# Patient Record
Sex: FEMALE | Race: WHITE | NOT HISPANIC OR LATINO
[De-identification: names, ages, dates, MRNs, and addresses within clinical notes are randomized per-mention and may not be internally consistent; named-entity substitution may affect disease eponyms.]

---

## 2017-04-19 PROBLEM — Z00.00 ENCOUNTER FOR PREVENTIVE HEALTH EXAMINATION: Status: ACTIVE | Noted: 2017-04-19

## 2017-04-27 ENCOUNTER — RESULT REVIEW (OUTPATIENT)
Age: 77
End: 2017-04-27

## 2017-04-27 ENCOUNTER — OUTPATIENT (OUTPATIENT)
Dept: OUTPATIENT SERVICES | Facility: HOSPITAL | Age: 77
LOS: 1 days | Discharge: HOME | End: 2017-04-27
Payer: MEDICARE

## 2017-04-27 DIAGNOSIS — C82.97 FOLLICULAR LYMPHOMA, UNSPECIFIED, SPLEEN: ICD-10-CM

## 2017-04-28 ENCOUNTER — LABORATORY RESULT (OUTPATIENT)
Age: 77
End: 2017-04-28

## 2017-04-28 ENCOUNTER — OUTPATIENT (OUTPATIENT)
Dept: OUTPATIENT SERVICES | Facility: HOSPITAL | Age: 77
LOS: 1 days | End: 2017-04-28
Payer: MEDICARE

## 2017-04-28 ENCOUNTER — APPOINTMENT (OUTPATIENT)
Dept: HEMATOLOGY ONCOLOGY | Facility: CLINIC | Age: 77
End: 2017-04-28

## 2017-04-28 ENCOUNTER — RESULT REVIEW (OUTPATIENT)
Age: 77
End: 2017-04-28

## 2017-04-28 VITALS
WEIGHT: 113.98 LBS | OXYGEN SATURATION: 98 % | RESPIRATION RATE: 16 BRPM | TEMPERATURE: 98.3 F | DIASTOLIC BLOOD PRESSURE: 96 MMHG | HEART RATE: 95 BPM | HEIGHT: 62.8 IN | SYSTOLIC BLOOD PRESSURE: 149 MMHG | BODY MASS INDEX: 20.2 KG/M2

## 2017-04-28 DIAGNOSIS — C82.97 FOLLICULAR LYMPHOMA, UNSPECIFIED, SPLEEN: ICD-10-CM

## 2017-04-28 LAB
BASOPHILS # BLD AUTO: 0 K/UL — SIGNIFICANT CHANGE UP (ref 0–0.2)
BASOPHILS NFR BLD AUTO: 0.7 % — SIGNIFICANT CHANGE UP (ref 0–2)
EOSINOPHIL # BLD AUTO: 0.2 K/UL — SIGNIFICANT CHANGE UP (ref 0–0.5)
EOSINOPHIL NFR BLD AUTO: 2.8 % — SIGNIFICANT CHANGE UP (ref 0–6)
HCT VFR BLD CALC: 35.1 % — SIGNIFICANT CHANGE UP (ref 34.5–45)
HGB BLD-MCNC: 11.9 G/DL — SIGNIFICANT CHANGE UP (ref 11.5–15.5)
LYMPHOCYTES # BLD AUTO: 1.8 K/UL — SIGNIFICANT CHANGE UP (ref 1–3.3)
LYMPHOCYTES # BLD AUTO: 30.3 % — SIGNIFICANT CHANGE UP (ref 13–44)
MCHC RBC-ENTMCNC: 30.6 PG — SIGNIFICANT CHANGE UP (ref 27–34)
MCHC RBC-ENTMCNC: 33.9 G/DL — SIGNIFICANT CHANGE UP (ref 32–36)
MCV RBC AUTO: 90.3 FL — SIGNIFICANT CHANGE UP (ref 80–100)
MONOCYTES # BLD AUTO: 0.9 K/UL — SIGNIFICANT CHANGE UP (ref 0–0.9)
MONOCYTES NFR BLD AUTO: 15.2 % — HIGH (ref 2–14)
NEUTROPHILS # BLD AUTO: 3 K/UL — SIGNIFICANT CHANGE UP (ref 1.8–7.4)
NEUTROPHILS NFR BLD AUTO: 50.9 % — SIGNIFICANT CHANGE UP (ref 43–77)
PLATELET # BLD AUTO: 208 K/UL — SIGNIFICANT CHANGE UP (ref 150–400)
RBC # BLD: 3.89 M/UL — SIGNIFICANT CHANGE UP (ref 3.8–5.2)
RBC # FLD: 13.1 % — SIGNIFICANT CHANGE UP (ref 10.3–14.5)
WBC # BLD: 5.9 K/UL — SIGNIFICANT CHANGE UP (ref 3.8–10.5)
WBC # FLD AUTO: 5.9 K/UL — SIGNIFICANT CHANGE UP (ref 3.8–10.5)

## 2017-04-28 PROCEDURE — 88280 CHROMOSOME KARYOTYPE STUDY: CPT

## 2017-04-28 PROCEDURE — 88264 CHROMOSOME ANALYSIS 20-25: CPT

## 2017-04-28 PROCEDURE — 85097 BONE MARROW INTERPRETATION: CPT

## 2017-04-28 PROCEDURE — 88313 SPECIAL STAINS GROUP 2: CPT

## 2017-04-28 PROCEDURE — 88189 FLOWCYTOMETRY/READ 16 & >: CPT

## 2017-04-28 PROCEDURE — 88185 FLOWCYTOMETRY/TC ADD-ON: CPT

## 2017-04-28 PROCEDURE — 88313 SPECIAL STAINS GROUP 2: CPT | Mod: 26

## 2017-04-28 PROCEDURE — 88237 TISSUE CULTURE BONE MARROW: CPT

## 2017-04-28 PROCEDURE — 88305 TISSUE EXAM BY PATHOLOGIST: CPT | Mod: 26

## 2017-04-28 PROCEDURE — 88305 TISSUE EXAM BY PATHOLOGIST: CPT

## 2017-04-28 PROCEDURE — 88184 FLOWCYTOMETRY/ TC 1 MARKER: CPT

## 2017-04-29 LAB
ALBUMIN SERPL ELPH-MCNC: 4.1 G/DL
ALP BLD-CCNC: 73 U/L
ALT SERPL-CCNC: 11 U/L
ANION GAP SERPL CALC-SCNC: 14 MMOL/L
AST SERPL-CCNC: 16 U/L
BILIRUB SERPL-MCNC: 1.1 MG/DL
BUN SERPL-MCNC: 10 MG/DL
CALCIUM SERPL-MCNC: 9.4 MG/DL
CHLORIDE SERPL-SCNC: 96 MMOL/L
CO2 SERPL-SCNC: 22 MMOL/L
CREAT SERPL-MCNC: 0.49 MG/DL
GLUCOSE SERPL-MCNC: 90 MG/DL
HCV AB SER QL: NONREACTIVE
HCV S/CO RATIO: 0.11 S/CO
HIV1+2 AB SPEC QL IA.RAPID: NONREACTIVE
LDH SERPL-CCNC: 196 U/L
POTASSIUM SERPL-SCNC: 4.2 MMOL/L
PROT SERPL-MCNC: 6 G/DL
SODIUM SERPL-SCNC: 132 MMOL/L
TSH SERPL-ACNC: 1.95 UIU/ML

## 2017-04-29 RX ORDER — PREDNISONE 20 MG/1
20 TABLET ORAL
Qty: 120 | Refills: 0 | Status: DISCONTINUED | COMMUNITY
Start: 2017-04-04

## 2017-04-29 RX ORDER — DILTIAZEM HYDROCHLORIDE 120 MG/1
120 CAPSULE, EXTENDED RELEASE ORAL
Qty: 90 | Refills: 0 | Status: DISCONTINUED | COMMUNITY
Start: 2017-03-20

## 2017-04-29 RX ORDER — CEFUROXIME AXETIL 500 MG/1
500 TABLET ORAL
Qty: 20 | Refills: 0 | Status: DISCONTINUED | COMMUNITY
Start: 2017-02-23

## 2017-04-29 RX ORDER — PROPRANOLOL HYDROCHLORIDE 10 MG/1
10 TABLET ORAL
Qty: 180 | Refills: 0 | Status: DISCONTINUED | COMMUNITY
Start: 2016-10-07

## 2017-04-29 RX ORDER — FLUTICASONE PROPIONATE 50 UG/1
50 SPRAY, METERED NASAL
Qty: 16 | Refills: 0 | Status: DISCONTINUED | COMMUNITY
Start: 2017-02-23

## 2017-04-29 RX ORDER — LEVOFLOXACIN 500 MG/1
500 TABLET, FILM COATED ORAL
Qty: 10 | Refills: 0 | Status: DISCONTINUED | COMMUNITY
Start: 2017-02-28

## 2017-04-29 RX ORDER — METOPROLOL TARTRATE 25 MG/1
25 TABLET, FILM COATED ORAL
Qty: 30 | Refills: 0 | Status: DISCONTINUED | COMMUNITY
Start: 2017-03-08

## 2017-05-01 ENCOUNTER — RESULT REVIEW (OUTPATIENT)
Age: 77
End: 2017-05-01

## 2017-05-03 LAB
HEMATOPATHOLOGY REPORT: SIGNIFICANT CHANGE UP
TM INTERPRETATION: SIGNIFICANT CHANGE UP

## 2017-05-04 ENCOUNTER — RESULT REVIEW (OUTPATIENT)
Age: 77
End: 2017-05-04

## 2017-05-04 ENCOUNTER — LABORATORY RESULT (OUTPATIENT)
Age: 77
End: 2017-05-04

## 2017-05-04 ENCOUNTER — APPOINTMENT (OUTPATIENT)
Dept: INFUSION THERAPY | Facility: HOSPITAL | Age: 77
End: 2017-05-04

## 2017-05-04 LAB
BASOPHILS # BLD AUTO: 0 K/UL — SIGNIFICANT CHANGE UP (ref 0–0.2)
BASOPHILS NFR BLD AUTO: 0.1 % — SIGNIFICANT CHANGE UP (ref 0–2)
EOSINOPHIL # BLD AUTO: 0.1 K/UL — SIGNIFICANT CHANGE UP (ref 0–0.5)
EOSINOPHIL NFR BLD AUTO: 2.1 % — SIGNIFICANT CHANGE UP (ref 0–6)
HCT VFR BLD CALC: 33.8 % — LOW (ref 34.5–45)
HGB BLD-MCNC: 11.9 G/DL — SIGNIFICANT CHANGE UP (ref 11.5–15.5)
LYMPHOCYTES # BLD AUTO: 1.5 K/UL — SIGNIFICANT CHANGE UP (ref 1–3.3)
LYMPHOCYTES # BLD AUTO: 26.2 % — SIGNIFICANT CHANGE UP (ref 13–44)
MCHC RBC-ENTMCNC: 31.8 PG — SIGNIFICANT CHANGE UP (ref 27–34)
MCHC RBC-ENTMCNC: 35.2 G/DL — SIGNIFICANT CHANGE UP (ref 32–36)
MCV RBC AUTO: 90.3 FL — SIGNIFICANT CHANGE UP (ref 80–100)
MONOCYTES # BLD AUTO: 1 K/UL — HIGH (ref 0–0.9)
MONOCYTES NFR BLD AUTO: 16.6 % — HIGH (ref 2–14)
NEUTROPHILS # BLD AUTO: 3.2 K/UL — SIGNIFICANT CHANGE UP (ref 1.8–7.4)
NEUTROPHILS NFR BLD AUTO: 55.1 % — SIGNIFICANT CHANGE UP (ref 43–77)
PLATELET # BLD AUTO: 238 K/UL — SIGNIFICANT CHANGE UP (ref 150–400)
RBC # BLD: 3.74 M/UL — LOW (ref 3.8–5.2)
RBC # FLD: 13 % — SIGNIFICANT CHANGE UP (ref 10.3–14.5)
WBC # BLD: 5.8 K/UL — SIGNIFICANT CHANGE UP (ref 3.8–10.5)
WBC # FLD AUTO: 5.8 K/UL — SIGNIFICANT CHANGE UP (ref 3.8–10.5)

## 2017-05-05 ENCOUNTER — RESULT REVIEW (OUTPATIENT)
Age: 77
End: 2017-05-05

## 2017-05-05 ENCOUNTER — APPOINTMENT (OUTPATIENT)
Dept: INFUSION THERAPY | Facility: HOSPITAL | Age: 77
End: 2017-05-05

## 2017-05-05 DIAGNOSIS — R11.2 NAUSEA WITH VOMITING, UNSPECIFIED: ICD-10-CM

## 2017-05-05 DIAGNOSIS — Z51.11 ENCOUNTER FOR ANTINEOPLASTIC CHEMOTHERAPY: ICD-10-CM

## 2017-05-05 DIAGNOSIS — E86.0 DEHYDRATION: ICD-10-CM

## 2017-05-05 LAB
BUN SERPL-MCNC: 13 MG/DL — SIGNIFICANT CHANGE UP (ref 7–23)
CA-I BLDA-SCNC: 1.22 MMOL/L — SIGNIFICANT CHANGE UP (ref 1.12–1.3)
CHLORIDE SERPL-SCNC: 92 MMOL/L — LOW (ref 96–108)
CO2 SERPL-SCNC: 28 MMOL/L — SIGNIFICANT CHANGE UP (ref 22–31)
CREAT SERPL-MCNC: 0.6 MG/DL — SIGNIFICANT CHANGE UP (ref 0.5–1.3)
GLUCOSE SERPL-MCNC: 95 MG/DL — SIGNIFICANT CHANGE UP (ref 70–99)
POTASSIUM SERPL-MCNC: 4.6 MMOL/L — SIGNIFICANT CHANGE UP (ref 3.5–5.3)
POTASSIUM SERPL-SCNC: 4.6 MMOL/L — SIGNIFICANT CHANGE UP (ref 3.5–5.3)
SODIUM SERPL-SCNC: 129 MMOL/L — LOW (ref 135–145)

## 2017-05-08 ENCOUNTER — RESULT REVIEW (OUTPATIENT)
Age: 77
End: 2017-05-08

## 2017-05-08 ENCOUNTER — APPOINTMENT (OUTPATIENT)
Dept: HEMATOLOGY ONCOLOGY | Facility: CLINIC | Age: 77
End: 2017-05-08

## 2017-05-08 DIAGNOSIS — Z51.89 ENCOUNTER FOR OTHER SPECIFIED AFTERCARE: ICD-10-CM

## 2017-05-08 DIAGNOSIS — C82.02 FOLLICULAR LYMPHOMA GRADE I, INTRATHORACIC LYMPH NODES: ICD-10-CM

## 2017-05-08 LAB
24R-OH-CALCIDIOL SERPL-MCNC: 20.4 NG/ML — LOW (ref 30–100)
ALBUMIN SERPL ELPH-MCNC: 4.2 G/DL — SIGNIFICANT CHANGE UP (ref 3.3–5)
ALP SERPL-CCNC: 119 U/L — SIGNIFICANT CHANGE UP (ref 40–120)
ALT FLD-CCNC: 14 U/L — SIGNIFICANT CHANGE UP (ref 10–45)
ANION GAP SERPL CALC-SCNC: 19 MMOL/L — HIGH (ref 5–17)
AST SERPL-CCNC: 22 U/L — SIGNIFICANT CHANGE UP (ref 10–40)
BASOPHILS # BLD AUTO: 0 K/UL — SIGNIFICANT CHANGE UP (ref 0–0.2)
BASOPHILS NFR BLD AUTO: 2 % — SIGNIFICANT CHANGE UP (ref 0–2)
BILIRUB SERPL-MCNC: 1.8 MG/DL — HIGH (ref 0.2–1.2)
BUN SERPL-MCNC: 3 MG/DL — LOW (ref 7–23)
BUN SERPL-MCNC: 7 MG/DL — SIGNIFICANT CHANGE UP (ref 7–23)
CA-I BLDA-SCNC: 1.17 MMOL/L — SIGNIFICANT CHANGE UP (ref 1.12–1.3)
CALCIUM SERPL-MCNC: 9.7 MG/DL — SIGNIFICANT CHANGE UP (ref 8.4–10.5)
CHLORIDE SERPL-SCNC: 91 MMOL/L — LOW (ref 96–108)
CHLORIDE SERPL-SCNC: 92 MMOL/L — LOW (ref 96–108)
CO2 SERPL-SCNC: 23 MMOL/L — SIGNIFICANT CHANGE UP (ref 22–31)
CO2 SERPL-SCNC: 26 MMOL/L — SIGNIFICANT CHANGE UP (ref 22–31)
CREAT SERPL-MCNC: 0.5 MG/DL — SIGNIFICANT CHANGE UP (ref 0.5–1.3)
CREAT SERPL-MCNC: 0.52 MG/DL — SIGNIFICANT CHANGE UP (ref 0.5–1.3)
EOSINOPHIL # BLD AUTO: 0.1 K/UL — SIGNIFICANT CHANGE UP (ref 0–0.5)
GLUCOSE SERPL-MCNC: 82 MG/DL — SIGNIFICANT CHANGE UP (ref 70–99)
GLUCOSE SERPL-MCNC: 90 MG/DL — SIGNIFICANT CHANGE UP (ref 70–99)
HCT VFR BLD CALC: 36 % — SIGNIFICANT CHANGE UP (ref 34.5–45)
HGB BLD-MCNC: 12.6 G/DL — SIGNIFICANT CHANGE UP (ref 11.5–15.5)
LDH SERPL L TO P-CCNC: 294 U/L — HIGH (ref 50–242)
LYMPHOCYTES # BLD AUTO: 0.8 K/UL — LOW (ref 1–3.3)
LYMPHOCYTES # BLD AUTO: 2 % — LOW (ref 13–44)
MCHC RBC-ENTMCNC: 31.7 PG — SIGNIFICANT CHANGE UP (ref 27–34)
MCHC RBC-ENTMCNC: 35 G/DL — SIGNIFICANT CHANGE UP (ref 32–36)
MCV RBC AUTO: 90.7 FL — SIGNIFICANT CHANGE UP (ref 80–100)
MONOCYTES # BLD AUTO: 2 K/UL — HIGH (ref 0–0.9)
MONOCYTES NFR BLD AUTO: 8 % — SIGNIFICANT CHANGE UP (ref 2–14)
NEUTROPHILS # BLD AUTO: 47.9 K/UL — HIGH (ref 1.8–7.4)
NEUTROPHILS NFR BLD AUTO: 86 % — HIGH (ref 43–77)
NEUTS BAND # BLD: 2 % — SIGNIFICANT CHANGE UP (ref 0–8)
PLAT MORPH BLD: NORMAL — SIGNIFICANT CHANGE UP
PLATELET # BLD AUTO: 262 K/UL — SIGNIFICANT CHANGE UP (ref 150–400)
POTASSIUM SERPL-MCNC: 3.3 MMOL/L — LOW (ref 3.5–5.3)
POTASSIUM SERPL-MCNC: 3.5 MMOL/L — SIGNIFICANT CHANGE UP (ref 3.5–5.3)
POTASSIUM SERPL-SCNC: 3.3 MMOL/L — LOW (ref 3.5–5.3)
POTASSIUM SERPL-SCNC: 3.5 MMOL/L — SIGNIFICANT CHANGE UP (ref 3.5–5.3)
PROT SERPL-MCNC: 6.2 G/DL — SIGNIFICANT CHANGE UP (ref 6–8.3)
RBC # BLD: 3.98 M/UL — SIGNIFICANT CHANGE UP (ref 3.8–5.2)
RBC # FLD: 13.3 % — SIGNIFICANT CHANGE UP (ref 10.3–14.5)
RBC BLD AUTO: NORMAL — SIGNIFICANT CHANGE UP
SODIUM SERPL-SCNC: 133 MMOL/L — LOW (ref 135–145)
SODIUM SERPL-SCNC: 134 MMOL/L — LOW (ref 135–145)
WBC # BLD: 50.8 K/UL — CRITICAL HIGH (ref 3.8–10.5)
WBC # FLD AUTO: 50.8 K/UL — CRITICAL HIGH (ref 3.8–10.5)

## 2017-05-09 ENCOUNTER — APPOINTMENT (OUTPATIENT)
Dept: HEMATOLOGY ONCOLOGY | Facility: CLINIC | Age: 77
End: 2017-05-09

## 2017-05-11 ENCOUNTER — RESULT REVIEW (OUTPATIENT)
Age: 77
End: 2017-05-11

## 2017-05-11 PROCEDURE — 88321 CONSLTJ&REPRT SLD PREP ELSWR: CPT

## 2017-05-12 LAB — CHROM ANALY OVERALL INTERP SPEC-IMP: SIGNIFICANT CHANGE UP

## 2017-05-15 ENCOUNTER — LABORATORY RESULT (OUTPATIENT)
Age: 77
End: 2017-05-15

## 2017-05-15 ENCOUNTER — RESULT REVIEW (OUTPATIENT)
Age: 77
End: 2017-05-15

## 2017-05-15 ENCOUNTER — APPOINTMENT (OUTPATIENT)
Dept: HEMATOLOGY ONCOLOGY | Facility: CLINIC | Age: 77
End: 2017-05-15

## 2017-05-15 VITALS
BODY MASS INDEX: 21.22 KG/M2 | TEMPERATURE: 100.1 F | HEART RATE: 148 BPM | OXYGEN SATURATION: 97 % | RESPIRATION RATE: 18 BRPM | DIASTOLIC BLOOD PRESSURE: 82 MMHG | WEIGHT: 119.05 LBS | SYSTOLIC BLOOD PRESSURE: 136 MMHG

## 2017-05-15 LAB
BASOPHILS # BLD AUTO: 0 K/UL — SIGNIFICANT CHANGE UP (ref 0–0.2)
BASOPHILS NFR BLD AUTO: 0.2 % — SIGNIFICANT CHANGE UP (ref 0–2)
BUN SERPL-MCNC: 6 MG/DL — LOW (ref 7–23)
CA-I BLDA-SCNC: 1.08 MMOL/L — LOW (ref 1.12–1.3)
CHLORIDE SERPL-SCNC: 91 MMOL/L — LOW (ref 96–108)
CO2 SERPL-SCNC: 25 MMOL/L — SIGNIFICANT CHANGE UP (ref 22–31)
CREAT SERPL-MCNC: 0.5 MG/DL — SIGNIFICANT CHANGE UP (ref 0.5–1.3)
EOSINOPHIL # BLD AUTO: 0.1 K/UL — SIGNIFICANT CHANGE UP (ref 0–0.5)
EOSINOPHIL NFR BLD AUTO: 0.3 % — SIGNIFICANT CHANGE UP (ref 0–6)
GLUCOSE SERPL-MCNC: 102 MG/DL — HIGH (ref 70–99)
HCT VFR BLD CALC: 33.3 % — LOW (ref 34.5–45)
HGB BLD-MCNC: 11.5 G/DL — SIGNIFICANT CHANGE UP (ref 11.5–15.5)
LYMPHOCYTES # BLD AUTO: 1 K/UL — SIGNIFICANT CHANGE UP (ref 1–3.3)
LYMPHOCYTES # BLD AUTO: 4.7 % — LOW (ref 13–44)
MCHC RBC-ENTMCNC: 31.5 PG — SIGNIFICANT CHANGE UP (ref 27–34)
MCHC RBC-ENTMCNC: 34.4 G/DL — SIGNIFICANT CHANGE UP (ref 32–36)
MCV RBC AUTO: 91.4 FL — SIGNIFICANT CHANGE UP (ref 80–100)
MONOCYTES # BLD AUTO: 1.4 K/UL — HIGH (ref 0–0.9)
MONOCYTES NFR BLD AUTO: 6.6 % — SIGNIFICANT CHANGE UP (ref 2–14)
NEUTROPHILS # BLD AUTO: 18.5 K/UL — HIGH (ref 1.8–7.4)
NEUTROPHILS NFR BLD AUTO: 88.2 % — HIGH (ref 43–77)
PLATELET # BLD AUTO: 111 K/UL — LOW (ref 150–400)
POTASSIUM SERPL-MCNC: 3.6 MMOL/L — SIGNIFICANT CHANGE UP (ref 3.5–5.3)
POTASSIUM SERPL-SCNC: 3.6 MMOL/L — SIGNIFICANT CHANGE UP (ref 3.5–5.3)
RBC # BLD: 3.64 M/UL — LOW (ref 3.8–5.2)
RBC # FLD: 14.2 % — SIGNIFICANT CHANGE UP (ref 10.3–14.5)
SODIUM SERPL-SCNC: 130 MMOL/L — LOW (ref 135–145)
SURGICAL PATHOLOGY STUDY: SIGNIFICANT CHANGE UP
WBC # BLD: 21 K/UL — HIGH (ref 3.8–10.5)
WBC # FLD AUTO: 21 K/UL — HIGH (ref 3.8–10.5)

## 2017-05-15 RX ORDER — APIXABAN 5 MG/1
5 TABLET, FILM COATED ORAL
Qty: 60 | Refills: 0 | Status: DISCONTINUED | COMMUNITY
Start: 2017-04-13 | End: 2017-05-15

## 2017-05-18 ENCOUNTER — RESULT REVIEW (OUTPATIENT)
Age: 77
End: 2017-05-18

## 2017-05-18 ENCOUNTER — APPOINTMENT (OUTPATIENT)
Dept: HEMATOLOGY ONCOLOGY | Facility: CLINIC | Age: 77
End: 2017-05-18

## 2017-05-18 LAB
BASOPHILS # BLD AUTO: 0 K/UL — SIGNIFICANT CHANGE UP (ref 0–0.2)
BASOPHILS NFR BLD AUTO: 0.1 % — SIGNIFICANT CHANGE UP (ref 0–2)
EOSINOPHIL # BLD AUTO: 0.2 K/UL — SIGNIFICANT CHANGE UP (ref 0–0.5)
EOSINOPHIL NFR BLD AUTO: 0.9 % — SIGNIFICANT CHANGE UP (ref 0–6)
HCT VFR BLD CALC: 32.8 % — LOW (ref 34.5–45)
HGB BLD-MCNC: 11 G/DL — LOW (ref 11.5–15.5)
LYMPHOCYTES # BLD AUTO: 0.7 K/UL — LOW (ref 1–3.3)
LYMPHOCYTES # BLD AUTO: 3.7 % — LOW (ref 13–44)
MCHC RBC-ENTMCNC: 31 PG — SIGNIFICANT CHANGE UP (ref 27–34)
MCHC RBC-ENTMCNC: 33.6 G/DL — SIGNIFICANT CHANGE UP (ref 32–36)
MCV RBC AUTO: 92.2 FL — SIGNIFICANT CHANGE UP (ref 80–100)
MONOCYTES # BLD AUTO: 0.8 K/UL — SIGNIFICANT CHANGE UP (ref 0–0.9)
MONOCYTES NFR BLD AUTO: 4.4 % — SIGNIFICANT CHANGE UP (ref 2–14)
NEUTROPHILS # BLD AUTO: 16.5 K/UL — HIGH (ref 1.8–7.4)
NEUTROPHILS NFR BLD AUTO: 90.9 % — HIGH (ref 43–77)
PLATELET # BLD AUTO: 166 K/UL — SIGNIFICANT CHANGE UP (ref 150–400)
RBC # BLD: 3.56 M/UL — LOW (ref 3.8–5.2)
RBC # FLD: 14.2 % — SIGNIFICANT CHANGE UP (ref 10.3–14.5)
WBC # BLD: 18.1 K/UL — HIGH (ref 3.8–10.5)
WBC # FLD AUTO: 18.1 K/UL — HIGH (ref 3.8–10.5)

## 2017-05-18 RX ORDER — OMEPRAZOLE 20 MG/1
20 CAPSULE, DELAYED RELEASE ORAL
Qty: 14 | Refills: 0 | Status: DISCONTINUED | COMMUNITY
Start: 2017-04-04 | End: 2017-05-18

## 2017-05-19 LAB
ALBUMIN SERPL ELPH-MCNC: 3.4 G/DL
ALP BLD-CCNC: 85 U/L
ALT SERPL-CCNC: 8 U/L
ANION GAP SERPL CALC-SCNC: 20 MMOL/L
AST SERPL-CCNC: 11 U/L
BACTERIA UR CULT: ABNORMAL
BILIRUB SERPL-MCNC: 1.9 MG/DL
BUN SERPL-MCNC: 22 MG/DL
CALCIUM SERPL-MCNC: 8.7 MG/DL
CHLORIDE SERPL-SCNC: 87 MMOL/L
CO2 SERPL-SCNC: 19 MMOL/L
CREAT SERPL-MCNC: 0.65 MG/DL
GLUCOSE SERPL-MCNC: 117 MG/DL
LDH SERPL-CCNC: 282 U/L
POTASSIUM SERPL-SCNC: 4.2 MMOL/L
PROT SERPL-MCNC: 5.6 G/DL
SODIUM SERPL-SCNC: 126 MMOL/L

## 2017-05-22 ENCOUNTER — LABORATORY RESULT (OUTPATIENT)
Age: 77
End: 2017-05-22

## 2017-05-22 ENCOUNTER — APPOINTMENT (OUTPATIENT)
Dept: HEMATOLOGY ONCOLOGY | Facility: CLINIC | Age: 77
End: 2017-05-22

## 2017-05-22 ENCOUNTER — RESULT REVIEW (OUTPATIENT)
Age: 77
End: 2017-05-22

## 2017-05-22 ENCOUNTER — FORM ENCOUNTER (OUTPATIENT)
Age: 77
End: 2017-05-22

## 2017-05-22 LAB
BASOPHILS # BLD AUTO: 0.1 K/UL — SIGNIFICANT CHANGE UP (ref 0–0.2)
BASOPHILS NFR BLD AUTO: 1.2 % — SIGNIFICANT CHANGE UP (ref 0–2)
BUN SERPL-MCNC: 8 MG/DL — SIGNIFICANT CHANGE UP (ref 7–23)
CA-I BLDA-SCNC: 1.14 MMOL/L — SIGNIFICANT CHANGE UP (ref 1.12–1.3)
CHLORIDE SERPL-SCNC: 92 MMOL/L — LOW (ref 96–108)
CO2 SERPL-SCNC: 25 MMOL/L — SIGNIFICANT CHANGE UP (ref 22–31)
CREAT SERPL-MCNC: 0.3 MG/DL — LOW (ref 0.5–1.3)
EOSINOPHIL # BLD AUTO: 0.1 K/UL — SIGNIFICANT CHANGE UP (ref 0–0.5)
EOSINOPHIL NFR BLD AUTO: 1.7 % — SIGNIFICANT CHANGE UP (ref 0–6)
GLUCOSE SERPL-MCNC: 91 MG/DL — SIGNIFICANT CHANGE UP (ref 70–99)
HCT VFR BLD CALC: 30.2 % — LOW (ref 34.5–45)
HGB BLD-MCNC: 10.3 G/DL — LOW (ref 11.5–15.5)
LYMPHOCYTES # BLD AUTO: 0.4 K/UL — LOW (ref 1–3.3)
LYMPHOCYTES # BLD AUTO: 8 % — LOW (ref 13–44)
MCHC RBC-ENTMCNC: 31.9 PG — SIGNIFICANT CHANGE UP (ref 27–34)
MCHC RBC-ENTMCNC: 34.2 G/DL — SIGNIFICANT CHANGE UP (ref 32–36)
MCV RBC AUTO: 93.3 FL — SIGNIFICANT CHANGE UP (ref 80–100)
MONOCYTES # BLD AUTO: 0.6 K/UL — SIGNIFICANT CHANGE UP (ref 0–0.9)
MONOCYTES NFR BLD AUTO: 12.9 % — SIGNIFICANT CHANGE UP (ref 2–14)
NEUTROPHILS # BLD AUTO: 3.4 K/UL — SIGNIFICANT CHANGE UP (ref 1.8–7.4)
NEUTROPHILS NFR BLD AUTO: 76.1 % — SIGNIFICANT CHANGE UP (ref 43–77)
PLATELET # BLD AUTO: 256 K/UL — SIGNIFICANT CHANGE UP (ref 150–400)
POTASSIUM SERPL-MCNC: 4.4 MMOL/L — SIGNIFICANT CHANGE UP (ref 3.5–5.3)
POTASSIUM SERPL-SCNC: 4.4 MMOL/L — SIGNIFICANT CHANGE UP (ref 3.5–5.3)
RBC # BLD: 3.24 M/UL — LOW (ref 3.8–5.2)
RBC # FLD: 13.8 % — SIGNIFICANT CHANGE UP (ref 10.3–14.5)
SODIUM SERPL-SCNC: 131 MMOL/L — LOW (ref 135–145)
WBC # BLD: 4.5 K/UL — SIGNIFICANT CHANGE UP (ref 3.8–10.5)
WBC # FLD AUTO: 4.5 K/UL — SIGNIFICANT CHANGE UP (ref 3.8–10.5)

## 2017-05-23 ENCOUNTER — APPOINTMENT (OUTPATIENT)
Dept: NUCLEAR MEDICINE | Facility: IMAGING CENTER | Age: 77
End: 2017-05-23

## 2017-05-23 ENCOUNTER — APPOINTMENT (OUTPATIENT)
Dept: NEPHROLOGY | Facility: CLINIC | Age: 77
End: 2017-05-23

## 2017-05-23 ENCOUNTER — OUTPATIENT (OUTPATIENT)
Dept: OUTPATIENT SERVICES | Facility: HOSPITAL | Age: 77
LOS: 1 days | End: 2017-05-23
Payer: MEDICARE

## 2017-05-23 VITALS
BODY MASS INDEX: 21.71 KG/M2 | HEART RATE: 99 BPM | OXYGEN SATURATION: 97 % | DIASTOLIC BLOOD PRESSURE: 97 MMHG | HEIGHT: 62 IN | WEIGHT: 118 LBS | SYSTOLIC BLOOD PRESSURE: 131 MMHG

## 2017-05-23 DIAGNOSIS — C82.02 FOLLICULAR LYMPHOMA GRADE I, INTRATHORACIC LYMPH NODES: ICD-10-CM

## 2017-05-23 DIAGNOSIS — Z87.19 PERSONAL HISTORY OF OTHER DISEASES OF THE DIGESTIVE SYSTEM: ICD-10-CM

## 2017-05-23 PROCEDURE — 78472 GATED HEART PLANAR SINGLE: CPT

## 2017-05-23 PROCEDURE — A9538: CPT

## 2017-05-23 PROCEDURE — A9560: CPT

## 2017-05-23 RX ORDER — ACYCLOVIR 800 MG/1
800 TABLET ORAL
Qty: 14 | Refills: 3 | Status: DISCONTINUED | COMMUNITY
Start: 2017-05-18 | End: 2017-05-23

## 2017-05-24 ENCOUNTER — APPOINTMENT (OUTPATIENT)
Dept: NUCLEAR MEDICINE | Facility: IMAGING CENTER | Age: 77
End: 2017-05-24

## 2017-05-24 LAB
APPEARANCE: ABNORMAL
BACTERIA: NEGATIVE
BILIRUBIN URINE: NEGATIVE
BLOOD URINE: NEGATIVE
COLOR: ABNORMAL
CREAT SPEC-SCNC: 63 MG/DL
GLUCOSE QUALITATIVE U: NORMAL MG/DL
HYALINE CASTS: 0 /LPF
KETONES URINE: NEGATIVE
LEUKOCYTE ESTERASE URINE: ABNORMAL
MICROSCOPIC-UA: NORMAL
NITRITE URINE: NEGATIVE
OSMOLALITY UR: 305 MOS/KG
PH URINE: 7
PROTEIN URINE: NEGATIVE MG/DL
RED BLOOD CELLS URINE: 2 /HPF
SODIUM ?TM SUB UR QN: 43 MMOL/L
SPECIFIC GRAVITY URINE: 1.01
SQUAMOUS EPITHELIAL CELLS: >27 /HPF
UROBILINOGEN URINE: 1 MG/DL
WHITE BLOOD CELLS URINE: 10 /HPF

## 2017-05-30 ENCOUNTER — OUTPATIENT (OUTPATIENT)
Dept: OUTPATIENT SERVICES | Facility: HOSPITAL | Age: 77
LOS: 1 days | Discharge: HOME | End: 2017-05-30

## 2017-05-30 DIAGNOSIS — C82.97 FOLLICULAR LYMPHOMA, UNSPECIFIED, SPLEEN: ICD-10-CM

## 2017-06-01 ENCOUNTER — APPOINTMENT (OUTPATIENT)
Dept: INFUSION THERAPY | Facility: HOSPITAL | Age: 77
End: 2017-06-01

## 2017-06-01 ENCOUNTER — LABORATORY RESULT (OUTPATIENT)
Age: 77
End: 2017-06-01

## 2017-06-01 ENCOUNTER — RESULT REVIEW (OUTPATIENT)
Age: 77
End: 2017-06-01

## 2017-06-01 LAB
BASOPHILS # BLD AUTO: 0 K/UL — SIGNIFICANT CHANGE UP (ref 0–0.2)
BASOPHILS NFR BLD AUTO: 0.2 % — SIGNIFICANT CHANGE UP (ref 0–2)
EOSINOPHIL # BLD AUTO: 0.3 K/UL — SIGNIFICANT CHANGE UP (ref 0–0.5)
EOSINOPHIL NFR BLD AUTO: 2.7 % — SIGNIFICANT CHANGE UP (ref 0–6)
HCT VFR BLD CALC: 32.9 % — LOW (ref 34.5–45)
HGB BLD-MCNC: 11.3 G/DL — LOW (ref 11.5–15.5)
LYMPHOCYTES # BLD AUTO: 0.9 K/UL — LOW (ref 1–3.3)
LYMPHOCYTES # BLD AUTO: 9.2 % — LOW (ref 13–44)
MCHC RBC-ENTMCNC: 31.3 PG — SIGNIFICANT CHANGE UP (ref 27–34)
MCHC RBC-ENTMCNC: 34.2 G/DL — SIGNIFICANT CHANGE UP (ref 32–36)
MCV RBC AUTO: 91.4 FL — SIGNIFICANT CHANGE UP (ref 80–100)
MONOCYTES # BLD AUTO: 0.4 K/UL — SIGNIFICANT CHANGE UP (ref 0–0.9)
MONOCYTES NFR BLD AUTO: 4.3 % — SIGNIFICANT CHANGE UP (ref 2–14)
NEUTROPHILS # BLD AUTO: 8.4 K/UL — HIGH (ref 1.8–7.4)
NEUTROPHILS NFR BLD AUTO: 83.7 % — HIGH (ref 43–77)
PLATELET # BLD AUTO: 199 K/UL — SIGNIFICANT CHANGE UP (ref 150–400)
RBC # BLD: 3.6 M/UL — LOW (ref 3.8–5.2)
RBC # FLD: 15.1 % — HIGH (ref 10.3–14.5)
WBC # BLD: 10.1 K/UL — SIGNIFICANT CHANGE UP (ref 3.8–10.5)
WBC # FLD AUTO: 10.1 K/UL — SIGNIFICANT CHANGE UP (ref 3.8–10.5)

## 2017-06-02 ENCOUNTER — APPOINTMENT (OUTPATIENT)
Dept: INFUSION THERAPY | Facility: HOSPITAL | Age: 77
End: 2017-06-02

## 2017-06-02 DIAGNOSIS — C82.82: ICD-10-CM

## 2017-06-02 DIAGNOSIS — Z51.11 ENCOUNTER FOR ANTINEOPLASTIC CHEMOTHERAPY: ICD-10-CM

## 2017-06-02 DIAGNOSIS — R11.2 NAUSEA WITH VOMITING, UNSPECIFIED: ICD-10-CM

## 2017-06-04 LAB — BACTERIA BLD CULT: NORMAL

## 2017-06-05 DIAGNOSIS — Z51.89 ENCOUNTER FOR OTHER SPECIFIED AFTERCARE: ICD-10-CM

## 2017-06-08 ENCOUNTER — RESULT REVIEW (OUTPATIENT)
Age: 77
End: 2017-06-08

## 2017-06-08 ENCOUNTER — APPOINTMENT (OUTPATIENT)
Dept: HEMATOLOGY ONCOLOGY | Facility: CLINIC | Age: 77
End: 2017-06-08

## 2017-06-08 VITALS
BODY MASS INDEX: 21.37 KG/M2 | HEART RATE: 101 BPM | RESPIRATION RATE: 16 BRPM | DIASTOLIC BLOOD PRESSURE: 84 MMHG | OXYGEN SATURATION: 98 % | TEMPERATURE: 98.6 F | WEIGHT: 116.82 LBS | SYSTOLIC BLOOD PRESSURE: 144 MMHG

## 2017-06-08 LAB
HCT VFR BLD CALC: 30 % — LOW (ref 34.5–45)
HGB BLD-MCNC: 10.4 G/DL — LOW (ref 11.5–15.5)
MCHC RBC-ENTMCNC: 31.3 PG — SIGNIFICANT CHANGE UP (ref 27–34)
MCHC RBC-ENTMCNC: 34.8 G/DL — SIGNIFICANT CHANGE UP (ref 32–36)
MCV RBC AUTO: 90 FL — SIGNIFICANT CHANGE UP (ref 80–100)
PLATELET # BLD AUTO: 79 K/UL — LOW (ref 150–400)
RBC # BLD: 3.34 M/UL — LOW (ref 3.8–5.2)
RBC # FLD: 14.5 % — SIGNIFICANT CHANGE UP (ref 10.3–14.5)
WBC # BLD: 0.5 K/UL — CRITICAL LOW (ref 3.8–10.5)
WBC # FLD AUTO: 0.5 K/UL — CRITICAL LOW (ref 3.8–10.5)

## 2017-06-08 RX ORDER — LEVOFLOXACIN 500 MG/1
500 TABLET, FILM COATED ORAL
Qty: 25 | Refills: 1 | Status: DISCONTINUED | COMMUNITY
Start: 2017-06-01 | End: 2017-06-08

## 2017-06-08 RX ORDER — ALLOPURINOL 300 MG/1
300 TABLET ORAL
Qty: 14 | Refills: 0 | Status: COMPLETED | COMMUNITY
Start: 2017-05-02 | End: 2017-06-08

## 2017-06-10 ENCOUNTER — INPATIENT (INPATIENT)
Facility: HOSPITAL | Age: 77
LOS: 3 days | Discharge: HOME | End: 2017-06-14
Attending: HOSPITALIST | Admitting: HOSPITALIST
Payer: MEDICARE

## 2017-06-10 VITALS
SYSTOLIC BLOOD PRESSURE: 153 MMHG | DIASTOLIC BLOOD PRESSURE: 111 MMHG | TEMPERATURE: 101 F | OXYGEN SATURATION: 99 % | HEART RATE: 140 BPM | RESPIRATION RATE: 16 BRPM

## 2017-06-10 DIAGNOSIS — D70.9 NEUTROPENIA, UNSPECIFIED: ICD-10-CM

## 2017-06-10 LAB
ALBUMIN SERPL ELPH-MCNC: 3.7 G/DL — SIGNIFICANT CHANGE UP (ref 3.3–5)
ALP SERPL-CCNC: 66 U/L — SIGNIFICANT CHANGE UP (ref 40–120)
ALT FLD-CCNC: 11 U/L — SIGNIFICANT CHANGE UP (ref 4–33)
ANISOCYTOSIS BLD QL: SLIGHT — SIGNIFICANT CHANGE UP
APPEARANCE UR: CLEAR — SIGNIFICANT CHANGE UP
AST SERPL-CCNC: 17 U/L — SIGNIFICANT CHANGE UP (ref 4–32)
BACTERIA # UR AUTO: SIGNIFICANT CHANGE UP
BASE EXCESS BLDV CALC-SCNC: 3.2 MMOL/L — SIGNIFICANT CHANGE UP
BASOPHILS # BLD AUTO: 0.01 K/UL — SIGNIFICANT CHANGE UP (ref 0–0.2)
BASOPHILS NFR BLD AUTO: 3.6 % — HIGH (ref 0–2)
BASOPHILS NFR SPEC: 0 % — SIGNIFICANT CHANGE UP (ref 0–2)
BILIRUB SERPL-MCNC: 1.3 MG/DL — HIGH (ref 0.2–1.2)
BILIRUB UR-MCNC: NEGATIVE — SIGNIFICANT CHANGE UP
BLOOD GAS VENOUS - CREATININE: 0.44 MG/DL — LOW (ref 0.5–1.3)
BLOOD UR QL VISUAL: NEGATIVE — SIGNIFICANT CHANGE UP
BUN SERPL-MCNC: 12 MG/DL — SIGNIFICANT CHANGE UP (ref 7–23)
CALCIUM SERPL-MCNC: 8.4 MG/DL — SIGNIFICANT CHANGE UP (ref 8.4–10.5)
CHLORIDE BLDV-SCNC: 93 MMOL/L — LOW (ref 96–108)
CHLORIDE SERPL-SCNC: 93 MMOL/L — LOW (ref 98–107)
CO2 SERPL-SCNC: 24 MMOL/L — SIGNIFICANT CHANGE UP (ref 22–31)
COLOR SPEC: YELLOW — SIGNIFICANT CHANGE UP
CREAT SERPL-MCNC: 0.45 MG/DL — LOW (ref 0.5–1.3)
EOSINOPHIL # BLD AUTO: 0.02 K/UL — SIGNIFICANT CHANGE UP (ref 0–0.5)
EOSINOPHIL NFR BLD AUTO: 7.1 % — HIGH (ref 0–6)
EOSINOPHIL NFR FLD: 12 % — HIGH (ref 0–6)
GAS PNL BLDV: 126 MMOL/L — LOW (ref 136–146)
GLUCOSE BLDV-MCNC: 100 — HIGH (ref 70–99)
GLUCOSE SERPL-MCNC: 107 MG/DL — HIGH (ref 70–99)
GLUCOSE UR-MCNC: NEGATIVE — SIGNIFICANT CHANGE UP
HCO3 BLDV-SCNC: 26 MMOL/L — SIGNIFICANT CHANGE UP (ref 20–27)
HCT VFR BLD CALC: 30 % — LOW (ref 34.5–45)
HCT VFR BLDV CALC: 31.6 % — LOW (ref 34.5–45)
HGB BLD-MCNC: 10 G/DL — LOW (ref 11.5–15.5)
HGB BLDV-MCNC: 10.2 G/DL — LOW (ref 11.5–15.5)
IMM GRANULOCYTES NFR BLD AUTO: 3.6 % — HIGH (ref 0–1.5)
KETONES UR-MCNC: SIGNIFICANT CHANGE UP
LACTATE BLDV-MCNC: 1 MMOL/L — SIGNIFICANT CHANGE UP (ref 0.5–2)
LEUKOCYTE ESTERASE UR-ACNC: NEGATIVE — SIGNIFICANT CHANGE UP
LG PLATELETS BLD QL AUTO: SLIGHT — SIGNIFICANT CHANGE UP
LYMPHOCYTES # BLD AUTO: 0.11 K/UL — LOW (ref 1–3.3)
LYMPHOCYTES # BLD AUTO: 39.3 % — SIGNIFICANT CHANGE UP (ref 13–44)
LYMPHOCYTES NFR SPEC AUTO: 44 % — SIGNIFICANT CHANGE UP (ref 13–44)
MCHC RBC-ENTMCNC: 29.4 PG — SIGNIFICANT CHANGE UP (ref 27–34)
MCHC RBC-ENTMCNC: 33.3 % — SIGNIFICANT CHANGE UP (ref 32–36)
MCV RBC AUTO: 88.2 FL — SIGNIFICANT CHANGE UP (ref 80–100)
MONOCYTES # BLD AUTO: 0.05 K/UL — SIGNIFICANT CHANGE UP (ref 0–0.9)
MONOCYTES NFR BLD AUTO: 17.9 % — HIGH (ref 2–14)
MONOCYTES NFR BLD: 8 % — SIGNIFICANT CHANGE UP (ref 2–9)
MUCOUS THREADS # UR AUTO: SIGNIFICANT CHANGE UP
NEUTROPHIL AB SER-ACNC: 36 % — LOW (ref 43–77)
NEUTROPHILS # BLD AUTO: 0.08 K/UL — LOW (ref 1.8–7.4)
NEUTROPHILS NFR BLD AUTO: 28.5 % — LOW (ref 43–77)
NITRITE UR-MCNC: NEGATIVE — SIGNIFICANT CHANGE UP
NON-SQ EPI CELLS # UR AUTO: <1 — SIGNIFICANT CHANGE UP
PCO2 BLDV: 39 MMHG — LOW (ref 41–51)
PH BLDV: 7.45 PH — HIGH (ref 7.32–7.43)
PH UR: 7.5 — SIGNIFICANT CHANGE UP (ref 4.6–8)
PLATELET # BLD AUTO: 76 K/UL — LOW (ref 150–400)
PLATELET COUNT - ESTIMATE: SIGNIFICANT CHANGE UP
PMV BLD: 11.1 FL — SIGNIFICANT CHANGE UP (ref 7–13)
PO2 BLDV: 25 MMHG — LOW (ref 35–40)
POTASSIUM BLDV-SCNC: 3.7 MMOL/L — SIGNIFICANT CHANGE UP (ref 3.4–4.5)
POTASSIUM SERPL-MCNC: 3.9 MMOL/L — SIGNIFICANT CHANGE UP (ref 3.5–5.3)
POTASSIUM SERPL-SCNC: 3.9 MMOL/L — SIGNIFICANT CHANGE UP (ref 3.5–5.3)
PROT SERPL-MCNC: 6 G/DL — SIGNIFICANT CHANGE UP (ref 6–8.3)
PROT UR-MCNC: 10 — SIGNIFICANT CHANGE UP
RBC # BLD: 3.4 M/UL — LOW (ref 3.8–5.2)
RBC # FLD: 15.5 % — HIGH (ref 10.3–14.5)
RBC CASTS # UR COMP ASSIST: SIGNIFICANT CHANGE UP (ref 0–?)
SAO2 % BLDV: 40.1 % — LOW (ref 60–85)
SODIUM SERPL-SCNC: 129 MMOL/L — LOW (ref 135–145)
SP GR SPEC: 1.01 — SIGNIFICANT CHANGE UP (ref 1–1.03)
SQUAMOUS # UR AUTO: SIGNIFICANT CHANGE UP
UROBILINOGEN FLD QL: NORMAL E.U. — SIGNIFICANT CHANGE UP (ref 0.1–0.2)
WBC # BLD: 0.28 K/UL — CRITICAL LOW (ref 3.8–10.5)
WBC # FLD AUTO: 0.28 K/UL — CRITICAL LOW (ref 3.8–10.5)
WBC UR QL: SIGNIFICANT CHANGE UP (ref 0–?)

## 2017-06-10 PROCEDURE — 71010: CPT | Mod: 26

## 2017-06-10 RX ORDER — SODIUM CHLORIDE 9 MG/ML
2000 INJECTION INTRAMUSCULAR; INTRAVENOUS; SUBCUTANEOUS ONCE
Qty: 0 | Refills: 0 | Status: COMPLETED | OUTPATIENT
Start: 2017-06-10 | End: 2017-06-10

## 2017-06-10 RX ORDER — ACETAMINOPHEN 500 MG
650 TABLET ORAL ONCE
Qty: 0 | Refills: 0 | Status: COMPLETED | OUTPATIENT
Start: 2017-06-10 | End: 2017-06-10

## 2017-06-10 RX ORDER — CEFEPIME 1 G/1
2000 INJECTION, POWDER, FOR SOLUTION INTRAMUSCULAR; INTRAVENOUS ONCE
Qty: 0 | Refills: 0 | Status: COMPLETED | OUTPATIENT
Start: 2017-06-10 | End: 2017-06-10

## 2017-06-10 RX ADMIN — Medication 650 MILLIGRAM(S): at 21:15

## 2017-06-10 RX ADMIN — CEFEPIME 100 MILLIGRAM(S): 1 INJECTION, POWDER, FOR SOLUTION INTRAMUSCULAR; INTRAVENOUS at 21:14

## 2017-06-10 RX ADMIN — SODIUM CHLORIDE 1000 MILLILITER(S): 9 INJECTION INTRAMUSCULAR; INTRAVENOUS; SUBCUTANEOUS at 21:08

## 2017-06-10 NOTE — ED PROVIDER NOTE - DIAGNOSTIC INTERPRETATION
CXR prelim ": small right pleural effusion. left basilar linear opacity likely atelectasis. small right mid lung calcified granuloma. "

## 2017-06-10 NOTE — ED PROVIDER NOTE - OBJECTIVE STATEMENT
77F hx of Afib on Eliquis, large cell lymphoma (last chemo 6/1/17), HTN presents with fever and weakness x 1 day. No chest pain, cough, urinary sx. SOB with exertion but not new. No n/v or diarrhea. No abdominal pain or back pain. Pt is on Levoquin but unsure why.     Oncologist: Dr. Parikh

## 2017-06-10 NOTE — ED PROVIDER NOTE - ATTENDING CONTRIBUTION TO CARE
77F hx of Afib on Eliquis, large cell lymphoma (last chemo 6/1/17), HTN presents with fever and weakness x 1 day. No chest pain, cough, urinary sx. SOB with exertion but not new. No n/v or diarrhea. No abdominal pain or back pain. Pt is on Levoquin but unsure why. On exam: in no distress, + febrile and tachycardic., clear lungs heart sounds normal abdomen soft non tender skin normal neuro non focal no leg swelling. Sepsis labs and cultures sent IV fluid resuscitation and cefepime 2g IV given for neutropenic sepsis, CXR UA cultures sent.

## 2017-06-10 NOTE — ED ADULT TRIAGE NOTE - CHIEF COMPLAINT QUOTE
Pt c/o fever & weakness x 1d sp IV chemo last week (for large cell lymphoma). Denies all other adverse symptoms at this time. States hx of afib.

## 2017-06-10 NOTE — ED ADULT NURSE NOTE - OBJECTIVE STATEMENT
Melania RN:. 78 yo female received in spot 12.  Pt is A&Ox4.  Pt c/o fever & weakness x 1d.  Pt has had 2 rounds of chemo for large cell lymphoma last round on 6/1/17.  Pt recieves chemo through PIV.  Pt has hx of afib.  Pt HR is 160 on CM.  EKG completed.  Pt also states she has she has been having ABD discomfort since 6/9/17.  Pt curently on levaquin but unsure dose or why she is taking it.  Denies any SOB, CP, N/V/D, no recent travel, or sick contacts.  cultures drawn.  pt has b/l PIVs.  Denies all other adverse symptoms at this time. Melania RN:. 78 yo female received in spot 12.  Pt is A&Ox4.  Pt c/o fever & weakness x 1d.  Pt has had 2 rounds of chemo for large cell lymphoma last round on 6/1/17.  Pt recieves chemo through PIV.  Pt has hx of afib.  Pt currently on eliquis for afib.  Pt HR is 140 on CM.  EKG completed.  Pt also states she has she has been having ABD discomfort since 6/9/17.  Pt curently on levaquin but unsure dose or why she is taking it.  Denies any SOB, CP, N/V/D, no recent travel, or sick contacts.  cultures drawn.  pt has b/l PIVs.  Denies all other adverse symptoms at this time.

## 2017-06-11 DIAGNOSIS — A41.9 SEPSIS, UNSPECIFIED ORGANISM: ICD-10-CM

## 2017-06-11 DIAGNOSIS — C85.90 NON-HODGKIN LYMPHOMA, UNSPECIFIED, UNSPECIFIED SITE: ICD-10-CM

## 2017-06-11 DIAGNOSIS — D70.9 NEUTROPENIA, UNSPECIFIED: ICD-10-CM

## 2017-06-11 DIAGNOSIS — E87.1 HYPO-OSMOLALITY AND HYPONATREMIA: ICD-10-CM

## 2017-06-11 DIAGNOSIS — Z90.13 ACQUIRED ABSENCE OF BILATERAL BREASTS AND NIPPLES: Chronic | ICD-10-CM

## 2017-06-11 DIAGNOSIS — I48.2 CHRONIC ATRIAL FIBRILLATION: ICD-10-CM

## 2017-06-11 DIAGNOSIS — I15.9 SECONDARY HYPERTENSION, UNSPECIFIED: ICD-10-CM

## 2017-06-11 DIAGNOSIS — Z29.9 ENCOUNTER FOR PROPHYLACTIC MEASURES, UNSPECIFIED: ICD-10-CM

## 2017-06-11 DIAGNOSIS — Z00.8 ENCOUNTER FOR OTHER GENERAL EXAMINATION: ICD-10-CM

## 2017-06-11 DIAGNOSIS — I48.91 UNSPECIFIED ATRIAL FIBRILLATION: ICD-10-CM

## 2017-06-11 DIAGNOSIS — I10 ESSENTIAL (PRIMARY) HYPERTENSION: ICD-10-CM

## 2017-06-11 LAB
ALBUMIN SERPL ELPH-MCNC: 3 G/DL — LOW (ref 3.3–5)
ALP SERPL-CCNC: 54 U/L — SIGNIFICANT CHANGE UP (ref 40–120)
ALT FLD-CCNC: 11 U/L — SIGNIFICANT CHANGE UP (ref 4–33)
AST SERPL-CCNC: 16 U/L — SIGNIFICANT CHANGE UP (ref 4–32)
BASOPHILS # BLD AUTO: 0.01 K/UL — SIGNIFICANT CHANGE UP (ref 0–0.2)
BASOPHILS NFR BLD AUTO: 4.2 % — HIGH (ref 0–2)
BILIRUB SERPL-MCNC: 1.3 MG/DL — HIGH (ref 0.2–1.2)
BUN SERPL-MCNC: 8 MG/DL — SIGNIFICANT CHANGE UP (ref 7–23)
CALCIUM SERPL-MCNC: 8 MG/DL — LOW (ref 8.4–10.5)
CHLORIDE SERPL-SCNC: 95 MMOL/L — LOW (ref 98–107)
CO2 SERPL-SCNC: 22 MMOL/L — SIGNIFICANT CHANGE UP (ref 22–31)
CREAT SERPL-MCNC: 0.29 MG/DL — LOW (ref 0.5–1.3)
EOSINOPHIL # BLD AUTO: 0.02 K/UL — SIGNIFICANT CHANGE UP (ref 0–0.5)
EOSINOPHIL NFR BLD AUTO: 8.3 % — HIGH (ref 0–6)
GLUCOSE SERPL-MCNC: 98 MG/DL — SIGNIFICANT CHANGE UP (ref 70–99)
HCT VFR BLD CALC: 26.3 % — LOW (ref 34.5–45)
HGB BLD-MCNC: 8.7 G/DL — LOW (ref 11.5–15.5)
IMM GRANULOCYTES NFR BLD AUTO: 4.2 % — HIGH (ref 0–1.5)
LYMPHOCYTES # BLD AUTO: 0.09 K/UL — LOW (ref 1–3.3)
LYMPHOCYTES # BLD AUTO: 37.5 % — SIGNIFICANT CHANGE UP (ref 13–44)
MAGNESIUM SERPL-MCNC: 1.8 MG/DL — SIGNIFICANT CHANGE UP (ref 1.6–2.6)
MANUAL SMEAR VERIFICATION: SIGNIFICANT CHANGE UP
MCHC RBC-ENTMCNC: 29.3 PG — SIGNIFICANT CHANGE UP (ref 27–34)
MCHC RBC-ENTMCNC: 33.1 % — SIGNIFICANT CHANGE UP (ref 32–36)
MCV RBC AUTO: 88.6 FL — SIGNIFICANT CHANGE UP (ref 80–100)
MONOCYTES # BLD AUTO: 0.07 K/UL — SIGNIFICANT CHANGE UP (ref 0–0.9)
MONOCYTES NFR BLD AUTO: 29.2 % — HIGH (ref 2–14)
NEUTROPHILS # BLD AUTO: 0.04 K/UL — LOW (ref 1.8–7.4)
NEUTROPHILS NFR BLD AUTO: 16.6 % — LOW (ref 43–77)
OSMOLALITY SERPL: 257 MOSMO/KG — LOW (ref 275–295)
OSMOLALITY UR: 340 MOSMO/KG — SIGNIFICANT CHANGE UP (ref 50–1200)
PHOSPHATE SERPL-MCNC: 2.2 MG/DL — LOW (ref 2.5–4.5)
PLATELET # BLD AUTO: 59 K/UL — LOW (ref 150–400)
PMV BLD: 11 FL — SIGNIFICANT CHANGE UP (ref 7–13)
POTASSIUM SERPL-MCNC: 3.5 MMOL/L — SIGNIFICANT CHANGE UP (ref 3.5–5.3)
POTASSIUM SERPL-SCNC: 3.5 MMOL/L — SIGNIFICANT CHANGE UP (ref 3.5–5.3)
PROT SERPL-MCNC: 5.2 G/DL — LOW (ref 6–8.3)
RBC # BLD: 2.97 M/UL — LOW (ref 3.8–5.2)
RBC # FLD: 15.7 % — HIGH (ref 10.3–14.5)
SODIUM SERPL-SCNC: 130 MMOL/L — LOW (ref 135–145)
SODIUM UR-SCNC: 126 MEQ/L — SIGNIFICANT CHANGE UP
SPECIMEN SOURCE: SIGNIFICANT CHANGE UP
SPECIMEN SOURCE: SIGNIFICANT CHANGE UP
WBC # BLD: 0.24 K/UL — CRITICAL LOW (ref 3.8–10.5)
WBC # FLD AUTO: 0.24 K/UL — CRITICAL LOW (ref 3.8–10.5)

## 2017-06-11 PROCEDURE — 99223 1ST HOSP IP/OBS HIGH 75: CPT | Mod: AI,GC

## 2017-06-11 PROCEDURE — 99223 1ST HOSP IP/OBS HIGH 75: CPT | Mod: GC

## 2017-06-11 RX ORDER — PANTOPRAZOLE SODIUM 20 MG/1
40 TABLET, DELAYED RELEASE ORAL
Qty: 0 | Refills: 0 | Status: DISCONTINUED | OUTPATIENT
Start: 2017-06-11 | End: 2017-06-14

## 2017-06-11 RX ORDER — CEFEPIME 1 G/1
2000 INJECTION, POWDER, FOR SOLUTION INTRAMUSCULAR; INTRAVENOUS EVERY 8 HOURS
Qty: 0 | Refills: 0 | Status: DISCONTINUED | OUTPATIENT
Start: 2017-06-11 | End: 2017-06-14

## 2017-06-11 RX ORDER — POTASSIUM PHOSPHATE, MONOBASIC POTASSIUM PHOSPHATE, DIBASIC 236; 224 MG/ML; MG/ML
15 INJECTION, SOLUTION INTRAVENOUS ONCE
Qty: 0 | Refills: 0 | Status: COMPLETED | OUTPATIENT
Start: 2017-06-11 | End: 2017-06-11

## 2017-06-11 RX ORDER — APIXABAN 2.5 MG/1
5 TABLET, FILM COATED ORAL
Qty: 0 | Refills: 0 | Status: DISCONTINUED | OUTPATIENT
Start: 2017-06-11 | End: 2017-06-14

## 2017-06-11 RX ORDER — SODIUM CHLORIDE 9 MG/ML
1000 INJECTION INTRAMUSCULAR; INTRAVENOUS; SUBCUTANEOUS
Qty: 0 | Refills: 0 | Status: DISCONTINUED | OUTPATIENT
Start: 2017-06-11 | End: 2017-06-14

## 2017-06-11 RX ORDER — LOSARTAN POTASSIUM 100 MG/1
50 TABLET, FILM COATED ORAL DAILY
Qty: 0 | Refills: 0 | Status: DISCONTINUED | OUTPATIENT
Start: 2017-06-11 | End: 2017-06-14

## 2017-06-11 RX ADMIN — APIXABAN 5 MILLIGRAM(S): 2.5 TABLET, FILM COATED ORAL at 18:29

## 2017-06-11 RX ADMIN — SODIUM CHLORIDE 100 MILLILITER(S): 9 INJECTION INTRAMUSCULAR; INTRAVENOUS; SUBCUTANEOUS at 11:14

## 2017-06-11 RX ADMIN — LOSARTAN POTASSIUM 50 MILLIGRAM(S): 100 TABLET, FILM COATED ORAL at 05:27

## 2017-06-11 RX ADMIN — POTASSIUM PHOSPHATE, MONOBASIC POTASSIUM PHOSPHATE, DIBASIC 62.5 MILLIMOLE(S): 236; 224 INJECTION, SOLUTION INTRAVENOUS at 14:55

## 2017-06-11 RX ADMIN — CEFEPIME 100 MILLIGRAM(S): 1 INJECTION, POWDER, FOR SOLUTION INTRAMUSCULAR; INTRAVENOUS at 14:00

## 2017-06-11 RX ADMIN — CEFEPIME 100 MILLIGRAM(S): 1 INJECTION, POWDER, FOR SOLUTION INTRAMUSCULAR; INTRAVENOUS at 21:55

## 2017-06-11 RX ADMIN — CEFEPIME 100 MILLIGRAM(S): 1 INJECTION, POWDER, FOR SOLUTION INTRAMUSCULAR; INTRAVENOUS at 05:27

## 2017-06-11 RX ADMIN — APIXABAN 5 MILLIGRAM(S): 2.5 TABLET, FILM COATED ORAL at 05:33

## 2017-06-11 NOTE — H&P ADULT - NSHPREVIEWOFSYSTEMS_GEN_ALL_CORE
REVIEW OF SYSTEMS:    CONSTITUTIONAL: +weakness +fever +chills  EYES/ENT: No visual changes;  No vertigo or throat pain   NECK: No pain or stiffness  RESPIRATORY: No cough, wheezing, hemoptysis; No shortness of breath  CARDIOVASCULAR: No chest pain or palpitations  GASTROINTESTINAL: No abdominal or epigastric pain. No nausea, vomiting, or hematemesis; No diarrhea or constipation. No melena or hematochezia.  GENITOURINARY: No dysuria, frequency or hematuria  NEUROLOGICAL: No numbness or weakness  SKIN: No itching, burning, rashes, or lesions   All other review of systems is negative unless indicated above.

## 2017-06-11 NOTE — CONSULT NOTE ADULT - PROBLEM SELECTOR RECOMMENDATION 9
s/p RCHOP 6/1 with neulasta 6/2  - continue to monitor CBC daily  - f/u final cultures  - continue abx per primary team  - check RVP  - supportive transfusions PRN  - will continue to follow s/p RCHOP 6/1 with neulasta 6/2  - continue to monitor CBC daily  - f/u final cultures  - continue abx per primary team  - check RVP  - supportive transfusions PRN  - will continue to follow  - will need to f/u with Dr. Nguyen on discharge

## 2017-06-11 NOTE — H&P ADULT - NSHPPHYSICALEXAM_GEN_ALL_CORE
GENERAL: No acute distress, well-developed  HEAD:  Atraumatic, Normocephalic  ENT: EOMI, PERRLA, conjunctiva and sclera clear, Neck supple, No JVD, moist mucosa, no pharynageal erythema, no tonsillar enlargement or exudate  CHEST/LUNG: Clear to auscultation bilaterally; No wheeze, equal breath sounds bilaterally   HEART: Irregularly irregular; No murmurs, rubs, or gallops  ABDOMEN: Soft, Nontender, Nondistended; Bowel sounds present, no organomegaly  EXTREMITIES:  2+ Peripheral Pulses, No clubbing, cyanosis, or edema  PSYCH: AAOx3  NEUROLOGY: non-focal, cranial nerves intact  SKIN: Normal color, No rashes or lesions

## 2017-06-11 NOTE — H&P ADULT - ASSESSMENT
77F hx of Afib on Eliquis, large cell lymphoma (last chemo 6/1/17 with Rituxan and Bendamustine), HTN presents with fever and weakness x 1 day.

## 2017-06-11 NOTE — H&P ADULT - PMH
Afib    HTN (hypertension) Afib    Ductal carcinoma in situ (DCIS) of left breast    HTN (hypertension)    Lymphoma, unspecified body region, unspecified lymphoma type

## 2017-06-11 NOTE — H&P ADULT - PROBLEM SELECTOR PLAN 1
-Pt tachycardic and febrile, meets sepsis criteria, however no localizing source of infection  -s/p cefepime x1 in the ED, will continue with cefepime 2g q8 for neutropenic fever  -f/u bcx x2, ucx  - will hold off IVF for now given chronic hyponatremia  -ID consult in the AM for further mgmt -Pt tachycardic and febrile, meets sepsis criteria, however no localizing source of infection, may be chemo-related.   -s/p cefepime x1 in the ED, will continue with cefepime 2g q8 for neutropenic fever  -f/u bcx x2, ucx  - will hold off IVF for now given chronic hyponatremia  -ID consult in the AM for further mgmt -Pt tachycardic and febrile, meets sepsis criteria, however no localizing source of infection, may be chemo-related (no skin lesions/wounds, no port/lines)  -s/p cefepime x1 in the ED, will continue with cefepime 2g q8 for neutropenic fever  -f/u bcx x2, ucx  - will hold off IVF for now given chronic hyponatremia  -ID consult in the AM for further mgmt

## 2017-06-11 NOTE — H&P ADULT - PROBLEM SELECTOR PLAN 3
-Atrial fibrillation now with better rate control, keep HR <110 as per RACE trial  -H/o of skin rash with metoprolol in the past, may use cardizem if HR persistently elevated.  -c/w home dose Eliquis BID

## 2017-06-11 NOTE — CONSULT NOTE ADULT - ATTENDING COMMENTS
77 year old woman with diffuse B cell large cell lymphoma s/p first cycle of R-CHOP with neutropenic fever  She received Neulasta and has monocytosis indicative of recovering marrow, so will hold off Neupogen.  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

## 2017-06-11 NOTE — CONSULT NOTE ADULT - SUBJECTIVE AND OBJECTIVE BOX
HPI:  77F hx of Afib on Eliquis, large cell lymphoma (last chemo 6/1/17 with Rituxan and Bendamustine), HTN, DCIS s/p b/l mastectomy 2013 presents with fever and weakness x 1 day. Pt endorses 'just feeling unwell. Pt denies CP, cough, nasal congestion, dysuria, increased urinary frequency or urgency, diarrhea, N/V, abd pain, back pain. Pt does have LAWRENCE but at baseline. Sleeps with one pillow at night, able to perform all ADLs with no difficulties. Pt denies recent travels, sick contact. Per outpt note, pt was started on Levaquin x5 days after each IV chemotherapy, completed last dose on 6/6. Pt also takes prednisone 100mg x5 days with each chemotherapy. Per pt, she was told to come to the ED after her son called her outpatient oncologist. Pt states that she currently feels much better.     In the ED, VS: 101.1 on admission,  -> 103 without meds, /69, RR16, 97%RA  CXR preliminary read showed small right pleural effusion, left basilar linear opacity likely atelectasis, small right mid lung calcified granuloma.  Pt received Cefepime x1, tylenol x1 in the ED. (11 Jun 2017 00:47)      Allergies    metoprolol (Unknown)    Intolerances        MEDICATIONS  (STANDING):  cefepime  IVPB 2000milliGRAM(s) IV Intermittent every 8 hours  apixaban 5milliGRAM(s) Oral two times a day  pantoprazole    Tablet 40milliGRAM(s) Oral before breakfast  losartan 50milliGRAM(s) Oral daily    MEDICATIONS  (PRN):      PAST MEDICAL & SURGICAL HISTORY:  Lymphoma, unspecified body region, unspecified lymphoma type  Ductal carcinoma in situ (DCIS) of left breast  HTN (hypertension)  Afib  H/O bilateral mastectomy  No significant past surgical history      FAMILY HISTORY:  No pertinent family history in first degree relatives      SOCIAL HISTORY: No EtOH, no tobacco    REVIEW OF SYSTEMS:    CONSTITUTIONAL: No weakness, fevers or chills  EYES/ENT: No visual changes;  No vertigo or throat pain   NECK: No pain or stiffness  RESPIRATORY: No cough, wheezing, hemoptysis; No shortness of breath  CARDIOVASCULAR: No chest pain or palpitations  GASTROINTESTINAL: No abdominal or epigastric pain. No nausea, vomiting, or hematemesis; No diarrhea or constipation. No melena or hematochezia.  GENITOURINARY: No dysuria, frequency or hematuria  NEUROLOGICAL: No numbness or weakness  SKIN: No itching, burning, rashes, or lesions   All other review of systems is negative unless indicated above.    Height (cm): 160 (06-11 @ 01:19)  Weight (kg): 54.2 (06-11 @ 01:19)  BMI (kg/m2): 21.2 (06-11 @ 01:19)  BSA (m2): 1.55 (06-11 @ 01:19)    T(F): 99.7, Max: 101.1 (06-10 @ 20:26)  HR: 107  BP: 113/68  RR: 18  SpO2: 98%  Wt(kg): --    GENERAL: NAD, well-developed  HEAD:  Atraumatic, Normocephalic  EYES: EOMI, PERRLA, conjunctiva and sclera clear  NECK: Supple, No JVD  CHEST/LUNG: Clear to auscultation bilaterally; No wheeze  HEART: Regular rate and rhythm; No murmurs, rubs, or gallops  ABDOMEN: Soft, Nontender, Nondistended; Bowel sounds present  EXTREMITIES:  2+ Peripheral Pulses, No clubbing, cyanosis, or edema  NEUROLOGY: non-focal  SKIN: No rashes or lesions                          10.0   0.28  )-----------( 76       ( 10 Ricky 2017 21:09 )             30.0       06-10    129<L>  |  93<L>  |  12  ----------------------------<  107<H>  3.9   |  24  |  0.45<L>    Ca    8.4      10 Ricky 2017 21:09    TPro  6.0  /  Alb  3.7  /  TBili  1.3<H>  /  DBili  x   /  AST  17  /  ALT  11  /  AlkPhos  66  06-10 HPI:  77F hx of Afib on Eliquis, DLBCL s/p RCHOP c1 on 6/1, admitted 6/10 for fevers, generalized weakness. She reports symptoms have been ongoing for last 1-2 days.       Pt denies CP, cough, nasal congestion, dysuria, increased urinary frequency or urgency, diarrhea, N/V, abd pain, back pain.     Per outpt note, pt was started on Levaquin x5 days after each IV chemotherapy, completed last dose on 6/6. Pt also takes prednisone 100mg x5 days with each chemotherapy.      In the ED, VS: 101.1 on admission,  -> 103 without meds, /69, RR16, 97%RA  CXR preliminary read showed small right pleural effusion, left basilar linear opacity likely atelectasis, small right mid lung calcified granuloma.  Pt received Cefepime x1, tylenol x1 in the ED. (11 Jun 2017 00:47)    Currently on cefepime, cultures negative to date    Allergies    metoprolol (Unknown)    Intolerances        MEDICATIONS  (STANDING):  cefepime  IVPB 2000milliGRAM(s) IV Intermittent every 8 hours  apixaban 5milliGRAM(s) Oral two times a day  pantoprazole    Tablet 40milliGRAM(s) Oral before breakfast  losartan 50milliGRAM(s) Oral daily    MEDICATIONS  (PRN):      PAST MEDICAL & SURGICAL HISTORY:  Lymphoma, unspecified body region, unspecified lymphoma type  Ductal carcinoma in situ (DCIS) of left breast  HTN (hypertension)  Afib  H/O bilateral mastectomy  No significant past surgical history      FAMILY HISTORY:  No pertinent family history in first degree relatives      SOCIAL HISTORY: No EtOH, no tobacco    REVIEW OF SYSTEMS:    CONSTITUTIONAL: No weakness, fevers or chills  EYES/ENT: No visual changes;  No vertigo or throat pain   NECK: No pain or stiffness  RESPIRATORY: No cough, wheezing, hemoptysis; No shortness of breath  CARDIOVASCULAR: No chest pain or palpitations  GASTROINTESTINAL: No abdominal or epigastric pain. No nausea, vomiting, or hematemesis; No diarrhea or constipation. No melena or hematochezia.  GENITOURINARY: No dysuria, frequency or hematuria  NEUROLOGICAL: No numbness or weakness  SKIN: No itching, burning, rashes, or lesions   All other review of systems is negative unless indicated above.    Height (cm): 160 (06-11 @ 01:19)  Weight (kg): 54.2 (06-11 @ 01:19)  BMI (kg/m2): 21.2 (06-11 @ 01:19)  BSA (m2): 1.55 (06-11 @ 01:19)    T(F): 99.7, Max: 101.1 (06-10 @ 20:26)  HR: 107  BP: 113/68  RR: 18  SpO2: 98%  Wt(kg): --    GENERAL: NAD, well-developed  HEAD:  Atraumatic, Normocephalic  EYES: EOMI, PERRLA, conjunctiva and sclera clear  NECK: Supple, No JVD  CHEST/LUNG: Clear to auscultation bilaterally; No wheeze  HEART: Regular rate and rhythm; No murmurs, rubs, or gallops  ABDOMEN: Soft, Nontender, Nondistended; Bowel sounds present  EXTREMITIES:  2+ Peripheral Pulses, No clubbing, cyanosis, or edema  NEUROLOGY: non-focal  SKIN: No rashes or lesions                          10.0   0.28  )-----------( 76       ( 10 Ricky 2017 21:09 )             30.0       06-10    129<L>  |  93<L>  |  12  ----------------------------<  107<H>  3.9   |  24  |  0.45<L>    Ca    8.4      10 Ricky 2017 21:09    TPro  6.0  /  Alb  3.7  /  TBili  1.3<H>  /  DBili  x   /  AST  17  /  ALT  11  /  AlkPhos  66  06-10 HPI:  77F hx of Afib on Eliquis, DLBCL s/p RCHOP c1 on 6/1, admitted 6/10 for fevers, generalized weakness. She reports symptoms have been ongoing for last 1-2 days.   Pt denies CP, cough, nasal congestion, dysuria, increased urinary frequency or urgency, diarrhea, N/V, abd pain, back pain.       In the ED, VS: 101.1 on admission,  -> 103 without meds, /69, RR16, 97%RA  CXR preliminary read showed small right pleural effusion, left basilar linear opacity likely atelectasis, small right mid lung calcified granuloma.  Pt received Cefepime x1, tylenol x1 in the ED.   Currently on cefepime, cultures negative to date    Allergies    metoprolol (Unknown)    Intolerances        MEDICATIONS  (STANDING):  cefepime  IVPB 2000milliGRAM(s) IV Intermittent every 8 hours  apixaban 5milliGRAM(s) Oral two times a day  pantoprazole    Tablet 40milliGRAM(s) Oral before breakfast  losartan 50milliGRAM(s) Oral daily    MEDICATIONS  (PRN):      PAST MEDICAL & SURGICAL HISTORY:  Lymphoma, unspecified body region, unspecified lymphoma type  Ductal carcinoma in situ (DCIS) of left breast  HTN (hypertension)  Afib  H/O bilateral mastectomy  No significant past surgical history      FAMILY HISTORY:  No pertinent family history in first degree relatives      SOCIAL HISTORY: No EtOH, no tobacco    REVIEW OF SYSTEMS:    CONSTITUTIONAL: No weakness, fevers or chills  EYES/ENT: No visual changes;  No vertigo or throat pain   NECK: No pain or stiffness  RESPIRATORY: No cough, wheezing, hemoptysis; No shortness of breath  CARDIOVASCULAR: No chest pain or palpitations  GASTROINTESTINAL: No abdominal or epigastric pain. No nausea, vomiting, or hematemesis; No diarrhea or constipation. No melena or hematochezia.  GENITOURINARY: No dysuria, frequency or hematuria  NEUROLOGICAL: No numbness or weakness  SKIN: No itching, burning, rashes, or lesions   All other review of systems is negative unless indicated above.    Height (cm): 160 (06-11 @ 01:19)  Weight (kg): 54.2 (06-11 @ 01:19)  BMI (kg/m2): 21.2 (06-11 @ 01:19)  BSA (m2): 1.55 (06-11 @ 01:19)    T(F): 99.7, Max: 101.1 (06-10 @ 20:26)  HR: 107  BP: 113/68  RR: 18  SpO2: 98%  Wt(kg): --    GENERAL: NAD, well-developed  HEAD:  Atraumatic, Normocephalic  EYES: EOMI, PERRLA, conjunctiva and sclera clear  NECK: Supple, No JVD  CHEST/LUNG: Clear to auscultation bilaterally; No wheeze  HEART: Regular rate and rhythm; No murmurs, rubs, or gallops  ABDOMEN: Soft, Nontender, Nondistended; Bowel sounds present  EXTREMITIES:  2+ Peripheral Pulses, No clubbing, cyanosis, or edema  NEUROLOGY: non-focal  SKIN: No rashes or lesions                          10.0   0.28  )-----------( 76       ( 10 Ricky 2017 21:09 )             30.0       06-10    129<L>  |  93<L>  |  12  ----------------------------<  107<H>  3.9   |  24  |  0.45<L>    Ca    8.4      10 Ricky 2017 21:09    TPro  6.0  /  Alb  3.7  /  TBili  1.3<H>  /  DBili  x   /  AST  17  /  ALT  11  /  AlkPhos  66  06-10 HPI:  77F hx of Afib on Eliquis, DLBCL s/p RCHOP c1 on 6/1, admitted 6/10 for fevers, generalized weakness. She reports symptoms have been ongoing for last 1-2 days. Noted to have a temperature of 101 at home and was instructed to come to ED.   Pt denies CP, cough, nasal congestion, dysuria, increased urinary frequency or urgency, diarrhea, N/V, abd pain, back pain.     In the ED, VS: 101.1 on admission,  -> 103 without meds, /69, RR16, 97%RA  CXR  showed small right pleural effusion, left basilar linear opacity likely atelectasis,    Pt received Cefepime x1, tylenol x1 in the ED.   Currently on cefepime, cultures negative to date    Allergies    metoprolol (Unknown)    Intolerances        MEDICATIONS  (STANDING):  cefepime  IVPB 2000milliGRAM(s) IV Intermittent every 8 hours  apixaban 5milliGRAM(s) Oral two times a day  pantoprazole    Tablet 40milliGRAM(s) Oral before breakfast  losartan 50milliGRAM(s) Oral daily    MEDICATIONS  (PRN):      PAST MEDICAL & SURGICAL HISTORY:  Lymphoma, unspecified body region, unspecified lymphoma type  Ductal carcinoma in situ (DCIS) of left breast  HTN (hypertension)  Afib  H/O bilateral mastectomy  No significant past surgical history      FAMILY HISTORY:  No pertinent family history in first degree relatives      SOCIAL HISTORY: No EtOH, no tobacco    REVIEW OF SYSTEMS:    CONSTITUTIONAL: No weakness, fevers or chills  EYES/ENT: No visual changes;  No vertigo or throat pain   NECK: No pain or stiffness  RESPIRATORY: No cough, wheezing, hemoptysis; No shortness of breath  CARDIOVASCULAR: No chest pain or palpitations  GASTROINTESTINAL: No abdominal or epigastric pain. No nausea, vomiting, or hematemesis; No diarrhea or constipation. No melena or hematochezia.  GENITOURINARY: No dysuria, frequency or hematuria  NEUROLOGICAL: No numbness or weakness  SKIN: No itching, burning, rashes, or lesions   All other review of systems is negative unless indicated above.    Height (cm): 160 (06-11 @ 01:19)  Weight (kg): 54.2 (06-11 @ 01:19)  BMI (kg/m2): 21.2 (06-11 @ 01:19)  BSA (m2): 1.55 (06-11 @ 01:19)    T(F): 99.7, Max: 101.1 (06-10 @ 20:26)  HR: 107  BP: 113/68  RR: 18  SpO2: 98%  Wt(kg): --    GENERAL: NAD, well-developed  HEAD:  Atraumatic, Normocephalic  EYES: EOMI, PERRLA, conjunctiva and sclera clear  NECK: Supple, No JVD  CHEST/LUNG: Clear to auscultation bilaterally; No wheeze  HEART: Regular rate and rhythm; No murmurs, rubs, or gallops  ABDOMEN: Soft, Nontender, Nondistended; Bowel sounds present  EXTREMITIES:  2+ Peripheral Pulses, No clubbing, cyanosis, or edema  NEUROLOGY: non-focal  SKIN: No rashes or lesions                          10.0   0.28  )-----------( 76       ( 10 Ricky 2017 21:09 )             30.0       06-10    129<L>  |  93<L>  |  12  ----------------------------<  107<H>  3.9   |  24  |  0.45<L>    Ca    8.4      10 Ricky 2017 21:09    TPro  6.0  /  Alb  3.7  /  TBili  1.3<H>  /  DBili  x   /  AST  17  /  ALT  11  /  AlkPhos  66  06-10 HPI:  77F hx of Afib on Eliquis, DLBCL s/p RCHOP c1 on 6/1, admitted 6/10 for fevers, generalized weakness. She reports symptoms have been ongoing for last 1-2 days. Noted to have a temperature of 101 at home and was instructed to come to ED.   Pt denies CP, cough, nasal congestion, dysuria, increased urinary frequency or urgency, diarrhea, N/V, abd pain, back pain.     In the ED, VS: 101.1 on admission,  -> 103 without meds, /69, RR16, 97%RA  CXR  showed small right pleural effusion, left basilar linear opacity likely atelectasis,    Pt received Cefepime x1, tylenol x1 in the ED.   Currently on cefepime, cultures negative to date.  Reports she is feeling better today. Denies fevers, chills, s/s infection.     Allergies    metoprolol (Unknown)    Intolerances        MEDICATIONS  (STANDING):  cefepime  IVPB 2000milliGRAM(s) IV Intermittent every 8 hours  apixaban 5milliGRAM(s) Oral two times a day  pantoprazole    Tablet 40milliGRAM(s) Oral before breakfast  losartan 50milliGRAM(s) Oral daily    MEDICATIONS  (PRN):      PAST MEDICAL & SURGICAL HISTORY:  Lymphoma, unspecified body region, unspecified lymphoma type  Ductal carcinoma in situ (DCIS) of left breast  HTN (hypertension)  Afib  H/O bilateral mastectomy  No significant past surgical history      FAMILY HISTORY:  No pertinent family history in first degree relatives      SOCIAL HISTORY: No EtOH, no tobacco    REVIEW OF SYSTEMS:    CONSTITUTIONAL: + weakness, fevers or chills  EYES/ENT: No visual changes;  No vertigo or throat pain   NECK: No pain or stiffness  RESPIRATORY: No cough, wheezing, hemoptysis; No shortness of breath  CARDIOVASCULAR: No chest pain or palpitations  GASTROINTESTINAL: No abdominal or epigastric pain. No nausea, vomiting, or hematemesis; No diarrhea or constipation. No melena or hematochezia.  GENITOURINARY: No dysuria, frequency or hematuria  NEUROLOGICAL: No numbness or weakness  SKIN: No itching, burning, rashes, or lesions   All other review of systems is negative unless indicated above.    Height (cm): 160 (06-11 @ 01:19)  Weight (kg): 54.2 (06-11 @ 01:19)  BMI (kg/m2): 21.2 (06-11 @ 01:19)  BSA (m2): 1.55 (06-11 @ 01:19)    T(F): 99.7, Max: 101.1 (06-10 @ 20:26)  HR: 107  BP: 113/68  RR: 18  SpO2: 98%  Wt(kg): --    GENERAL: NAD, well-developed  HEAD:  Atraumatic, Normocephalic  EYES: EOMI, PERRLA, conjunctiva and sclera clear  NECK: Supple, No JVD  CHEST/LUNG: Clear to auscultation bilaterally; No wheeze  HEART: Regular rate and rhythm; No murmurs, rubs, or gallops  ABDOMEN: Soft, Nontender, Nondistended; Bowel sounds present  EXTREMITIES:  2+ Peripheral Pulses, No clubbing, cyanosis, or edema  NEUROLOGY: non-focal  SKIN: No rashes or lesions                          10.0   0.28  )-----------( 76       ( 10 Ricky 2017 21:09 )             30.0       06-10    129<L>  |  93<L>  |  12  ----------------------------<  107<H>  3.9   |  24  |  0.45<L>    Ca    8.4      10 Ricky 2017 21:09    TPro  6.0  /  Alb  3.7  /  TBili  1.3<H>  /  DBili  x   /  AST  17  /  ALT  11  /  AlkPhos  66  06-10

## 2017-06-11 NOTE — PROGRESS NOTE ADULT - ATTENDING COMMENTS
Pt AAOx3, comfortable in bed  Neutropenic fever, c/w empiric abx, no need  for neupogen per Hem  Monitor counts  Thrombocytopenia + eliquis, trend plt counts. Risk for bleeding is high. Follow up Hem

## 2017-06-11 NOTE — H&P ADULT - HISTORY OF PRESENT ILLNESS
77F hx of Afib on Eliquis, large cell lymphoma (last chemo 6/1/17 with Rituxan and Bendamustine), HTN presents with fever and weakness x 1 day. Pt endorses 'just feeling unwell. Pt denies CP, cough, nasal congestion, dysuria, increased urinary frequency or urgency, diarrhea, N/V, abd pain, back pain. Pt does have LAWRENCE but at baseline. Sleeps with one pillow at night, able to perform all ADLs with no difficulties. Pt denies recent travels, sick contact. Per outpt note, pt was started on Levaquin x5 days after each IV chemotherapy. Per pt, she was told to come to the ED after her son called her outpatient oncologist. Pt states that she currently feels much better.     In the ED, VS: 101.1 on admission,  -> 103 without meds, /69, RR16, 97%RA  CXR preliminary read showed small right pleural effusion, left basilar linear opacity likely atelectasis, small right mid lung calcified granuloma.  Pt received Cefepime x1, tylenol x1 in the ED.       No chest pain, cough, urinary sx. SOB with exertion but not new. No n/v or diarrhea. No abdominal pain or back pain. Pt is on Levoquin but unsure why. 77F hx of Afib on Eliquis, large cell lymphoma (last chemo 6/1/17 with Rituxan and Bendamustine), HTN, DCIS s/p b/l mastectomy 2013 presents with fever and weakness x 1 day. Pt endorses 'just feeling unwell. Pt denies CP, cough, nasal congestion, dysuria, increased urinary frequency or urgency, diarrhea, N/V, abd pain, back pain. Pt does have LAWRENCE but at baseline. Sleeps with one pillow at night, able to perform all ADLs with no difficulties. Pt denies recent travels, sick contact. Per outpt note, pt was started on Levaquin x5 days after each IV chemotherapy. Per pt, she was told to come to the ED after her son called her outpatient oncologist. Pt states that she currently feels much better.     In the ED, VS: 101.1 on admission,  -> 103 without meds, /69, RR16, 97%RA  CXR preliminary read showed small right pleural effusion, left basilar linear opacity likely atelectasis, small right mid lung calcified granuloma.  Pt received Cefepime x1, tylenol x1 in the ED. 77F hx of Afib on Eliquis, large cell lymphoma (last chemo 6/1/17 with Rituxan and Bendamustine), HTN, DCIS s/p b/l mastectomy 2013 presents with fever and weakness x 1 day. Pt endorses 'just feeling unwell. Pt denies CP, cough, nasal congestion, dysuria, increased urinary frequency or urgency, diarrhea, N/V, abd pain, back pain. Pt does have LAWRENCE but at baseline. Sleeps with one pillow at night, able to perform all ADLs with no difficulties. Pt denies recent travels, sick contact. Per outpt note, pt was started on Levaquin x5 days after each IV chemotherapy, completed last dose on 6/6. Pt also takes prednisone 100mg x5 days with each chemotherapy. Per pt, she was told to come to the ED after her son called her outpatient oncologist. Pt states that she currently feels much better.     In the ED, VS: 101.1 on admission,  -> 103 without meds, /69, RR16, 97%RA  CXR preliminary read showed small right pleural effusion, left basilar linear opacity likely atelectasis, small right mid lung calcified granuloma.  Pt received Cefepime x1, tylenol x1 in the ED.

## 2017-06-11 NOTE — PROGRESS NOTE ADULT - SUBJECTIVE AND OBJECTIVE BOX
Medicine Accept Note   Patient is a 77y old  Female who presents with a chief complaint of chills, weakness (2017 00:47)    HPI:  77F hx of Afib on Eliquis, large cell lymphoma (last chemo 17 with Rituxan and Bendamustine), HTN, DCIS s/p b/l mastectomy  presents with fever and weakness x 1 day. Pt endorses 'just feeling unwell. Pt denies CP, cough, nasal congestion, dysuria, increased urinary frequency or urgency, diarrhea, N/V, abd pain, back pain. Pt does have LAWRENCE but at baseline. Sleeps with one pillow at night, able to perform all ADLs with no difficulties. Pt denies recent travels, sick contact. Per outpt note, pt was started on Levaquin x5 days after each IV chemotherapy, completed last dose on . Pt also takes prednisone 100mg x5 days with each chemotherapy. Per pt, she was told to come to the ED after her son called her outpatient oncologist.     In the ED, VS: 101.1 on admission,  -> 103 without meds, /69, RR16, 97%RA  CXR preliminary read showed small right pleural effusion, left basilar linear opacity likely atelectasis, small right mid lung calcified granuloma.  Pt received Cefepime x1, tylenol x1 in the ED.  Labs significant for WBC of 0.28 with ANC of 0.08.     ON  Patient admitted to medicine for further management and evaluation. Was continued on cefepime for mgt of neutropenic fever. Heme onc was consulted.       MEDICATIONS  (STANDING):  cefepime  IVPB 2000milliGRAM(s) IV Intermittent every 8 hours  apixaban 5milliGRAM(s) Oral two times a day  pantoprazole    Tablet 40milliGRAM(s) Oral before breakfast  losartan 50milliGRAM(s) Oral daily    MEDICATIONS  (PRN):    Allergies    metoprolol (Unknown)    Intolerances      REVIEW OF SYSTEMS:    CONSTITUTIONAL: No weakness, fevers or chills  EYES/ENT: No visual changes;  No vertigo or throat pain   NECK: No pain or stiffness  RESPIRATORY: No cough, wheezing, hemoptysis; No shortness of breath  CARDIOVASCULAR: No chest pain or palpitations  GASTROINTESTINAL: No abdominal or epigastric pain. No nausea, vomiting, or hematemesis; No diarrhea or constipation. No melena or hematochezia.  GENITOURINARY: No dysuria, frequency or hematuria  NEUROLOGICAL: No numbness or weakness  SKIN: No itching, burning, rashes, or lesions   All other review of systems is negative unless indicated above.  .  VITAL SIGNS:  T(C): 37.6, Max: 38.4 (06-10 @ 20:26)  T(F): 99.7, Max: 101.1 (06-10 @ 20:26)  HR: 107 (103 - 145)  BP: 113/68 (113/68 - 153/111)  BP(mean): --  RR: 18 (15 - 19)  SpO2: 98% (97% - 100%)  Wt(kg): --    GENERAL: NAD, well-developed  HEAD:  Atraumatic, Normocephalic  EYES: EOMI, PERRLA, conjunctiva and sclera clear  NECK: Supple, No JVD  CHEST/LUNG: Clear to auscultation bilaterally; No wheeze  HEART: Regular rate and rhythm; No murmurs, rubs, or gallops  ABDOMEN: Soft, Nontender, Nondistended; Bowel sounds present  EXTREMITIES:  2+ Peripheral Pulses, No clubbing, cyanosis, or edema  NEUROLOGY: non-focal  SKIN: No rashes or lesions  LABS:                        10.0   0.28  )-----------( 76       ( 10 Ricky 2017 21:09 )             30.0     06-10    129<L>  |  93<L>  |  12  ----------------------------<  107<H>  3.9   |  24  |  0.45<L>    Ca    8.4      10 Ricky 2017 21:09    TPro  6.0  /  Alb  3.7  /  TBili  1.3<H>  /  DBili  x   /  AST  17  /  ALT  11  /  AlkPhos  66  06-10    CAPILLARY BLOOD GLUCOSE            Urinalysis Basic - ( 10 Ricky 2017 23:15 )    Color: YELLOW / Appearance: CLEAR / S.011 / pH: 7.5  Gluc: NEGATIVE / Ketone: TRACE  / Bili: NEGATIVE / Urobili: NORMAL E.U.   Blood: NEGATIVE / Protein: 10 / Nitrite: NEGATIVE   Leuk Esterase: NEGATIVE / RBC: 0-2 / WBC 2-5   Sq Epi: OCC / Non Sq Epi: x / Bacteria: FEW    CXR  INTERPRETATION:  CLINICAL INFORMATION: Fever.    EXAM: Frontal radiograph of the chest from [facility] 10, 2017.    COMPARISON: None.    FINDINGS:  Small right pleural effusion. Linear opacity at the left lung base may   represent atelectasis. The heart is enlarged.     IMPRESSION: Small right pleural effusion. Linear opacity at the left lung   base may represent atelectasis.

## 2017-06-11 NOTE — H&P ADULT - PROBLEM SELECTOR PLAN 2
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

## 2017-06-11 NOTE — PROGRESS NOTE ADULT - PROBLEM SELECTOR PLAN 1
-Pt tachycardic and febrile, meets sepsis criteria  -In regards to source: UA WNL, CXR showed no evidence of pna though pt is neutropenic with ANC of 0.08 in setting of recent chemo  -s/p cefepime x1 in the ED, will continue with cefepime 2g q8 for neutropenic fever  -f/u bcx x2, ucx

## 2017-06-11 NOTE — H&P ADULT - NSHPLABSRESULTS_GEN_ALL_CORE
10.0   0.28  )-----------( 76       ( 10 Ricky 2017 21:09 )             30.0       06-10    129<L>  |  93<L>  |  12  ----------------------------<  107<H>  3.9   |  24  |  0.45<L>    Ca    8.4      10 Ricky 2017 21:09    TPro  6.0  /  Alb  3.7  /  TBili  1.3<H>  /  DBili  x   /  AST  17  /  ALT  11  /  AlkPhos  66  06-10        Urinalysis Basic - ( 10 Ricky 2017 23:15 )    Color: YELLOW / Appearance: CLEAR / S.011 / pH: 7.5  Gluc: NEGATIVE / Ketone: TRACE  / Bili: NEGATIVE / Urobili: NORMAL E.U.   Blood: NEGATIVE / Protein: 10 / Nitrite: NEGATIVE   Leuk Esterase: NEGATIVE / RBC: 0-2 / WBC 2-5   Sq Epi: OCC / Non Sq Epi: x / Bacteria: FEW

## 2017-06-12 ENCOUNTER — APPOINTMENT (OUTPATIENT)
Dept: HEMATOLOGY ONCOLOGY | Facility: CLINIC | Age: 77
End: 2017-06-12

## 2017-06-12 LAB
BACTERIA UR CULT: SIGNIFICANT CHANGE UP
BASOPHILS # BLD AUTO: 0.01 K/UL — SIGNIFICANT CHANGE UP (ref 0–0.2)
BASOPHILS NFR BLD AUTO: 3.8 % — HIGH (ref 0–2)
BUN SERPL-MCNC: 5 MG/DL — LOW (ref 7–23)
CALCIUM SERPL-MCNC: 7.3 MG/DL — LOW (ref 8.4–10.5)
CHLORIDE SERPL-SCNC: 98 MMOL/L — SIGNIFICANT CHANGE UP (ref 98–107)
CO2 SERPL-SCNC: 22 MMOL/L — SIGNIFICANT CHANGE UP (ref 22–31)
CREAT SERPL-MCNC: 0.28 MG/DL — LOW (ref 0.5–1.3)
EOSINOPHIL # BLD AUTO: 0.01 K/UL — SIGNIFICANT CHANGE UP (ref 0–0.5)
EOSINOPHIL NFR BLD AUTO: 3.8 % — SIGNIFICANT CHANGE UP (ref 0–6)
GLUCOSE SERPL-MCNC: 98 MG/DL — SIGNIFICANT CHANGE UP (ref 70–99)
HCT VFR BLD CALC: 23.1 % — LOW (ref 34.5–45)
HGB BLD-MCNC: 8 G/DL — LOW (ref 11.5–15.5)
IMM GRANULOCYTES NFR BLD AUTO: 3.8 % — HIGH (ref 0–1.5)
LYMPHOCYTES # BLD AUTO: 0.07 K/UL — LOW (ref 1–3.3)
LYMPHOCYTES # BLD AUTO: 26.9 % — SIGNIFICANT CHANGE UP (ref 13–44)
MAGNESIUM SERPL-MCNC: 1.6 MG/DL — SIGNIFICANT CHANGE UP (ref 1.6–2.6)
MANUAL SMEAR VERIFICATION: SIGNIFICANT CHANGE UP
MCHC RBC-ENTMCNC: 30.5 PG — SIGNIFICANT CHANGE UP (ref 27–34)
MCHC RBC-ENTMCNC: 34.6 % — SIGNIFICANT CHANGE UP (ref 32–36)
MCV RBC AUTO: 88.2 FL — SIGNIFICANT CHANGE UP (ref 80–100)
MONOCYTES # BLD AUTO: 0.12 K/UL — SIGNIFICANT CHANGE UP (ref 0–0.9)
MONOCYTES NFR BLD AUTO: 46.2 % — HIGH (ref 2–14)
NEUTROPHILS # BLD AUTO: 0.04 K/UL — LOW (ref 1.8–7.4)
NEUTROPHILS NFR BLD AUTO: 15.5 % — LOW (ref 43–77)
PHOSPHATE SERPL-MCNC: 2.2 MG/DL — LOW (ref 2.5–4.5)
PLATELET # BLD AUTO: 58 K/UL — LOW (ref 150–400)
PMV BLD: 11.1 FL — SIGNIFICANT CHANGE UP (ref 7–13)
POTASSIUM SERPL-MCNC: 3 MMOL/L — LOW (ref 3.5–5.3)
POTASSIUM SERPL-SCNC: 3 MMOL/L — LOW (ref 3.5–5.3)
RBC # BLD: 2.62 M/UL — LOW (ref 3.8–5.2)
RBC # FLD: 15.8 % — HIGH (ref 10.3–14.5)
SODIUM SERPL-SCNC: 132 MMOL/L — LOW (ref 135–145)
SPECIMEN SOURCE: SIGNIFICANT CHANGE UP
WBC # BLD: 0.26 K/UL — CRITICAL LOW (ref 3.8–10.5)
WBC # FLD AUTO: 0.26 K/UL — CRITICAL LOW (ref 3.8–10.5)

## 2017-06-12 PROCEDURE — 99233 SBSQ HOSP IP/OBS HIGH 50: CPT | Mod: GC

## 2017-06-12 RX ORDER — POTASSIUM PHOSPHATE, MONOBASIC POTASSIUM PHOSPHATE, DIBASIC 236; 224 MG/ML; MG/ML
15 INJECTION, SOLUTION INTRAVENOUS ONCE
Qty: 0 | Refills: 0 | Status: COMPLETED | OUTPATIENT
Start: 2017-06-12 | End: 2017-06-12

## 2017-06-12 RX ORDER — MAGNESIUM SULFATE 500 MG/ML
2 VIAL (ML) INJECTION ONCE
Qty: 0 | Refills: 0 | Status: COMPLETED | OUTPATIENT
Start: 2017-06-12 | End: 2017-06-12

## 2017-06-12 RX ORDER — POTASSIUM CHLORIDE 20 MEQ
40 PACKET (EA) ORAL EVERY 4 HOURS
Qty: 0 | Refills: 0 | Status: COMPLETED | OUTPATIENT
Start: 2017-06-12 | End: 2017-06-12

## 2017-06-12 RX ORDER — DOCUSATE SODIUM 100 MG
100 CAPSULE ORAL DAILY
Qty: 0 | Refills: 0 | Status: DISCONTINUED | OUTPATIENT
Start: 2017-06-12 | End: 2017-06-14

## 2017-06-12 RX ADMIN — Medication 100 MILLIGRAM(S): at 15:24

## 2017-06-12 RX ADMIN — CEFEPIME 100 MILLIGRAM(S): 1 INJECTION, POWDER, FOR SOLUTION INTRAMUSCULAR; INTRAVENOUS at 15:23

## 2017-06-12 RX ADMIN — APIXABAN 5 MILLIGRAM(S): 2.5 TABLET, FILM COATED ORAL at 06:05

## 2017-06-12 RX ADMIN — LOSARTAN POTASSIUM 50 MILLIGRAM(S): 100 TABLET, FILM COATED ORAL at 06:05

## 2017-06-12 RX ADMIN — Medication 50 GRAM(S): at 10:12

## 2017-06-12 RX ADMIN — CEFEPIME 100 MILLIGRAM(S): 1 INJECTION, POWDER, FOR SOLUTION INTRAMUSCULAR; INTRAVENOUS at 05:38

## 2017-06-12 RX ADMIN — CEFEPIME 100 MILLIGRAM(S): 1 INJECTION, POWDER, FOR SOLUTION INTRAMUSCULAR; INTRAVENOUS at 21:54

## 2017-06-12 RX ADMIN — Medication 40 MILLIEQUIVALENT(S): at 15:24

## 2017-06-12 RX ADMIN — POTASSIUM PHOSPHATE, MONOBASIC POTASSIUM PHOSPHATE, DIBASIC 62.5 MILLIMOLE(S): 236; 224 INJECTION, SOLUTION INTRAVENOUS at 16:22

## 2017-06-12 RX ADMIN — Medication 40 MILLIEQUIVALENT(S): at 10:12

## 2017-06-12 RX ADMIN — APIXABAN 5 MILLIGRAM(S): 2.5 TABLET, FILM COATED ORAL at 18:35

## 2017-06-12 RX ADMIN — PANTOPRAZOLE SODIUM 40 MILLIGRAM(S): 20 TABLET, DELAYED RELEASE ORAL at 06:05

## 2017-06-12 NOTE — PROGRESS NOTE ADULT - ATTENDING COMMENTS
Patient seen and examined.  Case discussed with house staff.  Agree with above as edited.  neutropenic fever - c/w cefepime  d/w Heme.  No plans for neupagen.  Hypokalemia - Replete with KCl and monitor.   Hypophosphatemia - Replete with KPhos and monitor.

## 2017-06-12 NOTE — PROGRESS NOTE ADULT - SUBJECTIVE AND OBJECTIVE BOX
Patient is a 77y old  Female who presents with a chief complaint of chills, weakness (2017 00:47)      SUBJECTIVE / OVERNIGHT EVENTS: No acute events ON. Afebrile in last 24 hrs. No complaints at this time. Denies chills, HA, SOB, chest pain, palpitations, n/v, diarrhea, constipation    MEDICATIONS  (STANDING):  cefepime  IVPB 2000milliGRAM(s) IV Intermittent every 8 hours  apixaban 5milliGRAM(s) Oral two times a day  pantoprazole    Tablet 40milliGRAM(s) Oral before breakfast  losartan 50milliGRAM(s) Oral daily  sodium chloride 0.9%. 1000milliLiter(s) IV Continuous <Continuous>    MEDICATIONS  (PRN):      Vital Signs Last 24 Hrs  T(C): 37.1, Max: 37.4 (-11 @ 21:38)  HR: 92 (83 - 96)  BP: 117/78 (114/77 - 117/78)  RR: 18 (18 - 18)  SpO2: 96% (96% - 99%)  Wt(kg): --  CAPILLARY BLOOD GLUCOSE    I&O's Summary      PHYSICAL EXAM:  GENERAL: NAD, well-developed  HEAD:  Atraumatic, Normocephalic  EYES: EOMI, PERRLA, conjunctiva and sclera clear  NECK: Supple, No JVD  CHEST/LUNG: Clear to auscultation bilaterally; No wheeze  HEART: Regular rate and rhythm; No murmurs, rubs, or gallops  ABDOMEN: Soft, Nontender, Nondistended; Bowel sounds present  EXTREMITIES:  2+ Peripheral Pulses, No clubbing, cyanosis, or edema  PSYCH: AAOx3  NEUROLOGY: non-focal  SKIN: No rashes or lesions    LABS:                        8.7    0.24  )-----------( 59       ( 2017 06:25 )             26.3     06-11    130<L>  |  95<L>  |  8   ----------------------------<  98  3.5   |  22  |  0.29<L>    Ca    8.0<L>      2017 06:25  Phos  2.2     06-11  Mg     1.8     06-11    TPro  5.2<L>  /  Alb  3.0<L>  /  TBili  1.3<H>  /  DBili  x   /  AST  16  /  ALT  11  /  AlkPhos  54  06-11          Urinalysis Basic - ( 10 Ricky 2017 23:15 )    Color: YELLOW / Appearance: CLEAR / S.011 / pH: 7.5  Gluc: NEGATIVE / Ketone: TRACE  / Bili: NEGATIVE / Urobili: NORMAL E.U.   Blood: NEGATIVE / Protein: 10 / Nitrite: NEGATIVE   Leuk Esterase: NEGATIVE / RBC: 0-2 / WBC 2-5   Sq Epi: OCC / Non Sq Epi: x / Bacteria: FEW    Culture - Blood (06.10.17 @ 21:20)    Culture - Blood:   NO ORGANISMS ISOLATED  NO ORGANISMS ISOLATED AT 24 HOURS    Specimen Source: BLOOD VENOUS Patient is a 77y old  Female who presents with a chief complaint of chills, weakness (11 Jun 2017 00:47)      SUBJECTIVE / OVERNIGHT EVENTS: No acute events ON. Afebrile in last 24 hrs. No complaints at this time. Denies chills, HA, SOB, chest pain, palpitations, n/v, diarrhea, constipation    MEDICATIONS  (STANDING):  cefepime  IVPB 2000milliGRAM(s) IV Intermittent every 8 hours  apixaban 5milliGRAM(s) Oral two times a day  pantoprazole    Tablet 40milliGRAM(s) Oral before breakfast  losartan 50milliGRAM(s) Oral daily  sodium chloride 0.9%. 1000milliLiter(s) IV Continuous <Continuous>    MEDICATIONS  (PRN):      Vital Signs Last 24 Hrs  T(C): 37.1, Max: 37.4 (06-11 @ 21:38)  HR: 92 (83 - 96)  BP: 117/78 (114/77 - 117/78)  RR: 18 (18 - 18)  SpO2: 96% (96% - 99%)  Wt(kg): --  CAPILLARY BLOOD GLUCOSE    I&O's Summary      PHYSICAL EXAM:  GENERAL: NAD, well-developed  HEAD:  Atraumatic, Normocephalic  EYES: EOMI, PERRLA, conjunctiva and sclera clear  NECK: Supple, No JVD  CHEST/LUNG: Clear to auscultation bilaterally; No wheeze  HEART: Regular rate and rhythm; No murmurs, rubs, or gallops  ABDOMEN: Soft, Nontender, Nondistended; Bowel sounds present  EXTREMITIES:  2+ Peripheral Pulses, No clubbing, cyanosis, or edema  PSYCH: AAOx3  NEUROLOGY: non-focal  SKIN: No rashes or lesions    LABS:                          8.0    0.26  )-----------( 58       ( 12 Jun 2017 06:05 )             23.1   06-12    132<L>  |  98  |  5<L>  ----------------------------<  98  3.0<L>   |  22  |  0.28<L>    Ca    7.3<L>      12 Jun 2017 06:05  Phos  2.2     06-12  Mg     1.6     06-12    TPro  5.2<L>  /  Alb  3.0<L>  /  TBili  1.3<H>  /  DBili  x   /  AST  16  /  ALT  11  /  AlkPhos  54  06-11

## 2017-06-12 NOTE — PROGRESS NOTE ADULT - PROBLEM SELECTOR PLAN 1
-Pt tachycardic and febrile on admission, neutropenic with ANC of 0.08 in setting of recent chemo  -C/w cefepime   -Per Heme monocytosis indicative of count recovery, hold off neupogen at this time   -NGTD on blood cultures

## 2017-06-13 LAB
BASOPHILS # BLD AUTO: 0.01 K/UL — SIGNIFICANT CHANGE UP (ref 0–0.2)
BASOPHILS NFR BLD AUTO: 2.6 % — HIGH (ref 0–2)
BUN SERPL-MCNC: 6 MG/DL — LOW (ref 7–23)
CALCIUM SERPL-MCNC: 8.1 MG/DL — LOW (ref 8.4–10.5)
CHLORIDE SERPL-SCNC: 101 MMOL/L — SIGNIFICANT CHANGE UP (ref 98–107)
CO2 SERPL-SCNC: 23 MMOL/L — SIGNIFICANT CHANGE UP (ref 22–31)
CREAT SERPL-MCNC: 0.28 MG/DL — LOW (ref 0.5–1.3)
EOSINOPHIL # BLD AUTO: 0.01 K/UL — SIGNIFICANT CHANGE UP (ref 0–0.5)
EOSINOPHIL NFR BLD AUTO: 2.6 % — SIGNIFICANT CHANGE UP (ref 0–6)
GLUCOSE SERPL-MCNC: 94 MG/DL — SIGNIFICANT CHANGE UP (ref 70–99)
HCT VFR BLD CALC: 23.1 % — LOW (ref 34.5–45)
HCT VFR BLD CALC: 23.1 % — LOW (ref 34.5–45)
HGB BLD-MCNC: 7.6 G/DL — LOW (ref 11.5–15.5)
HGB BLD-MCNC: 7.6 G/DL — LOW (ref 11.5–15.5)
IMM GRANULOCYTES NFR BLD AUTO: 5.3 % — HIGH (ref 0–1.5)
LYMPHOCYTES # BLD AUTO: 0.08 K/UL — LOW (ref 1–3.3)
LYMPHOCYTES # BLD AUTO: 21.1 % — SIGNIFICANT CHANGE UP (ref 13–44)
LYMPHOCYTES NFR SPEC AUTO: 24 % — SIGNIFICANT CHANGE UP (ref 13–44)
LYMPHOCYTES NFR SPEC AUTO: 24 % — SIGNIFICANT CHANGE UP (ref 13–44)
MAGNESIUM SERPL-MCNC: 1.8 MG/DL — SIGNIFICANT CHANGE UP (ref 1.6–2.6)
MCHC RBC-ENTMCNC: 29.1 PG — SIGNIFICANT CHANGE UP (ref 27–34)
MCHC RBC-ENTMCNC: 29.1 PG — SIGNIFICANT CHANGE UP (ref 27–34)
MCHC RBC-ENTMCNC: 32.9 % — SIGNIFICANT CHANGE UP (ref 32–36)
MCHC RBC-ENTMCNC: 32.9 % — SIGNIFICANT CHANGE UP (ref 32–36)
MCV RBC AUTO: 88.5 FL — SIGNIFICANT CHANGE UP (ref 80–100)
MCV RBC AUTO: 88.5 FL — SIGNIFICANT CHANGE UP (ref 80–100)
MONOCYTES # BLD AUTO: 0.2 K/UL — SIGNIFICANT CHANGE UP (ref 0–0.9)
MONOCYTES NFR BLD AUTO: 52.6 % — HIGH (ref 2–14)
MONOCYTES NFR BLD: 48 % — HIGH (ref 2–9)
MONOCYTES NFR BLD: 48 % — HIGH (ref 2–9)
MORPHOLOGY BLD-IMP: SIGNIFICANT CHANGE UP
MORPHOLOGY BLD-IMP: SIGNIFICANT CHANGE UP
NEUTROPHIL AB SER-ACNC: 28 % — LOW (ref 43–77)
NEUTROPHIL AB SER-ACNC: 28 % — LOW (ref 43–77)
NEUTROPHILS # BLD AUTO: 0.06 K/UL — LOW (ref 1.8–7.4)
NEUTROPHILS NFR BLD AUTO: 15.8 % — LOW (ref 43–77)
PHOSPHATE SERPL-MCNC: 2.4 MG/DL — LOW (ref 2.5–4.5)
PLATELET # BLD AUTO: 79 K/UL — LOW (ref 150–400)
PLATELET # BLD AUTO: 79 K/UL — LOW (ref 150–400)
PLATELET COUNT - ESTIMATE: SIGNIFICANT CHANGE UP
PLATELET COUNT - ESTIMATE: SIGNIFICANT CHANGE UP
PMV BLD: 11.5 FL — SIGNIFICANT CHANGE UP (ref 7–13)
PMV BLD: 11.5 FL — SIGNIFICANT CHANGE UP (ref 7–13)
POTASSIUM SERPL-MCNC: 4.2 MMOL/L — SIGNIFICANT CHANGE UP (ref 3.5–5.3)
POTASSIUM SERPL-SCNC: 4.2 MMOL/L — SIGNIFICANT CHANGE UP (ref 3.5–5.3)
RBC # BLD: 2.61 M/UL — LOW (ref 3.8–5.2)
RBC # BLD: 2.61 M/UL — LOW (ref 3.8–5.2)
RBC # FLD: 15.9 % — HIGH (ref 10.3–14.5)
RBC # FLD: 15.9 % — HIGH (ref 10.3–14.5)
SODIUM SERPL-SCNC: 133 MMOL/L — LOW (ref 135–145)
WBC # BLD: 0.41 K/UL — CRITICAL LOW (ref 3.8–10.5)
WBC # BLD: 0.41 K/UL — CRITICAL LOW (ref 3.8–10.5)
WBC # FLD AUTO: 0.41 K/UL — CRITICAL LOW (ref 3.8–10.5)
WBC # FLD AUTO: 0.41 K/UL — CRITICAL LOW (ref 3.8–10.5)

## 2017-06-13 PROCEDURE — 93010 ELECTROCARDIOGRAM REPORT: CPT

## 2017-06-13 PROCEDURE — 99233 SBSQ HOSP IP/OBS HIGH 50: CPT | Mod: GC

## 2017-06-13 RX ORDER — FAMOTIDINE 10 MG/ML
20 INJECTION INTRAVENOUS DAILY
Qty: 0 | Refills: 0 | Status: DISCONTINUED | OUTPATIENT
Start: 2017-06-13 | End: 2017-06-14

## 2017-06-13 RX ORDER — ONDANSETRON 8 MG/1
4 TABLET, FILM COATED ORAL EVERY 8 HOURS
Qty: 0 | Refills: 0 | Status: DISCONTINUED | OUTPATIENT
Start: 2017-06-13 | End: 2017-06-14

## 2017-06-13 RX ADMIN — PANTOPRAZOLE SODIUM 40 MILLIGRAM(S): 20 TABLET, DELAYED RELEASE ORAL at 06:41

## 2017-06-13 RX ADMIN — LOSARTAN POTASSIUM 50 MILLIGRAM(S): 100 TABLET, FILM COATED ORAL at 06:41

## 2017-06-13 RX ADMIN — FAMOTIDINE 20 MILLIGRAM(S): 10 INJECTION INTRAVENOUS at 13:10

## 2017-06-13 RX ADMIN — APIXABAN 5 MILLIGRAM(S): 2.5 TABLET, FILM COATED ORAL at 17:04

## 2017-06-13 RX ADMIN — Medication 100 MILLIGRAM(S): at 13:10

## 2017-06-13 RX ADMIN — Medication 63.75 MILLIMOLE(S): at 13:10

## 2017-06-13 RX ADMIN — CEFEPIME 100 MILLIGRAM(S): 1 INJECTION, POWDER, FOR SOLUTION INTRAMUSCULAR; INTRAVENOUS at 13:10

## 2017-06-13 RX ADMIN — CEFEPIME 100 MILLIGRAM(S): 1 INJECTION, POWDER, FOR SOLUTION INTRAMUSCULAR; INTRAVENOUS at 22:02

## 2017-06-13 RX ADMIN — CEFEPIME 100 MILLIGRAM(S): 1 INJECTION, POWDER, FOR SOLUTION INTRAMUSCULAR; INTRAVENOUS at 06:36

## 2017-06-13 RX ADMIN — APIXABAN 5 MILLIGRAM(S): 2.5 TABLET, FILM COATED ORAL at 06:41

## 2017-06-13 RX ADMIN — ONDANSETRON 4 MILLIGRAM(S): 8 TABLET, FILM COATED ORAL at 01:13

## 2017-06-13 NOTE — PROGRESS NOTE ADULT - ATTENDING COMMENTS
Patient seen and examined.  Case discussed with house staff.  Agree with above as edited.  Patient a/w neutropenic fever - c/w cefepime and monitor ANC. Will need abx until ANC recovers and afebrile 48hrs  Per Heme.  No plans for neupagen.

## 2017-06-13 NOTE — PROGRESS NOTE ADULT - SUBJECTIVE AND OBJECTIVE BOX
Patient is a 77y old  Female who presents with a chief complaint of chills, weakness (11 Jun 2017 00:47)      SUBJECTIVE / OVERNIGHT EVENTS: ON patient slightly nauseous. Afebrile. Denies chest pain, SOB, dizziness, constipation or diarrhea.     MEDICATIONS  (STANDING):  cefepime  IVPB 2000milliGRAM(s) IV Intermittent every 8 hours  apixaban 5milliGRAM(s) Oral two times a day  pantoprazole    Tablet 40milliGRAM(s) Oral before breakfast  losartan 50milliGRAM(s) Oral daily  sodium chloride 0.9%. 1000milliLiter(s) IV Continuous <Continuous>  docusate sodium 100milliGRAM(s) Oral daily    MEDICATIONS  (PRN):  ondansetron Injectable 4milliGRAM(s) IV Push every 8 hours PRN Nausea and/or Vomiting      Vital Signs Last 24 Hrs  T(C): 37.2, Max: 37.2 (06-13 @ 05:19)  HR: 94 (94 - 130)  BP: 107/68 (107/68 - 124/78)  RR: 18 (18 - 20)  SpO2: 96% (96% - 99%)  Wt(kg): --  CAPILLARY BLOOD GLUCOSE    I&O's Summary    I & Os for current day (as of 13 Jun 2017 07:10)  =============================================  IN: 50 ml / OUT: 0 ml / NET: 50 ml      PHYSICAL EXAM:  GENERAL: NAD, well-developed  HEAD:  Atraumatic, Normocephalic  EYES: EOMI, PERRLA, conjunctiva and sclera clear  NECK: Supple, No JVD  CHEST/LUNG: Clear to auscultation bilaterally; No wheeze  HEART: Regular rate and rhythm; No murmurs, rubs, or gallops  ABDOMEN: Soft, Nontender, Nondistended; Bowel sounds present  EXTREMITIES:  2+ Peripheral Pulses, No clubbing, cyanosis, or edema  PSYCH: AAOx3  NEUROLOGY: non-focal  SKIN: No rashes or lesions    LABS:                        8.0    0.26  )-----------( 58       ( 12 Jun 2017 06:05 )             23.1     06-12    132<L>  |  98  |  5<L>  ----------------------------<  98  3.0<L>   |  22  |  0.28<L>    Ca    7.3<L>      12 Jun 2017 06:05  Phos  2.2     06-12  Mg     1.6     06-12

## 2017-06-14 VITALS
OXYGEN SATURATION: 96 % | RESPIRATION RATE: 18 BRPM | DIASTOLIC BLOOD PRESSURE: 74 MMHG | SYSTOLIC BLOOD PRESSURE: 111 MMHG | HEART RATE: 90 BPM | TEMPERATURE: 98 F | WEIGHT: 115.96 LBS

## 2017-06-14 LAB
ANISOCYTOSIS BLD QL: SLIGHT — SIGNIFICANT CHANGE UP
BASOPHILS # BLD AUTO: 0.02 K/UL — SIGNIFICANT CHANGE UP (ref 0–0.2)
BASOPHILS NFR BLD AUTO: 3.4 % — HIGH (ref 0–2)
BASOPHILS NFR SPEC: 2.6 % — HIGH (ref 0–2)
BUN SERPL-MCNC: 6 MG/DL — LOW (ref 7–23)
CALCIUM SERPL-MCNC: 8.6 MG/DL — SIGNIFICANT CHANGE UP (ref 8.4–10.5)
CHLORIDE SERPL-SCNC: 98 MMOL/L — SIGNIFICANT CHANGE UP (ref 98–107)
CO2 SERPL-SCNC: 25 MMOL/L — SIGNIFICANT CHANGE UP (ref 22–31)
CREAT SERPL-MCNC: 0.34 MG/DL — LOW (ref 0.5–1.3)
EOSINOPHIL # BLD AUTO: 0.02 K/UL — SIGNIFICANT CHANGE UP (ref 0–0.5)
EOSINOPHIL NFR BLD AUTO: 3.4 % — SIGNIFICANT CHANGE UP (ref 0–6)
EOSINOPHIL NFR FLD: 0.9 % — SIGNIFICANT CHANGE UP (ref 0–6)
GIANT PLATELETS BLD QL SMEAR: PRESENT — SIGNIFICANT CHANGE UP
GLUCOSE SERPL-MCNC: 91 MG/DL — SIGNIFICANT CHANGE UP (ref 70–99)
HCT VFR BLD CALC: 25 % — LOW (ref 34.5–45)
HGB BLD-MCNC: 8.4 G/DL — LOW (ref 11.5–15.5)
IMM GRANULOCYTES NFR BLD AUTO: 0 % — SIGNIFICANT CHANGE UP (ref 0–1.5)
LYMPHOCYTES # BLD AUTO: 0.06 K/UL — LOW (ref 1–3.3)
LYMPHOCYTES # BLD AUTO: 10.2 % — LOW (ref 13–44)
LYMPHOCYTES NFR SPEC AUTO: 13.2 % — SIGNIFICANT CHANGE UP (ref 13–44)
MACROCYTES BLD QL: SLIGHT — SIGNIFICANT CHANGE UP
MAGNESIUM SERPL-MCNC: 1.7 MG/DL — SIGNIFICANT CHANGE UP (ref 1.6–2.6)
MCHC RBC-ENTMCNC: 30.1 PG — SIGNIFICANT CHANGE UP (ref 27–34)
MCHC RBC-ENTMCNC: 33.6 % — SIGNIFICANT CHANGE UP (ref 32–36)
MCV RBC AUTO: 89.6 FL — SIGNIFICANT CHANGE UP (ref 80–100)
MONOCYTES # BLD AUTO: 0.34 K/UL — SIGNIFICANT CHANGE UP (ref 0–0.9)
MONOCYTES NFR BLD AUTO: 57.6 % — HIGH (ref 2–14)
MONOCYTES NFR BLD: 40.2 % — HIGH (ref 2–9)
MYELOCYTES NFR BLD: 0.9 % — HIGH (ref 0–0)
NEUTROPHIL AB SER-ACNC: 35.1 % — LOW (ref 43–77)
NEUTROPHILS # BLD AUTO: 0.15 K/UL — LOW (ref 1.8–7.4)
NEUTROPHILS NFR BLD AUTO: 25.4 % — LOW (ref 43–77)
NEUTS BAND # BLD: 1.8 % — SIGNIFICANT CHANGE UP (ref 0–6)
PHOSPHATE SERPL-MCNC: 3.5 MG/DL — SIGNIFICANT CHANGE UP (ref 2.5–4.5)
PLATELET # BLD AUTO: 120 K/UL — LOW (ref 150–400)
PLATELET COUNT - ESTIMATE: SIGNIFICANT CHANGE UP
PMV BLD: 11.6 FL — SIGNIFICANT CHANGE UP (ref 7–13)
POIKILOCYTOSIS BLD QL AUTO: SLIGHT — SIGNIFICANT CHANGE UP
POTASSIUM SERPL-MCNC: 3.7 MMOL/L — SIGNIFICANT CHANGE UP (ref 3.5–5.3)
POTASSIUM SERPL-SCNC: 3.7 MMOL/L — SIGNIFICANT CHANGE UP (ref 3.5–5.3)
RBC # BLD: 2.79 M/UL — LOW (ref 3.8–5.2)
RBC # FLD: 16 % — HIGH (ref 10.3–14.5)
SODIUM SERPL-SCNC: 134 MMOL/L — LOW (ref 135–145)
VARIANT LYMPHS # BLD: 5.3 % — SIGNIFICANT CHANGE UP
WBC # BLD: 0.59 K/UL — CRITICAL LOW (ref 3.8–10.5)
WBC # FLD AUTO: 0.59 K/UL — CRITICAL LOW (ref 3.8–10.5)

## 2017-06-14 PROCEDURE — 99239 HOSP IP/OBS DSCHRG MGMT >30: CPT

## 2017-06-14 RX ORDER — CEFPODOXIME PROXETIL 100 MG
1 TABLET ORAL
Qty: 10 | Refills: 0 | OUTPATIENT
Start: 2017-06-14 | End: 2017-06-19

## 2017-06-14 RX ADMIN — FAMOTIDINE 20 MILLIGRAM(S): 10 INJECTION INTRAVENOUS at 13:03

## 2017-06-14 RX ADMIN — LOSARTAN POTASSIUM 50 MILLIGRAM(S): 100 TABLET, FILM COATED ORAL at 06:26

## 2017-06-14 RX ADMIN — PANTOPRAZOLE SODIUM 40 MILLIGRAM(S): 20 TABLET, DELAYED RELEASE ORAL at 06:25

## 2017-06-14 RX ADMIN — CEFEPIME 100 MILLIGRAM(S): 1 INJECTION, POWDER, FOR SOLUTION INTRAMUSCULAR; INTRAVENOUS at 13:03

## 2017-06-14 RX ADMIN — APIXABAN 5 MILLIGRAM(S): 2.5 TABLET, FILM COATED ORAL at 06:25

## 2017-06-14 RX ADMIN — CEFEPIME 100 MILLIGRAM(S): 1 INJECTION, POWDER, FOR SOLUTION INTRAMUSCULAR; INTRAVENOUS at 06:25

## 2017-06-14 RX ADMIN — Medication 100 MILLIGRAM(S): at 13:03

## 2017-06-14 NOTE — PROGRESS NOTE ADULT - PROBLEM SELECTOR PLAN 3
-c/w home dose Eliquis BID, Hold for plts <50  -Consider adding cardizem if RVR -c/w home dose Eliquis BID, Hold for plts <50

## 2017-06-14 NOTE — PROVIDER CONTACT NOTE (CRITICAL VALUE NOTIFICATION) - RECOMMENDATIONS
administer antibiotics as ordered
continue to monitor
MD notified for further interventions.
MD notified for further interventions.

## 2017-06-14 NOTE — DISCHARGE NOTE ADULT - PLAN OF CARE
Antibiotics You arrived with fever to 102 likely due to your decreased blood counts. You received antibiotics while in the hospital till your blood counts started increasing and will require more oral antibiotics till your counts fully recover. If you experience a fever while at home please seek emergent medical attention. Please follow up with Dr. Nguyen's office to have blood work to ensure your counts recover. Please follow up with Dr. Nguyen for further chemotherapy plans for your lymphoma Please continue eliquis

## 2017-06-14 NOTE — DISCHARGE NOTE ADULT - CARE PLAN
Principal Discharge DX:	Neutropenic fever  Secondary Diagnosis:	Lymphoma, unspecified body region, unspecified lymphoma type  Secondary Diagnosis:	Atrial fibrillation, unspecified type Principal Discharge DX:	Neutropenic fever  Goal:	Antibiotics  Instructions for follow-up, activity and diet:	You arrived with fever to 102 likely due to your decreased blood counts. You received antibiotics while in the hospital till your blood counts started increasing and will require more oral antibiotics till your counts fully recover. If you experience a fever while at home please seek emergent medical attention. Please follow up with Dr. Nguyen's office to have blood work to ensure your counts recover.  Secondary Diagnosis:	Lymphoma, unspecified body region, unspecified lymphoma type  Instructions for follow-up, activity and diet:	Please follow up with Dr. Nguyen for further chemotherapy plans for your lymphoma  Secondary Diagnosis:	Atrial fibrillation, unspecified type  Instructions for follow-up, activity and diet:	Please continue eliquis

## 2017-06-14 NOTE — DISCHARGE NOTE ADULT - CARE PROVIDER_API CALL
Alvarez Nguyen), Hematology; Internal Medicine; Medical Oncology  [address] [place] [address]  Phone: [phone]  Fax: [phone]

## 2017-06-14 NOTE — PROGRESS NOTE ADULT - PROBLEM SELECTOR PLAN 2
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

## 2017-06-14 NOTE — PROGRESS NOTE ADULT - SUBJECTIVE AND OBJECTIVE BOX
Patient is a 77y old  Female who presents with a chief complaint of chills, weakness (11 Jun 2017 00:47)      SUBJECTIVE / OVERNIGHT EVENTS: No events ON. No fevers. Denies chest pain, SOB, N/V, constipation or diarrhea    MEDICATIONS  (STANDING):  cefepime  IVPB 2000milliGRAM(s) IV Intermittent every 8 hours  apixaban 5milliGRAM(s) Oral two times a day  pantoprazole    Tablet 40milliGRAM(s) Oral before breakfast  losartan 50milliGRAM(s) Oral daily  sodium chloride 0.9%. 1000milliLiter(s) IV Continuous <Continuous>  docusate sodium 100milliGRAM(s) Oral daily  famotidine    Tablet 20milliGRAM(s) Oral daily    MEDICATIONS  (PRN):  ondansetron Injectable 4milliGRAM(s) IV Push every 8 hours PRN Nausea and/or Vomiting      Vital Signs Last 24 Hrs  T(C): 36.9, Max: 37.1 (06-13 @ 21:13)  HR: 90 (90 - 91)  BP: 111/74 (111/74 - 127/79)  RR: 18 (18 - 20)  SpO2: 96% (96% - 100%)  Wt(kg): --  CAPILLARY BLOOD GLUCOSE    I&O's Summary      PHYSICAL EXAM:  GENERAL: NAD, well-developed  HEAD:  Atraumatic, Normocephalic  EYES: EOMI, PERRLA, conjunctiva and sclera clear  NECK: Supple, No JVD  CHEST/LUNG: Clear to auscultation bilaterally; No wheeze  HEART: Regular rate and rhythm; No murmurs, rubs, or gallops  ABDOMEN: Soft, Nontender, Nondistended; Bowel sounds present  EXTREMITIES:  2+ Peripheral Pulses, No clubbing, cyanosis, or edema  PSYCH: AAOx3  NEUROLOGY: non-focal  SKIN: No rashes or lesions    LABS:                        7.6    0.41  )-----------( 79       ( 13 Jun 2017 05:30 )             23.1     06-13    133<L>  |  101  |  6<L>  ----------------------------<  94  4.2   |  23  |  0.28<L>    Ca    8.1<L>      13 Jun 2017 05:30  Phos  2.4     06-13  Mg     1.8     06-13                Care Discussed with Consultants/Other Providers: Heme

## 2017-06-14 NOTE — PROVIDER CONTACT NOTE (CRITICAL VALUE NOTIFICATION) - BACKGROUND
Pt. admitted with neutropenia
Pt. admitted with neutropenia
Patient admitted for neutropenic fever, PMH afib, lymphoma; PSH B/L mastectomy
Patient admitted for neutropenic fever.

## 2017-06-14 NOTE — PROGRESS NOTE ADULT - PROBLEM SELECTOR PROBLEM 3
Chronic atrial fibrillation

## 2017-06-14 NOTE — PROGRESS NOTE ADULT - ATTENDING COMMENTS
Patient seen and examined.  Case discussed with house staff.  Agree with above as edited.   Patient with DLBCL  and Afib a/w neutropenic fever.  Patient still neutropenic but counts improving.  Appreciated heme f/u.  Plan for d/c today. Change Abx to Cefpodoxime to complete 10d course. Patient seen and examined.  Case discussed with house staff.  Agree with above as edited.   Patient with DLBCL  and Afib a/w neutropenic fever.  Patient still neutropenic but counts improving.  Appreciated heme f/u.  Plan for d/c today. Change Abx to Cefpodoxime to complete 10d course.  D/C time: 40 minutes

## 2017-06-14 NOTE — PROVIDER CONTACT NOTE (CRITICAL VALUE NOTIFICATION) - ACTION/TREATMENT ORDERED:
administer antibiotics as ordered
administered antibiotics as ordered
MD aware. No further interventions ordered at this time. Will continue to monitor.
MD aware. Will continue to monitor.

## 2017-06-14 NOTE — DISCHARGE NOTE ADULT - PATIENT PORTAL LINK FT
“You can access the [facility] Patient Portal, offered by [facility], by registering with the following website: [email or web address]”

## 2017-06-14 NOTE — PROGRESS NOTE ADULT - ASSESSMENT
77F hx of Afib on Eliquis, large cell lymphoma (last chemo 6/1/17 with Rituxan and Bendamustine), HTN presents with fever and weakness x 1 day. Found to be neutropenic to 0.08 with recent cycle of chemo (last dose 6/6) so started on management for neutropenic fever

## 2017-06-14 NOTE — DISCHARGE NOTE ADULT - MEDICATION SUMMARY - MEDICATIONS TO TAKE
I will START or STAY ON the medications listed below when I get home from the hospital:    olmesartan 20 mg oral tablet  -- 1 tab(s) by mouth once a day  -- Indication: For HTN (hypertension)    Eliquis 5 mg oral tablet  -- 1 tab(s) by mouth 2 times a day  -- Indication: For Afib    cefpodoxime 200 mg oral tablet  -- 1 tab(s) by mouth 2 times a day  -- Finish all this medication unless otherwise directed by prescriber.  Take with food or milk.    -- Indication: For Neutropenia    omeprazole 20 mg oral delayed release capsule  -- 1 cap(s) by mouth once a day  -- Indication: For GERD

## 2017-06-14 NOTE — PROGRESS NOTE ADULT - PROBLEM SELECTOR PLAN 6
-DASH diet, fluid restrict.

## 2017-06-14 NOTE — PROGRESS NOTE ADULT - PROBLEM SELECTOR PLAN 1
-Pt tachycardic and febrile on admission, neutropenic with ANC of 0.08 in setting of recent chemo  -C/w cefepime- D/C on levaquin to complete empiric 5d course   -Per Heme monocytosis indicative of count recovery, hold off neupogen at this time   -NGTD on blood cultures -Pt tachycardic and febrile on admission, neutropenic with ANC of 150 now in setting of recent chemo  -C/w cefepime- D/C on cefpoxomine to complete empiric 10d course   -Per Heme monocytosis indicative of count recovery, hold off neupogen at this time   -NGTD on blood cultures

## 2017-06-15 ENCOUNTER — RESULT REVIEW (OUTPATIENT)
Age: 77
End: 2017-06-15

## 2017-06-15 ENCOUNTER — APPOINTMENT (OUTPATIENT)
Dept: HEMATOLOGY ONCOLOGY | Facility: CLINIC | Age: 77
End: 2017-06-15

## 2017-06-15 VITALS
TEMPERATURE: 98.2 F | OXYGEN SATURATION: 98 % | DIASTOLIC BLOOD PRESSURE: 82 MMHG | WEIGHT: 112.44 LBS | BODY MASS INDEX: 20.56 KG/M2 | RESPIRATION RATE: 16 BRPM | SYSTOLIC BLOOD PRESSURE: 135 MMHG | HEART RATE: 105 BPM

## 2017-06-15 PROBLEM — I48.91 UNSPECIFIED ATRIAL FIBRILLATION: Chronic | Status: ACTIVE | Noted: 2017-06-10

## 2017-06-15 PROBLEM — I10 ESSENTIAL (PRIMARY) HYPERTENSION: Chronic | Status: ACTIVE | Noted: 2017-06-10

## 2017-06-15 LAB
BACTERIA BLD CULT: SIGNIFICANT CHANGE UP
BACTERIA BLD CULT: SIGNIFICANT CHANGE UP
BASOPHILS # BLD AUTO: 0 K/UL — SIGNIFICANT CHANGE UP (ref 0–0.2)
BASOPHILS NFR BLD AUTO: 1 % — SIGNIFICANT CHANGE UP (ref 0–2)
EOSINOPHIL # BLD AUTO: 0.1 K/UL — SIGNIFICANT CHANGE UP (ref 0–0.5)
EOSINOPHIL NFR BLD AUTO: 2 % — SIGNIFICANT CHANGE UP (ref 0–6)
HCT VFR BLD CALC: 27.1 % — LOW (ref 34.5–45)
HGB BLD-MCNC: 9.4 G/DL — LOW (ref 11.5–15.5)
LYMPHOCYTES # BLD AUTO: 0.2 K/UL — LOW (ref 1–3.3)
LYMPHOCYTES # BLD AUTO: 15 % — SIGNIFICANT CHANGE UP (ref 13–44)
MCHC RBC-ENTMCNC: 31 PG — SIGNIFICANT CHANGE UP (ref 27–34)
MCHC RBC-ENTMCNC: 34.5 G/DL — SIGNIFICANT CHANGE UP (ref 32–36)
MCV RBC AUTO: 89.6 FL — SIGNIFICANT CHANGE UP (ref 80–100)
MONOCYTES # BLD AUTO: 0.7 K/UL — SIGNIFICANT CHANGE UP (ref 0–0.9)
MONOCYTES NFR BLD AUTO: 40 % — HIGH (ref 2–14)
NEUTROPHILS # BLD AUTO: 0.6 K/UL — LOW (ref 1.8–7.4)
NEUTROPHILS NFR BLD AUTO: 37 % — LOW (ref 43–77)
NEUTS BAND # BLD: 5 % — SIGNIFICANT CHANGE UP (ref 0–8)
PLAT MORPH BLD: NORMAL — SIGNIFICANT CHANGE UP
PLATELET # BLD AUTO: 160 K/UL — SIGNIFICANT CHANGE UP (ref 150–400)
RBC # BLD: 3.03 M/UL — LOW (ref 3.8–5.2)
RBC # FLD: 15.6 % — HIGH (ref 10.3–14.5)
RBC BLD AUTO: SIGNIFICANT CHANGE UP
WBC # BLD: 1.6 K/UL — LOW (ref 3.8–10.5)
WBC # FLD AUTO: 1.6 K/UL — LOW (ref 3.8–10.5)

## 2017-06-19 ENCOUNTER — APPOINTMENT (OUTPATIENT)
Dept: CARDIOLOGY | Facility: CLINIC | Age: 77
End: 2017-06-19

## 2017-06-19 VITALS
OXYGEN SATURATION: 98 % | WEIGHT: 112 LBS | DIASTOLIC BLOOD PRESSURE: 99 MMHG | BODY MASS INDEX: 20.61 KG/M2 | HEART RATE: 101 BPM | SYSTOLIC BLOOD PRESSURE: 130 MMHG | HEIGHT: 62 IN

## 2017-06-19 RX ORDER — METOPROLOL SUCCINATE 50 MG/1
50 TABLET, EXTENDED RELEASE ORAL DAILY
Qty: 30 | Refills: 5 | Status: DISCONTINUED | COMMUNITY
Start: 2017-06-19 | End: 2017-06-19

## 2017-06-22 ENCOUNTER — LABORATORY RESULT (OUTPATIENT)
Age: 77
End: 2017-06-22

## 2017-06-22 ENCOUNTER — RESULT REVIEW (OUTPATIENT)
Age: 77
End: 2017-06-22

## 2017-06-22 ENCOUNTER — APPOINTMENT (OUTPATIENT)
Dept: INFUSION THERAPY | Facility: HOSPITAL | Age: 77
End: 2017-06-22

## 2017-06-22 LAB
BASOPHILS # BLD AUTO: 0 K/UL — SIGNIFICANT CHANGE UP (ref 0–0.2)
BASOPHILS NFR BLD AUTO: 0.3 % — SIGNIFICANT CHANGE UP (ref 0–2)
EOSINOPHIL # BLD AUTO: 0.1 K/UL — SIGNIFICANT CHANGE UP (ref 0–0.5)
EOSINOPHIL NFR BLD AUTO: 1.9 % — SIGNIFICANT CHANGE UP (ref 0–6)
HCT VFR BLD CALC: 31.1 % — LOW (ref 34.5–45)
HGB BLD-MCNC: 10.8 G/DL — LOW (ref 11.5–15.5)
LYMPHOCYTES # BLD AUTO: 0.9 K/UL — LOW (ref 1–3.3)
LYMPHOCYTES # BLD AUTO: 12.9 % — LOW (ref 13–44)
MCHC RBC-ENTMCNC: 31.7 PG — SIGNIFICANT CHANGE UP (ref 27–34)
MCHC RBC-ENTMCNC: 34.7 G/DL — SIGNIFICANT CHANGE UP (ref 32–36)
MCV RBC AUTO: 91.4 FL — SIGNIFICANT CHANGE UP (ref 80–100)
MONOCYTES # BLD AUTO: 0.4 K/UL — SIGNIFICANT CHANGE UP (ref 0–0.9)
MONOCYTES NFR BLD AUTO: 5.5 % — SIGNIFICANT CHANGE UP (ref 2–14)
NEUTROPHILS # BLD AUTO: 5.7 K/UL — SIGNIFICANT CHANGE UP (ref 1.8–7.4)
NEUTROPHILS NFR BLD AUTO: 79.4 % — HIGH (ref 43–77)
PLATELET # BLD AUTO: 251 K/UL — SIGNIFICANT CHANGE UP (ref 150–400)
RBC # BLD: 3.4 M/UL — LOW (ref 3.8–5.2)
RBC # FLD: 17.7 % — HIGH (ref 10.3–14.5)
WBC # BLD: 7.1 K/UL — SIGNIFICANT CHANGE UP (ref 3.8–10.5)
WBC # FLD AUTO: 7.1 K/UL — SIGNIFICANT CHANGE UP (ref 3.8–10.5)

## 2017-06-23 ENCOUNTER — APPOINTMENT (OUTPATIENT)
Dept: INFUSION THERAPY | Facility: HOSPITAL | Age: 77
End: 2017-06-23

## 2017-06-23 DIAGNOSIS — C83.30 DIFFUSE LARGE B-CELL LYMPHOMA, UNSPECIFIED SITE: ICD-10-CM

## 2017-06-27 ENCOUNTER — OUTPATIENT (OUTPATIENT)
Dept: OUTPATIENT SERVICES | Facility: HOSPITAL | Age: 77
LOS: 1 days | Discharge: HOME | End: 2017-06-27

## 2017-06-27 DIAGNOSIS — C82.97 FOLLICULAR LYMPHOMA, UNSPECIFIED, SPLEEN: ICD-10-CM

## 2017-06-27 DIAGNOSIS — Z90.13 ACQUIRED ABSENCE OF BILATERAL BREASTS AND NIPPLES: Chronic | ICD-10-CM

## 2017-06-29 ENCOUNTER — APPOINTMENT (OUTPATIENT)
Dept: INFUSION THERAPY | Facility: HOSPITAL | Age: 77
End: 2017-06-29

## 2017-06-30 ENCOUNTER — APPOINTMENT (OUTPATIENT)
Dept: INFUSION THERAPY | Facility: HOSPITAL | Age: 77
End: 2017-06-30

## 2017-07-03 ENCOUNTER — RESULT REVIEW (OUTPATIENT)
Age: 77
End: 2017-07-03

## 2017-07-03 ENCOUNTER — APPOINTMENT (OUTPATIENT)
Dept: HEMATOLOGY ONCOLOGY | Facility: CLINIC | Age: 77
End: 2017-07-03

## 2017-07-03 VITALS
TEMPERATURE: 98.5 F | BODY MASS INDEX: 20.6 KG/M2 | OXYGEN SATURATION: 99 % | RESPIRATION RATE: 16 BRPM | WEIGHT: 112.65 LBS | HEART RATE: 119 BPM | DIASTOLIC BLOOD PRESSURE: 96 MMHG | SYSTOLIC BLOOD PRESSURE: 144 MMHG

## 2017-07-03 LAB
BASOPHILS # BLD AUTO: 0 K/UL — SIGNIFICANT CHANGE UP (ref 0–0.2)
BASOPHILS NFR BLD AUTO: 0.5 % — SIGNIFICANT CHANGE UP (ref 0–2)
EOSINOPHIL # BLD AUTO: 0.1 K/UL — SIGNIFICANT CHANGE UP (ref 0–0.5)
EOSINOPHIL NFR BLD AUTO: 2.1 % — SIGNIFICANT CHANGE UP (ref 0–6)
HCT VFR BLD CALC: 31.5 % — LOW (ref 34.5–45)
HGB BLD-MCNC: 10.8 G/DL — LOW (ref 11.5–15.5)
LYMPHOCYTES # BLD AUTO: 0.5 K/UL — LOW (ref 1–3.3)
LYMPHOCYTES # BLD AUTO: 11.5 % — LOW (ref 13–44)
MCHC RBC-ENTMCNC: 31.9 PG — SIGNIFICANT CHANGE UP (ref 27–34)
MCHC RBC-ENTMCNC: 34.1 G/DL — SIGNIFICANT CHANGE UP (ref 32–36)
MCV RBC AUTO: 93.4 FL — SIGNIFICANT CHANGE UP (ref 80–100)
MONOCYTES # BLD AUTO: 0.6 K/UL — SIGNIFICANT CHANGE UP (ref 0–0.9)
MONOCYTES NFR BLD AUTO: 13.3 % — SIGNIFICANT CHANGE UP (ref 2–14)
NEUTROPHILS # BLD AUTO: 3.4 K/UL — SIGNIFICANT CHANGE UP (ref 1.8–7.4)
NEUTROPHILS NFR BLD AUTO: 72.6 % — SIGNIFICANT CHANGE UP (ref 43–77)
PLATELET # BLD AUTO: 94 K/UL — LOW (ref 150–400)
RBC # BLD: 3.38 M/UL — LOW (ref 3.8–5.2)
RBC # FLD: 17.5 % — HIGH (ref 10.3–14.5)
WBC # BLD: 4.7 K/UL — SIGNIFICANT CHANGE UP (ref 3.8–10.5)
WBC # FLD AUTO: 4.7 K/UL — SIGNIFICANT CHANGE UP (ref 3.8–10.5)

## 2017-07-03 RX ORDER — CEFPODOXIME PROXETIL 200 MG/1
200 TABLET, FILM COATED ORAL
Qty: 10 | Refills: 0 | Status: DISCONTINUED | COMMUNITY
Start: 2017-06-14 | End: 2017-07-03

## 2017-07-09 ENCOUNTER — FORM ENCOUNTER (OUTPATIENT)
Age: 77
End: 2017-07-09

## 2017-07-10 ENCOUNTER — APPOINTMENT (OUTPATIENT)
Dept: NUCLEAR MEDICINE | Facility: IMAGING CENTER | Age: 77
End: 2017-07-10

## 2017-07-10 ENCOUNTER — APPOINTMENT (OUTPATIENT)
Dept: CARDIOLOGY | Facility: CLINIC | Age: 77
End: 2017-07-10

## 2017-07-10 ENCOUNTER — OUTPATIENT (OUTPATIENT)
Dept: OUTPATIENT SERVICES | Facility: HOSPITAL | Age: 77
LOS: 1 days | End: 2017-07-10
Payer: MEDICARE

## 2017-07-10 DIAGNOSIS — C82.02 FOLLICULAR LYMPHOMA GRADE I, INTRATHORACIC LYMPH NODES: ICD-10-CM

## 2017-07-10 DIAGNOSIS — Z90.13 ACQUIRED ABSENCE OF BILATERAL BREASTS AND NIPPLES: Chronic | ICD-10-CM

## 2017-07-10 PROCEDURE — 78815 PET IMAGE W/CT SKULL-THIGH: CPT

## 2017-07-10 PROCEDURE — A9552: CPT

## 2017-07-13 ENCOUNTER — RESULT REVIEW (OUTPATIENT)
Age: 77
End: 2017-07-13

## 2017-07-13 ENCOUNTER — LABORATORY RESULT (OUTPATIENT)
Age: 77
End: 2017-07-13

## 2017-07-13 ENCOUNTER — APPOINTMENT (OUTPATIENT)
Dept: INFUSION THERAPY | Facility: HOSPITAL | Age: 77
End: 2017-07-13

## 2017-07-13 LAB
BASOPHILS # BLD AUTO: 0 K/UL — SIGNIFICANT CHANGE UP (ref 0–0.2)
BASOPHILS NFR BLD AUTO: 0.2 % — SIGNIFICANT CHANGE UP (ref 0–2)
EOSINOPHIL # BLD AUTO: 0.1 K/UL — SIGNIFICANT CHANGE UP (ref 0–0.5)
EOSINOPHIL NFR BLD AUTO: 1.4 % — SIGNIFICANT CHANGE UP (ref 0–6)
HCT VFR BLD CALC: 33.1 % — LOW (ref 34.5–45)
HGB BLD-MCNC: 11.3 G/DL — LOW (ref 11.5–15.5)
LYMPHOCYTES # BLD AUTO: 1.4 K/UL — SIGNIFICANT CHANGE UP (ref 1–3.3)
LYMPHOCYTES # BLD AUTO: 16.3 % — SIGNIFICANT CHANGE UP (ref 13–44)
MCHC RBC-ENTMCNC: 31.9 PG — SIGNIFICANT CHANGE UP (ref 27–34)
MCHC RBC-ENTMCNC: 34.1 G/DL — SIGNIFICANT CHANGE UP (ref 32–36)
MCV RBC AUTO: 93.6 FL — SIGNIFICANT CHANGE UP (ref 80–100)
MONOCYTES # BLD AUTO: 0.8 K/UL — SIGNIFICANT CHANGE UP (ref 0–0.9)
MONOCYTES NFR BLD AUTO: 8.6 % — SIGNIFICANT CHANGE UP (ref 2–14)
NEUTROPHILS # BLD AUTO: 6.5 K/UL — SIGNIFICANT CHANGE UP (ref 1.8–7.4)
NEUTROPHILS NFR BLD AUTO: 73.5 % — SIGNIFICANT CHANGE UP (ref 43–77)
PLATELET # BLD AUTO: 187 K/UL — SIGNIFICANT CHANGE UP (ref 150–400)
RBC # BLD: 3.54 M/UL — LOW (ref 3.8–5.2)
RBC # FLD: 19.2 % — HIGH (ref 10.3–14.5)
WBC # BLD: 8.8 K/UL — SIGNIFICANT CHANGE UP (ref 3.8–10.5)
WBC # FLD AUTO: 8.8 K/UL — SIGNIFICANT CHANGE UP (ref 3.8–10.5)

## 2017-07-14 ENCOUNTER — APPOINTMENT (OUTPATIENT)
Dept: INFUSION THERAPY | Facility: HOSPITAL | Age: 77
End: 2017-07-14

## 2017-07-14 DIAGNOSIS — C83.30 DIFFUSE LARGE B-CELL LYMPHOMA, UNSPECIFIED SITE: ICD-10-CM

## 2017-07-14 DIAGNOSIS — Z51.11 ENCOUNTER FOR ANTINEOPLASTIC CHEMOTHERAPY: ICD-10-CM

## 2017-07-14 DIAGNOSIS — R11.2 NAUSEA WITH VOMITING, UNSPECIFIED: ICD-10-CM

## 2017-07-17 DIAGNOSIS — Z51.89 ENCOUNTER FOR OTHER SPECIFIED AFTERCARE: ICD-10-CM

## 2017-07-25 ENCOUNTER — APPOINTMENT (OUTPATIENT)
Dept: HEMATOLOGY ONCOLOGY | Facility: CLINIC | Age: 77
End: 2017-07-25

## 2017-07-25 ENCOUNTER — RESULT REVIEW (OUTPATIENT)
Age: 77
End: 2017-07-25

## 2017-07-25 VITALS
OXYGEN SATURATION: 97 % | RESPIRATION RATE: 16 BRPM | SYSTOLIC BLOOD PRESSURE: 144 MMHG | WEIGHT: 114.64 LBS | HEART RATE: 104 BPM | DIASTOLIC BLOOD PRESSURE: 89 MMHG | TEMPERATURE: 98 F | BODY MASS INDEX: 20.97 KG/M2

## 2017-07-25 LAB
BASOPHILS # BLD AUTO: 0 K/UL — SIGNIFICANT CHANGE UP (ref 0–0.2)
BASOPHILS NFR BLD AUTO: 0.6 % — SIGNIFICANT CHANGE UP (ref 0–2)
EOSINOPHIL # BLD AUTO: 0.1 K/UL — SIGNIFICANT CHANGE UP (ref 0–0.5)
EOSINOPHIL NFR BLD AUTO: 1.5 % — SIGNIFICANT CHANGE UP (ref 0–6)
HCT VFR BLD CALC: 33.8 % — LOW (ref 34.5–45)
HGB BLD-MCNC: 11.6 G/DL — SIGNIFICANT CHANGE UP (ref 11.5–15.5)
LYMPHOCYTES # BLD AUTO: 0.5 K/UL — LOW (ref 1–3.3)
LYMPHOCYTES # BLD AUTO: 6 % — LOW (ref 13–44)
MCHC RBC-ENTMCNC: 32.9 PG — SIGNIFICANT CHANGE UP (ref 27–34)
MCHC RBC-ENTMCNC: 34.2 G/DL — SIGNIFICANT CHANGE UP (ref 32–36)
MCV RBC AUTO: 96.3 FL — SIGNIFICANT CHANGE UP (ref 80–100)
MONOCYTES # BLD AUTO: 1 K/UL — HIGH (ref 0–0.9)
MONOCYTES NFR BLD AUTO: 12.1 % — SIGNIFICANT CHANGE UP (ref 2–14)
NEUTROPHILS # BLD AUTO: 6.9 K/UL — SIGNIFICANT CHANGE UP (ref 1.8–7.4)
NEUTROPHILS NFR BLD AUTO: 79.9 % — HIGH (ref 43–77)
PLATELET # BLD AUTO: 87 K/UL — LOW (ref 150–400)
RBC # BLD: 3.51 M/UL — LOW (ref 3.8–5.2)
RBC # FLD: 18.7 % — HIGH (ref 10.3–14.5)
WBC # BLD: 8.6 K/UL — SIGNIFICANT CHANGE UP (ref 3.8–10.5)
WBC # FLD AUTO: 8.6 K/UL — SIGNIFICANT CHANGE UP (ref 3.8–10.5)

## 2017-07-27 ENCOUNTER — APPOINTMENT (OUTPATIENT)
Dept: INFUSION THERAPY | Facility: HOSPITAL | Age: 77
End: 2017-07-27

## 2017-07-28 ENCOUNTER — APPOINTMENT (OUTPATIENT)
Dept: INFUSION THERAPY | Facility: HOSPITAL | Age: 77
End: 2017-07-28

## 2017-07-31 ENCOUNTER — APPOINTMENT (OUTPATIENT)
Dept: CARDIOLOGY | Facility: CLINIC | Age: 77
End: 2017-07-31
Payer: MEDICARE

## 2017-07-31 ENCOUNTER — NON-APPOINTMENT (OUTPATIENT)
Age: 77
End: 2017-07-31

## 2017-07-31 ENCOUNTER — OUTPATIENT (OUTPATIENT)
Dept: OUTPATIENT SERVICES | Facility: HOSPITAL | Age: 77
LOS: 1 days | Discharge: HOME | End: 2017-07-31
Payer: MEDICARE

## 2017-07-31 VITALS
HEIGHT: 62 IN | TEMPERATURE: 99.3 F | DIASTOLIC BLOOD PRESSURE: 96 MMHG | BODY MASS INDEX: 20.8 KG/M2 | HEART RATE: 114 BPM | WEIGHT: 113 LBS | OXYGEN SATURATION: 98 % | SYSTOLIC BLOOD PRESSURE: 139 MMHG

## 2017-07-31 VITALS — DIASTOLIC BLOOD PRESSURE: 80 MMHG | SYSTOLIC BLOOD PRESSURE: 130 MMHG

## 2017-07-31 DIAGNOSIS — Z90.13 ACQUIRED ABSENCE OF BILATERAL BREASTS AND NIPPLES: Chronic | ICD-10-CM

## 2017-07-31 DIAGNOSIS — C82.97 FOLLICULAR LYMPHOMA, UNSPECIFIED, SPLEEN: ICD-10-CM

## 2017-07-31 PROCEDURE — 93000 ELECTROCARDIOGRAM COMPLETE: CPT

## 2017-07-31 PROCEDURE — 99214 OFFICE O/P EST MOD 30 MIN: CPT

## 2017-08-01 ENCOUNTER — MEDICATION RENEWAL (OUTPATIENT)
Age: 77
End: 2017-08-01

## 2017-08-03 ENCOUNTER — RESULT REVIEW (OUTPATIENT)
Age: 77
End: 2017-08-03

## 2017-08-03 ENCOUNTER — MEDICATION RENEWAL (OUTPATIENT)
Age: 77
End: 2017-08-03

## 2017-08-03 ENCOUNTER — LABORATORY RESULT (OUTPATIENT)
Age: 77
End: 2017-08-03

## 2017-08-03 ENCOUNTER — APPOINTMENT (OUTPATIENT)
Dept: INFUSION THERAPY | Facility: HOSPITAL | Age: 77
End: 2017-08-03
Payer: MEDICARE

## 2017-08-03 LAB
BASOPHILS # BLD AUTO: 0 K/UL — SIGNIFICANT CHANGE UP (ref 0–0.2)
BASOPHILS NFR BLD AUTO: 0.1 % — SIGNIFICANT CHANGE UP (ref 0–2)
EOSINOPHIL # BLD AUTO: 0.1 K/UL — SIGNIFICANT CHANGE UP (ref 0–0.5)
EOSINOPHIL NFR BLD AUTO: 0.7 % — SIGNIFICANT CHANGE UP (ref 0–6)
HCT VFR BLD CALC: 34.3 % — LOW (ref 34.5–45)
HGB BLD-MCNC: 11.7 G/DL — SIGNIFICANT CHANGE UP (ref 11.5–15.5)
LYMPHOCYTES # BLD AUTO: 1.2 K/UL — SIGNIFICANT CHANGE UP (ref 1–3.3)
LYMPHOCYTES # BLD AUTO: 13.6 % — SIGNIFICANT CHANGE UP (ref 13–44)
MCHC RBC-ENTMCNC: 32.3 PG — SIGNIFICANT CHANGE UP (ref 27–34)
MCHC RBC-ENTMCNC: 34 G/DL — SIGNIFICANT CHANGE UP (ref 32–36)
MCV RBC AUTO: 94.9 FL — SIGNIFICANT CHANGE UP (ref 80–100)
MONOCYTES # BLD AUTO: 0.8 K/UL — SIGNIFICANT CHANGE UP (ref 0–0.9)
MONOCYTES NFR BLD AUTO: 8.5 % — SIGNIFICANT CHANGE UP (ref 2–14)
NEUTROPHILS # BLD AUTO: 7 K/UL — SIGNIFICANT CHANGE UP (ref 1.8–7.4)
NEUTROPHILS NFR BLD AUTO: 77.2 % — HIGH (ref 43–77)
PLATELET # BLD AUTO: 198 K/UL — SIGNIFICANT CHANGE UP (ref 150–400)
RBC # BLD: 3.61 M/UL — LOW (ref 3.8–5.2)
RBC # FLD: 19.5 % — HIGH (ref 10.3–14.5)
WBC # BLD: 9.1 K/UL — SIGNIFICANT CHANGE UP (ref 3.8–10.5)
WBC # FLD AUTO: 9.1 K/UL — SIGNIFICANT CHANGE UP (ref 3.8–10.5)

## 2017-08-03 PROCEDURE — 99213 OFFICE O/P EST LOW 20 MIN: CPT

## 2017-08-03 PROCEDURE — 93010 ELECTROCARDIOGRAM REPORT: CPT

## 2017-08-04 ENCOUNTER — APPOINTMENT (OUTPATIENT)
Dept: INFUSION THERAPY | Facility: HOSPITAL | Age: 77
End: 2017-08-04

## 2017-08-04 DIAGNOSIS — Z51.89 ENCOUNTER FOR OTHER SPECIFIED AFTERCARE: ICD-10-CM

## 2017-08-04 DIAGNOSIS — R11.2 NAUSEA WITH VOMITING, UNSPECIFIED: ICD-10-CM

## 2017-08-04 DIAGNOSIS — Z51.11 ENCOUNTER FOR ANTINEOPLASTIC CHEMOTHERAPY: ICD-10-CM

## 2017-08-04 DIAGNOSIS — C83.30 DIFFUSE LARGE B-CELL LYMPHOMA, UNSPECIFIED SITE: ICD-10-CM

## 2017-08-07 ENCOUNTER — APPOINTMENT (OUTPATIENT)
Dept: HEMATOLOGY ONCOLOGY | Facility: CLINIC | Age: 77
End: 2017-08-07

## 2017-08-14 ENCOUNTER — APPOINTMENT (OUTPATIENT)
Dept: HEMATOLOGY ONCOLOGY | Facility: CLINIC | Age: 77
End: 2017-08-14
Payer: MEDICARE

## 2017-08-14 ENCOUNTER — RESULT REVIEW (OUTPATIENT)
Age: 77
End: 2017-08-14

## 2017-08-14 VITALS
WEIGHT: 112.41 LBS | RESPIRATION RATE: 16 BRPM | DIASTOLIC BLOOD PRESSURE: 80 MMHG | SYSTOLIC BLOOD PRESSURE: 120 MMHG | TEMPERATURE: 98.1 F | BODY MASS INDEX: 20.56 KG/M2 | HEART RATE: 102 BPM | OXYGEN SATURATION: 98 %

## 2017-08-14 LAB
EOSINOPHIL NFR BLD AUTO: 2 % — SIGNIFICANT CHANGE UP (ref 0–6)
HCT VFR BLD CALC: 28.6 % — LOW (ref 34.5–45)
HGB BLD-MCNC: 10.1 G/DL — LOW (ref 11.5–15.5)
LYMPHOCYTES # BLD AUTO: 0.5 K/UL — LOW (ref 1–3.3)
LYMPHOCYTES # BLD AUTO: 7 % — LOW (ref 13–44)
MCHC RBC-ENTMCNC: 33.8 PG — SIGNIFICANT CHANGE UP (ref 27–34)
MCHC RBC-ENTMCNC: 35.5 G/DL — SIGNIFICANT CHANGE UP (ref 32–36)
MCV RBC AUTO: 95.3 FL — SIGNIFICANT CHANGE UP (ref 80–100)
MONOCYTES # BLD AUTO: 0.7 K/UL — SIGNIFICANT CHANGE UP (ref 0–0.9)
MONOCYTES NFR BLD AUTO: 8 % — SIGNIFICANT CHANGE UP (ref 2–14)
NEUTROPHILS # BLD AUTO: 5.1 K/UL — SIGNIFICANT CHANGE UP (ref 1.8–7.4)
NEUTROPHILS NFR BLD AUTO: 77 % — SIGNIFICANT CHANGE UP (ref 43–77)
NEUTS BAND # BLD: 6 % — SIGNIFICANT CHANGE UP (ref 0–8)
PLAT MORPH BLD: NORMAL — SIGNIFICANT CHANGE UP
PLATELET # BLD AUTO: 44 K/UL — LOW (ref 150–400)
RBC # BLD: 3 M/UL — LOW (ref 3.8–5.2)
RBC # FLD: 16.3 % — HIGH (ref 10.3–14.5)
RBC BLD AUTO: SIGNIFICANT CHANGE UP
WBC # BLD: 6.3 K/UL — SIGNIFICANT CHANGE UP (ref 3.8–10.5)
WBC # FLD AUTO: 6.3 K/UL — SIGNIFICANT CHANGE UP (ref 3.8–10.5)

## 2017-08-14 PROCEDURE — 99214 OFFICE O/P EST MOD 30 MIN: CPT

## 2017-08-24 ENCOUNTER — LABORATORY RESULT (OUTPATIENT)
Age: 77
End: 2017-08-24

## 2017-08-24 ENCOUNTER — APPOINTMENT (OUTPATIENT)
Dept: INFUSION THERAPY | Facility: HOSPITAL | Age: 77
End: 2017-08-24

## 2017-08-24 ENCOUNTER — RESULT REVIEW (OUTPATIENT)
Age: 77
End: 2017-08-24

## 2017-08-24 LAB
BASOPHILS # BLD AUTO: 0 K/UL — SIGNIFICANT CHANGE UP (ref 0–0.2)
BASOPHILS NFR BLD AUTO: 0.3 % — SIGNIFICANT CHANGE UP (ref 0–2)
EOSINOPHIL # BLD AUTO: 0.1 K/UL — SIGNIFICANT CHANGE UP (ref 0–0.5)
EOSINOPHIL NFR BLD AUTO: 1.1 % — SIGNIFICANT CHANGE UP (ref 0–6)
HCT VFR BLD CALC: 30.2 % — LOW (ref 34.5–45)
HGB BLD-MCNC: 10.7 G/DL — LOW (ref 11.5–15.5)
LYMPHOCYTES # BLD AUTO: 0.9 K/UL — LOW (ref 1–3.3)
LYMPHOCYTES # BLD AUTO: 9 % — LOW (ref 13–44)
MCHC RBC-ENTMCNC: 34.5 PG — HIGH (ref 27–34)
MCHC RBC-ENTMCNC: 35.5 G/DL — SIGNIFICANT CHANGE UP (ref 32–36)
MCV RBC AUTO: 97.1 FL — SIGNIFICANT CHANGE UP (ref 80–100)
MONOCYTES # BLD AUTO: 1.3 K/UL — HIGH (ref 0–0.9)
MONOCYTES NFR BLD AUTO: 13.5 % — SIGNIFICANT CHANGE UP (ref 2–14)
NEUTROPHILS # BLD AUTO: 7.5 K/UL — HIGH (ref 1.8–7.4)
NEUTROPHILS NFR BLD AUTO: 76.1 % — SIGNIFICANT CHANGE UP (ref 43–77)
PLATELET # BLD AUTO: 226 K/UL — SIGNIFICANT CHANGE UP (ref 150–400)
RBC # BLD: 3.11 M/UL — LOW (ref 3.8–5.2)
RBC # FLD: 17.6 % — HIGH (ref 10.3–14.5)
WBC # BLD: 9.8 K/UL — SIGNIFICANT CHANGE UP (ref 3.8–10.5)
WBC # FLD AUTO: 9.8 K/UL — SIGNIFICANT CHANGE UP (ref 3.8–10.5)

## 2017-08-25 ENCOUNTER — APPOINTMENT (OUTPATIENT)
Dept: INFUSION THERAPY | Facility: HOSPITAL | Age: 77
End: 2017-08-25

## 2017-08-28 ENCOUNTER — OUTPATIENT (OUTPATIENT)
Dept: OUTPATIENT SERVICES | Facility: HOSPITAL | Age: 77
LOS: 1 days | Discharge: HOME | End: 2017-08-28

## 2017-08-28 DIAGNOSIS — Z90.13 ACQUIRED ABSENCE OF BILATERAL BREASTS AND NIPPLES: Chronic | ICD-10-CM

## 2017-08-28 DIAGNOSIS — C82.97 FOLLICULAR LYMPHOMA, UNSPECIFIED, SPLEEN: ICD-10-CM

## 2017-08-31 ENCOUNTER — RESULT REVIEW (OUTPATIENT)
Age: 77
End: 2017-08-31

## 2017-08-31 ENCOUNTER — APPOINTMENT (OUTPATIENT)
Dept: HEMATOLOGY ONCOLOGY | Facility: CLINIC | Age: 77
End: 2017-08-31
Payer: MEDICARE

## 2017-08-31 VITALS
DIASTOLIC BLOOD PRESSURE: 97 MMHG | HEART RATE: 81 BPM | RESPIRATION RATE: 16 BRPM | OXYGEN SATURATION: 95 % | TEMPERATURE: 98.7 F | SYSTOLIC BLOOD PRESSURE: 143 MMHG | WEIGHT: 113.54 LBS | BODY MASS INDEX: 20.77 KG/M2

## 2017-08-31 LAB
EOSINOPHIL NFR BLD AUTO: 4 % — SIGNIFICANT CHANGE UP (ref 0–6)
HCT VFR BLD CALC: 30.1 % — LOW (ref 34.5–45)
HGB BLD-MCNC: 10.8 G/DL — LOW (ref 11.5–15.5)
LYMPHOCYTES # BLD AUTO: 42 % — SIGNIFICANT CHANGE UP (ref 13–44)
LYMPHOCYTES # BLD AUTO: LOW K/UL (ref 1–3.3)
MCHC RBC-ENTMCNC: 35.2 PG — HIGH (ref 27–34)
MCHC RBC-ENTMCNC: 35.9 G/DL — SIGNIFICANT CHANGE UP (ref 32–36)
MCV RBC AUTO: 98 FL — SIGNIFICANT CHANGE UP (ref 80–100)
MONOCYTES NFR BLD AUTO: 11 % — SIGNIFICANT CHANGE UP (ref 2–14)
NEUTROPHILS # BLD AUTO: 0.7 K/UL — LOW (ref 1.8–7.4)
NEUTROPHILS NFR BLD AUTO: 43 % — SIGNIFICANT CHANGE UP (ref 43–77)
PLAT MORPH BLD: NORMAL — SIGNIFICANT CHANGE UP
PLATELET # BLD AUTO: 40 K/UL — LOW (ref 150–400)
RBC # BLD: 3.08 M/UL — LOW (ref 3.8–5.2)
RBC # FLD: 16.3 % — HIGH (ref 10.3–14.5)
RBC BLD AUTO: SIGNIFICANT CHANGE UP
WBC # BLD: 1.3 K/UL — LOW (ref 3.8–10.5)
WBC # FLD AUTO: 1.3 K/UL — LOW (ref 3.8–10.5)

## 2017-08-31 PROCEDURE — 99214 OFFICE O/P EST MOD 30 MIN: CPT

## 2017-08-31 RX ORDER — APREPITANT 80 MG/1
80 CAPSULE ORAL
Qty: 1 | Refills: 4 | Status: DISCONTINUED | COMMUNITY
Start: 2017-05-15 | End: 2017-08-31

## 2017-08-31 RX ORDER — APREPITANT 80 MG/1
80 CAPSULE ORAL
Qty: 1 | Refills: 6 | Status: DISCONTINUED | COMMUNITY
Start: 2017-06-15 | End: 2017-08-31

## 2017-08-31 RX ORDER — ATENOLOL 25 MG/1
25 TABLET ORAL
Qty: 30 | Refills: 9 | Status: DISCONTINUED | COMMUNITY
Start: 2017-06-19 | End: 2017-08-31

## 2017-09-05 ENCOUNTER — APPOINTMENT (OUTPATIENT)
Dept: HEMATOLOGY ONCOLOGY | Facility: CLINIC | Age: 77
End: 2017-09-05

## 2017-09-25 ENCOUNTER — APPOINTMENT (OUTPATIENT)
Dept: HEMATOLOGY ONCOLOGY | Facility: CLINIC | Age: 77
End: 2017-09-25
Payer: MEDICARE

## 2017-09-25 ENCOUNTER — LABORATORY RESULT (OUTPATIENT)
Age: 77
End: 2017-09-25

## 2017-09-25 ENCOUNTER — RESULT REVIEW (OUTPATIENT)
Age: 77
End: 2017-09-25

## 2017-09-25 VITALS
DIASTOLIC BLOOD PRESSURE: 80 MMHG | OXYGEN SATURATION: 100 % | RESPIRATION RATE: 16 BRPM | HEART RATE: 100 BPM | BODY MASS INDEX: 20.36 KG/M2 | WEIGHT: 111.31 LBS | SYSTOLIC BLOOD PRESSURE: 120 MMHG | TEMPERATURE: 97.2 F

## 2017-09-25 LAB
BASOPHILS # BLD AUTO: 0 K/UL — SIGNIFICANT CHANGE UP (ref 0–0.2)
EOSINOPHIL # BLD AUTO: 0.1 K/UL — SIGNIFICANT CHANGE UP (ref 0–0.5)
EOSINOPHIL NFR BLD AUTO: 1 % — SIGNIFICANT CHANGE UP (ref 0–6)
HCT VFR BLD CALC: 32.2 % — LOW (ref 34.5–45)
HGB BLD-MCNC: 11 G/DL — LOW (ref 11.5–15.5)
LYMPHOCYTES # BLD AUTO: 1.5 K/UL — SIGNIFICANT CHANGE UP (ref 1–3.3)
LYMPHOCYTES # BLD AUTO: 34 % — SIGNIFICANT CHANGE UP (ref 13–44)
MCHC RBC-ENTMCNC: 34 PG — SIGNIFICANT CHANGE UP (ref 27–34)
MCHC RBC-ENTMCNC: 34.2 G/DL — SIGNIFICANT CHANGE UP (ref 32–36)
MCV RBC AUTO: 99.4 FL — SIGNIFICANT CHANGE UP (ref 80–100)
MONOCYTES # BLD AUTO: 1 K/UL — HIGH (ref 0–0.9)
MONOCYTES NFR BLD AUTO: 17 % — HIGH (ref 2–14)
NEUTROPHILS # BLD AUTO: 2.6 K/UL — SIGNIFICANT CHANGE UP (ref 1.8–7.4)
NEUTROPHILS NFR BLD AUTO: 48 % — SIGNIFICANT CHANGE UP (ref 43–77)
PLAT MORPH BLD: NORMAL — SIGNIFICANT CHANGE UP
PLATELET # BLD AUTO: 152 K/UL — SIGNIFICANT CHANGE UP (ref 150–400)
RBC # BLD: 3.24 M/UL — LOW (ref 3.8–5.2)
RBC # FLD: 17.6 % — HIGH (ref 10.3–14.5)
RBC BLD AUTO: SIGNIFICANT CHANGE UP
WBC # BLD: 5.2 K/UL — SIGNIFICANT CHANGE UP (ref 3.8–10.5)
WBC # FLD AUTO: 5.2 K/UL — SIGNIFICANT CHANGE UP (ref 3.8–10.5)

## 2017-09-25 PROCEDURE — 99214 OFFICE O/P EST MOD 30 MIN: CPT

## 2017-11-14 ENCOUNTER — FORM ENCOUNTER (OUTPATIENT)
Age: 77
End: 2017-11-14

## 2017-11-14 ENCOUNTER — OUTPATIENT (OUTPATIENT)
Dept: OUTPATIENT SERVICES | Facility: HOSPITAL | Age: 77
LOS: 1 days | Discharge: HOME | End: 2017-11-14

## 2017-11-14 DIAGNOSIS — C82.97 FOLLICULAR LYMPHOMA, UNSPECIFIED, SPLEEN: ICD-10-CM

## 2017-11-14 DIAGNOSIS — Z90.13 ACQUIRED ABSENCE OF BILATERAL BREASTS AND NIPPLES: Chronic | ICD-10-CM

## 2017-11-15 ENCOUNTER — APPOINTMENT (OUTPATIENT)
Dept: NUCLEAR MEDICINE | Facility: IMAGING CENTER | Age: 77
End: 2017-11-15
Payer: MEDICARE

## 2017-11-15 ENCOUNTER — OUTPATIENT (OUTPATIENT)
Dept: OUTPATIENT SERVICES | Facility: HOSPITAL | Age: 77
LOS: 1 days | End: 2017-11-15
Payer: MEDICARE

## 2017-11-15 DIAGNOSIS — Z90.13 ACQUIRED ABSENCE OF BILATERAL BREASTS AND NIPPLES: Chronic | ICD-10-CM

## 2017-11-15 DIAGNOSIS — C83.32 DIFFUSE LARGE B-CELL LYMPHOMA, INTRATHORACIC LYMPH NODES: ICD-10-CM

## 2017-11-15 PROCEDURE — 78815 PET IMAGE W/CT SKULL-THIGH: CPT | Mod: 26,PS

## 2017-11-15 PROCEDURE — A9552: CPT

## 2017-11-15 PROCEDURE — 78815 PET IMAGE W/CT SKULL-THIGH: CPT

## 2017-11-16 ENCOUNTER — APPOINTMENT (OUTPATIENT)
Dept: HEMATOLOGY ONCOLOGY | Facility: CLINIC | Age: 77
End: 2017-11-16
Payer: MEDICARE

## 2017-11-16 ENCOUNTER — RESULT REVIEW (OUTPATIENT)
Age: 77
End: 2017-11-16

## 2017-11-16 VITALS
RESPIRATION RATE: 16 BRPM | TEMPERATURE: 97.8 F | WEIGHT: 115.74 LBS | OXYGEN SATURATION: 98 % | BODY MASS INDEX: 21.17 KG/M2 | HEART RATE: 92 BPM | SYSTOLIC BLOOD PRESSURE: 120 MMHG | DIASTOLIC BLOOD PRESSURE: 80 MMHG

## 2017-11-16 LAB
BASOPHILS # BLD AUTO: 0.1 K/UL — SIGNIFICANT CHANGE UP (ref 0–0.2)
BASOPHILS NFR BLD AUTO: 1.4 % — SIGNIFICANT CHANGE UP (ref 0–2)
EOSINOPHIL # BLD AUTO: 0.1 K/UL — SIGNIFICANT CHANGE UP (ref 0–0.5)
EOSINOPHIL NFR BLD AUTO: 1.6 % — SIGNIFICANT CHANGE UP (ref 0–6)
HCT VFR BLD CALC: 38 % — SIGNIFICANT CHANGE UP (ref 34.5–45)
HGB BLD-MCNC: 13.5 G/DL — SIGNIFICANT CHANGE UP (ref 11.5–15.5)
LYMPHOCYTES # BLD AUTO: 1.4 K/UL — SIGNIFICANT CHANGE UP (ref 1–3.3)
LYMPHOCYTES # BLD AUTO: 29.7 % — SIGNIFICANT CHANGE UP (ref 13–44)
MCHC RBC-ENTMCNC: 33 PG — SIGNIFICANT CHANGE UP (ref 27–34)
MCHC RBC-ENTMCNC: 35.5 G/DL — SIGNIFICANT CHANGE UP (ref 32–36)
MCV RBC AUTO: 93.2 FL — SIGNIFICANT CHANGE UP (ref 80–100)
MONOCYTES # BLD AUTO: 0.7 K/UL — SIGNIFICANT CHANGE UP (ref 0–0.9)
MONOCYTES NFR BLD AUTO: 15.9 % — HIGH (ref 2–14)
NEUTROPHILS # BLD AUTO: 2.4 K/UL — SIGNIFICANT CHANGE UP (ref 1.8–7.4)
NEUTROPHILS NFR BLD AUTO: 51.4 % — SIGNIFICANT CHANGE UP (ref 43–77)
PLATELET # BLD AUTO: 134 K/UL — LOW (ref 150–400)
RBC # BLD: 4.08 M/UL — SIGNIFICANT CHANGE UP (ref 3.8–5.2)
RBC # FLD: 12.9 % — SIGNIFICANT CHANGE UP (ref 10.3–14.5)
WBC # BLD: 4.6 K/UL — SIGNIFICANT CHANGE UP (ref 3.8–10.5)
WBC # FLD AUTO: 4.6 K/UL — SIGNIFICANT CHANGE UP (ref 3.8–10.5)

## 2017-11-16 PROCEDURE — 99215 OFFICE O/P EST HI 40 MIN: CPT

## 2017-11-19 LAB
ALBUMIN MFR SERPL ELPH: 64.8 %
ALBUMIN SERPL ELPH-MCNC: 4.4 G/DL
ALBUMIN SERPL-MCNC: 3.9 G/DL
ALBUMIN/GLOB SERPL: 1.9 RATIO
ALP BLD-CCNC: 94 U/L
ALPHA1 GLOB MFR SERPL ELPH: 5.6 %
ALPHA1 GLOB SERPL ELPH-MCNC: 0.3 G/DL
ALPHA2 GLOB MFR SERPL ELPH: 11.8 %
ALPHA2 GLOB SERPL ELPH-MCNC: 0.7 G/DL
ALT SERPL-CCNC: 20 U/L
ANION GAP SERPL CALC-SCNC: 12 MMOL/L
AST SERPL-CCNC: 34 U/L
B-GLOBULIN MFR SERPL ELPH: 10.3 %
B-GLOBULIN SERPL ELPH-MCNC: 0.6 G/DL
B2 MICROGLOB SERPL-MCNC: 2.1 MG/L
B2 MICROGLOB SERPL-MCNC: 2.3 MG/L
BILIRUB SERPL-MCNC: 0.9 MG/DL
BUN SERPL-MCNC: 11 MG/DL
CALCIUM SERPL-MCNC: 9 MG/DL
CHLORIDE SERPL-SCNC: 95 MMOL/L
CO2 SERPL-SCNC: 26 MMOL/L
CREAT SERPL-MCNC: 0.54 MG/DL
GAMMA GLOB FLD ELPH-MCNC: 0.4 G/DL
GAMMA GLOB MFR SERPL ELPH: 7.5 %
GLUCOSE SERPL-MCNC: 91 MG/DL
IGA SER QL IEP: 83 MG/DL
IGG SER QL IEP: 434 MG/DL
IGM SER QL IEP: 45 MG/DL
INTERPRETATION SERPL IEP-IMP: NORMAL
LDH SERPL-CCNC: 237 U/L
POTASSIUM SERPL-SCNC: 5.2 MMOL/L
PROT SERPL-MCNC: 6 G/DL
SODIUM SERPL-SCNC: 133 MMOL/L

## 2017-11-22 ENCOUNTER — APPOINTMENT (OUTPATIENT)
Dept: THORACIC SURGERY | Facility: CLINIC | Age: 77
End: 2017-11-22
Payer: MEDICARE

## 2017-11-22 VITALS
HEIGHT: 62 IN | WEIGHT: 113 LBS | HEART RATE: 106 BPM | DIASTOLIC BLOOD PRESSURE: 96 MMHG | OXYGEN SATURATION: 98 % | BODY MASS INDEX: 20.8 KG/M2 | TEMPERATURE: 97.9 F | SYSTOLIC BLOOD PRESSURE: 156 MMHG | RESPIRATION RATE: 15 BRPM

## 2017-11-22 DIAGNOSIS — R91.1 SOLITARY PULMONARY NODULE: ICD-10-CM

## 2017-11-22 DIAGNOSIS — R59.1 GENERALIZED ENLARGED LYMPH NODES: ICD-10-CM

## 2017-11-22 PROCEDURE — 99205 OFFICE O/P NEW HI 60 MIN: CPT

## 2017-11-22 RX ORDER — OMEPRAZOLE 20 MG/1
20 CAPSULE, DELAYED RELEASE ORAL
Qty: 90 | Refills: 3 | Status: DISCONTINUED | COMMUNITY
Start: 2017-05-18 | End: 2017-11-22

## 2017-11-22 RX ORDER — ONDANSETRON 8 MG/1
8 TABLET ORAL
Qty: 15 | Refills: 5 | Status: DISCONTINUED | COMMUNITY
Start: 2017-05-15 | End: 2017-11-22

## 2017-11-22 RX ORDER — PREDNISONE 50 MG/1
50 TABLET ORAL
Qty: 60 | Refills: 0 | Status: DISCONTINUED | COMMUNITY
Start: 2017-05-15 | End: 2017-11-22

## 2017-11-22 RX ORDER — CLOTRIMAZOLE 10 MG/1
10 LOZENGE ORAL 3 TIMES DAILY
Qty: 90 | Refills: 0 | Status: DISCONTINUED | COMMUNITY
Start: 2017-05-15 | End: 2017-11-22

## 2017-11-22 RX ORDER — METOCLOPRAMIDE 10 MG/1
10 TABLET ORAL
Qty: 60 | Refills: 6 | Status: DISCONTINUED | COMMUNITY
Start: 2017-05-02 | End: 2017-11-22

## 2017-12-04 ENCOUNTER — NON-APPOINTMENT (OUTPATIENT)
Age: 77
End: 2017-12-04

## 2017-12-04 ENCOUNTER — APPOINTMENT (OUTPATIENT)
Dept: CARDIOLOGY | Facility: CLINIC | Age: 77
End: 2017-12-04
Payer: MEDICARE

## 2017-12-04 VITALS
HEART RATE: 108 BPM | OXYGEN SATURATION: 98 % | DIASTOLIC BLOOD PRESSURE: 109 MMHG | BODY MASS INDEX: 21.77 KG/M2 | SYSTOLIC BLOOD PRESSURE: 171 MMHG | WEIGHT: 119 LBS

## 2017-12-04 VITALS — DIASTOLIC BLOOD PRESSURE: 90 MMHG | SYSTOLIC BLOOD PRESSURE: 140 MMHG

## 2017-12-04 DIAGNOSIS — J98.8 OTHER SPECIFIED RESPIRATORY DISORDERS: ICD-10-CM

## 2017-12-04 PROCEDURE — 99214 OFFICE O/P EST MOD 30 MIN: CPT

## 2017-12-04 PROCEDURE — 93000 ELECTROCARDIOGRAM COMPLETE: CPT

## 2017-12-06 ENCOUNTER — OUTPATIENT (OUTPATIENT)
Dept: OUTPATIENT SERVICES | Facility: HOSPITAL | Age: 77
LOS: 1 days | End: 2017-12-06
Payer: MEDICARE

## 2017-12-06 ENCOUNTER — FORM ENCOUNTER (OUTPATIENT)
Age: 77
End: 2017-12-06

## 2017-12-06 VITALS
HEIGHT: 63 IN | HEART RATE: 88 BPM | TEMPERATURE: 99 F | RESPIRATION RATE: 16 BRPM | WEIGHT: 117.95 LBS | DIASTOLIC BLOOD PRESSURE: 90 MMHG | SYSTOLIC BLOOD PRESSURE: 140 MMHG

## 2017-12-06 DIAGNOSIS — Z90.89 ACQUIRED ABSENCE OF OTHER ORGANS: Chronic | ICD-10-CM

## 2017-12-06 DIAGNOSIS — I48.91 UNSPECIFIED ATRIAL FIBRILLATION: ICD-10-CM

## 2017-12-06 DIAGNOSIS — R91.8 OTHER NONSPECIFIC ABNORMAL FINDING OF LUNG FIELD: ICD-10-CM

## 2017-12-06 DIAGNOSIS — M12.9 ARTHROPATHY, UNSPECIFIED: Chronic | ICD-10-CM

## 2017-12-06 DIAGNOSIS — R59.1 GENERALIZED ENLARGED LYMPH NODES: ICD-10-CM

## 2017-12-06 DIAGNOSIS — H26.9 UNSPECIFIED CATARACT: Chronic | ICD-10-CM

## 2017-12-06 DIAGNOSIS — Z90.13 ACQUIRED ABSENCE OF BILATERAL BREASTS AND NIPPLES: Chronic | ICD-10-CM

## 2017-12-06 LAB
ALBUMIN SERPL ELPH-MCNC: 4.4 G/DL — SIGNIFICANT CHANGE UP (ref 3.3–5)
ALP SERPL-CCNC: 89 U/L — SIGNIFICANT CHANGE UP (ref 40–120)
ALT FLD-CCNC: 23 U/L — SIGNIFICANT CHANGE UP (ref 4–33)
AST SERPL-CCNC: 37 U/L — HIGH (ref 4–32)
BILIRUB SERPL-MCNC: 1.1 MG/DL — SIGNIFICANT CHANGE UP (ref 0.2–1.2)
BUN SERPL-MCNC: 11 MG/DL — SIGNIFICANT CHANGE UP (ref 7–23)
CALCIUM SERPL-MCNC: 9.6 MG/DL — SIGNIFICANT CHANGE UP (ref 8.4–10.5)
CHLORIDE SERPL-SCNC: 98 MMOL/L — SIGNIFICANT CHANGE UP (ref 98–107)
CO2 SERPL-SCNC: 27 MMOL/L — SIGNIFICANT CHANGE UP (ref 22–31)
CREAT SERPL-MCNC: 0.58 MG/DL — SIGNIFICANT CHANGE UP (ref 0.5–1.3)
GLUCOSE SERPL-MCNC: 77 MG/DL — SIGNIFICANT CHANGE UP (ref 70–99)
HBA1C BLD-MCNC: 5.7 % — HIGH (ref 4–5.6)
HCT VFR BLD CALC: 40 % — SIGNIFICANT CHANGE UP (ref 34.5–45)
HGB BLD-MCNC: 13.3 G/DL — SIGNIFICANT CHANGE UP (ref 11.5–15.5)
MCHC RBC-ENTMCNC: 30.7 PG — SIGNIFICANT CHANGE UP (ref 27–34)
MCHC RBC-ENTMCNC: 33.3 % — SIGNIFICANT CHANGE UP (ref 32–36)
MCV RBC AUTO: 92.4 FL — SIGNIFICANT CHANGE UP (ref 80–100)
NRBC # FLD: 0 — SIGNIFICANT CHANGE UP
PLATELET # BLD AUTO: 166 K/UL — SIGNIFICANT CHANGE UP (ref 150–400)
PMV BLD: 10.5 FL — SIGNIFICANT CHANGE UP (ref 7–13)
POTASSIUM SERPL-MCNC: 4.9 MMOL/L — SIGNIFICANT CHANGE UP (ref 3.5–5.3)
POTASSIUM SERPL-SCNC: 4.9 MMOL/L — SIGNIFICANT CHANGE UP (ref 3.5–5.3)
PROT SERPL-MCNC: 6.4 G/DL — SIGNIFICANT CHANGE UP (ref 6–8.3)
RBC # BLD: 4.33 M/UL — SIGNIFICANT CHANGE UP (ref 3.8–5.2)
RBC # FLD: 13.3 % — SIGNIFICANT CHANGE UP (ref 10.3–14.5)
SODIUM SERPL-SCNC: 136 MMOL/L — SIGNIFICANT CHANGE UP (ref 135–145)
WBC # BLD: 7.07 K/UL — SIGNIFICANT CHANGE UP (ref 3.8–10.5)
WBC # FLD AUTO: 7.07 K/UL — SIGNIFICANT CHANGE UP (ref 3.8–10.5)

## 2017-12-06 PROCEDURE — 93010 ELECTROCARDIOGRAM REPORT: CPT

## 2017-12-06 NOTE — H&P PST ADULT - NSANTHOSAYNRD_GEN_A_CORE
No. EDMOND screening performed.  STOP BANG Legend: 0-2 = LOW Risk; 3-4 = INTERMEDIATE Risk; 5-8 = HIGH Risk

## 2017-12-06 NOTE — H&P PST ADULT - ENMT COMMENTS
no oral erythema or lesions since chemotherapy, "nose runs a lot" no oral erythema or lesions; wears wig

## 2017-12-06 NOTE — H&P PST ADULT - PROBLEM SELECTOR PLAN 2
Await cardiac evaluation with cardiologist pre-arranged prior to PAST appointment  * As per patient, cardiologist instructed her that the last dose of Eliquis is am of 12-5-17

## 2017-12-06 NOTE — H&P PST ADULT - SOURCE OF INFORMATION, PROFILE
patient/cell [phone]; home [phone]; authorized , Uvaldo, and son, Uvaldo Logan. to receive info

## 2017-12-06 NOTE — H&P PST ADULT - HISTORY OF PRESENT ILLNESS
This is a 76 y/o female who is a very poor historian. Patient presents with h/o left breast cancer diagnosed in 2012, underwent b/l mastectomy with NO post op chemotherapy. Recently, patient developed "the flu" and was hospitalized at [facility]. "The tests showed I had something in the lung." Referred to surgeon with recommendation for intervention. Treated with chemotherapy prior to surgery which was completed in November 2017. Scheduled for flexible/right bronchoscopy with biopsy, cautery debridement of trachea, possible YAG bettina, possible stent, cryoablation on 12-8-17.

## 2017-12-06 NOTE — H&P PST ADULT - PSH
Arthropathy  2005, partial left knee replacement  H/O bilateral mastectomy Arthropathy  2005, partial left knee replacement  H/O bilateral mastectomy    S/P tonsillectomy Arthropathy  2005, partial left knee replacement  Cataract  left eye lens  H/O bilateral mastectomy    S/P tonsillectomy

## 2017-12-06 NOTE — H&P PST ADULT - LYMPHATIC
anterior cervical R/supraclavicular R/posterior cervical L/anterior cervical L/supraclavicular L/posterior cervical R

## 2017-12-06 NOTE — H&P PST ADULT - FAMILY HISTORY
No pertinent family history in first degree relatives Family history of lymphoma, low grade, as per Dr. Palacios's consult on 12-4-17     Aunt  Still living? No  Family history of ovarian cancer, Age at diagnosis: Age Unknown

## 2017-12-06 NOTE — H&P PST ADULT - PROBLEM SELECTOR PLAN 1
This is a 78 y/o female who is scheduled for flexible/right bronchoscopy with biopsy, cautery debridement of trachea, possible YAG laser, possible stent, cryoablation on 12-8-17  * Given preop famotidine  * Instructed to take normal am dose of metoprolol and olmesartan the am of surgery

## 2017-12-06 NOTE — H&P PST ADULT - PMH
Afib    Ductal carcinoma in situ (DCIS) of left breast  had b/l mastectomy in 2012 with NO post op chemotherapy or RT  HTN (hypertension)    Lung mass  diagnosed in 2017  Lymphoma, unspecified body region, unspecified lymphoma type  HODKIN's LYMPHOMA Afib    Anemia  due to chemotherapy, as per Dr. Mckeon's consult on 12-4-17  Ductal carcinoma in situ (DCIS) of left breast  had b/l mastectomy in 2012 with NO post op chemotherapy or RT  GERD (gastroesophageal reflux disease)    HTN (hypertension)    Hyponatremia  hyposmolality and/or hypoonatremia, as per Dr. Mckeon consult on 12-4-17  Lung mass  diagnosed in 2017--received chemotherapy prior to surgery scheduled to biopsy trachea on 12-8-17  Lymphadenopathy  as per Dr. Mckeon's consult on 12-4-17  Lymphoma, unspecified body region, unspecified lymphoma type  diffuse large B-cell lymphoma of intrathoracic lymph nodes  S/P chemotherapy, time since 4-12 weeks  since chemotherapy, c/o memory loss  Thrombocytopenia    Tracheal compression    Varicose veins Afib    Anemia  due to chemotherapy, as per Dr. Mckeon's consult on 12-4-17  Ductal carcinoma in situ (DCIS) of left breast  had b/l mastectomy in 2012 with NO post op chemotherapy or RT  GERD (gastroesophageal reflux disease)    HTN (hypertension)    Hyponatremia  hyposmolality and/or hyponatremia, as per Dr. Mckeon consult on 12-4-17  Lung mass  diagnosed in 2017--received chemotherapy prior to surgery scheduled to biopsy trachea on 12-8-17  Lymphadenopathy  as per Dr. Mckeon's consult on 12-4-17  Lymphoma, unspecified body region, unspecified lymphoma type  diffuse large B-cell lymphoma of intrathoracic lymph nodes  S/P chemotherapy, time since 4-12 weeks  since chemotherapy, c/o memory loss  Thrombocytopenia    Tracheal compression    Varicose veins Afib    Alopecia  wears a wig -- hair regrowing back  Anemia  due to chemotherapy, as per Dr. Mckeon's consult on 12-4-17  Ductal carcinoma in situ (DCIS) of left breast  had b/l mastectomy in 2012 with NO post op chemotherapy or RT  GERD (gastroesophageal reflux disease)    HTN (hypertension)    Hyponatremia  hyposmolality and/or hyponatremia, as per Dr. Mckeon consult on 12-4-17  Lung mass  diagnosed in 2017--received chemotherapy prior to surgery scheduled to biopsy trachea on 12-8-17  Lymphadenopathy  as per Dr. Mckeon's consult on 12-4-17  Lymphoma, unspecified body region, unspecified lymphoma type  diffuse large B-cell lymphoma of intrathoracic lymph nodes  Murmur, cardiac    S/P chemotherapy, time since 4-12 weeks  since chemotherapy, c/o memory loss  Thrombocytopenia    Tracheal compression    Varicose veins

## 2017-12-07 ENCOUNTER — RESULT REVIEW (OUTPATIENT)
Age: 77
End: 2017-12-07

## 2017-12-07 ENCOUNTER — APPOINTMENT (OUTPATIENT)
Dept: THORACIC SURGERY | Facility: HOSPITAL | Age: 77
End: 2017-12-07
Payer: MEDICARE

## 2017-12-07 ENCOUNTER — OUTPATIENT (OUTPATIENT)
Dept: OUTPATIENT SERVICES | Facility: HOSPITAL | Age: 77
LOS: 1 days | Discharge: HOME | End: 2017-12-07
Payer: MEDICARE

## 2017-12-07 VITALS
TEMPERATURE: 98 F | OXYGEN SATURATION: 98 % | HEART RATE: 92 BPM | DIASTOLIC BLOOD PRESSURE: 92 MMHG | WEIGHT: 117.95 LBS | SYSTOLIC BLOOD PRESSURE: 137 MMHG | HEIGHT: 63 IN | RESPIRATION RATE: 16 BRPM

## 2017-12-07 VITALS
TEMPERATURE: 98 F | OXYGEN SATURATION: 96 % | HEART RATE: 91 BPM | RESPIRATION RATE: 16 BRPM | SYSTOLIC BLOOD PRESSURE: 134 MMHG | DIASTOLIC BLOOD PRESSURE: 89 MMHG

## 2017-12-07 DIAGNOSIS — M12.9 ARTHROPATHY, UNSPECIFIED: Chronic | ICD-10-CM

## 2017-12-07 DIAGNOSIS — Z90.89 ACQUIRED ABSENCE OF OTHER ORGANS: Chronic | ICD-10-CM

## 2017-12-07 DIAGNOSIS — H26.9 UNSPECIFIED CATARACT: Chronic | ICD-10-CM

## 2017-12-07 DIAGNOSIS — Z90.13 ACQUIRED ABSENCE OF BILATERAL BREASTS AND NIPPLES: Chronic | ICD-10-CM

## 2017-12-07 DIAGNOSIS — R59.1 GENERALIZED ENLARGED LYMPH NODES: ICD-10-CM

## 2017-12-07 PROCEDURE — 88189 FLOWCYTOMETRY/READ 16 & >: CPT

## 2017-12-07 PROCEDURE — 88307 TISSUE EXAM BY PATHOLOGIST: CPT | Mod: 26

## 2017-12-07 PROCEDURE — 88365 INSITU HYBRIDIZATION (FISH): CPT | Mod: 26,59

## 2017-12-07 PROCEDURE — 31641 BRONCHOSCOPY TREAT BLOCKAGE: CPT

## 2017-12-07 PROCEDURE — 88341 IMHCHEM/IMCYTCHM EA ADD ANTB: CPT | Mod: 26,59

## 2017-12-07 PROCEDURE — 88367 INSITU HYBRIDIZATION AUTO: CPT | Mod: 26

## 2017-12-07 PROCEDURE — 88342 IMHCHEM/IMCYTCHM 1ST ANTB: CPT | Mod: 26,59

## 2017-12-07 PROCEDURE — 71010: CPT | Mod: 26

## 2017-12-07 PROCEDURE — 31624 DX BRONCHOSCOPE/LAVAGE: CPT

## 2017-12-07 RX ORDER — SODIUM CHLORIDE 9 MG/ML
1000 INJECTION, SOLUTION INTRAVENOUS
Qty: 0 | Refills: 0 | Status: CANCELLED | OUTPATIENT
Start: 2017-12-08 | End: 2017-12-07

## 2017-12-07 RX ORDER — ONDANSETRON 8 MG/1
4 TABLET, FILM COATED ORAL ONCE
Qty: 0 | Refills: 0 | Status: DISCONTINUED | OUTPATIENT
Start: 2017-12-07 | End: 2017-12-08

## 2017-12-07 RX ORDER — METOCLOPRAMIDE HCL 10 MG
10 TABLET ORAL ONCE
Qty: 0 | Refills: 0 | Status: DISCONTINUED | OUTPATIENT
Start: 2017-12-07 | End: 2017-12-08

## 2017-12-07 RX ORDER — FENTANYL CITRATE 50 UG/ML
25 INJECTION INTRAVENOUS
Qty: 0 | Refills: 0 | Status: DISCONTINUED | OUTPATIENT
Start: 2017-12-07 | End: 2017-12-08

## 2017-12-07 RX ORDER — SODIUM CHLORIDE 9 MG/ML
1000 INJECTION, SOLUTION INTRAVENOUS
Qty: 0 | Refills: 0 | Status: DISCONTINUED | OUTPATIENT
Start: 2017-12-07 | End: 2017-12-22

## 2017-12-07 RX ADMIN — FENTANYL CITRATE 25 MICROGRAM(S): 50 INJECTION INTRAVENOUS at 16:22

## 2017-12-07 RX ADMIN — FENTANYL CITRATE 25 MICROGRAM(S): 50 INJECTION INTRAVENOUS at 16:37

## 2017-12-07 NOTE — ASU PATIENT PROFILE, ADULT - VISION (WITH CORRECTIVE LENSES IF THE PATIENT USUALLY WEARS THEM):
wears glasses for reading Partially impaired: cannot see medication labels or newsprint, but can see obstacles in path, and the surrounding layout; can count fingers at arm's length/wears glasses for reading

## 2017-12-07 NOTE — BRIEF OPERATIVE NOTE - PROCEDURE
<<-----Click on this checkbox to enter Procedure Bronchoscopy  12/07/2017  Bronchoscopy, Biopsy of tracheal mass, Laser/electro fulgeration of tracheal mass, Mechanical debridment of tracheal mass, Cryotherapy of tracheal mass.  Active  CSUMMERS

## 2017-12-07 NOTE — ASU DISCHARGE PLAN (ADULT/PEDIATRIC). - MEDICATION SUMMARY - MEDICATIONS TO TAKE
I will START or STAY ON the medications listed below when I get home from the hospital:    olmesartan 20 mg oral tablet  -- 1 tab(s) by mouth once a day  -- Indication: For HTN    Eliquis 5 mg oral tablet  -- 1 tab(s) by mouth 2 times a day  -- Indication: For Anticoagulation. Start on Friday 12/8/17    Metoprolol Succinate ER 50 mg oral tablet, extended release  -- 1 tab(s) by mouth once a day in the morning  -- Indication: For HTN I will START or STAY ON the medications listed below when I get home from the hospital:    olmesartan 20 mg oral tablet  -- 1 tab(s) by mouth once a day  -- Indication: For HTN    Eliquis 5 mg oral tablet  -- 1 tab(s) by mouth 2 times a day  -- Indication: For Anticoagulation. Start on Friday 12/10/17 per Dr Coles    Metoprolol Succinate ER 50 mg oral tablet, extended release  -- 1 tab(s) by mouth once a day in the morning  -- Indication: For HTN

## 2017-12-07 NOTE — ASU DISCHARGE PLAN (ADULT/PEDIATRIC). - INSTRUCTIONS
Call to schedule a bronchoscopy for 4 weeks from now.  Please call at anytime if you have any questions or concerns.

## 2017-12-07 NOTE — ASU DISCHARGE PLAN (ADULT/PEDIATRIC). - NURSING INSTRUCTIONS
For the next 24-48 hours, avoid spicy, greasy, citrus, dairy, carbonated beverages & jagged edged foods like dry crackers and toast. Have fluids at room temperature. Any difficulty breathing or shortness of breath please call 911 immediately.

## 2017-12-07 NOTE — ASU DISCHARGE PLAN (ADULT/PEDIATRIC). - NOTIFY
Swelling that continues/Shortness of breath/Fever greater than 101/Bleeding that does not stop Bleeding that does not stop/Persistent Nausea and Vomiting/Swelling that continues/Shortness of breath/Unable to Urinate/Fever greater than 101

## 2017-12-07 NOTE — ASU PATIENT PROFILE, ADULT - PSH
Arthropathy  2005, partial left knee replacement  Cataract  left eye lens  H/O bilateral mastectomy    S/P tonsillectomy

## 2017-12-07 NOTE — ASU PATIENT PROFILE, ADULT - PMH
Afib    Alopecia  wears a wig -- hair regrowing back  Anemia  due to chemotherapy, as per Dr. Mckeon's consult on 12-4-17  Ductal carcinoma in situ (DCIS) of left breast  had b/l mastectomy in 2012 with NO post op chemotherapy or RT  GERD (gastroesophageal reflux disease)    HTN (hypertension)    Hyponatremia  hyposmolality and/or hyponatremia, as per Dr. Mckeon consult on 12-4-17  Lung mass  diagnosed in 2017--received chemotherapy prior to surgery scheduled to biopsy trachea on 12-8-17  Lymphadenopathy  as per Dr. Mckeon's consult on 12-4-17  Lymphoma, unspecified body region, unspecified lymphoma type  diffuse large B-cell lymphoma of intrathoracic lymph nodes  Murmur, cardiac    S/P chemotherapy, time since 4-12 weeks  since chemotherapy, c/o memory loss  Thrombocytopenia    Tracheal compression    Varicose veins

## 2017-12-08 ENCOUNTER — TRANSCRIPTION ENCOUNTER (OUTPATIENT)
Age: 77
End: 2017-12-08

## 2017-12-08 ENCOUNTER — APPOINTMENT (OUTPATIENT)
Dept: THORACIC SURGERY | Facility: HOSPITAL | Age: 77
End: 2017-12-08

## 2017-12-11 LAB — TM INTERPRETATION: SIGNIFICANT CHANGE UP

## 2017-12-19 ENCOUNTER — RX RENEWAL (OUTPATIENT)
Age: 77
End: 2017-12-19

## 2017-12-19 LAB — HEMATOPATHOLOGY REPORT: SIGNIFICANT CHANGE UP

## 2017-12-20 ENCOUNTER — OUTPATIENT (OUTPATIENT)
Dept: OUTPATIENT SERVICES | Facility: HOSPITAL | Age: 77
LOS: 1 days | Discharge: HOME | End: 2017-12-20

## 2017-12-20 DIAGNOSIS — Z90.89 ACQUIRED ABSENCE OF OTHER ORGANS: Chronic | ICD-10-CM

## 2017-12-20 DIAGNOSIS — Z90.13 ACQUIRED ABSENCE OF BILATERAL BREASTS AND NIPPLES: Chronic | ICD-10-CM

## 2017-12-20 DIAGNOSIS — H26.9 UNSPECIFIED CATARACT: Chronic | ICD-10-CM

## 2017-12-20 DIAGNOSIS — M12.9 ARTHROPATHY, UNSPECIFIED: Chronic | ICD-10-CM

## 2017-12-21 ENCOUNTER — APPOINTMENT (OUTPATIENT)
Dept: RADIATION ONCOLOGY | Facility: CLINIC | Age: 77
End: 2017-12-21
Payer: MEDICARE

## 2017-12-21 ENCOUNTER — RESULT REVIEW (OUTPATIENT)
Age: 77
End: 2017-12-21

## 2017-12-21 VITALS
SYSTOLIC BLOOD PRESSURE: 162 MMHG | TEMPERATURE: 98.1 F | BODY MASS INDEX: 21.5 KG/M2 | WEIGHT: 116.84 LBS | HEART RATE: 110 BPM | OXYGEN SATURATION: 96 % | HEIGHT: 62 IN | DIASTOLIC BLOOD PRESSURE: 104 MMHG | RESPIRATION RATE: 17 BRPM

## 2017-12-21 PROCEDURE — 77262 THER RADIOLOGY TX PLNG INTRM: CPT

## 2017-12-21 PROCEDURE — 99204 OFFICE O/P NEW MOD 45 MIN: CPT | Mod: 25

## 2017-12-26 ENCOUNTER — MEDICATION RENEWAL (OUTPATIENT)
Age: 77
End: 2017-12-26

## 2017-12-26 ENCOUNTER — OTHER (OUTPATIENT)
Age: 77
End: 2017-12-26

## 2017-12-26 ENCOUNTER — OUTPATIENT (OUTPATIENT)
Dept: OUTPATIENT SERVICES | Facility: HOSPITAL | Age: 77
LOS: 1 days | Discharge: HOME | End: 2017-12-26

## 2017-12-26 DIAGNOSIS — H26.9 UNSPECIFIED CATARACT: Chronic | ICD-10-CM

## 2017-12-26 DIAGNOSIS — Z90.89 ACQUIRED ABSENCE OF OTHER ORGANS: Chronic | ICD-10-CM

## 2017-12-26 DIAGNOSIS — M12.9 ARTHROPATHY, UNSPECIFIED: Chronic | ICD-10-CM

## 2017-12-26 DIAGNOSIS — Z90.13 ACQUIRED ABSENCE OF BILATERAL BREASTS AND NIPPLES: Chronic | ICD-10-CM

## 2017-12-26 DIAGNOSIS — C82.97 FOLLICULAR LYMPHOMA, UNSPECIFIED, SPLEEN: ICD-10-CM

## 2017-12-26 PROCEDURE — 77334 RADIATION TREATMENT AID(S): CPT | Mod: 26

## 2017-12-26 PROCEDURE — 77290 THER RAD SIMULAJ FIELD CPLX: CPT | Mod: 26

## 2017-12-29 ENCOUNTER — RESULT REVIEW (OUTPATIENT)
Age: 77
End: 2017-12-29

## 2017-12-29 ENCOUNTER — LABORATORY RESULT (OUTPATIENT)
Age: 77
End: 2017-12-29

## 2017-12-29 ENCOUNTER — APPOINTMENT (OUTPATIENT)
Dept: INFUSION THERAPY | Facility: HOSPITAL | Age: 77
End: 2017-12-29

## 2017-12-29 LAB
BASOPHILS # BLD AUTO: 0.1 K/UL — SIGNIFICANT CHANGE UP (ref 0–0.2)
BASOPHILS NFR BLD AUTO: 1.3 % — SIGNIFICANT CHANGE UP (ref 0–2)
EOSINOPHIL # BLD AUTO: 0 K/UL — SIGNIFICANT CHANGE UP (ref 0–0.5)
EOSINOPHIL NFR BLD AUTO: 0 % — SIGNIFICANT CHANGE UP (ref 0–6)
HCT VFR BLD CALC: 39.4 % — SIGNIFICANT CHANGE UP (ref 34.5–45)
HGB BLD-MCNC: 13.5 G/DL — SIGNIFICANT CHANGE UP (ref 11.5–15.5)
LYMPHOCYTES # BLD AUTO: 2.1 K/UL — SIGNIFICANT CHANGE UP (ref 1–3.3)
LYMPHOCYTES # BLD AUTO: 25.9 % — SIGNIFICANT CHANGE UP (ref 13–44)
MCHC RBC-ENTMCNC: 31.3 PG — SIGNIFICANT CHANGE UP (ref 27–34)
MCHC RBC-ENTMCNC: 34.2 G/DL — SIGNIFICANT CHANGE UP (ref 32–36)
MCV RBC AUTO: 91.4 FL — SIGNIFICANT CHANGE UP (ref 80–100)
MONOCYTES # BLD AUTO: 0.9 K/UL — SIGNIFICANT CHANGE UP (ref 0–0.9)
MONOCYTES NFR BLD AUTO: 11.2 % — SIGNIFICANT CHANGE UP (ref 2–14)
NEUTROPHILS # BLD AUTO: 4.9 K/UL — SIGNIFICANT CHANGE UP (ref 1.8–7.4)
NEUTROPHILS NFR BLD AUTO: 61.7 % — SIGNIFICANT CHANGE UP (ref 43–77)
PLATELET # BLD AUTO: 177 K/UL — SIGNIFICANT CHANGE UP (ref 150–400)
RBC # BLD: 4.31 M/UL — SIGNIFICANT CHANGE UP (ref 3.8–5.2)
RBC # FLD: 13.5 % — SIGNIFICANT CHANGE UP (ref 10.3–14.5)
WBC # BLD: 8 K/UL — SIGNIFICANT CHANGE UP (ref 3.8–10.5)
WBC # FLD AUTO: 8 K/UL — SIGNIFICANT CHANGE UP (ref 3.8–10.5)

## 2018-01-02 DIAGNOSIS — Z51.11 ENCOUNTER FOR ANTINEOPLASTIC CHEMOTHERAPY: ICD-10-CM

## 2018-01-02 DIAGNOSIS — R11.2 NAUSEA WITH VOMITING, UNSPECIFIED: ICD-10-CM

## 2018-01-03 RX ORDER — ALLOPURINOL 300 MG/1
300 TABLET ORAL
Qty: 7 | Refills: 0 | Status: DISCONTINUED | COMMUNITY
Start: 2017-12-22 | End: 2018-01-03

## 2018-01-03 RX ORDER — HYDROCODONE BITARTRATE AND HOMATROPINE METHYLBROMIDE 5; 1.5 MG/5ML; MG/5ML
5-1.5 SYRUP ORAL
Qty: 420 | Refills: 0 | Status: DISCONTINUED | COMMUNITY
Start: 2017-12-19 | End: 2018-01-03

## 2018-01-05 ENCOUNTER — APPOINTMENT (OUTPATIENT)
Dept: INFUSION THERAPY | Facility: HOSPITAL | Age: 78
End: 2018-01-05

## 2018-01-05 ENCOUNTER — LABORATORY RESULT (OUTPATIENT)
Age: 78
End: 2018-01-05

## 2018-01-05 ENCOUNTER — RESULT REVIEW (OUTPATIENT)
Age: 78
End: 2018-01-05

## 2018-01-05 ENCOUNTER — RX RENEWAL (OUTPATIENT)
Age: 78
End: 2018-01-05

## 2018-01-05 LAB
BASOPHILS # BLD AUTO: 0.1 K/UL — SIGNIFICANT CHANGE UP (ref 0–0.2)
BASOPHILS NFR BLD AUTO: 0.8 % — SIGNIFICANT CHANGE UP (ref 0–2)
EOSINOPHIL # BLD AUTO: 0 K/UL — SIGNIFICANT CHANGE UP (ref 0–0.5)
EOSINOPHIL NFR BLD AUTO: 0 % — SIGNIFICANT CHANGE UP (ref 0–6)
HCT VFR BLD CALC: 40 % — SIGNIFICANT CHANGE UP (ref 34.5–45)
HGB BLD-MCNC: 13.9 G/DL — SIGNIFICANT CHANGE UP (ref 11.5–15.5)
LYMPHOCYTES # BLD AUTO: 1.6 K/UL — SIGNIFICANT CHANGE UP (ref 1–3.3)
LYMPHOCYTES # BLD AUTO: 21.2 % — SIGNIFICANT CHANGE UP (ref 13–44)
MCHC RBC-ENTMCNC: 31.2 PG — SIGNIFICANT CHANGE UP (ref 27–34)
MCHC RBC-ENTMCNC: 34.8 G/DL — SIGNIFICANT CHANGE UP (ref 32–36)
MCV RBC AUTO: 89.7 FL — SIGNIFICANT CHANGE UP (ref 80–100)
MONOCYTES # BLD AUTO: 1.1 K/UL — HIGH (ref 0–0.9)
MONOCYTES NFR BLD AUTO: 14.4 % — HIGH (ref 2–14)
NEUTROPHILS # BLD AUTO: 4.8 K/UL — SIGNIFICANT CHANGE UP (ref 1.8–7.4)
NEUTROPHILS NFR BLD AUTO: 63.6 % — SIGNIFICANT CHANGE UP (ref 43–77)
PLATELET # BLD AUTO: 142 K/UL — LOW (ref 150–400)
RBC # BLD: 4.47 M/UL — SIGNIFICANT CHANGE UP (ref 3.8–5.2)
RBC # FLD: 14.1 % — SIGNIFICANT CHANGE UP (ref 10.3–14.5)
WBC # BLD: 7.6 K/UL — SIGNIFICANT CHANGE UP (ref 3.8–10.5)
WBC # FLD AUTO: 7.6 K/UL — SIGNIFICANT CHANGE UP (ref 3.8–10.5)

## 2018-01-12 ENCOUNTER — APPOINTMENT (OUTPATIENT)
Dept: INFUSION THERAPY | Facility: HOSPITAL | Age: 78
End: 2018-01-12

## 2018-01-12 ENCOUNTER — RESULT REVIEW (OUTPATIENT)
Age: 78
End: 2018-01-12

## 2018-01-12 ENCOUNTER — LABORATORY RESULT (OUTPATIENT)
Age: 78
End: 2018-01-12

## 2018-01-12 LAB
BASOPHILS # BLD AUTO: 0 K/UL — SIGNIFICANT CHANGE UP (ref 0–0.2)
BASOPHILS NFR BLD AUTO: 0.5 % — SIGNIFICANT CHANGE UP (ref 0–2)
EOSINOPHIL # BLD AUTO: 0 K/UL — SIGNIFICANT CHANGE UP (ref 0–0.5)
EOSINOPHIL NFR BLD AUTO: 0 % — SIGNIFICANT CHANGE UP (ref 0–6)
HCT VFR BLD CALC: 37.7 % — SIGNIFICANT CHANGE UP (ref 34.5–45)
HGB BLD-MCNC: 13.7 G/DL — SIGNIFICANT CHANGE UP (ref 11.5–15.5)
LYMPHOCYTES # BLD AUTO: 2.3 K/UL — SIGNIFICANT CHANGE UP (ref 1–3.3)
LYMPHOCYTES # BLD AUTO: 28.9 % — SIGNIFICANT CHANGE UP (ref 13–44)
MCHC RBC-ENTMCNC: 32.8 PG — SIGNIFICANT CHANGE UP (ref 27–34)
MCHC RBC-ENTMCNC: 36.2 G/DL — HIGH (ref 32–36)
MCV RBC AUTO: 90.6 FL — SIGNIFICANT CHANGE UP (ref 80–100)
MONOCYTES # BLD AUTO: 1.2 K/UL — HIGH (ref 0–0.9)
MONOCYTES NFR BLD AUTO: 15.6 % — HIGH (ref 2–14)
NEUTROPHILS # BLD AUTO: 4.4 K/UL — SIGNIFICANT CHANGE UP (ref 1.8–7.4)
NEUTROPHILS NFR BLD AUTO: 55 % — SIGNIFICANT CHANGE UP (ref 43–77)
PLATELET # BLD AUTO: 161 K/UL — SIGNIFICANT CHANGE UP (ref 150–400)
RBC # BLD: 4.16 M/UL — SIGNIFICANT CHANGE UP (ref 3.8–5.2)
RBC # FLD: 14.5 % — SIGNIFICANT CHANGE UP (ref 10.3–14.5)
WBC # BLD: 8 K/UL — SIGNIFICANT CHANGE UP (ref 3.8–10.5)
WBC # FLD AUTO: 8 K/UL — SIGNIFICANT CHANGE UP (ref 3.8–10.5)

## 2018-01-12 PROCEDURE — 77295 3-D RADIOTHERAPY PLAN: CPT | Mod: 26

## 2018-01-12 PROCEDURE — 77300 RADIATION THERAPY DOSE PLAN: CPT | Mod: 26

## 2018-01-12 PROCEDURE — 77334 RADIATION TREATMENT AID(S): CPT | Mod: 26

## 2018-01-12 RX ORDER — METOPROLOL SUCCINATE 50 MG/1
50 TABLET, EXTENDED RELEASE ORAL
Qty: 30 | Refills: 3 | Status: DISCONTINUED | COMMUNITY
Start: 2017-08-03 | End: 2018-01-12

## 2018-01-12 RX ORDER — ALLOPURINOL 300 MG/1
300 TABLET ORAL
Qty: 5 | Refills: 0 | Status: COMPLETED | COMMUNITY
Start: 2018-01-03 | End: 2018-01-12

## 2018-01-19 ENCOUNTER — LABORATORY RESULT (OUTPATIENT)
Age: 78
End: 2018-01-19

## 2018-01-19 ENCOUNTER — RESULT REVIEW (OUTPATIENT)
Age: 78
End: 2018-01-19

## 2018-01-19 ENCOUNTER — APPOINTMENT (OUTPATIENT)
Dept: INFUSION THERAPY | Facility: HOSPITAL | Age: 78
End: 2018-01-19

## 2018-01-19 LAB
BASOPHILS # BLD AUTO: 0 K/UL — SIGNIFICANT CHANGE UP (ref 0–0.2)
BASOPHILS NFR BLD AUTO: 0.2 % — SIGNIFICANT CHANGE UP (ref 0–2)
EOSINOPHIL # BLD AUTO: 0 K/UL — SIGNIFICANT CHANGE UP (ref 0–0.5)
EOSINOPHIL NFR BLD AUTO: 0 % — SIGNIFICANT CHANGE UP (ref 0–6)
HCT VFR BLD CALC: 38.2 % — SIGNIFICANT CHANGE UP (ref 34.5–45)
HGB BLD-MCNC: 13.2 G/DL — SIGNIFICANT CHANGE UP (ref 11.5–15.5)
LYMPHOCYTES # BLD AUTO: 1.7 K/UL — SIGNIFICANT CHANGE UP (ref 1–3.3)
LYMPHOCYTES # BLD AUTO: 22.5 % — SIGNIFICANT CHANGE UP (ref 13–44)
MCHC RBC-ENTMCNC: 31.5 PG — SIGNIFICANT CHANGE UP (ref 27–34)
MCHC RBC-ENTMCNC: 34.6 G/DL — SIGNIFICANT CHANGE UP (ref 32–36)
MCV RBC AUTO: 91 FL — SIGNIFICANT CHANGE UP (ref 80–100)
MONOCYTES # BLD AUTO: 1 K/UL — HIGH (ref 0–0.9)
MONOCYTES NFR BLD AUTO: 14 % — SIGNIFICANT CHANGE UP (ref 2–14)
NEUTROPHILS # BLD AUTO: 4.7 K/UL — SIGNIFICANT CHANGE UP (ref 1.8–7.4)
NEUTROPHILS NFR BLD AUTO: 63.3 % — SIGNIFICANT CHANGE UP (ref 43–77)
PLATELET # BLD AUTO: 172 K/UL — SIGNIFICANT CHANGE UP (ref 150–400)
RBC # BLD: 4.2 M/UL — SIGNIFICANT CHANGE UP (ref 3.8–5.2)
RBC # FLD: 14.8 % — HIGH (ref 10.3–14.5)
WBC # BLD: 7.4 K/UL — SIGNIFICANT CHANGE UP (ref 3.8–10.5)
WBC # FLD AUTO: 7.4 K/UL — SIGNIFICANT CHANGE UP (ref 3.8–10.5)

## 2018-01-22 PROCEDURE — 77280 THER RAD SIMULAJ FIELD SMPL: CPT | Mod: 26

## 2018-01-23 PROCEDURE — 77427 RADIATION TX MANAGEMENT X5: CPT

## 2018-01-24 PROCEDURE — 77331 SPECIAL RADIATION DOSIMETRY: CPT | Mod: 26

## 2018-01-26 ENCOUNTER — CHART COPY (OUTPATIENT)
Age: 78
End: 2018-01-26

## 2018-01-26 RX ORDER — HYDROCODONE BITARTRATE AND HOMATROPINE METHYLBROMIDE 5; 1.5 MG/5ML; MG/5ML
5-1.5 SYRUP ORAL
Qty: 240 | Refills: 0 | Status: DISCONTINUED | COMMUNITY
Start: 2018-01-12 | End: 2018-01-26

## 2018-01-26 RX ORDER — MONTELUKAST 10 MG/1
10 TABLET, FILM COATED ORAL
Qty: 2 | Refills: 3 | Status: DISCONTINUED | COMMUNITY
Start: 2017-08-03 | End: 2018-01-26

## 2018-01-26 RX ORDER — MONTELUKAST 10 MG/1
10 TABLET, FILM COATED ORAL
Qty: 4 | Refills: 0 | Status: DISCONTINUED | COMMUNITY
Start: 2018-01-03 | End: 2018-01-26

## 2018-01-26 RX ORDER — HYDROCODONE BITARTRATE AND HOMATROPINE METHYLBROMIDE 5; 1.5 MG/5ML; MG/5ML
5-1.5 SYRUP ORAL
Qty: 240 | Refills: 0 | Status: DISCONTINUED | COMMUNITY
Start: 2017-12-29 | End: 2018-01-26

## 2018-01-26 RX ORDER — PREDNISONE 20 MG/1
20 TABLET ORAL
Qty: 8 | Refills: 0 | Status: DISCONTINUED | COMMUNITY
Start: 2017-12-19 | End: 2018-01-26

## 2018-01-26 RX ORDER — OLMESARTAN MEDOXOMIL 20 MG/1
20 TABLET, FILM COATED ORAL DAILY
Refills: 0 | Status: DISCONTINUED | COMMUNITY
Start: 2017-03-27 | End: 2018-01-26

## 2018-01-28 ENCOUNTER — FORM ENCOUNTER (OUTPATIENT)
Age: 78
End: 2018-01-28

## 2018-01-29 ENCOUNTER — APPOINTMENT (OUTPATIENT)
Dept: CARDIOLOGY | Facility: CLINIC | Age: 78
End: 2018-01-29
Payer: MEDICARE

## 2018-01-29 ENCOUNTER — NON-APPOINTMENT (OUTPATIENT)
Age: 78
End: 2018-01-29

## 2018-01-29 ENCOUNTER — OUTPATIENT (OUTPATIENT)
Dept: OUTPATIENT SERVICES | Facility: HOSPITAL | Age: 78
LOS: 1 days | End: 2018-01-29
Payer: MEDICARE

## 2018-01-29 ENCOUNTER — APPOINTMENT (OUTPATIENT)
Dept: CT IMAGING | Facility: IMAGING CENTER | Age: 78
End: 2018-01-29

## 2018-01-29 VITALS
HEIGHT: 62 IN | TEMPERATURE: 97.8 F | BODY MASS INDEX: 21.56 KG/M2 | OXYGEN SATURATION: 98 % | RESPIRATION RATE: 17 BRPM | HEART RATE: 118 BPM | SYSTOLIC BLOOD PRESSURE: 160 MMHG | WEIGHT: 117.18 LBS | DIASTOLIC BLOOD PRESSURE: 92 MMHG

## 2018-01-29 VITALS — DIASTOLIC BLOOD PRESSURE: 80 MMHG | SYSTOLIC BLOOD PRESSURE: 150 MMHG

## 2018-01-29 VITALS
TEMPERATURE: 98.5 F | SYSTOLIC BLOOD PRESSURE: 167 MMHG | HEART RATE: 102 BPM | DIASTOLIC BLOOD PRESSURE: 115 MMHG | OXYGEN SATURATION: 98 %

## 2018-01-29 DIAGNOSIS — Z90.13 ACQUIRED ABSENCE OF BILATERAL BREASTS AND NIPPLES: Chronic | ICD-10-CM

## 2018-01-29 DIAGNOSIS — Z00.8 ENCOUNTER FOR OTHER GENERAL EXAMINATION: ICD-10-CM

## 2018-01-29 DIAGNOSIS — Z90.89 ACQUIRED ABSENCE OF OTHER ORGANS: Chronic | ICD-10-CM

## 2018-01-29 DIAGNOSIS — M12.9 ARTHROPATHY, UNSPECIFIED: Chronic | ICD-10-CM

## 2018-01-29 DIAGNOSIS — H26.9 UNSPECIFIED CATARACT: Chronic | ICD-10-CM

## 2018-01-29 PROCEDURE — 71250 CT THORAX DX C-: CPT | Mod: 26

## 2018-01-29 PROCEDURE — 99214 OFFICE O/P EST MOD 30 MIN: CPT

## 2018-01-29 PROCEDURE — 71250 CT THORAX DX C-: CPT

## 2018-01-29 PROCEDURE — 93000 ELECTROCARDIOGRAM COMPLETE: CPT

## 2018-01-30 ENCOUNTER — OUTPATIENT (OUTPATIENT)
Dept: OUTPATIENT SERVICES | Facility: HOSPITAL | Age: 78
LOS: 1 days | Discharge: HOME | End: 2018-01-30

## 2018-01-30 VITALS — OXYGEN SATURATION: 98 % | HEART RATE: 110 BPM

## 2018-01-30 DIAGNOSIS — C82.97 FOLLICULAR LYMPHOMA, UNSPECIFIED, SPLEEN: ICD-10-CM

## 2018-01-30 DIAGNOSIS — Z90.89 ACQUIRED ABSENCE OF OTHER ORGANS: Chronic | ICD-10-CM

## 2018-01-30 DIAGNOSIS — H26.9 UNSPECIFIED CATARACT: Chronic | ICD-10-CM

## 2018-01-30 DIAGNOSIS — M12.9 ARTHROPATHY, UNSPECIFIED: Chronic | ICD-10-CM

## 2018-01-30 DIAGNOSIS — Z90.13 ACQUIRED ABSENCE OF BILATERAL BREASTS AND NIPPLES: Chronic | ICD-10-CM

## 2018-02-01 ENCOUNTER — APPOINTMENT (OUTPATIENT)
Dept: THORACIC SURGERY | Facility: CLINIC | Age: 78
End: 2018-02-01

## 2018-02-01 PROCEDURE — 77427 RADIATION TX MANAGEMENT X5: CPT

## 2018-02-02 ENCOUNTER — RESULT REVIEW (OUTPATIENT)
Age: 78
End: 2018-02-02

## 2018-02-02 ENCOUNTER — LABORATORY RESULT (OUTPATIENT)
Age: 78
End: 2018-02-02

## 2018-02-02 ENCOUNTER — APPOINTMENT (OUTPATIENT)
Dept: INFUSION THERAPY | Facility: HOSPITAL | Age: 78
End: 2018-02-02

## 2018-02-02 LAB
BASOPHILS # BLD AUTO: 0 K/UL — SIGNIFICANT CHANGE UP (ref 0–0.2)
BASOPHILS NFR BLD AUTO: 0.1 % — SIGNIFICANT CHANGE UP (ref 0–2)
EOSINOPHIL # BLD AUTO: 0 K/UL — SIGNIFICANT CHANGE UP (ref 0–0.5)
EOSINOPHIL NFR BLD AUTO: 0 % — SIGNIFICANT CHANGE UP (ref 0–6)
HCT VFR BLD CALC: 41.7 % — SIGNIFICANT CHANGE UP (ref 34.5–45)
HGB BLD-MCNC: 14.6 G/DL — SIGNIFICANT CHANGE UP (ref 11.5–15.5)
LYMPHOCYTES # BLD AUTO: 1.3 K/UL — SIGNIFICANT CHANGE UP (ref 1–3.3)
LYMPHOCYTES # BLD AUTO: 14.5 % — SIGNIFICANT CHANGE UP (ref 13–44)
MCHC RBC-ENTMCNC: 32.1 PG — SIGNIFICANT CHANGE UP (ref 27–34)
MCHC RBC-ENTMCNC: 35 G/DL — SIGNIFICANT CHANGE UP (ref 32–36)
MCV RBC AUTO: 91.8 FL — SIGNIFICANT CHANGE UP (ref 80–100)
MONOCYTES # BLD AUTO: 1.5 K/UL — HIGH (ref 0–0.9)
MONOCYTES NFR BLD AUTO: 16.2 % — HIGH (ref 2–14)
NEUTROPHILS # BLD AUTO: 6.2 K/UL — SIGNIFICANT CHANGE UP (ref 1.8–7.4)
NEUTROPHILS NFR BLD AUTO: 69.1 % — SIGNIFICANT CHANGE UP (ref 43–77)
PLATELET # BLD AUTO: 171 K/UL — SIGNIFICANT CHANGE UP (ref 150–400)
RBC # BLD: 4.54 M/UL — SIGNIFICANT CHANGE UP (ref 3.8–5.2)
RBC # FLD: 15.4 % — HIGH (ref 10.3–14.5)
WBC # BLD: 9 K/UL — SIGNIFICANT CHANGE UP (ref 3.8–10.5)
WBC # FLD AUTO: 9 K/UL — SIGNIFICANT CHANGE UP (ref 3.8–10.5)

## 2018-02-05 VITALS
DIASTOLIC BLOOD PRESSURE: 82 MMHG | BODY MASS INDEX: 21.06 KG/M2 | HEART RATE: 97 BPM | HEIGHT: 62 IN | TEMPERATURE: 98.1 F | SYSTOLIC BLOOD PRESSURE: 155 MMHG | RESPIRATION RATE: 16 BRPM | WEIGHT: 114.42 LBS | OXYGEN SATURATION: 99 %

## 2018-02-05 DIAGNOSIS — C82.30 FOLLICULAR LYMPHOMA GRADE IIIA, UNSPECIFIED SITE: ICD-10-CM

## 2018-02-05 DIAGNOSIS — R11.2 NAUSEA WITH VOMITING, UNSPECIFIED: ICD-10-CM

## 2018-02-05 DIAGNOSIS — Z51.11 ENCOUNTER FOR ANTINEOPLASTIC CHEMOTHERAPY: ICD-10-CM

## 2018-02-09 ENCOUNTER — RESULT REVIEW (OUTPATIENT)
Age: 78
End: 2018-02-09

## 2018-02-09 ENCOUNTER — RX RENEWAL (OUTPATIENT)
Age: 78
End: 2018-02-09

## 2018-02-09 ENCOUNTER — APPOINTMENT (OUTPATIENT)
Dept: HEMATOLOGY ONCOLOGY | Facility: CLINIC | Age: 78
End: 2018-02-09

## 2018-02-09 LAB
BASOPHILS # BLD AUTO: 0 K/UL — SIGNIFICANT CHANGE UP (ref 0–0.2)
BASOPHILS NFR BLD AUTO: 0.6 % — SIGNIFICANT CHANGE UP (ref 0–2)
EOSINOPHIL # BLD AUTO: 0 K/UL — SIGNIFICANT CHANGE UP (ref 0–0.5)
EOSINOPHIL NFR BLD AUTO: 0 % — SIGNIFICANT CHANGE UP (ref 0–6)
HCT VFR BLD CALC: 36 % — SIGNIFICANT CHANGE UP (ref 34.5–45)
HGB BLD-MCNC: 12.8 G/DL — SIGNIFICANT CHANGE UP (ref 11.5–15.5)
LYMPHOCYTES # BLD AUTO: 1.2 K/UL — SIGNIFICANT CHANGE UP (ref 1–3.3)
LYMPHOCYTES # BLD AUTO: 17.5 % — SIGNIFICANT CHANGE UP (ref 13–44)
MCHC RBC-ENTMCNC: 32.2 PG — SIGNIFICANT CHANGE UP (ref 27–34)
MCHC RBC-ENTMCNC: 35.5 G/DL — SIGNIFICANT CHANGE UP (ref 32–36)
MCV RBC AUTO: 90.5 FL — SIGNIFICANT CHANGE UP (ref 80–100)
MONOCYTES # BLD AUTO: 0.8 K/UL — SIGNIFICANT CHANGE UP (ref 0–0.9)
MONOCYTES NFR BLD AUTO: 11.7 % — SIGNIFICANT CHANGE UP (ref 2–14)
NEUTROPHILS # BLD AUTO: 4.6 K/UL — SIGNIFICANT CHANGE UP (ref 1.8–7.4)
NEUTROPHILS NFR BLD AUTO: 70.2 % — SIGNIFICANT CHANGE UP (ref 43–77)
PLATELET # BLD AUTO: 103 K/UL — LOW (ref 150–400)
RBC # BLD: 3.98 M/UL — SIGNIFICANT CHANGE UP (ref 3.8–5.2)
RBC # FLD: 15.2 % — HIGH (ref 10.3–14.5)
WBC # BLD: 6.6 K/UL — SIGNIFICANT CHANGE UP (ref 3.8–10.5)
WBC # FLD AUTO: 6.6 K/UL — SIGNIFICANT CHANGE UP (ref 3.8–10.5)

## 2018-02-15 PROCEDURE — 77427 RADIATION TX MANAGEMENT X5: CPT

## 2018-02-16 ENCOUNTER — RESULT REVIEW (OUTPATIENT)
Age: 78
End: 2018-02-16

## 2018-02-16 ENCOUNTER — LABORATORY RESULT (OUTPATIENT)
Age: 78
End: 2018-02-16

## 2018-02-16 ENCOUNTER — APPOINTMENT (OUTPATIENT)
Dept: INFUSION THERAPY | Facility: HOSPITAL | Age: 78
End: 2018-02-16

## 2018-02-16 LAB
BASOPHILS # BLD AUTO: 0 K/UL — SIGNIFICANT CHANGE UP (ref 0–0.2)
BASOPHILS NFR BLD AUTO: 0.4 % — SIGNIFICANT CHANGE UP (ref 0–2)
EOSINOPHIL # BLD AUTO: 0.1 K/UL — SIGNIFICANT CHANGE UP (ref 0–0.5)
EOSINOPHIL NFR BLD AUTO: 1.8 % — SIGNIFICANT CHANGE UP (ref 0–6)
HCT VFR BLD CALC: 34 % — LOW (ref 34.5–45)
HGB BLD-MCNC: 12 G/DL — SIGNIFICANT CHANGE UP (ref 11.5–15.5)
LYMPHOCYTES # BLD AUTO: 1 K/UL — SIGNIFICANT CHANGE UP (ref 1–3.3)
LYMPHOCYTES # BLD AUTO: 15.6 % — SIGNIFICANT CHANGE UP (ref 13–44)
MCHC RBC-ENTMCNC: 32.6 PG — SIGNIFICANT CHANGE UP (ref 27–34)
MCHC RBC-ENTMCNC: 35.4 G/DL — SIGNIFICANT CHANGE UP (ref 32–36)
MCV RBC AUTO: 92 FL — SIGNIFICANT CHANGE UP (ref 80–100)
MONOCYTES # BLD AUTO: 0.6 K/UL — SIGNIFICANT CHANGE UP (ref 0–0.9)
MONOCYTES NFR BLD AUTO: 8.7 % — SIGNIFICANT CHANGE UP (ref 2–14)
NEUTROPHILS # BLD AUTO: 4.9 K/UL — SIGNIFICANT CHANGE UP (ref 1.8–7.4)
NEUTROPHILS NFR BLD AUTO: 73.6 % — SIGNIFICANT CHANGE UP (ref 43–77)
PLATELET # BLD AUTO: 101 K/UL — LOW (ref 150–400)
RBC # BLD: 3.69 M/UL — LOW (ref 3.8–5.2)
RBC # FLD: 15.8 % — HIGH (ref 10.3–14.5)
WBC # BLD: 6.7 K/UL — SIGNIFICANT CHANGE UP (ref 3.8–10.5)
WBC # FLD AUTO: 6.7 K/UL — SIGNIFICANT CHANGE UP (ref 3.8–10.5)

## 2018-02-23 ENCOUNTER — MEDICATION RENEWAL (OUTPATIENT)
Age: 78
End: 2018-02-23

## 2018-02-23 DIAGNOSIS — R07.0 PAIN IN THROAT: ICD-10-CM

## 2018-02-23 PROCEDURE — 77427 RADIATION TX MANAGEMENT X5: CPT

## 2018-02-26 ENCOUNTER — OUTPATIENT (OUTPATIENT)
Dept: OUTPATIENT SERVICES | Facility: HOSPITAL | Age: 78
LOS: 1 days | Discharge: HOME | End: 2018-02-26

## 2018-02-26 DIAGNOSIS — M12.9 ARTHROPATHY, UNSPECIFIED: Chronic | ICD-10-CM

## 2018-02-26 DIAGNOSIS — Z90.13 ACQUIRED ABSENCE OF BILATERAL BREASTS AND NIPPLES: Chronic | ICD-10-CM

## 2018-02-26 DIAGNOSIS — Z90.89 ACQUIRED ABSENCE OF OTHER ORGANS: Chronic | ICD-10-CM

## 2018-02-26 DIAGNOSIS — C82.97 FOLLICULAR LYMPHOMA, UNSPECIFIED, SPLEEN: ICD-10-CM

## 2018-02-26 DIAGNOSIS — H26.9 UNSPECIFIED CATARACT: Chronic | ICD-10-CM

## 2018-02-27 VITALS
WEIGHT: 109.9 LBS | HEART RATE: 96 BPM | TEMPERATURE: 97.8 F | BODY MASS INDEX: 20.22 KG/M2 | SYSTOLIC BLOOD PRESSURE: 124 MMHG | HEIGHT: 62 IN | DIASTOLIC BLOOD PRESSURE: 76 MMHG | RESPIRATION RATE: 16 BRPM | OXYGEN SATURATION: 97 %

## 2018-02-28 ENCOUNTER — OTHER (OUTPATIENT)
Age: 78
End: 2018-02-28

## 2018-03-02 ENCOUNTER — RESULT REVIEW (OUTPATIENT)
Age: 78
End: 2018-03-02

## 2018-03-02 ENCOUNTER — APPOINTMENT (OUTPATIENT)
Dept: INFUSION THERAPY | Facility: HOSPITAL | Age: 78
End: 2018-03-02

## 2018-03-02 ENCOUNTER — LABORATORY RESULT (OUTPATIENT)
Age: 78
End: 2018-03-02

## 2018-03-02 LAB
BASOPHILS # BLD AUTO: 0 K/UL — SIGNIFICANT CHANGE UP (ref 0–0.2)
BASOPHILS NFR BLD AUTO: 1 % — SIGNIFICANT CHANGE UP (ref 0–2)
EOSINOPHIL # BLD AUTO: 0.1 K/UL — SIGNIFICANT CHANGE UP (ref 0–0.5)
HCT VFR BLD CALC: 33.7 % — LOW (ref 34.5–45)
HGB BLD-MCNC: 12.3 G/DL — SIGNIFICANT CHANGE UP (ref 11.5–15.5)
LYMPHOCYTES # BLD AUTO: 0.7 K/UL — LOW (ref 1–3.3)
LYMPHOCYTES # BLD AUTO: 19 % — SIGNIFICANT CHANGE UP (ref 13–44)
MCHC RBC-ENTMCNC: 33.5 PG — SIGNIFICANT CHANGE UP (ref 27–34)
MCHC RBC-ENTMCNC: 36.4 G/DL — HIGH (ref 32–36)
MCV RBC AUTO: 92.1 FL — SIGNIFICANT CHANGE UP (ref 80–100)
METAMYELOCYTES # FLD: 1 % — HIGH (ref 0–0)
MONOCYTES # BLD AUTO: 0.4 K/UL — SIGNIFICANT CHANGE UP (ref 0–0.9)
MONOCYTES NFR BLD AUTO: 15 % — HIGH (ref 2–14)
MYELOCYTES NFR BLD: 1 % — HIGH (ref 0–0)
NEUTROPHILS # BLD AUTO: 1.2 K/UL — LOW (ref 1.8–7.4)
NEUTROPHILS NFR BLD AUTO: 59 % — SIGNIFICANT CHANGE UP (ref 43–77)
PLAT MORPH BLD: NORMAL — SIGNIFICANT CHANGE UP
PLATELET # BLD AUTO: 119 K/UL — LOW (ref 150–400)
RBC # BLD: 3.65 M/UL — LOW (ref 3.8–5.2)
RBC # FLD: 16.5 % — HIGH (ref 10.3–14.5)
RBC BLD AUTO: SIGNIFICANT CHANGE UP
VARIANT LYMPHS # BLD: 4 % — SIGNIFICANT CHANGE UP (ref 0–6)
WBC # BLD: 2.5 K/UL — LOW (ref 3.8–10.5)
WBC # FLD AUTO: 2.5 K/UL — LOW (ref 3.8–10.5)

## 2018-03-05 DIAGNOSIS — R11.2 NAUSEA WITH VOMITING, UNSPECIFIED: ICD-10-CM

## 2018-03-05 DIAGNOSIS — Z51.11 ENCOUNTER FOR ANTINEOPLASTIC CHEMOTHERAPY: ICD-10-CM

## 2018-03-06 ENCOUNTER — OTHER (OUTPATIENT)
Age: 78
End: 2018-03-06

## 2018-03-16 ENCOUNTER — APPOINTMENT (OUTPATIENT)
Dept: INFUSION THERAPY | Facility: HOSPITAL | Age: 78
End: 2018-03-16

## 2018-03-16 ENCOUNTER — RESULT REVIEW (OUTPATIENT)
Age: 78
End: 2018-03-16

## 2018-03-16 ENCOUNTER — LABORATORY RESULT (OUTPATIENT)
Age: 78
End: 2018-03-16

## 2018-03-16 LAB
BASOPHILS # BLD AUTO: 0 K/UL — SIGNIFICANT CHANGE UP (ref 0–0.2)
EOSINOPHIL # BLD AUTO: 0 K/UL — SIGNIFICANT CHANGE UP (ref 0–0.5)
HCT VFR BLD CALC: 33.8 % — LOW (ref 34.5–45)
HGB BLD-MCNC: 12.2 G/DL — SIGNIFICANT CHANGE UP (ref 11.5–15.5)
LYMPHOCYTES # BLD AUTO: 1.2 K/UL — SIGNIFICANT CHANGE UP (ref 1–3.3)
LYMPHOCYTES # BLD AUTO: 47 % — HIGH (ref 13–44)
MCHC RBC-ENTMCNC: 33.1 PG — SIGNIFICANT CHANGE UP (ref 27–34)
MCHC RBC-ENTMCNC: 36.1 G/DL — HIGH (ref 32–36)
MCV RBC AUTO: 91.6 FL — SIGNIFICANT CHANGE UP (ref 80–100)
MONOCYTES # BLD AUTO: 0.5 K/UL — SIGNIFICANT CHANGE UP (ref 0–0.9)
MONOCYTES NFR BLD AUTO: 13 % — SIGNIFICANT CHANGE UP (ref 2–14)
NEUTROPHILS # BLD AUTO: 1.4 K/UL — LOW (ref 1.8–7.4)
NEUTROPHILS NFR BLD AUTO: 40 % — LOW (ref 43–77)
PLAT MORPH BLD: NORMAL — SIGNIFICANT CHANGE UP
PLATELET # BLD AUTO: 137 K/UL — LOW (ref 150–400)
RBC # BLD: 3.69 M/UL — LOW (ref 3.8–5.2)
RBC # FLD: 16.9 % — HIGH (ref 10.3–14.5)
RBC BLD AUTO: SIGNIFICANT CHANGE UP
WBC # BLD: 3.1 K/UL — LOW (ref 3.8–10.5)
WBC # FLD AUTO: 3.1 K/UL — LOW (ref 3.8–10.5)

## 2018-03-27 ENCOUNTER — MEDICATION RENEWAL (OUTPATIENT)
Age: 78
End: 2018-03-27

## 2018-04-10 ENCOUNTER — OUTPATIENT (OUTPATIENT)
Dept: OUTPATIENT SERVICES | Facility: HOSPITAL | Age: 78
LOS: 1 days | Discharge: HOME | End: 2018-04-10

## 2018-04-10 DIAGNOSIS — C82.97 FOLLICULAR LYMPHOMA, UNSPECIFIED, SPLEEN: ICD-10-CM

## 2018-04-10 DIAGNOSIS — M12.9 ARTHROPATHY, UNSPECIFIED: Chronic | ICD-10-CM

## 2018-04-10 DIAGNOSIS — Z90.13 ACQUIRED ABSENCE OF BILATERAL BREASTS AND NIPPLES: Chronic | ICD-10-CM

## 2018-04-10 DIAGNOSIS — H26.9 UNSPECIFIED CATARACT: Chronic | ICD-10-CM

## 2018-04-10 DIAGNOSIS — Z90.89 ACQUIRED ABSENCE OF OTHER ORGANS: Chronic | ICD-10-CM

## 2018-04-11 ENCOUNTER — APPOINTMENT (OUTPATIENT)
Dept: RADIATION ONCOLOGY | Facility: CLINIC | Age: 78
End: 2018-04-11
Payer: MEDICARE

## 2018-04-11 VITALS
WEIGHT: 110.34 LBS | BODY MASS INDEX: 20.31 KG/M2 | SYSTOLIC BLOOD PRESSURE: 91 MMHG | TEMPERATURE: 98.2 F | HEART RATE: 96 BPM | HEIGHT: 62 IN | DIASTOLIC BLOOD PRESSURE: 60 MMHG | OXYGEN SATURATION: 100 % | RESPIRATION RATE: 15 BRPM

## 2018-04-11 PROCEDURE — 99024 POSTOP FOLLOW-UP VISIT: CPT

## 2018-04-13 ENCOUNTER — RESULT REVIEW (OUTPATIENT)
Age: 78
End: 2018-04-13

## 2018-04-13 ENCOUNTER — LABORATORY RESULT (OUTPATIENT)
Age: 78
End: 2018-04-13

## 2018-04-13 ENCOUNTER — APPOINTMENT (OUTPATIENT)
Dept: INFUSION THERAPY | Facility: HOSPITAL | Age: 78
End: 2018-04-13

## 2018-04-13 LAB
BASOPHILS # BLD AUTO: 0.1 K/UL — SIGNIFICANT CHANGE UP (ref 0–0.2)
BASOPHILS NFR BLD AUTO: 2.5 % — HIGH (ref 0–2)
EOSINOPHIL # BLD AUTO: 0.5 K/UL — SIGNIFICANT CHANGE UP (ref 0–0.5)
EOSINOPHIL NFR BLD AUTO: 11.7 % — HIGH (ref 0–6)
HCT VFR BLD CALC: 31.4 % — LOW (ref 34.5–45)
HGB BLD-MCNC: 11.1 G/DL — LOW (ref 11.5–15.5)
LYMPHOCYTES # BLD AUTO: 2 K/UL — SIGNIFICANT CHANGE UP (ref 1–3.3)
LYMPHOCYTES # BLD AUTO: 44.1 % — HIGH (ref 13–44)
MCHC RBC-ENTMCNC: 33.7 PG — SIGNIFICANT CHANGE UP (ref 27–34)
MCHC RBC-ENTMCNC: 35.4 G/DL — SIGNIFICANT CHANGE UP (ref 32–36)
MCV RBC AUTO: 95.2 FL — SIGNIFICANT CHANGE UP (ref 80–100)
MONOCYTES # BLD AUTO: 0.7 K/UL — SIGNIFICANT CHANGE UP (ref 0–0.9)
MONOCYTES NFR BLD AUTO: 16.6 % — HIGH (ref 2–14)
NEUTROPHILS # BLD AUTO: 1.1 K/UL — LOW (ref 1.8–7.4)
NEUTROPHILS NFR BLD AUTO: 25 % — LOW (ref 43–77)
PLATELET # BLD AUTO: 154 K/UL — SIGNIFICANT CHANGE UP (ref 150–400)
RBC # BLD: 3.3 M/UL — LOW (ref 3.8–5.2)
RBC # FLD: 16.4 % — HIGH (ref 10.3–14.5)
WBC # BLD: 4.5 K/UL — SIGNIFICANT CHANGE UP (ref 3.8–10.5)
WBC # FLD AUTO: 4.5 K/UL — SIGNIFICANT CHANGE UP (ref 3.8–10.5)

## 2018-04-16 ENCOUNTER — RESULT REVIEW (OUTPATIENT)
Age: 78
End: 2018-04-16

## 2018-04-16 DIAGNOSIS — Z51.11 ENCOUNTER FOR ANTINEOPLASTIC CHEMOTHERAPY: ICD-10-CM

## 2018-04-16 DIAGNOSIS — R11.2 NAUSEA WITH VOMITING, UNSPECIFIED: ICD-10-CM

## 2018-04-19 ENCOUNTER — FORM ENCOUNTER (OUTPATIENT)
Age: 78
End: 2018-04-19

## 2018-04-19 ENCOUNTER — APPOINTMENT (OUTPATIENT)
Dept: HEMATOLOGY ONCOLOGY | Facility: CLINIC | Age: 78
End: 2018-04-19

## 2018-04-20 ENCOUNTER — APPOINTMENT (OUTPATIENT)
Dept: CT IMAGING | Facility: IMAGING CENTER | Age: 78
End: 2018-04-20
Payer: MEDICARE

## 2018-04-20 ENCOUNTER — OUTPATIENT (OUTPATIENT)
Dept: OUTPATIENT SERVICES | Facility: HOSPITAL | Age: 78
LOS: 1 days | End: 2018-04-20
Payer: MEDICARE

## 2018-04-20 DIAGNOSIS — H26.9 UNSPECIFIED CATARACT: Chronic | ICD-10-CM

## 2018-04-20 DIAGNOSIS — M12.9 ARTHROPATHY, UNSPECIFIED: Chronic | ICD-10-CM

## 2018-04-20 DIAGNOSIS — Z90.13 ACQUIRED ABSENCE OF BILATERAL BREASTS AND NIPPLES: Chronic | ICD-10-CM

## 2018-04-20 DIAGNOSIS — C83.32 DIFFUSE LARGE B-CELL LYMPHOMA, INTRATHORACIC LYMPH NODES: ICD-10-CM

## 2018-04-20 DIAGNOSIS — Z90.89 ACQUIRED ABSENCE OF OTHER ORGANS: Chronic | ICD-10-CM

## 2018-04-20 PROCEDURE — 71260 CT THORAX DX C+: CPT

## 2018-04-20 PROCEDURE — 71260 CT THORAX DX C+: CPT | Mod: 26

## 2018-04-20 RX ORDER — PREDNISONE 20 MG/1
20 TABLET ORAL
Qty: 14 | Refills: 0 | Status: COMPLETED | COMMUNITY
Start: 2018-01-19 | End: 2018-04-20

## 2018-04-20 RX ORDER — OLMESARTAN MEDOXOMIL 20 MG/1
20 TABLET, FILM COATED ORAL DAILY
Qty: 30 | Refills: 2 | Status: DISCONTINUED | COMMUNITY
Start: 2018-01-05 | End: 2018-04-20

## 2018-04-20 RX ORDER — DIPHENHYDRAMINE HYDROCHLORIDE AND LIDOCAINE HYDROCHLORIDE AND ALUMINUM HYDROXIDE AND MAGNESIUM HYDRO
KIT
Qty: 500 | Refills: 2 | Status: DISCONTINUED | COMMUNITY
Start: 2018-03-16 | End: 2018-04-20

## 2018-04-20 RX ORDER — SULFAMETHOXAZOLE AND TRIMETHOPRIM 800; 160 MG/1; MG/1
800-160 TABLET ORAL
Qty: 36 | Refills: 4 | Status: DISCONTINUED | COMMUNITY
Start: 2018-01-26 | End: 2018-04-20

## 2018-04-20 RX ORDER — SUCRALFATE 1 G/10ML
1 SUSPENSION ORAL 3 TIMES DAILY
Qty: 450 | Refills: 1 | Status: DISCONTINUED | COMMUNITY
Start: 2018-02-23 | End: 2018-04-20

## 2018-04-20 RX ORDER — METOPROLOL SUCCINATE 50 MG/1
50 TABLET, EXTENDED RELEASE ORAL
Qty: 7 | Refills: 0 | Status: DISCONTINUED | COMMUNITY
Start: 2018-01-05 | End: 2018-04-20

## 2018-04-20 RX ORDER — LENALIDOMIDE 15 MG/1
15 CAPSULE ORAL
Qty: 21 | Refills: 0 | Status: DISCONTINUED | COMMUNITY
Start: 2018-01-16 | End: 2018-04-20

## 2018-04-20 RX ORDER — LEVOFLOXACIN 500 MG/1
500 TABLET, FILM COATED ORAL
Qty: 7 | Refills: 0 | Status: DISCONTINUED | COMMUNITY
Start: 2018-02-09 | End: 2018-04-20

## 2018-04-20 RX ORDER — HYDROCODONE BITARTRATE AND HOMATROPINE METHYLBROMIDE 5; 1.5 MG/5ML; MG/5ML
5-1.5 SYRUP ORAL
Qty: 420 | Refills: 0 | Status: COMPLETED | COMMUNITY
Start: 2018-01-26 | End: 2018-04-20

## 2018-04-20 RX ORDER — PREDNISONE 20 MG/1
20 TABLET ORAL DAILY
Qty: 28 | Refills: 0 | Status: DISCONTINUED | COMMUNITY
Start: 2018-01-19 | End: 2018-04-20

## 2018-04-24 ENCOUNTER — RX RENEWAL (OUTPATIENT)
Age: 78
End: 2018-04-24

## 2018-04-25 ENCOUNTER — OTHER (OUTPATIENT)
Age: 78
End: 2018-04-25

## 2018-05-08 ENCOUNTER — OUTPATIENT (OUTPATIENT)
Dept: OUTPATIENT SERVICES | Facility: HOSPITAL | Age: 78
LOS: 1 days | Discharge: HOME | End: 2018-05-08

## 2018-05-08 DIAGNOSIS — Z90.13 ACQUIRED ABSENCE OF BILATERAL BREASTS AND NIPPLES: Chronic | ICD-10-CM

## 2018-05-08 DIAGNOSIS — Z90.89 ACQUIRED ABSENCE OF OTHER ORGANS: Chronic | ICD-10-CM

## 2018-05-08 DIAGNOSIS — C82.97 FOLLICULAR LYMPHOMA, UNSPECIFIED, SPLEEN: ICD-10-CM

## 2018-05-08 DIAGNOSIS — M12.9 ARTHROPATHY, UNSPECIFIED: Chronic | ICD-10-CM

## 2018-05-08 DIAGNOSIS — H26.9 UNSPECIFIED CATARACT: Chronic | ICD-10-CM

## 2018-05-09 ENCOUNTER — FORM ENCOUNTER (OUTPATIENT)
Age: 78
End: 2018-05-09

## 2018-05-10 ENCOUNTER — APPOINTMENT (OUTPATIENT)
Dept: HEMATOLOGY ONCOLOGY | Facility: CLINIC | Age: 78
End: 2018-05-10
Payer: MEDICARE

## 2018-05-10 ENCOUNTER — RESULT REVIEW (OUTPATIENT)
Age: 78
End: 2018-05-10

## 2018-05-10 ENCOUNTER — OUTPATIENT (OUTPATIENT)
Dept: OUTPATIENT SERVICES | Facility: HOSPITAL | Age: 78
LOS: 1 days | End: 2018-05-10
Payer: MEDICARE

## 2018-05-10 ENCOUNTER — APPOINTMENT (OUTPATIENT)
Dept: CT IMAGING | Facility: IMAGING CENTER | Age: 78
End: 2018-05-10
Payer: MEDICARE

## 2018-05-10 VITALS
BODY MASS INDEX: 20.84 KG/M2 | HEART RATE: 97 BPM | OXYGEN SATURATION: 97 % | RESPIRATION RATE: 16 BRPM | WEIGHT: 113.96 LBS | SYSTOLIC BLOOD PRESSURE: 149 MMHG | DIASTOLIC BLOOD PRESSURE: 95 MMHG | TEMPERATURE: 97.5 F

## 2018-05-10 DIAGNOSIS — M12.9 ARTHROPATHY, UNSPECIFIED: Chronic | ICD-10-CM

## 2018-05-10 DIAGNOSIS — H26.9 UNSPECIFIED CATARACT: Chronic | ICD-10-CM

## 2018-05-10 DIAGNOSIS — Z90.89 ACQUIRED ABSENCE OF OTHER ORGANS: Chronic | ICD-10-CM

## 2018-05-10 DIAGNOSIS — Z90.13 ACQUIRED ABSENCE OF BILATERAL BREASTS AND NIPPLES: Chronic | ICD-10-CM

## 2018-05-10 DIAGNOSIS — I48.91 UNSPECIFIED ATRIAL FIBRILLATION: ICD-10-CM

## 2018-05-10 LAB
ALBUMIN SERPL ELPH-MCNC: 3.8 G/DL
ALP BLD-CCNC: 88 U/L
ALT SERPL-CCNC: 46 U/L
ANION GAP SERPL CALC-SCNC: 12 MMOL/L
ANISOCYTOSIS BLD QL: SLIGHT — SIGNIFICANT CHANGE UP
AST SERPL-CCNC: 40 U/L
BASOPHILS # BLD AUTO: 0.1 K/UL — SIGNIFICANT CHANGE UP (ref 0–0.2)
BASOPHILS NFR BLD AUTO: 2 % — SIGNIFICANT CHANGE UP (ref 0–2)
BILIRUB SERPL-MCNC: 1.4 MG/DL
BUN SERPL-MCNC: 8 MG/DL
CALCIUM SERPL-MCNC: 8.9 MG/DL
CHLORIDE SERPL-SCNC: 99 MMOL/L
CO2 SERPL-SCNC: 25 MMOL/L
CREAT SERPL-MCNC: 0.54 MG/DL
EOSINOPHIL # BLD AUTO: 0.1 K/UL — SIGNIFICANT CHANGE UP (ref 0–0.5)
EOSINOPHIL NFR BLD AUTO: 3 % — SIGNIFICANT CHANGE UP (ref 0–6)
GLUCOSE SERPL-MCNC: 90 MG/DL
HCT VFR BLD CALC: 33.6 % — LOW (ref 34.5–45)
HGB BLD-MCNC: 11.7 G/DL — SIGNIFICANT CHANGE UP (ref 11.5–15.5)
LDH SERPL-CCNC: 226 U/L
LG PLATELETS BLD QL AUTO: SLIGHT — SIGNIFICANT CHANGE UP
LYMPHOCYTES # BLD AUTO: 1.9 K/UL — SIGNIFICANT CHANGE UP (ref 1–3.3)
LYMPHOCYTES # BLD AUTO: 51 % — HIGH (ref 13–44)
MACROCYTES BLD QL: SLIGHT — SIGNIFICANT CHANGE UP
MCHC RBC-ENTMCNC: 33.4 PG — SIGNIFICANT CHANGE UP (ref 27–34)
MCHC RBC-ENTMCNC: 34.9 G/DL — SIGNIFICANT CHANGE UP (ref 32–36)
MCV RBC AUTO: 95.8 FL — SIGNIFICANT CHANGE UP (ref 80–100)
MICROCYTES BLD QL: SLIGHT — SIGNIFICANT CHANGE UP
MONOCYTES # BLD AUTO: 0.9 K/UL — SIGNIFICANT CHANGE UP (ref 0–0.9)
MONOCYTES NFR BLD AUTO: 17 % — HIGH (ref 2–14)
NEUTROPHILS # BLD AUTO: 1.8 K/UL — SIGNIFICANT CHANGE UP (ref 1.8–7.4)
NEUTROPHILS NFR BLD AUTO: 27 % — LOW (ref 43–77)
PLAT MORPH BLD: NORMAL — SIGNIFICANT CHANGE UP
PLATELET # BLD AUTO: 109 K/UL — LOW (ref 150–400)
POIKILOCYTOSIS BLD QL AUTO: SLIGHT — SIGNIFICANT CHANGE UP
POTASSIUM SERPL-SCNC: 4.1 MMOL/L
PROT SERPL-MCNC: 5.2 G/DL
RBC # BLD: 3.51 M/UL — LOW (ref 3.8–5.2)
RBC # FLD: 15.7 % — HIGH (ref 10.3–14.5)
RBC BLD AUTO: SIGNIFICANT CHANGE UP
SODIUM SERPL-SCNC: 136 MMOL/L
WBC # BLD: 4.7 K/UL — SIGNIFICANT CHANGE UP (ref 3.8–10.5)
WBC # FLD AUTO: 4.7 K/UL — SIGNIFICANT CHANGE UP (ref 3.8–10.5)

## 2018-05-10 PROCEDURE — 99214 OFFICE O/P EST MOD 30 MIN: CPT

## 2018-05-10 PROCEDURE — 71250 CT THORAX DX C-: CPT

## 2018-05-10 PROCEDURE — 71250 CT THORAX DX C-: CPT | Mod: 26

## 2018-05-10 RX ORDER — PREDNISONE 20 MG/1
20 TABLET ORAL
Qty: 14 | Refills: 0 | Status: DISCONTINUED | COMMUNITY
Start: 2018-04-20 | End: 2018-05-10

## 2018-05-14 ENCOUNTER — RESULT REVIEW (OUTPATIENT)
Age: 78
End: 2018-05-14

## 2018-05-14 ENCOUNTER — RX RENEWAL (OUTPATIENT)
Age: 78
End: 2018-05-14

## 2018-05-14 ENCOUNTER — APPOINTMENT (OUTPATIENT)
Dept: INFUSION THERAPY | Facility: HOSPITAL | Age: 78
End: 2018-05-14

## 2018-05-14 ENCOUNTER — LABORATORY RESULT (OUTPATIENT)
Age: 78
End: 2018-05-14

## 2018-05-14 LAB
BASOPHILS # BLD AUTO: 0.1 K/UL — SIGNIFICANT CHANGE UP (ref 0–0.2)
BASOPHILS NFR BLD AUTO: 1.2 % — SIGNIFICANT CHANGE UP (ref 0–2)
EOSINOPHIL # BLD AUTO: 0 K/UL — SIGNIFICANT CHANGE UP (ref 0–0.5)
EOSINOPHIL NFR BLD AUTO: 0 % — SIGNIFICANT CHANGE UP (ref 0–6)
HCT VFR BLD CALC: 34.6 % — SIGNIFICANT CHANGE UP (ref 34.5–45)
HGB BLD-MCNC: 12.2 G/DL — SIGNIFICANT CHANGE UP (ref 11.5–15.5)
LYMPHOCYTES # BLD AUTO: 2.2 K/UL — SIGNIFICANT CHANGE UP (ref 1–3.3)
LYMPHOCYTES # BLD AUTO: 48.2 % — HIGH (ref 13–44)
MCHC RBC-ENTMCNC: 33.5 PG — SIGNIFICANT CHANGE UP (ref 27–34)
MCHC RBC-ENTMCNC: 35.2 G/DL — SIGNIFICANT CHANGE UP (ref 32–36)
MCV RBC AUTO: 95.1 FL — SIGNIFICANT CHANGE UP (ref 80–100)
MONOCYTES # BLD AUTO: 0.6 K/UL — SIGNIFICANT CHANGE UP (ref 0–0.9)
MONOCYTES NFR BLD AUTO: 14.1 % — HIGH (ref 2–14)
NEUTROPHILS # BLD AUTO: 1.7 K/UL — LOW (ref 1.8–7.4)
NEUTROPHILS NFR BLD AUTO: 36.5 % — LOW (ref 43–77)
PLATELET # BLD AUTO: 127 K/UL — LOW (ref 150–400)
RBC # BLD: 3.64 M/UL — LOW (ref 3.8–5.2)
RBC # FLD: 15.3 % — HIGH (ref 10.3–14.5)
WBC # BLD: 4.6 K/UL — SIGNIFICANT CHANGE UP (ref 3.8–10.5)
WBC # FLD AUTO: 4.6 K/UL — SIGNIFICANT CHANGE UP (ref 3.8–10.5)

## 2018-05-14 RX ORDER — APIXABAN 5 MG/1
5 TABLET, FILM COATED ORAL
Qty: 60 | Refills: 5 | Status: ACTIVE | COMMUNITY
Start: 2018-05-14 | End: 1900-01-01

## 2018-05-15 DIAGNOSIS — Z51.11 ENCOUNTER FOR ANTINEOPLASTIC CHEMOTHERAPY: ICD-10-CM

## 2018-05-15 DIAGNOSIS — R11.2 NAUSEA WITH VOMITING, UNSPECIFIED: ICD-10-CM

## 2018-05-29 ENCOUNTER — RX RENEWAL (OUTPATIENT)
Age: 78
End: 2018-05-29

## 2018-06-07 ENCOUNTER — OUTPATIENT (OUTPATIENT)
Dept: OUTPATIENT SERVICES | Facility: HOSPITAL | Age: 78
LOS: 1 days | Discharge: HOME | End: 2018-06-07

## 2018-06-07 DIAGNOSIS — C82.97 FOLLICULAR LYMPHOMA, UNSPECIFIED, SPLEEN: ICD-10-CM

## 2018-06-07 DIAGNOSIS — M12.9 ARTHROPATHY, UNSPECIFIED: Chronic | ICD-10-CM

## 2018-06-07 DIAGNOSIS — H26.9 UNSPECIFIED CATARACT: Chronic | ICD-10-CM

## 2018-06-07 DIAGNOSIS — Z90.13 ACQUIRED ABSENCE OF BILATERAL BREASTS AND NIPPLES: Chronic | ICD-10-CM

## 2018-06-07 DIAGNOSIS — Z90.89 ACQUIRED ABSENCE OF OTHER ORGANS: Chronic | ICD-10-CM

## 2018-06-11 ENCOUNTER — LABORATORY RESULT (OUTPATIENT)
Age: 78
End: 2018-06-11

## 2018-06-11 ENCOUNTER — APPOINTMENT (OUTPATIENT)
Dept: INFUSION THERAPY | Facility: HOSPITAL | Age: 78
End: 2018-06-11

## 2018-06-11 ENCOUNTER — RESULT REVIEW (OUTPATIENT)
Age: 78
End: 2018-06-11

## 2018-06-11 ENCOUNTER — APPOINTMENT (OUTPATIENT)
Dept: HEMATOLOGY ONCOLOGY | Facility: CLINIC | Age: 78
End: 2018-06-11
Payer: MEDICARE

## 2018-06-11 LAB
BASOPHILS # BLD AUTO: 0.1 K/UL — SIGNIFICANT CHANGE UP (ref 0–0.2)
BASOPHILS NFR BLD AUTO: 1.6 % — SIGNIFICANT CHANGE UP (ref 0–2)
EOSINOPHIL # BLD AUTO: 0.2 K/UL — SIGNIFICANT CHANGE UP (ref 0–0.5)
EOSINOPHIL NFR BLD AUTO: 4.2 % — SIGNIFICANT CHANGE UP (ref 0–6)
HCT VFR BLD CALC: 37.4 % — SIGNIFICANT CHANGE UP (ref 34.5–45)
HGB BLD-MCNC: 12.7 G/DL — SIGNIFICANT CHANGE UP (ref 11.5–15.5)
LYMPHOCYTES # BLD AUTO: 1.9 K/UL — SIGNIFICANT CHANGE UP (ref 1–3.3)
LYMPHOCYTES # BLD AUTO: 40.7 % — SIGNIFICANT CHANGE UP (ref 13–44)
MCHC RBC-ENTMCNC: 31.3 PG — SIGNIFICANT CHANGE UP (ref 27–34)
MCHC RBC-ENTMCNC: 33.8 G/DL — SIGNIFICANT CHANGE UP (ref 32–36)
MCV RBC AUTO: 92.6 FL — SIGNIFICANT CHANGE UP (ref 80–100)
MONOCYTES # BLD AUTO: 0.6 K/UL — SIGNIFICANT CHANGE UP (ref 0–0.9)
MONOCYTES NFR BLD AUTO: 13.6 % — SIGNIFICANT CHANGE UP (ref 2–14)
NEUTROPHILS # BLD AUTO: 1.9 K/UL — SIGNIFICANT CHANGE UP (ref 1.8–7.4)
NEUTROPHILS NFR BLD AUTO: 39.9 % — LOW (ref 43–77)
PLATELET # BLD AUTO: 127 K/UL — LOW (ref 150–400)
RBC # BLD: 4.04 M/UL — SIGNIFICANT CHANGE UP (ref 3.8–5.2)
RBC # FLD: 14.9 % — HIGH (ref 10.3–14.5)
WBC # BLD: 4.7 K/UL — SIGNIFICANT CHANGE UP (ref 3.8–10.5)
WBC # FLD AUTO: 4.7 K/UL — SIGNIFICANT CHANGE UP (ref 3.8–10.5)

## 2018-06-11 PROCEDURE — 99214 OFFICE O/P EST MOD 30 MIN: CPT

## 2018-06-12 DIAGNOSIS — C83.30 DIFFUSE LARGE B-CELL LYMPHOMA, UNSPECIFIED SITE: ICD-10-CM

## 2018-06-12 DIAGNOSIS — R11.2 NAUSEA WITH VOMITING, UNSPECIFIED: ICD-10-CM

## 2018-06-12 DIAGNOSIS — Z51.11 ENCOUNTER FOR ANTINEOPLASTIC CHEMOTHERAPY: ICD-10-CM

## 2018-06-15 NOTE — PROVIDER CONTACT NOTE (CRITICAL VALUE NOTIFICATION) - PERSON GIVING RESULT:
Patient presents to clinic today with a chief complaint of painful, inflamed toenails which the patient states hurt inside the shoes.  The patient reports painful nails that they can no longer self debride.  The pain is related as being a pressure type of pain occurring when the tops of the shoes rub on the toenails.  Pain is related as being mild to moderate, and is made worse with dressier shoes.  The nails have not responded to conservative treatment and the patient declines pharmacological or laser treatments. Nurse's notes were reviewed and accepted.    Physical Exam:  Reveals nails that are thickened, lytic, brittle, discolored with subungual debris present.  There is noted pain to palpation of the nails.  The nails are flaky and also exhibit an odor consistent with onychomycosis.  The nails are also dystrophic.  Nails affected are left 1, right 1.  Any remaining nails are observed to be elongated and non-dystrophic. The lateral borders of both hallux nails are also noted to be growing into the nail grooves and causing pain upon palpation.    Neurologic:  Patient is alert and oriented and in appropriate mood.  Proprioception appears intact.    Constitutional:  Patient's general appearance is normal, showing adequate nutrition and grooming.    Musculoskeletal:  No palpable soft tissue masses seen bilateral.  Muscle strength and tone appear within normal limits for patient's age and health status bilateral.    Peripheral Vascular Exam:  Reveals non-palpable dp pulses, non-palpable pt pulses, hair atrophy, thickened nails, dermatological pigmentary discoloration, edema bilateral ankles.    Diagnosis:  Dystrophic nails which are also mycotic with report of pain, generalized atherosclerosis indicated by the presence of peripheral vascular findings, and non-dystrophic nails.     Procedure:  The patient's feet were examined on today's date.  The patient was instructed about good foot health.  All the affected nails 
were debrided utilizing sharp and mechanical debridement, reducing the diseased nail tissue to a level that alleviated the patient's symptoms and is medically safe for the patient once verbal consent was obtained.  The remaining 8 non-dystrophic nails were also trimmed.  The patient was then instructed about his or her conditions and recommendations were made for these conditions.  The recommendations post nail debridement includes OTC topical medication, oral medication such as Lamisil, prescription topical medication, and mycotic nail laser.  The patient declined these.  The patient was instructed to return to clinic as needed.  Today's treatment was confirmed to be medically necessary.          Patient has had a right knee replacement done in September 2016.      Patient is also diabetic.  
Lab - Cora Solares
Hematology - Hammad

## 2018-06-29 ENCOUNTER — OUTPATIENT (OUTPATIENT)
Dept: OUTPATIENT SERVICES | Facility: HOSPITAL | Age: 78
LOS: 1 days | Discharge: HOME | End: 2018-06-29

## 2018-06-29 DIAGNOSIS — C82.97 FOLLICULAR LYMPHOMA, UNSPECIFIED, SPLEEN: ICD-10-CM

## 2018-06-29 DIAGNOSIS — Z90.89 ACQUIRED ABSENCE OF OTHER ORGANS: Chronic | ICD-10-CM

## 2018-06-29 DIAGNOSIS — H26.9 UNSPECIFIED CATARACT: Chronic | ICD-10-CM

## 2018-06-29 DIAGNOSIS — Z90.13 ACQUIRED ABSENCE OF BILATERAL BREASTS AND NIPPLES: Chronic | ICD-10-CM

## 2018-06-29 DIAGNOSIS — M12.9 ARTHROPATHY, UNSPECIFIED: Chronic | ICD-10-CM

## 2018-07-05 ENCOUNTER — MEDICATION RENEWAL (OUTPATIENT)
Age: 78
End: 2018-07-05

## 2018-07-09 ENCOUNTER — LABORATORY RESULT (OUTPATIENT)
Age: 78
End: 2018-07-09

## 2018-07-09 ENCOUNTER — APPOINTMENT (OUTPATIENT)
Dept: INFUSION THERAPY | Facility: HOSPITAL | Age: 78
End: 2018-07-09

## 2018-07-09 ENCOUNTER — RESULT REVIEW (OUTPATIENT)
Age: 78
End: 2018-07-09

## 2018-07-09 LAB
BASOPHILS # BLD AUTO: 0.1 K/UL — SIGNIFICANT CHANGE UP (ref 0–0.2)
BASOPHILS NFR BLD AUTO: 2.2 % — HIGH (ref 0–2)
EOSINOPHIL # BLD AUTO: 0.2 K/UL — SIGNIFICANT CHANGE UP (ref 0–0.5)
EOSINOPHIL NFR BLD AUTO: 6 % — SIGNIFICANT CHANGE UP (ref 0–6)
HCT VFR BLD CALC: 37.5 % — SIGNIFICANT CHANGE UP (ref 34.5–45)
HGB BLD-MCNC: 12.8 G/DL — SIGNIFICANT CHANGE UP (ref 11.5–15.5)
LYMPHOCYTES # BLD AUTO: 1.6 K/UL — SIGNIFICANT CHANGE UP (ref 1–3.3)
LYMPHOCYTES # BLD AUTO: 49 % — HIGH (ref 13–44)
MCHC RBC-ENTMCNC: 31.2 PG — SIGNIFICANT CHANGE UP (ref 27–34)
MCHC RBC-ENTMCNC: 34.1 G/DL — SIGNIFICANT CHANGE UP (ref 32–36)
MCV RBC AUTO: 91.4 FL — SIGNIFICANT CHANGE UP (ref 80–100)
MONOCYTES # BLD AUTO: 0.4 K/UL — SIGNIFICANT CHANGE UP (ref 0–0.9)
MONOCYTES NFR BLD AUTO: 12.7 % — SIGNIFICANT CHANGE UP (ref 2–14)
NEUTROPHILS # BLD AUTO: 1 K/UL — LOW (ref 1.8–7.4)
NEUTROPHILS NFR BLD AUTO: 30.1 % — LOW (ref 43–77)
PLATELET # BLD AUTO: 131 K/UL — LOW (ref 150–400)
RBC # BLD: 4.1 M/UL — SIGNIFICANT CHANGE UP (ref 3.8–5.2)
RBC # FLD: 15.1 % — HIGH (ref 10.3–14.5)
WBC # BLD: 3.2 K/UL — LOW (ref 3.8–10.5)
WBC # FLD AUTO: 3.2 K/UL — LOW (ref 3.8–10.5)

## 2018-07-10 DIAGNOSIS — R11.2 NAUSEA WITH VOMITING, UNSPECIFIED: ICD-10-CM

## 2018-07-10 DIAGNOSIS — Z51.11 ENCOUNTER FOR ANTINEOPLASTIC CHEMOTHERAPY: ICD-10-CM

## 2018-07-16 PROBLEM — R91.8 OTHER NONSPECIFIC ABNORMAL FINDING OF LUNG FIELD: Chronic | Status: ACTIVE | Noted: 2017-12-06

## 2018-07-16 PROBLEM — C85.90 NON-HODGKIN LYMPHOMA, UNSPECIFIED, UNSPECIFIED SITE: Chronic | Status: ACTIVE | Noted: 2017-06-11

## 2018-07-16 PROBLEM — D05.12 INTRADUCTAL CARCINOMA IN SITU OF LEFT BREAST: Chronic | Status: ACTIVE | Noted: 2017-06-11

## 2018-07-18 ENCOUNTER — RX RENEWAL (OUTPATIENT)
Age: 78
End: 2018-07-18

## 2018-07-24 PROBLEM — K21.9 GASTRO-ESOPHAGEAL REFLUX DISEASE WITHOUT ESOPHAGITIS: Chronic | Status: ACTIVE | Noted: 2017-12-06

## 2018-07-24 PROBLEM — D69.6 THROMBOCYTOPENIA, UNSPECIFIED: Chronic | Status: ACTIVE | Noted: 2017-12-06

## 2018-07-24 PROBLEM — J98.8 OTHER SPECIFIED RESPIRATORY DISORDERS: Chronic | Status: ACTIVE | Noted: 2017-12-06

## 2018-07-24 PROBLEM — R01.1 CARDIAC MURMUR, UNSPECIFIED: Chronic | Status: ACTIVE | Noted: 2017-12-06

## 2018-07-24 PROBLEM — L65.9 NONSCARRING HAIR LOSS, UNSPECIFIED: Chronic | Status: ACTIVE | Noted: 2017-12-06

## 2018-07-24 PROBLEM — I83.90 ASYMPTOMATIC VARICOSE VEINS OF UNSPECIFIED LOWER EXTREMITY: Chronic | Status: ACTIVE | Noted: 2017-12-06

## 2018-07-24 PROBLEM — R59.1 GENERALIZED ENLARGED LYMPH NODES: Chronic | Status: ACTIVE | Noted: 2017-12-06

## 2018-07-25 ENCOUNTER — OUTPATIENT (OUTPATIENT)
Dept: OUTPATIENT SERVICES | Facility: HOSPITAL | Age: 78
LOS: 1 days | Discharge: HOME | End: 2018-07-25

## 2018-07-25 DIAGNOSIS — Z90.13 ACQUIRED ABSENCE OF BILATERAL BREASTS AND NIPPLES: Chronic | ICD-10-CM

## 2018-07-25 DIAGNOSIS — Z90.89 ACQUIRED ABSENCE OF OTHER ORGANS: Chronic | ICD-10-CM

## 2018-07-25 DIAGNOSIS — M12.9 ARTHROPATHY, UNSPECIFIED: Chronic | ICD-10-CM

## 2018-07-25 DIAGNOSIS — H26.9 UNSPECIFIED CATARACT: Chronic | ICD-10-CM

## 2018-07-25 DIAGNOSIS — C82.97 FOLLICULAR LYMPHOMA, UNSPECIFIED, SPLEEN: ICD-10-CM

## 2018-07-31 ENCOUNTER — RESULT REVIEW (OUTPATIENT)
Age: 78
End: 2018-07-31

## 2018-07-31 ENCOUNTER — APPOINTMENT (OUTPATIENT)
Dept: HEMATOLOGY ONCOLOGY | Facility: CLINIC | Age: 78
End: 2018-07-31
Payer: MEDICARE

## 2018-07-31 VITALS
WEIGHT: 110.34 LBS | SYSTOLIC BLOOD PRESSURE: 165 MMHG | BODY MASS INDEX: 20.18 KG/M2 | HEART RATE: 87 BPM | DIASTOLIC BLOOD PRESSURE: 84 MMHG | RESPIRATION RATE: 16 BRPM | TEMPERATURE: 97.7 F | OXYGEN SATURATION: 96 %

## 2018-07-31 LAB
BASOPHILS # BLD AUTO: 0 K/UL — SIGNIFICANT CHANGE UP (ref 0–0.2)
BASOPHILS NFR BLD AUTO: 3 % — HIGH (ref 0–2)
EOSINOPHIL # BLD AUTO: 0.1 K/UL — SIGNIFICANT CHANGE UP (ref 0–0.5)
EOSINOPHIL NFR BLD AUTO: 2 % — SIGNIFICANT CHANGE UP (ref 0–6)
HCT VFR BLD CALC: 34.5 % — SIGNIFICANT CHANGE UP (ref 34.5–45)
HGB BLD-MCNC: 11.6 G/DL — SIGNIFICANT CHANGE UP (ref 11.5–15.5)
LYMPHOCYTES # BLD AUTO: 1.4 K/UL — SIGNIFICANT CHANGE UP (ref 1–3.3)
LYMPHOCYTES # BLD AUTO: 47 % — HIGH (ref 13–44)
MCHC RBC-ENTMCNC: 30.5 PG — SIGNIFICANT CHANGE UP (ref 27–34)
MCHC RBC-ENTMCNC: 33.5 G/DL — SIGNIFICANT CHANGE UP (ref 32–36)
MCV RBC AUTO: 90.9 FL — SIGNIFICANT CHANGE UP (ref 80–100)
MONOCYTES # BLD AUTO: 0.4 K/UL — SIGNIFICANT CHANGE UP (ref 0–0.9)
MONOCYTES NFR BLD AUTO: 14 % — SIGNIFICANT CHANGE UP (ref 2–14)
NEUTROPHILS # BLD AUTO: 0.6 K/UL — LOW (ref 1.8–7.4)
NEUTROPHILS NFR BLD AUTO: 34 % — LOW (ref 43–77)
PLAT MORPH BLD: NORMAL — SIGNIFICANT CHANGE UP
PLATELET # BLD AUTO: 98 K/UL — LOW (ref 150–400)
RBC # BLD: 3.79 M/UL — LOW (ref 3.8–5.2)
RBC # FLD: 14.9 % — HIGH (ref 10.3–14.5)
RBC BLD AUTO: SIGNIFICANT CHANGE UP
WBC # BLD: 2.5 K/UL — LOW (ref 3.8–10.5)
WBC # FLD AUTO: 2.5 K/UL — LOW (ref 3.8–10.5)

## 2018-07-31 PROCEDURE — 99214 OFFICE O/P EST MOD 30 MIN: CPT

## 2018-08-06 ENCOUNTER — RESULT REVIEW (OUTPATIENT)
Age: 78
End: 2018-08-06

## 2018-08-06 ENCOUNTER — APPOINTMENT (OUTPATIENT)
Dept: HEMATOLOGY ONCOLOGY | Facility: CLINIC | Age: 78
End: 2018-08-06

## 2018-08-06 ENCOUNTER — APPOINTMENT (OUTPATIENT)
Dept: INFUSION THERAPY | Facility: HOSPITAL | Age: 78
End: 2018-08-06

## 2018-08-06 LAB
ALBUMIN SERPL ELPH-MCNC: 4.1 G/DL
ALP BLD-CCNC: 79 U/L
ALT SERPL-CCNC: 14 U/L
ANION GAP SERPL CALC-SCNC: 12 MMOL/L
AST SERPL-CCNC: 18 U/L
BASOPHILS # BLD AUTO: 0.1 K/UL — SIGNIFICANT CHANGE UP (ref 0–0.2)
BASOPHILS NFR BLD AUTO: 3 % — HIGH (ref 0–2)
BILIRUB SERPL-MCNC: 1.7 MG/DL
BUN SERPL-MCNC: 11 MG/DL
CALCIUM SERPL-MCNC: 9 MG/DL
CHLORIDE SERPL-SCNC: 100 MMOL/L
CO2 SERPL-SCNC: 26 MMOL/L
CREAT SERPL-MCNC: 0.55 MG/DL
DEPRECATED KAPPA LC FREE/LAMBDA SER: 1.24 RATIO
EOSINOPHIL # BLD AUTO: 0 K/UL — SIGNIFICANT CHANGE UP (ref 0–0.5)
EOSINOPHIL NFR BLD AUTO: 3 % — SIGNIFICANT CHANGE UP (ref 0–6)
GLUCOSE SERPL-MCNC: 95 MG/DL
HCT VFR BLD CALC: 35.2 % — SIGNIFICANT CHANGE UP (ref 34.5–45)
HGB BLD-MCNC: 12 G/DL — SIGNIFICANT CHANGE UP (ref 11.5–15.5)
IGA SER QL IEP: 50 MG/DL
IGG SER QL IEP: 377 MG/DL
IGM SER QL IEP: 13 MG/DL
KAPPA LC CSF-MCNC: 1.1 MG/DL
KAPPA LC SERPL-MCNC: 1.36 MG/DL
LDH SERPL-CCNC: 194 U/L
LYMPHOCYTES # BLD AUTO: 1.4 K/UL — SIGNIFICANT CHANGE UP (ref 1–3.3)
LYMPHOCYTES # BLD AUTO: 51 % — HIGH (ref 13–44)
MCHC RBC-ENTMCNC: 30.9 PG — SIGNIFICANT CHANGE UP (ref 27–34)
MCHC RBC-ENTMCNC: 34 G/DL — SIGNIFICANT CHANGE UP (ref 32–36)
MCV RBC AUTO: 90.9 FL — SIGNIFICANT CHANGE UP (ref 80–100)
MONOCYTES # BLD AUTO: 0.6 K/UL — SIGNIFICANT CHANGE UP (ref 0–0.9)
MONOCYTES NFR BLD AUTO: 17 % — HIGH (ref 2–14)
NEUTROPHILS # BLD AUTO: 0.5 K/UL — LOW (ref 1.8–7.4)
NEUTROPHILS NFR BLD AUTO: 26 % — LOW (ref 43–77)
PLAT MORPH BLD: NORMAL — SIGNIFICANT CHANGE UP
PLATELET # BLD AUTO: 151 K/UL — SIGNIFICANT CHANGE UP (ref 150–400)
POTASSIUM SERPL-SCNC: 4.3 MMOL/L
PROT SERPL-MCNC: 5.3 G/DL
RBC # BLD: 3.87 M/UL — SIGNIFICANT CHANGE UP (ref 3.8–5.2)
RBC # FLD: 15.8 % — HIGH (ref 10.3–14.5)
RBC BLD AUTO: SIGNIFICANT CHANGE UP
SODIUM SERPL-SCNC: 138 MMOL/L
WBC # BLD: 2.6 K/UL — LOW (ref 3.8–10.5)
WBC # FLD AUTO: 2.6 K/UL — LOW (ref 3.8–10.5)

## 2018-08-07 DIAGNOSIS — Z51.89 ENCOUNTER FOR OTHER SPECIFIED AFTERCARE: ICD-10-CM

## 2018-08-13 ENCOUNTER — RESULT REVIEW (OUTPATIENT)
Age: 78
End: 2018-08-13

## 2018-08-13 ENCOUNTER — APPOINTMENT (OUTPATIENT)
Dept: HEMATOLOGY ONCOLOGY | Facility: CLINIC | Age: 78
End: 2018-08-13

## 2018-08-13 LAB
BASOPHILS # BLD AUTO: 0.1 K/UL — SIGNIFICANT CHANGE UP (ref 0–0.2)
BASOPHILS NFR BLD AUTO: 2 % — SIGNIFICANT CHANGE UP (ref 0–2)
EOSINOPHIL # BLD AUTO: 0 K/UL — SIGNIFICANT CHANGE UP (ref 0–0.5)
EOSINOPHIL NFR BLD AUTO: 1 % — SIGNIFICANT CHANGE UP (ref 0–6)
HCT VFR BLD CALC: 39 % — SIGNIFICANT CHANGE UP (ref 34.5–45)
HGB BLD-MCNC: 13.1 G/DL — SIGNIFICANT CHANGE UP (ref 11.5–15.5)
LYMPHOCYTES # BLD AUTO: 1.8 K/UL — SIGNIFICANT CHANGE UP (ref 1–3.3)
LYMPHOCYTES # BLD AUTO: 30 % — SIGNIFICANT CHANGE UP (ref 13–44)
MCHC RBC-ENTMCNC: 30.7 PG — SIGNIFICANT CHANGE UP (ref 27–34)
MCHC RBC-ENTMCNC: 33.7 G/DL — SIGNIFICANT CHANGE UP (ref 32–36)
MCV RBC AUTO: 91.2 FL — SIGNIFICANT CHANGE UP (ref 80–100)
MONOCYTES # BLD AUTO: 1 K/UL — HIGH (ref 0–0.9)
MONOCYTES NFR BLD AUTO: 19 % — HIGH (ref 2–14)
NEUTROPHILS # BLD AUTO: 2.7 K/UL — SIGNIFICANT CHANGE UP (ref 1.8–7.4)
NEUTROPHILS NFR BLD AUTO: 43 % — SIGNIFICANT CHANGE UP (ref 43–77)
NEUTS BAND # BLD: 5 % — SIGNIFICANT CHANGE UP (ref 0–8)
PLAT MORPH BLD: NORMAL — SIGNIFICANT CHANGE UP
PLATELET # BLD AUTO: 149 K/UL — LOW (ref 150–400)
RBC # BLD: 4.27 M/UL — SIGNIFICANT CHANGE UP (ref 3.8–5.2)
RBC # FLD: 15.9 % — HIGH (ref 10.3–14.5)
RBC BLD AUTO: NORMAL — SIGNIFICANT CHANGE UP
WBC # BLD: 5.6 K/UL — SIGNIFICANT CHANGE UP (ref 3.8–10.5)
WBC # FLD AUTO: 5.6 K/UL — SIGNIFICANT CHANGE UP (ref 3.8–10.5)

## 2018-08-15 ENCOUNTER — MEDICATION RENEWAL (OUTPATIENT)
Age: 78
End: 2018-08-15

## 2018-08-15 RX ORDER — OLMESARTAN MEDOXOMIL 20 MG/1
20 TABLET, FILM COATED ORAL
Qty: 30 | Refills: 2 | Status: ACTIVE | COMMUNITY
Start: 2018-04-16 | End: 1900-01-01

## 2018-08-30 ENCOUNTER — OUTPATIENT (OUTPATIENT)
Dept: OUTPATIENT SERVICES | Facility: HOSPITAL | Age: 78
LOS: 1 days | Discharge: HOME | End: 2018-08-30

## 2018-08-30 DIAGNOSIS — Z90.89 ACQUIRED ABSENCE OF OTHER ORGANS: Chronic | ICD-10-CM

## 2018-08-30 DIAGNOSIS — C82.97 FOLLICULAR LYMPHOMA, UNSPECIFIED, SPLEEN: ICD-10-CM

## 2018-08-30 DIAGNOSIS — H26.9 UNSPECIFIED CATARACT: Chronic | ICD-10-CM

## 2018-08-30 DIAGNOSIS — M12.9 ARTHROPATHY, UNSPECIFIED: Chronic | ICD-10-CM

## 2018-08-30 DIAGNOSIS — Z90.13 ACQUIRED ABSENCE OF BILATERAL BREASTS AND NIPPLES: Chronic | ICD-10-CM

## 2018-09-07 ENCOUNTER — RX RENEWAL (OUTPATIENT)
Age: 78
End: 2018-09-07

## 2018-09-07 RX ORDER — APIXABAN 5 MG/1
5 TABLET, FILM COATED ORAL
Qty: 60 | Refills: 5 | Status: DISCONTINUED | COMMUNITY
Start: 2017-05-15 | End: 2018-09-07

## 2018-09-07 RX ORDER — MONTELUKAST 10 MG/1
10 TABLET, FILM COATED ORAL
Qty: 16 | Refills: 3 | Status: ACTIVE | COMMUNITY
Start: 2018-09-07 | End: 1900-01-01

## 2018-09-07 RX ORDER — LENALIDOMIDE 15 MG/1
15 CAPSULE ORAL
Qty: 21 | Refills: 0 | Status: COMPLETED | COMMUNITY
Start: 2018-05-29 | End: 2018-09-07

## 2018-09-07 RX ORDER — PREDNISONE 10 MG/1
10 TABLET ORAL DAILY
Qty: 30 | Refills: 1 | Status: COMPLETED | COMMUNITY
Start: 2018-07-31 | End: 2018-09-07

## 2018-09-07 RX ORDER — MONTELUKAST 10 MG/1
10 TABLET, FILM COATED ORAL
Qty: 18 | Refills: 0 | Status: COMPLETED | COMMUNITY
Start: 2018-01-12 | End: 2018-09-07

## 2018-09-07 RX ORDER — LENALIDOMIDE 15 MG/1
15 CAPSULE ORAL
Qty: 21 | Refills: 0 | Status: COMPLETED | COMMUNITY
Start: 2018-04-24 | End: 2018-09-07

## 2018-09-07 RX ORDER — LENALIDOMIDE 15 MG/1
15 CAPSULE ORAL
Qty: 21 | Refills: 0 | Status: COMPLETED | COMMUNITY
Start: 2018-02-27 | End: 2018-09-07

## 2018-09-07 RX ORDER — AMOXICILLIN AND CLAVULANATE POTASSIUM 500; 125 MG/1; MG/1
500-125 TABLET, FILM COATED ORAL
Qty: 14 | Refills: 2 | Status: COMPLETED | COMMUNITY
Start: 2018-08-06 | End: 2018-09-07

## 2018-09-10 ENCOUNTER — RESULT REVIEW (OUTPATIENT)
Age: 78
End: 2018-09-10

## 2018-09-10 ENCOUNTER — APPOINTMENT (OUTPATIENT)
Dept: INFUSION THERAPY | Facility: HOSPITAL | Age: 78
End: 2018-09-10

## 2018-09-10 ENCOUNTER — LABORATORY RESULT (OUTPATIENT)
Age: 78
End: 2018-09-10

## 2018-09-10 LAB
ANISOCYTOSIS BLD QL: SLIGHT — SIGNIFICANT CHANGE UP
BASOPHILS # BLD AUTO: 0 K/UL — SIGNIFICANT CHANGE UP (ref 0–0.2)
BASOPHILS NFR BLD AUTO: 4 % — HIGH (ref 0–2)
EOSINOPHIL # BLD AUTO: 0 K/UL — SIGNIFICANT CHANGE UP (ref 0–0.5)
HCT VFR BLD CALC: 33.9 % — LOW (ref 34.5–45)
HGB BLD-MCNC: 11.7 G/DL — SIGNIFICANT CHANGE UP (ref 11.5–15.5)
LYMPHOCYTES # BLD AUTO: 1.3 K/UL — SIGNIFICANT CHANGE UP (ref 1–3.3)
LYMPHOCYTES # BLD AUTO: 25 % — SIGNIFICANT CHANGE UP (ref 13–44)
MACROCYTES BLD QL: SLIGHT — SIGNIFICANT CHANGE UP
MCHC RBC-ENTMCNC: 30.9 PG — SIGNIFICANT CHANGE UP (ref 27–34)
MCHC RBC-ENTMCNC: 34.4 G/DL — SIGNIFICANT CHANGE UP (ref 32–36)
MCV RBC AUTO: 89.8 FL — SIGNIFICANT CHANGE UP (ref 80–100)
MICROCYTES BLD QL: SLIGHT — SIGNIFICANT CHANGE UP
MONOCYTES # BLD AUTO: 1 K/UL — HIGH (ref 0–0.9)
MONOCYTES NFR BLD AUTO: 22 % — HIGH (ref 2–14)
NEUTROPHILS # BLD AUTO: 2.9 K/UL — SIGNIFICANT CHANGE UP (ref 1.8–7.4)
NEUTROPHILS NFR BLD AUTO: 49 % — SIGNIFICANT CHANGE UP (ref 43–77)
PLAT MORPH BLD: NORMAL — SIGNIFICANT CHANGE UP
PLATELET # BLD AUTO: 145 K/UL — LOW (ref 150–400)
POIKILOCYTOSIS BLD QL AUTO: SLIGHT — SIGNIFICANT CHANGE UP
POLYCHROMASIA BLD QL SMEAR: SLIGHT — SIGNIFICANT CHANGE UP
RBC # BLD: 3.78 M/UL — LOW (ref 3.8–5.2)
RBC # FLD: 15.3 % — HIGH (ref 10.3–14.5)
RBC BLD AUTO: SIGNIFICANT CHANGE UP
WBC # BLD: 5.3 K/UL — SIGNIFICANT CHANGE UP (ref 3.8–10.5)
WBC # FLD AUTO: 5.3 K/UL — SIGNIFICANT CHANGE UP (ref 3.8–10.5)

## 2018-09-11 DIAGNOSIS — Z51.11 ENCOUNTER FOR ANTINEOPLASTIC CHEMOTHERAPY: ICD-10-CM

## 2018-09-11 DIAGNOSIS — R11.2 NAUSEA WITH VOMITING, UNSPECIFIED: ICD-10-CM

## 2018-09-20 ENCOUNTER — RESULT REVIEW (OUTPATIENT)
Age: 78
End: 2018-09-20

## 2018-09-20 ENCOUNTER — APPOINTMENT (OUTPATIENT)
Dept: HEMATOLOGY ONCOLOGY | Facility: CLINIC | Age: 78
End: 2018-09-20
Payer: MEDICARE

## 2018-09-20 VITALS
HEART RATE: 82 BPM | BODY MASS INDEX: 19.48 KG/M2 | DIASTOLIC BLOOD PRESSURE: 107 MMHG | TEMPERATURE: 97.6 F | RESPIRATION RATE: 17 BRPM | WEIGHT: 106.48 LBS | SYSTOLIC BLOOD PRESSURE: 151 MMHG

## 2018-09-20 DIAGNOSIS — R06.02 SHORTNESS OF BREATH: ICD-10-CM

## 2018-09-20 LAB
BASOPHILS # BLD AUTO: 0 K/UL — SIGNIFICANT CHANGE UP (ref 0–0.2)
BASOPHILS NFR BLD AUTO: 0.4 % — SIGNIFICANT CHANGE UP (ref 0–2)
EOSINOPHIL # BLD AUTO: 0.2 K/UL — SIGNIFICANT CHANGE UP (ref 0–0.5)
EOSINOPHIL NFR BLD AUTO: 3.2 % — SIGNIFICANT CHANGE UP (ref 0–6)
HCT VFR BLD CALC: 35.1 % — SIGNIFICANT CHANGE UP (ref 34.5–45)
HGB BLD-MCNC: 11.5 G/DL — SIGNIFICANT CHANGE UP (ref 11.5–15.5)
LYMPHOCYTES # BLD AUTO: 1 K/UL — SIGNIFICANT CHANGE UP (ref 1–3.3)
LYMPHOCYTES # BLD AUTO: 20.1 % — SIGNIFICANT CHANGE UP (ref 13–44)
MCHC RBC-ENTMCNC: 29.9 PG — SIGNIFICANT CHANGE UP (ref 27–34)
MCHC RBC-ENTMCNC: 32.8 G/DL — SIGNIFICANT CHANGE UP (ref 32–36)
MCV RBC AUTO: 91.3 FL — SIGNIFICANT CHANGE UP (ref 80–100)
MONOCYTES # BLD AUTO: 0.8 K/UL — SIGNIFICANT CHANGE UP (ref 0–0.9)
MONOCYTES NFR BLD AUTO: 16.5 % — HIGH (ref 2–14)
NEUTROPHILS # BLD AUTO: 3 K/UL — SIGNIFICANT CHANGE UP (ref 1.8–7.4)
NEUTROPHILS NFR BLD AUTO: 59.9 % — SIGNIFICANT CHANGE UP (ref 43–77)
PLATELET # BLD AUTO: 113 K/UL — LOW (ref 150–400)
RBC # BLD: 3.85 M/UL — SIGNIFICANT CHANGE UP (ref 3.8–5.2)
RBC # FLD: 15.4 % — HIGH (ref 10.3–14.5)
WBC # BLD: 5.1 K/UL — SIGNIFICANT CHANGE UP (ref 3.8–10.5)
WBC # FLD AUTO: 5.1 K/UL — SIGNIFICANT CHANGE UP (ref 3.8–10.5)

## 2018-09-20 PROCEDURE — 99214 OFFICE O/P EST MOD 30 MIN: CPT

## 2018-09-20 RX ORDER — METOPROLOL SUCCINATE 50 MG/1
50 TABLET, EXTENDED RELEASE ORAL DAILY
Qty: 90 | Refills: 2 | Status: COMPLETED | COMMUNITY
Start: 2018-04-16 | End: 2018-09-20

## 2018-09-20 RX ORDER — BACLOFEN 10 MG/1
10 TABLET ORAL
Qty: 7 | Refills: 1 | Status: COMPLETED | COMMUNITY
Start: 2018-05-10 | End: 2018-09-20

## 2018-09-20 RX ORDER — TRIAMCINOLONE ACETONIDE 1 MG/G
0.1 OINTMENT TOPICAL
Qty: 1 | Refills: 1 | Status: ACTIVE | COMMUNITY
Start: 2018-09-20 | End: 1900-01-01

## 2018-09-20 RX ORDER — HYDROCODONE BITARTRATE AND HOMATROPINE METHYLBROMIDE 5; 1.5 MG/5ML; MG/5ML
5-1.5 SYRUP ORAL
Qty: 240 | Refills: 0 | Status: COMPLETED | COMMUNITY
Start: 2018-02-09 | End: 2018-09-20

## 2018-09-20 RX ORDER — FUROSEMIDE 20 MG/1
20 TABLET ORAL
Qty: 3 | Refills: 0 | Status: DISCONTINUED | COMMUNITY
Start: 2018-05-10 | End: 2018-09-20

## 2018-09-24 LAB
ALBUMIN SERPL ELPH-MCNC: 4.1 G/DL
ALP BLD-CCNC: 85 U/L
ALT SERPL-CCNC: 14 U/L
ANION GAP SERPL CALC-SCNC: 12 MMOL/L
AST SERPL-CCNC: 20 U/L
B2 MICROGLOB SERPL-MCNC: 2.4 MG/L
BILIRUB SERPL-MCNC: 1.4 MG/DL
BUN SERPL-MCNC: 7 MG/DL
CALCIUM SERPL-MCNC: 9.2 MG/DL
CHLORIDE SERPL-SCNC: 99 MMOL/L
CO2 SERPL-SCNC: 25 MMOL/L
CREAT SERPL-MCNC: 0.54 MG/DL
DEPRECATED KAPPA LC FREE/LAMBDA SER: 1.41 RATIO
GLUCOSE SERPL-MCNC: 68 MG/DL
IGA SER QL IEP: 55 MG/DL
IGG SER QL IEP: 386 MG/DL
IGM SER QL IEP: <11 MG/DL
KAPPA LC CSF-MCNC: 1 MG/DL
KAPPA LC SERPL-MCNC: 1.41 MG/DL
LDH SERPL-CCNC: 174 U/L
POTASSIUM SERPL-SCNC: 4.2 MMOL/L
PROT SERPL-MCNC: 5.4 G/DL
SODIUM SERPL-SCNC: 136 MMOL/L

## 2018-10-03 ENCOUNTER — MEDICATION RENEWAL (OUTPATIENT)
Age: 78
End: 2018-10-03

## 2018-11-08 ENCOUNTER — OUTPATIENT (OUTPATIENT)
Dept: OUTPATIENT SERVICES | Facility: HOSPITAL | Age: 78
LOS: 1 days | Discharge: HOME | End: 2018-11-08

## 2018-11-08 DIAGNOSIS — Z90.89 ACQUIRED ABSENCE OF OTHER ORGANS: Chronic | ICD-10-CM

## 2018-11-08 DIAGNOSIS — C82.97 FOLLICULAR LYMPHOMA, UNSPECIFIED, SPLEEN: ICD-10-CM

## 2018-11-08 DIAGNOSIS — H26.9 UNSPECIFIED CATARACT: Chronic | ICD-10-CM

## 2018-11-08 DIAGNOSIS — Z90.13 ACQUIRED ABSENCE OF BILATERAL BREASTS AND NIPPLES: Chronic | ICD-10-CM

## 2018-11-08 DIAGNOSIS — M12.9 ARTHROPATHY, UNSPECIFIED: Chronic | ICD-10-CM

## 2018-11-19 ENCOUNTER — RESULT REVIEW (OUTPATIENT)
Age: 78
End: 2018-11-19

## 2018-11-19 ENCOUNTER — LABORATORY RESULT (OUTPATIENT)
Age: 78
End: 2018-11-19

## 2018-11-19 ENCOUNTER — APPOINTMENT (OUTPATIENT)
Dept: HEMATOLOGY ONCOLOGY | Facility: CLINIC | Age: 78
End: 2018-11-19
Payer: MEDICARE

## 2018-11-19 ENCOUNTER — APPOINTMENT (OUTPATIENT)
Dept: INFUSION THERAPY | Facility: HOSPITAL | Age: 78
End: 2018-11-19

## 2018-11-19 VITALS
SYSTOLIC BLOOD PRESSURE: 138 MMHG | RESPIRATION RATE: 16 BRPM | DIASTOLIC BLOOD PRESSURE: 90 MMHG | OXYGEN SATURATION: 98 % | WEIGHT: 110.23 LBS | BODY MASS INDEX: 20.16 KG/M2 | TEMPERATURE: 97.8 F | HEART RATE: 84 BPM

## 2018-11-19 DIAGNOSIS — Z78.9 OTHER SPECIFIED HEALTH STATUS: ICD-10-CM

## 2018-11-19 DIAGNOSIS — Z80.41 FAMILY HISTORY OF MALIGNANT NEOPLASM OF OVARY: ICD-10-CM

## 2018-11-19 DIAGNOSIS — D69.6 THROMBOCYTOPENIA, UNSPECIFIED: ICD-10-CM

## 2018-11-19 LAB
BASOPHILS # BLD AUTO: 0 K/UL — SIGNIFICANT CHANGE UP (ref 0–0.2)
BASOPHILS NFR BLD AUTO: 1 % — SIGNIFICANT CHANGE UP (ref 0–2)
EOSINOPHIL # BLD AUTO: 0.1 K/UL — SIGNIFICANT CHANGE UP (ref 0–0.5)
EOSINOPHIL NFR BLD AUTO: 2.7 % — SIGNIFICANT CHANGE UP (ref 0–6)
HCT VFR BLD CALC: 38.6 % — SIGNIFICANT CHANGE UP (ref 34.5–45)
HGB BLD-MCNC: 12.8 G/DL — SIGNIFICANT CHANGE UP (ref 11.5–15.5)
LYMPHOCYTES # BLD AUTO: 1.5 K/UL — SIGNIFICANT CHANGE UP (ref 1–3.3)
LYMPHOCYTES # BLD AUTO: 32.9 % — SIGNIFICANT CHANGE UP (ref 13–44)
MCHC RBC-ENTMCNC: 30.5 PG — SIGNIFICANT CHANGE UP (ref 27–34)
MCHC RBC-ENTMCNC: 33.2 G/DL — SIGNIFICANT CHANGE UP (ref 32–36)
MCV RBC AUTO: 91.9 FL — SIGNIFICANT CHANGE UP (ref 80–100)
MONOCYTES # BLD AUTO: 0.8 K/UL — SIGNIFICANT CHANGE UP (ref 0–0.9)
MONOCYTES NFR BLD AUTO: 16.9 % — HIGH (ref 2–14)
NEUTROPHILS # BLD AUTO: 2.1 K/UL — SIGNIFICANT CHANGE UP (ref 1.8–7.4)
NEUTROPHILS NFR BLD AUTO: 46.5 % — SIGNIFICANT CHANGE UP (ref 43–77)
PLATELET # BLD AUTO: 100 K/UL — LOW (ref 150–400)
RBC # BLD: 4.2 M/UL — SIGNIFICANT CHANGE UP (ref 3.8–5.2)
RBC # FLD: 16.2 % — HIGH (ref 10.3–14.5)
WBC # BLD: 4.5 K/UL — SIGNIFICANT CHANGE UP (ref 3.8–10.5)
WBC # FLD AUTO: 4.5 K/UL — SIGNIFICANT CHANGE UP (ref 3.8–10.5)

## 2018-11-19 PROCEDURE — 99214 OFFICE O/P EST MOD 30 MIN: CPT

## 2018-11-19 RX ORDER — METOCLOPRAMIDE 10 MG/1
10 TABLET ORAL
Qty: 60 | Refills: 6 | Status: DISCONTINUED | COMMUNITY
Start: 2017-12-22 | End: 2018-11-19

## 2018-11-19 RX ORDER — METOPROLOL SUCCINATE 50 MG/1
50 TABLET, EXTENDED RELEASE ORAL
Refills: 0 | Status: ACTIVE | COMMUNITY
Start: 2018-11-19

## 2018-11-19 RX ORDER — CHOLECALCIFEROL (VITAMIN D3) 50 MCG
50 MCG TABLET ORAL
Refills: 0 | Status: ACTIVE | COMMUNITY
Start: 2018-11-19

## 2018-11-19 RX ORDER — FLUTICASONE PROPIONATE 50 UG/1
50 SPRAY, METERED NASAL TWICE DAILY
Qty: 1 | Refills: 0 | Status: DISCONTINUED | COMMUNITY
Start: 2018-09-20 | End: 2018-11-19

## 2018-11-19 RX ORDER — UBIDECARENONE/VIT E ACET 100MG-5
50 MCG CAPSULE ORAL
Refills: 0 | Status: DISCONTINUED | COMMUNITY
End: 2018-11-19

## 2018-11-21 DIAGNOSIS — R11.2 NAUSEA WITH VOMITING, UNSPECIFIED: ICD-10-CM

## 2018-11-21 DIAGNOSIS — Z51.11 ENCOUNTER FOR ANTINEOPLASTIC CHEMOTHERAPY: ICD-10-CM

## 2018-11-21 PROBLEM — Z78.9 NEVER SMOKED TOBACCO: Status: ACTIVE | Noted: 2017-04-29

## 2018-11-21 PROBLEM — Z78.9 ALCOHOL INGESTION: Status: ACTIVE | Noted: 2017-04-29

## 2018-11-21 PROBLEM — D69.6 THROMBOCYTOPENIA: Status: ACTIVE | Noted: 2017-08-18

## 2018-11-21 PROBLEM — Z80.41 FHX: OVARIAN CANCER: Status: ACTIVE | Noted: 2017-04-29

## 2018-11-21 LAB
ALBUMIN SERPL ELPH-MCNC: 4.4 G/DL
ALP BLD-CCNC: 82 U/L
ALT SERPL-CCNC: 27 U/L
ANION GAP SERPL CALC-SCNC: 12 MMOL/L
AST SERPL-CCNC: 26 U/L
B2 MICROGLOB SERPL-MCNC: 2.7 MG/L
BILIRUB SERPL-MCNC: 1.6 MG/DL
BUN SERPL-MCNC: 10 MG/DL
CALCIUM SERPL-MCNC: 9.5 MG/DL
CHLORIDE SERPL-SCNC: 97 MMOL/L
CO2 SERPL-SCNC: 27 MMOL/L
CREAT SERPL-MCNC: 0.71 MG/DL
GLUCOSE SERPL-MCNC: 86 MG/DL
LDH SERPL-CCNC: 163 U/L
POTASSIUM SERPL-SCNC: 4.7 MMOL/L
PROT SERPL-MCNC: 5.7 G/DL
SODIUM SERPL-SCNC: 136 MMOL/L

## 2018-11-21 NOTE — PHYSICAL EXAM
[Restricted in physically strenuous activity but ambulatory and able to carry out work of a light or sedentary nature] : Status 1- Restricted in physically strenuous activity but ambulatory and able to carry out work of a light or sedentary nature, e.g., light house work, office work [Thin] : thin [Normal] : affect appropriate [de-identified] : no CVAT [de-identified] : minimal papular rash

## 2018-11-21 NOTE — REVIEW OF SYSTEMS
[Fatigue] : fatigue [Cough] : cough [Skin Rash] : skin rash [Negative] : Heme/Lymph [Easy Bleeding] : no tendency for easy bleeding [Easy Bruising] : no tendency for easy bruising [Swollen Glands] : no swollen glands [FreeTextEntry3] : blurry vision-less c/o [FreeTextEntry6] : mild [de-identified] : as above [de-identified] : less tingling

## 2018-11-21 NOTE — HISTORY OF PRESENT ILLNESS
[Disease:__________________________] : Disease: [unfilled] [Treatment Protocol] : Treatment Protocol [Therapy: ___] : Therapy: [unfilled] [Cycle: ___] : Cycle: [unfilled] [de-identified] : Mrs. White is a 77 year old woman with a newly diagnosed lymphoma of FCC origin presenting with cough, dyspnea and bulky paratracheal and hilar disease.  She had had generally good health.  In January, 2017 she developed increased cough, dyspnea and was admitted to [place] when she was found to be in rapid atrial fibrillation.  She also was (+) for influenza A>  She was hospitalized for one week. Right paratracheal and perihilar densities were seen in a CXR on 2/24/17.  CT of chest with contrast on 2/28/2017 showed as compared with prior CT scans of chest in 6/2015 and 8/2016 a nodular soft tissue mass measuring up to 6.4 x 5.3 x 6.4 cm extending from right paratracheal region inferiorly to aortopulmonary and pretracheal regions, encasing RUL pulmonary artery and with mass effect on SVC and mild tracheal compression at the jon.  Stable right thyroid nodules and subcm lung granulomata were unchanged. Flexible bronchoscopy was negative.  Steroids were started on 3/30/17  because of worsening pulmonary status. 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 Pathology was read at [facility]. The lymphoma was (+) for CD10,  CD20, BCL-2 and (-) for CD5 and BCL-6.  Ki-67 was described as "low."  \par She was gven prednisone and  had a marked improvement in her pulmonary status.  She saw Dr. Ashtyn Costello a few days ago and had a PET/CT 4/25/17 which showed generally marked regression in her dominant paratracheal, perihilar disease.  While a non-FDG+  1.5 x 1.1 m  RUL nodule was larger than a ground glass nodule i \par CT  of chest from 3/390/17,  right hilar adenopathy  was 2.9 x 1.8 cm with an SUV of 10.9 compared with 6.1 x 4.7 cm in prior CT; left paratracheal node was 1.5 x 1 cm with SUV 2.9 vs. 2 cm in earlier CT.   An FDG (-) subcarinal node was seen.  She also has a calcified, weakly FDG+ right thyroid nodule. She is ambulatory, without significant cough or LAWRENCE.  In the last week, there has been some recurrence of cough, albeit milder than prior to steroid therapy. She has had no significant constitutional symptoms except for weight loss bordering  on 10% of her baseline weight.  On steroids, appetite, weight loss have stabilized.\par Atrial fibrillation was initially treated with metoprolol, on which she developed a rash.  She is on olmesartan 10 mg/d and tolerating it well.  She is also on eliquis 5 mg po 2x/day.  She does not have cardiac complaints recentlyShe had a left DCIS in 2013 s/p mastectomy and prophylactic roight mastectomy.  She has a h/o HTN and partial left knee replacement. [de-identified] : II vs IV [de-identified] : low Ki-67 [de-identified] : DLBCL- completed Radiation therapy, Rituxan weekly x 4 then every other week x 4 then monthly x 4, now on q8 wk rituxan along with rev 15 mg po 21/28d.  Has had intermittent rasah, pruritusa likely rev related, currently mild and control with TAC.  Before RT, had paratracheal mass causing SOB and dysphagia.\par Significant cough which she had has greatly diminished.\par  \par AFIB- on xarelto, no c/o palpitatioonas, CP\par hypertension-controlled with olmesartan\par thrombocytopenia, miold, likely rev related, 100K topday

## 2018-11-21 NOTE — ASSESSMENT
[Palliative] : Goals of care discussed with patient: Palliative [Palliative Care Plan] : not applicable at this time [FreeTextEntry1] : relapse DLBCL status post radiation therapy to chest  now receiving Rituxan and Revlimid \par Excellent clinical response\par Cont. R2, with ASA held as xarelto should provide adequate coverage re rev's prothrombotic risk.\par plan; follow CMP, L[facility]\par rituxan now q8 wks\par \par RV 4-6 wks

## 2018-11-23 ENCOUNTER — MEDICATION RENEWAL (OUTPATIENT)
Age: 78
End: 2018-11-23

## 2018-12-02 ENCOUNTER — FORM ENCOUNTER (OUTPATIENT)
Age: 78
End: 2018-12-02

## 2018-12-03 ENCOUNTER — APPOINTMENT (OUTPATIENT)
Dept: HEMATOLOGY ONCOLOGY | Facility: CLINIC | Age: 78
End: 2018-12-03
Payer: MEDICARE

## 2018-12-03 ENCOUNTER — OUTPATIENT (OUTPATIENT)
Dept: OUTPATIENT SERVICES | Facility: HOSPITAL | Age: 78
LOS: 1 days | End: 2018-12-03
Payer: MEDICARE

## 2018-12-03 ENCOUNTER — RESULT REVIEW (OUTPATIENT)
Age: 78
End: 2018-12-03

## 2018-12-03 ENCOUNTER — APPOINTMENT (OUTPATIENT)
Dept: INFUSION THERAPY | Facility: HOSPITAL | Age: 78
End: 2018-12-03

## 2018-12-03 ENCOUNTER — APPOINTMENT (OUTPATIENT)
Dept: RADIOLOGY | Facility: IMAGING CENTER | Age: 78
End: 2018-12-03
Payer: MEDICARE

## 2018-12-03 VITALS
DIASTOLIC BLOOD PRESSURE: 93 MMHG | BODY MASS INDEX: 20.67 KG/M2 | HEART RATE: 106 BPM | RESPIRATION RATE: 16 BRPM | SYSTOLIC BLOOD PRESSURE: 164 MMHG | OXYGEN SATURATION: 95 % | WEIGHT: 112.99 LBS | TEMPERATURE: 99.1 F

## 2018-12-03 DIAGNOSIS — Z90.13 ACQUIRED ABSENCE OF BILATERAL BREASTS AND NIPPLES: Chronic | ICD-10-CM

## 2018-12-03 DIAGNOSIS — H26.9 UNSPECIFIED CATARACT: Chronic | ICD-10-CM

## 2018-12-03 DIAGNOSIS — M12.9 ARTHROPATHY, UNSPECIFIED: Chronic | ICD-10-CM

## 2018-12-03 DIAGNOSIS — Z90.89 ACQUIRED ABSENCE OF OTHER ORGANS: Chronic | ICD-10-CM

## 2018-12-03 DIAGNOSIS — C50.912 MALIGNANT NEOPLASM OF UNSPECIFIED SITE OF LEFT FEMALE BREAST: ICD-10-CM

## 2018-12-03 DIAGNOSIS — C83.32 DIFFUSE LARGE B-CELL LYMPHOMA, INTRATHORACIC LYMPH NODES: ICD-10-CM

## 2018-12-03 LAB
BASOPHILS # BLD AUTO: 0 K/UL — SIGNIFICANT CHANGE UP (ref 0–0.2)
BUN SERPL-MCNC: 7 MG/DL — SIGNIFICANT CHANGE UP (ref 7–23)
CA-I BLDA-SCNC: 1.15 MMOL/L — SIGNIFICANT CHANGE UP (ref 1.12–1.3)
CHLORIDE SERPL-SCNC: 99 MMOL/L — SIGNIFICANT CHANGE UP (ref 96–108)
CO2 SERPL-SCNC: 27 MMOL/L — SIGNIFICANT CHANGE UP (ref 22–31)
CREAT SERPL-MCNC: 0.6 MG/DL — SIGNIFICANT CHANGE UP (ref 0.5–1.3)
EOSINOPHIL # BLD AUTO: 0 K/UL — SIGNIFICANT CHANGE UP (ref 0–0.5)
EOSINOPHIL NFR BLD AUTO: 3 % — SIGNIFICANT CHANGE UP (ref 0–6)
GLUCOSE SERPL-MCNC: 96 MG/DL — SIGNIFICANT CHANGE UP (ref 70–99)
HCT VFR BLD CALC: 35 % — SIGNIFICANT CHANGE UP (ref 34.5–45)
HGB BLD-MCNC: 11.9 G/DL — SIGNIFICANT CHANGE UP (ref 11.5–15.5)
LYMPHOCYTES # BLD AUTO: 1.8 K/UL — SIGNIFICANT CHANGE UP (ref 1–3.3)
LYMPHOCYTES # BLD AUTO: 60 % — HIGH (ref 13–44)
MCHC RBC-ENTMCNC: 31.5 PG — SIGNIFICANT CHANGE UP (ref 27–34)
MCHC RBC-ENTMCNC: 34.1 G/DL — SIGNIFICANT CHANGE UP (ref 32–36)
MCV RBC AUTO: 92.4 FL — SIGNIFICANT CHANGE UP (ref 80–100)
MONOCYTES # BLD AUTO: 0.7 K/UL — SIGNIFICANT CHANGE UP (ref 0–0.9)
MONOCYTES NFR BLD AUTO: 19 % — HIGH (ref 2–14)
NEUTROPHILS # BLD AUTO: 0.5 K/UL — LOW (ref 1.8–7.4)
NEUTROPHILS NFR BLD AUTO: 18 % — LOW (ref 43–77)
PLAT MORPH BLD: NORMAL — SIGNIFICANT CHANGE UP
PLATELET # BLD AUTO: 103 K/UL — LOW (ref 150–400)
POTASSIUM SERPL-MCNC: 4 MMOL/L — SIGNIFICANT CHANGE UP (ref 3.5–5.3)
POTASSIUM SERPL-SCNC: 4 MMOL/L — SIGNIFICANT CHANGE UP (ref 3.5–5.3)
RBC # BLD: 3.78 M/UL — LOW (ref 3.8–5.2)
RBC # FLD: 16.8 % — HIGH (ref 10.3–14.5)
RBC BLD AUTO: SIGNIFICANT CHANGE UP
SODIUM SERPL-SCNC: 137 MMOL/L — SIGNIFICANT CHANGE UP (ref 135–145)
WBC # BLD: 3 K/UL — LOW (ref 3.8–10.5)
WBC # FLD AUTO: 3 K/UL — LOW (ref 3.8–10.5)

## 2018-12-03 PROCEDURE — 71046 X-RAY EXAM CHEST 2 VIEWS: CPT | Mod: 26

## 2018-12-03 PROCEDURE — 71046 X-RAY EXAM CHEST 2 VIEWS: CPT

## 2018-12-03 PROCEDURE — 99214 OFFICE O/P EST MOD 30 MIN: CPT

## 2018-12-04 DIAGNOSIS — Z51.89 ENCOUNTER FOR OTHER SPECIFIED AFTERCARE: ICD-10-CM

## 2018-12-10 ENCOUNTER — OUTPATIENT (OUTPATIENT)
Dept: OUTPATIENT SERVICES | Facility: HOSPITAL | Age: 78
LOS: 1 days | Discharge: HOME | End: 2018-12-10

## 2018-12-10 DIAGNOSIS — M12.9 ARTHROPATHY, UNSPECIFIED: Chronic | ICD-10-CM

## 2018-12-10 DIAGNOSIS — Z90.89 ACQUIRED ABSENCE OF OTHER ORGANS: Chronic | ICD-10-CM

## 2018-12-10 DIAGNOSIS — H26.9 UNSPECIFIED CATARACT: Chronic | ICD-10-CM

## 2018-12-10 DIAGNOSIS — C82.97 FOLLICULAR LYMPHOMA, UNSPECIFIED, SPLEEN: ICD-10-CM

## 2018-12-10 DIAGNOSIS — Z90.13 ACQUIRED ABSENCE OF BILATERAL BREASTS AND NIPPLES: Chronic | ICD-10-CM

## 2018-12-11 ENCOUNTER — RESULT REVIEW (OUTPATIENT)
Age: 78
End: 2018-12-11

## 2018-12-11 ENCOUNTER — APPOINTMENT (OUTPATIENT)
Dept: HEMATOLOGY ONCOLOGY | Facility: CLINIC | Age: 78
End: 2018-12-11

## 2018-12-11 LAB
BASOPHILS # BLD AUTO: 0 K/UL — SIGNIFICANT CHANGE UP (ref 0–0.2)
BASOPHILS NFR BLD AUTO: 0.5 % — SIGNIFICANT CHANGE UP (ref 0–2)
EOSINOPHIL # BLD AUTO: 0.1 K/UL — SIGNIFICANT CHANGE UP (ref 0–0.5)
EOSINOPHIL NFR BLD AUTO: 2.1 % — SIGNIFICANT CHANGE UP (ref 0–6)
HCT VFR BLD CALC: 36.7 % — SIGNIFICANT CHANGE UP (ref 34.5–45)
HGB BLD-MCNC: 12.2 G/DL — SIGNIFICANT CHANGE UP (ref 11.5–15.5)
LYMPHOCYTES # BLD AUTO: 1.2 K/UL — SIGNIFICANT CHANGE UP (ref 1–3.3)
LYMPHOCYTES # BLD AUTO: 25.1 % — SIGNIFICANT CHANGE UP (ref 13–44)
MCHC RBC-ENTMCNC: 30.5 PG — SIGNIFICANT CHANGE UP (ref 27–34)
MCHC RBC-ENTMCNC: 33.2 G/DL — SIGNIFICANT CHANGE UP (ref 32–36)
MCV RBC AUTO: 91.8 FL — SIGNIFICANT CHANGE UP (ref 80–100)
MONOCYTES # BLD AUTO: 0.4 K/UL — SIGNIFICANT CHANGE UP (ref 0–0.9)
MONOCYTES NFR BLD AUTO: 8.6 % — SIGNIFICANT CHANGE UP (ref 2–14)
NEUTROPHILS # BLD AUTO: 3 K/UL — SIGNIFICANT CHANGE UP (ref 1.8–7.4)
NEUTROPHILS NFR BLD AUTO: 63.6 % — SIGNIFICANT CHANGE UP (ref 43–77)
PLATELET # BLD AUTO: 144 K/UL — LOW (ref 150–400)
RBC # BLD: 4 M/UL — SIGNIFICANT CHANGE UP (ref 3.8–5.2)
RBC # FLD: 16.6 % — HIGH (ref 10.3–14.5)
WBC # BLD: 4.7 K/UL — SIGNIFICANT CHANGE UP (ref 3.8–10.5)
WBC # FLD AUTO: 4.7 K/UL — SIGNIFICANT CHANGE UP (ref 3.8–10.5)

## 2018-12-19 ENCOUNTER — RESULT REVIEW (OUTPATIENT)
Age: 78
End: 2018-12-19

## 2018-12-19 ENCOUNTER — APPOINTMENT (OUTPATIENT)
Dept: HEMATOLOGY ONCOLOGY | Facility: CLINIC | Age: 78
End: 2018-12-19

## 2018-12-19 LAB
BASOPHILS # BLD AUTO: 0 K/UL — SIGNIFICANT CHANGE UP (ref 0–0.2)
BASOPHILS NFR BLD AUTO: 0.6 % — SIGNIFICANT CHANGE UP (ref 0–2)
BUN SERPL-MCNC: 7 MG/DL — SIGNIFICANT CHANGE UP (ref 7–23)
CA-I BLDA-SCNC: 1.18 MMOL/L — SIGNIFICANT CHANGE UP (ref 1.12–1.3)
CHLORIDE SERPL-SCNC: 98 MMOL/L — SIGNIFICANT CHANGE UP (ref 96–108)
CO2 SERPL-SCNC: 26 MMOL/L — SIGNIFICANT CHANGE UP (ref 22–31)
CREAT SERPL-MCNC: 0.5 MG/DL — SIGNIFICANT CHANGE UP (ref 0.5–1.3)
EOSINOPHIL # BLD AUTO: 0.1 K/UL — SIGNIFICANT CHANGE UP (ref 0–0.5)
EOSINOPHIL NFR BLD AUTO: 3.1 % — SIGNIFICANT CHANGE UP (ref 0–6)
GLUCOSE SERPL-MCNC: 79 MG/DL — SIGNIFICANT CHANGE UP (ref 70–99)
HCT VFR BLD CALC: 36.1 % — SIGNIFICANT CHANGE UP (ref 34.5–45)
HGB BLD-MCNC: 12.6 G/DL — SIGNIFICANT CHANGE UP (ref 11.5–15.5)
LYMPHOCYTES # BLD AUTO: 1.2 K/UL — SIGNIFICANT CHANGE UP (ref 1–3.3)
LYMPHOCYTES # BLD AUTO: 28.5 % — SIGNIFICANT CHANGE UP (ref 13–44)
MCHC RBC-ENTMCNC: 32.4 PG — SIGNIFICANT CHANGE UP (ref 27–34)
MCHC RBC-ENTMCNC: 34.9 G/DL — SIGNIFICANT CHANGE UP (ref 32–36)
MCV RBC AUTO: 92.8 FL — SIGNIFICANT CHANGE UP (ref 80–100)
MONOCYTES # BLD AUTO: 0.4 K/UL — SIGNIFICANT CHANGE UP (ref 0–0.9)
MONOCYTES NFR BLD AUTO: 10.1 % — SIGNIFICANT CHANGE UP (ref 2–14)
NEUTROPHILS # BLD AUTO: 2.3 K/UL — SIGNIFICANT CHANGE UP (ref 1.8–7.4)
NEUTROPHILS NFR BLD AUTO: 57.7 % — SIGNIFICANT CHANGE UP (ref 43–77)
PLATELET # BLD AUTO: 98 K/UL — LOW (ref 150–400)
POTASSIUM SERPL-MCNC: 4.1 MMOL/L — SIGNIFICANT CHANGE UP (ref 3.5–5.3)
POTASSIUM SERPL-SCNC: 4.1 MMOL/L — SIGNIFICANT CHANGE UP (ref 3.5–5.3)
RBC # BLD: 3.89 M/UL — SIGNIFICANT CHANGE UP (ref 3.8–5.2)
RBC # FLD: 16.2 % — HIGH (ref 10.3–14.5)
SODIUM SERPL-SCNC: 136 MMOL/L — SIGNIFICANT CHANGE UP (ref 135–145)
WBC # BLD: 4 K/UL — SIGNIFICANT CHANGE UP (ref 3.8–10.5)
WBC # FLD AUTO: 4 K/UL — SIGNIFICANT CHANGE UP (ref 3.8–10.5)

## 2018-12-28 RX ORDER — LENALIDOMIDE 15 MG/1
15 CAPSULE ORAL
Qty: 21 | Refills: 0 | Status: COMPLETED | COMMUNITY
Start: 2018-10-30 | End: 2018-12-28

## 2018-12-28 RX ORDER — TRIAMCINOLONE ACETONIDE 1 MG/G
0.1 OINTMENT TOPICAL
Qty: 1 | Refills: 2 | Status: COMPLETED | COMMUNITY
Start: 2018-04-20 | End: 2018-12-28

## 2019-01-01 NOTE — ASSESSMENT
[Palliative] : Goals of care discussed with patient: Palliative [Palliative Care Plan] : not applicable at this time [FreeTextEntry1] : relapse DLBCL status post radiation therapy to chest  now receiving Rituxan and Revlimid , seen today for increase cough congestion\par Excellent clinical response\par Cont. R2, with ASA held as xarelto should provide adequate coverage re rev's prothrombotic risk.\par plan; follow CMP, [facility]DH obtain chest xray\par Rituxan now q8 wks\par to start Levaquin 500 mg daily\par Tessalon Perles for cough\par \par RV 4-6 wks

## 2019-01-01 NOTE — PHYSICAL EXAM
[Restricted in physically strenuous activity but ambulatory and able to carry out work of a light or sedentary nature] : Status 1- Restricted in physically strenuous activity but ambulatory and able to carry out work of a light or sedentary nature, e.g., light house work, office work [Thin] : thin [Normal] : affect appropriate [de-identified] : diminshed at bases ?rales scattered

## 2019-01-01 NOTE — REVIEW OF SYSTEMS
[Fatigue] : fatigue [Cough] : cough [Negative] : Allergic/Immunologic [FreeTextEntry6] : crackles at bases [de-identified] : no rash at present

## 2019-01-01 NOTE — HISTORY OF PRESENT ILLNESS
[Disease:__________________________] : Disease: [unfilled] [Treatment Protocol] : Treatment Protocol [Therapy: ___] : Therapy: [unfilled] [Cycle: ___] : Cycle: [unfilled] [de-identified] : Mrs. White is a 77 year old woman with a newly diagnosed lymphoma of FCC origin presenting with cough, dyspnea and bulky paratracheal and hilar disease.  She had had generally good health.  In January, 2017 she developed increased cough, dyspnea and was admitted to [place] when she was found to be in rapid atrial fibrillation.  She also was (+) for influenza A>  She was hospitalized for one week. Right paratracheal and perihilar densities were seen in a CXR on 2/24/17.  CT of chest with contrast on 2/28/2017 showed as compared with prior CT scans of chest in 6/2015 and 8/2016 a nodular soft tissue mass measuring up to 6.4 x 5.3 x 6.4 cm extending from right paratracheal region inferiorly to aortopulmonary and pretracheal regions, encasing RUL pulmonary artery and with mass effect on SVC and mild tracheal compression at the jon.  Stable right thyroid nodules and subcm lung granulomata were unchanged. Flexible bronchoscopy was negative.  Steroids were started on 3/30/17  because of worsening pulmonary status. 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 Pathology was read at [facility]. The lymphoma was (+) for CD10,  CD20, BCL-2 and (-) for CD5 and BCL-6.  Ki-67 was described as "low."  \par She was gven prednisone and  had a marked improvement in her pulmonary status.  She saw Dr. Ashtyn Costello a few days ago and had a PET/CT 4/25/17 which showed generally marked regression in her dominant paratracheal, perihilar disease.  While a non-FDG+  1.5 x 1.1 m  RUL nodule was larger than a ground glass nodule i \par CT  of chest from 3/390/17,  right hilar adenopathy  was 2.9 x 1.8 cm with an SUV of 10.9 compared with 6.1 x 4.7 cm in prior CT; left paratracheal node was 1.5 x 1 cm with SUV 2.9 vs. 2 cm in earlier CT.   An FDG (-) subcarinal node was seen.  She also has a calcified, weakly FDG+ right thyroid nodule. She is ambulatory, without significant cough or LAWRENCE.  In the last week, there has been some recurrence of cough, albeit milder than prior to steroid therapy. She has had no significant constitutional symptoms except for weight loss bordering  on 10% of her baseline weight.  On steroids, appetite, weight loss have stabilized.\par Atrial fibrillation was initially treated with metoprolol, on which she developed a rash.  She is on olmesartan 10 mg/d and tolerating it well.  She is also on eliquis 5 mg po 2x/day.  She does not have cardiac complaints recentlyShe had a left DCIS in 2013 s/p mastectomy and prophylactic roight mastectomy.  She has a h/o HTN and partial left knee replacement. [de-identified] : II vs IV [de-identified] : low Ki-67 [de-identified] : DLBCL- completed Radiation therapy, Rituxan weekly x 4 then every other week x 4 then monthly x 4, now on q8 wk rituxan along with rev 15 mg po 21/28d.  Has had intermittent rasah, pruritusa likely rev related, currently mild and control with TAC.  Before RT, had paratracheal mass causing SOB and dysphagia.\par Significant cough which she had has greatly diminished.\par  \par AFIB- on xarelto, no c/o palpitatioonas, CP\par hypertension-controlled with olmesartan\par thrombocytopenia, miold, likely rev related,

## 2019-01-07 ENCOUNTER — RESULT REVIEW (OUTPATIENT)
Age: 79
End: 2019-01-07

## 2019-01-07 ENCOUNTER — LABORATORY RESULT (OUTPATIENT)
Age: 79
End: 2019-01-07

## 2019-01-07 ENCOUNTER — APPOINTMENT (OUTPATIENT)
Dept: INFUSION THERAPY | Facility: HOSPITAL | Age: 79
End: 2019-01-07

## 2019-01-07 LAB
ANISOCYTOSIS BLD QL: SLIGHT — SIGNIFICANT CHANGE UP
BASOPHILS # BLD AUTO: 0.1 K/UL — SIGNIFICANT CHANGE UP (ref 0–0.2)
ELLIPTOCYTES BLD QL SMEAR: SLIGHT — SIGNIFICANT CHANGE UP
EOSINOPHIL # BLD AUTO: 0.1 K/UL — SIGNIFICANT CHANGE UP (ref 0–0.5)
HCT VFR BLD CALC: 35.9 % — SIGNIFICANT CHANGE UP (ref 34.5–45)
HGB BLD-MCNC: 12.7 G/DL — SIGNIFICANT CHANGE UP (ref 11.5–15.5)
LG PLATELETS BLD QL AUTO: SLIGHT — SIGNIFICANT CHANGE UP
LYMPHOCYTES # BLD AUTO: 1.6 K/UL — SIGNIFICANT CHANGE UP (ref 1–3.3)
LYMPHOCYTES # BLD AUTO: 62 % — HIGH (ref 13–44)
MCHC RBC-ENTMCNC: 32.3 PG — SIGNIFICANT CHANGE UP (ref 27–34)
MCHC RBC-ENTMCNC: 35.4 G/DL — SIGNIFICANT CHANGE UP (ref 32–36)
MCV RBC AUTO: 91.2 FL — SIGNIFICANT CHANGE UP (ref 80–100)
MONOCYTES # BLD AUTO: 0.7 K/UL — SIGNIFICANT CHANGE UP (ref 0–0.9)
MONOCYTES NFR BLD AUTO: 22 % — HIGH (ref 2–14)
NEUTROPHILS # BLD AUTO: 0.4 K/UL — LOW (ref 1.8–7.4)
NEUTROPHILS NFR BLD AUTO: 10 % — LOW (ref 43–77)
NEUTS BAND # BLD: 2 % — SIGNIFICANT CHANGE UP (ref 0–8)
PLAT MORPH BLD: NORMAL — SIGNIFICANT CHANGE UP
PLATELET # BLD AUTO: 147 K/UL — LOW (ref 150–400)
POIKILOCYTOSIS BLD QL AUTO: SLIGHT — SIGNIFICANT CHANGE UP
RBC # BLD: 3.93 M/UL — SIGNIFICANT CHANGE UP (ref 3.8–5.2)
RBC # FLD: 15.8 % — HIGH (ref 10.3–14.5)
RBC BLD AUTO: ABNORMAL
SCHISTOCYTES BLD QL AUTO: SLIGHT — SIGNIFICANT CHANGE UP
VARIANT LYMPHS # BLD: 4 % — SIGNIFICANT CHANGE UP (ref 0–6)
WBC # BLD: 2.9 K/UL — LOW (ref 3.8–10.5)
WBC # FLD AUTO: 2.9 K/UL — LOW (ref 3.8–10.5)

## 2019-01-08 ENCOUNTER — APPOINTMENT (OUTPATIENT)
Dept: INFUSION THERAPY | Facility: HOSPITAL | Age: 79
End: 2019-01-08

## 2019-01-08 DIAGNOSIS — Z51.89 ENCOUNTER FOR OTHER SPECIFIED AFTERCARE: ICD-10-CM

## 2019-01-09 ENCOUNTER — OUTPATIENT (OUTPATIENT)
Dept: OUTPATIENT SERVICES | Facility: HOSPITAL | Age: 79
LOS: 1 days | Discharge: HOME | End: 2019-01-09

## 2019-01-09 DIAGNOSIS — H26.9 UNSPECIFIED CATARACT: Chronic | ICD-10-CM

## 2019-01-09 DIAGNOSIS — Z90.13 ACQUIRED ABSENCE OF BILATERAL BREASTS AND NIPPLES: Chronic | ICD-10-CM

## 2019-01-09 DIAGNOSIS — C82.97 FOLLICULAR LYMPHOMA, UNSPECIFIED, SPLEEN: ICD-10-CM

## 2019-01-09 DIAGNOSIS — Z90.89 ACQUIRED ABSENCE OF OTHER ORGANS: Chronic | ICD-10-CM

## 2019-01-09 DIAGNOSIS — M12.9 ARTHROPATHY, UNSPECIFIED: Chronic | ICD-10-CM

## 2019-01-14 ENCOUNTER — APPOINTMENT (OUTPATIENT)
Dept: INFUSION THERAPY | Facility: HOSPITAL | Age: 79
End: 2019-01-14

## 2019-01-14 ENCOUNTER — RESULT REVIEW (OUTPATIENT)
Age: 79
End: 2019-01-14

## 2019-01-14 ENCOUNTER — LABORATORY RESULT (OUTPATIENT)
Age: 79
End: 2019-01-14

## 2019-01-14 LAB
BASOPHILS # BLD AUTO: 0 K/UL — SIGNIFICANT CHANGE UP (ref 0–0.2)
BASOPHILS NFR BLD AUTO: 0.7 % — SIGNIFICANT CHANGE UP (ref 0–2)
EOSINOPHIL # BLD AUTO: 0 K/UL — SIGNIFICANT CHANGE UP (ref 0–0.5)
EOSINOPHIL NFR BLD AUTO: 0 % — SIGNIFICANT CHANGE UP (ref 0–6)
HCT VFR BLD CALC: 38.1 % — SIGNIFICANT CHANGE UP (ref 34.5–45)
HGB BLD-MCNC: 13.1 G/DL — SIGNIFICANT CHANGE UP (ref 11.5–15.5)
LYMPHOCYTES # BLD AUTO: 1.8 K/UL — SIGNIFICANT CHANGE UP (ref 1–3.3)
LYMPHOCYTES # BLD AUTO: 30.7 % — SIGNIFICANT CHANGE UP (ref 13–44)
MCHC RBC-ENTMCNC: 31.8 PG — SIGNIFICANT CHANGE UP (ref 27–34)
MCHC RBC-ENTMCNC: 34.4 G/DL — SIGNIFICANT CHANGE UP (ref 32–36)
MCV RBC AUTO: 92.3 FL — SIGNIFICANT CHANGE UP (ref 80–100)
MONOCYTES # BLD AUTO: 0.9 K/UL — SIGNIFICANT CHANGE UP (ref 0–0.9)
MONOCYTES NFR BLD AUTO: 14.5 % — HIGH (ref 2–14)
NEUTROPHILS # BLD AUTO: 3.2 K/UL — SIGNIFICANT CHANGE UP (ref 1.8–7.4)
NEUTROPHILS NFR BLD AUTO: 54.1 % — SIGNIFICANT CHANGE UP (ref 43–77)
PLATELET # BLD AUTO: 141 K/UL — LOW (ref 150–400)
RBC # BLD: 4.13 M/UL — SIGNIFICANT CHANGE UP (ref 3.8–5.2)
RBC # FLD: 15.6 % — HIGH (ref 10.3–14.5)
WBC # BLD: 6 K/UL — SIGNIFICANT CHANGE UP (ref 3.8–10.5)
WBC # FLD AUTO: 6 K/UL — SIGNIFICANT CHANGE UP (ref 3.8–10.5)

## 2019-01-15 DIAGNOSIS — R11.2 NAUSEA WITH VOMITING, UNSPECIFIED: ICD-10-CM

## 2019-01-15 DIAGNOSIS — Z51.11 ENCOUNTER FOR ANTINEOPLASTIC CHEMOTHERAPY: ICD-10-CM

## 2019-01-17 ENCOUNTER — RESULT REVIEW (OUTPATIENT)
Age: 79
End: 2019-01-17

## 2019-01-17 ENCOUNTER — APPOINTMENT (OUTPATIENT)
Dept: HEMATOLOGY ONCOLOGY | Facility: CLINIC | Age: 79
End: 2019-01-17
Payer: MEDICARE

## 2019-01-17 VITALS
BODY MASS INDEX: 20.64 KG/M2 | WEIGHT: 112.87 LBS | RESPIRATION RATE: 16 BRPM | OXYGEN SATURATION: 98 % | HEART RATE: 92 BPM | TEMPERATURE: 98.4 F | DIASTOLIC BLOOD PRESSURE: 101 MMHG | SYSTOLIC BLOOD PRESSURE: 135 MMHG

## 2019-01-17 LAB
BASOPHILS # BLD AUTO: 0 K/UL — SIGNIFICANT CHANGE UP (ref 0–0.2)
BASOPHILS NFR BLD AUTO: 0.6 % — SIGNIFICANT CHANGE UP (ref 0–2)
EOSINOPHIL # BLD AUTO: 0.1 K/UL — SIGNIFICANT CHANGE UP (ref 0–0.5)
EOSINOPHIL NFR BLD AUTO: 1.6 % — SIGNIFICANT CHANGE UP (ref 0–6)
HCT VFR BLD CALC: 38.4 % — SIGNIFICANT CHANGE UP (ref 34.5–45)
HGB BLD-MCNC: 13.3 G/DL — SIGNIFICANT CHANGE UP (ref 11.5–15.5)
LYMPHOCYTES # BLD AUTO: 1.3 K/UL — SIGNIFICANT CHANGE UP (ref 1–3.3)
LYMPHOCYTES # BLD AUTO: 16.6 % — SIGNIFICANT CHANGE UP (ref 13–44)
MCHC RBC-ENTMCNC: 32.1 PG — SIGNIFICANT CHANGE UP (ref 27–34)
MCHC RBC-ENTMCNC: 34.5 G/DL — SIGNIFICANT CHANGE UP (ref 32–36)
MCV RBC AUTO: 92.9 FL — SIGNIFICANT CHANGE UP (ref 80–100)
MONOCYTES # BLD AUTO: 0.4 K/UL — SIGNIFICANT CHANGE UP (ref 0–0.9)
MONOCYTES NFR BLD AUTO: 4.5 % — SIGNIFICANT CHANGE UP (ref 2–14)
NEUTROPHILS # BLD AUTO: 6.1 K/UL — SIGNIFICANT CHANGE UP (ref 1.8–7.4)
NEUTROPHILS NFR BLD AUTO: 76.7 % — SIGNIFICANT CHANGE UP (ref 43–77)
PLATELET # BLD AUTO: 136 K/UL — LOW (ref 150–400)
RBC # BLD: 4.13 M/UL — SIGNIFICANT CHANGE UP (ref 3.8–5.2)
RBC # FLD: 15.6 % — HIGH (ref 10.3–14.5)
WBC # BLD: 8 K/UL — SIGNIFICANT CHANGE UP (ref 3.8–10.5)
WBC # FLD AUTO: 8 K/UL — SIGNIFICANT CHANGE UP (ref 3.8–10.5)

## 2019-01-17 PROCEDURE — 99214 OFFICE O/P EST MOD 30 MIN: CPT

## 2019-01-17 RX ORDER — LEVOFLOXACIN 500 MG/1
500 TABLET, FILM COATED ORAL
Qty: 7 | Refills: 1 | Status: COMPLETED | COMMUNITY
Start: 2018-12-03 | End: 2019-01-17

## 2019-01-17 RX ORDER — BENZONATATE 100 MG/1
100 CAPSULE ORAL 3 TIMES DAILY
Qty: 21 | Refills: 1 | Status: DISCONTINUED | COMMUNITY
Start: 2018-12-03 | End: 2019-01-17

## 2019-01-28 RX ORDER — HYDROCODONE BITARTRATE AND HOMATROPINE METHYLBROMIDE 5; 1.5 MG/5ML; MG/5ML
5-1.5 SYRUP ORAL
Qty: 240 | Refills: 0 | Status: ACTIVE | COMMUNITY
Start: 2019-01-28 | End: 1900-01-01

## 2019-02-11 ENCOUNTER — OTHER (OUTPATIENT)
Age: 79
End: 2019-02-11

## 2019-02-18 LAB
ALBUMIN SERPL ELPH-MCNC: 3.8 G/DL
ALP BLD-CCNC: 86 U/L
ALT SERPL-CCNC: 31 U/L
ANION GAP SERPL CALC-SCNC: 9 MMOL/L
AST SERPL-CCNC: 32 U/L
BILIRUB SERPL-MCNC: 1.2 MG/DL
BUN SERPL-MCNC: 8 MG/DL
CALCIUM SERPL-MCNC: 8.9 MG/DL
CHLORIDE SERPL-SCNC: 103 MMOL/L
CO2 SERPL-SCNC: 25 MMOL/L
CREAT SERPL-MCNC: 0.59 MG/DL
GLUCOSE SERPL-MCNC: 101 MG/DL
LDH SERPL-CCNC: 187 U/L
PHOSPHATE SERPL-MCNC: 3.6 MG/DL
POTASSIUM SERPL-SCNC: 4.4 MMOL/L
PROT SERPL-MCNC: 5.4 G/DL
SODIUM SERPL-SCNC: 137 MMOL/L
URATE SERPL-MCNC: 3.2 MG/DL

## 2019-02-18 NOTE — HISTORY OF PRESENT ILLNESS
[Disease:__________________________] : Disease: [unfilled] [Treatment Protocol] : Treatment Protocol [Therapy: ___] : Therapy: [unfilled] [Cycle: ___] : Cycle: [unfilled] [de-identified] : Mrs. White is a 77 year old woman with a newly diagnosed lymphoma of FCC origin presenting with cough, dyspnea and bulky paratracheal and hilar disease.  She had had generally good health.  In January, 2017 she developed increased cough, dyspnea and was admitted to [place] when she was found to be in rapid atrial fibrillation.  She also was (+) for influenza A>  She was hospitalized for one week. Right paratracheal and perihilar densities were seen in a CXR on 2/24/17.  CT of chest with contrast on 2/28/2017 showed as compared with prior CT scans of chest in 6/2015 and 8/2016 a nodular soft tissue mass measuring up to 6.4 x 5.3 x 6.4 cm extending from right paratracheal region inferiorly to aortopulmonary and pretracheal regions, encasing RUL pulmonary artery and with mass effect on SVC and mild tracheal compression at the jon.  Stable right thyroid nodules and subcm lung granulomata were unchanged. Flexible bronchoscopy was negative.  Steroids were started on 3/30/17  because of worsening pulmonary status. 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 Pathology was read at [facility]. The lymphoma was (+) for CD10,  CD20, BCL-2 and (-) for CD5 and BCL-6.  Ki-67 was described as "low."  \par She was gven prednisone and  had a marked improvement in her pulmonary status.  She saw Dr. Ashtyn Costello a few days ago and had a PET/CT 4/25/17 which showed generally marked regression in her dominant paratracheal, perihilar disease.  While a non-FDG+  1.5 x 1.1 m  RUL nodule was larger than a ground glass nodule i \par CT  of chest from 3/390/17,  right hilar adenopathy  was 2.9 x 1.8 cm with an SUV of 10.9 compared with 6.1 x 4.7 cm in prior CT; left paratracheal node was 1.5 x 1 cm with SUV 2.9 vs. 2 cm in earlier CT.   An FDG (-) subcarinal node was seen.  She also has a calcified, weakly FDG+ right thyroid nodule. She is ambulatory, without significant cough or LAWRENCE.  In the last week, there has been some recurrence of cough, albeit milder than prior to steroid therapy. She has had no significant constitutional symptoms except for weight loss bordering  on 10% of her baseline weight.  On steroids, appetite, weight loss have stabilized.\par Atrial fibrillation was initially treated with metoprolol, on which she developed a rash.  She is on olmesartan 10 mg/d and tolerating it well.  She is also on eliquis 5 mg po 2x/day.  She does not have cardiac complaints recentlyShe had a left DCIS in 2013 s/p mastectomy and prophylactic roight mastectomy.  She has a h/o HTN and partial left knee replacement. [de-identified] : II vs IV [de-identified] : low Ki-67 [de-identified] : DLBCL- completed Radiation therapy, Rituxan weekly x 4 then every other week x 4 then monthly x 4, now on q8 wk rituxan along with rev 15 mg po 21/28d.  Has had intermittent rash, pruritus likely rev related, currently mild and control with TAC.  Before RT, had paratracheal mass causing SOB and dysphagia.\par Significant cough which she had has greatly diminished.\par  \par AFIB- on Xarelto, no c/o palpitation or , CP\par hypertension- controlled with olmesartan\par thrombocytopenia, mild  likely rev related, \par wants to go to [place] for a month

## 2019-02-18 NOTE — PHYSICAL EXAM
[Restricted in physically strenuous activity but ambulatory and able to carry out work of a light or sedentary nature] : Status 1- Restricted in physically strenuous activity but ambulatory and able to carry out work of a light or sedentary nature, e.g., light house work, office work [Normal] : affect appropriate [Thin] : thin

## 2019-02-18 NOTE — ASSESSMENT
[FreeTextEntry1] : relapse DLBCL status post radiation therapy to chest  now receiving Rituxan and Revlimid.\par Excellent clinical response\par Cont. R2, with ASA held as Xarelto should provide adequate coverage re rev's prothrombotic risk.\par plan; follow CMP, LDH \par Rituxan now q8 wks\par cleared for trip to [place]\par \par RV 4-6 wks [Palliative] : Goals of care discussed with patient: Palliative

## 2019-03-07 ENCOUNTER — RX RENEWAL (OUTPATIENT)
Age: 79
End: 2019-03-07

## 2019-03-07 RX ORDER — LENALIDOMIDE 15 MG/1
15 CAPSULE ORAL
Qty: 21 | Refills: 0 | Status: DISCONTINUED | COMMUNITY
Start: 2018-12-28 | End: 2019-03-07

## 2019-03-07 RX ORDER — LENALIDOMIDE 15 MG/1
15 CAPSULE ORAL
Qty: 21 | Refills: 0 | Status: DISCONTINUED | COMMUNITY
Start: 2019-02-11 | End: 2019-03-07

## 2019-03-07 RX ORDER — LENALIDOMIDE 15 MG/1
15 CAPSULE ORAL
Qty: 21 | Refills: 0 | Status: DISCONTINUED | COMMUNITY
Start: 2018-07-31 | End: 2019-03-07

## 2019-03-11 ENCOUNTER — APPOINTMENT (OUTPATIENT)
Dept: INFUSION THERAPY | Facility: HOSPITAL | Age: 79
End: 2019-03-11

## 2019-03-15 ENCOUNTER — OUTPATIENT (OUTPATIENT)
Dept: OUTPATIENT SERVICES | Facility: HOSPITAL | Age: 79
LOS: 1 days | Discharge: HOME | End: 2019-03-15

## 2019-03-15 DIAGNOSIS — C82.97 FOLLICULAR LYMPHOMA, UNSPECIFIED, SPLEEN: ICD-10-CM

## 2019-03-15 DIAGNOSIS — M12.9 ARTHROPATHY, UNSPECIFIED: Chronic | ICD-10-CM

## 2019-03-15 DIAGNOSIS — Z90.89 ACQUIRED ABSENCE OF OTHER ORGANS: Chronic | ICD-10-CM

## 2019-03-15 DIAGNOSIS — Z90.13 ACQUIRED ABSENCE OF BILATERAL BREASTS AND NIPPLES: Chronic | ICD-10-CM

## 2019-03-15 DIAGNOSIS — H26.9 UNSPECIFIED CATARACT: Chronic | ICD-10-CM

## 2019-03-18 ENCOUNTER — RX RENEWAL (OUTPATIENT)
Age: 79
End: 2019-03-18

## 2019-03-21 ENCOUNTER — APPOINTMENT (OUTPATIENT)
Dept: HEMATOLOGY ONCOLOGY | Facility: CLINIC | Age: 79
End: 2019-03-21
Payer: MEDICARE

## 2019-03-21 ENCOUNTER — RESULT REVIEW (OUTPATIENT)
Age: 79
End: 2019-03-21

## 2019-03-21 VITALS
RESPIRATION RATE: 16 BRPM | DIASTOLIC BLOOD PRESSURE: 95 MMHG | BODY MASS INDEX: 20.77 KG/M2 | OXYGEN SATURATION: 97 % | HEART RATE: 85 BPM | SYSTOLIC BLOOD PRESSURE: 151 MMHG | TEMPERATURE: 98.3 F | WEIGHT: 113.54 LBS

## 2019-03-21 DIAGNOSIS — E87.1 HYPO-OSMOLALITY AND HYPONATREMIA: ICD-10-CM

## 2019-03-21 DIAGNOSIS — Z87.898 PERSONAL HISTORY OF OTHER SPECIFIED CONDITIONS: ICD-10-CM

## 2019-03-21 DIAGNOSIS — I10 ESSENTIAL (PRIMARY) HYPERTENSION: ICD-10-CM

## 2019-03-21 DIAGNOSIS — Z86.79 PERSONAL HISTORY OF OTHER DISEASES OF THE CIRCULATORY SYSTEM: ICD-10-CM

## 2019-03-21 DIAGNOSIS — C83.32: ICD-10-CM

## 2019-03-21 DIAGNOSIS — Z51.11 ENCOUNTER FOR ANTINEOPLASTIC CHEMOTHERAPY: ICD-10-CM

## 2019-03-21 DIAGNOSIS — Z87.2 PERSONAL HISTORY OF DISEASES OF THE SKIN AND SUBCUTANEOUS TISSUE: ICD-10-CM

## 2019-03-21 DIAGNOSIS — R05 COUGH: ICD-10-CM

## 2019-03-21 DIAGNOSIS — Z01.810 ENCOUNTER FOR PREPROCEDURAL CARDIOVASCULAR EXAMINATION: ICD-10-CM

## 2019-03-21 DIAGNOSIS — I48.91 UNSPECIFIED ATRIAL FIBRILLATION: ICD-10-CM

## 2019-03-21 DIAGNOSIS — D64.81 ANEMIA DUE TO ANTINEOPLASTIC CHEMOTHERAPY: ICD-10-CM

## 2019-03-21 DIAGNOSIS — T45.1X5A ANEMIA DUE TO ANTINEOPLASTIC CHEMOTHERAPY: ICD-10-CM

## 2019-03-21 LAB
BASOPHILS NFR BLD AUTO: 2 % — SIGNIFICANT CHANGE UP (ref 0–2)
EOSINOPHIL # BLD AUTO: 0.1 K/UL — SIGNIFICANT CHANGE UP (ref 0–0.5)
HCT VFR BLD CALC: 33.6 % — LOW (ref 34.5–45)
HGB BLD-MCNC: 11.8 G/DL — SIGNIFICANT CHANGE UP (ref 11.5–15.5)
LYMPHOCYTES # BLD AUTO: 0.9 K/UL — LOW (ref 1–3.3)
LYMPHOCYTES # BLD AUTO: 32 % — SIGNIFICANT CHANGE UP (ref 13–44)
MCHC RBC-ENTMCNC: 32 PG — SIGNIFICANT CHANGE UP (ref 27–34)
MCHC RBC-ENTMCNC: 35 G/DL — SIGNIFICANT CHANGE UP (ref 32–36)
MCV RBC AUTO: 91.2 FL — SIGNIFICANT CHANGE UP (ref 80–100)
MONOCYTES # BLD AUTO: 0.4 K/UL — SIGNIFICANT CHANGE UP (ref 0–0.9)
MONOCYTES NFR BLD AUTO: 18 % — HIGH (ref 2–14)
NEUTROPHILS # BLD AUTO: 1.4 K/UL — LOW (ref 1.8–7.4)
NEUTROPHILS NFR BLD AUTO: 48 % — SIGNIFICANT CHANGE UP (ref 43–77)
PLAT MORPH BLD: NORMAL — SIGNIFICANT CHANGE UP
PLATELET # BLD AUTO: 129 K/UL — LOW (ref 150–400)
RBC # BLD: 3.69 M/UL — LOW (ref 3.8–5.2)
RBC # FLD: 15.2 % — HIGH (ref 10.3–14.5)
RBC BLD AUTO: SIGNIFICANT CHANGE UP
WBC # BLD: 2.8 K/UL — LOW (ref 3.8–10.5)
WBC # FLD AUTO: 2.8 K/UL — LOW (ref 3.8–10.5)

## 2019-03-21 PROCEDURE — 99214 OFFICE O/P EST MOD 30 MIN: CPT

## 2019-03-21 RX ORDER — LENALIDOMIDE 15 MG/1
15 CAPSULE ORAL
Qty: 21 | Refills: 0 | Status: COMPLETED | COMMUNITY
Start: 2019-03-07 | End: 2019-03-21

## 2019-03-21 RX ORDER — IPRATROPIUM BROMIDE AND ALBUTEROL 20; 100 UG/1; UG/1
20-100 SPRAY, METERED RESPIRATORY (INHALATION) 4 TIMES DAILY
Qty: 1 | Refills: 0 | Status: DISCONTINUED | COMMUNITY
Start: 2018-09-20 | End: 2019-03-21

## 2019-03-21 RX ORDER — OMEPRAZOLE 20 MG
20 TABLET, DELAYED RELEASE (ENTERIC COATED) ORAL
Qty: 30 | Refills: 0 | Status: COMPLETED | COMMUNITY
Start: 2018-09-20 | End: 2019-03-21

## 2019-03-21 RX ORDER — HYDROCODONE BITARTRATE AND HOMATROPINE METHYLBROMIDE 5; 1.5 MG/5ML; MG/5ML
5-1.5 SYRUP ORAL
Qty: 240 | Refills: 0 | Status: ACTIVE | COMMUNITY
Start: 2019-03-21 | End: 1900-01-01

## 2019-03-21 RX ORDER — HYDROCODONE BITARTRATE AND HOMATROPINE METHYLBROMIDE 5; 1.5 MG/5ML; MG/5ML
5-1.5 SYRUP ORAL
Qty: 240 | Refills: 0 | Status: COMPLETED | COMMUNITY
Start: 2018-12-03 | End: 2019-03-21

## 2019-03-25 ENCOUNTER — RESULT REVIEW (OUTPATIENT)
Age: 79
End: 2019-03-25

## 2019-03-25 ENCOUNTER — LABORATORY RESULT (OUTPATIENT)
Age: 79
End: 2019-03-25

## 2019-03-25 ENCOUNTER — APPOINTMENT (OUTPATIENT)
Dept: INFUSION THERAPY | Facility: HOSPITAL | Age: 79
End: 2019-03-25

## 2019-03-25 LAB
ALBUMIN SERPL ELPH-MCNC: 4.1 G/DL
ALP BLD-CCNC: 82 U/L
ALT SERPL-CCNC: 13 U/L
ANION GAP SERPL CALC-SCNC: 13 MMOL/L
AST SERPL-CCNC: 17 U/L
B2 MICROGLOB SERPL-MCNC: 2.2 MG/L
BASOPHILS # BLD AUTO: 0 K/UL — SIGNIFICANT CHANGE UP (ref 0–0.2)
BASOPHILS NFR BLD AUTO: 0.4 % — SIGNIFICANT CHANGE UP (ref 0–2)
BILIRUB SERPL-MCNC: 1.7 MG/DL
BUN SERPL-MCNC: 10 MG/DL
CALCIUM SERPL-MCNC: 9.2 MG/DL
CHLORIDE SERPL-SCNC: 101 MMOL/L
CO2 SERPL-SCNC: 26 MMOL/L
CREAT SERPL-MCNC: 0.61 MG/DL
EOSINOPHIL # BLD AUTO: 0.3 K/UL — SIGNIFICANT CHANGE UP (ref 0–0.5)
EOSINOPHIL NFR BLD AUTO: 8.6 % — HIGH (ref 0–6)
GLUCOSE SERPL-MCNC: 116 MG/DL
HCT VFR BLD CALC: 37.1 % — SIGNIFICANT CHANGE UP (ref 34.5–45)
HGB BLD-MCNC: 12.9 G/DL — SIGNIFICANT CHANGE UP (ref 11.5–15.5)
LDH SERPL-CCNC: 214 U/L
LYMPHOCYTES # BLD AUTO: 1.2 K/UL — SIGNIFICANT CHANGE UP (ref 1–3.3)
LYMPHOCYTES # BLD AUTO: 40 % — SIGNIFICANT CHANGE UP (ref 13–44)
MCHC RBC-ENTMCNC: 31.4 PG — SIGNIFICANT CHANGE UP (ref 27–34)
MCHC RBC-ENTMCNC: 34.7 G/DL — SIGNIFICANT CHANGE UP (ref 32–36)
MCV RBC AUTO: 90.5 FL — SIGNIFICANT CHANGE UP (ref 80–100)
MONOCYTES # BLD AUTO: 0.5 K/UL — SIGNIFICANT CHANGE UP (ref 0–0.9)
MONOCYTES NFR BLD AUTO: 15.4 % — HIGH (ref 2–14)
NEUTROPHILS # BLD AUTO: 1.1 K/UL — LOW (ref 1.8–7.4)
NEUTROPHILS NFR BLD AUTO: 35.6 % — LOW (ref 43–77)
PLATELET # BLD AUTO: 144 K/UL — LOW (ref 150–400)
POTASSIUM SERPL-SCNC: 3.7 MMOL/L
PROT SERPL-MCNC: 5.5 G/DL
RBC # BLD: 4.1 M/UL — SIGNIFICANT CHANGE UP (ref 3.8–5.2)
RBC # FLD: 15.3 % — HIGH (ref 10.3–14.5)
SODIUM SERPL-SCNC: 140 MMOL/L
WBC # BLD: 3 K/UL — LOW (ref 3.8–10.5)
WBC # FLD AUTO: 3 K/UL — LOW (ref 3.8–10.5)

## 2019-03-26 DIAGNOSIS — Z51.11 ENCOUNTER FOR ANTINEOPLASTIC CHEMOTHERAPY: ICD-10-CM

## 2019-03-26 DIAGNOSIS — R11.2 NAUSEA WITH VOMITING, UNSPECIFIED: ICD-10-CM

## 2019-03-31 PROBLEM — E87.1 HYPOSMOLALITY AND/OR HYPONATREMIA: Status: RESOLVED | Noted: 2017-05-23 | Resolved: 2019-03-31

## 2019-03-31 PROBLEM — Z87.898 HISTORY OF FEVER: Status: RESOLVED | Noted: 2018-02-09 | Resolved: 2019-03-31

## 2019-03-31 PROBLEM — Z87.2 HISTORY OF DRUG RASH: Status: RESOLVED | Noted: 2018-04-20 | Resolved: 2019-03-31

## 2019-03-31 PROBLEM — Z86.79 HISTORY OF ATRIAL FIBRILLATION: Status: RESOLVED | Noted: 2017-08-18 | Resolved: 2019-03-31

## 2019-03-31 PROBLEM — R05 COUGH: Status: ACTIVE | Noted: 2017-04-29

## 2019-03-31 PROBLEM — Z01.810 PREOP CARDIOVASCULAR EXAM: Status: RESOLVED | Noted: 2017-12-04 | Resolved: 2019-03-31

## 2019-03-31 PROBLEM — Z51.11 ENCOUNTER FOR CHEMOTHERAPY MANAGEMENT: Status: ACTIVE | Noted: 2017-08-18

## 2019-03-31 NOTE — REVIEW OF SYSTEMS
[Fatigue] : fatigue [Cough] : cough [Negative] : Heme/Lymph [FreeTextEntry3] : blurry vision [FreeTextEntry9] : mikayla kendrick lower legs , pain behind knee

## 2019-03-31 NOTE — HISTORY OF PRESENT ILLNESS
[Disease:__________________________] : Disease: [unfilled] [de-identified] : Mrs. White is a 77 year old woman with a newly diagnosed lymphoma of FCC origin presenting with cough, dyspnea and bulky paratracheal and hilar disease.  She had had generally good health.  In January, 2017 she developed increased cough, dyspnea and was admitted to [facility] when she was found to be in rapid atrial fibrillation.  She also was (+) for influenza A>  She was hospitalized for one week. Right paratracheal and perihilar densities were seen in a CXR on 2/24/17.  CT of chest with contrast on 2/28/2017 showed as compared with prior CT scans of chest in 6/2015 and 8/2016 a nodular soft tissue mass measuring up to 6.4 x 5.3 x 6.4 cm extending from right paratracheal region inferiorly to aortopulmonary and pretracheal regions, encasing RUL pulmonary artery and with mass effect on SVC and mild tracheal compression at the jon.  Stable right thyroid nodules and subcm lung granulomata were unchanged. Flexible bronchoscopy was negative.  Steroids were started on 3/30/17  because of worsening pulmonary status. 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 Pathology was read at [facility]. The lymphoma was (+) for CD10,  CD20, BCL-2 and (-) for CD5 and BCL-6.  Ki-67 was described as "low."  \par She was gven prednisone and  had a marked improvement in her pulmonary status.  She saw Dr. Ashtyn Costello a few days ago and had a PET/CT 4/25/17 which showed generally marked regression in her dominant paratracheal, perihilar disease.  While a non-FDG+  1.5 x 1.1 m  RUL nodule was larger than a ground glass nodule i \par CT  of chest from 3/390/17,  right hilar adenopathy  was 2.9 x 1.8 cm with an SUV of 10.9 compared with 6.1 x 4.7 cm in prior CT; left paratracheal node was 1.5 x 1 cm with SUV 2.9 vs. 2 cm in earlier CT.   An FDG (-) subcarinal node was seen.  She also has a calcified, weakly FDG+ right thyroid nodule. She is ambulatory, without significant cough or LAWRENCE.  In the last week, there has been some recurrence of cough, albeit milder than prior to steroid therapy. She has had no significant constitutional symptoms except for weight loss bordering  on 10% of her baseline weight.  On steroids, appetite, weight loss have stabilized.\par Atrial fibrillation was initially treated with metoprolol, on which she developed a rash.  She is on olmesartan 10 mg/d and tolerating it well.  She is also on eliquis 5 mg po 2x/day.  She does not have cardiac complaints recentlyShe had a left DCIS in 2013 s/p mastectomy and prophylactic roight mastectomy.  She has a h/o HTN and partial left knee replacement. [de-identified] : II vs IV [de-identified] : low Ki-67 [de-identified] : fatigue , fall asleep during day \par memory loss hard remember things\par cough with occasional yellow mucus with SOB\par blurry vision\par swelling ankle and pain behind knee right \par patient was down in [place] for past 2 months delayed restarting Revlimid secondary to side effects as above but has now decided to take medication and is planning to continue Rituxan seen with Dr Nguyen

## 2019-03-31 NOTE — ASSESSMENT
[FreeTextEntry1] : relapse DLBCL status post radiation therapy to chest  now receiving Rituxan and Revlimid.\par Excellent clinical response\par Cont. R2, with ASA held as Xarelto should provide adequate coverage re rev's prothrombotic risk.\par plan; follow CMP, LDH \par Rituxan now q8 wks\par \par RV 4-6 wks [Palliative] : Goals of care discussed with patient: Palliative

## 2019-04-03 ENCOUNTER — APPOINTMENT (OUTPATIENT)
Dept: ULTRASOUND IMAGING | Facility: IMAGING CENTER | Age: 79
End: 2019-04-03
Payer: MEDICARE

## 2019-04-03 ENCOUNTER — MEDICATION RENEWAL (OUTPATIENT)
Age: 79
End: 2019-04-03

## 2019-04-03 ENCOUNTER — RESULT REVIEW (OUTPATIENT)
Age: 79
End: 2019-04-03

## 2019-04-03 ENCOUNTER — APPOINTMENT (OUTPATIENT)
Dept: HEMATOLOGY ONCOLOGY | Facility: CLINIC | Age: 79
End: 2019-04-03

## 2019-04-03 ENCOUNTER — APPOINTMENT (OUTPATIENT)
Dept: INFUSION THERAPY | Facility: HOSPITAL | Age: 79
End: 2019-04-03

## 2019-04-03 ENCOUNTER — RX CHANGE (OUTPATIENT)
Age: 79
End: 2019-04-03

## 2019-04-03 LAB
BASOPHILS # BLD AUTO: 0 K/UL — SIGNIFICANT CHANGE UP (ref 0–0.2)
BASOPHILS NFR BLD AUTO: 1.6 % — SIGNIFICANT CHANGE UP (ref 0–2)
EOSINOPHIL # BLD AUTO: 0 K/UL — SIGNIFICANT CHANGE UP (ref 0–0.5)
EOSINOPHIL NFR BLD AUTO: 2.9 % — SIGNIFICANT CHANGE UP (ref 0–6)
HCT VFR BLD CALC: 34 % — LOW (ref 34.5–45)
HGB BLD-MCNC: 11.9 G/DL — SIGNIFICANT CHANGE UP (ref 11.5–15.5)
LYMPHOCYTES # BLD AUTO: 0.8 K/UL — LOW (ref 1–3.3)
LYMPHOCYTES # BLD AUTO: 63 % — HIGH (ref 13–44)
MCHC RBC-ENTMCNC: 31.8 PG — SIGNIFICANT CHANGE UP (ref 27–34)
MCHC RBC-ENTMCNC: 35 G/DL — SIGNIFICANT CHANGE UP (ref 32–36)
MCV RBC AUTO: 90.9 FL — SIGNIFICANT CHANGE UP (ref 80–100)
MONOCYTES # BLD AUTO: 0.2 K/UL — SIGNIFICANT CHANGE UP (ref 0–0.9)
MONOCYTES NFR BLD AUTO: 18.1 % — HIGH (ref 2–14)
NEUTROPHILS # BLD AUTO: 0.2 K/UL — LOW (ref 1.8–7.4)
NEUTROPHILS NFR BLD AUTO: 14.4 % — LOW (ref 43–77)
PLATELET # BLD AUTO: 104 K/UL — LOW (ref 150–400)
RBC # BLD: 3.74 M/UL — LOW (ref 3.8–5.2)
RBC # FLD: 15.3 % — HIGH (ref 10.3–14.5)
WBC # BLD: 1.3 K/UL — LOW (ref 3.8–10.5)
WBC # FLD AUTO: 1.3 K/UL — LOW (ref 3.8–10.5)

## 2019-04-03 RX ORDER — LEVOFLOXACIN 500 MG/1
500 TABLET, FILM COATED ORAL DAILY
Qty: 14 | Refills: 0 | Status: ACTIVE | COMMUNITY
Start: 2019-04-03 | End: 1900-01-01

## 2019-04-03 RX ORDER — PREDNISONE 20 MG/1
20 TABLET ORAL
Qty: 60 | Refills: 0 | Status: ACTIVE | COMMUNITY
Start: 2019-04-03 | End: 1900-01-01

## 2019-04-04 DIAGNOSIS — C83.30 DIFFUSE LARGE B-CELL LYMPHOMA, UNSPECIFIED SITE: ICD-10-CM

## 2019-04-04 DIAGNOSIS — R79.1 ABNORMAL COAGULATION PROFILE: ICD-10-CM

## 2019-04-04 DIAGNOSIS — Z51.89 ENCOUNTER FOR OTHER SPECIFIED AFTERCARE: ICD-10-CM

## 2019-04-04 RX ORDER — PHYTONADIONE 5 MG/1
5 TABLET ORAL
Qty: 6 | Refills: 1 | Status: ACTIVE | COMMUNITY
Start: 2019-04-04 | End: 1900-01-01

## 2019-04-05 ENCOUNTER — RESULT REVIEW (OUTPATIENT)
Age: 79
End: 2019-04-05

## 2019-04-05 ENCOUNTER — APPOINTMENT (OUTPATIENT)
Dept: INFUSION THERAPY | Facility: HOSPITAL | Age: 79
End: 2019-04-05

## 2019-04-05 ENCOUNTER — RX RENEWAL (OUTPATIENT)
Age: 79
End: 2019-04-05

## 2019-04-05 ENCOUNTER — APPOINTMENT (OUTPATIENT)
Dept: HEMATOLOGY ONCOLOGY | Facility: CLINIC | Age: 79
End: 2019-04-05

## 2019-04-05 LAB
BASOPHILS # BLD AUTO: 0.1 K/UL — SIGNIFICANT CHANGE UP (ref 0–0.2)
BASOPHILS NFR BLD AUTO: 2 % — SIGNIFICANT CHANGE UP (ref 0–2)
EOSINOPHIL # BLD AUTO: 0 K/UL — SIGNIFICANT CHANGE UP (ref 0–0.5)
EOSINOPHIL NFR BLD AUTO: 0.1 % — SIGNIFICANT CHANGE UP (ref 0–6)
HCT VFR BLD CALC: 32.9 % — LOW (ref 34.5–45)
HGB BLD-MCNC: 11.2 G/DL — LOW (ref 11.5–15.5)
LYMPHOCYTES # BLD AUTO: 0.8 K/UL — LOW (ref 1–3.3)
LYMPHOCYTES # BLD AUTO: 30.8 % — SIGNIFICANT CHANGE UP (ref 13–44)
MCHC RBC-ENTMCNC: 31.4 PG — SIGNIFICANT CHANGE UP (ref 27–34)
MCHC RBC-ENTMCNC: 34.1 G/DL — SIGNIFICANT CHANGE UP (ref 32–36)
MCV RBC AUTO: 91.9 FL — SIGNIFICANT CHANGE UP (ref 80–100)
MONOCYTES # BLD AUTO: 0.6 K/UL — SIGNIFICANT CHANGE UP (ref 0–0.9)
MONOCYTES NFR BLD AUTO: 23.3 % — HIGH (ref 2–14)
NEUTROPHILS # BLD AUTO: 1.2 K/UL — LOW (ref 1.8–7.4)
NEUTROPHILS NFR BLD AUTO: 43.9 % — SIGNIFICANT CHANGE UP (ref 43–77)
PLATELET # BLD AUTO: 124 K/UL — SIGNIFICANT CHANGE UP (ref 150–400)
RBC # BLD: 3.58 M/UL — LOW (ref 3.8–5.2)
RBC # FLD: 15.3 % — HIGH (ref 10.3–14.5)
WBC # BLD: 2.7 K/UL — LOW (ref 3.8–10.5)
WBC # FLD AUTO: 2.7 K/UL — LOW (ref 3.8–10.5)

## 2019-04-05 RX ORDER — HYDROCODONE BITARTRATE AND HOMATROPINE METHYLBROMIDE 5; 1.5 MG/5ML; MG/5ML
5-1.5 SYRUP ORAL
Qty: 240 | Refills: 0 | Status: ACTIVE | COMMUNITY
Start: 2019-04-05 | End: 1900-01-01

## 2019-04-06 ENCOUNTER — APPOINTMENT (OUTPATIENT)
Dept: INFUSION THERAPY | Facility: HOSPITAL | Age: 79
End: 2019-04-06

## 2019-04-08 ENCOUNTER — FORM ENCOUNTER (OUTPATIENT)
Age: 79
End: 2019-04-08

## 2019-04-08 ENCOUNTER — RESULT REVIEW (OUTPATIENT)
Age: 79
End: 2019-04-08

## 2019-04-08 ENCOUNTER — APPOINTMENT (OUTPATIENT)
Dept: INFUSION THERAPY | Facility: HOSPITAL | Age: 79
End: 2019-04-08

## 2019-04-08 ENCOUNTER — APPOINTMENT (OUTPATIENT)
Dept: HEMATOLOGY ONCOLOGY | Facility: CLINIC | Age: 79
End: 2019-04-08

## 2019-04-08 DIAGNOSIS — C82.32: ICD-10-CM

## 2019-04-08 DIAGNOSIS — D64.81 ANEMIA DUE TO ANTINEOPLASTIC CHEMOTHERAPY: ICD-10-CM

## 2019-04-08 LAB
APTT BLD: 31.7 SEC
BASOPHILS # BLD AUTO: 0 K/UL — SIGNIFICANT CHANGE UP (ref 0–0.2)
DOHLE BOD BLD QL SMEAR: PRESENT — SIGNIFICANT CHANGE UP
EOSINOPHIL # BLD AUTO: 0 K/UL — SIGNIFICANT CHANGE UP (ref 0–0.5)
EOSINOPHIL NFR BLD AUTO: 1 % — SIGNIFICANT CHANGE UP (ref 0–6)
HCT VFR BLD CALC: 37.8 % — SIGNIFICANT CHANGE UP (ref 34.5–45)
HGB BLD-MCNC: 12.7 G/DL — SIGNIFICANT CHANGE UP (ref 11.5–15.5)
INR PPP: 1.93 RATIO
LYMPHOCYTES # BLD AUTO: 1.7 K/UL — SIGNIFICANT CHANGE UP (ref 1–3.3)
LYMPHOCYTES # BLD AUTO: 9 % — LOW (ref 13–44)
MCHC RBC-ENTMCNC: 30.8 PG — SIGNIFICANT CHANGE UP (ref 27–34)
MCHC RBC-ENTMCNC: 33.5 G/DL — SIGNIFICANT CHANGE UP (ref 32–36)
MCV RBC AUTO: 92 FL — SIGNIFICANT CHANGE UP (ref 80–100)
MONOCYTES # BLD AUTO: 2.4 K/UL — HIGH (ref 0–0.9)
MONOCYTES NFR BLD AUTO: 12 % — SIGNIFICANT CHANGE UP (ref 2–14)
NEUTROPHILS # BLD AUTO: 32.6 K/UL — HIGH (ref 1.8–7.4)
NEUTROPHILS NFR BLD AUTO: 64 % — SIGNIFICANT CHANGE UP (ref 43–77)
NEUTS BAND # BLD: 14 % — HIGH (ref 0–8)
NPP-PT: 11.6 SEC
PLAT MORPH BLD: NORMAL — SIGNIFICANT CHANGE UP
PLATELET # BLD AUTO: 113 K/UL — LOW (ref 150–400)
PROTHROMBIN TIME/NOMAL: 15.4 SEC
PT BLD: 22.7 SEC
PT BLD: 22.7 SEC
RBC # BLD: 4.11 M/UL — SIGNIFICANT CHANGE UP (ref 3.8–5.2)
RBC # FLD: 15.9 % — HIGH (ref 10.3–14.5)
RBC BLD AUTO: SIGNIFICANT CHANGE UP
WBC # BLD: 36.6 K/UL — HIGH (ref 3.8–10.5)
WBC # FLD AUTO: 36.6 K/UL — HIGH (ref 3.8–10.5)

## 2019-04-09 ENCOUNTER — RESULT REVIEW (OUTPATIENT)
Age: 79
End: 2019-04-09

## 2019-04-09 ENCOUNTER — OUTPATIENT (OUTPATIENT)
Dept: OUTPATIENT SERVICES | Facility: HOSPITAL | Age: 79
LOS: 1 days | End: 2019-04-09
Payer: MEDICARE

## 2019-04-09 ENCOUNTER — APPOINTMENT (OUTPATIENT)
Dept: ULTRASOUND IMAGING | Facility: IMAGING CENTER | Age: 79
End: 2019-04-09
Payer: MEDICARE

## 2019-04-09 DIAGNOSIS — R05 COUGH: ICD-10-CM

## 2019-04-09 DIAGNOSIS — T45.1X5A ADVERSE EFFECT OF ANTINEOPLASTIC AND IMMUNOSUPPRESSIVE DRUGS, INITIAL ENCOUNTER: ICD-10-CM

## 2019-04-09 DIAGNOSIS — R06.02 SHORTNESS OF BREATH: ICD-10-CM

## 2019-04-09 DIAGNOSIS — C83.32 DIFFUSE LARGE B-CELL LYMPHOMA, INTRATHORACIC LYMPH NODES: ICD-10-CM

## 2019-04-09 DIAGNOSIS — Z90.89 ACQUIRED ABSENCE OF OTHER ORGANS: Chronic | ICD-10-CM

## 2019-04-09 DIAGNOSIS — M12.9 ARTHROPATHY, UNSPECIFIED: Chronic | ICD-10-CM

## 2019-04-09 DIAGNOSIS — H26.9 UNSPECIFIED CATARACT: Chronic | ICD-10-CM

## 2019-04-09 DIAGNOSIS — Z90.13 ACQUIRED ABSENCE OF BILATERAL BREASTS AND NIPPLES: Chronic | ICD-10-CM

## 2019-04-09 LAB
AMYLASE FLD-CCNC: 13 U/L — SIGNIFICANT CHANGE UP
APTT BLD: 28.1 SEC
B PERT IGG+IGM PNL SER: CLEAR — SIGNIFICANT CHANGE UP
COLOR FLD: YELLOW — SIGNIFICANT CHANGE UP
FIBRINOGEN PPP COAG.DERIVED-MCNC: 513 MG/DL
FLUID INTAKE SUBSTANCE CLASS: SIGNIFICANT CHANGE UP
FLUID SEGMENTED GRANULOCYTES: 23 % — SIGNIFICANT CHANGE UP
GLUCOSE FLD-MCNC: 106 MG/DL — SIGNIFICANT CHANGE UP
INR PPP: 1.2 RATIO
LYMPHOCYTES # FLD: 39 % — SIGNIFICANT CHANGE UP
MESOTHL CELL # FLD: 9 % — SIGNIFICANT CHANGE UP
MONOS+MACROS # FLD: 23 % — SIGNIFICANT CHANGE UP
OTHER CELLS FLD MANUAL: 6 % — SIGNIFICANT CHANGE UP
PROT FLD-MCNC: 2 G/DL — SIGNIFICANT CHANGE UP
PT BLD: 13.8 SEC
RCV VOL RI: 112 /UL — HIGH (ref 0–5)
TOTAL NUCLEATED CELL COUNT, BODY FLUID: 163 /UL — HIGH (ref 0–5)
TUBE TYPE: SIGNIFICANT CHANGE UP

## 2019-04-09 PROCEDURE — 88112 CYTOPATH CELL ENHANCE TECH: CPT | Mod: 26

## 2019-04-09 PROCEDURE — 88342 IMHCHEM/IMCYTCHM 1ST ANTB: CPT

## 2019-04-09 PROCEDURE — 71046 X-RAY EXAM CHEST 2 VIEWS: CPT | Mod: 26

## 2019-04-09 PROCEDURE — 71046 X-RAY EXAM CHEST 2 VIEWS: CPT

## 2019-04-09 PROCEDURE — 82150 ASSAY OF AMYLASE: CPT

## 2019-04-09 PROCEDURE — 87075 CULTR BACTERIA EXCEPT BLOOD: CPT

## 2019-04-09 PROCEDURE — 82945 GLUCOSE OTHER FLUID: CPT

## 2019-04-09 PROCEDURE — 88185 FLOWCYTOMETRY/TC ADD-ON: CPT

## 2019-04-09 PROCEDURE — 88108 CYTOPATH CONCENTRATE TECH: CPT | Mod: 26,59

## 2019-04-09 PROCEDURE — 88341 IMHCHEM/IMCYTCHM EA ADD ANTB: CPT | Mod: 26

## 2019-04-09 PROCEDURE — 88342 IMHCHEM/IMCYTCHM 1ST ANTB: CPT | Mod: 26,59

## 2019-04-09 PROCEDURE — 88189 FLOWCYTOMETRY/READ 16 & >: CPT

## 2019-04-09 PROCEDURE — 87206 SMEAR FLUORESCENT/ACID STAI: CPT

## 2019-04-09 PROCEDURE — 88305 TISSUE EXAM BY PATHOLOGIST: CPT | Mod: 26

## 2019-04-09 PROCEDURE — 89051 BODY FLUID CELL COUNT: CPT

## 2019-04-09 PROCEDURE — 87116 MYCOBACTERIA CULTURE: CPT

## 2019-04-09 PROCEDURE — 88112 CYTOPATH CELL ENHANCE TECH: CPT

## 2019-04-09 PROCEDURE — 88341 IMHCHEM/IMCYTCHM EA ADD ANTB: CPT

## 2019-04-09 PROCEDURE — 32555 ASPIRATE PLEURA W/ IMAGING: CPT

## 2019-04-09 PROCEDURE — 84157 ASSAY OF PROTEIN OTHER: CPT

## 2019-04-09 PROCEDURE — 87205 SMEAR GRAM STAIN: CPT

## 2019-04-09 PROCEDURE — 32555 ASPIRATE PLEURA W/ IMAGING: CPT | Mod: RT

## 2019-04-09 PROCEDURE — 87015 SPECIMEN INFECT AGNT CONCNTJ: CPT

## 2019-04-09 PROCEDURE — 88184 FLOWCYTOMETRY/ TC 1 MARKER: CPT

## 2019-04-09 PROCEDURE — 88305 TISSUE EXAM BY PATHOLOGIST: CPT

## 2019-04-09 PROCEDURE — 87070 CULTURE OTHR SPECIMN AEROBIC: CPT

## 2019-04-09 PROCEDURE — 83690 ASSAY OF LIPASE: CPT

## 2019-04-10 LAB
GRAM STN FLD: SIGNIFICANT CHANGE UP
NIGHT BLUE STAIN TISS: SIGNIFICANT CHANGE UP
SPECIMEN SOURCE: SIGNIFICANT CHANGE UP
SPECIMEN SOURCE: SIGNIFICANT CHANGE UP

## 2019-04-11 LAB — TM INTERPRETATION: SIGNIFICANT CHANGE UP

## 2019-04-12 LAB — LIPASE FLD-CCNC: SIGNIFICANT CHANGE UP

## 2019-04-14 LAB
CULTURE RESULTS: SIGNIFICANT CHANGE UP
SPECIMEN SOURCE: SIGNIFICANT CHANGE UP

## 2019-04-15 ENCOUNTER — OTHER (OUTPATIENT)
Age: 79
End: 2019-04-15

## 2019-04-17 ENCOUNTER — FORM ENCOUNTER (OUTPATIENT)
Age: 79
End: 2019-04-17

## 2019-04-17 ENCOUNTER — MEDICATION RENEWAL (OUTPATIENT)
Age: 79
End: 2019-04-17

## 2019-04-17 LAB
COMMENT - FLUIDS: SIGNIFICANT CHANGE UP
NON-GYNECOLOGICAL CYTOLOGY STUDY: SIGNIFICANT CHANGE UP

## 2019-04-17 RX ORDER — LENALIDOMIDE 15 MG/1
15 CAPSULE ORAL
Qty: 21 | Refills: 0 | Status: ACTIVE | COMMUNITY
Start: 2019-03-18 | End: 1900-01-01

## 2019-04-18 ENCOUNTER — APPOINTMENT (OUTPATIENT)
Dept: NUCLEAR MEDICINE | Facility: IMAGING CENTER | Age: 79
End: 2019-04-18
Payer: MEDICARE

## 2019-04-18 ENCOUNTER — OUTPATIENT (OUTPATIENT)
Dept: OUTPATIENT SERVICES | Facility: HOSPITAL | Age: 79
LOS: 1 days | End: 2019-04-18
Payer: MEDICARE

## 2019-04-18 DIAGNOSIS — H26.9 UNSPECIFIED CATARACT: Chronic | ICD-10-CM

## 2019-04-18 DIAGNOSIS — Z90.89 ACQUIRED ABSENCE OF OTHER ORGANS: Chronic | ICD-10-CM

## 2019-04-18 DIAGNOSIS — M12.9 ARTHROPATHY, UNSPECIFIED: Chronic | ICD-10-CM

## 2019-04-18 DIAGNOSIS — Z90.13 ACQUIRED ABSENCE OF BILATERAL BREASTS AND NIPPLES: Chronic | ICD-10-CM

## 2019-04-18 DIAGNOSIS — C83.32 DIFFUSE LARGE B-CELL LYMPHOMA, INTRATHORACIC LYMPH NODES: ICD-10-CM

## 2019-04-18 PROCEDURE — 78815 PET IMAGE W/CT SKULL-THIGH: CPT | Mod: 26,PS

## 2019-04-18 PROCEDURE — 78815 PET IMAGE W/CT SKULL-THIGH: CPT

## 2019-04-18 PROCEDURE — A9552: CPT

## 2019-05-01 ENCOUNTER — OUTPATIENT (OUTPATIENT)
Dept: OUTPATIENT SERVICES | Facility: HOSPITAL | Age: 79
LOS: 1 days | Discharge: HOME | End: 2019-05-01

## 2019-05-01 ENCOUNTER — OTHER (OUTPATIENT)
Age: 79
End: 2019-05-01

## 2019-05-01 ENCOUNTER — RESULT REVIEW (OUTPATIENT)
Age: 79
End: 2019-05-01

## 2019-05-01 DIAGNOSIS — H26.9 UNSPECIFIED CATARACT: Chronic | ICD-10-CM

## 2019-05-01 DIAGNOSIS — C82.97 FOLLICULAR LYMPHOMA, UNSPECIFIED, SPLEEN: ICD-10-CM

## 2019-05-01 DIAGNOSIS — Z90.89 ACQUIRED ABSENCE OF OTHER ORGANS: Chronic | ICD-10-CM

## 2019-05-01 DIAGNOSIS — M12.9 ARTHROPATHY, UNSPECIFIED: Chronic | ICD-10-CM

## 2019-05-01 DIAGNOSIS — Z90.13 ACQUIRED ABSENCE OF BILATERAL BREASTS AND NIPPLES: Chronic | ICD-10-CM

## 2019-05-02 ENCOUNTER — OTHER (OUTPATIENT)
Age: 79
End: 2019-05-02

## 2019-05-03 ENCOUNTER — OTHER (OUTPATIENT)
Age: 79
End: 2019-05-03

## 2019-05-05 LAB
APTT BLD: 35.2 SEC
INR PPP: 2.23 RATIO
PT BLD: 26.1 SEC

## 2019-05-06 ENCOUNTER — APPOINTMENT (OUTPATIENT)
Dept: INFUSION THERAPY | Facility: HOSPITAL | Age: 79
End: 2019-05-06

## 2019-05-06 ENCOUNTER — FORM ENCOUNTER (OUTPATIENT)
Age: 79
End: 2019-05-06

## 2019-05-07 ENCOUNTER — OUTPATIENT (OUTPATIENT)
Dept: OUTPATIENT SERVICES | Facility: HOSPITAL | Age: 79
LOS: 1 days | End: 2019-05-07
Payer: MEDICARE

## 2019-05-07 ENCOUNTER — RESULT REVIEW (OUTPATIENT)
Age: 79
End: 2019-05-07

## 2019-05-07 ENCOUNTER — APPOINTMENT (OUTPATIENT)
Dept: CT IMAGING | Facility: HOSPITAL | Age: 79
End: 2019-05-07

## 2019-05-07 ENCOUNTER — APPOINTMENT (OUTPATIENT)
Dept: INFUSION THERAPY | Facility: HOSPITAL | Age: 79
End: 2019-05-07

## 2019-05-07 DIAGNOSIS — H26.9 UNSPECIFIED CATARACT: Chronic | ICD-10-CM

## 2019-05-07 DIAGNOSIS — Z90.89 ACQUIRED ABSENCE OF OTHER ORGANS: Chronic | ICD-10-CM

## 2019-05-07 DIAGNOSIS — M12.9 ARTHROPATHY, UNSPECIFIED: Chronic | ICD-10-CM

## 2019-05-07 DIAGNOSIS — Z00.00 ENCOUNTER FOR GENERAL ADULT MEDICAL EXAMINATION WITHOUT ABNORMAL FINDINGS: ICD-10-CM

## 2019-05-07 DIAGNOSIS — Z90.13 ACQUIRED ABSENCE OF BILATERAL BREASTS AND NIPPLES: Chronic | ICD-10-CM

## 2019-05-07 PROCEDURE — 32405: CPT

## 2019-05-07 PROCEDURE — 77012 CT SCAN FOR NEEDLE BIOPSY: CPT

## 2019-05-07 PROCEDURE — 71045 X-RAY EXAM CHEST 1 VIEW: CPT | Mod: 26,76

## 2019-05-07 PROCEDURE — 88172 CYTP DX EVAL FNA 1ST EA SITE: CPT

## 2019-05-07 PROCEDURE — 88173 CYTOPATH EVAL FNA REPORT: CPT

## 2019-05-07 PROCEDURE — 71045 X-RAY EXAM CHEST 1 VIEW: CPT

## 2019-05-07 PROCEDURE — 88173 CYTOPATH EVAL FNA REPORT: CPT | Mod: 26

## 2019-05-07 PROCEDURE — 10009 FNA BX W/CT GDN 1ST LES: CPT

## 2019-05-07 PROCEDURE — 88305 TISSUE EXAM BY PATHOLOGIST: CPT | Mod: 26

## 2019-05-07 PROCEDURE — 88305 TISSUE EXAM BY PATHOLOGIST: CPT

## 2019-05-07 PROCEDURE — 77012 CT SCAN FOR NEEDLE BIOPSY: CPT | Mod: 26

## 2019-05-08 DIAGNOSIS — C83.32 DIFFUSE LARGE B-CELL LYMPHOMA, INTRATHORACIC LYMPH NODES: ICD-10-CM

## 2019-05-08 LAB — NON-GYNECOLOGICAL CYTOLOGY STUDY: SIGNIFICANT CHANGE UP

## 2019-05-12 ENCOUNTER — FORM ENCOUNTER (OUTPATIENT)
Age: 79
End: 2019-05-12

## 2019-05-13 ENCOUNTER — RESULT REVIEW (OUTPATIENT)
Age: 79
End: 2019-05-13

## 2019-05-13 ENCOUNTER — OUTPATIENT (OUTPATIENT)
Dept: OUTPATIENT SERVICES | Facility: HOSPITAL | Age: 79
LOS: 1 days | End: 2019-05-13
Payer: MEDICARE

## 2019-05-13 ENCOUNTER — APPOINTMENT (OUTPATIENT)
Dept: CT IMAGING | Facility: HOSPITAL | Age: 79
End: 2019-05-13
Payer: MEDICARE

## 2019-05-13 ENCOUNTER — APPOINTMENT (OUTPATIENT)
Dept: INFUSION THERAPY | Facility: HOSPITAL | Age: 79
End: 2019-05-13

## 2019-05-13 DIAGNOSIS — Z00.8 ENCOUNTER FOR OTHER GENERAL EXAMINATION: ICD-10-CM

## 2019-05-13 DIAGNOSIS — Z90.13 ACQUIRED ABSENCE OF BILATERAL BREASTS AND NIPPLES: Chronic | ICD-10-CM

## 2019-05-13 DIAGNOSIS — H26.9 UNSPECIFIED CATARACT: Chronic | ICD-10-CM

## 2019-05-13 DIAGNOSIS — M12.9 ARTHROPATHY, UNSPECIFIED: Chronic | ICD-10-CM

## 2019-05-13 DIAGNOSIS — R91.1 SOLITARY PULMONARY NODULE: ICD-10-CM

## 2019-05-13 DIAGNOSIS — Z90.89 ACQUIRED ABSENCE OF OTHER ORGANS: Chronic | ICD-10-CM

## 2019-05-13 PROCEDURE — 88341 IMHCHEM/IMCYTCHM EA ADD ANTB: CPT

## 2019-05-13 PROCEDURE — 88360 TUMOR IMMUNOHISTOCHEM/MANUAL: CPT

## 2019-05-13 PROCEDURE — 10009 FNA BX W/CT GDN 1ST LES: CPT

## 2019-05-13 PROCEDURE — 88307 TISSUE EXAM BY PATHOLOGIST: CPT

## 2019-05-13 PROCEDURE — 87205 SMEAR GRAM STAIN: CPT

## 2019-05-13 PROCEDURE — 88307 TISSUE EXAM BY PATHOLOGIST: CPT | Mod: 26

## 2019-05-13 PROCEDURE — 88312 SPECIAL STAINS GROUP 1: CPT | Mod: 26

## 2019-05-13 PROCEDURE — 88342 IMHCHEM/IMCYTCHM 1ST ANTB: CPT | Mod: 26,59

## 2019-05-13 PROCEDURE — 88184 FLOWCYTOMETRY/ TC 1 MARKER: CPT

## 2019-05-13 PROCEDURE — 88341 IMHCHEM/IMCYTCHM EA ADD ANTB: CPT | Mod: 26,59

## 2019-05-13 PROCEDURE — 77012 CT SCAN FOR NEEDLE BIOPSY: CPT

## 2019-05-13 PROCEDURE — 88312 SPECIAL STAINS GROUP 1: CPT

## 2019-05-13 PROCEDURE — 32405: CPT

## 2019-05-13 PROCEDURE — 88185 FLOWCYTOMETRY/TC ADD-ON: CPT

## 2019-05-13 PROCEDURE — 88173 CYTOPATH EVAL FNA REPORT: CPT

## 2019-05-13 PROCEDURE — 88360 TUMOR IMMUNOHISTOCHEM/MANUAL: CPT | Mod: 26

## 2019-05-13 PROCEDURE — 88173 CYTOPATH EVAL FNA REPORT: CPT | Mod: 26

## 2019-05-13 PROCEDURE — 88172 CYTP DX EVAL FNA 1ST EA SITE: CPT

## 2019-05-13 PROCEDURE — 88342 IMHCHEM/IMCYTCHM 1ST ANTB: CPT

## 2019-05-13 PROCEDURE — 77012 CT SCAN FOR NEEDLE BIOPSY: CPT | Mod: 26

## 2019-05-13 RX ORDER — BENZONATATE 100 MG/1
100 CAPSULE ORAL 3 TIMES DAILY
Qty: 30 | Refills: 0 | Status: ACTIVE | COMMUNITY
Start: 2019-05-13 | End: 1900-01-01

## 2019-05-13 RX ORDER — PREDNISONE 20 MG/1
20 TABLET ORAL
Qty: 30 | Refills: 0 | Status: ACTIVE | COMMUNITY
Start: 2019-05-13 | End: 1900-01-01

## 2019-05-14 ENCOUNTER — FORM ENCOUNTER (OUTPATIENT)
Age: 79
End: 2019-05-14

## 2019-05-15 ENCOUNTER — APPOINTMENT (OUTPATIENT)
Dept: ULTRASOUND IMAGING | Facility: IMAGING CENTER | Age: 79
End: 2019-05-15
Payer: MEDICARE

## 2019-05-15 ENCOUNTER — OUTPATIENT (OUTPATIENT)
Dept: OUTPATIENT SERVICES | Facility: HOSPITAL | Age: 79
LOS: 1 days | End: 2019-05-15
Payer: MEDICARE

## 2019-05-15 DIAGNOSIS — Z90.89 ACQUIRED ABSENCE OF OTHER ORGANS: Chronic | ICD-10-CM

## 2019-05-15 DIAGNOSIS — M12.9 ARTHROPATHY, UNSPECIFIED: Chronic | ICD-10-CM

## 2019-05-15 DIAGNOSIS — Z90.13 ACQUIRED ABSENCE OF BILATERAL BREASTS AND NIPPLES: Chronic | ICD-10-CM

## 2019-05-15 DIAGNOSIS — H26.9 UNSPECIFIED CATARACT: Chronic | ICD-10-CM

## 2019-05-15 DIAGNOSIS — C83.32 DIFFUSE LARGE B-CELL LYMPHOMA, INTRATHORACIC LYMPH NODES: ICD-10-CM

## 2019-05-15 LAB
NON-GYNECOLOGICAL CYTOLOGY STUDY: SIGNIFICANT CHANGE UP
TM INTERPRETATION: SIGNIFICANT CHANGE UP

## 2019-05-15 PROCEDURE — 76604 US EXAM CHEST: CPT | Mod: 26

## 2019-05-15 PROCEDURE — 76604 US EXAM CHEST: CPT

## 2019-05-20 ENCOUNTER — APPOINTMENT (OUTPATIENT)
Dept: INFUSION THERAPY | Facility: HOSPITAL | Age: 79
End: 2019-05-20

## 2019-05-28 ENCOUNTER — APPOINTMENT (OUTPATIENT)
Dept: INFUSION THERAPY | Facility: HOSPITAL | Age: 79
End: 2019-05-28

## 2019-05-29 LAB
CULTURE RESULTS: SIGNIFICANT CHANGE UP
SPECIMEN SOURCE: SIGNIFICANT CHANGE UP

## 2019-06-03 ENCOUNTER — APPOINTMENT (OUTPATIENT)
Dept: INFUSION THERAPY | Facility: HOSPITAL | Age: 79
End: 2019-06-03

## 2019-06-04 ENCOUNTER — APPOINTMENT (OUTPATIENT)
Dept: INFUSION THERAPY | Facility: HOSPITAL | Age: 79
End: 2019-06-04

## 2019-06-25 ENCOUNTER — OTHER (OUTPATIENT)
Age: 79
End: 2019-06-25

## 2019-06-26 ENCOUNTER — INPATIENT (INPATIENT)
Facility: HOSPITAL | Age: 79
LOS: 40 days | Discharge: HOME HEALTH | End: 2019-08-06
Attending: INTERNAL MEDICINE | Admitting: INTERNAL MEDICINE
Payer: MEDICARE

## 2019-06-26 VITALS
TEMPERATURE: 99 F | HEART RATE: 84 BPM | OXYGEN SATURATION: 94 % | DIASTOLIC BLOOD PRESSURE: 81 MMHG | RESPIRATION RATE: 24 BRPM | SYSTOLIC BLOOD PRESSURE: 126 MMHG

## 2019-06-26 DIAGNOSIS — Z29.9 ENCOUNTER FOR PROPHYLACTIC MEASURES, UNSPECIFIED: ICD-10-CM

## 2019-06-26 DIAGNOSIS — H26.9 UNSPECIFIED CATARACT: Chronic | ICD-10-CM

## 2019-06-26 DIAGNOSIS — C83.30 DIFFUSE LARGE B-CELL LYMPHOMA, UNSPECIFIED SITE: ICD-10-CM

## 2019-06-26 DIAGNOSIS — Z90.89 ACQUIRED ABSENCE OF OTHER ORGANS: Chronic | ICD-10-CM

## 2019-06-26 DIAGNOSIS — M12.9 ARTHROPATHY, UNSPECIFIED: Chronic | ICD-10-CM

## 2019-06-26 DIAGNOSIS — I48.91 UNSPECIFIED ATRIAL FIBRILLATION: ICD-10-CM

## 2019-06-26 DIAGNOSIS — R06.02 SHORTNESS OF BREATH: ICD-10-CM

## 2019-06-26 DIAGNOSIS — E87.1 HYPO-OSMOLALITY AND HYPONATREMIA: ICD-10-CM

## 2019-06-26 DIAGNOSIS — I10 ESSENTIAL (PRIMARY) HYPERTENSION: ICD-10-CM

## 2019-06-26 DIAGNOSIS — Z90.13 ACQUIRED ABSENCE OF BILATERAL BREASTS AND NIPPLES: Chronic | ICD-10-CM

## 2019-06-26 DIAGNOSIS — J18.9 PNEUMONIA, UNSPECIFIED ORGANISM: ICD-10-CM

## 2019-06-26 LAB
ALBUMIN SERPL ELPH-MCNC: 3.7 G/DL — SIGNIFICANT CHANGE UP (ref 3.3–5)
ALP SERPL-CCNC: 67 U/L — SIGNIFICANT CHANGE UP (ref 40–120)
ALT FLD-CCNC: 17 U/L — SIGNIFICANT CHANGE UP (ref 4–33)
ANION GAP SERPL CALC-SCNC: 11 MMO/L — SIGNIFICANT CHANGE UP (ref 7–14)
ANISOCYTOSIS BLD QL: SLIGHT — SIGNIFICANT CHANGE UP
APPEARANCE UR: CLEAR — SIGNIFICANT CHANGE UP
APTT BLD: 25.9 SEC — LOW (ref 27.5–36.3)
AST SERPL-CCNC: 21 U/L — SIGNIFICANT CHANGE UP (ref 4–32)
B PERT DNA SPEC QL NAA+PROBE: NOT DETECTED — SIGNIFICANT CHANGE UP
BACTERIA # UR AUTO: NEGATIVE — SIGNIFICANT CHANGE UP
BASE EXCESS BLDV CALC-SCNC: 4.3 MMOL/L — SIGNIFICANT CHANGE UP
BASOPHILS # BLD AUTO: 0.05 K/UL — SIGNIFICANT CHANGE UP (ref 0–0.2)
BASOPHILS NFR BLD AUTO: 1.5 % — SIGNIFICANT CHANGE UP (ref 0–2)
BASOPHILS NFR SPEC: 0 % — SIGNIFICANT CHANGE UP (ref 0–2)
BILIRUB SERPL-MCNC: 1.7 MG/DL — HIGH (ref 0.2–1.2)
BILIRUB UR-MCNC: NEGATIVE — SIGNIFICANT CHANGE UP
BLASTS # FLD: 0 % — SIGNIFICANT CHANGE UP (ref 0–0)
BLD GP AB SCN SERPL QL: NEGATIVE — SIGNIFICANT CHANGE UP
BLOOD GAS VENOUS - CREATININE: 0.5 MG/DL — SIGNIFICANT CHANGE UP (ref 0.5–1.3)
BLOOD GAS VENOUS - FIO2: 21 — SIGNIFICANT CHANGE UP
BLOOD UR QL VISUAL: NEGATIVE — SIGNIFICANT CHANGE UP
BUN SERPL-MCNC: 10 MG/DL — SIGNIFICANT CHANGE UP (ref 7–23)
C PNEUM DNA SPEC QL NAA+PROBE: NOT DETECTED — SIGNIFICANT CHANGE UP
CALCIUM SERPL-MCNC: 9.3 MG/DL — SIGNIFICANT CHANGE UP (ref 8.4–10.5)
CHLORIDE BLDV-SCNC: 96 MMOL/L — SIGNIFICANT CHANGE UP (ref 96–108)
CHLORIDE SERPL-SCNC: 89 MMOL/L — LOW (ref 98–107)
CO2 SERPL-SCNC: 28 MMOL/L — SIGNIFICANT CHANGE UP (ref 22–31)
COLOR SPEC: YELLOW — SIGNIFICANT CHANGE UP
CREAT SERPL-MCNC: 0.46 MG/DL — LOW (ref 0.5–1.3)
EOSINOPHIL # BLD AUTO: 0.01 K/UL — SIGNIFICANT CHANGE UP (ref 0–0.5)
EOSINOPHIL NFR BLD AUTO: 0.3 % — SIGNIFICANT CHANGE UP (ref 0–6)
EOSINOPHIL NFR FLD: 0 % — SIGNIFICANT CHANGE UP (ref 0–6)
FLUAV H1 2009 PAND RNA SPEC QL NAA+PROBE: NOT DETECTED — SIGNIFICANT CHANGE UP
FLUAV H1 RNA SPEC QL NAA+PROBE: NOT DETECTED — SIGNIFICANT CHANGE UP
FLUAV H3 RNA SPEC QL NAA+PROBE: NOT DETECTED — SIGNIFICANT CHANGE UP
FLUAV SUBTYP SPEC NAA+PROBE: NOT DETECTED — SIGNIFICANT CHANGE UP
FLUBV RNA SPEC QL NAA+PROBE: NOT DETECTED — SIGNIFICANT CHANGE UP
GAS PNL BLDV: 123 MMOL/L — LOW (ref 136–146)
GIANT PLATELETS BLD QL SMEAR: PRESENT — SIGNIFICANT CHANGE UP
GLUCOSE BLDV-MCNC: 87 MG/DL — SIGNIFICANT CHANGE UP (ref 70–99)
GLUCOSE SERPL-MCNC: 91 MG/DL — SIGNIFICANT CHANGE UP (ref 70–99)
GLUCOSE UR-MCNC: NEGATIVE — SIGNIFICANT CHANGE UP
HADV DNA SPEC QL NAA+PROBE: NOT DETECTED — SIGNIFICANT CHANGE UP
HCO3 BLDV-SCNC: 28 MMOL/L — HIGH (ref 20–27)
HCOV PNL SPEC NAA+PROBE: SIGNIFICANT CHANGE UP
HCT VFR BLD CALC: 33.6 % — LOW (ref 34.5–45)
HCT VFR BLDV CALC: 35.4 % — SIGNIFICANT CHANGE UP (ref 34.5–45)
HGB BLD-MCNC: 11.4 G/DL — LOW (ref 11.5–15.5)
HGB BLDV-MCNC: 11.5 G/DL — SIGNIFICANT CHANGE UP (ref 11.5–15.5)
HMPV RNA SPEC QL NAA+PROBE: NOT DETECTED — SIGNIFICANT CHANGE UP
HPIV1 RNA SPEC QL NAA+PROBE: NOT DETECTED — SIGNIFICANT CHANGE UP
HPIV2 RNA SPEC QL NAA+PROBE: NOT DETECTED — SIGNIFICANT CHANGE UP
HPIV3 RNA SPEC QL NAA+PROBE: NOT DETECTED — SIGNIFICANT CHANGE UP
HPIV4 RNA SPEC QL NAA+PROBE: NOT DETECTED — SIGNIFICANT CHANGE UP
HYALINE CASTS # UR AUTO: NEGATIVE — SIGNIFICANT CHANGE UP
IMM GRANULOCYTES NFR BLD AUTO: 9.4 % — HIGH (ref 0–1.5)
INR BLD: 1.25 — HIGH (ref 0.88–1.17)
KETONES UR-MCNC: NEGATIVE — SIGNIFICANT CHANGE UP
LACTATE BLDV-MCNC: 1.8 MMOL/L — SIGNIFICANT CHANGE UP (ref 0.5–2)
LEUKOCYTE ESTERASE UR-ACNC: NEGATIVE — SIGNIFICANT CHANGE UP
LYMPHOCYTES # BLD AUTO: 0.57 K/UL — LOW (ref 1–3.3)
LYMPHOCYTES # BLD AUTO: 17.3 % — SIGNIFICANT CHANGE UP (ref 13–44)
LYMPHOCYTES NFR SPEC AUTO: 14.8 % — SIGNIFICANT CHANGE UP (ref 13–44)
MACROCYTES BLD QL: SLIGHT — SIGNIFICANT CHANGE UP
MCHC RBC-ENTMCNC: 32 PG — SIGNIFICANT CHANGE UP (ref 27–34)
MCHC RBC-ENTMCNC: 33.9 % — SIGNIFICANT CHANGE UP (ref 32–36)
MCV RBC AUTO: 94.4 FL — SIGNIFICANT CHANGE UP (ref 80–100)
METAMYELOCYTES # FLD: 9.6 % — HIGH (ref 0–1)
MONOCYTES # BLD AUTO: 0.27 K/UL — SIGNIFICANT CHANGE UP (ref 0–0.9)
MONOCYTES NFR BLD AUTO: 8.2 % — SIGNIFICANT CHANGE UP (ref 2–14)
MONOCYTES NFR BLD: 6.1 % — SIGNIFICANT CHANGE UP (ref 2–9)
MYELOCYTES NFR BLD: 6.9 % — HIGH (ref 0–0)
NEUTROPHIL AB SER-ACNC: 48.7 % — SIGNIFICANT CHANGE UP (ref 43–77)
NEUTROPHILS # BLD AUTO: 2.09 K/UL — SIGNIFICANT CHANGE UP (ref 1.8–7.4)
NEUTROPHILS NFR BLD AUTO: 63.3 % — SIGNIFICANT CHANGE UP (ref 43–77)
NEUTS BAND # BLD: 9.6 % — HIGH (ref 0–6)
NITRITE UR-MCNC: NEGATIVE — SIGNIFICANT CHANGE UP
NRBC # FLD: 0 K/UL — SIGNIFICANT CHANGE UP (ref 0–0)
NT-PROBNP SERPL-SCNC: 3794 PG/ML — SIGNIFICANT CHANGE UP
OTHER - HEMATOLOGY %: 0 — SIGNIFICANT CHANGE UP
PCO2 BLDV: 40 MMHG — LOW (ref 41–51)
PH BLDV: 7.46 PH — HIGH (ref 7.32–7.43)
PH UR: 7 — SIGNIFICANT CHANGE UP (ref 5–8)
PLATELET # BLD AUTO: 160 K/UL — SIGNIFICANT CHANGE UP (ref 150–400)
PLATELET COUNT - ESTIMATE: NORMAL — SIGNIFICANT CHANGE UP
PMV BLD: 10.1 FL — SIGNIFICANT CHANGE UP (ref 7–13)
PO2 BLDV: 32 MMHG — LOW (ref 35–40)
POIKILOCYTOSIS BLD QL AUTO: SLIGHT — SIGNIFICANT CHANGE UP
POLYCHROMASIA BLD QL SMEAR: SLIGHT — SIGNIFICANT CHANGE UP
POTASSIUM BLDV-SCNC: 3.3 MMOL/L — LOW (ref 3.4–4.5)
POTASSIUM SERPL-MCNC: 3.6 MMOL/L — SIGNIFICANT CHANGE UP (ref 3.5–5.3)
POTASSIUM SERPL-SCNC: 3.6 MMOL/L — SIGNIFICANT CHANGE UP (ref 3.5–5.3)
PROMYELOCYTES # FLD: 0 % — SIGNIFICANT CHANGE UP (ref 0–0)
PROT SERPL-MCNC: 5.6 G/DL — LOW (ref 6–8.3)
PROT UR-MCNC: NEGATIVE — SIGNIFICANT CHANGE UP
PROTHROM AB SERPL-ACNC: 14.3 SEC — HIGH (ref 9.8–13.1)
RBC # BLD: 3.56 M/UL — LOW (ref 3.8–5.2)
RBC # FLD: 18.6 % — HIGH (ref 10.3–14.5)
RBC CASTS # UR COMP ASSIST: SIGNIFICANT CHANGE UP (ref 0–?)
REVIEW TO FOLLOW: YES — SIGNIFICANT CHANGE UP
RH IG SCN BLD-IMP: POSITIVE — SIGNIFICANT CHANGE UP
RSV RNA SPEC QL NAA+PROBE: NOT DETECTED — SIGNIFICANT CHANGE UP
RV+EV RNA SPEC QL NAA+PROBE: DETECTED — HIGH
SAO2 % BLDV: 55.4 % — LOW (ref 60–85)
SODIUM SERPL-SCNC: 128 MMOL/L — LOW (ref 135–145)
SP GR SPEC: 1.01 — SIGNIFICANT CHANGE UP (ref 1–1.04)
SQUAMOUS # UR AUTO: SIGNIFICANT CHANGE UP
TROPONIN T, HIGH SENSITIVITY: 16 NG/L — SIGNIFICANT CHANGE UP (ref ?–14)
TROPONIN T, HIGH SENSITIVITY: 18 NG/L — SIGNIFICANT CHANGE UP (ref ?–14)
UROBILINOGEN FLD QL: NORMAL — SIGNIFICANT CHANGE UP
VARIANT LYMPHS # BLD: 4.3 % — SIGNIFICANT CHANGE UP
WBC # BLD: 3.3 K/UL — LOW (ref 3.8–10.5)
WBC # FLD AUTO: 3.3 K/UL — LOW (ref 3.8–10.5)
WBC UR QL: SIGNIFICANT CHANGE UP (ref 0–?)

## 2019-06-26 PROCEDURE — 71045 X-RAY EXAM CHEST 1 VIEW: CPT | Mod: 26

## 2019-06-26 PROCEDURE — 99223 1ST HOSP IP/OBS HIGH 75: CPT

## 2019-06-26 RX ORDER — IPRATROPIUM/ALBUTEROL SULFATE 18-103MCG
3 AEROSOL WITH ADAPTER (GRAM) INHALATION ONCE
Refills: 0 | Status: COMPLETED | OUTPATIENT
Start: 2019-06-26 | End: 2019-06-26

## 2019-06-26 RX ORDER — VALACYCLOVIR 500 MG/1
500 TABLET, FILM COATED ORAL
Refills: 0 | Status: DISCONTINUED | OUTPATIENT
Start: 2019-06-26 | End: 2019-07-17

## 2019-06-26 RX ORDER — IPRATROPIUM/ALBUTEROL SULFATE 18-103MCG
3 AEROSOL WITH ADAPTER (GRAM) INHALATION EVERY 6 HOURS
Refills: 0 | Status: DISCONTINUED | OUTPATIENT
Start: 2019-06-26 | End: 2019-06-29

## 2019-06-26 RX ORDER — PANTOPRAZOLE SODIUM 20 MG/1
40 TABLET, DELAYED RELEASE ORAL
Refills: 0 | Status: DISCONTINUED | OUTPATIENT
Start: 2019-06-26 | End: 2019-08-06

## 2019-06-26 RX ORDER — ALLOPURINOL 300 MG
300 TABLET ORAL DAILY
Refills: 0 | Status: DISCONTINUED | OUTPATIENT
Start: 2019-06-26 | End: 2019-08-06

## 2019-06-26 RX ORDER — VANCOMYCIN HCL 1 G
1000 VIAL (EA) INTRAVENOUS ONCE
Refills: 0 | Status: COMPLETED | OUTPATIENT
Start: 2019-06-26 | End: 2019-06-26

## 2019-06-26 RX ORDER — APIXABAN 2.5 MG/1
2.5 TABLET, FILM COATED ORAL
Refills: 0 | Status: DISCONTINUED | OUTPATIENT
Start: 2019-06-26 | End: 2019-06-28

## 2019-06-26 RX ORDER — DILTIAZEM HCL 120 MG
10 CAPSULE, EXT RELEASE 24 HR ORAL ONCE
Refills: 0 | Status: COMPLETED | OUTPATIENT
Start: 2019-06-26 | End: 2019-06-26

## 2019-06-26 RX ORDER — SODIUM CHLORIDE 9 MG/ML
1000 INJECTION INTRAMUSCULAR; INTRAVENOUS; SUBCUTANEOUS ONCE
Refills: 0 | Status: COMPLETED | OUTPATIENT
Start: 2019-06-26 | End: 2019-06-26

## 2019-06-26 RX ORDER — METOPROLOL TARTRATE 50 MG
1 TABLET ORAL
Qty: 0 | Refills: 0 | DISCHARGE

## 2019-06-26 RX ORDER — METOPROLOL TARTRATE 50 MG
50 TABLET ORAL DAILY
Refills: 0 | Status: DISCONTINUED | OUTPATIENT
Start: 2019-06-26 | End: 2019-07-13

## 2019-06-26 RX ORDER — VANCOMYCIN HCL 1 G
750 VIAL (EA) INTRAVENOUS EVERY 12 HOURS
Refills: 0 | Status: DISCONTINUED | OUTPATIENT
Start: 2019-06-26 | End: 2019-07-02

## 2019-06-26 RX ORDER — PIPERACILLIN AND TAZOBACTAM 4; .5 G/20ML; G/20ML
3.38 INJECTION, POWDER, LYOPHILIZED, FOR SOLUTION INTRAVENOUS EVERY 8 HOURS
Refills: 0 | Status: COMPLETED | OUTPATIENT
Start: 2019-06-26 | End: 2019-07-03

## 2019-06-26 RX ORDER — PIPERACILLIN AND TAZOBACTAM 4; .5 G/20ML; G/20ML
3.38 INJECTION, POWDER, LYOPHILIZED, FOR SOLUTION INTRAVENOUS ONCE
Refills: 0 | Status: COMPLETED | OUTPATIENT
Start: 2019-06-26 | End: 2019-06-26

## 2019-06-26 RX ADMIN — Medication 250 MILLIGRAM(S): at 11:00

## 2019-06-26 RX ADMIN — Medication 3 MILLILITER(S): at 09:30

## 2019-06-26 RX ADMIN — Medication 100 MILLIGRAM(S): at 18:20

## 2019-06-26 RX ADMIN — Medication 10 MILLIGRAM(S): at 20:15

## 2019-06-26 RX ADMIN — Medication 3 MILLILITER(S): at 10:54

## 2019-06-26 RX ADMIN — Medication 3 MILLILITER(S): at 17:50

## 2019-06-26 RX ADMIN — Medication 10 MILLIGRAM(S): at 10:16

## 2019-06-26 RX ADMIN — Medication 50 MILLIGRAM(S): at 21:25

## 2019-06-26 RX ADMIN — Medication 1 TABLET(S): at 18:20

## 2019-06-26 RX ADMIN — PIPERACILLIN AND TAZOBACTAM 25 GRAM(S): 4; .5 INJECTION, POWDER, LYOPHILIZED, FOR SOLUTION INTRAVENOUS at 21:37

## 2019-06-26 RX ADMIN — PIPERACILLIN AND TAZOBACTAM 25 GRAM(S): 4; .5 INJECTION, POWDER, LYOPHILIZED, FOR SOLUTION INTRAVENOUS at 10:27

## 2019-06-26 RX ADMIN — APIXABAN 2.5 MILLIGRAM(S): 2.5 TABLET, FILM COATED ORAL at 18:20

## 2019-06-26 RX ADMIN — Medication 3 MILLILITER(S): at 10:30

## 2019-06-26 RX ADMIN — SODIUM CHLORIDE 1000 MILLILITER(S): 9 INJECTION INTRAMUSCULAR; INTRAVENOUS; SUBCUTANEOUS at 09:50

## 2019-06-26 RX ADMIN — VALACYCLOVIR 500 MILLIGRAM(S): 500 TABLET, FILM COATED ORAL at 21:05

## 2019-06-26 RX ADMIN — Medication 20 MILLIGRAM(S): at 18:20

## 2019-06-26 NOTE — H&P ADULT - PROBLEM SELECTOR PLAN 2
- Patient with ?superimposed PNA on CXR vs. metastatic disease  - Will get CT chest  - Will c/w broad spectrum abx at this time as patient is immunocompromised

## 2019-06-26 NOTE — CHART NOTE - NSCHARTNOTEFT_GEN_A_CORE
Patient noted to be rapid AFib to highs 160s, has no complaints of chest pain or shortness of breath at this time.  Will give a dose of 10mg IV Cardizem to aide in rate control.  Patient states that she did not take her Toprol XL 50mg before presenting to the hospital today.  Will give this evening.  Continue to monitor.    T(C): 37.3 (06-26-19 @ 18:49), Max: 37.3 (06-26-19 @ 18:49)  HR: 140 (06-26-19 @ 19:29) (84 - 140)  BP: 133/86 (06-26-19 @ 19:29) (109/67 - 158/79)  RR: 18 (06-26-19 @ 18:49) (17 - 26)  SpO2: 98% (06-26-19 @ 18:49) (94% - 100%) Patient noted to be heart rate sustaining 140s-150s on telemetry, has no complaints of chest pain or shortness of breath at this time.  Will give a dose of 10mg IV Cardizem to aid in rate control.  Patient states that she did not take her Toprol XL 50mg before presenting to the hospital today.  Will give this evening.  Continue to monitor.    T(C): 37.3 (06-26-19 @ 18:49), Max: 37.3 (06-26-19 @ 18:49)  HR: 140 (06-26-19 @ 19:29) (84 - 140)  BP: 133/86 (06-26-19 @ 19:29) (109/67 - 158/79)  RR: 18 (06-26-19 @ 18:49) (17 - 26)  SpO2: 98% (06-26-19 @ 18:49) (94% - 100%)

## 2019-06-26 NOTE — H&P ADULT - PROBLEM SELECTOR PLAN 3
- Afib w/ RVR in ED requiring IV cardizem in ED  - Monitor on tele   - C/w home metoprolol 50mg ER   - Likely RVR in setting of underlying infection

## 2019-06-26 NOTE — H&P ADULT - NSHPPHYSICALEXAM_GEN_ALL_CORE
Vital Signs Last 24 Hrs  T(C): 36.9 (26 Jun 2019 15:33), Max: 37.2 (26 Jun 2019 08:47)  T(F): 98.4 (26 Jun 2019 15:33), Max: 99 (26 Jun 2019 08:47)  HR: 100 (26 Jun 2019 15:33) (84 - 122)  BP: 109/67 (26 Jun 2019 15:33) (109/67 - 158/79)  BP(mean): --  RR: 17 (26 Jun 2019 15:33) (17 - 26)  SpO2: 99% (26 Jun 2019 15:33) (94% - 100%)    Constitutional: no acute distress   Eyes: EOMI  ENMT: mucus membranes moist  Neck: supple   Back: normal shape, ROM intact   Respiratory: CTAB  Cardiovascular: RRR, S1/S2  Gastrointestinal: soft, non-tender, normal bowel sounds   Extremities: no LE edema/cynaosis/clubbing   Vascular: radial pulses intact   Neurological: A&Ox3  Skin: warm and dry   Lymph Nodes: normal supraclavicular LN   Musculoskeletal: ROM intact  Psychiatric: appropriate affect Vital Signs Last 24 Hrs  T(C): 36.9 (26 Jun 2019 15:33), Max: 37.2 (26 Jun 2019 08:47)  T(F): 98.4 (26 Jun 2019 15:33), Max: 99 (26 Jun 2019 08:47)  HR: 100 (26 Jun 2019 15:33) (84 - 122)  BP: 109/67 (26 Jun 2019 15:33) (109/67 - 158/79)  BP(mean): --  RR: 17 (26 Jun 2019 15:33) (17 - 26)  SpO2: 99% (26 Jun 2019 15:33) (94% - 100%)    Constitutional: no acute distress   Eyes: EOMI  ENMT: mucus membranes dry  Neck: supple   Back: normal shape, ROM intact   Respiratory: scattered coarse breath sounds, no wheeze/rales/rhonchi  Cardiovascular: tachycardic, irregularly irregular   Gastrointestinal: soft, non-tender, normal bowel sounds   Extremities: no LE edema/cynaosis/clubbing   Vascular: radial pulses intact   Neurological: A&Ox3  Skin: warm and dry   Musculoskeletal: ROM intact  Psychiatric: appropriate affect

## 2019-06-26 NOTE — H&P ADULT - PROBLEM SELECTOR PLAN 5
- Follows with Dr. Nguyen at [facility]   - Heme/onc consult in AM  - C/w ppx Bactrim, valacyclovir and allopurinol

## 2019-06-26 NOTE — H&P ADULT - PROBLEM SELECTOR PLAN 4
- Likely in setting of decreased PO intake, but can also be due to SIADH in setting of infection/lung pathology   - S/p IVF in ED   - Monitor BMP  - UA unremarkable, will get urine lytes

## 2019-06-26 NOTE — ED ADULT NURSE NOTE - INTERVENTIONS DEFINITIONS
[place] to call system/Non-slip footwear when patient is off stretcher/Physically safe environment: no spills, clutter or unnecessary equipment/Call bell, personal items and telephone within reach/Instruct patient to call for assistance/Stretcher in lowest position, wheels locked, appropriate side rails in place

## 2019-06-26 NOTE — H&P ADULT - PROBLEM SELECTOR PLAN 1
- Found to be RVP + for entero/rhinovirus with ?superimposed PNA vs. worsening lung disease 2/2 lymphoma   - O2 as needed   - Will get CT chest to eval PNA vs. metastatic dz  - Symptomatic support for viral illness

## 2019-06-26 NOTE — ED ADULT NURSE REASSESSMENT NOTE - NS ED NURSE REASSESS COMMENT FT1
Pt with episode of non productive cough, lungs with exp wheezing, pt medicated as per order with Duoneb, with good effect.  Pt to floor accompanied by transport

## 2019-06-26 NOTE — H&P ADULT - HISTORY OF PRESENT ILLNESS
79F hx of DLBC lymphoma, Afib on eliquis presenting with three days of worsening SOB and productive cough. Patient states she was having worsening phlegm and felt like should "couldn't breath" this AM, so she went to ED. Has had chronic cough and per son was started on prednisone 40mg x2 days, now on prednisone 20mg by Dr. Nguyen for worsening cough. She denies sick contacts, fevers, chills, LE edema. She denies chest pain or palpitations. Endorsing decreased PO intake. Denies dysuria, n/v/d.

## 2019-06-26 NOTE — ED PROVIDER NOTE - ATTENDING CONTRIBUTION TO CARE
DANITZA KWOK  ATTENDING, MD: Reviewed and agree with the HPI as documented below:  80 yo F, nonsmoker with PMH of large cell lymphoma s/p chemotherapy 2017, left breast cancer 2012 w/ b/l mastectomy, A-fib on Eliquis, HTN, chronic cough, recurrent lung cancer s/p CT guided biopsy with documented small pneumothorax 5/9/19, BIBEMS to ER c/o productive cough x3 days w/ sob, hypoxic, Sat 84% on RA. Pt states she has chronic cough for 2 years, worsening with productive cough for the past few days. Reports sob w/ the sputum, better after clearing up the phlegm. As per son, patient has recent recurrent lung cancer, has biopsy last month, has tried chemo drug last month x1 but making pt confused which stopped tx. Denies any fever, chills, n/v/d, chest pain, back pain, abdominal pain, dysuria, recent travel, recent hospitalization, sick contacts or any other complaints.    PHYSICAL EXAM:  Vital signs reviewed.  GENERAL: Patient is awake and alert and in no acute distress.  Non-toxic appearing.  A+Ox4  HEAD:  Airway patent.  No oropharyngeal edema.  No stridor.  Auricles are normal.    EYES: EOM grossly intact, conjunctiva non-injected and sclera clear  NECK: Supple, No vertebral point tenderness to palpation.  CHEST/LUNG: Lungs auscultation bilaterally diffuse wheezes.    HEART: Irregularly tachycardia.  ABDOMEN: Soft, non-tender to palpation.  No rebound/no guarding.  Bowel sounds present x 4.   MSK/EXTREMITIES: No clubbing or cyanosis. Back is nontender, with no vertebral point tenderness to palpation, no CVAT.  Moving all 4 extremities.     NEURO: Neurologically grossly intact.   No obvious deficits.   PSYCH: Psychiatrically normal mood and affect.  No apparent risk to self or others.       DR. KWOK, ATTENDING MD:    I performed a face to face bedside interview with patient regarding history of present illness, review of symptoms and past medical history. I completed an independent physical exam.  I have discussed patient's plan of care with the team of health care providers.   I agree with note as stated above, having amended the EMR as needed to reflect my findings. I have discussed the assessment and plan of care.  This includes during the time I functioned as the attending physician for this patient. DANITZA HADDAD MD: Reviewed and agree with the HPI as documented below:  80 yo F, nonsmoker with PMH of large cell lymphoma s/p chemotherapy 2017, left breast cancer 2012 w/ b/l mastectomy, A-fib on Eliquis, HTN, chronic cough, recurrent lung cancer s/p CT guided biopsy with documented small pneumothorax 5/9/19, BIBEMS to ER c/o productive cough x3 days w/ sob, hypoxic, Sat 84% on RA. Pt states she has chronic cough for 2 years, worsening with productive cough for the past few days. Reports sob w/ the sputum, better after clearing up the phlegm. As per son, patient has recent recurrent lung cancer, has biopsy last month, has tried chemo drug last month x1 but making pt confused which stopped tx. Denies any fever, chills, n/v/d, chest pain, back pain, abdominal pain, dysuria, recent travel, recent hospitalization, sick contacts or any other complaints.    PHYSICAL EXAM:  Vital signs reviewed.  GENERAL: Patient is awake and alert and in no acute distress.  Non-toxic appearing.  A+Ox4  HEAD:  Airway patent.  No oropharyngeal edema.  No stridor.  Auricles are normal.    EYES: EOM grossly intact, conjunctiva non-injected and sclera clear  NECK: Supple, No vertebral point tenderness to palpation.  CHEST/LUNG: Lungs auscultation bilaterally diffuse wheezes.    HEART: Irregularly tachycardia. +murmur  ABDOMEN: Soft, non-tender to palpation.  No rebound/no guarding.  Bowel sounds present x 4.   MSK/EXTREMITIES: No clubbing or cyanosis. Back is nontender, with no vertebral point tenderness to palpation, no CVAT.  Moving all 4 extremities.     NEURO: Neurologically grossly intact.   No obvious deficits.   PSYCH: Psychiatrically normal mood and affect.  No apparent risk to self or others.       DR. KWOK, ATTENDING MD:    I performed a face to face bedside interview with patient regarding history of present illness, review of symptoms and past medical history. I completed an independent physical exam.  I have discussed patient's plan of care with the team of health care providers.   I agree with note as stated above, having amended the EMR as needed to reflect my findings. I have discussed the assessment and plan of care.  This includes during the time I functioned as the attending physician for this patient.

## 2019-06-26 NOTE — H&P ADULT - NSICDXFAMILYHX_GEN_ALL_CORE_FT
FAMILY HISTORY:  Family history of lymphoma, low grade, as per Dr. Palacios's consult on 12-4-17    Aunt  Still living? No  Family history of ovarian cancer, Age at diagnosis: Age Unknown

## 2019-06-26 NOTE — H&P ADULT - ASSESSMENT
79F hx of DLBC lymphoma, Afib on eliquis presenting with three days of worsening SOB and productive cough x3 days found to have +entero/rhinovirus with possible superimposed PNA.

## 2019-06-26 NOTE — ED PROVIDER NOTE - OBJECTIVE STATEMENT
80 yo F, nonsmoker with PMH of large cell lymphoma s/p chemotherapy 2017, left breast cancer 2012 w/ b/l mastectomy, recurrent lung cancer, A-fib on Eliquis, HTN, chronic cough, BIBEMS to ER c/o productive cough x3 days w/ sob, hypoxic, Sat 84% on RA. Pt states she has chronic cough for 2 years, worsening with productive cough for the past few days. Reports sob w/ the sputum, better after clearing up the phlegm. As per son, patient has recent recurrent lung cancer, has biopsy last month, has tried chemo drug last month x1 but making pt confused which stopped tx. Denies any fever, chills, n/v/d, chest pain, back pain, abdominal pain, dysuria, recent travel, recent hospitalization, sick contacts or any other complaints. 80 yo F, nonsmoker with PMH of large cell lymphoma s/p chemotherapy 2017, left breast cancer 2012 w/ b/l mastectomy, A-fib on Eliquis, HTN, chronic cough, recurrent lung cancer s/p CT guided biopsy with documented small pneumothorax 5/9/19, BIBEMS to ER c/o productive cough x3 days w/ sob, hypoxic, Sat 84% on RA. Pt states she has chronic cough for 2 years, worsening with productive cough for the past few days. Reports sob w/ the sputum, better after clearing up the phlegm. As per son, patient has recent recurrent lung cancer, has biopsy last month, has tried chemo drug last month x1 but making pt confused which stopped tx. Denies any fever, chills, n/v/d, chest pain, back pain, abdominal pain, dysuria, recent travel, recent hospitalization, sick contacts or any other complaints.

## 2019-06-26 NOTE — H&P ADULT - NSHPREVIEWOFSYSTEMS_GEN_ALL_CORE
General: denies fevers, chills  Skin/Breast: denies rash   Ophthalmologic: denies blurry vision  ENMT: denies throat pain  Respiratory and Thorax: + productive cough, + shortness of breath  Cardiovascular: denies chest pain, palpitations. Denies LE swelling   Gastrointestinal: denies abdominal pain/ nausea/ vomiting/ diarrhea. Denies BRBPR/ melena   Genitourinary: Denies dysuria  Musculoskeletal: Denies mylagias   Neurological: Denies syncope  Psychiatric: Denies mood disturbance   Hematology/Lymphatics: denies bleeding/bruising. Denies skin lumps

## 2019-06-26 NOTE — H&P ADULT - NSHPSOCIALHISTORY_GEN_ALL_CORE
Occupation: retired    Lives with: son and    Substance Use: denies   Tobacco Usage:  denies   Alcohol Usage: denies

## 2019-06-26 NOTE — H&P ADULT - NSICDXPASTSURGICALHX_GEN_ALL_CORE_FT
PAST SURGICAL HISTORY:  Arthropathy 2005, partial left knee replacement    Cataract left eye lens    H/O bilateral mastectomy     S/P tonsillectomy

## 2019-06-26 NOTE — H&P ADULT - NSICDXPASTMEDICALHX_GEN_ALL_CORE_FT
PAST MEDICAL HISTORY:  Afib     Alopecia wears a wig -- hair regrowing back    Anemia     Ductal carcinoma in situ (DCIS) of left breast had b/l mastectomy in 2012 with NO post op chemotherapy or RT    GERD (gastroesophageal reflux disease)     HTN (hypertension)     Hyponatremia     Lung mass diagnosed in 2017--received chemotherapy prior to surgery scheduled to biopsy trachea on 12-8-17    Lymphadenopathy as per Dr. Mckeon's consult on 12-4-17    Lymphoma, unspecified body region, unspecified lymphoma type diffuse large B-cell lymphoma of intrathoracic lymph nodes    Murmur, cardiac     S/P chemotherapy, time since 4-12 weeks     Thrombocytopenia     Tracheal compression     Varicose veins

## 2019-06-26 NOTE — ED PROVIDER NOTE - PROGRESS NOTE DETAILS
PA LINDA: Patient reassessed, sitting comfortably in chair in NAD, denies any complaints. States feeling better, symptoms improved. Exam: wheezing improved b/l. Cardizem given, HR improved, 98 BPM. portable CXR ordered due to FARAZ. Will continue to monitor and reassess. PA LINDA: Patient reassessed, sitting comfortably in chair in NAD, denies any complaints. States feeling better, symptoms improved. CXR shows increased opacities consistent w/ worsening metastatic disease. Called and spoke with Dr. Schafer, not affiliated with [facility]. Discussed with attending, will admit MENG MCKEON: spoke hospitalist, Dr. Mckeon, accepted patient. Pt admitted for worsening sob w/ recurrent lung cancer. MAR text paged.

## 2019-06-26 NOTE — ED PROVIDER NOTE - CLINICAL SUMMARY MEDICAL DECISION MAKING FREE TEXT BOX
80 yo F, nonsmoker with PMH of large cell lymphoma s/p chemotherapy 2017, left breast cancer 2012 w/ b/l mastectomy, recurrent lung cancer, A-fib on Eliquis, HTN, chronic cough, BIBEMS to ER c/o productive cough x3 days w/ sob, hypoxic, Sat 84% on RA. Well appearing female with lymphoma, recurrent lung cancer, worsening cough, productive w/ yellow/white sputum, intermittent sob with sputum, hypoxic on RA, tachycardiac HR of 120, wheezing, afebrile in ED, rectal 98.8, concern for pneumonia, URI. EKG A-fib, no chest pain. Plan: check labs, blood cx, VBG, trop, ProBNP, CXR, give IVF, IV abx, and reassess, likely admit.

## 2019-06-26 NOTE — ED ADULT NURSE NOTE - OBJECTIVE STATEMENT
Pt received to room 22 AAO x 3 c/o SOB and productive cough, worse than usual since this AM. Pt denies chest pain or palpitations, PMH left side lung CA- not presently on chemo or radiation. Pt is tachypneic with audible wheezing and O2 sat 100% on 3L nasal cannula, Afib noted on cardiac monitor, no lower extremity  edema noted. Labs sent and 18G placed to the right AC, prehospital 20G noted to the left AC. Report given to primary RN and margo marmolejo.

## 2019-06-26 NOTE — ED ADULT TRIAGE NOTE - CHIEF COMPLAINT QUOTE
Lung CA pt, states no longer on chemo but recently finished radiation. pt reports SOB this AM with wet productive cough. LS rhonchi on L side. as per EMS pt was hypoxic on room air 84%. 94% on NC.

## 2019-06-27 DIAGNOSIS — J96.01 ACUTE RESPIRATORY FAILURE WITH HYPOXIA: ICD-10-CM

## 2019-06-27 LAB
ALBUMIN SERPL ELPH-MCNC: 3.1 G/DL — LOW (ref 3.3–5)
ALP SERPL-CCNC: 53 U/L — SIGNIFICANT CHANGE UP (ref 40–120)
ALT FLD-CCNC: 12 U/L — SIGNIFICANT CHANGE UP (ref 4–33)
ANION GAP SERPL CALC-SCNC: 11 MMO/L — SIGNIFICANT CHANGE UP (ref 7–14)
AST SERPL-CCNC: 17 U/L — SIGNIFICANT CHANGE UP (ref 4–32)
BACTERIA UR CULT: SIGNIFICANT CHANGE UP
BASOPHILS # BLD AUTO: 0.03 K/UL — SIGNIFICANT CHANGE UP (ref 0–0.2)
BASOPHILS NFR BLD AUTO: 1.1 % — SIGNIFICANT CHANGE UP (ref 0–2)
BILIRUB SERPL-MCNC: 1.3 MG/DL — HIGH (ref 0.2–1.2)
BUN SERPL-MCNC: 10 MG/DL — SIGNIFICANT CHANGE UP (ref 7–23)
CALCIUM SERPL-MCNC: 8.8 MG/DL — SIGNIFICANT CHANGE UP (ref 8.4–10.5)
CHLORIDE SERPL-SCNC: 94 MMOL/L — LOW (ref 98–107)
CO2 SERPL-SCNC: 23 MMOL/L — SIGNIFICANT CHANGE UP (ref 22–31)
CREAT SERPL-MCNC: 0.42 MG/DL — LOW (ref 0.5–1.3)
EOSINOPHIL # BLD AUTO: 0 K/UL — SIGNIFICANT CHANGE UP (ref 0–0.5)
EOSINOPHIL NFR BLD AUTO: 0 % — SIGNIFICANT CHANGE UP (ref 0–6)
GLUCOSE SERPL-MCNC: 165 MG/DL — HIGH (ref 70–99)
HCT VFR BLD CALC: 30 % — LOW (ref 34.5–45)
HGB BLD-MCNC: 10.1 G/DL — LOW (ref 11.5–15.5)
IMM GRANULOCYTES NFR BLD AUTO: 11.9 % — HIGH (ref 0–1.5)
LYMPHOCYTES # BLD AUTO: 0.54 K/UL — LOW (ref 1–3.3)
LYMPHOCYTES # BLD AUTO: 19.5 % — SIGNIFICANT CHANGE UP (ref 13–44)
MAGNESIUM SERPL-MCNC: 1.5 MG/DL — LOW (ref 1.6–2.6)
MANUAL SMEAR VERIFICATION: SIGNIFICANT CHANGE UP
MCHC RBC-ENTMCNC: 32.1 PG — SIGNIFICANT CHANGE UP (ref 27–34)
MCHC RBC-ENTMCNC: 33.7 % — SIGNIFICANT CHANGE UP (ref 32–36)
MCV RBC AUTO: 95.2 FL — SIGNIFICANT CHANGE UP (ref 80–100)
MONOCYTES # BLD AUTO: 0.23 K/UL — SIGNIFICANT CHANGE UP (ref 0–0.9)
MONOCYTES NFR BLD AUTO: 8.3 % — SIGNIFICANT CHANGE UP (ref 2–14)
NEUTROPHILS # BLD AUTO: 1.64 K/UL — LOW (ref 1.8–7.4)
NEUTROPHILS NFR BLD AUTO: 59.2 % — SIGNIFICANT CHANGE UP (ref 43–77)
NRBC # FLD: 0 K/UL — SIGNIFICANT CHANGE UP (ref 0–0)
OSMOLALITY SERPL: 265 MOSMO/KG — LOW (ref 275–295)
PHOSPHATE SERPL-MCNC: 3.2 MG/DL — SIGNIFICANT CHANGE UP (ref 2.5–4.5)
PLATELET # BLD AUTO: 138 K/UL — LOW (ref 150–400)
PMV BLD: 10.5 FL — SIGNIFICANT CHANGE UP (ref 7–13)
POTASSIUM SERPL-MCNC: 3.4 MMOL/L — LOW (ref 3.5–5.3)
POTASSIUM SERPL-SCNC: 3.4 MMOL/L — LOW (ref 3.5–5.3)
PROT SERPL-MCNC: 4.9 G/DL — LOW (ref 6–8.3)
RBC # BLD: 3.15 M/UL — LOW (ref 3.8–5.2)
RBC # FLD: 19.1 % — HIGH (ref 10.3–14.5)
SODIUM SERPL-SCNC: 128 MMOL/L — LOW (ref 135–145)
SPECIMEN SOURCE: SIGNIFICANT CHANGE UP
WBC # BLD: 2.77 K/UL — LOW (ref 3.8–10.5)
WBC # FLD AUTO: 2.77 K/UL — LOW (ref 3.8–10.5)

## 2019-06-27 PROCEDURE — 99233 SBSQ HOSP IP/OBS HIGH 50: CPT

## 2019-06-27 PROCEDURE — 71250 CT THORAX DX C-: CPT | Mod: 26

## 2019-06-27 RX ORDER — SODIUM CHLORIDE 9 MG/ML
1 INJECTION INTRAMUSCULAR; INTRAVENOUS; SUBCUTANEOUS
Refills: 0 | Status: DISCONTINUED | OUTPATIENT
Start: 2019-06-27 | End: 2019-06-28

## 2019-06-27 RX ORDER — POTASSIUM CHLORIDE 20 MEQ
40 PACKET (EA) ORAL ONCE
Refills: 0 | Status: COMPLETED | OUTPATIENT
Start: 2019-06-27 | End: 2019-06-27

## 2019-06-27 RX ADMIN — PANTOPRAZOLE SODIUM 40 MILLIGRAM(S): 20 TABLET, DELAYED RELEASE ORAL at 05:29

## 2019-06-27 RX ADMIN — PIPERACILLIN AND TAZOBACTAM 25 GRAM(S): 4; .5 INJECTION, POWDER, LYOPHILIZED, FOR SOLUTION INTRAVENOUS at 21:58

## 2019-06-27 RX ADMIN — PIPERACILLIN AND TAZOBACTAM 25 GRAM(S): 4; .5 INJECTION, POWDER, LYOPHILIZED, FOR SOLUTION INTRAVENOUS at 06:34

## 2019-06-27 RX ADMIN — Medication 20 MILLIGRAM(S): at 05:29

## 2019-06-27 RX ADMIN — Medication 100 MILLIGRAM(S): at 09:38

## 2019-06-27 RX ADMIN — Medication 300 MILLIGRAM(S): at 11:24

## 2019-06-27 RX ADMIN — SODIUM CHLORIDE 1 GRAM(S): 9 INJECTION INTRAMUSCULAR; INTRAVENOUS; SUBCUTANEOUS at 18:53

## 2019-06-27 RX ADMIN — Medication 3 MILLILITER(S): at 15:08

## 2019-06-27 RX ADMIN — Medication 100 MILLIGRAM(S): at 02:45

## 2019-06-27 RX ADMIN — Medication 250 MILLIGRAM(S): at 18:45

## 2019-06-27 RX ADMIN — Medication 40 MILLIEQUIVALENT(S): at 18:49

## 2019-06-27 RX ADMIN — Medication 250 MILLIGRAM(S): at 05:23

## 2019-06-27 RX ADMIN — APIXABAN 2.5 MILLIGRAM(S): 2.5 TABLET, FILM COATED ORAL at 18:46

## 2019-06-27 RX ADMIN — Medication 100 MILLIGRAM(S): at 18:46

## 2019-06-27 RX ADMIN — Medication 50 MILLIGRAM(S): at 11:24

## 2019-06-27 RX ADMIN — PIPERACILLIN AND TAZOBACTAM 25 GRAM(S): 4; .5 INJECTION, POWDER, LYOPHILIZED, FOR SOLUTION INTRAVENOUS at 13:15

## 2019-06-27 RX ADMIN — APIXABAN 2.5 MILLIGRAM(S): 2.5 TABLET, FILM COATED ORAL at 05:29

## 2019-06-27 NOTE — PROGRESS NOTE ADULT - PROBLEM SELECTOR PLAN 1
ddx inc. PNA vs. mucus plugging vs. progressive metastatic disease vs. reactive airway disease  -remains hypoxic this am, SpO2 85% RA, 94% 3L. will cont O2 to maintain SpO2>90, wean as tolerated   -f/u CT chest r/o PNA  -cont broad spectrum abx for possible bacterial PNA- need to coverage MRSA and gram neg organism given immunocompromised state/recent viral infection   -cont prednisone and duonebs for reactive airway disease  -supportive care for rhino virus- hycodan prn for cough

## 2019-06-27 NOTE — PROGRESS NOTE ADULT - PROBLEM SELECTOR PLAN 3
Likely in setting of decreased PO intake, but can also be due to SIADH in setting of infection/lung pathology   - start NaCl 2g  - f/u urine lytes

## 2019-06-27 NOTE — PROGRESS NOTE ADULT - SUBJECTIVE AND OBJECTIVE BOX
Patient is a 79y old  Female who presents with a chief complaint of SOB (2019 17:29)      SUBJECTIVE / OVERNIGHT EVENTS:    no acute event o/n. report breathing has improved. still have a productive cough    Review of Systems:    RESPIRATORY: No cough, wheezing, chills or hemoptysis; No shortness of breath  CARDIOVASCULAR: No chest pain, palpitations, dizziness, or leg swelling  GASTROINTESTINAL: No abdominal or epigastric pain. No nausea, vomiting, or hematemesis; No diarrhea or constipation. No melena or hematochezia.      MEDICATIONS  (STANDING):  allopurinol 300 milliGRAM(s) Oral daily  apixaban 2.5 milliGRAM(s) Oral two times a day  metoprolol succinate ER 50 milliGRAM(s) Oral daily  pantoprazole    Tablet 40 milliGRAM(s) Oral before breakfast  piperacillin/tazobactam IVPB.. 3.375 Gram(s) IV Intermittent every 8 hours  potassium chloride    Tablet ER 40 milliEquivalent(s) Oral once  predniSONE   Tablet 20 milliGRAM(s) Oral daily  trimethoprim  160 mG/sulfamethoxazole 800 mG 1 Tablet(s) Oral every 48 hours  valACYclovir 500 milliGRAM(s) Oral <User Schedule>  vancomycin  IVPB 750 milliGRAM(s) IV Intermittent every 12 hours    MEDICATIONS  (PRN):  ALBUTerol/ipratropium for Nebulization 3 milliLiter(s) Nebulizer every 6 hours PRN Shortness of Breath and/or Wheezing  benzonatate 100 milliGRAM(s) Oral three times a day PRN Cough  HYDROcodone/homatropine Syrup 5 milliLiter(s) Oral every 6 hours PRN Cough      PHYSICAL EXAM:  T(C): 36.9 (19 @ 09:09), Max: 37.3 (19 @ 18:49)  HR: 102 (19 @ 09:09) (71 - 140)  BP: 104/72 (19 @ 09:09) (100/66 - 133/86)  RR: 17 (19 @ 09:09) (16 - 18)  SpO2: 97% (19 @ 09:09) (93% - 99%)  I&O's Summary    GENERAL: elderly female lying in bed in NAD   MENTAL STATUS/PSYCH:  AAO x3   HEAD:  Atraumatic, Normocephalic  EYES: EOMI, PERRLA, conjunctiva and sclera clear  NECK: Supple, No elevated JVD  CHEST/LUNG:  +rhonchi bilaterally  HEART: Regular rate and rhythm; No murmurs, rubs, or gallops  ABDOMEN: Soft, Nontender, Nondistended; Bowel sounds present  EXTREMITIES:  2+ Peripheral Pulses, No clubbing, cyanosis, or edema  NEUROLOGY: CN II-XII grossly intact, moving all extremities  SKIN: No rashes or lesions    LABS:  CAPILLARY BLOOD GLUCOSE                              10.1   2.77  )-----------( 138      ( 2019 06:16 )             30.0     06-27    128<L>  |  94<L>  |  10  ----------------------------<  165<H>  3.4<L>   |  23  |  0.42<L>    Ca    8.8      2019 06:16  Phos  3.2     -  Mg     1.5     -    TPro  4.9<L>  /  Alb  3.1<L>  /  TBili  1.3<H>  /  DBili  x   /  AST  17  /  ALT  12  /  AlkPhos  53  06-27    PT/INR - ( 2019 09:25 )   PT: 14.3 SEC;   INR: 1.25          PTT - ( 2019 09:25 )  PTT:25.9 SEC      Urinalysis Basic - ( 2019 11:35 )    Color: YELLOW / Appearance: CLEAR / S.008 / pH: 7.0  Gluc: NEGATIVE / Ketone: NEGATIVE  / Bili: NEGATIVE / Urobili: NORMAL   Blood: NEGATIVE / Protein: NEGATIVE / Nitrite: NEGATIVE   Leuk Esterase: NEGATIVE / RBC: 0-2 / WBC 0-2   Sq Epi: OCC / Non Sq Epi: x / Bacteria: NEGATIVE

## 2019-06-27 NOTE — PROGRESS NOTE ADULT - PROBLEM SELECTOR PLAN 2
Afib w/ RVR likely d/t infection and hypoxia, now better rate controlled   - Monitor on tele   - C/w home metoprolol 50mg ER  - cont apixaban for AC

## 2019-06-27 NOTE — PROGRESS NOTE ADULT - PROBLEM SELECTOR PLAN 4
- [place] [facility] [place] per family, [facility] is not offering any disease modifying treatment. Pt is looking into enrollment in a clinical trial   - C/w ppx Bactrim, valacyclovir and allopurinol  - GOC discussed with pt and daughter, want full code for now

## 2019-06-28 LAB
ANION GAP SERPL CALC-SCNC: 14 MMO/L — SIGNIFICANT CHANGE UP (ref 7–14)
BUN SERPL-MCNC: 9 MG/DL — SIGNIFICANT CHANGE UP (ref 7–23)
CALCIUM SERPL-MCNC: 9.1 MG/DL — SIGNIFICANT CHANGE UP (ref 8.4–10.5)
CHLORIDE SERPL-SCNC: 87 MMOL/L — LOW (ref 98–107)
CO2 SERPL-SCNC: 25 MMOL/L — SIGNIFICANT CHANGE UP (ref 22–31)
CREAT SERPL-MCNC: 0.45 MG/DL — LOW (ref 0.5–1.3)
GLUCOSE SERPL-MCNC: 102 MG/DL — HIGH (ref 70–99)
HCT VFR BLD CALC: 32 % — LOW (ref 34.5–45)
HGB BLD-MCNC: 10.9 G/DL — LOW (ref 11.5–15.5)
MAGNESIUM SERPL-MCNC: 1.5 MG/DL — LOW (ref 1.6–2.6)
MCHC RBC-ENTMCNC: 32.1 PG — SIGNIFICANT CHANGE UP (ref 27–34)
MCHC RBC-ENTMCNC: 34.1 % — SIGNIFICANT CHANGE UP (ref 32–36)
MCV RBC AUTO: 94.1 FL — SIGNIFICANT CHANGE UP (ref 80–100)
NRBC # FLD: 0 K/UL — SIGNIFICANT CHANGE UP (ref 0–0)
PHOSPHATE SERPL-MCNC: 2.2 MG/DL — LOW (ref 2.5–4.5)
PLATELET # BLD AUTO: 143 K/UL — LOW (ref 150–400)
PMV BLD: 9.8 FL — SIGNIFICANT CHANGE UP (ref 7–13)
POTASSIUM SERPL-MCNC: 3.8 MMOL/L — SIGNIFICANT CHANGE UP (ref 3.5–5.3)
POTASSIUM SERPL-SCNC: 3.8 MMOL/L — SIGNIFICANT CHANGE UP (ref 3.5–5.3)
RBC # BLD: 3.4 M/UL — LOW (ref 3.8–5.2)
RBC # FLD: 18.8 % — HIGH (ref 10.3–14.5)
SODIUM SERPL-SCNC: 126 MMOL/L — LOW (ref 135–145)
VANCOMYCIN TROUGH SERPL-MCNC: 6.4 UG/ML — LOW (ref 10–20)
WBC # BLD: 2.54 K/UL — LOW (ref 3.8–10.5)
WBC # FLD AUTO: 2.54 K/UL — LOW (ref 3.8–10.5)

## 2019-06-28 PROCEDURE — 99223 1ST HOSP IP/OBS HIGH 75: CPT | Mod: 57

## 2019-06-28 PROCEDURE — 99233 SBSQ HOSP IP/OBS HIGH 50: CPT

## 2019-06-28 RX ORDER — SODIUM CHLORIDE 9 MG/ML
2 INJECTION INTRAMUSCULAR; INTRAVENOUS; SUBCUTANEOUS
Refills: 0 | Status: DISCONTINUED | OUTPATIENT
Start: 2019-06-28 | End: 2019-07-20

## 2019-06-28 RX ADMIN — Medication 100 MILLIGRAM(S): at 03:10

## 2019-06-28 RX ADMIN — PIPERACILLIN AND TAZOBACTAM 25 GRAM(S): 4; .5 INJECTION, POWDER, LYOPHILIZED, FOR SOLUTION INTRAVENOUS at 06:08

## 2019-06-28 RX ADMIN — Medication 3 MILLILITER(S): at 00:31

## 2019-06-28 RX ADMIN — Medication 50 MILLIGRAM(S): at 05:25

## 2019-06-28 RX ADMIN — SODIUM CHLORIDE 1 GRAM(S): 9 INJECTION INTRAMUSCULAR; INTRAVENOUS; SUBCUTANEOUS at 06:09

## 2019-06-28 RX ADMIN — APIXABAN 2.5 MILLIGRAM(S): 2.5 TABLET, FILM COATED ORAL at 17:54

## 2019-06-28 RX ADMIN — Medication 20 MILLIGRAM(S): at 05:25

## 2019-06-28 RX ADMIN — PANTOPRAZOLE SODIUM 40 MILLIGRAM(S): 20 TABLET, DELAYED RELEASE ORAL at 06:09

## 2019-06-28 RX ADMIN — Medication 100 MILLIGRAM(S): at 08:32

## 2019-06-28 RX ADMIN — Medication 100 MILLIGRAM(S): at 13:26

## 2019-06-28 RX ADMIN — Medication 3 MILLILITER(S): at 19:33

## 2019-06-28 RX ADMIN — Medication 250 MILLIGRAM(S): at 18:34

## 2019-06-28 RX ADMIN — APIXABAN 2.5 MILLIGRAM(S): 2.5 TABLET, FILM COATED ORAL at 05:25

## 2019-06-28 RX ADMIN — Medication 300 MILLIGRAM(S): at 12:01

## 2019-06-28 RX ADMIN — Medication 1 TABLET(S): at 17:55

## 2019-06-28 RX ADMIN — PIPERACILLIN AND TAZOBACTAM 25 GRAM(S): 4; .5 INJECTION, POWDER, LYOPHILIZED, FOR SOLUTION INTRAVENOUS at 13:25

## 2019-06-28 RX ADMIN — SODIUM CHLORIDE 2 GRAM(S): 9 INJECTION INTRAMUSCULAR; INTRAVENOUS; SUBCUTANEOUS at 17:54

## 2019-06-28 RX ADMIN — PIPERACILLIN AND TAZOBACTAM 25 GRAM(S): 4; .5 INJECTION, POWDER, LYOPHILIZED, FOR SOLUTION INTRAVENOUS at 22:22

## 2019-06-28 RX ADMIN — Medication 250 MILLIGRAM(S): at 06:09

## 2019-06-28 RX ADMIN — VALACYCLOVIR 500 MILLIGRAM(S): 500 TABLET, FILM COATED ORAL at 17:54

## 2019-06-28 NOTE — CONSULT NOTE ADULT - ASSESSMENT
79yF complex medical history including DBCL now p/w productive cough and shortness of breath. Patient was found to have +enterorhinovirus and radiographic evidence concerning for right lower lobe pneumonia and endobronchial obstruction.  Thoracic surgery consulted for possible flexible bronchoscopy.     - patient seen and examined at bedside, no acute distress without hypoxemia on nasal cannula supplemental oxygen  - to be discussed with Dr. RENITA Pena, plan to follow.     Patient seen and examined with YASEMIN FAN.     HARPREET Park MD, PGY-I  Thoracic Surgery  [facility]  Pager: [phone] 79yF complex medical history including DBCL now p/w productive cough and shortness of breath. Patient was found to have +enterorhinovirus and radiographic evidence concerning for right lower lobe pneumonia and endobronchial obstruction.  Thoracic surgery consulted for possible flexible bronchoscopy.     - patient seen and examined at bedside, no acute distress without hypoxemia on nasal cannula supplemental oxygen  - will plan for Flexible bronchoscopy with possible laser fulguration on Mond  - will make patient npo tonight for possible urgent flexible bronchoscopy tomorrow    Plan discussed with HARPREET Keith MD  Patient seen and examined with YASEMIN FAN.     HARPREET Park MD, PGY-I  Thoracic Surgery  [facility]  Pager: [phone] 79yF complex medical history including DBCL now p/w productive cough and shortness of breath. Patient was found to have +enterorhinovirus and radiographic evidence concerning for right lower lobe pneumonia and endobronchial obstruction.  Thoracic surgery consulted for possible flexible bronchoscopy.     - patient seen and examined at bedside, no acute distress without hypoxemia on nasal cannula supplemental oxygen  - will plan for Flexible bronchoscopy with possible laser fulguration on Mond  - will make patient npo tonight for possible urgent flexible bronchoscopy tomorrow  - Please hold Eliquis   Plan discussed with HARPREET Keith MD  Patient seen and examined with YASEMIN FAN.     HARPREET Park MD, PGY-I  Thoracic Surgery  [facility]  Pager: [phone]

## 2019-06-28 NOTE — PROGRESS NOTE ADULT - SUBJECTIVE AND OBJECTIVE BOX
Patient is a 79y old  Female who presents with a chief complaint of SOB (27 Jun 2019 11:45)      SUBJECTIVE / OVERNIGHT EVENTS:    Pt reports having a rough night last night, coughed up a lot more thick sputum. this am feels a little better. denies chest pain     Review of Systems:    RESPIRATORY: + cough, No wheezing, chills or hemoptysis; No shortness of breath  CARDIOVASCULAR: No chest pain, palpitations, dizziness, or leg swelling  GASTROINTESTINAL: No abdominal or epigastric pain. No nausea, vomiting, or hematemesis; No diarrhea or constipation. No melena or hematochezia.      MEDICATIONS  (STANDING):  allopurinol 300 milliGRAM(s) Oral daily  apixaban 2.5 milliGRAM(s) Oral two times a day  metoprolol succinate ER 50 milliGRAM(s) Oral daily  pantoprazole    Tablet 40 milliGRAM(s) Oral before breakfast  piperacillin/tazobactam IVPB.. 3.375 Gram(s) IV Intermittent every 8 hours  predniSONE   Tablet 20 milliGRAM(s) Oral daily  sodium chloride 1 Gram(s) Oral two times a day  trimethoprim  160 mG/sulfamethoxazole 800 mG 1 Tablet(s) Oral every 48 hours  valACYclovir 500 milliGRAM(s) Oral <User Schedule>  vancomycin  IVPB 750 milliGRAM(s) IV Intermittent every 12 hours    MEDICATIONS  (PRN):  ALBUTerol/ipratropium for Nebulization 3 milliLiter(s) Nebulizer every 6 hours PRN Shortness of Breath and/or Wheezing  benzonatate 100 milliGRAM(s) Oral three times a day PRN Cough  HYDROcodone/homatropine Syrup 5 milliLiter(s) Oral every 6 hours PRN Cough      PHYSICAL EXAM:  T(C): 36.7 (06-28-19 @ 09:21), Max: 36.9 (06-27-19 @ 17:09)  HR: 87 (06-28-19 @ 09:21) (87 - 98)  BP: 117/66 (06-28-19 @ 09:21) (98/69 - 125/86)  RR: 18 (06-28-19 @ 09:21) (18 - 19)  SpO2: 98% (06-28-19 @ 09:21) (96% - 98%)  I&O's Summary    GENERAL: elderly female lying in bed in NAD   MENTAL STATUS/PSYCH:  AAO x3   HEAD:  Atraumatic, Normocephalic  EYES: EOMI, PERRLA, conjunctiva and sclera clear  NECK: Supple, No elevated JVD  CHEST/LUNG:  +rhonchi bilaterally  HEART: Regular rate and rhythm; No murmurs, rubs, or gallops  ABDOMEN: Soft, Nontender, Nondistended; Bowel sounds present  EXTREMITIES:  2+ Peripheral Pulses, No clubbing, cyanosis, or edema  NEUROLOGY: CN II-XII grossly intact, moving all extremities  SKIN: No rashes or lesions    LABS:  CAPILLARY BLOOD GLUCOSE                              10.9   2.54  )-----------( 143      ( 28 Jun 2019 04:30 )             32.0     06-28    126<L>  |  87<L>  |  9   ----------------------------<  102<H>  3.8   |  25  |  0.45<L>    Ca    9.1      28 Jun 2019 04:30  Phos  2.2     06-28  Mg     1.5     06-28    TPro  4.9<L>  /  Alb  3.1<L>  /  TBili  1.3<H>  /  DBili  x   /  AST  17  /  ALT  12  /  AlkPhos  53  06-27

## 2019-06-28 NOTE — PROGRESS NOTE ADULT - PROBLEM SELECTOR PLAN 4
- [place] [facility] [place], [facility] is not offering any disease modifying treatment. Pt is looking into enrollment in a clinical trial   - C/w ppx Bactrim, valacyclovir and allopurinol  - GOC discussed with pt and daughter, want full code for now

## 2019-06-28 NOTE — PROGRESS NOTE ADULT - PROBLEM SELECTOR PLAN 3
Likely in setting of decreased PO intake, but can also be due to SIADH in setting of infection/lung pathology   - increase NaCl to 2g bid

## 2019-06-28 NOTE — CHART NOTE - NSCHARTNOTEFT_GEN_A_CORE
Thoracic surgery    Will make NPO after midnight tonight for potential bronchoscopy on Saturday.  Thoracic surgery [phone]

## 2019-06-28 NOTE — PROGRESS NOTE ADULT - PROBLEM SELECTOR PLAN 1
ddx inc. PNA vs. mucus plugging vs. progressive metastatic disease vs. reactive airway disease  -remains hypoxic this am, SpO2 85% RA, 94% 3L. will cont O2 to maintain SpO2>90, wean as tolerated   -CT chest c/w mucus plugging, cannot r/o PNA  -CT surgery consulted for possible bronchoscopy. will f/u recs   -cont broad spectrum abx for possible bacterial PNA- need to coverage MRSA and gram neg organism given immunocompromised state/recent viral infection   -cont prednisone and duonebs for reactive airway disease  -supportive care for rhino virus- hycodan prn for cough

## 2019-06-28 NOTE — CONSULT NOTE ADULT - SUBJECTIVE AND OBJECTIVE BOX
THORACIC SURGERY CONSULT NOTE    CC:   Patient is a 79y old  Female who presents with a chief complaint of SOB (28 Jun 2019 12:52)    HPI:  79F hx of DLBC lymphoma, Afib on eliquis presenting with three days of worsening SOB and productive cough. Patient states she was having worsening phlegm and felt like should "couldn't breath" this AM, so she went to ED. Has had chronic cough and per son was started on prednisone 40mg x2 days, now on prednisone 20mg by Dr. Nguyen for worsening cough. She denies sick contacts, fevers, chills, LE edema. She denies chest pain or palpitations. Endorsing decreased PO intake. Denies dysuria, n/v/d. (26 Jun 2019 17:29)    INTERVAL EVENTS:  Patient seen and examined at bedside. Resting comfortably in bed w/o shortness of breath at rest on 2L NC. Patient has had minimal ambulation in past 24h and has significant dyspnea on exertion. No nausea / vomiting. Tolerating PO diet. No HA. No CP. No hemoptysis +productive cough with thick sputum. +flatus. -BM.     PAST MEDICAL & SURGICAL HISTORY:  Alopecia: wears a wig -- hair regrowing back  Murmur, cardiac  Varicose veins  GERD (gastroesophageal reflux disease)  Tracheal compression  Thrombocytopenia  Lymphadenopathy: as per Dr. 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 3.375 Gram(s) IV Intermittent every 8 hours  predniSONE   Tablet 20 milliGRAM(s) Oral daily  sodium chloride 1 Gram(s) Oral two times a day  trimethoprim  160 mG/sulfamethoxazole 800 mG 1 Tablet(s) Oral every 48 hours  valACYclovir 500 milliGRAM(s) Oral <User Schedule>  vancomycin  IVPB 750 milliGRAM(s) IV Intermittent every 12 hours    MEDICATIONS  (PRN):  ALBUTerol/ipratropium for Nebulization 3 milliLiter(s) Nebulizer every 6 hours PRN Shortness of Breath and/or Wheezing  benzonatate 100 milliGRAM(s) Oral three times a day PRN Cough  HYDROcodone/homatropine Syrup 5 milliLiter(s) Oral every 6 hours PRN Cough    ALLERGIES:  No Known Allergies or Intolerances    SOCIAL HISTORY:  Occupation: retired; lives with son and   Smoking Hx: denies  Etoh Hx: denies  IVDA Hx: denies    FAMILY HISTORY:  Family history of lymphoma: low grade, as per Dr. Palacios&#x27;s consult on 12-4-17  Family history of ovarian cancer (Aunt)    Objective:   Vital Signs Last 24 Hrs  T(C): 36.7 (28 Jun 2019 09:21), Max: 36.9 (27 Jun 2019 17:09)  T(F): 98.1 (28 Jun 2019 09:21), Max: 98.4 (27 Jun 2019 17:09)  HR: 87 (28 Jun 2019 09:21) (87 - 98)  BP: 117/66 (28 Jun 2019 09:21) (101/72 - 125/86)  RR: 18 (28 Jun 2019 09:21) (18 - 19)  SpO2: 98% (28 Jun 2019 09:21) (96% - 98%)      Physical Exam:  General: Well developed, well nourished, alert and cooperative, and appears to be in no acute distress on NC supplemental oxygen.   HEENT: normocephalic, vision is grossly intact  Neck: Neck supple, non-tender   Chest: Reduced lung sounds in the right lower lung field posteriorly. Bilateral rhonci, crackles and expiratory wheezes bilaterally.   Cardiac: Atrial fibrillation on telemetry. S1, S2.   Abdomen: soft, non-distended, non-tender, no guarding or rebound  Skin: Skin normal color, texture and turgor with no lesions or eruptions.    LABS:                        10.9   2.54  )-----------( 143      ( 28 Jun 2019 04:30 )             32.0     06-28    126<L>  |  87<L>  |  9   ----------------------------<  102<H>  3.8   |  25  |  0.45<L>    Ca    9.1      28 Jun 2019 04:30  Phos  2.2     06-28  Mg     1.5     06-28    TPro  4.9<L>  /  Alb  3.1<L>  /  TBili  1.3<H>  /  DBili  x   /  AST  17  /  ALT  12  /  AlkPhos  53  06-27    LIVER FUNCTIONS - ( 27 Jun 2019 06:16 )  Alb: 3.1 g/dL / Pro: 4.9 g/dL / ALK PHOS: 53 u/L / ALT: 12 u/L / AST: 17 u/L / GGT: x             RADIOLOGY & ADDITIONAL STUDIES:    < from: CT Chest No Cont (06.27.19 @ 18:06) >  ******PRELIMINARY REPORT******            INTERPRETATION:  Progression of metastastic disease.    New right lower lobe consolidation may represent pneumonia and/or atelectasis from endobronchial obstruction by debris/mucus.    Small right pleural effusion.    < end of copied text >

## 2019-06-29 LAB
ANION GAP SERPL CALC-SCNC: 16 MMO/L — HIGH (ref 7–14)
BUN SERPL-MCNC: 8 MG/DL — SIGNIFICANT CHANGE UP (ref 7–23)
CALCIUM SERPL-MCNC: 8.5 MG/DL — SIGNIFICANT CHANGE UP (ref 8.4–10.5)
CHLORIDE SERPL-SCNC: 90 MMOL/L — LOW (ref 98–107)
CO2 SERPL-SCNC: 21 MMOL/L — LOW (ref 22–31)
CREAT SERPL-MCNC: 0.42 MG/DL — LOW (ref 0.5–1.3)
GLUCOSE SERPL-MCNC: 176 MG/DL — HIGH (ref 70–99)
HCT VFR BLD CALC: 30.6 % — LOW (ref 34.5–45)
HGB BLD-MCNC: 10.2 G/DL — LOW (ref 11.5–15.5)
MAGNESIUM SERPL-MCNC: 1.5 MG/DL — LOW (ref 1.6–2.6)
MCHC RBC-ENTMCNC: 31.9 PG — SIGNIFICANT CHANGE UP (ref 27–34)
MCHC RBC-ENTMCNC: 33.3 % — SIGNIFICANT CHANGE UP (ref 32–36)
MCV RBC AUTO: 95.6 FL — SIGNIFICANT CHANGE UP (ref 80–100)
NRBC # FLD: 0 K/UL — SIGNIFICANT CHANGE UP (ref 0–0)
PHOSPHATE SERPL-MCNC: 2.7 MG/DL — SIGNIFICANT CHANGE UP (ref 2.5–4.5)
PLATELET # BLD AUTO: 143 K/UL — LOW (ref 150–400)
PMV BLD: 10.8 FL — SIGNIFICANT CHANGE UP (ref 7–13)
POTASSIUM SERPL-MCNC: 3.3 MMOL/L — LOW (ref 3.5–5.3)
POTASSIUM SERPL-SCNC: 3.3 MMOL/L — LOW (ref 3.5–5.3)
RBC # BLD: 3.2 M/UL — LOW (ref 3.8–5.2)
RBC # FLD: 18.7 % — HIGH (ref 10.3–14.5)
SODIUM SERPL-SCNC: 127 MMOL/L — LOW (ref 135–145)
VANCOMYCIN TROUGH SERPL-MCNC: 7.5 UG/ML — LOW (ref 10–20)
WBC # BLD: 1.75 K/UL — LOW (ref 3.8–10.5)
WBC # FLD AUTO: 1.75 K/UL — LOW (ref 3.8–10.5)

## 2019-06-29 PROCEDURE — 99233 SBSQ HOSP IP/OBS HIGH 50: CPT

## 2019-06-29 RX ORDER — DOCUSATE SODIUM 100 MG
100 CAPSULE ORAL THREE TIMES A DAY
Refills: 0 | Status: DISCONTINUED | OUTPATIENT
Start: 2019-06-29 | End: 2019-08-06

## 2019-06-29 RX ORDER — SENNA PLUS 8.6 MG/1
2 TABLET ORAL AT BEDTIME
Refills: 0 | Status: DISCONTINUED | OUTPATIENT
Start: 2019-06-29 | End: 2019-07-03

## 2019-06-29 RX ORDER — MAGNESIUM SULFATE 500 MG/ML
1 VIAL (ML) INJECTION ONCE
Refills: 0 | Status: COMPLETED | OUTPATIENT
Start: 2019-06-29 | End: 2019-06-29

## 2019-06-29 RX ORDER — HEPARIN SODIUM 5000 [USP'U]/ML
5000 INJECTION INTRAVENOUS; SUBCUTANEOUS EVERY 12 HOURS
Refills: 0 | Status: DISCONTINUED | OUTPATIENT
Start: 2019-06-29 | End: 2019-07-02

## 2019-06-29 RX ORDER — BENZOCAINE AND MENTHOL 5; 1 G/100ML; G/100ML
1 LIQUID ORAL EVERY 4 HOURS
Refills: 0 | Status: DISCONTINUED | OUTPATIENT
Start: 2019-06-29 | End: 2019-08-06

## 2019-06-29 RX ORDER — BENZOCAINE AND MENTHOL 5; 1 G/100ML; G/100ML
1 LIQUID ORAL EVERY 4 HOURS
Refills: 0 | Status: DISCONTINUED | OUTPATIENT
Start: 2019-06-29 | End: 2019-06-29

## 2019-06-29 RX ORDER — POTASSIUM CHLORIDE 20 MEQ
10 PACKET (EA) ORAL
Refills: 0 | Status: COMPLETED | OUTPATIENT
Start: 2019-06-29 | End: 2019-06-29

## 2019-06-29 RX ORDER — IPRATROPIUM/ALBUTEROL SULFATE 18-103MCG
3 AEROSOL WITH ADAPTER (GRAM) INHALATION EVERY 6 HOURS
Refills: 0 | Status: DISCONTINUED | OUTPATIENT
Start: 2019-06-29 | End: 2019-07-01

## 2019-06-29 RX ADMIN — Medication 100 MILLIGRAM(S): at 01:25

## 2019-06-29 RX ADMIN — Medication 100 MILLIEQUIVALENT(S): at 10:35

## 2019-06-29 RX ADMIN — Medication 100 MILLIGRAM(S): at 05:11

## 2019-06-29 RX ADMIN — Medication 250 MILLIGRAM(S): at 05:11

## 2019-06-29 RX ADMIN — Medication 100 MILLIGRAM(S): at 10:34

## 2019-06-29 RX ADMIN — HEPARIN SODIUM 5000 UNIT(S): 5000 INJECTION INTRAVENOUS; SUBCUTANEOUS at 15:55

## 2019-06-29 RX ADMIN — PIPERACILLIN AND TAZOBACTAM 25 GRAM(S): 4; .5 INJECTION, POWDER, LYOPHILIZED, FOR SOLUTION INTRAVENOUS at 15:55

## 2019-06-29 RX ADMIN — Medication 100 MILLIEQUIVALENT(S): at 12:28

## 2019-06-29 RX ADMIN — SODIUM CHLORIDE 2 GRAM(S): 9 INJECTION INTRAMUSCULAR; INTRAVENOUS; SUBCUTANEOUS at 05:13

## 2019-06-29 RX ADMIN — Medication 3 MILLILITER(S): at 10:51

## 2019-06-29 RX ADMIN — Medication 300 MILLIGRAM(S): at 12:27

## 2019-06-29 RX ADMIN — PIPERACILLIN AND TAZOBACTAM 25 GRAM(S): 4; .5 INJECTION, POWDER, LYOPHILIZED, FOR SOLUTION INTRAVENOUS at 21:39

## 2019-06-29 RX ADMIN — SENNA PLUS 2 TABLET(S): 8.6 TABLET ORAL at 21:39

## 2019-06-29 RX ADMIN — Medication 250 MILLIGRAM(S): at 19:07

## 2019-06-29 RX ADMIN — Medication 3 MILLILITER(S): at 16:00

## 2019-06-29 RX ADMIN — Medication 100 GRAM(S): at 10:35

## 2019-06-29 RX ADMIN — PANTOPRAZOLE SODIUM 40 MILLIGRAM(S): 20 TABLET, DELAYED RELEASE ORAL at 06:18

## 2019-06-29 RX ADMIN — Medication 100 MILLIEQUIVALENT(S): at 14:16

## 2019-06-29 RX ADMIN — PIPERACILLIN AND TAZOBACTAM 25 GRAM(S): 4; .5 INJECTION, POWDER, LYOPHILIZED, FOR SOLUTION INTRAVENOUS at 05:11

## 2019-06-29 RX ADMIN — Medication 3 MILLILITER(S): at 23:05

## 2019-06-29 RX ADMIN — BENZOCAINE AND MENTHOL 1 LOZENGE: 5; 1 LIQUID ORAL at 21:41

## 2019-06-29 RX ADMIN — Medication 50 MILLIGRAM(S): at 05:13

## 2019-06-29 RX ADMIN — Medication 3 MILLILITER(S): at 04:22

## 2019-06-29 RX ADMIN — Medication 100 MILLIGRAM(S): at 15:55

## 2019-06-29 RX ADMIN — SODIUM CHLORIDE 2 GRAM(S): 9 INJECTION INTRAMUSCULAR; INTRAVENOUS; SUBCUTANEOUS at 19:08

## 2019-06-29 RX ADMIN — Medication 20 MILLIGRAM(S): at 05:13

## 2019-06-29 NOTE — PROGRESS NOTE ADULT - PROBLEM SELECTOR PLAN 2
Afib w/ RVR likely d/t infection and hypoxia, now better rate controlled   - Monitor on tele   - C/w home metoprolol 50mg ER  - cont apixaban on hold pending bronch

## 2019-06-29 NOTE — PROGRESS NOTE ADULT - PROBLEM SELECTOR PLAN 3
Likely in setting of decreased PO intake, but can also be due to SIADH in setting of infection/lung pathology   - cont NaCl to 2g bid

## 2019-06-29 NOTE — PROGRESS NOTE ADULT - PROBLEM SELECTOR PLAN 4
- [place] [facility] [place] family, [facility] is not offering any disease modifying treatment. Pt is looking into enrollment in a clinical trial   - C/w ppx Bactrim, valacyclovir and allopurinol  - GOC discussed with pt and daughter, want full code for now

## 2019-06-29 NOTE — PROGRESS NOTE ADULT - SUBJECTIVE AND OBJECTIVE BOX
S: Last night with some delirium.  This AM feels ok, does report a cough and some wheezing. Otherwise no complaints.    O: ICU Vital Signs Last 24 Hrs  T(C): 36.6 (29 Jun 2019 04:43), Max: 36.8 (28 Jun 2019 15:45)  T(F): 97.9 (29 Jun 2019 04:43), Max: 98.2 (28 Jun 2019 15:45)  HR: 109 (29 Jun 2019 04:43) (90 - 109)  BP: 127/73 (29 Jun 2019 04:43) (105/71 - 131/85)  BP(mean): --  ABP: --  ABP(mean): --  RR: 18 (29 Jun 2019 04:43) (17 - 20)  SpO2: 97% (29 Jun 2019 04:43) (97% - 99%) 2L NC    NAD  Grossly AAOx3  Some wheezing and coughing, otherwise breathing comfortably  Extremities warm, well perfused

## 2019-06-29 NOTE — PROGRESS NOTE ADULT - PROBLEM SELECTOR PLAN 1
ddx inc. PNA vs. mucus plugging vs. progressive metastatic disease vs. reactive airway disease  -remains hypoxic this am, SpO2 85% RA, 94% 3L. will cont O2 to maintain SpO2>90, wean as tolerated   -CT chest Complete occlusion of the right lower lobe bronchus and the segmental   bronchi of the right lower lobe with a central opacity, cannot r/o PNA  -CT surgery consulted, plan for bronchoscopy 7/1  -cont broad spectrum abx for possible bacterial PNA- need to coverage MRSA and gram neg organism given immunocompromised state/recent viral infection   -cont prednisone and duonebs for reactive airway disease  -supportive care for rhino virus- hycodan prn for cough

## 2019-06-29 NOTE — PROGRESS NOTE ADULT - SUBJECTIVE AND OBJECTIVE BOX
Patient is a 79y old  Female who presents with a chief complaint of SOB (29 Jun 2019 10:25)      SUBJECTIVE / OVERNIGHT EVENTS:    Pt reports persistent cough. Denies sob    Review of Systems:    RESPIRATORY: No cough, wheezing, chills or hemoptysis; No shortness of breath  CARDIOVASCULAR: No chest pain, palpitations, dizziness, or leg swelling  GASTROINTESTINAL: No abdominal or epigastric pain. No nausea, vomiting, or hematemesis; No diarrhea or constipation. No melena or hematochezia.    MEDICATIONS  (STANDING):  ALBUTerol/ipratropium for Nebulization 3 milliLiter(s) Nebulizer every 6 hours  allopurinol 300 milliGRAM(s) Oral daily  metoprolol succinate ER 50 milliGRAM(s) Oral daily  pantoprazole    Tablet 40 milliGRAM(s) Oral before breakfast  piperacillin/tazobactam IVPB.. 3.375 Gram(s) IV Intermittent every 8 hours  potassium chloride  10 mEq/100 mL IVPB 10 milliEquivalent(s) IV Intermittent every 1 hour  predniSONE   Tablet 20 milliGRAM(s) Oral daily  sodium chloride 2 Gram(s) Oral two times a day  trimethoprim  160 mG/sulfamethoxazole 800 mG 1 Tablet(s) Oral every 48 hours  valACYclovir 500 milliGRAM(s) Oral <User Schedule>  vancomycin  IVPB 750 milliGRAM(s) IV Intermittent every 12 hours    MEDICATIONS  (PRN):  benzonatate 100 milliGRAM(s) Oral three times a day PRN Cough  HYDROcodone/homatropine Syrup 5 milliLiter(s) Oral every 6 hours PRN Cough      PHYSICAL EXAM:  T(C): 36.7 (06-29-19 @ 11:16), Max: 36.8 (06-28-19 @ 15:45)  HR: 90 (06-29-19 @ 11:16) (90 - 109)  BP: 117/70 (06-29-19 @ 11:16) (105/71 - 131/85)  RR: 32 (06-29-19 @ 11:16) (17 - 32)  SpO2: 98% (06-29-19 @ 11:16) (95% - 99%)  I&O's Summary    GENERAL: elderly female lying in bed in NAD   MENTAL STATUS/PSYCH:  AAO x3   HEAD:  Atraumatic, Normocephalic  EYES: EOMI, PERRLA, conjunctiva and sclera clear  NECK: Supple, No elevated JVD  CHEST/LUNG:  +rhonchi bilaterally  HEART: Regular rate and rhythm; No murmurs, rubs, or gallops  ABDOMEN: Soft, Nontender, Nondistended; Bowel sounds present  EXTREMITIES:  2+ Peripheral Pulses, No clubbing, cyanosis, or edema  NEUROLOGY: CN II-XII grossly intact, moving all extremities  SKIN: No rashes or lesions      LABS:  CAPILLARY BLOOD GLUCOSE                              10.2   1.75  )-----------( 143      ( 29 Jun 2019 06:10 )             30.6     06-29    127<L>  |  90<L>  |  8   ----------------------------<  176<H>  3.3<L>   |  21<L>  |  0.42<L>    Ca    8.5      29 Jun 2019 06:10  Phos  2.7     06-29  Mg     1.5     06-29

## 2019-06-29 NOTE — PROGRESS NOTE ADULT - ASSESSMENT
A/P: 79yF complex medical history including DBCL now p/w productive cough and shortness of breath.   Patient was found to have +enterorhinovirus and radiographic evidence concerning for right lower lobe pneumonia and endobronchial obstruction.  Thoracic surgery consulted for possible flexible bronchoscopy.   Some wheezing when coughing, otherwise in no acute distress on 2LNC    - Recommend scheduled nebulizer treatments given wheezing  - Plan for Flexible bronchoscopy with possible laser fulguration on Monday  - Ok to give food today. Please make NPO after midnight on Sunday evening  - Continue to hold eliquis   - Pt seen and assessed w/ Dr. Pena who agrees w/ plan  - Thoracic surgery to follow, please call [phone] w/ any questions

## 2019-06-30 ENCOUNTER — TRANSCRIPTION ENCOUNTER (OUTPATIENT)
Age: 79
End: 2019-06-30

## 2019-06-30 LAB
ANION GAP SERPL CALC-SCNC: 11 MMO/L — SIGNIFICANT CHANGE UP (ref 7–14)
BUN SERPL-MCNC: 7 MG/DL — SIGNIFICANT CHANGE UP (ref 7–23)
CALCIUM SERPL-MCNC: 8.8 MG/DL — SIGNIFICANT CHANGE UP (ref 8.4–10.5)
CHLORIDE SERPL-SCNC: 95 MMOL/L — LOW (ref 98–107)
CO2 SERPL-SCNC: 26 MMOL/L — SIGNIFICANT CHANGE UP (ref 22–31)
CREAT SERPL-MCNC: 0.43 MG/DL — LOW (ref 0.5–1.3)
GLUCOSE SERPL-MCNC: 94 MG/DL — SIGNIFICANT CHANGE UP (ref 70–99)
HCT VFR BLD CALC: 29.1 % — LOW (ref 34.5–45)
HGB BLD-MCNC: 9.5 G/DL — LOW (ref 11.5–15.5)
MAGNESIUM SERPL-MCNC: 1.7 MG/DL — SIGNIFICANT CHANGE UP (ref 1.6–2.6)
MCHC RBC-ENTMCNC: 31 PG — SIGNIFICANT CHANGE UP (ref 27–34)
MCHC RBC-ENTMCNC: 32.6 % — SIGNIFICANT CHANGE UP (ref 32–36)
MCV RBC AUTO: 95.1 FL — SIGNIFICANT CHANGE UP (ref 80–100)
NRBC # FLD: 0 K/UL — SIGNIFICANT CHANGE UP (ref 0–0)
PHOSPHATE SERPL-MCNC: 2.2 MG/DL — LOW (ref 2.5–4.5)
PLATELET # BLD AUTO: 132 K/UL — LOW (ref 150–400)
PMV BLD: 10.5 FL — SIGNIFICANT CHANGE UP (ref 7–13)
POTASSIUM SERPL-MCNC: 3.9 MMOL/L — SIGNIFICANT CHANGE UP (ref 3.5–5.3)
POTASSIUM SERPL-SCNC: 3.9 MMOL/L — SIGNIFICANT CHANGE UP (ref 3.5–5.3)
RBC # BLD: 3.06 M/UL — LOW (ref 3.8–5.2)
RBC # FLD: 18.8 % — HIGH (ref 10.3–14.5)
SODIUM SERPL-SCNC: 132 MMOL/L — LOW (ref 135–145)
WBC # BLD: 1.75 K/UL — LOW (ref 3.8–10.5)
WBC # FLD AUTO: 1.75 K/UL — LOW (ref 3.8–10.5)

## 2019-06-30 PROCEDURE — 99233 SBSQ HOSP IP/OBS HIGH 50: CPT

## 2019-06-30 RX ORDER — ACETAMINOPHEN 500 MG
650 TABLET ORAL ONCE
Refills: 0 | Status: COMPLETED | OUTPATIENT
Start: 2019-06-30 | End: 2019-06-30

## 2019-06-30 RX ADMIN — Medication 250 MILLIGRAM(S): at 18:48

## 2019-06-30 RX ADMIN — SODIUM CHLORIDE 2 GRAM(S): 9 INJECTION INTRAMUSCULAR; INTRAVENOUS; SUBCUTANEOUS at 05:27

## 2019-06-30 RX ADMIN — Medication 1 TABLET(S): at 17:22

## 2019-06-30 RX ADMIN — Medication 50 MILLIGRAM(S): at 05:27

## 2019-06-30 RX ADMIN — Medication 3 MILLILITER(S): at 10:20

## 2019-06-30 RX ADMIN — Medication 300 MILLIGRAM(S): at 11:52

## 2019-06-30 RX ADMIN — SODIUM CHLORIDE 2 GRAM(S): 9 INJECTION INTRAMUSCULAR; INTRAVENOUS; SUBCUTANEOUS at 18:49

## 2019-06-30 RX ADMIN — Medication 650 MILLIGRAM(S): at 22:05

## 2019-06-30 RX ADMIN — HEPARIN SODIUM 5000 UNIT(S): 5000 INJECTION INTRAVENOUS; SUBCUTANEOUS at 17:22

## 2019-06-30 RX ADMIN — Medication 20 MILLIGRAM(S): at 05:27

## 2019-06-30 RX ADMIN — VALACYCLOVIR 500 MILLIGRAM(S): 500 TABLET, FILM COATED ORAL at 17:22

## 2019-06-30 RX ADMIN — BENZOCAINE AND MENTHOL 1 LOZENGE: 5; 1 LIQUID ORAL at 17:22

## 2019-06-30 RX ADMIN — PIPERACILLIN AND TAZOBACTAM 25 GRAM(S): 4; .5 INJECTION, POWDER, LYOPHILIZED, FOR SOLUTION INTRAVENOUS at 21:08

## 2019-06-30 RX ADMIN — Medication 3 MILLILITER(S): at 21:20

## 2019-06-30 RX ADMIN — PIPERACILLIN AND TAZOBACTAM 25 GRAM(S): 4; .5 INJECTION, POWDER, LYOPHILIZED, FOR SOLUTION INTRAVENOUS at 13:43

## 2019-06-30 RX ADMIN — BENZOCAINE AND MENTHOL 1 LOZENGE: 5; 1 LIQUID ORAL at 21:08

## 2019-06-30 RX ADMIN — Medication 250 MILLIGRAM(S): at 06:22

## 2019-06-30 RX ADMIN — BENZOCAINE AND MENTHOL 1 LOZENGE: 5; 1 LIQUID ORAL at 11:52

## 2019-06-30 RX ADMIN — PANTOPRAZOLE SODIUM 40 MILLIGRAM(S): 20 TABLET, DELAYED RELEASE ORAL at 05:28

## 2019-06-30 RX ADMIN — BENZOCAINE AND MENTHOL 1 LOZENGE: 5; 1 LIQUID ORAL at 13:43

## 2019-06-30 RX ADMIN — Medication 3 MILLILITER(S): at 16:28

## 2019-06-30 RX ADMIN — Medication 100 MILLIGRAM(S): at 21:08

## 2019-06-30 RX ADMIN — Medication 100 MILLIGRAM(S): at 05:27

## 2019-06-30 RX ADMIN — Medication 650 MILLIGRAM(S): at 22:59

## 2019-06-30 RX ADMIN — BENZOCAINE AND MENTHOL 1 LOZENGE: 5; 1 LIQUID ORAL at 05:26

## 2019-06-30 RX ADMIN — BENZOCAINE AND MENTHOL 1 LOZENGE: 5; 1 LIQUID ORAL at 01:47

## 2019-06-30 RX ADMIN — Medication 100 MILLIGRAM(S): at 13:43

## 2019-06-30 RX ADMIN — PIPERACILLIN AND TAZOBACTAM 25 GRAM(S): 4; .5 INJECTION, POWDER, LYOPHILIZED, FOR SOLUTION INTRAVENOUS at 05:27

## 2019-06-30 RX ADMIN — HEPARIN SODIUM 5000 UNIT(S): 5000 INJECTION INTRAVENOUS; SUBCUTANEOUS at 05:26

## 2019-06-30 RX ADMIN — SENNA PLUS 2 TABLET(S): 8.6 TABLET ORAL at 21:08

## 2019-06-30 NOTE — PROGRESS NOTE ADULT - PROBLEM SELECTOR PLAN 4
- [place] [facility] [place] family, [facility] is not offering any disease modifying treatment. Pt is looking into enrollment in a clinical trial   - C/w ppx Bactrim, valacyclovir and allopurinol  - GOC discussed with pt and daughter, want full code for now

## 2019-06-30 NOTE — PROGRESS NOTE ADULT - SUBJECTIVE AND OBJECTIVE BOX
S:  Pt reports still requiring the nebulizer treatments, breathing improved with them.  No other complaints.    O: Vital Signs Last 24 Hrs  T(C): 36.7 (30 Jun 2019 05:24), Max: 36.7 (29 Jun 2019 11:16)  T(F): 98.1 (30 Jun 2019 05:24), Max: 98.1 (29 Jun 2019 11:16)  HR: 74 (30 Jun 2019 10:22) (61 - 134)  BP: 124/83 (30 Jun 2019 05:24) (110/70 - 150/73)  BP(mean): --  RR: 20 (30 Jun 2019 05:24) (20 - 32)  SpO2: 97% (30 Jun 2019 10:22) (95% - 99%)    NAD  Grossly AAOx3  Breathing comfortably with nebulizer treatment  Extremities warm, well perfused

## 2019-06-30 NOTE — PROGRESS NOTE ADULT - SUBJECTIVE AND OBJECTIVE BOX
Patient is a 79y old  Female who presents with a chief complaint of SOB (30 Jun 2019 10:44)      SUBJECTIVE / OVERNIGHT EVENTS:    No acute event. Cough slightly better than yesterday. No new complaint     Review of Systems:    RESPIRATORY: + cough, wheezing, chills or hemoptysis; No shortness of breath  CARDIOVASCULAR: No chest pain, palpitations, dizziness, or leg swelling  GASTROINTESTINAL: No abdominal or epigastric pain. No nausea, vomiting, or hematemesis; No diarrhea or constipation. No melena or hematochezia.    MEDICATIONS  (STANDING):  ALBUTerol/ipratropium for Nebulization 3 milliLiter(s) Nebulizer every 6 hours  allopurinol 300 milliGRAM(s) Oral daily  benzocaine 15 mG/menthol 3.6 mG (Sugar-Free) Lozenge 1 Lozenge Oral every 4 hours  docusate sodium 100 milliGRAM(s) Oral three times a day  heparin  Injectable 5000 Unit(s) SubCutaneous every 12 hours  metoprolol succinate ER 50 milliGRAM(s) Oral daily  pantoprazole    Tablet 40 milliGRAM(s) Oral before breakfast  piperacillin/tazobactam IVPB.. 3.375 Gram(s) IV Intermittent every 8 hours  predniSONE   Tablet 20 milliGRAM(s) Oral daily  senna 2 Tablet(s) Oral at bedtime  sodium chloride 2 Gram(s) Oral two times a day  trimethoprim  160 mG/sulfamethoxazole 800 mG 1 Tablet(s) Oral every 48 hours  valACYclovir 500 milliGRAM(s) Oral <User Schedule>  vancomycin  IVPB 750 milliGRAM(s) IV Intermittent every 12 hours    MEDICATIONS  (PRN):  benzonatate 100 milliGRAM(s) Oral three times a day PRN Cough  HYDROcodone/homatropine Syrup 5 milliLiter(s) Oral every 6 hours PRN Cough      PHYSICAL EXAM:  T(C): 36.7 (06-30-19 @ 05:24), Max: 36.7 (06-29-19 @ 21:33)  HR: 74 (06-30-19 @ 10:22) (61 - 99)  BP: 124/83 (06-30-19 @ 05:24) (110/70 - 150/73)  RR: 20 (06-30-19 @ 05:24) (20 - 27)  SpO2: 97% (06-30-19 @ 10:22) (95% - 99%)  I&O's Summary    GENERAL: elderly female lying in bed in NAD   MENTAL STATUS/PSYCH:  AAO x3   HEAD:  Atraumatic, Normocephalic  EYES: EOMI, PERRLA, conjunctiva and sclera clear  NECK: Supple, No elevated JVD  CHEST/LUNG:  +rhonchi bilaterally  HEART: Regular rate and rhythm; No murmurs, rubs, or gallops  ABDOMEN: Soft, Nontender, Nondistended; Bowel sounds present  EXTREMITIES:  2+ Peripheral Pulses, No clubbing, cyanosis, or edema  NEUROLOGY: CN II-XII grossly intact, moving all extremities  SKIN: No rashes or lesions    LABS:  CAPILLARY BLOOD GLUCOSE                              9.5    1.75  )-----------( 132      ( 30 Jun 2019 04:34 )             29.1     06-30    132<L>  |  95<L>  |  7   ----------------------------<  94  3.9   |  26  |  0.43<L>    Ca    8.8      30 Jun 2019 04:34  Phos  2.2     06-30  Mg     1.7     06-30

## 2019-06-30 NOTE — CHART NOTE - NSCHARTNOTEFT_GEN_A_CORE
Patient with recurrent fever on antibiotics - Vanco, Zosyn & Bactrim for pneumonia   Oral temp was 100.2 - rectal was 101.3 - repeat Blood cultures x 2 sets were sent, Tylenol was given.  Patient awaiting Flexible bronchoscopy for tomorrow - NPO ordered    Vital Signs Last 24 Hrs  T(C): 37.4 (30 Jun 2019 22:59), Max: 38.5 (30 Jun 2019 21:43)  T(F): 99.4 (30 Jun 2019 22:59), Max: 101.3 (30 Jun 2019 21:43)  HR: 111 (30 Jun 2019 21:22) (74 - 111)  BP: 121/73 (30 Jun 2019 21:02) (110/70 - 124/86)  BP(mean): 84 (30 Jun 2019 17:13) (84 - 84)  RR: 16 (30 Jun 2019 21:02) (16 - 20)  SpO2: 97% (30 Jun 2019 21:22) (97% - 99%)      MEDICATIONS  (STANDING):  ALBUTerol/ipratropium for Nebulization 3 milliLiter(s) Nebulizer every 6 hours  allopurinol 300 milliGRAM(s) Oral daily  benzocaine 15 mG/menthol 3.6 mG (Sugar-Free) Lozenge 1 Lozenge Oral every 4 hours  docusate sodium 100 milliGRAM(s) Oral three times a day  heparin  Injectable 5000 Unit(s) SubCutaneous every 12 hours  metoprolol succinate ER 50 milliGRAM(s) Oral daily  pantoprazole    Tablet 40 milliGRAM(s) Oral before breakfast  piperacillin/tazobactam IVPB.. 3.375 Gram(s) IV Intermittent every 8 hours  predniSONE   Tablet 20 milliGRAM(s) Oral daily  senna 2 Tablet(s) Oral at bedtime  sodium chloride 2 Gram(s) Oral two times a day  trimethoprim  160 mG/sulfamethoxazole 800 mG 1 Tablet(s) Oral every 48 hours  valACYclovir 500 milliGRAM(s) Oral <User Schedule>  vancomycin  IVPB 750 milliGRAM(s) IV Intermittent every 12 hours    MEDICATIONS  (PRN):  benzonatate 100 milliGRAM(s) Oral three times a day PRN Cough  HYDROcodone/homatropine Syrup 5 milliLiter(s) Oral every 6 hours PRN Cough Patient with recurrent fever on antibiotics - Vanco, Zosyn & Bactrim for pneumonia.  Previous blood cultures x 2 sets - neg @ 96 hrs, Urine culture neg  Oral temp was 100.2 - rectal was 101.3 - repeat Blood cultures x 2 sets were sent, Tylenol was given.  Patient awaiting Flexible bronchoscopy for tomorrow - NPO ordered    Vital Signs Last 24 Hrs  T(C): 37.4 (30 Jun 2019 22:59), Max: 38.5 (30 Jun 2019 21:43)  T(F): 99.4 (30 Jun 2019 22:59), Max: 101.3 (30 Jun 2019 21:43)  HR: 111 (30 Jun 2019 21:22) (74 - 111)  BP: 121/73 (30 Jun 2019 21:02) (110/70 - 124/86)  BP(mean): 84 (30 Jun 2019 17:13) (84 - 84)  RR: 16 (30 Jun 2019 21:02) (16 - 20)  SpO2: 97% (30 Jun 2019 21:22) (97% - 99%)      MEDICATIONS  (STANDING):  ALBUTerol/ipratropium for Nebulization 3 milliLiter(s) Nebulizer every 6 hours  allopurinol 300 milliGRAM(s) Oral daily  benzocaine 15 mG/menthol 3.6 mG (Sugar-Free) Lozenge 1 Lozenge Oral every 4 hours  docusate sodium 100 milliGRAM(s) Oral three times a day  heparin  Injectable 5000 Unit(s) SubCutaneous every 12 hours  metoprolol succinate ER 50 milliGRAM(s) Oral daily  pantoprazole    Tablet 40 milliGRAM(s) Oral before breakfast  piperacillin/tazobactam IVPB.. 3.375 Gram(s) IV Intermittent every 8 hours  predniSONE   Tablet 20 milliGRAM(s) Oral daily  senna 2 Tablet(s) Oral at bedtime  sodium chloride 2 Gram(s) Oral two times a day  trimethoprim  160 mG/sulfamethoxazole 800 mG 1 Tablet(s) Oral every 48 hours  valACYclovir 500 milliGRAM(s) Oral <User Schedule>  vancomycin  IVPB 750 milliGRAM(s) IV Intermittent every 12 hours    MEDICATIONS  (PRN):  benzonatate 100 milliGRAM(s) Oral three times a day PRN Cough  HYDROcodone/homatropine Syrup 5 milliLiter(s) Oral every 6 hours PRN Cough

## 2019-06-30 NOTE — PROGRESS NOTE ADULT - ASSESSMENT
A/P: 79yF complex medical history including DBCL now p/w productive cough and shortness of breath.   Patient was found to have +enterorhinovirus and radiographic evidence concerning for right lower lobe pneumonia and endobronchial obstruction.  Thoracic surgery consulted for possible flexible bronchoscopy.   Some wheezing when coughing, otherwise in no acute distress on 2LNC    - Recommend scheduled nebulizer treatments given wheezing (consider xopenex given tachycardia)  - Plan for Flexible bronchoscopy with possible laser fulguration on Monday  - Please make NPO after midnight on Sunday evening  - Type and screen  - Continue to hold eliquis   - Pt seen and assessed w/ Dr. Pena who agrees w/ plan  - Thoracic surgery to follow, please call [employer] [phone] w/ any questions A/P: 79yF complex medical history including DBCL now p/w productive cough and shortness of breath.   Patient was found to have +enterorhinovirus and radiographic evidence concerning for right lower lobe pneumonia and endobronchial obstruction.  Thoracic surgery consulted for possible flexible bronchoscopy.   Some wheezing when coughing, otherwise in no acute distress on 2LNC    - Recommend scheduled nebulizer treatments given wheezing (consider xopenex given tachycardia)  - Plan for Flexible bronchoscopy with possible laser fulguration on Monday  - Please make NPO after midnight on Sunday evening  - Type and screen (I ordered for AM)  - Continue to hold eliquis   - Pt seen and assessed w/ Dr. Pena who agrees w/ plan  - Thoracic surgery to follow, please call [employer] [phone] w/ any questions

## 2019-07-01 ENCOUNTER — RESULT REVIEW (OUTPATIENT)
Age: 79
End: 2019-07-01

## 2019-07-01 DIAGNOSIS — D61.818 OTHER PANCYTOPENIA: ICD-10-CM

## 2019-07-01 LAB
ANION GAP SERPL CALC-SCNC: 10 MMO/L — SIGNIFICANT CHANGE UP (ref 7–14)
ANISOCYTOSIS BLD QL: SIGNIFICANT CHANGE UP
ANISOCYTOSIS BLD QL: SIGNIFICANT CHANGE UP
BACTERIA BLD CULT: SIGNIFICANT CHANGE UP
BACTERIA BLD CULT: SIGNIFICANT CHANGE UP
BASOPHILS # BLD AUTO: 0.03 K/UL — SIGNIFICANT CHANGE UP (ref 0–0.2)
BASOPHILS NFR BLD AUTO: 2 % — SIGNIFICANT CHANGE UP (ref 0–2)
BASOPHILS NFR SPEC: 0.9 % — SIGNIFICANT CHANGE UP (ref 0–2)
BASOPHILS NFR SPEC: 0.9 % — SIGNIFICANT CHANGE UP (ref 0–2)
BLASTS # FLD: 0 % — SIGNIFICANT CHANGE UP (ref 0–0)
BLASTS # FLD: 0 % — SIGNIFICANT CHANGE UP (ref 0–0)
BLD GP AB SCN SERPL QL: NEGATIVE — SIGNIFICANT CHANGE UP
BUN SERPL-MCNC: 9 MG/DL — SIGNIFICANT CHANGE UP (ref 7–23)
CALCIUM SERPL-MCNC: 8.7 MG/DL — SIGNIFICANT CHANGE UP (ref 8.4–10.5)
CHLORIDE SERPL-SCNC: 96 MMOL/L — LOW (ref 98–107)
CO2 SERPL-SCNC: 26 MMOL/L — SIGNIFICANT CHANGE UP (ref 22–31)
CREAT SERPL-MCNC: 0.46 MG/DL — LOW (ref 0.5–1.3)
EOSINOPHIL # BLD AUTO: 0.01 K/UL — SIGNIFICANT CHANGE UP (ref 0–0.5)
EOSINOPHIL NFR BLD AUTO: 0.7 % — SIGNIFICANT CHANGE UP (ref 0–6)
EOSINOPHIL NFR FLD: 1.8 % — SIGNIFICANT CHANGE UP (ref 0–6)
EOSINOPHIL NFR FLD: 1.8 % — SIGNIFICANT CHANGE UP (ref 0–6)
GLUCOSE SERPL-MCNC: 91 MG/DL — SIGNIFICANT CHANGE UP (ref 70–99)
GRAM STN SPT: SIGNIFICANT CHANGE UP
GRAM STN SPT: SIGNIFICANT CHANGE UP
HCT VFR BLD CALC: 30 % — LOW (ref 34.5–45)
HCT VFR BLD CALC: 30 % — LOW (ref 34.5–45)
HGB BLD-MCNC: 9.7 G/DL — LOW (ref 11.5–15.5)
HGB BLD-MCNC: 9.7 G/DL — LOW (ref 11.5–15.5)
IMM GRANULOCYTES NFR BLD AUTO: 10.8 % — HIGH (ref 0–1.5)
LYMPHOCYTES # BLD AUTO: 0.48 K/UL — LOW (ref 1–3.3)
LYMPHOCYTES # BLD AUTO: 32.4 % — SIGNIFICANT CHANGE UP (ref 13–44)
LYMPHOCYTES NFR SPEC AUTO: 13.4 % — SIGNIFICANT CHANGE UP (ref 13–44)
LYMPHOCYTES NFR SPEC AUTO: 13.4 % — SIGNIFICANT CHANGE UP (ref 13–44)
MACROCYTES BLD QL: SLIGHT — SIGNIFICANT CHANGE UP
MACROCYTES BLD QL: SLIGHT — SIGNIFICANT CHANGE UP
MAGNESIUM SERPL-MCNC: 1.5 MG/DL — LOW (ref 1.6–2.6)
MCHC RBC-ENTMCNC: 31.4 PG — SIGNIFICANT CHANGE UP (ref 27–34)
MCHC RBC-ENTMCNC: 31.4 PG — SIGNIFICANT CHANGE UP (ref 27–34)
MCHC RBC-ENTMCNC: 32.3 % — SIGNIFICANT CHANGE UP (ref 32–36)
MCHC RBC-ENTMCNC: 32.3 % — SIGNIFICANT CHANGE UP (ref 32–36)
MCV RBC AUTO: 97.1 FL — SIGNIFICANT CHANGE UP (ref 80–100)
MCV RBC AUTO: 97.1 FL — SIGNIFICANT CHANGE UP (ref 80–100)
METAMYELOCYTES # FLD: 20.5 % — HIGH (ref 0–1)
METAMYELOCYTES # FLD: 20.5 % — HIGH (ref 0–1)
MICROCYTES BLD QL: SLIGHT — SIGNIFICANT CHANGE UP
MICROCYTES BLD QL: SLIGHT — SIGNIFICANT CHANGE UP
MONOCYTES # BLD AUTO: 0.14 K/UL — SIGNIFICANT CHANGE UP (ref 0–0.9)
MONOCYTES NFR BLD AUTO: 9.5 % — SIGNIFICANT CHANGE UP (ref 2–14)
MONOCYTES NFR BLD: 8.9 % — SIGNIFICANT CHANGE UP (ref 2–9)
MONOCYTES NFR BLD: 8.9 % — SIGNIFICANT CHANGE UP (ref 2–9)
MYELOCYTES NFR BLD: 8.9 % — HIGH (ref 0–0)
MYELOCYTES NFR BLD: 8.9 % — HIGH (ref 0–0)
NEUTROPHIL AB SER-ACNC: 19.7 % — LOW (ref 43–77)
NEUTROPHIL AB SER-ACNC: 19.7 % — LOW (ref 43–77)
NEUTROPHILS # BLD AUTO: 0.66 K/UL — LOW (ref 1.8–7.4)
NEUTROPHILS NFR BLD AUTO: 44.6 % — SIGNIFICANT CHANGE UP (ref 43–77)
NEUTS BAND # BLD: 3.6 % — SIGNIFICANT CHANGE UP (ref 0–6)
NEUTS BAND # BLD: 3.6 % — SIGNIFICANT CHANGE UP (ref 0–6)
NRBC # FLD: 0 K/UL — SIGNIFICANT CHANGE UP (ref 0–0)
NRBC # FLD: 0 K/UL — SIGNIFICANT CHANGE UP (ref 0–0)
OTHER - HEMATOLOGY %: 0 — SIGNIFICANT CHANGE UP
OTHER - HEMATOLOGY %: 0 — SIGNIFICANT CHANGE UP
OVALOCYTES BLD QL SMEAR: SLIGHT — SIGNIFICANT CHANGE UP
OVALOCYTES BLD QL SMEAR: SLIGHT — SIGNIFICANT CHANGE UP
PHOSPHATE SERPL-MCNC: 2.7 MG/DL — SIGNIFICANT CHANGE UP (ref 2.5–4.5)
PLATELET # BLD AUTO: 124 K/UL — LOW (ref 150–400)
PLATELET # BLD AUTO: 124 K/UL — LOW (ref 150–400)
PLATELET COUNT - ESTIMATE: SIGNIFICANT CHANGE UP
PLATELET COUNT - ESTIMATE: SIGNIFICANT CHANGE UP
PMV BLD: 9.9 FL — SIGNIFICANT CHANGE UP (ref 7–13)
PMV BLD: 9.9 FL — SIGNIFICANT CHANGE UP (ref 7–13)
POLYCHROMASIA BLD QL SMEAR: SLIGHT — SIGNIFICANT CHANGE UP
POLYCHROMASIA BLD QL SMEAR: SLIGHT — SIGNIFICANT CHANGE UP
POTASSIUM SERPL-MCNC: 3.6 MMOL/L — SIGNIFICANT CHANGE UP (ref 3.5–5.3)
POTASSIUM SERPL-SCNC: 3.6 MMOL/L — SIGNIFICANT CHANGE UP (ref 3.5–5.3)
PROMYELOCYTES # FLD: 0 % — SIGNIFICANT CHANGE UP (ref 0–0)
PROMYELOCYTES # FLD: 0 % — SIGNIFICANT CHANGE UP (ref 0–0)
RBC # BLD: 3.09 M/UL — LOW (ref 3.8–5.2)
RBC # BLD: 3.09 M/UL — LOW (ref 3.8–5.2)
RBC # FLD: 18.9 % — HIGH (ref 10.3–14.5)
RBC # FLD: 18.9 % — HIGH (ref 10.3–14.5)
RH IG SCN BLD-IMP: POSITIVE — SIGNIFICANT CHANGE UP
SMUDGE CELLS # BLD: PRESENT — SIGNIFICANT CHANGE UP
SMUDGE CELLS # BLD: PRESENT — SIGNIFICANT CHANGE UP
SODIUM SERPL-SCNC: 132 MMOL/L — LOW (ref 135–145)
SPECIMEN SOURCE: SIGNIFICANT CHANGE UP
VANCOMYCIN TROUGH SERPL-MCNC: 8.1 UG/ML — LOW (ref 10–20)
VARIANT LYMPHS # BLD: 22.3 % — SIGNIFICANT CHANGE UP
VARIANT LYMPHS # BLD: 22.3 % — SIGNIFICANT CHANGE UP
WBC # BLD: 1.45 K/UL — LOW (ref 3.8–10.5)
WBC # BLD: 1.45 K/UL — LOW (ref 3.8–10.5)
WBC # FLD AUTO: 1.45 K/UL — LOW (ref 3.8–10.5)
WBC # FLD AUTO: 1.45 K/UL — LOW (ref 3.8–10.5)

## 2019-07-01 PROCEDURE — 31645 BRNCHSC W/THER ASPIR 1ST: CPT

## 2019-07-01 PROCEDURE — 88305 TISSUE EXAM BY PATHOLOGIST: CPT | Mod: 26

## 2019-07-01 PROCEDURE — 99233 SBSQ HOSP IP/OBS HIGH 50: CPT

## 2019-07-01 PROCEDURE — 71045 X-RAY EXAM CHEST 1 VIEW: CPT | Mod: 26

## 2019-07-01 PROCEDURE — 31624 DX BRONCHOSCOPE/LAVAGE: CPT

## 2019-07-01 PROCEDURE — 31625 BRONCHOSCOPY W/BIOPSY(S): CPT

## 2019-07-01 RX ORDER — ONDANSETRON 8 MG/1
4 TABLET, FILM COATED ORAL ONCE
Refills: 0 | Status: DISCONTINUED | OUTPATIENT
Start: 2019-07-01 | End: 2019-07-01

## 2019-07-01 RX ORDER — IPRATROPIUM/ALBUTEROL SULFATE 18-103MCG
3 AEROSOL WITH ADAPTER (GRAM) INHALATION ONCE
Refills: 0 | Status: COMPLETED | OUTPATIENT
Start: 2019-07-01 | End: 2019-07-01

## 2019-07-01 RX ORDER — FENTANYL CITRATE 50 UG/ML
25 INJECTION INTRAVENOUS
Refills: 0 | Status: DISCONTINUED | OUTPATIENT
Start: 2019-07-01 | End: 2019-07-01

## 2019-07-01 RX ORDER — FENTANYL CITRATE 50 UG/ML
50 INJECTION INTRAVENOUS
Refills: 0 | Status: DISCONTINUED | OUTPATIENT
Start: 2019-07-01 | End: 2019-07-01

## 2019-07-01 RX ORDER — LEVALBUTEROL 1.25 MG/.5ML
0.63 SOLUTION, CONCENTRATE RESPIRATORY (INHALATION) EVERY 4 HOURS
Refills: 0 | Status: DISCONTINUED | OUTPATIENT
Start: 2019-07-01 | End: 2019-07-08

## 2019-07-01 RX ORDER — METOPROLOL TARTRATE 50 MG
25 TABLET ORAL ONCE
Refills: 0 | Status: COMPLETED | OUTPATIENT
Start: 2019-07-01 | End: 2019-07-01

## 2019-07-01 RX ORDER — SODIUM CHLORIDE 9 MG/ML
1000 INJECTION, SOLUTION INTRAVENOUS
Refills: 0 | Status: DISCONTINUED | OUTPATIENT
Start: 2019-07-01 | End: 2019-07-18

## 2019-07-01 RX ADMIN — HEPARIN SODIUM 5000 UNIT(S): 5000 INJECTION INTRAVENOUS; SUBCUTANEOUS at 05:36

## 2019-07-01 RX ADMIN — FENTANYL CITRATE 50 MICROGRAM(S): 50 INJECTION INTRAVENOUS at 11:30

## 2019-07-01 RX ADMIN — Medication 100 MILLIGRAM(S): at 05:36

## 2019-07-01 RX ADMIN — SENNA PLUS 2 TABLET(S): 8.6 TABLET ORAL at 21:19

## 2019-07-01 RX ADMIN — Medication 100 MILLIGRAM(S): at 21:19

## 2019-07-01 RX ADMIN — Medication 300 MILLIGRAM(S): at 13:27

## 2019-07-01 RX ADMIN — SODIUM CHLORIDE 2 GRAM(S): 9 INJECTION INTRAMUSCULAR; INTRAVENOUS; SUBCUTANEOUS at 18:05

## 2019-07-01 RX ADMIN — Medication 250 MILLIGRAM(S): at 18:05

## 2019-07-01 RX ADMIN — FENTANYL CITRATE 50 MICROGRAM(S): 50 INJECTION INTRAVENOUS at 11:20

## 2019-07-01 RX ADMIN — Medication 100 MILLIGRAM(S): at 20:11

## 2019-07-01 RX ADMIN — Medication 3 MILLILITER(S): at 16:42

## 2019-07-01 RX ADMIN — Medication 250 MILLIGRAM(S): at 06:28

## 2019-07-01 RX ADMIN — HEPARIN SODIUM 5000 UNIT(S): 5000 INJECTION INTRAVENOUS; SUBCUTANEOUS at 18:05

## 2019-07-01 RX ADMIN — Medication 20 MILLIGRAM(S): at 05:36

## 2019-07-01 RX ADMIN — SODIUM CHLORIDE 30 MILLILITER(S): 9 INJECTION, SOLUTION INTRAVENOUS at 13:27

## 2019-07-01 RX ADMIN — Medication 25 MILLIGRAM(S): at 20:32

## 2019-07-01 RX ADMIN — Medication 3 MILLILITER(S): at 03:39

## 2019-07-01 RX ADMIN — PIPERACILLIN AND TAZOBACTAM 25 GRAM(S): 4; .5 INJECTION, POWDER, LYOPHILIZED, FOR SOLUTION INTRAVENOUS at 21:19

## 2019-07-01 RX ADMIN — PANTOPRAZOLE SODIUM 40 MILLIGRAM(S): 20 TABLET, DELAYED RELEASE ORAL at 05:36

## 2019-07-01 RX ADMIN — LEVALBUTEROL 0.63 MILLIGRAM(S): 1.25 SOLUTION, CONCENTRATE RESPIRATORY (INHALATION) at 21:59

## 2019-07-01 RX ADMIN — Medication 100 MILLIGRAM(S): at 18:05

## 2019-07-01 RX ADMIN — BENZOCAINE AND MENTHOL 1 LOZENGE: 5; 1 LIQUID ORAL at 05:36

## 2019-07-01 RX ADMIN — PIPERACILLIN AND TAZOBACTAM 25 GRAM(S): 4; .5 INJECTION, POWDER, LYOPHILIZED, FOR SOLUTION INTRAVENOUS at 13:27

## 2019-07-01 RX ADMIN — SODIUM CHLORIDE 2 GRAM(S): 9 INJECTION INTRAMUSCULAR; INTRAVENOUS; SUBCUTANEOUS at 05:36

## 2019-07-01 RX ADMIN — Medication 3 MILLILITER(S): at 18:27

## 2019-07-01 RX ADMIN — Medication 3 MILLILITER(S): at 11:42

## 2019-07-01 RX ADMIN — Medication 50 MILLIGRAM(S): at 05:36

## 2019-07-01 RX ADMIN — PIPERACILLIN AND TAZOBACTAM 25 GRAM(S): 4; .5 INJECTION, POWDER, LYOPHILIZED, FOR SOLUTION INTRAVENOUS at 05:36

## 2019-07-01 RX ADMIN — BENZOCAINE AND MENTHOL 1 LOZENGE: 5; 1 LIQUID ORAL at 18:07

## 2019-07-01 RX ADMIN — Medication 100 MILLIGRAM(S): at 13:27

## 2019-07-01 NOTE — PROGRESS NOTE ADULT - SUBJECTIVE AND OBJECTIVE BOX
Patient is a 79y old  Female who presents with a chief complaint of SOB (01 Jul 2019 14:56)    SUBJECTIVE / OVERNIGHT EVENTS:  Patient seen with her  at bedside s/p bronch denying any complaints.     MEDICATIONS  (STANDING):  ALBUTerol/ipratropium for Nebulization 3 milliLiter(s) Nebulizer every 6 hours  allopurinol 300 milliGRAM(s) Oral daily  benzocaine 15 mG/menthol 3.6 mG (Sugar-Free) Lozenge 1 Lozenge Oral every 4 hours  docusate sodium 100 milliGRAM(s) Oral three times a day  heparin  Injectable 5000 Unit(s) SubCutaneous every 12 hours  lactated ringers. 1000 milliLiter(s) (30 mL/Hr) IV Continuous <Continuous>  metoprolol succinate ER 50 milliGRAM(s) Oral daily  pantoprazole    Tablet 40 milliGRAM(s) Oral before breakfast  piperacillin/tazobactam IVPB.. 3.375 Gram(s) IV Intermittent every 8 hours  predniSONE   Tablet 20 milliGRAM(s) Oral daily  senna 2 Tablet(s) Oral at bedtime  sodium chloride 2 Gram(s) Oral two times a day  trimethoprim  160 mG/sulfamethoxazole 800 mG 1 Tablet(s) Oral every 48 hours  valACYclovir 500 milliGRAM(s) Oral <User Schedule>  vancomycin  IVPB 750 milliGRAM(s) IV Intermittent every 12 hours    MEDICATIONS  (PRN):  benzonatate 100 milliGRAM(s) Oral three times a day PRN Cough  fentaNYL    Injectable 25 MICROGram(s) IV Push every 5 minutes PRN Moderate Pain (4 - 6)  fentaNYL    Injectable 50 MICROGram(s) IV Push every 5 minutes PRN Severe Pain (7 - 10)  HYDROcodone/homatropine Syrup 5 milliLiter(s) Oral every 6 hours PRN Cough  ondansetron Injectable 4 milliGRAM(s) IV Push once PRN Nausea and/or Vomiting      Vital Signs Last 24 Hrs  T(C): 36.6 (01 Jul 2019 12:00), Max: 38.5 (30 Jun 2019 21:43)  T(F): 97.9 (01 Jul 2019 12:00), Max: 101.3 (30 Jun 2019 21:43)  HR: 118 (01 Jul 2019 12:30) (80 - 143)  BP: 100/74 (01 Jul 2019 12:30) (93/64 - 140/79)  BP(mean): 79 (01 Jul 2019 12:30) (74 - 84)  RR: 19 (01 Jul 2019 12:30) (16 - 24)  SpO2: 95% (01 Jul 2019 12:30) (95% - 99%)  CAPILLARY BLOOD GLUCOSE        I&O's Summary      GENERAL: elderly female lying in bed in NAD   MENTAL STATUS/PSYCH:  AAO x3   HEAD:  Atraumatic, Normocephalic  EYES: EOMI, PERRLA, conjunctiva and sclera clear  NECK: Supple, No elevated JVD  CHEST/LUNG:  course breath sound throughout with rhonchi on Rt side   HEART: Regular rate and rhythm; No murmurs, rubs, or gallops  ABDOMEN: Soft, Nontender, Nondistended; Bowel sounds present  EXTREMITIES:  2+ Peripheral Pulses, No clubbing, cyanosis, or edema  NEUROLOGY: CN II-XII grossly intact, moving all extremities  SKIN: No rashes or lesions      LABS:                        9.7    1.45  )-----------( 124      ( 01 Jul 2019 05:28 )             30.0     07-01    132<L>  |  96<L>  |  9   ----------------------------<  91  3.6   |  26  |  0.46<L>    Ca    8.7      01 Jul 2019 05:28  Phos  2.7     07-01  Mg     1.5     07-01                  RADIOLOGY & ADDITIONAL TESTS:    Imaging Personally Reviewed:  Consultant(s) Notes Reviewed:    Care Discussed with Consultants/Other Providers:

## 2019-07-01 NOTE — SWALLOW BEDSIDE ASSESSMENT ADULT - COMMENTS
79F hx of DLBC lymphoma, Afib on eliquis presenting with three days of worsening SOB and productive cough x3 days found to have +entero/rhinovirus with possible superimposed PNA.     Patient seen at bedside, alert and oriented state. Patient is able to follow commands and express needs. Patient had Bronchoscopy procedure this morning. Patient offers no c/o discomfort/pain when swallowing. Patient is noted with baseline cough prior to PO trials.

## 2019-07-01 NOTE — PROGRESS NOTE ADULT - PROBLEM SELECTOR PLAN 5
- Follows with Dr. Nguyen at [facility] [place]. as per family, [facility] is not offering any disease modifying treatment. Pt is looking into enrollment in a clinical trial   - C/w ppx Bactrim, valacyclovir and allopurinol  - GOC discussed with pt and daughter, want full code for now

## 2019-07-01 NOTE — PROGRESS NOTE ADULT - PROBLEM SELECTOR PLAN 1
Likely 2/2 postobstructive viral/bacterial PNA s/p bronch today with thoracic sx  -remains hypoxic, cont O2 to maintain SpO2>90, wean as tolerated   -f/u cultures from bronch, cytology  -cont broad spectrum abx with IV vanco, zosyn  -cont prednisone and duonebs for reactive airway disease  -supportive care for rhino virus- hycodan prn for cough  - Pulmonary consulted, will see patient tomorrow

## 2019-07-01 NOTE — PROGRESS NOTE ADULT - SUBJECTIVE AND OBJECTIVE BOX
Patient is a 79y old  Female who presents with a chief complaint of SOB (30 Jun 2019 12:10)        SUBJECTIVE / OVERNIGHT EVENTS:      MEDICATIONS  (STANDING):  ALBUTerol/ipratropium for Nebulization 3 milliLiter(s) Nebulizer every 6 hours  allopurinol 300 milliGRAM(s) Oral daily  benzocaine 15 mG/menthol 3.6 mG (Sugar-Free) Lozenge 1 Lozenge Oral every 4 hours  docusate sodium 100 milliGRAM(s) Oral three times a day  heparin  Injectable 5000 Unit(s) SubCutaneous every 12 hours  lactated ringers. 1000 milliLiter(s) (30 mL/Hr) IV Continuous <Continuous>  metoprolol succinate ER 50 milliGRAM(s) Oral daily  pantoprazole    Tablet 40 milliGRAM(s) Oral before breakfast  piperacillin/tazobactam IVPB.. 3.375 Gram(s) IV Intermittent every 8 hours  predniSONE   Tablet 20 milliGRAM(s) Oral daily  senna 2 Tablet(s) Oral at bedtime  sodium chloride 2 Gram(s) Oral two times a day  trimethoprim  160 mG/sulfamethoxazole 800 mG 1 Tablet(s) Oral every 48 hours  valACYclovir 500 milliGRAM(s) Oral <User Schedule>  vancomycin  IVPB 750 milliGRAM(s) IV Intermittent every 12 hours    MEDICATIONS  (PRN):  benzonatate 100 milliGRAM(s) Oral three times a day PRN Cough  fentaNYL    Injectable 25 MICROGram(s) IV Push every 5 minutes PRN Moderate Pain (4 - 6)  fentaNYL    Injectable 50 MICROGram(s) IV Push every 5 minutes PRN Severe Pain (7 - 10)  HYDROcodone/homatropine Syrup 5 milliLiter(s) Oral every 6 hours PRN Cough  ondansetron Injectable 4 milliGRAM(s) IV Push once PRN Nausea and/or Vomiting      Vital Signs Last 24 Hrs  T(C): 36.6 (01 Jul 2019 12:00), Max: 38.5 (30 Jun 2019 21:43)  T(F): 97.9 (01 Jul 2019 12:00), Max: 101.3 (30 Jun 2019 21:43)  HR: 118 (01 Jul 2019 12:30) (80 - 143)  BP: 100/74 (01 Jul 2019 12:30) (93/64 - 140/79)  BP(mean): 79 (01 Jul 2019 12:30) (74 - 84)  RR: 19 (01 Jul 2019 12:30) (16 - 24)  SpO2: 95% (01 Jul 2019 12:30) (95% - 99%)  CAPILLARY BLOOD GLUCOSE        I&O's Summary        PHYSICAL EXAM  GENERAL: NAD, well-developed  HEAD:  Atraumatic, Normocephalic  EYES: EOMI, PERRLA, conjunctiva and sclera clear  NECK: Supple, No JVD  CHEST/LUNG: Clear to auscultation bilaterally; No wheeze  HEART: Regular rate and rhythm; No murmurs, rubs, or gallops  ABDOMEN: Soft, Nontender, Nondistended; Bowel sounds present  EXTREMITIES:  2+ Peripheral Pulses, No clubbing, cyanosis, or edema  PSYCH: AAOx3  SKIN: No rashes or lesions    LABS:                        9.7    1.45  )-----------( 124      ( 01 Jul 2019 05:28 )             30.0     07-01    132<L>  |  96<L>  |  9   ----------------------------<  91  3.6   |  26  |  0.46<L>    Ca    8.7      01 Jul 2019 05:28  Phos  2.7     07-01  Mg     1.5     07-01                  RADIOLOGY & ADDITIONAL TESTS:    Imaging Personally Reviewed:  Consultant(s) Notes Reviewed:    Care Discussed with Consultants/Other Providers:

## 2019-07-01 NOTE — PROGRESS NOTE ADULT - PROBLEM SELECTOR PLAN 4
- Follows with Dr. Nguyen at [facility] [place]. as per family, [facility] is not offering any disease modifying treatment. Pt is looking into enrollment in a clinical trial   - C/w ppx Bactrim, valacyclovir and allopurinol  - GOC discussed with pt and daughter, want full code for now

## 2019-07-01 NOTE — BRIEF OPERATIVE NOTE - NSICDXBRIEFPROCEDURE_GEN_ALL_CORE_FT
PROCEDURES:  Bronchoscopy, with BAL 01-Jul-2019 10:54:37 Biopsy Right Mainstem Endobronchial mass Sparkle Machuca

## 2019-07-01 NOTE — PROGRESS NOTE ADULT - PROBLEM SELECTOR PLAN 1
ddx inc. PNA vs. mucus plugging vs. progressive metastatic disease vs. reactive airway disease  -remains hypoxic, cont O2 to maintain SpO2>90, wean as tolerated   -CT chest Complete occlusion of the right lower lobe bronchus and the segmental   bronchi of the right lower lobe with a central opacity, cannot r/o PNA  -CT surgery consulted, s/p bronchoscopy 7/1  -cont broad spectrum abx for possible bacterial PNA- need to coverage MRSA and gram neg organism given immunocompromised state/recent viral infection   -cont prednisone and duonebs for reactive airway disease  -supportive care for rhino virus- hycodan prn for cough

## 2019-07-01 NOTE — SWALLOW BEDSIDE ASSESSMENT ADULT - SWALLOW EVAL: DIAGNOSIS
Of Note: Patient is with baseline cough prior to PO trials.  Patient presents with OroPharyngeal Stage Dysphagia characterized by adequate oral containment, slow chewing for solid with ability to break down solid, adequate bolus manipulation and transfer with adequate oral clearance. There is laryngeal elevation upon palpation, suspect delayed initiation of the pharyngeal swallow.  There were overt signs of impaired airway protection for Thin Liquids evident by immediate coughing response post swallow for Thin Liquids.  Patient tolerated puree/solids/nectar thick liquids without immediate overt signs of impaired airway protection.

## 2019-07-02 DIAGNOSIS — K59.01 SLOW TRANSIT CONSTIPATION: ICD-10-CM

## 2019-07-02 LAB
ANION GAP SERPL CALC-SCNC: 13 MMO/L — SIGNIFICANT CHANGE UP (ref 7–14)
BASOPHILS # BLD AUTO: 0.03 K/UL — SIGNIFICANT CHANGE UP (ref 0–0.2)
BASOPHILS NFR BLD AUTO: 1.7 % — SIGNIFICANT CHANGE UP (ref 0–2)
BUN SERPL-MCNC: 9 MG/DL — SIGNIFICANT CHANGE UP (ref 7–23)
CALCIUM SERPL-MCNC: 9 MG/DL — SIGNIFICANT CHANGE UP (ref 8.4–10.5)
CHLORIDE SERPL-SCNC: 94 MMOL/L — LOW (ref 98–107)
CO2 SERPL-SCNC: 25 MMOL/L — SIGNIFICANT CHANGE UP (ref 22–31)
CREAT SERPL-MCNC: 0.39 MG/DL — LOW (ref 0.5–1.3)
EOSINOPHIL # BLD AUTO: 0.01 K/UL — SIGNIFICANT CHANGE UP (ref 0–0.5)
EOSINOPHIL NFR BLD AUTO: 0.6 % — SIGNIFICANT CHANGE UP (ref 0–6)
GLUCOSE SERPL-MCNC: 101 MG/DL — HIGH (ref 70–99)
HCT VFR BLD CALC: 30.2 % — LOW (ref 34.5–45)
HGB BLD-MCNC: 10 G/DL — LOW (ref 11.5–15.5)
IMM GRANULOCYTES NFR BLD AUTO: 11 % — HIGH (ref 0–1.5)
LYMPHOCYTES # BLD AUTO: 0.64 K/UL — LOW (ref 1–3.3)
LYMPHOCYTES # BLD AUTO: 35.4 % — SIGNIFICANT CHANGE UP (ref 13–44)
MAGNESIUM SERPL-MCNC: 1.5 MG/DL — LOW (ref 1.6–2.6)
MCHC RBC-ENTMCNC: 31.6 PG — SIGNIFICANT CHANGE UP (ref 27–34)
MCHC RBC-ENTMCNC: 33.1 % — SIGNIFICANT CHANGE UP (ref 32–36)
MCV RBC AUTO: 95.6 FL — SIGNIFICANT CHANGE UP (ref 80–100)
MONOCYTES # BLD AUTO: 0.15 K/UL — SIGNIFICANT CHANGE UP (ref 0–0.9)
MONOCYTES NFR BLD AUTO: 8.3 % — SIGNIFICANT CHANGE UP (ref 2–14)
NEUTROPHILS # BLD AUTO: 0.78 K/UL — LOW (ref 1.8–7.4)
NEUTROPHILS NFR BLD AUTO: 43 % — SIGNIFICANT CHANGE UP (ref 43–77)
NRBC # FLD: 0 K/UL — SIGNIFICANT CHANGE UP (ref 0–0)
PHOSPHATE SERPL-MCNC: 2.2 MG/DL — LOW (ref 2.5–4.5)
PLATELET # BLD AUTO: 132 K/UL — LOW (ref 150–400)
PMV BLD: 10.5 FL — SIGNIFICANT CHANGE UP (ref 7–13)
POTASSIUM SERPL-MCNC: 3.8 MMOL/L — SIGNIFICANT CHANGE UP (ref 3.5–5.3)
POTASSIUM SERPL-SCNC: 3.8 MMOL/L — SIGNIFICANT CHANGE UP (ref 3.5–5.3)
RBC # BLD: 3.16 M/UL — LOW (ref 3.8–5.2)
RBC # FLD: 18.8 % — HIGH (ref 10.3–14.5)
SODIUM SERPL-SCNC: 132 MMOL/L — LOW (ref 135–145)
VANCOMYCIN FLD-MCNC: 7.6 UG/ML — SIGNIFICANT CHANGE UP
WBC # BLD: 1.81 K/UL — LOW (ref 3.8–10.5)
WBC # FLD AUTO: 1.81 K/UL — LOW (ref 3.8–10.5)

## 2019-07-02 PROCEDURE — 99222 1ST HOSP IP/OBS MODERATE 55: CPT | Mod: GC

## 2019-07-02 PROCEDURE — 99233 SBSQ HOSP IP/OBS HIGH 50: CPT

## 2019-07-02 RX ORDER — VANCOMYCIN HCL 1 G
1000 VIAL (EA) INTRAVENOUS EVERY 12 HOURS
Refills: 0 | Status: DISCONTINUED | OUTPATIENT
Start: 2019-07-02 | End: 2019-07-03

## 2019-07-02 RX ORDER — APIXABAN 2.5 MG/1
2.5 TABLET, FILM COATED ORAL EVERY 12 HOURS
Refills: 0 | Status: DISCONTINUED | OUTPATIENT
Start: 2019-07-02 | End: 2019-07-17

## 2019-07-02 RX ORDER — POLYETHYLENE GLYCOL 3350 17 G/17G
17 POWDER, FOR SOLUTION ORAL DAILY
Refills: 0 | Status: DISCONTINUED | OUTPATIENT
Start: 2019-07-02 | End: 2019-08-06

## 2019-07-02 RX ORDER — MAGNESIUM SULFATE 500 MG/ML
2 VIAL (ML) INJECTION ONCE
Refills: 0 | Status: COMPLETED | OUTPATIENT
Start: 2019-07-02 | End: 2019-07-02

## 2019-07-02 RX ADMIN — VALACYCLOVIR 500 MILLIGRAM(S): 500 TABLET, FILM COATED ORAL at 17:42

## 2019-07-02 RX ADMIN — Medication 5 MILLIGRAM(S): at 13:11

## 2019-07-02 RX ADMIN — HEPARIN SODIUM 5000 UNIT(S): 5000 INJECTION INTRAVENOUS; SUBCUTANEOUS at 05:02

## 2019-07-02 RX ADMIN — SODIUM CHLORIDE 30 MILLILITER(S): 9 INJECTION, SOLUTION INTRAVENOUS at 21:33

## 2019-07-02 RX ADMIN — PIPERACILLIN AND TAZOBACTAM 25 GRAM(S): 4; .5 INJECTION, POWDER, LYOPHILIZED, FOR SOLUTION INTRAVENOUS at 13:12

## 2019-07-02 RX ADMIN — Medication 20 MILLIGRAM(S): at 05:02

## 2019-07-02 RX ADMIN — POLYETHYLENE GLYCOL 3350 17 GRAM(S): 17 POWDER, FOR SOLUTION ORAL at 13:12

## 2019-07-02 RX ADMIN — LEVALBUTEROL 0.63 MILLIGRAM(S): 1.25 SOLUTION, CONCENTRATE RESPIRATORY (INHALATION) at 15:22

## 2019-07-02 RX ADMIN — Medication 100 MILLIGRAM(S): at 13:11

## 2019-07-02 RX ADMIN — Medication 100 MILLIGRAM(S): at 05:03

## 2019-07-02 RX ADMIN — Medication 100 MILLIGRAM(S): at 14:59

## 2019-07-02 RX ADMIN — SODIUM CHLORIDE 30 MILLILITER(S): 9 INJECTION, SOLUTION INTRAVENOUS at 17:41

## 2019-07-02 RX ADMIN — PIPERACILLIN AND TAZOBACTAM 25 GRAM(S): 4; .5 INJECTION, POWDER, LYOPHILIZED, FOR SOLUTION INTRAVENOUS at 07:23

## 2019-07-02 RX ADMIN — LEVALBUTEROL 0.63 MILLIGRAM(S): 1.25 SOLUTION, CONCENTRATE RESPIRATORY (INHALATION) at 20:14

## 2019-07-02 RX ADMIN — LEVALBUTEROL 0.63 MILLIGRAM(S): 1.25 SOLUTION, CONCENTRATE RESPIRATORY (INHALATION) at 01:56

## 2019-07-02 RX ADMIN — LEVALBUTEROL 0.63 MILLIGRAM(S): 1.25 SOLUTION, CONCENTRATE RESPIRATORY (INHALATION) at 23:27

## 2019-07-02 RX ADMIN — PANTOPRAZOLE SODIUM 40 MILLIGRAM(S): 20 TABLET, DELAYED RELEASE ORAL at 05:03

## 2019-07-02 RX ADMIN — APIXABAN 2.5 MILLIGRAM(S): 2.5 TABLET, FILM COATED ORAL at 17:42

## 2019-07-02 RX ADMIN — Medication 1 TABLET(S): at 17:42

## 2019-07-02 RX ADMIN — PIPERACILLIN AND TAZOBACTAM 25 GRAM(S): 4; .5 INJECTION, POWDER, LYOPHILIZED, FOR SOLUTION INTRAVENOUS at 21:32

## 2019-07-02 RX ADMIN — SODIUM CHLORIDE 2 GRAM(S): 9 INJECTION INTRAMUSCULAR; INTRAVENOUS; SUBCUTANEOUS at 05:02

## 2019-07-02 RX ADMIN — Medication 300 MILLIGRAM(S): at 13:11

## 2019-07-02 RX ADMIN — Medication 50 MILLIGRAM(S): at 05:02

## 2019-07-02 RX ADMIN — BENZOCAINE AND MENTHOL 1 LOZENGE: 5; 1 LIQUID ORAL at 13:11

## 2019-07-02 RX ADMIN — SODIUM CHLORIDE 2 GRAM(S): 9 INJECTION INTRAMUSCULAR; INTRAVENOUS; SUBCUTANEOUS at 17:42

## 2019-07-02 RX ADMIN — Medication 100 MILLIGRAM(S): at 21:40

## 2019-07-02 RX ADMIN — Medication 50 GRAM(S): at 09:28

## 2019-07-02 RX ADMIN — BENZOCAINE AND MENTHOL 1 LOZENGE: 5; 1 LIQUID ORAL at 21:32

## 2019-07-02 RX ADMIN — Medication 250 MILLIGRAM(S): at 05:02

## 2019-07-02 RX ADMIN — Medication 100 MILLIGRAM(S): at 17:42

## 2019-07-02 RX ADMIN — BENZOCAINE AND MENTHOL 1 LOZENGE: 5; 1 LIQUID ORAL at 05:03

## 2019-07-02 RX ADMIN — LEVALBUTEROL 0.63 MILLIGRAM(S): 1.25 SOLUTION, CONCENTRATE RESPIRATORY (INHALATION) at 09:30

## 2019-07-02 RX ADMIN — LEVALBUTEROL 0.63 MILLIGRAM(S): 1.25 SOLUTION, CONCENTRATE RESPIRATORY (INHALATION) at 05:10

## 2019-07-02 RX ADMIN — BENZOCAINE AND MENTHOL 1 LOZENGE: 5; 1 LIQUID ORAL at 17:42

## 2019-07-02 RX ADMIN — BENZOCAINE AND MENTHOL 1 LOZENGE: 5; 1 LIQUID ORAL at 09:29

## 2019-07-02 RX ADMIN — Medication 250 MILLIGRAM(S): at 17:41

## 2019-07-02 NOTE — CHART NOTE - NSCHARTNOTEFT_GEN_A_CORE
Pt s/p Bronch w BAL and Biopsy yesterday.   No events overnight.   Post Bronch CXR stable.  No further Thoracic Surgical intervention or involvement indicated.   Recommend follow up Pathology and Culture from Bronch  Care per Medicine/Oncology.  Will sign off at this time.   D/w Tele PA. Recall with any questions or concerns.

## 2019-07-02 NOTE — CONSULT NOTE ADULT - SUBJECTIVE AND OBJECTIVE BOX
HPI: 79F PMHx DLBC lymphoma s/p multiple rounds chemo, radiation, Afib on eliquis, presenting w/ 1 month hx productive cough, 5 day hx SOB associated with cough. Patient initially diagnosed w DLBCL in 2017, found to have mediastinal mass, s/p multiple rounds chemo [place] [facility] [place] ([place]. Nguyen) (last chemo ~1.5 months ago), radiation (2017/2018), now found to have bilateral nodules, multiple bilateral masses on CT (6/27/19). Patient states that she had productive cough with yellow phlegm for longer than one month, but that the cough became worse approx. 5 days ago and she had trouble breathing, prompting her to come to the ED. Patient denies fevers, chills, nausea, vomiting, hemoptysis. Patient found to be rhinovirus/enterovirus+ on RVP, also found to have obstruction of RLL bronchus, s/p BAL w/ bx 7/1 by CT surgery.       PAST MEDICAL & SURGICAL HISTORY:  Alopecia: wears a wig -- hair regrowing back  Murmur, cardiac  Varicose veins  GERD (gastroesophageal reflux disease)  Tracheal compression  Thrombocytopenia  Lymphadenopathy: as per Dr. 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 Palacios&#x27;s consult on 12-4-17  Family history of ovarian cancer (Aunt)      SOCIAL HISTORY:  Smoking: limited 2nd hand exposure.   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   Exposures: none  Recent Travel: spent winter in [place], returned in march.     Allergies    No Known Allergies    Intolerances    HOME MEDICATIONS:    REVIEW OF SYSTEMS:  Constitutional: No fevers or chills. No weight loss. +fatigue, no SOB at rest.   Eyes: No itching or discharge from the eyes  ENT: No ear pain. No ear discharge. No nasal congestion. No post nasal drip. No epistaxis. No throat pain. No sore throat. No difficulty swallowing.   CV: No chest pain. No palpitations. No lightheadedness or dizziness.   Resp: No dyspnea at rest. +yellow sputum production, +dyspnea on exertion. Patient denies chest pain with respiration. GI: No nausea. No vomiting. No diarrhea.  MSK: No joint pain or pain in any extremities  Integumentary: KASEY ecchymoses UE. No pedal edema.  Neurological: No gross motor weakness. No sensory changes.    [X ] All other systems negative    OBJECTIVE:  ICU Vital Signs Last 24 Hrs  T(C): 36.6 (02 Jul 2019 12:49), Max: 37.4 (01 Jul 2019 19:30)  T(F): 97.9 (02 Jul 2019 12:49), Max: 99.3 (01 Jul 2019 19:30)  HR: 107 (02 Jul 2019 12:49) (93 - 130)  BP: 96/65 (02 Jul 2019 12:49) (96/65 - 127/76)  BP(mean): --  ABP: --  ABP(mean): --  RR: 18 (02 Jul 2019 12:49) (18 - 24)  SpO2: 96% (02 Jul 2019 12:49) (94% - 98%)        07-01 @ 07:01  -  07-02 @ 07:00  --------------------------------------------------------  IN: 750 mL / OUT: 0 mL / NET: 750 mL      CAPILLARY BLOOD GLUCOSE      PHYSICAL EXAM:  General: Awake, alert, oriented X 3, resting comfortably in bed.   HEENT: Atraumatic, normocephalic.   Lymph Nodes: No palpable lymphadenopathy  Neck: No JVD. No carotid bruit.   Respiratory: Normal chest expansion                         Normal and equal air entry                         +diffuse rhonchi KASEY  Cardiovascular: S1 S2 normal. No murmurs, rubs or gallops.   Abdomen: Soft, non-tender, non-distended. No organomegaly.  Extremities: Warm to touch. Peripheral pulse palpable. No pedal edema.   Skin: +KASEY ecchymoses UE, patient states from IV access attempts, improving  Neurological: Motor and sensory examination equal and normal in all four extremities.  Psychiatry: Appropriate mood and affect.    HOSPITAL MEDICATIONS:  MEDICATIONS  (STANDING):  allopurinol 300 milliGRAM(s) Oral daily  apixaban 2.5 milliGRAM(s) Oral every 12 hours  benzocaine 15 mG/menthol 3.6 mG (Sugar-Free) Lozenge 1 Lozenge Oral every 4 hours  docusate sodium 100 milliGRAM(s) Oral three times a day  lactated ringers. 1000 milliLiter(s) (30 mL/Hr) IV Continuous <Continuous>  levalbuterol Inhalation 0.63 milliGRAM(s) Inhalation every 4 hours  metoprolol succinate ER 50 milliGRAM(s) Oral daily  pantoprazole    Tablet 40 milliGRAM(s) Oral before breakfast  piperacillin/tazobactam IVPB.. 3.375 Gram(s) IV Intermittent every 8 hours  predniSONE   Tablet 20 milliGRAM(s) Oral daily  senna 2 Tablet(s) Oral at bedtime  sodium chloride 2 Gram(s) Oral two times a day  trimethoprim  160 mG/sulfamethoxazole 800 mG 1 Tablet(s) Oral every 48 hours  valACYclovir 500 milliGRAM(s) Oral <User Schedule>  vancomycin  IVPB 750 milliGRAM(s) IV Intermittent every 12 hours    MEDICATIONS  (PRN):  benzonatate 100 milliGRAM(s) Oral three times a day PRN Cough  bisacodyl 5 milliGRAM(s) Oral every 12 hours PRN Constipation  polyethylene glycol 3350 17 Gram(s) Oral daily PRN Constipation      LABS:                        10.0   1.81  )-----------( 132      ( 02 Jul 2019 04:20 )             30.2     07-02    132<L>  |  94<L>  |  9   ----------------------------<  101<H>  3.8   |  25  |  0.39<L>    Ca    9.0      02 Jul 2019 04:20  Phos  2.2     07-02  Mg     1.5     07-02      MICROBIOLOGY:     RADIOLOGY:  [X] Reviewed and interpreted by me    INTERPRETATION:  CT CHEST WITHOUT CONTRAST    INDICATION: Shortness of breath, lymphoma. Cough.    TECHNIQUE: Unenhanced helical images were obtained of the chest. Coronal   and sagittal images were reconstructed. Maximum intensity projection   images were generated.     COMPARISON: Chest CT 5/13/2019, CT chest 5/7/2019, PET/CT 4/18/2019, CT   chest 4/20/2019, 4/2/2019, 11/15/2017.    FINDINGS:     Lungs And Airways:  There are numerous bilateral, panlobar nodules, many   of which are either increased in size, or are new. There are multiple   bilateral masses, largest in the lingula (2, 75) measuring 5.2 x 3.5 cm.   The largest mass in the right upper lobe measures 2.4 x 3.9 cm (series 2   image 43)    There is complete occlusion of the right lower lobe bronchus, increased   since 4/18/2019. Adjacent to the right lower lobe bronchus, is a   confluent opacity (2, 58), measuring 2.6 x 4.1 cm.    Secretions in the right middle lobe bronchus and the medial segmental and   lateral segmental bronchi of the right middle lobe. Subsegmental   atelectasis of the right middle lobe and right lower lobe. The calcified   granuloma in right upper lobe and right lower lobe unchanged. The apical   segment right upper lobe scarring is unchanged. Atelectasis.     Pleura:No pneumothorax.  Small right pleural effusion, decreased since   5/30/2019 but unchanged from 4/18/2019.    Mediastinum: There are multiple enlarged prevascular, bilateral   perihilar, AP window and peribronchial lymph nodes, most of which have   increased in size, largest in the prevascular space measuring 1.8 x 3.0   cm (2, 47). Calcified right thyroid 0.7 cm hypodensity, which corresponds   to the FDG avid hypodensity on PET/CT 4/18/2019, but unchanged since   2017. Unchanged thickening of the esophagus.    Heart and Vasculature: The heart is normal in size.  There is no   pericardial effusion. Thickened mitral valve leaflets with mitral annular   calcification. Coronary artery disease with calcified plaque involving   the left circumflex artery.      The main pulmonary artery is enlarged, which can these seen in the   setting of pulmonary arterial hypertension.  Left-sided aortic arch and   left-sided descending thoracic aorta. The ascending aorta and descending   thoracic aorta are normal in course and caliber. Atheromatous disease of   the aorta.    Upper Abdomen: Right hepatic lobe cyst.    Bones And Soft Tissues: Unchanged superior endplate deformity of T5.  The   soft tissues are unremarkable.      IMPRESSION:     1.  The bilateral nodules have increased in size in number. In   particular, there are multiple masses in the bilateral lobes, with   extension into the right lower lobe bronchus.  2.  Complete occlusion of the right lower lobe bronchus and the segmental   bronchi of the right lower lobe with a central opacity. It is uncertain   if this represents a combination of atelectasis and malignancy or   alternatively atelectasis and pneumonia given the reported history.  3.  The chest lymph nodes are malignant. The prevascular lymph node, in   particular, has increased in size.      LAUREN BAINS M.D., RADIOLOGY RESIDENT  This document has been electronically signed.  MELODY VALLADARES M.D., ATTENDING RADIOLOGIST  This document has been electronically signed. Jun 28 2019  2:38PM HPI: 79F PMHx DLBC lymphoma s/p multiple rounds chemo, radiation, Afib on eliquis, presenting w/ 1 month hx productive cough, 5 day hx SOB associated with cough. Patient initially diagnosed w DLBCL in 2017, found to have mediastinal mass, [place] [facility] [place] (last chemo ~1.5 months ago), radiation (2017/2018), now found to have bilateral nodules, multiple bilateral masses on CT (6/27/19). Patient states that she had productive cough with yellow phlegm for longer than one month, but that the cough became worse approx. 5 days ago and she had trouble breathing, prompting her to come to the ED. Patient denies fevers, chills, nausea, vomiting, hemoptysis. Patient found to be rhinovirus/enterovirus+ on RVP, also found to have obstruction of RLL bronchus, s/p BAL w/ bx 7/1 by CT surgery.       PAST MEDICAL & SURGICAL HISTORY:  Alopecia: wears a wig -- hair regrowing back  Murmur, cardiac  Varicose veins  GERD (gastroesophageal reflux disease)  Tracheal compression  Thrombocytopenia  Lymphadenopathy: as per Dr. 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 Palacios&#x27;s consult on 12-4-17  Family history of ovarian cancer (Aunt)      SOCIAL HISTORY:  Smoking: limited 2nd hand exposure.   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   Exposures: none  Recent Travel: spent winter in [place], returned in march.     Allergies    No Known Allergies    Intolerances    HOME MEDICATIONS:    REVIEW OF SYSTEMS:  Constitutional: No fevers or chills. No weight loss. +fatigue, no SOB at rest.   Eyes: No itching or discharge from the eyes  ENT: No ear pain. No ear discharge. No nasal congestion. No post nasal drip. No epistaxis. No throat pain. No sore throat. No difficulty swallowing.   CV: No chest pain. No palpitations. No lightheadedness or dizziness.   Resp: No dyspnea at rest. +yellow sputum production, +dyspnea on exertion. Patient denies chest pain with respiration. GI: No nausea. No vomiting. No diarrhea.  MSK: No joint pain or pain in any extremities  Integumentary: No pedal edema.  Neurological: No gross motor weakness. No sensory changes.    [X ] All other systems negative    OBJECTIVE:  ICU Vital Signs Last 24 Hrs  T(C): 36.6 (02 Jul 2019 12:49), Max: 37.4 (01 Jul 2019 19:30)  T(F): 97.9 (02 Jul 2019 12:49), Max: 99.3 (01 Jul 2019 19:30)  HR: 107 (02 Jul 2019 12:49) (93 - 130)  BP: 96/65 (02 Jul 2019 12:49) (96/65 - 127/76)  BP(mean): --  ABP: --  ABP(mean): --  RR: 18 (02 Jul 2019 12:49) (18 - 24)  SpO2: 96% (02 Jul 2019 12:49) (94% - 98%)        07-01 @ 07:01  -  07-02 @ 07:00  --------------------------------------------------------  IN: 750 mL / OUT: 0 mL / NET: 750 mL      CAPILLARY BLOOD GLUCOSE      PHYSICAL EXAM:  General: Awake, alert, oriented X 3, resting comfortably in bed.   HEENT: Atraumatic, normocephalic.   Lymph Nodes: No palpable lymphadenopathy  Neck: No JVD. No carotid bruit.   Respiratory: Normal chest expansion                         +diffuse rhonchi KASEY  Cardiovascular: S1 S2 normal. No murmurs, rubs or gallops.   Abdomen: Soft, non-tender, non-distended. No organomegaly.  Extremities: Warm to touch. Peripheral pulse palpable. No pedal edema.   Skin: +KASEY ecchymoses UE, patient states from IV access attempts, improving  Neurological: Motor and sensory examination equal and normal in all four extremities.  Psychiatry: Appropriate mood and affect.    HOSPITAL MEDICATIONS:  MEDICATIONS  (STANDING):  allopurinol 300 milliGRAM(s) Oral daily  apixaban 2.5 milliGRAM(s) Oral every 12 hours  benzocaine 15 mG/menthol 3.6 mG (Sugar-Free) Lozenge 1 Lozenge Oral every 4 hours  docusate sodium 100 milliGRAM(s) Oral three times a day  lactated ringers. 1000 milliLiter(s) (30 mL/Hr) IV Continuous <Continuous>  levalbuterol Inhalation 0.63 milliGRAM(s) Inhalation every 4 hours  metoprolol succinate ER 50 milliGRAM(s) Oral daily  pantoprazole    Tablet 40 milliGRAM(s) Oral before breakfast  piperacillin/tazobactam IVPB.. 3.375 Gram(s) IV Intermittent every 8 hours  predniSONE   Tablet 20 milliGRAM(s) Oral daily  senna 2 Tablet(s) Oral at bedtime  sodium chloride 2 Gram(s) Oral two times a day  trimethoprim  160 mG/sulfamethoxazole 800 mG 1 Tablet(s) Oral every 48 hours  valACYclovir 500 milliGRAM(s) Oral <User Schedule>  vancomycin  IVPB 750 milliGRAM(s) IV Intermittent every 12 hours    MEDICATIONS  (PRN):  benzonatate 100 milliGRAM(s) Oral three times a day PRN Cough  bisacodyl 5 milliGRAM(s) Oral every 12 hours PRN Constipation  polyethylene glycol 3350 17 Gram(s) Oral daily PRN Constipation      LABS:                        10.0   1.81  )-----------( 132      ( 02 Jul 2019 04:20 )             30.2     07-02    132<L>  |  94<L>  |  9   ----------------------------<  101<H>  3.8   |  25  |  0.39<L>    Ca    9.0      02 Jul 2019 04:20  Phos  2.2     07-02  Mg     1.5     07-02      MICROBIOLOGY:     RADIOLOGY:  [X] Reviewed and interpreted by me    INTERPRETATION:  CT CHEST WITHOUT CONTRAST    INDICATION: Shortness of breath, lymphoma. Cough.    TECHNIQUE: Unenhanced helical images were obtained of the chest. Coronal   and sagittal images were reconstructed. Maximum intensity projection   images were generated.     COMPARISON: Chest CT 5/13/2019, CT chest 5/7/2019, PET/CT 4/18/2019, CT   chest 4/20/2019, 4/2/2019, 11/15/2017.    FINDINGS:     Lungs And Airways:  There are numerous bilateral, panlobar nodules, many   of which are either increased in size, or are new. There are multiple   bilateral masses, largest in the lingula (2, 75) measuring 5.2 x 3.5 cm.   The largest mass in the right upper lobe measures 2.4 x 3.9 cm (series 2   image 43)    There is complete occlusion of the right lower lobe bronchus, increased   since 4/18/2019. Adjacent to the right lower lobe bronchus, is a   confluent opacity (2, 58), measuring 2.6 x 4.1 cm.    Secretions in the right middle lobe bronchus and the medial segmental and   lateral segmental bronchi of the right middle lobe. Subsegmental   atelectasis of the right middle lobe and right lower lobe. The calcified   granuloma in right upper lobe and right lower lobe unchanged. The apical   segment right upper lobe scarring is unchanged. Atelectasis.     Pleura:No pneumothorax.  Small right pleural effusion, decreased since   5/30/2019 but unchanged from 4/18/2019.    Mediastinum: There are multiple enlarged prevascular, bilateral   perihilar, AP window and peribronchial lymph nodes, most of which have   increased in size, largest in the prevascular space measuring 1.8 x 3.0   cm (2, 47). Calcified right thyroid 0.7 cm hypodensity, which corresponds   to the FDG avid hypodensity on PET/CT 4/18/2019, but unchanged since   2017. Unchanged thickening of the esophagus.    Heart and Vasculature: The heart is normal in size.  There is no   pericardial effusion. Thickened mitral valve leaflets with mitral annular   calcification. Coronary artery disease with calcified plaque involving   the left circumflex artery.      The main pulmonary artery is enlarged, which can these seen in the   setting of pulmonary arterial hypertension.  Left-sided aortic arch and   left-sided descending thoracic aorta. The ascending aorta and descending   thoracic aorta are normal in course and caliber. Atheromatous disease of   the aorta.    Upper Abdomen: Right hepatic lobe cyst.    Bones And Soft Tissues: Unchanged superior endplate deformity of T5.  The   soft tissues are unremarkable.      IMPRESSION:     1.  The bilateral nodules have increased in size in number. In   particular, there are multiple masses in the bilateral lobes, with   extension into the right lower lobe bronchus.  2.  Complete occlusion of the right lower lobe bronchus and the segmental   bronchi of the right lower lobe with a central opacity. It is uncertain   if this represents a combination of atelectasis and malignancy or   alternatively atelectasis and pneumonia given the reported history.  3.  The chest lymph nodes are malignant. The prevascular lymph node, in   particular, has increased in size.      LAUREN BAINS M.D., RADIOLOGY RESIDENT  This document has been electronically signed.  MELODY VALLADARES M.D., ATTENDING RADIOLOGIST  This document has been electronically signed. Jun 28 2019  2:38PM

## 2019-07-02 NOTE — PROGRESS NOTE ADULT - SUBJECTIVE AND OBJECTIVE BOX
Patient is a 79y old  Female who presents with a chief complaint of SOB (02 Jul 2019 01:49)    SUBJECTIVE / OVERNIGHT EVENTS:  Patient seen with daughter at bedside reporting SOB, improvement with nebs, also complaining of cough with thick yellow copious secretions. She is also constipated, no BM since hospitalization.    MEDICATIONS  (STANDING):  allopurinol 300 milliGRAM(s) Oral daily  benzocaine 15 mG/menthol 3.6 mG (Sugar-Free) Lozenge 1 Lozenge Oral every 4 hours  docusate sodium 100 milliGRAM(s) Oral three times a day  heparin  Injectable 5000 Unit(s) SubCutaneous every 12 hours  lactated ringers. 1000 milliLiter(s) (30 mL/Hr) IV Continuous <Continuous>  levalbuterol Inhalation 0.63 milliGRAM(s) Inhalation every 4 hours  metoprolol succinate ER 50 milliGRAM(s) Oral daily  pantoprazole    Tablet 40 milliGRAM(s) Oral before breakfast  piperacillin/tazobactam IVPB.. 3.375 Gram(s) IV Intermittent every 8 hours  predniSONE   Tablet 20 milliGRAM(s) Oral daily  senna 2 Tablet(s) Oral at bedtime  sodium chloride 2 Gram(s) Oral two times a day  trimethoprim  160 mG/sulfamethoxazole 800 mG 1 Tablet(s) Oral every 48 hours  valACYclovir 500 milliGRAM(s) Oral <User Schedule>  vancomycin  IVPB 750 milliGRAM(s) IV Intermittent every 12 hours    MEDICATIONS  (PRN):  benzonatate 100 milliGRAM(s) Oral three times a day PRN Cough  HYDROcodone/homatropine Syrup 5 milliLiter(s) Oral every 6 hours PRN Cough      Vital Signs Last 24 Hrs  T(C): 37.4 (02 Jul 2019 04:59), Max: 37.4 (01 Jul 2019 19:30)  T(F): 99.3 (02 Jul 2019 04:59), Max: 99.3 (01 Jul 2019 19:30)  HR: 100 (02 Jul 2019 09:35) (93 - 143)  BP: 109/74 (02 Jul 2019 04:59) (93/64 - 140/79)  BP(mean): 79 (01 Jul 2019 12:30) (74 - 79)  RR: 18 (02 Jul 2019 04:59) (16 - 24)  SpO2: 98% (02 Jul 2019 05:11) (94% - 99%)  CAPILLARY BLOOD GLUCOSE        I&O's Summary    01 Jul 2019 07:01  -  02 Jul 2019 07:00  --------------------------------------------------------  IN: 750 mL / OUT: 0 mL / NET: 750 mL          PHYSICAL EXAM  GENERAL: elderly female lying in bed in NAD   MENTAL STATUS/PSYCH:  AAO x3   HEAD:  Atraumatic, Normocephalic  EYES: EOMI, PERRLA, conjunctiva and sclera clear  NECK: Supple, No elevated JVD  CHEST/LUNG:  course breath sound throughout with significant rhonchi on Rt side   HEART: Regular rate and rhythm; No murmurs, rubs, or gallops  ABDOMEN: Soft, Nontender, Nondistended; Bowel sounds present  EXTREMITIES:  2+ Peripheral Pulses, No clubbing, cyanosis, or edema  NEUROLOGY: CN II-XII grossly intact, moving all extremities  SKIN: No rashes or lesions      LABS:                        10.0   1.81  )-----------( 132      ( 02 Jul 2019 04:20 )             30.2     07-02    132<L>  |  94<L>  |  9   ----------------------------<  101<H>  3.8   |  25  |  0.39<L>    Ca    9.0      02 Jul 2019 04:20  Phos  2.2     07-02  Mg     1.5     07-02                Culture - Respiratory with Gram Stain (collected 01 Jul 2019 14:45)  Source: BRONCHIAL LAVAGE  Preliminary Report (02 Jul 2019 09:51):    NRF^Normal Respiratory Le    QUANTITY OF GROWTH: RARE    Culture - Respiratory with Gram Stain (collected 01 Jul 2019 14:29)  Source: BRONCHIAL LAVAGE  Preliminary Report (02 Jul 2019 10:00):    NO GROWTH - PRELIMINARY RESULTS    Culture - Blood (collected 30 Jun 2019 22:54)  Source: BLOOD VENOUS  Preliminary Report (01 Jul 2019 22:55):    NO ORGANISMS ISOLATED    NO ORGANISMS ISOLATED AT 24 HOURS    Culture - Blood (collected 30 Jun 2019 22:54)  Source: BLOOD PERIPHERAL  Preliminary Report (01 Jul 2019 22:55):    NO ORGANISMS ISOLATED    NO ORGANISMS ISOLATED AT 24 HOURS        RADIOLOGY & ADDITIONAL TESTS:    Imaging Personally Reviewed:  Consultant(s) Notes Reviewed:    Care Discussed with Consultants/Other Providers:

## 2019-07-02 NOTE — PROGRESS NOTE ADULT - PROBLEM SELECTOR PLAN 4
Likely 2/2 poor po intake  - continue standing bowel regimen, will add laxatives today, enema if no BM

## 2019-07-02 NOTE — PROGRESS NOTE ADULT - PROBLEM SELECTOR PLAN 1
Likely 2/2 postobstructive viral/bacterial PNA s/p bronch 7/1 with thoracic sx  -remains hypoxic, cont O2 to maintain SpO2>90, wean as tolerated   -f/u cultures from bronch, cytology  -cont broad spectrum abx with IV vanco, zosyn  -cont prednisone and duonebs for reactive airway disease  -supportive care for rhino virus- hycodan prn for cough  - Pulmonary consulted, recommendations appreciated

## 2019-07-02 NOTE — PROGRESS NOTE ADULT - SUBJECTIVE AND OBJECTIVE BOX
Pt seen s/p FB, BAL, endobronchial bx.  She tolerated the procedure and has not c/o hemoptysis.  She has had her usual SOB and has had various treatments while still c/o SOB. Plan as per primary team.

## 2019-07-02 NOTE — PROGRESS NOTE ADULT - PROBLEM SELECTOR PLAN 6
- [place] [facility] [place] per family, [facility] is not offering any disease modifying treatment. Pt is looking into enrollment in a clinical trial   - C/w ppx Bactrim, valacyclovir and allopurinol  - GOC discussed with pt and daughter, want full code for now

## 2019-07-02 NOTE — CONSULT NOTE ADULT - ASSESSMENT
79F PMHx DLBC lymphoma, Afib on eliquus, p/w hx productive cough x1 month, SOB x3 days, found to have RLL bronchus obstruction, +enterorhinovirus on RVP, with possible post obstructive pneumonia.   -patient reports breathing improved s/p BAL w/bx (7/1), patient satting high 90s on 2L NC.   -c/w O2 as needed.   -f/u cultures/cytology from [facility]  -c/w broad spectrum abx vanc/zosyn pending culture results.   -c/w prednisone and duonebs   -Airway clearance therapy w chest PT and bronchodilators.

## 2019-07-03 LAB
ANION GAP SERPL CALC-SCNC: 13 MMO/L — SIGNIFICANT CHANGE UP (ref 7–14)
BUN SERPL-MCNC: 7 MG/DL — SIGNIFICANT CHANGE UP (ref 7–23)
CALCIUM SERPL-MCNC: 9.1 MG/DL — SIGNIFICANT CHANGE UP (ref 8.4–10.5)
CHLORIDE SERPL-SCNC: 93 MMOL/L — LOW (ref 98–107)
CO2 SERPL-SCNC: 27 MMOL/L — SIGNIFICANT CHANGE UP (ref 22–31)
CREAT SERPL-MCNC: 0.42 MG/DL — LOW (ref 0.5–1.3)
GLUCOSE SERPL-MCNC: 109 MG/DL — HIGH (ref 70–99)
HCT VFR BLD CALC: 32.2 % — LOW (ref 34.5–45)
HGB BLD-MCNC: 10.3 G/DL — LOW (ref 11.5–15.5)
MCHC RBC-ENTMCNC: 31.1 PG — SIGNIFICANT CHANGE UP (ref 27–34)
MCHC RBC-ENTMCNC: 32 % — SIGNIFICANT CHANGE UP (ref 32–36)
MCV RBC AUTO: 97.3 FL — SIGNIFICANT CHANGE UP (ref 80–100)
NRBC # FLD: 0 K/UL — SIGNIFICANT CHANGE UP (ref 0–0)
PLATELET # BLD AUTO: 139 K/UL — LOW (ref 150–400)
PMV BLD: 10.3 FL — SIGNIFICANT CHANGE UP (ref 7–13)
POTASSIUM SERPL-MCNC: 3.8 MMOL/L — SIGNIFICANT CHANGE UP (ref 3.5–5.3)
POTASSIUM SERPL-SCNC: 3.8 MMOL/L — SIGNIFICANT CHANGE UP (ref 3.5–5.3)
RBC # BLD: 3.31 M/UL — LOW (ref 3.8–5.2)
RBC # FLD: 18.6 % — HIGH (ref 10.3–14.5)
SODIUM SERPL-SCNC: 133 MMOL/L — LOW (ref 135–145)
WBC # BLD: 1.8 K/UL — LOW (ref 3.8–10.5)
WBC # FLD AUTO: 1.8 K/UL — LOW (ref 3.8–10.5)

## 2019-07-03 PROCEDURE — 99222 1ST HOSP IP/OBS MODERATE 55: CPT | Mod: GC

## 2019-07-03 PROCEDURE — 99232 SBSQ HOSP IP/OBS MODERATE 35: CPT | Mod: GC

## 2019-07-03 PROCEDURE — 99223 1ST HOSP IP/OBS HIGH 75: CPT | Mod: GC

## 2019-07-03 PROCEDURE — 99233 SBSQ HOSP IP/OBS HIGH 50: CPT

## 2019-07-03 RX ORDER — VORICONAZOLE 10 MG/ML
300 INJECTION, POWDER, LYOPHILIZED, FOR SOLUTION INTRAVENOUS EVERY 12 HOURS
Refills: 0 | Status: COMPLETED | OUTPATIENT
Start: 2019-07-03 | End: 2019-07-04

## 2019-07-03 RX ORDER — SODIUM CHLORIDE 9 MG/ML
4 INJECTION INTRAMUSCULAR; INTRAVENOUS; SUBCUTANEOUS EVERY 4 HOURS
Refills: 0 | Status: DISCONTINUED | OUTPATIENT
Start: 2019-07-03 | End: 2019-07-09

## 2019-07-03 RX ORDER — ATOVAQUONE 750 MG/5ML
1500 SUSPENSION ORAL DAILY
Refills: 0 | Status: DISCONTINUED | OUTPATIENT
Start: 2019-07-03 | End: 2019-07-17

## 2019-07-03 RX ORDER — VORICONAZOLE 10 MG/ML
200 INJECTION, POWDER, LYOPHILIZED, FOR SOLUTION INTRAVENOUS EVERY 12 HOURS
Refills: 0 | Status: DISCONTINUED | OUTPATIENT
Start: 2019-07-04 | End: 2019-07-17

## 2019-07-03 RX ORDER — PIPERACILLIN AND TAZOBACTAM 4; .5 G/20ML; G/20ML
3.38 INJECTION, POWDER, LYOPHILIZED, FOR SOLUTION INTRAVENOUS EVERY 8 HOURS
Refills: 0 | Status: DISCONTINUED | OUTPATIENT
Start: 2019-07-03 | End: 2019-07-05

## 2019-07-03 RX ORDER — SODIUM CHLORIDE 0.65 %
1 AEROSOL, SPRAY (ML) NASAL DAILY
Refills: 0 | Status: DISCONTINUED | OUTPATIENT
Start: 2019-07-03 | End: 2019-08-06

## 2019-07-03 RX ADMIN — BENZOCAINE AND MENTHOL 1 LOZENGE: 5; 1 LIQUID ORAL at 05:53

## 2019-07-03 RX ADMIN — Medication 100 MILLIGRAM(S): at 21:06

## 2019-07-03 RX ADMIN — BENZOCAINE AND MENTHOL 1 LOZENGE: 5; 1 LIQUID ORAL at 18:39

## 2019-07-03 RX ADMIN — PIPERACILLIN AND TAZOBACTAM 25 GRAM(S): 4; .5 INJECTION, POWDER, LYOPHILIZED, FOR SOLUTION INTRAVENOUS at 13:23

## 2019-07-03 RX ADMIN — PIPERACILLIN AND TAZOBACTAM 25 GRAM(S): 4; .5 INJECTION, POWDER, LYOPHILIZED, FOR SOLUTION INTRAVENOUS at 05:51

## 2019-07-03 RX ADMIN — Medication 250 MILLIGRAM(S): at 05:50

## 2019-07-03 RX ADMIN — APIXABAN 2.5 MILLIGRAM(S): 2.5 TABLET, FILM COATED ORAL at 18:38

## 2019-07-03 RX ADMIN — SODIUM CHLORIDE 2 GRAM(S): 9 INJECTION INTRAMUSCULAR; INTRAVENOUS; SUBCUTANEOUS at 18:39

## 2019-07-03 RX ADMIN — LEVALBUTEROL 0.63 MILLIGRAM(S): 1.25 SOLUTION, CONCENTRATE RESPIRATORY (INHALATION) at 20:10

## 2019-07-03 RX ADMIN — BENZOCAINE AND MENTHOL 1 LOZENGE: 5; 1 LIQUID ORAL at 01:48

## 2019-07-03 RX ADMIN — BENZOCAINE AND MENTHOL 1 LOZENGE: 5; 1 LIQUID ORAL at 21:06

## 2019-07-03 RX ADMIN — LEVALBUTEROL 0.63 MILLIGRAM(S): 1.25 SOLUTION, CONCENTRATE RESPIRATORY (INHALATION) at 07:01

## 2019-07-03 RX ADMIN — Medication 50 MILLIGRAM(S): at 05:49

## 2019-07-03 RX ADMIN — Medication 100 MILLIGRAM(S): at 01:47

## 2019-07-03 RX ADMIN — VORICONAZOLE 300 MILLIGRAM(S): 10 INJECTION, POWDER, LYOPHILIZED, FOR SOLUTION INTRAVENOUS at 18:39

## 2019-07-03 RX ADMIN — Medication 100 MILLIGRAM(S): at 22:53

## 2019-07-03 RX ADMIN — BENZOCAINE AND MENTHOL 1 LOZENGE: 5; 1 LIQUID ORAL at 10:08

## 2019-07-03 RX ADMIN — Medication 300 MILLIGRAM(S): at 13:15

## 2019-07-03 RX ADMIN — LEVALBUTEROL 0.63 MILLIGRAM(S): 1.25 SOLUTION, CONCENTRATE RESPIRATORY (INHALATION) at 12:02

## 2019-07-03 RX ADMIN — LEVALBUTEROL 0.63 MILLIGRAM(S): 1.25 SOLUTION, CONCENTRATE RESPIRATORY (INHALATION) at 03:47

## 2019-07-03 RX ADMIN — SODIUM CHLORIDE 4 MILLILITER(S): 9 INJECTION INTRAMUSCULAR; INTRAVENOUS; SUBCUTANEOUS at 20:16

## 2019-07-03 RX ADMIN — APIXABAN 2.5 MILLIGRAM(S): 2.5 TABLET, FILM COATED ORAL at 05:49

## 2019-07-03 RX ADMIN — BENZOCAINE AND MENTHOL 1 LOZENGE: 5; 1 LIQUID ORAL at 13:23

## 2019-07-03 RX ADMIN — Medication 20 MILLIGRAM(S): at 05:49

## 2019-07-03 RX ADMIN — SODIUM CHLORIDE 30 MILLILITER(S): 9 INJECTION, SOLUTION INTRAVENOUS at 05:53

## 2019-07-03 RX ADMIN — LEVALBUTEROL 0.63 MILLIGRAM(S): 1.25 SOLUTION, CONCENTRATE RESPIRATORY (INHALATION) at 16:26

## 2019-07-03 RX ADMIN — PIPERACILLIN AND TAZOBACTAM 25 GRAM(S): 4; .5 INJECTION, POWDER, LYOPHILIZED, FOR SOLUTION INTRAVENOUS at 18:39

## 2019-07-03 RX ADMIN — Medication 200 MILLIGRAM(S): at 22:50

## 2019-07-03 NOTE — PROGRESS NOTE ADULT - PROBLEM SELECTOR PLAN 3
Likely in setting of decreased PO intake, but can also be due to SIADH in setting of infection/lung pathology   - cont NaCl to 2g bid  - monitor BMP

## 2019-07-03 NOTE — CONSULT NOTE ADULT - SUBJECTIVE AND OBJECTIVE BOX
HPI:  79F hx of DLBC lymphoma, Afib on eliquis presenting with three days of worsening SOB and productive cough. Patient states she was having worsening phlegm and felt like should "couldn't breath" this AM, so she went to ED. Has had chronic cough and per son was started on prednisone 40mg x2 days, now on prednisone 20mg by Dr. Nguyen for worsening cough. She denies sick contacts, fevers, chills, LE edema. She denies chest pain or palpitations. Endorsing decreased PO intake. Denies dysuria, n/v/d. (26 Jun 2019 17:29)      PAST MEDICAL & SURGICAL HISTORY:  Alopecia: wears a wig -- hair regrowing back  Murmur, cardiac  Varicose veins  GERD (gastroesophageal reflux disease)  Tracheal compression  Thrombocytopenia  Lymphadenopathy: as per Dr. Mckeon&#x27;s consult on 12-4-17  Hyponatremia  Anemia  S/P chemotherapy, time since 4-12 weeks  Lung mass: diagnosed in 2017--received chemotherapy prior to surgery scheduled to biopsy trachea on 12-8-17  Lymphoma, unspecified body region, unspecified lymphoma type: diffuse large B-cell lymphoma of intrathoracic lymph nodes  Ductal carcinoma in situ (DCIS) of left breast: had b/l mastectomy in 2012 with NO post op chemotherapy or RT  HTN (hypertension)  Afib  Cataract: left eye lens  S/P tonsillectomy  Arthropathy: 2005, partial left knee replacement  H/O bilateral mastectomy      Allergies    No Known Allergies    Intolerances        ANTIMICROBIALS:  piperacillin/tazobactam IVPB.. 3.375 every 8 hours  trimethoprim  160 mG/sulfamethoxazole 800 mG 1 every 48 hours  valACYclovir 500 <User Schedule>  vancomycin  IVPB 1000 every 12 hours      OTHER MEDS:  allopurinol 300 milliGRAM(s) Oral daily  apixaban 2.5 milliGRAM(s) Oral every 12 hours  benzocaine 15 mG/menthol 3.6 mG (Sugar-Free) Lozenge 1 Lozenge Oral every 4 hours  benzonatate 100 milliGRAM(s) Oral three times a day PRN  bisacodyl 5 milliGRAM(s) Oral every 12 hours PRN  docusate sodium 100 milliGRAM(s) Oral three times a day  guaiFENesin    Syrup 200 milliGRAM(s) Oral every 6 hours PRN  lactated ringers. 1000 milliLiter(s) IV Continuous <Continuous>  levalbuterol Inhalation 0.63 milliGRAM(s) Inhalation every 4 hours  metoprolol succinate ER 50 milliGRAM(s) Oral daily  pantoprazole    Tablet 40 milliGRAM(s) Oral before breakfast  polyethylene glycol 3350 17 Gram(s) Oral daily PRN  predniSONE   Tablet 20 milliGRAM(s) Oral daily  senna 2 Tablet(s) Oral at bedtime  sodium chloride 2 Gram(s) Oral two times a day  sodium chloride 0.65% Nasal 1 Spray(s) Both Nostrils daily PRN      SOCIAL HISTORY:    Marital Status:    Occupation:   Lives with:     Substance Use (street drugs):   Tobacco Usage:    Alcohol Usage: Social EtOH    FAMILY HISTORY:  Family history of lymphoma: low grade, as per Dr. Palacios&#x27;s consult on 12-4-17  Family history of ovarian cancer (Aunt)      ROS:  Unobtainable because:   All other systems negative     Constitutional: no fever, no chills, no weight loss, no night sweats  Eye: no eye pain, no redness, no vision changes  ENT:  no sore throat, no rhinorrhea  Cardiovascular:  no chest pain, no palpitation  Respiratory:  no SOB, no cough  GI:  no abd pain, no vomiting, no diarrhea  urinary: no dysuria, no hematuria, no flank pain  : no  discharge or bleeding  musculoskeletal:  no joint pain, no joint swelling  skin:  no rash  neurology:  no headache, no seizure, no change in mental status  psych: no anxiety, no depression     Physical Exam:    General:    NAD, non toxic  Head: atraumatic, normocephalic  Eyes: normal sclera and conjunctiva  ENT:   no oropharyngeal lesions, no LAD, neck supple  Cardio:    regular S1,S2, no murmur  Respiratory:   clear b/l, no wheezing  abd:   soft, BS +, not tender, no hepatosplenomegaly  :     no CVAT, no suprapubic tenderness, no bledsoe  Musculoskeletal : no joint swelling, no edema  Skin:    no rash  vascular: no lines, normal pulses  Neurologic:     no focal deficits  psych: normal affect, no suicidal ideation      Drug Dosing Weight  Height (cm): 160.02 (01 Jul 2019 09:06)  Weight (kg): 47.6 (01 Jul 2019 09:06)  BMI (kg/m2): 18.6 (01 Jul 2019 09:06)  BSA (m2): 1.47 (01 Jul 2019 09:06)    Vital Signs Last 24 Hrs  T(F): 97.9 (07-03-19 @ 04:51), Max: 101.3 (06-30-19 @ 21:43)    Vital Signs Last 24 Hrs  HR: 84 (07-03-19 @ 07:01) (82 - 910)  BP: 156/98 (07-03-19 @ 04:51) (96/65 - 156/98)  RR: 22 (07-03-19 @ 04:51)  SpO2: 98% (07-03-19 @ 07:01) (96% - 99%)  Wt(kg): --                          10.3   1.80  )-----------( 139      ( 03 Jul 2019 04:37 )             32.2       07-03    133<L>  |  93<L>  |  7   ----------------------------<  109<H>  3.8   |  27  |  0.42<L>    Ca    9.1      03 Jul 2019 04:37  Phos  2.2     07-02  Mg     1.5     07-02            MICROBIOLOGY:  Vancomycin Level, Random: 7.6 ug/mL (07-02-19 @ 15:18)  v  BRONCHIAL LAVAGE  07-01-19 --  --  --      BRONCHIAL LAVAGE  07-01-19 --  --  --      BLOOD PERIPHERAL  06-30-19 --  --  --      URINE MIDSTREAM  06-26-19 --  --  --      BLOOD PERIPHERAL  06-26-19 --  --  --                  RADIOLOGY: HPI: 79F hx of DLBC lymphoma, Afib on eliquis presenting with three days of worsening SOB and productive cough. Pt is a very poor historian so info from chart. Had relapse of DLBCL. On 3/25/19 given rituxan and revlimid. On 4/29/19 received obintuzumab and bendamustine. Came in for SOB. Has been having cough. Per chart pt w chronic cough that acutely worsened, started on prednisone a couple days PTA.    Denies fevers, cough, cp, abd pain, n/v, diarrhea, dysuria.     Had bronch/BAL performed 7/1 w path now showing fungal hyphae. Per bronch op report- lots of secretions seen. Pt doesn't know when she last traveled- guessed years ago.       PAST MEDICAL & SURGICAL HISTORY:  Alopecia: wears a wig -- hair regrowing back  Murmur, cardiac  Varicose veins  GERD (gastroesophageal reflux disease)  Tracheal compression  Thrombocytopenia  Lymphadenopathy: as per Dr. Mckeon&#x27;s consult on 12-4-17  Hyponatremia  Anemia  S/P chemotherapy, time since 4-12 weeks  Lung mass: diagnosed in 2017--received chemotherapy prior to surgery scheduled to biopsy trachea on 12-8-17  Lymphoma, unspecified body region, unspecified lymphoma type: diffuse large B-cell lymphoma of intrathoracic lymph nodes  Ductal carcinoma in situ (DCIS) of left breast: had b/l mastectomy in 2012 with NO post op chemotherapy or RT  HTN (hypertension)  Afib  Cataract: left eye lens  S/P tonsillectomy  Arthropathy: 2005, partial left knee replacement  H/O bilateral mastectomy      Allergies    No Known Allergies    Intolerances        ANTIMICROBIALS:  piperacillin/tazobactam IVPB.. 3.375 every 8 hours  trimethoprim  160 mG/sulfamethoxazole 800 mG 1 every 48 hours  valACYclovir 500 <User Schedule>  vancomycin  IVPB 1000 every 12 hours      OTHER MEDS:  allopurinol 300 milliGRAM(s) Oral daily  apixaban 2.5 milliGRAM(s) Oral every 12 hours  benzocaine 15 mG/menthol 3.6 mG (Sugar-Free) Lozenge 1 Lozenge Oral every 4 hours  benzonatate 100 milliGRAM(s) Oral three times a day PRN  bisacodyl 5 milliGRAM(s) Oral every 12 hours PRN  docusate sodium 100 milliGRAM(s) Oral three times a day  guaiFENesin    Syrup 200 milliGRAM(s) Oral every 6 hours PRN  lactated ringers. 1000 milliLiter(s) IV Continuous <Continuous>  levalbuterol Inhalation 0.63 milliGRAM(s) Inhalation every 4 hours  metoprolol succinate ER 50 milliGRAM(s) Oral daily  pantoprazole    Tablet 40 milliGRAM(s) Oral before breakfast  polyethylene glycol 3350 17 Gram(s) Oral daily PRN  predniSONE   Tablet 20 milliGRAM(s) Oral daily  senna 2 Tablet(s) Oral at bedtime  sodium chloride 2 Gram(s) Oral two times a day  sodium chloride 0.65% Nasal 1 Spray(s) Both Nostrils daily PRN      SOCIAL HISTORY:    Marital Status:    Occupation:   Lives with:     Substance Use (street drugs):   Tobacco Usage:    Alcohol Usage: Social EtOH    FAMILY HISTORY:  Family history of lymphoma: low grade, as per Dr. Palacios&#x27;s consult on 12-4-17  Family history of ovarian cancer (Aunt)      ROS:  Unobtainable because:   All other systems negative     Constitutional: no fever, no chills, no weight loss, no night sweats  Eye: no eye pain, no redness, no vision changes  ENT:  no sore throat, no rhinorrhea  Cardiovascular:  no chest pain, no palpitation  Respiratory:  no SOB, no cough  GI:  no abd pain, no vomiting, no diarrhea  urinary: no dysuria, no hematuria, no flank pain  : no  discharge or bleeding  musculoskeletal:  no joint pain, no joint swelling  skin:  no rash  neurology:  no headache, no seizure, no change in mental status  psych: no anxiety, no depression     Physical Exam:    General:    NAD, non toxic  Head: atraumatic, normocephalic  Eyes: normal sclera and conjunctiva  ENT:   no oropharyngeal lesions, no LAD, neck supple  Cardio:    regular S1,S2, no murmur  Respiratory:   clear b/l, no wheezing  abd:   soft, BS +, not tender, no hepatosplenomegaly  :     no CVAT, no suprapubic tenderness, no bledsoe  Musculoskeletal : no joint swelling, no edema  Skin:    no rash  vascular: no lines, normal pulses  Neurologic:     no focal deficits  psych: normal affect, no suicidal ideation      Drug Dosing Weight  Height (cm): 160.02 (01 Jul 2019 09:06)  Weight (kg): 47.6 (01 Jul 2019 09:06)  BMI (kg/m2): 18.6 (01 Jul 2019 09:06)  BSA (m2): 1.47 (01 Jul 2019 09:06)    Vital Signs Last 24 Hrs  T(F): 97.9 (07-03-19 @ 04:51), Max: 101.3 (06-30-19 @ 21:43)    Vital Signs Last 24 Hrs  HR: 84 (07-03-19 @ 07:01) (82 - 910)  BP: 156/98 (07-03-19 @ 04:51) (96/65 - 156/98)  RR: 22 (07-03-19 @ 04:51)  SpO2: 98% (07-03-19 @ 07:01) (96% - 99%)  Wt(kg): --                          10.3   1.80  )-----------( 139      ( 03 Jul 2019 04:37 )             32.2       07-03    133<L>  |  93<L>  |  7   ----------------------------<  109<H>  3.8   |  27  |  0.42<L>    Ca    9.1      03 Jul 2019 04:37  Phos  2.2     07-02  Mg     1.5     07-02            MICROBIOLOGY:  Vancomycin Level, Random: 7.6 ug/mL (07-02-19 @ 15:18)  v  BRONCHIAL LAVAGE  07-01-19 --  --  --      BRONCHIAL LAVAGE  07-01-19 --  --  --      BLOOD PERIPHERAL  06-30-19 --  --  --      URINE MIDSTREAM  06-26-19 --  --  --      BLOOD PERIPHERAL  06-26-19 --  --  --                  RADIOLOGY: HPI: 79F hx of DLBC lymphoma, Afib on eliquis presenting with three days of worsening SOB and productive cough. Pt is a very poor historian so info from chart. First had DLBCL dx at [facility] 2017. Had relapse of DLBCL 3/2019. On 3/25/19 given rituxan and revlimid. On 4/29/19 received obintuzumab and bendamustine. Came in for SOB. Has been having cough. Per chart pt w chronic cough that acutely worsened, started on prednisone a couple days PTA.    Denies fevers, cough, cp, abd pain, n/v, diarrhea, dysuria.     Had bronch/BAL performed 7/1 w path now showing fungal hyphae and BAL cx w mold. Per bronch op report- lots of secretions seen. Pt went to [place] a few months ago.       PAST MEDICAL & SURGICAL HISTORY:  Alopecia: wears a wig -- hair regrowing back  Murmur, cardiac  Varicose veins  GERD (gastroesophageal reflux disease)  Tracheal compression  Thrombocytopenia  Lymphadenopathy: as per Dr. Mckeon&#x27;s consult on 12-4-17  Hyponatremia  Anemia  S/P chemotherapy, time since 4-12 weeks  Lung mass: diagnosed in 2017--received chemotherapy prior to surgery scheduled to biopsy trachea on 12-8-17  Lymphoma, unspecified body region, unspecified lymphoma type: diffuse large B-cell lymphoma of intrathoracic lymph nodes  Ductal carcinoma in situ (DCIS) of left breast: had b/l mastectomy in 2012 with NO post op chemotherapy or RT  HTN (hypertension)  Afib  Cataract: left eye lens  S/P tonsillectomy  Arthropathy: 2005, partial left knee replacement  H/O bilateral mastectomy      Allergies    No Known Allergies    Intolerances        ANTIMICROBIALS:  piperacillin/tazobactam IVPB.. 3.375 every 8 hours  trimethoprim  160 mG/sulfamethoxazole 800 mG 1 every 48 hours  valACYclovir 500 <User Schedule>  vancomycin  IVPB 1000 every 12 hours      OTHER MEDS:  allopurinol 300 milliGRAM(s) Oral daily  apixaban 2.5 milliGRAM(s) Oral every 12 hours  benzocaine 15 mG/menthol 3.6 mG (Sugar-Free) Lozenge 1 Lozenge Oral every 4 hours  benzonatate 100 milliGRAM(s) Oral three times a day PRN  bisacodyl 5 milliGRAM(s) Oral every 12 hours PRN  docusate sodium 100 milliGRAM(s) Oral three times a day  guaiFENesin    Syrup 200 milliGRAM(s) Oral every 6 hours PRN  lactated ringers. 1000 milliLiter(s) IV Continuous <Continuous>  levalbuterol Inhalation 0.63 milliGRAM(s) Inhalation every 4 hours  metoprolol succinate ER 50 milliGRAM(s) Oral daily  pantoprazole    Tablet 40 milliGRAM(s) Oral before breakfast  polyethylene glycol 3350 17 Gram(s) Oral daily PRN  predniSONE   Tablet 20 milliGRAM(s) Oral daily  senna 2 Tablet(s) Oral at bedtime  sodium chloride 2 Gram(s) Oral two times a day  sodium chloride 0.65% Nasal 1 Spray(s) Both Nostrils daily PRN      SOCIAL HISTORY:  Substance Use (street drugs): denies  Tobacco Usage:  denies  Alcohol Usage: denies    FAMILY HISTORY:  Family history of lymphoma: low grade, as per Dr. Palacios&#x27;s consult on 12-4-17  Family history of ovarian cancer (Aunt)      ROS:  All other systems negative     Constitutional: no fever, no chills  Eye: no eye pain, no redness, no vision changes  ENT:  no sore throat, no rhinorrhea  Cardiovascular:  no chest pain, no palpitation  Respiratory:  SOB, cough  GI:  no abd pain, no vomiting, no diarrhea  urinary: no dysuria  musculoskeletal:  no joint pain, no joint swelling  skin:  no rash  neurology:  no headache    Physical Exam:    General:  NAD, non toxic  HEENT: atraumatic, normocephalic, normal sclera and conjunctiva  Cardio:    regular S1,S2  Respiratory:   decreased bs RLL  abd:   soft, BS +, not tender, no hepatosplenomegaly  :      no bledsoe  Musculoskeletal : no joint swelling, no edema  Skin:    no rash  Neurologic:     no focal deficits  psych: normal affect      Drug Dosing Weight  Height (cm): 160.02 (01 Jul 2019 09:06)  Weight (kg): 47.6 (01 Jul 2019 09:06)  BMI (kg/m2): 18.6 (01 Jul 2019 09:06)  BSA (m2): 1.47 (01 Jul 2019 09:06)    Vital Signs Last 24 Hrs  T(F): 97.9 (07-03-19 @ 04:51), Max: 101.3 (06-30-19 @ 21:43)    Vital Signs Last 24 Hrs  HR: 84 (07-03-19 @ 07:01) (82 - 910)  BP: 156/98 (07-03-19 @ 04:51) (96/65 - 156/98)  RR: 22 (07-03-19 @ 04:51)  SpO2: 98% (07-03-19 @ 07:01) (96% - 99%)  Wt(kg): --                          10.3   1.80  )-----------( 139      ( 03 Jul 2019 04:37 )             32.2       07-03    133<L>  |  93<L>  |  7   ----------------------------<  109<H>  3.8   |  27  |  0.42<L>    Ca    9.1      03 Jul 2019 04:37  Phos  2.2     07-02  Mg     1.5     07-02            MICROBIOLOGY:  Vancomycin Level, Random: 7.6 ug/mL (07-02-19 @ 15:18)    Culture - Respiratory with Gram Stain (collected 01 Jul 2019 14:45)  Source: BRONCHIAL LAVAGE  Preliminary Report (03 Jul 2019 11:32):    Normal Respiratory Le Also Present    ***** CRITICAL RESULT *****    PERSON CALLED / READ-BACK: SOMMER HAGAN R.N./MICHELLE    DATE / TIME CALLED: 07/03/19 1128    CALLED BY: MARILUZ FERRELL    MOLD^MOLD - Further ID to Follow  Preliminary Report (02 Jul 2019 09:51):    NRF^Normal Respiratory Le    QUANTITY OF GROWTH: RARE    Culture - Respiratory with Gram Stain (collected 01 Jul 2019 14:29)  Source: BRONCHIAL LAVAGE  Preliminary Report (03 Jul 2019 11:52):    NO GROWTH - PRELIMINARY RESULTS    NRF^Normal Respiratory Le    QUANTITY OF GROWTH: RARE    Culture - Blood (collected 30 Jun 2019 22:54)  Source: BLOOD VENOUS  Preliminary Report (02 Jul 2019 22:55):    NO ORGANISMS ISOLATED    NO ORGANISMS ISOLATED AT 48 HRS.    Culture - Blood (collected 30 Jun 2019 22:54)  Source: BLOOD PERIPHERAL  Preliminary Report (02 Jul 2019 22:55):    NO ORGANISMS ISOLATED    NO ORGANISMS ISOLATED AT 48 HRS.    Culture - Urine (collected 26 Jun 2019 13:04)  Source: URINE MIDSTREAM  Final Report (27 Jun 2019 09:03):    NO GROWTH AT 24 HOURS    Culture - Blood (collected 26 Jun 2019 10:29)  Source: BLOOD VENOUS  Final Report (01 Jul 2019 10:31):    NO ORGANISMS ISOLATED    Culture - Blood (collected 26 Jun 2019 10:29)  Source: BLOOD PERIPHERAL  Final Report (01 Jul 2019 10:31):    NO ORGANISMS ISOLATED      RADIOLOGY:    ct chest- 1.  The bilateral nodules have increased in size in number. In   particular, there are multiple masses in the bilateral lobes, with   extension into the right lower lobe bronchus.  2.  Complete occlusion of the right lower lobe bronchus and the segmental   bronchi of the right lower lobe with a central opacity. It is uncertain   if this represents a combination of atelectasis and malignancy or   alternatively atelectasis and pneumonia given the reported history.  3.  The chest lymph nodes are malignant. The prevascular lymph node, in   particular, has increased in size.

## 2019-07-03 NOTE — PROGRESS NOTE ADULT - SUBJECTIVE AND OBJECTIVE BOX
CHIEF COMPLAINT: Dyspnea    Interval Events: Pt seen and examined at bedside. This morning, pt had episode of coughing with wheezing not responsive to Xopenex.     REVIEW OF SYSTEMS:  Constitutional: No fevers or chills. No weight loss. +fatigue, no SOB at rest.   Eyes: No itching or discharge from the eyes  ENT: No ear pain. No ear discharge. No nasal congestion. No post nasal drip. No epistaxis. No throat pain. No sore throat. No difficulty swallowing.   CV: No chest pain. No palpitations. No lightheadedness or dizziness.   Resp: No dyspnea at rest. +yellow sputum production, +dyspnea on exertion. Patient denies chest pain with respiration. GI: No nausea. No vomiting. No diarrhea.  MSK: No joint pain or pain in any extremities  Integumentary: No pedal edema.  Neurological: No gross motor weakness. No sensory changes.  [X ] All other systems negative    OBJECTIVE:  ICU Vital Signs Last 24 Hrs  T(C): 36.6 (03 Jul 2019 04:51), Max: 37.2 (02 Jul 2019 23:03)  T(F): 97.9 (03 Jul 2019 04:51), Max: 98.9 (02 Jul 2019 23:03)  HR: 84 (03 Jul 2019 07:01) (82 - 910)  BP: 156/98 (03 Jul 2019 04:51) (96/65 - 156/98)  BP(mean): --  ABP: --  ABP(mean): --  RR: 22 (03 Jul 2019 04:51) (18 - 22)  SpO2: 98% (03 Jul 2019 07:01) (96% - 99%)        CAPILLARY BLOOD GLUCOSE      PHYSICAL EXAM:  General: Awake, alert, oriented X 3, resting comfortably in bed.   HEENT: Atraumatic, normocephalic.   Lymph Nodes: No palpable lymphadenopathy  Neck: No JVD. No carotid bruit.   Respiratory: Normal chest expansion                         +diffuse rhonchi KASEY. (+) wheezing diffusely.   Cardiovascular: S1 S2 normal. No murmurs, rubs or gallops.   Abdomen: Soft, non-tender, non-distended. No organomegaly.  Extremities: Warm to touch. Peripheral pulse palpable. No pedal edema.   Skin: +KASEY ecchymoses UE, patient states from IV access attempts, improving  Neurological: Motor and sensory examination equal and normal in all four extremities.  Psychiatry: Appropriate mood and affect.    HOSPITAL MEDICATIONS:  MEDICATIONS  (STANDING):  allopurinol 300 milliGRAM(s) Oral daily  apixaban 2.5 milliGRAM(s) Oral every 12 hours  benzocaine 15 mG/menthol 3.6 mG (Sugar-Free) Lozenge 1 Lozenge Oral every 4 hours  docusate sodium 100 milliGRAM(s) Oral three times a day  lactated ringers. 1000 milliLiter(s) (30 mL/Hr) IV Continuous <Continuous>  levalbuterol Inhalation 0.63 milliGRAM(s) Inhalation every 4 hours  metoprolol succinate ER 50 milliGRAM(s) Oral daily  pantoprazole    Tablet 40 milliGRAM(s) Oral before breakfast  piperacillin/tazobactam IVPB.. 3.375 Gram(s) IV Intermittent every 8 hours  predniSONE   Tablet 20 milliGRAM(s) Oral daily  senna 2 Tablet(s) Oral at bedtime  sodium chloride 2 Gram(s) Oral two times a day  trimethoprim  160 mG/sulfamethoxazole 800 mG 1 Tablet(s) Oral every 48 hours  valACYclovir 500 milliGRAM(s) Oral <User Schedule>  vancomycin  IVPB 1000 milliGRAM(s) IV Intermittent every 12 hours    MEDICATIONS  (PRN):  benzonatate 100 milliGRAM(s) Oral three times a day PRN Cough  bisacodyl 5 milliGRAM(s) Oral every 12 hours PRN Constipation  guaiFENesin    Syrup 200 milliGRAM(s) Oral every 6 hours PRN Cough  polyethylene glycol 3350 17 Gram(s) Oral daily PRN Constipation  sodium chloride 0.65% Nasal 1 Spray(s) Both Nostrils daily PRN Nasal Congestion      LABS:                        10.3   1.80  )-----------( 139      ( 03 Jul 2019 04:37 )             32.2     Hgb Trend: 10.3<--, 10.0<--, 9.7<--, 9.5<--, 10.2<--  07-03    133<L>  |  93<L>  |  7   ----------------------------<  109<H>  3.8   |  27  |  0.42<L>    Ca    9.1      03 Jul 2019 04:37  Phos  2.2     07-02  Mg     1.5     07-02      Creatinine Trend: 0.42<--, 0.39<--, 0.46<--, 0.43<--, 0.42<--, 0.45<--            MICROBIOLOGY:     Culture - Respiratory with Gram Stain (collected 01 Jul 2019 14:45)  Source: BRONCHIAL LAVAGE  Preliminary Report (02 Jul 2019 09:51):    NRF^Normal Respiratory Le    QUANTITY OF GROWTH: RARE    Culture - Respiratory with Gram Stain (collected 01 Jul 2019 14:29)  Source: BRONCHIAL LAVAGE  Preliminary Report (02 Jul 2019 10:00):    NO GROWTH - PRELIMINARY RESULTS    Culture - Blood (collected 30 Jun 2019 22:54)  Source: BLOOD VENOUS  Preliminary Report (02 Jul 2019 22:55):    NO ORGANISMS ISOLATED    NO ORGANISMS ISOLATED AT 48 HRS.    Culture - Blood (collected 30 Jun 2019 22:54)  Source: BLOOD PERIPHERAL  Preliminary Report (02 Jul 2019 22:55):    NO ORGANISMS ISOLATED    NO ORGANISMS ISOLATED AT 48 HRS.        RADIOLOGY:  [ x] Reviewed and interpreted by me

## 2019-07-03 NOTE — CONSULT NOTE ADULT - SUBJECTIVE AND OBJECTIVE BOX
HPI:  79F with DLBC lymphoma, Afib on eliquis presenting with three days of worsening SOB and productive cough. Per documentation, symptoms started the morning prior to admission. Associated symptoms are increased phlegm and difficulty breathing. Patient was found to have entero/rhinovirus with possible superimposed PNA for which she currently is being empirically treated for.     Cancer Hx:  Patient had generally good health until January 2017 when she developed increased cough, dyspnea and was admitted to [place]. Right paratracheal and perihilar densities were seen in a CXR on 2/24/17. CT of chest with contrast on 2/28/2017 showed as compared with prior CT scans of chest in 6/2015 and 8/2016 a nodular soft tissue mass measuring up to 6.4 x 5.3 x 6.4 cm extending from right paratracheal region inferiorly to aortopulmonary and pretracheal regions, encasing RUL pulmonary artery and with mass effect on SVC and mild tracheal compression at the jon. Stable right thyroid nodules and subcm lung granulomata were unchanged. Flexible bronchoscopy was negative. Steroids were started on 3/30/17 because of worsening pulmonary status. 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 Pathology was read at [facility]. The lymphoma was (+) for CD10, CD20, BCL-2 and (-) for CD5 and BCL-6. Ki-67 was described as "low." PET/CT 4/25/17 showed generally marked regression in her dominant paratracheal, perihilar disease. While a non-FDG+ 1.5 x 1.1 m RUL nodule was larger than a ground glass nodule. CT of chest from 3/390/17, right hilar adenopathy was 2.9 x 1.8 cm with an SUV of 10.9 compared with 6.1 x 4.7 cm in prior CT; left paratracheal node was 1.5 x 1 cm with SUV 2.9 vs. 2 cm in earlier CT. An FDG (-) subcarinal node was seen. She also has a calcified, weakly FDG+ right thyroid nodule. Patient underwent biopsy of the esophageal lesion in 12/2017 that was consistent with high grade B cell lymphoma. Patient was started on Rituximab and revlimid. She was then noted to have pleural effusion and underwent thoracentesis which cytology was positive for large b cell lymphoma.         Allergies    No Known Allergies    Intolerances        MEDICATIONS  (STANDING):  allopurinol 300 milliGRAM(s) Oral daily  apixaban 2.5 milliGRAM(s) Oral every 12 hours  benzocaine 15 mG/menthol 3.6 mG (Sugar-Free) Lozenge 1 Lozenge Oral every 4 hours  docusate sodium 100 milliGRAM(s) Oral three times a day  lactated ringers. 1000 milliLiter(s) (30 mL/Hr) IV Continuous <Continuous>  levalbuterol Inhalation 0.63 milliGRAM(s) Inhalation every 4 hours  metoprolol succinate ER 50 milliGRAM(s) Oral daily  pantoprazole    Tablet 40 milliGRAM(s) Oral before breakfast  piperacillin/tazobactam IVPB.. 3.375 Gram(s) IV Intermittent every 8 hours  predniSONE   Tablet 20 milliGRAM(s) Oral daily  senna 2 Tablet(s) Oral at bedtime  sodium chloride 2 Gram(s) Oral two times a day  sodium chloride 3%  Inhalation 4 milliLiter(s) Inhalation every 4 hours  trimethoprim  160 mG/sulfamethoxazole 800 mG 1 Tablet(s) Oral every 48 hours  valACYclovir 500 milliGRAM(s) Oral <User Schedule>  vancomycin  IVPB 1000 milliGRAM(s) IV Intermittent every 12 hours  voriconazole 300 milliGRAM(s) Oral every 12 hours    MEDICATIONS  (PRN):  benzonatate 100 milliGRAM(s) Oral three times a day PRN Cough  bisacodyl 5 milliGRAM(s) Oral every 12 hours PRN Constipation  guaiFENesin    Syrup 200 milliGRAM(s) Oral every 6 hours PRN Cough  polyethylene glycol 3350 17 Gram(s) Oral daily PRN Constipation  sodium chloride 0.65% Nasal 1 Spray(s) Both Nostrils daily PRN Nasal Congestion      PAST MEDICAL & SURGICAL HISTORY:  Alopecia: wears a wig -- hair regrowing back  Murmur, cardiac  Varicose veins  GERD (gastroesophageal reflux disease)  Tracheal compression  Thrombocytopenia  Lymphadenopathy: as per Dr. 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 Palacios&#x27;s consult on 12-4-17  Family history of ovarian cancer (Aunt)      SOCIAL HISTORY: No EtOH, no tobacco    REVIEW OF SYSTEMS: per HPI    T(F): 97.9 (07-03-19 @ 04:51), Max: 98.9 (07-02-19 @ 23:03)  HR: 82 (07-03-19 @ 12:00)  BP: 156/98 (07-03-19 @ 04:51)  RR: 22 (07-03-19 @ 04:51)  SpO2: 98% (07-03-19 @ 07:01)  Wt(kg): --    GENERAL:   HEAD:  Atraumatic, Normocephalic  EYES: EOMI, conjunctiva and sclera clear  NECK: Supple  CHEST/LUNG: Clear to auscultation bilaterally; No wheeze  HEART: Regular rate and rhythm; No murmurs, rubs, or gallops  ABDOMEN: Soft, Nontender, Nondistended; Bowel sounds present  EXTREMITIES:  No clubbing, cyanosis, or edema  NEUROLOGY: non-focal  SKIN: No rashes or lesions                          10.3   1.80  )-----------( 139      ( 03 Jul 2019 04:37 )             32.2       07-03    133<L>  |  93<L>  |  7   ----------------------------<  109<H>  3.8   |  27  |  0.42<L>    Ca    9.1      03 Jul 2019 04:37  Phos  2.2     07-02  Mg     1.5     07-02 HPI:  79F with DLBC lymphoma, Afib on eliquis presenting with three days of worsening SOB and productive cough. Per documentation, symptoms started the morning prior to admission. Associated symptoms are increased phlegm and difficulty breathing. Patient was found to have entero/rhinovirus with possible superimposed PNA for which she currently is being empirically treated for.     Cancer Hx:  Patient had generally good health until January 2017 when she developed increased cough, dyspnea and was admitted to [place]. Right paratracheal and perihilar densities were seen in a CXR on 2/24/17. CT of chest with contrast on 2/28/2017 showed as compared with prior CT scans of chest in 6/2015 and 8/2016 a nodular soft tissue mass measuring up to 6.4 x 5.3 x 6.4 cm extending from right paratracheal region inferiorly to aortopulmonary and pretracheal regions, encasing RUL pulmonary artery and with mass effect on SVC and mild tracheal compression at the jon. Stable right thyroid nodules and subcm lung granulomata were unchanged. Flexible bronchoscopy was negative. Steroids were started on 3/30/17 because of worsening pulmonary status. 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 Pathology was read at [facility]. The lymphoma was (+) for CD10, CD20, BCL-2 and (-) for CD5 and BCL-6. Ki-67 was described as "low." PET/CT 4/25/17 showed generally marked regression in her dominant paratracheal, perihilar disease. While a non-FDG+ 1.5 x 1.1 m RUL nodule was larger than a ground glass nodule. CT of chest from 3/390/17, right hilar adenopathy was 2.9 x 1.8 cm with an SUV of 10.9 compared with 6.1 x 4.7 cm in prior CT; left paratracheal node was 1.5 x 1 cm with SUV 2.9 vs. 2 cm in earlier CT. An FDG (-) subcarinal node was seen. She also has a calcified, weakly FDG+ right thyroid nodule. Patient underwent biopsy of the esophageal lesion in 12/2017 that was consistent with high grade B cell lymphoma. Patient was started on Rituximab and revlimid. She was then noted to have pleural effusion and underwent thoracentesis which cytology was positive for large b cell lymphoma.     Patient today reports shortness of breath that has improved since admission. Denies fever, chills.     Allergies    No Known Allergies    Intolerances    MEDICATIONS  (STANDING):  allopurinol 300 milliGRAM(s) Oral daily  apixaban 2.5 milliGRAM(s) Oral every 12 hours  benzocaine 15 mG/menthol 3.6 mG (Sugar-Free) Lozenge 1 Lozenge Oral every 4 hours  docusate sodium 100 milliGRAM(s) Oral three times a day  lactated ringers. 1000 milliLiter(s) (30 mL/Hr) IV Continuous <Continuous>  levalbuterol Inhalation 0.63 milliGRAM(s) Inhalation every 4 hours  metoprolol succinate ER 50 milliGRAM(s) Oral daily  pantoprazole    Tablet 40 milliGRAM(s) Oral before breakfast  piperacillin/tazobactam IVPB.. 3.375 Gram(s) IV Intermittent every 8 hours  predniSONE   Tablet 20 milliGRAM(s) Oral daily  senna 2 Tablet(s) Oral at bedtime  sodium chloride 2 Gram(s) Oral two times a day  sodium chloride 3%  Inhalation 4 milliLiter(s) Inhalation every 4 hours  trimethoprim  160 mG/sulfamethoxazole 800 mG 1 Tablet(s) Oral every 48 hours  valACYclovir 500 milliGRAM(s) Oral <User Schedule>  vancomycin  IVPB 1000 milliGRAM(s) IV Intermittent every 12 hours  voriconazole 300 milliGRAM(s) Oral every 12 hours    MEDICATIONS  (PRN):  benzonatate 100 milliGRAM(s) Oral three times a day PRN Cough  bisacodyl 5 milliGRAM(s) Oral every 12 hours PRN Constipation  guaiFENesin    Syrup 200 milliGRAM(s) Oral every 6 hours PRN Cough  polyethylene glycol 3350 17 Gram(s) Oral daily PRN Constipation  sodium chloride 0.65% Nasal 1 Spray(s) Both Nostrils daily PRN Nasal Congestion      PAST MEDICAL & SURGICAL HISTORY:  Alopecia: wears a wig -- hair regrowing back  Murmur, cardiac  Varicose veins  GERD (gastroesophageal reflux disease)  Tracheal compression  Thrombocytopenia  Lymphadenopathy: as per Dr. 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 Palacios&#x27;s consult on 12-4-17  Family history of ovarian cancer (Aunt)      SOCIAL HISTORY: No EtOH, no tobacco    REVIEW OF SYSTEMS: per HPI    T(F): 97.9 (07-03-19 @ 04:51), Max: 98.9 (07-02-19 @ 23:03)  HR: 82 (07-03-19 @ 12:00)  BP: 156/98 (07-03-19 @ 04:51)  RR: 22 (07-03-19 @ 04:51)  SpO2: 98% (07-03-19 @ 07:01)  Wt(kg): --    GENERAL: mild respiratory distress   HEAD:  Atraumatic, Normocephalic  EYES: EOMI, conjunctiva and sclera clear  NECK: Supple  CHEST/LUNG: course breath sounds   HEART: Regular rate and rhythm  ABDOMEN: Soft, Nontender, Nondistended; Bowel sounds present  EXTREMITIES:  No clubbing, cyanosis, or edema  NEUROLOGY: non-focal  SKIN: No rashes or lesions                          10.3   1.80  )-----------( 139      ( 03 Jul 2019 04:37 )             32.2       07-03    133<L>  |  93<L>  |  7   ----------------------------<  109<H>  3.8   |  27  |  0.42<L>    Ca    9.1      03 Jul 2019 04:37  Phos  2.2     07-02  Mg     1.5     07-02

## 2019-07-03 NOTE — PROGRESS NOTE ADULT - ASSESSMENT
79F PMHx DLBC lymphoma and AFib (on Eliquis) presenting with productive cough x1 month, as well as SOB x3 days now s/p bronchoscopy for concer of RLL bronchus obstruction s/p BAL with bx 7/1 by CTSx found to have mucous impaction. Initial cx (+) fungal hyphae.    INCOMPLETE 79F PMHx DLBC lymphoma and AFib (on Eliquis) presenting with productive cough x1 month, as well as SOB x3 days now s/p bronchoscopy for concer of RLL bronchus obstruction s/p BAL with bx 7/1 by CTSx found to have mucous impaction. Initial cx (+) fungal hyphae in necrotic tissue on biopsy.   -Would treat with antifungal agent given immunocompromised state with (+) tissue biopsy. I spoke to microbiology lab, fungal culture added to BAL respiratory sample as well   -Please start pt on standing airway clearance therapy. Pt should use Xopenex q4 hours, following by nebulized 3% hypertonic saline, and then Aerobika device.   -Continue with broad-spectrum Abx  -Would consult ID for input   -Pulmonary will continue to follow

## 2019-07-03 NOTE — PROGRESS NOTE ADULT - PROBLEM SELECTOR PLAN 2
Afib w/ RVR likely d/t infection and hypoxia, now better rate controlled   - Monitor on tele   - C/w home metoprolol 50mg ER  - cont apixaban

## 2019-07-03 NOTE — CONSULT NOTE ADULT - ASSESSMENT
79F hx of DLBC lymphoma, Afib on eliquis presenting with three days of worsening SOB and productive cough, s/p bronch w path and BAL cx concerning for fungal PNA.    - start voriconazole (order placed)  - check fungitell, galactomannan  - d/c vanco  - c/w zosyn  - could cytopenias be 2/2 bactrim? --> d/c bactrim and start Mepron 1500 mg po daily for pjp ppx  - monitor cbc w diff daily  - monitor all cultures      d/w Dr. Noel 79F hx of DLBC lymphoma, Afib on eliquis presenting with three days of worsening SOB and productive cough, s/p bronch w path and BAL cx concerning for fungal PNA.    - start voriconazole (order placed)  - check fungitell, galactomannan  - d/c vanco  - c/w zosyn  - could cytopenias be 2/2 bactrim? --> d/c bactrim and start Mepron 1500 mg po daily for pjp ppx  - monitor cbc w diff daily  - monitor all cultures  - supportive care for entero/rhinovirus      d/w Dr. Noel

## 2019-07-03 NOTE — PROGRESS NOTE ADULT - PROBLEM SELECTOR PLAN 6
Follows with Dr. Nguyen at [facility]. as per family, [facility] is not offering any disease modifying treatment. Pt is looking into enrollment in a clinical trial   - C/w ppx Bactrim, valacyclovir and allopurinol  - GOC discussed with pt and daughter, want full code for now

## 2019-07-03 NOTE — PROGRESS NOTE ADULT - PROBLEM SELECTOR PLAN 1
RLL bronchus obstruction with concern for postobstructive PNA s/p BAL 7/1 with thoracic sx  -remains hypoxic, cont O2 to maintain SpO2>90, wean as tolerated   -BAL cx NTD, path with Fungal hyphae in a background of necrotic tissue  - F/U fungal cx that was added on   -cont broad spectrum abx with IV vanco, zosyn, will consider adding IV voriconazole, awaiting ID's final recommendations  - standing airway clearance therapy, Xopenex q4 hours, following by nebulized 3% hypertonic saline, and then Aerobika device as per Pulm recommendations  - supportive care for rhino virus- hycodan prn for cough  - Pulmonary and ID consulted, recommendations appreciated

## 2019-07-03 NOTE — CONSULT NOTE ADULT - ASSESSMENT
79F with DLBC lymphoma, Afib on eliquis presenting with three days of worsening SOB and productive cough, found to have entero/rhinovirus and fungal infection.    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 Then esophageal biopsy in 12/2017 consistent with high grade lymphoma  - Patient was started on revlimid and rituximab  - Most recently underwent thoracentesis which was positive for lymphoma  - CT chest from 6/27: The bilateral nodules have increased in size in number. In particular, there are multiple masses in the bilateral lobes, with extension into the right lower lobe bronchus. The chest lymph nodes are malignant. The prevascular lymph node, in particular, has increased in size.  - Palliative care consult     # Pancytopenia  - Concerning for bone marrow involvement given worsening disease noted on imaging  - Continue to check CBC diff daily  - Will review smear     # SOB  - CT chest: complete occlusion of the right lower lobe bronchus and the segmental bronchi of the right lower lobe with a central opacity  - Bronch with biopsy done, showed fungal infection  - Appreciate ID recs    Coral Olazagasti  Hematology Fellow  [phone] 79F with DLBC lymphoma, Afib on eliquis presenting with three days of worsening SOB and productive cough, found to have entero/rhinovirus and fungal infection.    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 Then esophageal biopsy in 12/2017 consistent with high grade lymphoma  - Patient was started on revlimid and rituximab  - Most recently underwent thoracentesis which was positive for lymphoma  - CT chest from 6/27: The bilateral nodules have increased in size in number. In particular, there are multiple masses in the bilateral lobes, with extension into the right lower lobe bronchus. The chest lymph nodes are malignant. The prevascular lymph node, in particular, has increased in size.  - Palliative care consult   - Outpatient follow up with Dr. Nguyen who will determine if a patient is a candidate for further treatment     # Pancytopenia  - Concerning for bone marrow involvement given worsening disease noted on imaging  - Continue to check CBC diff daily  - goal hgb > 7 and plt >10k if afebrile, >15k if febrile     # SOB  - CT chest: complete occlusion of the right lower lobe bronchus and the segmental bronchi of the right lower lobe with a central opacity  - Bronch with biopsy done, showed fungal infection  - Appreciate ID recs    Coral Olazagasti  Hematology Fellow  [phone]

## 2019-07-03 NOTE — PROGRESS NOTE ADULT - SUBJECTIVE AND OBJECTIVE BOX
Patient is a 79y old  Female who presents with a chief complaint of SOB (03 Jul 2019 08:27)    SUBJECTIVE / OVERNIGHT EVENTS:  Patient seen with her daughter at bedside complaining of SOB, no CP. Was coughing overnight, given Xopenex treatment with out improvement.    MEDICATIONS  (STANDING):  allopurinol 300 milliGRAM(s) Oral daily  apixaban 2.5 milliGRAM(s) Oral every 12 hours  benzocaine 15 mG/menthol 3.6 mG (Sugar-Free) Lozenge 1 Lozenge Oral every 4 hours  docusate sodium 100 milliGRAM(s) Oral three times a day  lactated ringers. 1000 milliLiter(s) (30 mL/Hr) IV Continuous <Continuous>  levalbuterol Inhalation 0.63 milliGRAM(s) Inhalation every 4 hours  metoprolol succinate ER 50 milliGRAM(s) Oral daily  pantoprazole    Tablet 40 milliGRAM(s) Oral before breakfast  piperacillin/tazobactam IVPB.. 3.375 Gram(s) IV Intermittent every 8 hours  predniSONE   Tablet 20 milliGRAM(s) Oral daily  senna 2 Tablet(s) Oral at bedtime  sodium chloride 2 Gram(s) Oral two times a day  trimethoprim  160 mG/sulfamethoxazole 800 mG 1 Tablet(s) Oral every 48 hours  valACYclovir 500 milliGRAM(s) Oral <User Schedule>  vancomycin  IVPB 1000 milliGRAM(s) IV Intermittent every 12 hours    MEDICATIONS  (PRN):  benzonatate 100 milliGRAM(s) Oral three times a day PRN Cough  bisacodyl 5 milliGRAM(s) Oral every 12 hours PRN Constipation  guaiFENesin    Syrup 200 milliGRAM(s) Oral every 6 hours PRN Cough  polyethylene glycol 3350 17 Gram(s) Oral daily PRN Constipation  sodium chloride 0.65% Nasal 1 Spray(s) Both Nostrils daily PRN Nasal Congestion      Vital Signs Last 24 Hrs  T(C): 36.6 (03 Jul 2019 04:51), Max: 37.2 (02 Jul 2019 23:03)  T(F): 97.9 (03 Jul 2019 04:51), Max: 98.9 (02 Jul 2019 23:03)  HR: 84 (03 Jul 2019 07:01) (82 - 910)  BP: 156/98 (03 Jul 2019 04:51) (96/65 - 156/98)  BP(mean): --  RR: 22 (03 Jul 2019 04:51) (18 - 22)  SpO2: 98% (03 Jul 2019 07:01) (96% - 99%)  CAPILLARY BLOOD GLUCOSE        I&O's Summary    PHYSICAL EXAM  GENERAL: elderly female lying in bed in NAD   MENTAL STATUS/PSYCH:  AAO x3   HEAD:  Atraumatic, Normocephalic  EYES: EOMI, PERRLA, conjunctiva and sclera clear  NECK: Supple, No elevated JVD  CHEST/LUNG:  course breath sound throughout with significant rhonchi on Rt side   HEART: Regular rate and rhythm; No murmurs, rubs, or gallops  ABDOMEN: Soft, Nontender, Nondistended; Bowel sounds present  EXTREMITIES:  2+ Peripheral Pulses, No clubbing, cyanosis, or edema  NEUROLOGY: CN II-XII grossly intact, moving all extremities  SKIN: No rashes or lesions      LABS:                        10.3   1.80  )-----------( 139      ( 03 Jul 2019 04:37 )             32.2     07-03    133<L>  |  93<L>  |  7   ----------------------------<  109<H>  3.8   |  27  |  0.42<L>    Ca    9.1      03 Jul 2019 04:37  Phos  2.2     07-02  Mg     1.5     07-02                Culture - Respiratory with Gram Stain (collected 01 Jul 2019 14:45)  Source: BRONCHIAL L[facility]AGE  Preliminary Report (02 Jul 2019 09:51):    NRF^Normal Respiratory Le    QUANTITY OF GROWTH: RARE    Culture - Respiratory with Gram Stain (collected 01 Jul 2019 14:29)  Source: BRONCHIAL LAVAGE  Preliminary Report (02 Jul 2019 10:00):    NO GROWTH - PRELIMINARY RESULTS    Culture - Blood (collected 30 Jun 2019 22:54)  Source: BLOOD VENOUS  Preliminary Report (02 Jul 2019 22:55):    NO ORGANISMS ISOLATED    NO ORGANISMS ISOLATED AT 48 HRS.    Culture - Blood (collected 30 Jun 2019 22:54)  Source: BLOOD PERIPHERAL  Preliminary Report (02 Jul 2019 22:55):    NO ORGANISMS ISOLATED    NO ORGANISMS ISOLATED AT 48 HRS.        RADIOLOGY & ADDITIONAL TESTS:    Imaging Personally Reviewed:  Consultant(s) Notes Reviewed:    Care Discussed with Consultants/Other Providers:

## 2019-07-03 NOTE — CHART NOTE - NSCHARTNOTEFT_GEN_A_CORE
Informed by nurse that patient was coughing a lot with wheezing auscultation. Patient examined at bedside. Lungs: diffuse wheezing throughout bilateral lung fields. Cardiovascular: Tachycardic, S1,S2 heard. No murmurs, rubs are gallops.  Patient received Xopenex treatment with out improvement, Hydrocodone/homatropine syrup ordered. Pro BNP ordered. Continuous pulse ox ordered. Will continue to monitor.

## 2019-07-04 LAB
ANION GAP SERPL CALC-SCNC: 15 MMO/L — HIGH (ref 7–14)
BACTERIA SPT RESP CULT: SIGNIFICANT CHANGE UP
BASOPHILS # BLD AUTO: 0.02 K/UL — SIGNIFICANT CHANGE UP (ref 0–0.2)
BASOPHILS NFR BLD AUTO: 1.4 % — SIGNIFICANT CHANGE UP (ref 0–2)
BUN SERPL-MCNC: 7 MG/DL — SIGNIFICANT CHANGE UP (ref 7–23)
CALCIUM SERPL-MCNC: 9.2 MG/DL — SIGNIFICANT CHANGE UP (ref 8.4–10.5)
CHLORIDE SERPL-SCNC: 95 MMOL/L — LOW (ref 98–107)
CO2 SERPL-SCNC: 27 MMOL/L — SIGNIFICANT CHANGE UP (ref 22–31)
CREAT SERPL-MCNC: 0.39 MG/DL — LOW (ref 0.5–1.3)
EOSINOPHIL # BLD AUTO: 0.02 K/UL — SIGNIFICANT CHANGE UP (ref 0–0.5)
EOSINOPHIL NFR BLD AUTO: 1.4 % — SIGNIFICANT CHANGE UP (ref 0–6)
GLUCOSE SERPL-MCNC: 123 MG/DL — HIGH (ref 70–99)
HCT VFR BLD CALC: 30.3 % — LOW (ref 34.5–45)
HGB BLD-MCNC: 9.9 G/DL — LOW (ref 11.5–15.5)
IMM GRANULOCYTES NFR BLD AUTO: 9.5 % — HIGH (ref 0–1.5)
LYMPHOCYTES # BLD AUTO: 0.52 K/UL — LOW (ref 1–3.3)
LYMPHOCYTES # BLD AUTO: 35.1 % — SIGNIFICANT CHANGE UP (ref 13–44)
MAGNESIUM SERPL-MCNC: 1.6 MG/DL — SIGNIFICANT CHANGE UP (ref 1.6–2.6)
MANUAL SMEAR VERIFICATION: SIGNIFICANT CHANGE UP
MCHC RBC-ENTMCNC: 31.5 PG — SIGNIFICANT CHANGE UP (ref 27–34)
MCHC RBC-ENTMCNC: 32.7 % — SIGNIFICANT CHANGE UP (ref 32–36)
MCV RBC AUTO: 96.5 FL — SIGNIFICANT CHANGE UP (ref 80–100)
MONOCYTES # BLD AUTO: 0.2 K/UL — SIGNIFICANT CHANGE UP (ref 0–0.9)
MONOCYTES NFR BLD AUTO: 13.5 % — SIGNIFICANT CHANGE UP (ref 2–14)
NEUTROPHILS # BLD AUTO: 0.58 K/UL — LOW (ref 1.8–7.4)
NEUTROPHILS NFR BLD AUTO: 39.1 % — LOW (ref 43–77)
NRBC # FLD: 0.03 K/UL — SIGNIFICANT CHANGE UP (ref 0–0)
NRBC FLD-RTO: 2 — SIGNIFICANT CHANGE UP
PLATELET # BLD AUTO: 123 K/UL — LOW (ref 150–400)
PMV BLD: 10.1 FL — SIGNIFICANT CHANGE UP (ref 7–13)
POTASSIUM SERPL-MCNC: 3.2 MMOL/L — LOW (ref 3.5–5.3)
POTASSIUM SERPL-SCNC: 3.2 MMOL/L — LOW (ref 3.5–5.3)
RBC # BLD: 3.14 M/UL — LOW (ref 3.8–5.2)
RBC # FLD: 18.8 % — HIGH (ref 10.3–14.5)
SODIUM SERPL-SCNC: 137 MMOL/L — SIGNIFICANT CHANGE UP (ref 135–145)
WBC # BLD: 1.48 K/UL — LOW (ref 3.8–10.5)
WBC # FLD AUTO: 1.48 K/UL — LOW (ref 3.8–10.5)

## 2019-07-04 PROCEDURE — 99232 SBSQ HOSP IP/OBS MODERATE 35: CPT | Mod: GC

## 2019-07-04 PROCEDURE — 99233 SBSQ HOSP IP/OBS HIGH 50: CPT

## 2019-07-04 RX ORDER — POTASSIUM CHLORIDE 20 MEQ
10 PACKET (EA) ORAL
Refills: 0 | Status: COMPLETED | OUTPATIENT
Start: 2019-07-04 | End: 2019-07-04

## 2019-07-04 RX ADMIN — SODIUM CHLORIDE 4 MILLILITER(S): 9 INJECTION INTRAMUSCULAR; INTRAVENOUS; SUBCUTANEOUS at 00:03

## 2019-07-04 RX ADMIN — SODIUM CHLORIDE 4 MILLILITER(S): 9 INJECTION INTRAMUSCULAR; INTRAVENOUS; SUBCUTANEOUS at 23:33

## 2019-07-04 RX ADMIN — Medication 200 MILLIGRAM(S): at 05:02

## 2019-07-04 RX ADMIN — Medication 100 MILLIEQUIVALENT(S): at 16:42

## 2019-07-04 RX ADMIN — BENZOCAINE AND MENTHOL 1 LOZENGE: 5; 1 LIQUID ORAL at 14:17

## 2019-07-04 RX ADMIN — ATOVAQUONE 1500 MILLIGRAM(S): 750 SUSPENSION ORAL at 12:40

## 2019-07-04 RX ADMIN — LEVALBUTEROL 0.63 MILLIGRAM(S): 1.25 SOLUTION, CONCENTRATE RESPIRATORY (INHALATION) at 23:27

## 2019-07-04 RX ADMIN — LEVALBUTEROL 0.63 MILLIGRAM(S): 1.25 SOLUTION, CONCENTRATE RESPIRATORY (INHALATION) at 07:36

## 2019-07-04 RX ADMIN — SODIUM CHLORIDE 4 MILLILITER(S): 9 INJECTION INTRAMUSCULAR; INTRAVENOUS; SUBCUTANEOUS at 04:41

## 2019-07-04 RX ADMIN — VORICONAZOLE 200 MILLIGRAM(S): 10 INJECTION, POWDER, LYOPHILIZED, FOR SOLUTION INTRAVENOUS at 17:52

## 2019-07-04 RX ADMIN — Medication 100 MILLIGRAM(S): at 05:02

## 2019-07-04 RX ADMIN — LEVALBUTEROL 0.63 MILLIGRAM(S): 1.25 SOLUTION, CONCENTRATE RESPIRATORY (INHALATION) at 11:10

## 2019-07-04 RX ADMIN — BENZOCAINE AND MENTHOL 1 LOZENGE: 5; 1 LIQUID ORAL at 21:52

## 2019-07-04 RX ADMIN — SODIUM CHLORIDE 2 GRAM(S): 9 INJECTION INTRAMUSCULAR; INTRAVENOUS; SUBCUTANEOUS at 17:53

## 2019-07-04 RX ADMIN — PANTOPRAZOLE SODIUM 40 MILLIGRAM(S): 20 TABLET, DELAYED RELEASE ORAL at 05:02

## 2019-07-04 RX ADMIN — APIXABAN 2.5 MILLIGRAM(S): 2.5 TABLET, FILM COATED ORAL at 05:02

## 2019-07-04 RX ADMIN — Medication 300 MILLIGRAM(S): at 12:40

## 2019-07-04 RX ADMIN — PIPERACILLIN AND TAZOBACTAM 25 GRAM(S): 4; .5 INJECTION, POWDER, LYOPHILIZED, FOR SOLUTION INTRAVENOUS at 01:35

## 2019-07-04 RX ADMIN — SODIUM CHLORIDE 4 MILLILITER(S): 9 INJECTION INTRAMUSCULAR; INTRAVENOUS; SUBCUTANEOUS at 19:28

## 2019-07-04 RX ADMIN — APIXABAN 2.5 MILLIGRAM(S): 2.5 TABLET, FILM COATED ORAL at 17:53

## 2019-07-04 RX ADMIN — SODIUM CHLORIDE 4 MILLILITER(S): 9 INJECTION INTRAMUSCULAR; INTRAVENOUS; SUBCUTANEOUS at 11:13

## 2019-07-04 RX ADMIN — LEVALBUTEROL 0.63 MILLIGRAM(S): 1.25 SOLUTION, CONCENTRATE RESPIRATORY (INHALATION) at 00:00

## 2019-07-04 RX ADMIN — BENZOCAINE AND MENTHOL 1 LOZENGE: 5; 1 LIQUID ORAL at 17:52

## 2019-07-04 RX ADMIN — PIPERACILLIN AND TAZOBACTAM 25 GRAM(S): 4; .5 INJECTION, POWDER, LYOPHILIZED, FOR SOLUTION INTRAVENOUS at 10:24

## 2019-07-04 RX ADMIN — BENZOCAINE AND MENTHOL 1 LOZENGE: 5; 1 LIQUID ORAL at 10:24

## 2019-07-04 RX ADMIN — LEVALBUTEROL 0.63 MILLIGRAM(S): 1.25 SOLUTION, CONCENTRATE RESPIRATORY (INHALATION) at 04:38

## 2019-07-04 RX ADMIN — LEVALBUTEROL 0.63 MILLIGRAM(S): 1.25 SOLUTION, CONCENTRATE RESPIRATORY (INHALATION) at 15:45

## 2019-07-04 RX ADMIN — Medication 200 MILLIGRAM(S): at 21:52

## 2019-07-04 RX ADMIN — VORICONAZOLE 300 MILLIGRAM(S): 10 INJECTION, POWDER, LYOPHILIZED, FOR SOLUTION INTRAVENOUS at 05:02

## 2019-07-04 RX ADMIN — Medication 20 MILLIGRAM(S): at 05:02

## 2019-07-04 RX ADMIN — LEVALBUTEROL 0.63 MILLIGRAM(S): 1.25 SOLUTION, CONCENTRATE RESPIRATORY (INHALATION) at 19:20

## 2019-07-04 RX ADMIN — PIPERACILLIN AND TAZOBACTAM 25 GRAM(S): 4; .5 INJECTION, POWDER, LYOPHILIZED, FOR SOLUTION INTRAVENOUS at 17:53

## 2019-07-04 RX ADMIN — SODIUM CHLORIDE 4 MILLILITER(S): 9 INJECTION INTRAMUSCULAR; INTRAVENOUS; SUBCUTANEOUS at 07:39

## 2019-07-04 RX ADMIN — SODIUM CHLORIDE 2 GRAM(S): 9 INJECTION INTRAMUSCULAR; INTRAVENOUS; SUBCUTANEOUS at 05:02

## 2019-07-04 RX ADMIN — SODIUM CHLORIDE 4 MILLILITER(S): 9 INJECTION INTRAMUSCULAR; INTRAVENOUS; SUBCUTANEOUS at 15:48

## 2019-07-04 RX ADMIN — Medication 100 MILLIEQUIVALENT(S): at 14:18

## 2019-07-04 RX ADMIN — Medication 50 MILLIGRAM(S): at 05:02

## 2019-07-04 RX ADMIN — SODIUM CHLORIDE 30 MILLILITER(S): 9 INJECTION, SOLUTION INTRAVENOUS at 16:43

## 2019-07-04 RX ADMIN — VALACYCLOVIR 500 MILLIGRAM(S): 500 TABLET, FILM COATED ORAL at 17:53

## 2019-07-04 NOTE — PHYSICAL THERAPY INITIAL EVALUATION ADULT - ADDITIONAL COMMENTS
Patient lives with her  and son in a private house, 4 steps to enter. Patient was previously independent in all ADLs and did not use an assistive device for ambulation.     Patient was left sitting in chair as found, all lines/tubes intact and call godinez within reach, MADELINE patricio

## 2019-07-04 NOTE — PROGRESS NOTE ADULT - PROBLEM SELECTOR PLAN 6
Follows with Dr. Nguyen at [facility]. as per family, [facility] is not offering any disease modifying treatment. Pt is looking into enrollment in a clinical trial   - C/w ppx mepron, valacyclovir and allopurinol  - GOC discussed with pt and daughter, want full code for now

## 2019-07-04 NOTE — PHYSICAL THERAPY INITIAL EVALUATION ADULT - PATIENT PROFILE REVIEW, REHAB EVAL
PT orders received: ambulate as tolerated. Consult with RN Sheridan SHELLEY, pt may participate in PT evaluation./yes

## 2019-07-04 NOTE — PROGRESS NOTE ADULT - SUBJECTIVE AND OBJECTIVE BOX
Interval History: No issues overnight. Still coughing and SOB but somewhat improved from yday.    ROS:    All other systems negative    Constitutional: no fever, no chills  Head: no trauma  Eyes: no vision changes, no eye pain  ENT:  no sore throat, no rhinorrhea  Cardiovascular:  no chest pain, no palpitation  Respiratory:  SOB, cough  GI:  no abd pain, no vomiting, no diarrhea  urinary: no dysuria,  musculoskeletal:  no joint pain, no joint swelling  skin:  no rash  neurology:  no headache         Allergies  No Known Allergies        ANTIMICROBIALS:  atovaquone Suspension 1500 daily  piperacillin/tazobactam IVPB.. 3.375 every 8 hours  valACYclovir 500 <User Schedule>  voriconazole 200 every 12 hours      OTHER MEDS:  allopurinol 300 milliGRAM(s) Oral daily  apixaban 2.5 milliGRAM(s) Oral every 12 hours  benzocaine 15 mG/menthol 3.6 mG (Sugar-Free) Lozenge 1 Lozenge Oral every 4 hours  benzonatate 100 milliGRAM(s) Oral three times a day PRN  bisacodyl 5 milliGRAM(s) Oral every 12 hours PRN  docusate sodium 100 milliGRAM(s) Oral three times a day  guaiFENesin    Syrup 200 milliGRAM(s) Oral every 6 hours PRN  lactated ringers. 1000 milliLiter(s) IV Continuous <Continuous>  levalbuterol Inhalation 0.63 milliGRAM(s) Inhalation every 4 hours  metoprolol succinate ER 50 milliGRAM(s) Oral daily  pantoprazole    Tablet 40 milliGRAM(s) Oral before breakfast  polyethylene glycol 3350 17 Gram(s) Oral daily PRN  predniSONE   Tablet 20 milliGRAM(s) Oral daily  sodium chloride 2 Gram(s) Oral two times a day  sodium chloride 0.65% Nasal 1 Spray(s) Both Nostrils daily PRN  sodium chloride 3%  Inhalation 4 milliLiter(s) Inhalation every 4 hours      Vital Signs Last 24 Hrs  T(C): 36.6 (04 Jul 2019 05:00), Max: 36.8 (03 Jul 2019 21:05)  T(F): 97.9 (04 Jul 2019 05:00), Max: 98.2 (03 Jul 2019 21:05)  HR: 101 (04 Jul 2019 07:39) (81 - 112)  BP: 145/89 (04 Jul 2019 05:00) (117/80 - 145/89)  BP(mean): --  RR: 18 (04 Jul 2019 05:00) (18 - 20)  SpO2: 98% (04 Jul 2019 05:00) (98% - 99%)    Physical Exam:  General:  NAD,  non toxic, A&O x 3  HEENT: atraumatic, normocephalic  Cardio:     regular S1, S2  Respiratory:   some rhonchi throughout, on nasal cannula  abd:     soft,   BS +,   no tenderness,    no organomegaly  :   no  bledsoe  Musculoskeletal:   no joint swelling,   no edema  Skin:    no rash  Neurologic:     no focal deficit  psych: normal affect                          9.9    1.48  )-----------( 123      ( 04 Jul 2019 06:45 )             30.3       07-04    137  |  95<L>  |  7   ----------------------------<  123<H>  3.2<L>   |  27  |  0.39<L>    Ca    9.2      04 Jul 2019 06:45  Mg     1.6     07-04            MICROBIOLOGY:    Culture - Respiratory with Gram Stain (collected 01 Jul 2019 14:45)  Source: BRONCHIAL LAVAGE  Preliminary Report (03 Jul 2019 11:32):    Normal Respiratory Le Also Present    ***** CRITICAL RESULT *****    PERSON CALLED / READ-BACK: SOMMER HAGAN R.N./MICHELLE    DATE / TIME CALLED: 07/03/19 1128    CALLED BY: MARILUZ FERRELL    MOLD^MOLD - Further ID to Follow  Preliminary Report (02 Jul 2019 09:51):    NRF^Normal Respiratory Le    QUANTITY OF GROWTH: RARE    Culture - Respiratory with Gram Stain (collected 01 Jul 2019 14:29)  Source: BRONCHIAL LAVAGE  Preliminary Report (03 Jul 2019 11:52):    NO GROWTH - PRELIMINARY RESULTS    NRF^Normal Respiratory Le    QUANTITY OF GROWTH: RARE    Culture - Blood (collected 30 Jun 2019 22:54)  Source: BLOOD VENOUS  Preliminary Report (03 Jul 2019 22:55):    NO ORGANISMS ISOLATED    NO ORGANISMS ISOLATED AT 72 HRS.    Culture - Blood (collected 30 Jun 2019 22:54)  Source: BLOOD PERIPHERAL  Preliminary Report (03 Jul 2019 22:55):    NO ORGANISMS ISOLATED    NO ORGANISMS ISOLATED AT 72 HRS.    Culture - Urine (collected 26 Jun 2019 13:04)  Source: URINE MIDSTREAM  Final Report (27 Jun 2019 09:03):    NO GROWTH AT 24 HOURS    Culture - Blood (collected 26 Jun 2019 10:29)  Source: BLOOD VENOUS  Final Report (01 Jul 2019 10:31):    NO ORGANISMS ISOLATED    Culture - Blood (collected 26 Jun 2019 10:29)  Source: BLOOD PERIPHERAL  Final Report (01 Jul 2019 10:31):    NO ORGANISMS ISOLATED      Bronch path- fungal hyphae in a background of necrotic tissue    RADIOLOGY:    ct chest- 1.  The bilateral nodules have increased in size in number. In   particular, there are multiple masses in the bilateral lobes, with   extension into the right lower lobe bronchus.  2.  Complete occlusion of the right lower lobe bronchus and the segmental   bronchi of the right lower lobe with a central opacity. It is uncertain   if this represents a combination of atelectasis and malignancy or   alternatively atelectasis and pneumonia given the reported history.  3.  The chest lymph nodes are malignant. The prevascular lymph node, in   particular, has increased in size. f/u fungal pneumonia    Interval History: No issues overnight. Still coughing and SOB but somewhat improved from yesterday.  Denies n/v/d.  No visual hallucinations.  rest of ROS:  All other systems negative    Allergies  No Known Allergies    ANTIMICROBIALS:    atovaquone Suspension 1500 daily  piperacillin/tazobactam IVPB.. 3.375 every 8 hours  valACYclovir 500 <User Schedule>  voriconazole 200 every 12 hours    MEDICATIONS  (STANDING):  allopurinol 300 daily  apixaban 2.5 every 12 hours  docusate sodium 100 three times a day  levalbuterol Inhalation 0.63 every 4 hours  metoprolol succinate ER 50 daily  pantoprazole    Tablet 40 before breakfast  predniSONE   Tablet 20 daily  sodium chloride 3%  Inhalation 4 every 4 hours    Vital Signs Last 24 Hrs  T(C): 36.6 (04 Jul 2019 05:00), Max: 36.8 (03 Jul 2019 21:05)  T(F): 97.9 (04 Jul 2019 05:00), Max: 98.2 (03 Jul 2019 21:05)  HR: 101 (04 Jul 2019 07:39) (81 - 112)  BP: 145/89 (04 Jul 2019 05:00) (117/80 - 145/89)  BP(mean): --  RR: 18 (04 Jul 2019 05:00) (18 - 20)  SpO2: 98% (04 Jul 2019 05:00) (98% - 99%)    Physical Exam: daughter at bedside  General:  NAD,  non toxic, A&O x 3  HEENT: atraumatic, normocephalic  Cardio:     regular S1, S2  Respiratory:   some rhonchi throughout, on nasal cannula  abd:     soft,   BS +,   no tenderness,    no organomegaly  :   no  bledsoe  Musculoskeletal:   no joint swelling,   no edema  Skin:    no rash  Neurologic:     no focal deficit  psych: normal affect  Vascular: no phlebitis                        9.9    1.48  )-----------( 123      ( 04 Jul 2019 06:45 ) -              30.3     137  |  95<L>  |  7   ----------------------------<  123<H>  3.2<L>   |  27  |  0.39<L>    Ca    9.2      04 Jul 2019 06:45  Mg     1.6     07-04    MICROBIOLOGY:  Culture - Respiratory with Gram Stain (collected 01 Jul 2019 14:45)  Source: BRONCHIAL LAVAGE  Preliminary Report (03 Jul 2019 11:32):    Normal Respiratory Le Also Present    ***** CRITICAL RESULT *****    PERSON CALLED / READ-BACK: SOMMER HAGAN R.N./MICHELLE    DATE / TIME CALLED: 07/03/19 1128    CALLED BY: MARILUZ FERRELL    MOLD^MOLD - Further ID to Follow  Preliminary Report (02 Jul 2019 09:51):    NRF^Normal Respiratory Le    QUANTITY OF GROWTH: RARE    Culture - Respiratory with Gram Stain (collected 01 Jul 2019 14:29)  Source: BRONCHIAL LAVAGE  Preliminary Report (03 Jul 2019 11:52):    NO GROWTH - PRELIMINARY RESULTS    NRF^Normal Respiratory Le    QUANTITY OF GROWTH: RARE    Culture - Blood (collected 30 Jun 2019 22:54)  Source: BLOOD VENOUS  Preliminary Report (03 Jul 2019 22:55):    NO ORGANISMS ISOLATED    NO ORGANISMS ISOLATED AT 72 HRS.    Culture - Blood (collected 30 Jun 2019 22:54)  Source: BLOOD PERIPHERAL  Preliminary Report (03 Jul 2019 22:55):    NO ORGANISMS ISOLATED    NO ORGANISMS ISOLATED AT 72 HRS.    Culture - Urine (collected 26 Jun 2019 13:04)  Source: URINE MIDSTREAM  Final Report (27 Jun 2019 09:03):    NO GROWTH AT 24 HOURS    Culture - Blood (collected 26 Jun 2019 10:29)  Source: BLOOD VENOUS  Final Report (01 Jul 2019 10:31):    NO ORGANISMS ISOLATED    Culture - Blood (collected 26 Jun 2019 10:29)  Source: BLOOD PERIPHERAL  Final Report (01 Jul 2019 10:31):    NO ORGANISMS ISOLATED    Bronch path- fungal hyphae in a background of necrotic tissue    RADIOLOGY:  ct chest- 1.  The bilateral nodules have increased in size in number. In particular, there are multiple masses in the bilateral lobes, with extension into the right lower lobe bronchus.  2.  Complete occlusion of the right lower lobe bronchus and the segmental bronchi of the right lower lobe with a central opacity. It is uncertain if this represents a combination of atelectasis and malignancy or alternatively atelectasis and pneumonia given the reported history.  3.  The chest lymph nodes are malignant. The prevascular lymph node, in particular, has increased in size.

## 2019-07-04 NOTE — PHYSICAL THERAPY INITIAL EVALUATION ADULT - PERTINENT HX OF CURRENT PROBLEM, REHAB EVAL
Patient is an 79 year old female admitted to [facility] on 6/26 with worsening SOB and productive cough. PMH: DLBC lymphoma, Afib on eliquis.

## 2019-07-04 NOTE — PHYSICAL THERAPY INITIAL EVALUATION ADULT - ASSISTIVE DEVICE FOR TRANSFER: GAIT, REHAB EVAL
rolling walker utilized for first 25 feet, then patient deferring. Ambulated remainder of evaluation without assistive device

## 2019-07-04 NOTE — PROGRESS NOTE ADULT - ASSESSMENT
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 related to disease vs Bactrim  Changed Bactrim to Mepron 1500 mg po daily  monitor cbc w diff    3) Continue zosyn while WBC low and until bronch cx final.    4) Entero/rhinovirus  - supportive care 79F with relapsed B cell lymphoma (dx 2017, now relaplse).  Treated with rituximab and revlimid in 3/2019.  Treated with Obintuzmab and Bendamustine iin 4/2019.  Presents with shortness of breath.  She had abnormal imaging with lung nodules.  On 7/1- she had a bronchoscopy - path and culture suggestive of fungal process.  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  Neutropenic    Suggest:  - c/w voriconazole pending ID  - f/u Galactomannen  - check beta-d-glucan  - can continue zosyn for now  - continue mepron 1500 mg po daily  - trend cbc w diff

## 2019-07-04 NOTE — PROGRESS NOTE ADULT - SUBJECTIVE AND OBJECTIVE BOX
Patient is a 79y old  Female who presents with a chief complaint of SOB (04 Jul 2019 08:56)      SUBJECTIVE / OVERNIGHT EVENTS:  Patient seen with daughter at bedside stating her dyspnea is slightly improved. No other complaints.     MEDICATIONS  (STANDING):  allopurinol 300 milliGRAM(s) Oral daily  apixaban 2.5 milliGRAM(s) Oral every 12 hours  atovaquone Suspension 1500 milliGRAM(s) Oral daily  benzocaine 15 mG/menthol 3.6 mG (Sugar-Free) Lozenge 1 Lozenge Oral every 4 hours  docusate sodium 100 milliGRAM(s) Oral three times a day  lactated ringers. 1000 milliLiter(s) (30 mL/Hr) IV Continuous <Continuous>  levalbuterol Inhalation 0.63 milliGRAM(s) Inhalation every 4 hours  metoprolol succinate ER 50 milliGRAM(s) Oral daily  pantoprazole    Tablet 40 milliGRAM(s) Oral before breakfast  piperacillin/tazobactam IVPB.. 3.375 Gram(s) IV Intermittent every 8 hours  potassium chloride  10 mEq/100 mL IVPB 10 milliEquivalent(s) IV Intermittent every 1 hour  predniSONE   Tablet 20 milliGRAM(s) Oral daily  sodium chloride 2 Gram(s) Oral two times a day  sodium chloride 3%  Inhalation 4 milliLiter(s) Inhalation every 4 hours  valACYclovir 500 milliGRAM(s) Oral <User Schedule>  voriconazole 200 milliGRAM(s) Oral every 12 hours    MEDICATIONS  (PRN):  benzonatate 100 milliGRAM(s) Oral three times a day PRN Cough  bisacodyl 5 milliGRAM(s) Oral every 12 hours PRN Constipation  guaiFENesin    Syrup 200 milliGRAM(s) Oral every 6 hours PRN Cough  polyethylene glycol 3350 17 Gram(s) Oral daily PRN Constipation  sodium chloride 0.65% Nasal 1 Spray(s) Both Nostrils daily PRN Nasal Congestion      Vital Signs Last 24 Hrs  T(C): 36.8 (04 Jul 2019 12:44), Max: 36.8 (03 Jul 2019 21:05)  T(F): 98.2 (04 Jul 2019 12:44), Max: 98.2 (03 Jul 2019 21:05)  HR: 92 (04 Jul 2019 12:44) (71 - 112)  BP: 112/77 (04 Jul 2019 12:44) (112/77 - 145/89)  BP(mean): --  RR: 18 (04 Jul 2019 12:44) (18 - 20)  SpO2: 92% (04 Jul 2019 12:44) (92% - 99%)  CAPILLARY BLOOD GLUCOSE        I&O's Summary    PHYSICAL EXAM  GENERAL: elderly female lying in bed in NAD   MENTAL STATUS/PSYCH:  AAO x3   HEAD:  Atraumatic, Normocephalic  EYES: EOMI, PERRLA, conjunctiva and sclera clear  NECK: Supple, No elevated JVD  CHEST/LUNG:  course breath sound throughout with significant rhonchi on Rt side   HEART: Regular rate and rhythm; No murmurs, rubs, or gallops  ABDOMEN: Soft, Nontender, Nondistended; Bowel sounds present  EXTREMITIES:  2+ Peripheral Pulses, No clubbing, cyanosis, or edema  NEUROLOGY: CN II-XII grossly intact, moving all extremities  SKIN: No rashes or lesions    LABS:                        9.9    1.48  )-----------( 123      ( 04 Jul 2019 06:45 )             30.3     07-04    137  |  95<L>  |  7   ----------------------------<  123<H>  3.2<L>   |  27  |  0.39<L>    Ca    9.2      04 Jul 2019 06:45  Mg     1.6     07-04                Culture - Respiratory with Gram Stain (collected 01 Jul 2019 14:45)  Source: BRONCHIAL LAVAGE  Preliminary Report (03 Jul 2019 11:32):    Normal Respiratory Le Also Present    ***** CRITICAL RESULT *****    PERSON CALLED / READ-BACK: SOMMER HAGAN R.N./MICHELLE    DATE / TIME CALLED: 07/03/19 1128    CALLED BY: MARILUZ FERRELL    MOLD^MOLD - Further ID to Follow  Preliminary Report (02 Jul 2019 09:51):    NRF^Normal Respiratory Le    QUANTITY OF GROWTH: RARE    Culture - Respiratory with Gram Stain (collected 01 Jul 2019 14:29)  Source: BRONCHIAL LAVAGE  Final Report (04 Jul 2019 11:54):    NO GROWTH - PRELIMINARY RESULTS    NRF^Normal Respiratory Le    QUANTITY OF GROWTH: RARE        RADIOLOGY & ADDITIONAL TESTS:    Imaging Personally Reviewed:  Consultant(s) Notes Reviewed:    Care Discussed with Consultants/Other Providers:

## 2019-07-04 NOTE — PHYSICAL THERAPY INITIAL EVALUATION ADULT - ASR EQUIP NEEDS DISCH PT EVAL
pt deferring rolling walker/cane at this time. Demonstrated safe ambulation without assistive device

## 2019-07-05 LAB
ANION GAP SERPL CALC-SCNC: 14 MMO/L — SIGNIFICANT CHANGE UP (ref 7–14)
ANISOCYTOSIS BLD QL: SLIGHT — SIGNIFICANT CHANGE UP
BACTERIA BLD CULT: SIGNIFICANT CHANGE UP
BACTERIA BLD CULT: SIGNIFICANT CHANGE UP
BASOPHILS # BLD AUTO: 0.03 K/UL — SIGNIFICANT CHANGE UP (ref 0–0.2)
BASOPHILS NFR BLD AUTO: 2 % — SIGNIFICANT CHANGE UP (ref 0–2)
BASOPHILS NFR SPEC: 0 % — SIGNIFICANT CHANGE UP (ref 0–2)
BUN SERPL-MCNC: 8 MG/DL — SIGNIFICANT CHANGE UP (ref 7–23)
CALCIUM SERPL-MCNC: 8.5 MG/DL — SIGNIFICANT CHANGE UP (ref 8.4–10.5)
CHLORIDE SERPL-SCNC: 99 MMOL/L — SIGNIFICANT CHANGE UP (ref 98–107)
CO2 SERPL-SCNC: 24 MMOL/L — SIGNIFICANT CHANGE UP (ref 22–31)
CREAT SERPL-MCNC: 0.35 MG/DL — LOW (ref 0.5–1.3)
EOSINOPHIL # BLD AUTO: 0.02 K/UL — SIGNIFICANT CHANGE UP (ref 0–0.5)
EOSINOPHIL NFR BLD AUTO: 1.3 % — SIGNIFICANT CHANGE UP (ref 0–6)
EOSINOPHIL NFR FLD: 0 % — SIGNIFICANT CHANGE UP (ref 0–6)
GLUCOSE SERPL-MCNC: 84 MG/DL — SIGNIFICANT CHANGE UP (ref 70–99)
HCT VFR BLD CALC: 29.7 % — LOW (ref 34.5–45)
HGB BLD-MCNC: 9.4 G/DL — LOW (ref 11.5–15.5)
IMM GRANULOCYTES NFR BLD AUTO: 15.8 % — HIGH (ref 0–1.5)
LYMPHOCYTES # BLD AUTO: 0.45 K/UL — LOW (ref 1–3.3)
LYMPHOCYTES # BLD AUTO: 29.6 % — SIGNIFICANT CHANGE UP (ref 13–44)
LYMPHOCYTES NFR SPEC AUTO: 28 % — SIGNIFICANT CHANGE UP (ref 13–44)
MAGNESIUM SERPL-MCNC: 1.2 MG/DL — LOW (ref 1.6–2.6)
MANUAL SMEAR VERIFICATION: SIGNIFICANT CHANGE UP
MCHC RBC-ENTMCNC: 30.9 PG — SIGNIFICANT CHANGE UP (ref 27–34)
MCHC RBC-ENTMCNC: 31.6 % — LOW (ref 32–36)
MCV RBC AUTO: 97.7 FL — SIGNIFICANT CHANGE UP (ref 80–100)
MONOCYTES # BLD AUTO: 0.22 K/UL — SIGNIFICANT CHANGE UP (ref 0–0.9)
MONOCYTES NFR BLD AUTO: 14.5 % — HIGH (ref 2–14)
MONOCYTES NFR BLD: 12 % — HIGH (ref 2–9)
NEUTROPHIL AB SER-ACNC: 58 % — SIGNIFICANT CHANGE UP (ref 43–77)
NEUTROPHILS # BLD AUTO: 0.56 K/UL — LOW (ref 1.8–7.4)
NEUTROPHILS NFR BLD AUTO: 36.8 % — LOW (ref 43–77)
NEUTS BAND # BLD: 2 % — SIGNIFICANT CHANGE UP (ref 0–6)
NRBC # BLD: 0 /100WBC — SIGNIFICANT CHANGE UP
NRBC # FLD: 0 K/UL — SIGNIFICANT CHANGE UP (ref 0–0)
PHOSPHATE SERPL-MCNC: 2.2 MG/DL — LOW (ref 2.5–4.5)
PLATELET # BLD AUTO: 126 K/UL — LOW (ref 150–400)
PLATELET COUNT - ESTIMATE: SIGNIFICANT CHANGE UP
PMV BLD: 10.8 FL — SIGNIFICANT CHANGE UP (ref 7–13)
POTASSIUM SERPL-MCNC: 3.5 MMOL/L — SIGNIFICANT CHANGE UP (ref 3.5–5.3)
POTASSIUM SERPL-SCNC: 3.5 MMOL/L — SIGNIFICANT CHANGE UP (ref 3.5–5.3)
RBC # BLD: 3.04 M/UL — LOW (ref 3.8–5.2)
RBC # FLD: 18.6 % — HIGH (ref 10.3–14.5)
SODIUM SERPL-SCNC: 137 MMOL/L — SIGNIFICANT CHANGE UP (ref 135–145)
WBC # BLD: 1.52 K/UL — LOW (ref 3.8–10.5)
WBC # FLD AUTO: 1.52 K/UL — LOW (ref 3.8–10.5)

## 2019-07-05 PROCEDURE — 99232 SBSQ HOSP IP/OBS MODERATE 35: CPT

## 2019-07-05 PROCEDURE — 99233 SBSQ HOSP IP/OBS HIGH 50: CPT

## 2019-07-05 RX ORDER — MAGNESIUM SULFATE 500 MG/ML
2 VIAL (ML) INJECTION ONCE
Refills: 0 | Status: COMPLETED | OUTPATIENT
Start: 2019-07-05 | End: 2019-07-05

## 2019-07-05 RX ADMIN — PIPERACILLIN AND TAZOBACTAM 25 GRAM(S): 4; .5 INJECTION, POWDER, LYOPHILIZED, FOR SOLUTION INTRAVENOUS at 01:00

## 2019-07-05 RX ADMIN — Medication 100 MILLIGRAM(S): at 23:25

## 2019-07-05 RX ADMIN — SODIUM CHLORIDE 4 MILLILITER(S): 9 INJECTION INTRAMUSCULAR; INTRAVENOUS; SUBCUTANEOUS at 12:14

## 2019-07-05 RX ADMIN — BENZOCAINE AND MENTHOL 1 LOZENGE: 5; 1 LIQUID ORAL at 01:00

## 2019-07-05 RX ADMIN — SODIUM CHLORIDE 2 GRAM(S): 9 INJECTION INTRAMUSCULAR; INTRAVENOUS; SUBCUTANEOUS at 05:01

## 2019-07-05 RX ADMIN — LEVALBUTEROL 0.63 MILLIGRAM(S): 1.25 SOLUTION, CONCENTRATE RESPIRATORY (INHALATION) at 15:38

## 2019-07-05 RX ADMIN — SODIUM CHLORIDE 4 MILLILITER(S): 9 INJECTION INTRAMUSCULAR; INTRAVENOUS; SUBCUTANEOUS at 23:37

## 2019-07-05 RX ADMIN — BENZOCAINE AND MENTHOL 1 LOZENGE: 5; 1 LIQUID ORAL at 05:01

## 2019-07-05 RX ADMIN — SODIUM CHLORIDE 4 MILLILITER(S): 9 INJECTION INTRAMUSCULAR; INTRAVENOUS; SUBCUTANEOUS at 15:38

## 2019-07-05 RX ADMIN — VORICONAZOLE 200 MILLIGRAM(S): 10 INJECTION, POWDER, LYOPHILIZED, FOR SOLUTION INTRAVENOUS at 17:11

## 2019-07-05 RX ADMIN — BENZOCAINE AND MENTHOL 1 LOZENGE: 5; 1 LIQUID ORAL at 13:03

## 2019-07-05 RX ADMIN — Medication 20 MILLIGRAM(S): at 05:01

## 2019-07-05 RX ADMIN — LEVALBUTEROL 0.63 MILLIGRAM(S): 1.25 SOLUTION, CONCENTRATE RESPIRATORY (INHALATION) at 12:13

## 2019-07-05 RX ADMIN — BENZOCAINE AND MENTHOL 1 LOZENGE: 5; 1 LIQUID ORAL at 17:12

## 2019-07-05 RX ADMIN — SODIUM CHLORIDE 2 GRAM(S): 9 INJECTION INTRAMUSCULAR; INTRAVENOUS; SUBCUTANEOUS at 17:11

## 2019-07-05 RX ADMIN — Medication 200 MILLIGRAM(S): at 05:01

## 2019-07-05 RX ADMIN — Medication 300 MILLIGRAM(S): at 13:03

## 2019-07-05 RX ADMIN — Medication 100 MILLIGRAM(S): at 23:27

## 2019-07-05 RX ADMIN — BENZOCAINE AND MENTHOL 1 LOZENGE: 5; 1 LIQUID ORAL at 23:26

## 2019-07-05 RX ADMIN — APIXABAN 2.5 MILLIGRAM(S): 2.5 TABLET, FILM COATED ORAL at 05:01

## 2019-07-05 RX ADMIN — PANTOPRAZOLE SODIUM 40 MILLIGRAM(S): 20 TABLET, DELAYED RELEASE ORAL at 05:01

## 2019-07-05 RX ADMIN — Medication 100 MILLIGRAM(S): at 00:59

## 2019-07-05 RX ADMIN — SODIUM CHLORIDE 4 MILLILITER(S): 9 INJECTION INTRAMUSCULAR; INTRAVENOUS; SUBCUTANEOUS at 19:32

## 2019-07-05 RX ADMIN — Medication 200 MILLIGRAM(S): at 13:10

## 2019-07-05 RX ADMIN — APIXABAN 2.5 MILLIGRAM(S): 2.5 TABLET, FILM COATED ORAL at 17:11

## 2019-07-05 RX ADMIN — SODIUM CHLORIDE 4 MILLILITER(S): 9 INJECTION INTRAMUSCULAR; INTRAVENOUS; SUBCUTANEOUS at 08:56

## 2019-07-05 RX ADMIN — VORICONAZOLE 200 MILLIGRAM(S): 10 INJECTION, POWDER, LYOPHILIZED, FOR SOLUTION INTRAVENOUS at 05:01

## 2019-07-05 RX ADMIN — PIPERACILLIN AND TAZOBACTAM 25 GRAM(S): 4; .5 INJECTION, POWDER, LYOPHILIZED, FOR SOLUTION INTRAVENOUS at 09:53

## 2019-07-05 RX ADMIN — ATOVAQUONE 1500 MILLIGRAM(S): 750 SUSPENSION ORAL at 13:03

## 2019-07-05 RX ADMIN — BENZOCAINE AND MENTHOL 1 LOZENGE: 5; 1 LIQUID ORAL at 09:53

## 2019-07-05 RX ADMIN — Medication 50 MILLIGRAM(S): at 05:01

## 2019-07-05 RX ADMIN — Medication 100 MILLIGRAM(S): at 13:03

## 2019-07-05 RX ADMIN — Medication 50 GRAM(S): at 09:53

## 2019-07-05 RX ADMIN — LEVALBUTEROL 0.63 MILLIGRAM(S): 1.25 SOLUTION, CONCENTRATE RESPIRATORY (INHALATION) at 19:27

## 2019-07-05 RX ADMIN — LEVALBUTEROL 0.63 MILLIGRAM(S): 1.25 SOLUTION, CONCENTRATE RESPIRATORY (INHALATION) at 23:31

## 2019-07-05 RX ADMIN — LEVALBUTEROL 0.63 MILLIGRAM(S): 1.25 SOLUTION, CONCENTRATE RESPIRATORY (INHALATION) at 08:48

## 2019-07-05 RX ADMIN — Medication 100 MILLIGRAM(S): at 12:57

## 2019-07-05 NOTE — PROGRESS NOTE ADULT - PROBLEM SELECTOR PLAN 1
RLL bronchus obstruction with concern for postobstructive PNA s/p BAL 7/1 with thoracic sx  -remains hypoxic, cont O2 to maintain SpO2>90, wean as tolerated   -BAL cx NTD, path with Fungal hyphae in a background of necrotic tissue  - F/U fungal cx that was added on   - c/w voriconazole pending ID to cover for invasive aspergilloses  - f/u Galactomannen, beta-d-glucan  - can continue zosyn for now, vanco DC'd  - continue mepron 1500 mg po daily  - trend cbc w diff  - standing airway clearance therapy, Xopenex q4 hours, following by nebulized 3% hypertonic saline, and then Aerobika device as per Pulm recommendations  - supportive care for rhino virus- hycodan prn for cough  - Pulmonary and ID consulted, recommendations appreciated

## 2019-07-05 NOTE — DIETITIAN INITIAL EVALUATION ADULT. - PROBLEM SELECTOR PLAN 5
- Follows with Dr. Nguyen at [facility] [place]   - Heme/onc consult in AM  - C/w ppx Bactrim, valacyclovir and allopurinol

## 2019-07-05 NOTE — PROGRESS NOTE ADULT - SUBJECTIVE AND OBJECTIVE BOX
CHIEF COMPLAINT: SOB    Interval Events: Patient c/o continued cough with sensation that mucus is stuck in her throat/upper chest. Pt states that she was feeling/breathing well most of the day yesterday, but overnight she had a lot of coughing. Daughter worried that pt more confused than baseline; patient currently AAOx3. Patient satting high 90's on 2L NC.     REVIEW OF SYSTEMS:  Constitutional: No fevers or chills. No weight loss. No fatigue or generalised malaise.  Eyes: No itching or discharge from the eyes  ENT: No ear pain. No ear discharge. No nasal congestion. No post nasal drip. No epistaxis. No throat pain. No sore throat. No difficulty swallowing.   CV: No chest pain. No palpitations. No lightheadedness or dizziness.   Resp: No dyspnea at rest. No chest pain with respiration. +cough w white/yellow phlegm. +feeling of upper chest congestion.   GI: No nausea. No vomiting. No diarrhea.  MSK: No joint pain or pain in any extremities  Integumentary: No skin lesions. No pedal edema.  Neurological: No gross motor weakness. No sensory changes.  [X ] All other systems negative      OBJECTIVE:  ICU Vital Signs Last 24 Hrs  T(C): 36.4 (05 Jul 2019 04:59), Max: 36.8 (04 Jul 2019 12:44)  T(F): 97.6 (05 Jul 2019 04:59), Max: 98.3 (04 Jul 2019 17:57)  HR: 111 (05 Jul 2019 04:59) (70 - 111)  BP: 127/88 (05 Jul 2019 04:59) (112/77 - 128/89)  BP(mean): --  ABP: --  ABP(mean): --  RR: 18 (05 Jul 2019 04:59) (18 - 20)  SpO2: 100% (05 Jul 2019 04:59) (92% - 100%)          PHYSICAL EXAM:  General: Awake, alert, oriented X 3.   HEENT: Atraumatic, normocephalic.   Lymph Nodes: No palpable lymphadenopathy  Neck: No JVD. No carotid bruit.   Respiratory: Normal chest expansion  + KASEY diffuse rhonchi  + diffuse wheezing                    Cardiovascular: S1 S2 normal. No murmurs, rubs or gallops.   Abdomen: Soft, non-tender, non-distended. No organomegaly.  Extremities: Warm to touch. Peripheral pulse palpable. No pedal edema.   Skin: KASEY UE ecchymoses @ wrists, improved  Neurological: Motor and sensory examination equal and normal in all four extremities.  Psychiatry: Appropriate mood and affect.    HOSPITAL MEDICATIONS:  MEDICATIONS  (STANDING):  allopurinol 300 milliGRAM(s) Oral daily  apixaban 2.5 milliGRAM(s) Oral every 12 hours  atovaquone Suspension 1500 milliGRAM(s) Oral daily  benzocaine 15 mG/menthol 3.6 mG (Sugar-Free) Lozenge 1 Lozenge Oral every 4 hours  docusate sodium 100 milliGRAM(s) Oral three times a day  lactated ringers. 1000 milliLiter(s) (30 mL/Hr) IV Continuous <Continuous>  levalbuterol Inhalation 0.63 milliGRAM(s) Inhalation every 4 hours  metoprolol succinate ER 50 milliGRAM(s) Oral daily  pantoprazole    Tablet 40 milliGRAM(s) Oral before breakfast  piperacillin/tazobactam IVPB.. 3.375 Gram(s) IV Intermittent every 8 hours  predniSONE   Tablet 20 milliGRAM(s) Oral daily  sodium chloride 2 Gram(s) Oral two times a day  sodium chloride 3%  Inhalation 4 milliLiter(s) Inhalation every 4 hours  valACYclovir 500 milliGRAM(s) Oral <User Schedule>  voriconazole 200 milliGRAM(s) Oral every 12 hours    MEDICATIONS  (PRN):  benzonatate 100 milliGRAM(s) Oral three times a day PRN Cough  bisacodyl 5 milliGRAM(s) Oral every 12 hours PRN Constipation  guaiFENesin    Syrup 200 milliGRAM(s) Oral every 6 hours PRN Cough  polyethylene glycol 3350 17 Gram(s) Oral daily PRN Constipation  sodium chloride 0.65% Nasal 1 Spray(s) Both Nostrils daily PRN Nasal Congestion      LABS:                        9.4    1.52  )-----------( 126      ( 05 Jul 2019 07:01 )             29.7     07-05    137  |  99  |  8   ----------------------------<  84  3.5   |  24  |  0.35<L>    Ca    8.5      05 Jul 2019 07:01  Phos  2.2     07-05  Mg     1.2     07-05      MICROBIOLOGY:   Culture - Respiratory with Gram Stain (07.01.19 @ 14:45)    Culture - Respiratory:   NRF^Normal Respiratory Le  QUANTITY OF GROWTH: RARE    Culture - Respiratory:   Normal Respiratory Le Also Present  ***** CRITICAL RESULT *****  PERSON CALLED / READ-BACK: SOMMER HAGAN R.N./MICHELLE  DATE / TIME CALLED: 07/03/19 1128  CALLED BY: MARILUZ FERRELL  MOLD^MOLD - Further ID to Follow    Gram Stain Sputum:   NOS^No Organisms Seen  WBC^White Blood Cells  QNTY CELLS IN GRAM STAIN: RARE (1+)    Specimen Source: BRONCHIAL LAVAGE CHIEF COMPLAINT: SOB    Interval Events: Patient c/o continued cough with sensation that mucus is stuck in her throat/upper chest. Pt states that she was feeling/breathing well most of the day yesterday, but overnight she had a lot of coughing. Daughter worried that pt more confused than baseline; patient currently AAOx3. Patient satting high 90's on 2L NC.     REVIEW OF SYSTEMS:  Constitutional: No fevers or chills. No weight loss. No fatigue or generalised malaise.  Eyes: No itching or discharge from the eyes  ENT: No ear pain. No ear discharge. No nasal congestion. No post nasal drip. No epistaxis. No throat pain. No sore throat. No difficulty swallowing.   CV: No chest pain. No palpitations. No lightheadedness or dizziness.   Resp: No dyspnea at rest. No chest pain with respiration. +cough w white/yellow phlegm. +feeling of upper chest congestion.   GI: No nausea. No vomiting. No diarrhea.  MSK: No joint pain or pain in any extremities  Integumentary: No skin lesions. No pedal edema.  Neurological: No gross motor weakness. No sensory changes.  [X ] All other systems negative      OBJECTIVE:  ICU Vital Signs Last 24 Hrs  T(C): 36.4 (05 Jul 2019 04:59), Max: 36.8 (04 Jul 2019 12:44)  T(F): 97.6 (05 Jul 2019 04:59), Max: 98.3 (04 Jul 2019 17:57)  HR: 111 (05 Jul 2019 04:59) (70 - 111)  BP: 127/88 (05 Jul 2019 04:59) (112/77 - 128/89)  BP(mean): --  ABP: --  ABP(mean): --  RR: 18 (05 Jul 2019 04:59) (18 - 20)  SpO2: 100% (05 Jul 2019 04:59) (92% - 100%)          PHYSICAL EXAM:  General: Awake, alert, oriented X 3.   HEENT: Atraumatic, normocephalic.   Lymph Nodes: No palpable lymphadenopathy  Neck: No JVD. No carotid bruit.   Respiratory: Normal chest expansion  + KASEY diffuse rhonchi  + wheezing                    Cardiovascular: S1 S2 normal. No murmurs, rubs or gallops.   Abdomen: Soft, non-tender, non-distended. No organomegaly.  Extremities: Warm to touch. Peripheral pulse palpable. No pedal edema.   Skin: KASEY UE ecchymoses @ wrists, improved  Neurological: Motor and sensory examination equal and normal in all four extremities.  Psychiatry: Appropriate mood and affect.    HOSPITAL MEDICATIONS:  MEDICATIONS  (STANDING):  allopurinol 300 milliGRAM(s) Oral daily  apixaban 2.5 milliGRAM(s) Oral every 12 hours  atovaquone Suspension 1500 milliGRAM(s) Oral daily  benzocaine 15 mG/menthol 3.6 mG (Sugar-Free) Lozenge 1 Lozenge Oral every 4 hours  docusate sodium 100 milliGRAM(s) Oral three times a day  lactated ringers. 1000 milliLiter(s) (30 mL/Hr) IV Continuous <Continuous>  levalbuterol Inhalation 0.63 milliGRAM(s) Inhalation every 4 hours  metoprolol succinate ER 50 milliGRAM(s) Oral daily  pantoprazole    Tablet 40 milliGRAM(s) Oral before breakfast  piperacillin/tazobactam IVPB.. 3.375 Gram(s) IV Intermittent every 8 hours  predniSONE   Tablet 20 milliGRAM(s) Oral daily  sodium chloride 2 Gram(s) Oral two times a day  sodium chloride 3%  Inhalation 4 milliLiter(s) Inhalation every 4 hours  valACYclovir 500 milliGRAM(s) Oral <User Schedule>  voriconazole 200 milliGRAM(s) Oral every 12 hours    MEDICATIONS  (PRN):  benzonatate 100 milliGRAM(s) Oral three times a day PRN Cough  bisacodyl 5 milliGRAM(s) Oral every 12 hours PRN Constipation  guaiFENesin    Syrup 200 milliGRAM(s) Oral every 6 hours PRN Cough  polyethylene glycol 3350 17 Gram(s) Oral daily PRN Constipation  sodium chloride 0.65% Nasal 1 Spray(s) Both Nostrils daily PRN Nasal Congestion      LABS:                        9.4    1.52  )-----------( 126      ( 05 Jul 2019 07:01 )             29.7     07-05    137  |  99  |  8   ----------------------------<  84  3.5   |  24  |  0.35<L>    Ca    8.5      05 Jul 2019 07:01  Phos  2.2     07-05  Mg     1.2     07-05      MICROBIOLOGY:   Culture - Respiratory with Gram Stain (07.01.19 @ 14:45)    Culture - Respiratory:   NRF^Normal Respiratory Le  QUANTITY OF GROWTH: RARE    Culture - Respiratory:   Normal Respiratory Le Also Present  ***** CRITICAL RESULT *****  PERSON CALLED / READ-BACK: SOMMER HAGAN R.N./MICHELLE  DATE / TIME CALLED: 07/03/19 1128  CALLED BY: MARILUZ FERRELL  MOLD^MOLD - Further ID to Follow    Gram Stain Sputum:   NOS^No Organisms Seen  WBC^White Blood Cells  QNTY CELLS IN GRAM STAIN: RARE (1+)    Specimen Source: BRONCHIAL LAVAGE

## 2019-07-05 NOTE — PROGRESS NOTE ADULT - ASSESSMENT
79F PMHx DLBC lymphoma, Afib (on Eliquis), p/w productive cough x1 month, SOB x3 days, s/p bronchoscopy & BAL 7/1 for concern of RLL bronchus obstruction found to have mucus impaction. Initial cx demonstrates fungal hyphae in necrotic tissue on bx.  -c/w levalbuterol/HS 3%/aerobika device BID  -levalbuterol q6hr  -antifungal and abx per ID 79F PMHx DLBC lymphoma, Afib (on Eliquis), p/w productive cough x1 month, SOB x3 days, s/p bronchoscopy & BAL 7/1 for concern of RLL bronchus obstruction found to have mucus impaction. Initial cx demonstrates fungal hyphae in necrotic tissue on bx.  -can increase HS 3% to HS 7% c/w levalbuterol/HS 3%/aerobika device BID  -levalbuterol q6hr standing  -antifungal and abx per ID

## 2019-07-05 NOTE — DIETITIAN INITIAL EVALUATION ADULT. - ADD RECOMMEND
1. Liberalize DASH restriction. 2. Add Ensure Enlive 2x daily (700 des and 40 gm protein) to help patient better meet protein/energy needs. 3. Encouraged protein intake at meals/snacks and encouraged continuing Ensure on d/c.

## 2019-07-05 NOTE — DIETITIAN INITIAL EVALUATION ADULT. - DIET TYPE
dysphagia 2, mechanical soft, nectar consistency fld/Ensure Enlive 2x daily (700 des and 40 gm protein)

## 2019-07-05 NOTE — PROGRESS NOTE ADULT - ASSESSMENT
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 related to disease vs Bactrim  Change Bactrim to Mepron 1500 mg po daily    3) Immunosuppressed secondary to cancer tx    4) Delirium: Daughter reports a history of delirium while hospitalized.  Occured prior to voriconazole.    Not clear this is related to voriconazole.    If it worsens, can change voriconazole to posaconazole.

## 2019-07-05 NOTE — DIETITIAN INITIAL EVALUATION ADULT. - PHYSICAL APPEARANCE
underweight/other (specify) Nutrition focused physical exam conducted - found signs of malnutrition [ ]absent [x ]present   Subcutaneous fat loss:  [ moderate]Buccal fat region, [ severe]Triceps region,  Muscle wasting: [severe ]Temples region, [severe ]Clavicle region, [severe ]Shoulder region

## 2019-07-05 NOTE — DIETITIAN INITIAL EVALUATION ADULT. - OTHER INFO
78 y/o F with hx of lymphoma presented with SOB and found to have RVP+ with possible PNA. Pt with poor PO intake during admission (9 days) with PO intake <50% meals. Swallow assessment 7/1 recommending mechanical soft Nectar Consistency fluids. No GI distress (nausea/vomiting/diarrhea/constipation.) Of note, pt was c/o constipation earlier but last BM 7/4. Pt was eating 3 meals/day at home but very small portions as appetite decreased with lymphoma. March 2019 wt was 113.5 pounds and current weight 106 pounds. Pt states before cancer dx she weighed 120 pounds (>2 years ago). Pt is willing to try Ensure Enlive and amenable to diet to being liberalized.

## 2019-07-05 NOTE — PROGRESS NOTE ADULT - SUBJECTIVE AND OBJECTIVE BOX
Patient is a 79y old  Female who presents with a chief complaint of SOB (05 Jul 2019 08:50)        SUBJECTIVE / OVERNIGHT EVENTS:      MEDICATIONS  (STANDING):  allopurinol 300 milliGRAM(s) Oral daily  apixaban 2.5 milliGRAM(s) Oral every 12 hours  atovaquone Suspension 1500 milliGRAM(s) Oral daily  benzocaine 15 mG/menthol 3.6 mG (Sugar-Free) Lozenge 1 Lozenge Oral every 4 hours  docusate sodium 100 milliGRAM(s) Oral three times a day  lactated ringers. 1000 milliLiter(s) (30 mL/Hr) IV Continuous <Continuous>  levalbuterol Inhalation 0.63 milliGRAM(s) Inhalation every 4 hours  magnesium sulfate  IVPB 2 Gram(s) IV Intermittent once  metoprolol succinate ER 50 milliGRAM(s) Oral daily  pantoprazole    Tablet 40 milliGRAM(s) Oral before breakfast  piperacillin/tazobactam IVPB.. 3.375 Gram(s) IV Intermittent every 8 hours  predniSONE   Tablet 20 milliGRAM(s) Oral daily  sodium chloride 2 Gram(s) Oral two times a day  sodium chloride 3%  Inhalation 4 milliLiter(s) Inhalation every 4 hours  valACYclovir 500 milliGRAM(s) Oral <User Schedule>  voriconazole 200 milliGRAM(s) Oral every 12 hours    MEDICATIONS  (PRN):  benzonatate 100 milliGRAM(s) Oral three times a day PRN Cough  bisacodyl 5 milliGRAM(s) Oral every 12 hours PRN Constipation  guaiFENesin    Syrup 200 milliGRAM(s) Oral every 6 hours PRN Cough  polyethylene glycol 3350 17 Gram(s) Oral daily PRN Constipation  sodium chloride 0.65% Nasal 1 Spray(s) Both Nostrils daily PRN Nasal Congestion      Vital Signs Last 24 Hrs  T(C): 36.4 (05 Jul 2019 04:59), Max: 36.8 (04 Jul 2019 12:44)  T(F): 97.6 (05 Jul 2019 04:59), Max: 98.3 (04 Jul 2019 17:57)  HR: 80 (05 Jul 2019 08:50) (70 - 111)  BP: 127/88 (05 Jul 2019 04:59) (112/77 - 128/89)  BP(mean): --  RR: 18 (05 Jul 2019 04:59) (18 - 20)  SpO2: 97% (05 Jul 2019 08:50) (92% - 100%)  CAPILLARY BLOOD GLUCOSE        I&O's Summary        PHYSICAL EXAM  GENERAL: NAD, well-developed  HEAD:  Atraumatic, Normocephalic  EYES: EOMI, PERRLA, conjunctiva and sclera clear  NECK: Supple, No JVD  CHEST/LUNG: Clear to auscultation bilaterally; No wheeze  HEART: Regular rate and rhythm; No murmurs, rubs, or gallops  ABDOMEN: Soft, Nontender, Nondistended; Bowel sounds present  EXTREMITIES:  2+ Peripheral Pulses, No clubbing, cyanosis, or edema  PSYCH: AAOx3  SKIN: No rashes or lesions    LABS:                        9.4    1.52  )-----------( 126      ( 05 Jul 2019 07:01 )             29.7     07-05    137  |  99  |  8   ----------------------------<  84  3.5   |  24  |  0.35<L>    Ca    8.5      05 Jul 2019 07:01  Phos  2.2     07-05  Mg     1.2     07-05                  RADIOLOGY & ADDITIONAL TESTS:    Imaging Personally Reviewed:  Consultant(s) Notes Reviewed:    Care Discussed with Consultants/Other Providers: Patient is a 79y old  Female who presents with a chief complaint of SOB (05 Jul 2019 08:50)    SUBJECTIVE / OVERNIGHT EVENTS:  Patient seen complaining of dyspnea still, coughing overnight, concern of confusion last night as well.     MEDICATIONS  (STANDING):  allopurinol 300 milliGRAM(s) Oral daily  apixaban 2.5 milliGRAM(s) Oral every 12 hours  atovaquone Suspension 1500 milliGRAM(s) Oral daily  benzocaine 15 mG/menthol 3.6 mG (Sugar-Free) Lozenge 1 Lozenge Oral every 4 hours  docusate sodium 100 milliGRAM(s) Oral three times a day  lactated ringers. 1000 milliLiter(s) (30 mL/Hr) IV Continuous <Continuous>  levalbuterol Inhalation 0.63 milliGRAM(s) Inhalation every 4 hours  magnesium sulfate  IVPB 2 Gram(s) IV Intermittent once  metoprolol succinate ER 50 milliGRAM(s) Oral daily  pantoprazole    Tablet 40 milliGRAM(s) Oral before breakfast  piperacillin/tazobactam IVPB.. 3.375 Gram(s) IV Intermittent every 8 hours  predniSONE   Tablet 20 milliGRAM(s) Oral daily  sodium chloride 2 Gram(s) Oral two times a day  sodium chloride 3%  Inhalation 4 milliLiter(s) Inhalation every 4 hours  valACYclovir 500 milliGRAM(s) Oral <User Schedule>  voriconazole 200 milliGRAM(s) Oral every 12 hours    MEDICATIONS  (PRN):  benzonatate 100 milliGRAM(s) Oral three times a day PRN Cough  bisacodyl 5 milliGRAM(s) Oral every 12 hours PRN Constipation  guaiFENesin    Syrup 200 milliGRAM(s) Oral every 6 hours PRN Cough  polyethylene glycol 3350 17 Gram(s) Oral daily PRN Constipation  sodium chloride 0.65% Nasal 1 Spray(s) Both Nostrils daily PRN Nasal Congestion      Vital Signs Last 24 Hrs  T(C): 36.4 (05 Jul 2019 04:59), Max: 36.8 (04 Jul 2019 12:44)  T(F): 97.6 (05 Jul 2019 04:59), Max: 98.3 (04 Jul 2019 17:57)  HR: 80 (05 Jul 2019 08:50) (70 - 111)  BP: 127/88 (05 Jul 2019 04:59) (112/77 - 128/89)  BP(mean): --  RR: 18 (05 Jul 2019 04:59) (18 - 20)  SpO2: 97% (05 Jul 2019 08:50) (92% - 100%)  CAPILLARY BLOOD GLUCOSE        I&O's Summary      PHYSICAL EXAM  GENERAL: elderly female lying in bed in NAD   MENTAL STATUS/PSYCH:  AAO x3   HEAD:  Atraumatic, Normocephalic  EYES: EOMI, PERRLA, conjunctiva and sclera clear  NECK: Supple, No elevated JVD  CHEST/LUNG:  course breath sound throughout with significant rhonchi on Rt side   HEART: Regular rate and rhythm; No murmurs, rubs, or gallops  ABDOMEN: Soft, Nontender, Nondistended; Bowel sounds present  EXTREMITIES:  2+ Peripheral Pulses, No clubbing, cyanosis, or edema  NEUROLOGY: CN II-XII grossly intact, moving all extremities  SKIN: No rashes or lesions    LABS:                        9.4    1.52  )-----------( 126      ( 05 Jul 2019 07:01 )             29.7     07-05    137  |  99  |  8   ----------------------------<  84  3.5   |  24  |  0.35<L>    Ca    8.5      05 Jul 2019 07:01  Phos  2.2     07-05  Mg     1.2     07-05                  RADIOLOGY & ADDITIONAL TESTS:    Imaging Personally Reviewed:  Consultant(s) Notes Reviewed:    Care Discussed with Consultants/Other Providers:

## 2019-07-05 NOTE — PROGRESS NOTE ADULT - SUBJECTIVE AND OBJECTIVE BOX
Follow Up:      Inverval History/ROS:Patient is a 79y old  Female who presents with a chief complaint of SOB (05 Jul 2019 09:16)  no fever  Feels okay      Allergies    No Known Allergies    Intolerances        ANTIMICROBIALS:  atovaquone Suspension 1500 daily  piperacillin/tazobactam IVPB.. 3.375 every 8 hours  valACYclovir 500 <User Schedule>  voriconazole 200 every 12 hours      OTHER MEDS:  allopurinol 300 milliGRAM(s) Oral daily  apixaban 2.5 milliGRAM(s) Oral every 12 hours  benzocaine 15 mG/menthol 3.6 mG (Sugar-Free) Lozenge 1 Lozenge Oral every 4 hours  benzonatate 100 milliGRAM(s) Oral three times a day PRN  bisacodyl 5 milliGRAM(s) Oral every 12 hours PRN  docusate sodium 100 milliGRAM(s) Oral three times a day  guaiFENesin    Syrup 200 milliGRAM(s) Oral every 6 hours PRN  lactated ringers. 1000 milliLiter(s) IV Continuous <Continuous>  levalbuterol Inhalation 0.63 milliGRAM(s) Inhalation every 4 hours  metoprolol succinate ER 50 milliGRAM(s) Oral daily  pantoprazole    Tablet 40 milliGRAM(s) Oral before breakfast  polyethylene glycol 3350 17 Gram(s) Oral daily PRN  predniSONE   Tablet 20 milliGRAM(s) Oral daily  sodium chloride 2 Gram(s) Oral two times a day  sodium chloride 0.65% Nasal 1 Spray(s) Both Nostrils daily PRN  sodium chloride 3%  Inhalation 4 milliLiter(s) Inhalation every 4 hours      Vital Signs Last 24 Hrs  T(C): 36.3 (05 Jul 2019 14:58), Max: 36.8 (04 Jul 2019 17:57)  T(F): 97.3 (05 Jul 2019 14:58), Max: 98.3 (04 Jul 2019 17:57)  HR: 100 (05 Jul 2019 15:38) (65 - 111)  BP: 140/108 (05 Jul 2019 14:58) (113/72 - 140/108)  BP(mean): --  RR: 18 (05 Jul 2019 14:58) (18 - 20)  SpO2: 96% (05 Jul 2019 15:38) (94% - 100%)    PHYSICAL EXAM:  General: [x ] non-toxic  HEAD/EYES: [ ] PERRL [ x] white sclera [ ] icterus  ENT:  [ ] normal [x ] supple [ ] thrush [ ] pharyngeal exudate  Cardiovascular:   [ ] murmur [x] normal [ ] PPM/AICD  Respiratory:  [x ] clear to ausculation bilaterally  GI:  [ x] soft, non-tender, normal bowel sounds  :  [ ] bledsoe [x ] no CVA tenderness   Musculoskeletal:  [ x] no synovitis  Neurologic:  [ ] non-focal exam   Skin:  [x ] no rash  Lymph: [ ] no lymphadenopathy  Psychiatric:  [ ] appropriate affect [x ] alert & oriented  Lines:  [ ] no phlebitis [ ] central line                                9.4    1.52  )-----------( 126      ( 05 Jul 2019 07:01 )             29.7       07-05    137  |  99  |  8   ----------------------------<  84  3.5   |  24  |  0.35<L>    Ca    8.5      05 Jul 2019 07:01  Phos  2.2     07-05  Mg     1.2     07-05            MICROBIOLOGY:Culture - Respiratory:   NRF^Normal Respiratory Le  QUANTITY OF GROWTH: RARE (07-01-19 @ 14:45)  Culture - Respiratory:   Normal Respiratory Le Also Present  ***** CRITICAL RESULT *****  PERSON CALLED / READ-BACK: SOMMER HAGAN R.N./MICHELLE  DATE / TIME CALLED: 07/03/19 1128  CALLED BY: MARILUZ FERRELL  MOLD^MOLD - Further ID to Follow (07-01-19 @ 14:45)  Culture - Respiratory:   NO GROWTH - PRELIMINARY RESULTS  NRF^Normal Respiratory Le  QUANTITY OF GROWTH: RARE (07-01-19 @ 14:29)      RADIOLOGY: Follow Up:      Inverval History/ROS:Patient is a 79y old  Female who presents with a chief complaint of SOB (05 Jul 2019 09:16)  no fever  Feels okay    Episode of delirium this am per daughter.      Allergies    No Known Allergies    Intolerances        ANTIMICROBIALS:  atovaquone Suspension 1500 daily  piperacillin/tazobactam IVPB.. 3.375 every 8 hours  valACYclovir 500 <User Schedule>  voriconazole 200 every 12 hours      OTHER MEDS:  allopurinol 300 milliGRAM(s) Oral daily  apixaban 2.5 milliGRAM(s) Oral every 12 hours  benzocaine 15 mG/menthol 3.6 mG (Sugar-Free) Lozenge 1 Lozenge Oral every 4 hours  benzonatate 100 milliGRAM(s) Oral three times a day PRN  bisacodyl 5 milliGRAM(s) Oral every 12 hours PRN  docusate sodium 100 milliGRAM(s) Oral three times a day  guaiFENesin    Syrup 200 milliGRAM(s) Oral every 6 hours PRN  lactated ringers. 1000 milliLiter(s) IV Continuous <Continuous>  levalbuterol Inhalation 0.63 milliGRAM(s) Inhalation every 4 hours  metoprolol succinate ER 50 milliGRAM(s) Oral daily  pantoprazole    Tablet 40 milliGRAM(s) Oral before breakfast  polyethylene glycol 3350 17 Gram(s) Oral daily PRN  predniSONE   Tablet 20 milliGRAM(s) Oral daily  sodium chloride 2 Gram(s) Oral two times a day  sodium chloride 0.65% Nasal 1 Spray(s) Both Nostrils daily PRN  sodium chloride 3%  Inhalation 4 milliLiter(s) Inhalation every 4 hours      Vital Signs Last 24 Hrs  T(C): 36.3 (05 Jul 2019 14:58), Max: 36.8 (04 Jul 2019 17:57)  T(F): 97.3 (05 Jul 2019 14:58), Max: 98.3 (04 Jul 2019 17:57)  HR: 100 (05 Jul 2019 15:38) (65 - 111)  BP: 140/108 (05 Jul 2019 14:58) (113/72 - 140/108)  BP(mean): --  RR: 18 (05 Jul 2019 14:58) (18 - 20)  SpO2: 96% (05 Jul 2019 15:38) (94% - 100%)    PHYSICAL EXAM:  General: [x ] non-toxic  HEAD/EYES: [ ] PERRL [ x] white sclera [ ] icterus  ENT:  [ ] normal [x ] supple [ ] thrush [ ] pharyngeal exudate  Cardiovascular:   [ ] murmur [x] normal [ ] PPM/AICD  Respiratory:  [x ] clear to ausculation bilaterally  GI:  [ x] soft, non-tender, normal bowel sounds  :  [ ] bledsoe [x ] no CVA tenderness   Musculoskeletal:  [ x] no synovitis  Neurologic:  [ ] non-focal exam   Skin:  [x ] no rash  Lymph: [ ] no lymphadenopathy  Psychiatric:  [ ] appropriate affect [x ] alert & oriented  Lines:  [ ] no phlebitis [ ] central line                                9.4    1.52  )-----------( 126      ( 05 Jul 2019 07:01 )             29.7       07-05    137  |  99  |  8   ----------------------------<  84  3.5   |  24  |  0.35<L>    Ca    8.5      05 Jul 2019 07:01  Phos  2.2     07-05  Mg     1.2     07-05            MICROBIOLOGY:Culture - Respiratory:   NRF^Normal Respiratory Le  QUANTITY OF GROWTH: RARE (07-01-19 @ 14:45)  Culture - Respiratory:   Normal Respiratory Le Also Present  ***** CRITICAL RESULT *****  PERSON CALLED / READ-BACK: SOMMER HAGAN R.N./MICHELLE  DATE / TIME CALLED: 07/03/19 1128  CALLED BY: MARILUZ FERRELL  MOLD^MOLD - Further ID to Follow (07-01-19 @ 14:45)  Culture - Respiratory:   NO GROWTH - PRELIMINARY RESULTS  NRF^Normal Respiratory Le  QUANTITY OF GROWTH: RARE (07-01-19 @ 14:29)      RADIOLOGY:

## 2019-07-05 NOTE — PROGRESS NOTE ADULT - PROBLEM SELECTOR PLAN 6
[place] [facility] [place], [facility] is not offering any disease modifying treatment. Pt is looking into enrollment in a clinical trial   - C/w ppx mepron, valacyclovir and allopurinol  - GOC discussed with pt and daughter, want full code for now

## 2019-07-05 NOTE — DIETITIAN INITIAL EVALUATION ADULT. - PERTINENT MEDS FT
MEDICATIONS  (STANDING):  allopurinol 300 milliGRAM(s) Oral daily  apixaban 2.5 milliGRAM(s) Oral every 12 hours  atovaquone Suspension 1500 milliGRAM(s) Oral daily  benzocaine 15 mG/menthol 3.6 mG (Sugar-Free) Lozenge 1 Lozenge Oral every 4 hours  docusate sodium 100 milliGRAM(s) Oral three times a day  lactated ringers. 1000 milliLiter(s) (30 mL/Hr) IV Continuous <Continuous>  levalbuterol Inhalation 0.63 milliGRAM(s) Inhalation every 4 hours  metoprolol succinate ER 50 milliGRAM(s) Oral daily  pantoprazole    Tablet 40 milliGRAM(s) Oral before breakfast  predniSONE   Tablet 20 milliGRAM(s) Oral daily  sodium chloride 2 Gram(s) Oral two times a day  sodium chloride 3%  Inhalation 4 milliLiter(s) Inhalation every 4 hours  valACYclovir 500 milliGRAM(s) Oral <User Schedule>  voriconazole 200 milliGRAM(s) Oral every 12 hours

## 2019-07-06 LAB
ANION GAP SERPL CALC-SCNC: 13 MMO/L — SIGNIFICANT CHANGE UP (ref 7–14)
BASOPHILS # BLD AUTO: 0.03 K/UL — SIGNIFICANT CHANGE UP (ref 0–0.2)
BASOPHILS NFR BLD AUTO: 1.6 % — SIGNIFICANT CHANGE UP (ref 0–2)
BUN SERPL-MCNC: 9 MG/DL — SIGNIFICANT CHANGE UP (ref 7–23)
CALCIUM SERPL-MCNC: 9.1 MG/DL — SIGNIFICANT CHANGE UP (ref 8.4–10.5)
CHLORIDE SERPL-SCNC: 97 MMOL/L — LOW (ref 98–107)
CO2 SERPL-SCNC: 27 MMOL/L — SIGNIFICANT CHANGE UP (ref 22–31)
CREAT SERPL-MCNC: 0.41 MG/DL — LOW (ref 0.5–1.3)
EOSINOPHIL # BLD AUTO: 0.07 K/UL — SIGNIFICANT CHANGE UP (ref 0–0.5)
EOSINOPHIL NFR BLD AUTO: 3.7 % — SIGNIFICANT CHANGE UP (ref 0–6)
GLUCOSE SERPL-MCNC: 104 MG/DL — HIGH (ref 70–99)
HCT VFR BLD CALC: 30.8 % — LOW (ref 34.5–45)
HGB BLD-MCNC: 9.8 G/DL — LOW (ref 11.5–15.5)
IMM GRANULOCYTES NFR BLD AUTO: 11.1 % — HIGH (ref 0–1.5)
LYMPHOCYTES # BLD AUTO: 0.57 K/UL — LOW (ref 1–3.3)
LYMPHOCYTES # BLD AUTO: 30 % — SIGNIFICANT CHANGE UP (ref 13–44)
MAGNESIUM SERPL-MCNC: 1.7 MG/DL — SIGNIFICANT CHANGE UP (ref 1.6–2.6)
MCHC RBC-ENTMCNC: 31 PG — SIGNIFICANT CHANGE UP (ref 27–34)
MCHC RBC-ENTMCNC: 31.8 % — LOW (ref 32–36)
MCV RBC AUTO: 97.5 FL — SIGNIFICANT CHANGE UP (ref 80–100)
MONOCYTES # BLD AUTO: 0.3 K/UL — SIGNIFICANT CHANGE UP (ref 0–0.9)
MONOCYTES NFR BLD AUTO: 15.8 % — HIGH (ref 2–14)
NEUTROPHILS # BLD AUTO: 0.72 K/UL — LOW (ref 1.8–7.4)
NEUTROPHILS NFR BLD AUTO: 37.8 % — LOW (ref 43–77)
NRBC # FLD: 0.04 K/UL — SIGNIFICANT CHANGE UP (ref 0–0)
NRBC FLD-RTO: 2.1 — SIGNIFICANT CHANGE UP
PHOSPHATE SERPL-MCNC: 2.8 MG/DL — SIGNIFICANT CHANGE UP (ref 2.5–4.5)
PLATELET # BLD AUTO: 128 K/UL — LOW (ref 150–400)
PMV BLD: 10.7 FL — SIGNIFICANT CHANGE UP (ref 7–13)
POTASSIUM SERPL-MCNC: 3.5 MMOL/L — SIGNIFICANT CHANGE UP (ref 3.5–5.3)
POTASSIUM SERPL-SCNC: 3.5 MMOL/L — SIGNIFICANT CHANGE UP (ref 3.5–5.3)
RBC # BLD: 3.16 M/UL — LOW (ref 3.8–5.2)
RBC # FLD: 18.6 % — HIGH (ref 10.3–14.5)
SODIUM SERPL-SCNC: 137 MMOL/L — SIGNIFICANT CHANGE UP (ref 135–145)
WBC # BLD: 1.9 K/UL — LOW (ref 3.8–10.5)
WBC # FLD AUTO: 1.9 K/UL — LOW (ref 3.8–10.5)

## 2019-07-06 PROCEDURE — 99233 SBSQ HOSP IP/OBS HIGH 50: CPT

## 2019-07-06 PROCEDURE — 99232 SBSQ HOSP IP/OBS MODERATE 35: CPT

## 2019-07-06 RX ORDER — LEVALBUTEROL 1.25 MG/.5ML
0.63 SOLUTION, CONCENTRATE RESPIRATORY (INHALATION) ONCE
Refills: 0 | Status: COMPLETED | OUTPATIENT
Start: 2019-07-06 | End: 2019-07-06

## 2019-07-06 RX ADMIN — Medication 100 MILLIGRAM(S): at 11:39

## 2019-07-06 RX ADMIN — Medication 100 MILLIGRAM(S): at 05:31

## 2019-07-06 RX ADMIN — SODIUM CHLORIDE 4 MILLILITER(S): 9 INJECTION INTRAMUSCULAR; INTRAVENOUS; SUBCUTANEOUS at 16:13

## 2019-07-06 RX ADMIN — ATOVAQUONE 1500 MILLIGRAM(S): 750 SUSPENSION ORAL at 11:39

## 2019-07-06 RX ADMIN — VORICONAZOLE 200 MILLIGRAM(S): 10 INJECTION, POWDER, LYOPHILIZED, FOR SOLUTION INTRAVENOUS at 05:31

## 2019-07-06 RX ADMIN — BENZOCAINE AND MENTHOL 1 LOZENGE: 5; 1 LIQUID ORAL at 10:10

## 2019-07-06 RX ADMIN — APIXABAN 2.5 MILLIGRAM(S): 2.5 TABLET, FILM COATED ORAL at 05:31

## 2019-07-06 RX ADMIN — APIXABAN 2.5 MILLIGRAM(S): 2.5 TABLET, FILM COATED ORAL at 17:57

## 2019-07-06 RX ADMIN — SODIUM CHLORIDE 4 MILLILITER(S): 9 INJECTION INTRAMUSCULAR; INTRAVENOUS; SUBCUTANEOUS at 20:10

## 2019-07-06 RX ADMIN — LEVALBUTEROL 0.63 MILLIGRAM(S): 1.25 SOLUTION, CONCENTRATE RESPIRATORY (INHALATION) at 20:01

## 2019-07-06 RX ADMIN — BENZOCAINE AND MENTHOL 1 LOZENGE: 5; 1 LIQUID ORAL at 05:31

## 2019-07-06 RX ADMIN — LEVALBUTEROL 0.63 MILLIGRAM(S): 1.25 SOLUTION, CONCENTRATE RESPIRATORY (INHALATION) at 13:39

## 2019-07-06 RX ADMIN — SODIUM CHLORIDE 4 MILLILITER(S): 9 INJECTION INTRAMUSCULAR; INTRAVENOUS; SUBCUTANEOUS at 09:07

## 2019-07-06 RX ADMIN — VORICONAZOLE 200 MILLIGRAM(S): 10 INJECTION, POWDER, LYOPHILIZED, FOR SOLUTION INTRAVENOUS at 17:57

## 2019-07-06 RX ADMIN — LEVALBUTEROL 0.63 MILLIGRAM(S): 1.25 SOLUTION, CONCENTRATE RESPIRATORY (INHALATION) at 09:05

## 2019-07-06 RX ADMIN — BENZOCAINE AND MENTHOL 1 LOZENGE: 5; 1 LIQUID ORAL at 21:36

## 2019-07-06 RX ADMIN — SODIUM CHLORIDE 4 MILLILITER(S): 9 INJECTION INTRAMUSCULAR; INTRAVENOUS; SUBCUTANEOUS at 13:40

## 2019-07-06 RX ADMIN — Medication 20 MILLIGRAM(S): at 05:31

## 2019-07-06 RX ADMIN — PANTOPRAZOLE SODIUM 40 MILLIGRAM(S): 20 TABLET, DELAYED RELEASE ORAL at 05:31

## 2019-07-06 RX ADMIN — LEVALBUTEROL 0.63 MILLIGRAM(S): 1.25 SOLUTION, CONCENTRATE RESPIRATORY (INHALATION) at 16:13

## 2019-07-06 RX ADMIN — Medication 100 MILLIGRAM(S): at 13:04

## 2019-07-06 RX ADMIN — Medication 200 MILLIGRAM(S): at 21:45

## 2019-07-06 RX ADMIN — BENZOCAINE AND MENTHOL 1 LOZENGE: 5; 1 LIQUID ORAL at 13:04

## 2019-07-06 RX ADMIN — Medication 300 MILLIGRAM(S): at 11:39

## 2019-07-06 RX ADMIN — SODIUM CHLORIDE 2 GRAM(S): 9 INJECTION INTRAMUSCULAR; INTRAVENOUS; SUBCUTANEOUS at 17:57

## 2019-07-06 RX ADMIN — SODIUM CHLORIDE 2 GRAM(S): 9 INJECTION INTRAMUSCULAR; INTRAVENOUS; SUBCUTANEOUS at 05:31

## 2019-07-06 RX ADMIN — SODIUM CHLORIDE 30 MILLILITER(S): 9 INJECTION, SOLUTION INTRAVENOUS at 10:10

## 2019-07-06 RX ADMIN — BENZOCAINE AND MENTHOL 1 LOZENGE: 5; 1 LIQUID ORAL at 17:57

## 2019-07-06 RX ADMIN — LEVALBUTEROL 0.63 MILLIGRAM(S): 1.25 SOLUTION, CONCENTRATE RESPIRATORY (INHALATION) at 06:51

## 2019-07-06 RX ADMIN — Medication 200 MILLIGRAM(S): at 13:04

## 2019-07-06 RX ADMIN — Medication 100 MILLIGRAM(S): at 21:36

## 2019-07-06 RX ADMIN — VALACYCLOVIR 500 MILLIGRAM(S): 500 TABLET, FILM COATED ORAL at 17:57

## 2019-07-06 RX ADMIN — Medication 50 MILLIGRAM(S): at 05:31

## 2019-07-06 RX ADMIN — SODIUM CHLORIDE 30 MILLILITER(S): 9 INJECTION, SOLUTION INTRAVENOUS at 05:33

## 2019-07-06 NOTE — PROGRESS NOTE ADULT - ASSESSMENT
79F with relapsed B cell lymphoma (dx 2017, now relaplse).  Treated with rituximab and revlimid in 3/2019.  Treated with Obintuzmab and Bendamustine iin 4/2019.  Presents with shortness of breath.  She had abnormal imaging with lung nodules.  On 7/1- she had a bronchoscopy - path and culture suggestive of fungal process.  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  Neutropenic    Suggest:  - c/w voriconazole - seems to be tolerating  - f/u Galactomannen  - check beta-d-glucan  - continue mepron 1500 mg po daily  - trend cbc w diff    Please call the ID service [phone] with questions or concerns over the weekend

## 2019-07-06 NOTE — PROGRESS NOTE ADULT - PROBLEM SELECTOR PLAN 1
RLL bronchus obstruction with concern for postobstructive PNA s/p BAL 7/1 with thoracic sx  -remains hypoxic, cont O2 to maintain SpO2>90, wean as tolerated   -BAL cx NTD, path with Fungal hyphae in a background of necrotic tissue  - F/U fungal cx that was added on   - c/w voriconazole pending ID to cover for invasive aspergilloses  - f/u Galactomannen, beta-d-glucan  - can continue zosyn for now, vanco DC'd  - continue mepron 1500 mg po daily  - trend cbc w diff  - standing airway clearance therapy, Xopenex q4 hours, following by nebulized 3% hypertonic saline, and then Aerobika device as per Pulm recommendations  - supportive care for rhino virus- hycodan prn for cough  - Pulmonary and ID consulted, recommendations appreciated  - Nurse instructed to give cough meds

## 2019-07-06 NOTE — PROGRESS NOTE ADULT - SUBJECTIVE AND OBJECTIVE BOX
Patient is a 79y old  Female who presents with a chief complaint of SOB (2019 10:34)      SUBJECTIVE / OVERNIGHT EVENTS: Pt endorses cough. Improving SOB    MEDICATIONS  (STANDING):  allopurinol 300 milliGRAM(s) Oral daily  apixaban 2.5 milliGRAM(s) Oral every 12 hours  atovaquone Suspension 1500 milliGRAM(s) Oral daily  benzocaine 15 mG/menthol 3.6 mG (Sugar-Free) Lozenge 1 Lozenge Oral every 4 hours  docusate sodium 100 milliGRAM(s) Oral three times a day  lactated ringers. 1000 milliLiter(s) (30 mL/Hr) IV Continuous <Continuous>  levalbuterol Inhalation 0.63 milliGRAM(s) Inhalation every 4 hours  metoprolol succinate ER 50 milliGRAM(s) Oral daily  pantoprazole    Tablet 40 milliGRAM(s) Oral before breakfast  predniSONE   Tablet 20 milliGRAM(s) Oral daily  sodium chloride 2 Gram(s) Oral two times a day  sodium chloride 3%  Inhalation 4 milliLiter(s) Inhalation every 4 hours  valACYclovir 500 milliGRAM(s) Oral <User Schedule>  voriconazole 200 milliGRAM(s) Oral every 12 hours    MEDICATIONS  (PRN):  benzonatate 100 milliGRAM(s) Oral three times a day PRN Cough  bisacodyl 5 milliGRAM(s) Oral every 12 hours PRN Constipation  guaiFENesin    Syrup 200 milliGRAM(s) Oral every 6 hours PRN Cough  polyethylene glycol 3350 17 Gram(s) Oral daily PRN Constipation  sodium chloride 0.65% Nasal 1 Spray(s) Both Nostrils daily PRN Nasal Congestion      T(C): 36.6 (19 @ 05:27), Max: 36.6 (19 @ 23:24)  HR: 76 (19 @ 09:06) (65 - 104)  BP: 135/86 (19 @ 05:27) (108/75 - 140/108)  RR: 18 (19 @ 05:27) (18 - 18)  SpO2: 98% (19 @ 09:06) (94% - 100%)  CAPILLARY BLOOD GLUCOSE        I&O's Summary      PHYSICAL EXAM:  GENERAL: NAD,   HEAD:  Normocephalic  EYES: EOMI,  conjunctiva and sclera clear  NECK: Supple,   CHEST/LUNG: diffuse rhonchi; No wheeze  HEART:  s1 s2 + Regular rate and rhythm;   ABDOMEN: Soft, Nontender, Nondistended; Bowel sounds present  EXTREMITIES:   No clubbing, cyanosis  NEURO: AAOx3      LABS:                        9.8    1.90  )-----------( 128      ( 2019 05:16 )             30.8     07-06    137  |  97<L>  |  9   ----------------------------<  104<H>  3.5   |  27  |  0.41<L>    Ca    9.1      2019 05:16  Phos  2.8     07-  Mg     1.7     07-                  Consultant(s) Notes Reviewed:  RASHEEDA Grey MD  Hospitalist  P/ [phone]

## 2019-07-06 NOTE — PROGRESS NOTE ADULT - SUBJECTIVE AND OBJECTIVE BOX
f/u fungal pneumonia    Interval History:  sleeping deeply.  daughter is at bedside and reports there were no further episodes of confusion.  no hallucinations.   no n/v/d.  some cough.  All other systems negative    Allergies  No Known Allergies    ANTIMICROBIALS:    atovaquone Suspension 1500 daily  valACYclovir 500 <User Schedule>  voriconazole 200 every 12 hours    MEDICATIONS  (STANDING):  allopurinol 300 daily  apixaban 2.5 every 12 hours  docusate sodium 100 three times a day  levalbuterol Inhalation 0.63 every 4 hours  metoprolol succinate ER 50 daily  pantoprazole    Tablet 40 before breakfast  predniSONE   Tablet 20 daily  sodium chloride 3%  Inhalation 4 every 4 hours    Vital Signs Last 24 Hrs  T(F): 97.9 (07-06-19 @ 05:27), Max: 97.9 (07-05-19 @ 23:24)  HR: 76 (07-06-19 @ 09:06)  BP: 135/86 (07-06-19 @ 05:27)  RR: 18 (07-06-19 @ 05:27)  SpO2: 98% (07-06-19 @ 09:06) (94% - 100%)    Physical Exam: daughter at bedside  General:  non-toxic, sleeping  Head/EYES: eyes closed  ENT: no thrush  Cardio:  S1, S2  Respiratory:  grossly clear anteriorly  abd:     soft,   BS +  :   no  bledsoe  Musculoskeletal:   no joint swelling  Skin:    no rash  Neurologic:   did not assess  psych:  did not assess  Vascular: no phlebitis                                 9.8    1.90  )-----------( 128      ( 06 Jul 2019 05:16 )             30.8 07-06    137  |  97  |  9   ----------------------------<  104  3.5   |  27  |  0.41  Ca    9.1      06 Jul 2019 05:16Phos  2.8     07-06Mg     1.7     07-06    MICROBIOLOGY:  Culture - Respiratory with Gram Stain (collected 01 Jul 2019 14:45)  Source: BRONCHIAL LAVAGE  Preliminary Report (03 Jul 2019 11:32):    Normal Respiratory Le Also Present    ***** CRITICAL RESULT *****    PERSON CALLED / READ-BACK: SOMMER HAGAN R.N./MICHELLE    DATE / TIME CALLED: 07/03/19 1128    CALLED BY: MARILUZ FERRELL    MOLD^MOLD - Further ID to Follow  Preliminary Report (02 Jul 2019 09:51):    NRF^Normal Respiratory Le    QUANTITY OF GROWTH: RARE    Culture - Respiratory with Gram Stain (collected 01 Jul 2019 14:29)  Source: BRONCHIAL LAVAGE  Preliminary Report (03 Jul 2019 11:52):    NO GROWTH - PRELIMINARY RESULTS    NRF^Normal Respiratory Le    QUANTITY OF GROWTH: RARE    Culture - Blood (collected 30 Jun 2019 22:54)  Source: BLOOD VENOUS  Preliminary Report (03 Jul 2019 22:55):    NO ORGANISMS ISOLATED    NO ORGANISMS ISOLATED AT 72 HRS.    Culture - Blood (collected 30 Jun 2019 22:54)  Source: BLOOD PERIPHERAL  Preliminary Report (03 Jul 2019 22:55):    NO ORGANISMS ISOLATED    NO ORGANISMS ISOLATED AT 72 HRS.    Culture - Urine (collected 26 Jun 2019 13:04)  Source: URINE MIDSTREAM  Final Report (27 Jun 2019 09:03):    NO GROWTH AT 24 HOURS    Culture - Blood (collected 26 Jun 2019 10:29)  Source: BLOOD VENOUS  Final Report (01 Jul 2019 10:31):    NO ORGANISMS ISOLATED    Culture - Blood (collected 26 Jun 2019 10:29)  Source: BLOOD PERIPHERAL  Final Report (01 Jul 2019 10:31):    NO ORGANISMS ISOLATED    Bronch path- fungal hyphae in a background of necrotic tissue    RADIOLOGY:  ct chest- 1.  The bilateral nodules have increased in size in number. In particular, there are multiple masses in the bilateral lobes, with extension into the right lower lobe bronchus.  2.  Complete occlusion of the right lower lobe bronchus and the segmental bronchi of the right lower lobe with a central opacity. It is uncertain if this represents a combination of atelectasis and malignancy or alternatively atelectasis and pneumonia given the reported history.  3.  The chest lymph nodes are malignant. The prevascular lymph node, in particular, has increased in size.

## 2019-07-06 NOTE — PROGRESS NOTE ADULT - PROBLEM SELECTOR PLAN 6
[place]. [place] [facility] [place] as per family, [facility] is not offering any disease modifying treatment. Pt is looking into enrollment in a clinical trial   - C/w ppx mepron, valacyclovir and allopurinol  - GOC discussed with pt and daughter, want full code for now

## 2019-07-07 LAB
ANION GAP SERPL CALC-SCNC: 12 MMO/L — SIGNIFICANT CHANGE UP (ref 7–14)
ANISOCYTOSIS BLD QL: SIGNIFICANT CHANGE UP
BASE EXCESS BLDA CALC-SCNC: 4.8 MMOL/L — SIGNIFICANT CHANGE UP
BASOPHILS # BLD AUTO: 0.02 K/UL — SIGNIFICANT CHANGE UP (ref 0–0.2)
BASOPHILS NFR BLD AUTO: 0.9 % — SIGNIFICANT CHANGE UP (ref 0–2)
BASOPHILS NFR SPEC: 1 % — SIGNIFICANT CHANGE UP (ref 0–2)
BLOOD GAS ARTERIAL - FIO2: 50 — SIGNIFICANT CHANGE UP
BUN SERPL-MCNC: 8 MG/DL — SIGNIFICANT CHANGE UP (ref 7–23)
CALCIUM SERPL-MCNC: 9.6 MG/DL — SIGNIFICANT CHANGE UP (ref 8.4–10.5)
CHLORIDE SERPL-SCNC: 98 MMOL/L — SIGNIFICANT CHANGE UP (ref 98–107)
CO2 SERPL-SCNC: 31 MMOL/L — SIGNIFICANT CHANGE UP (ref 22–31)
CREAT SERPL-MCNC: 0.42 MG/DL — LOW (ref 0.5–1.3)
EOSINOPHIL # BLD AUTO: 0.03 K/UL — SIGNIFICANT CHANGE UP (ref 0–0.5)
EOSINOPHIL NFR BLD AUTO: 1.3 % — SIGNIFICANT CHANGE UP (ref 0–6)
EOSINOPHIL NFR FLD: 1 % — SIGNIFICANT CHANGE UP (ref 0–6)
GLUCOSE BLDA-MCNC: 165 MG/DL — HIGH (ref 70–99)
GLUCOSE SERPL-MCNC: 107 MG/DL — HIGH (ref 70–99)
HCO3 BLDA-SCNC: 29 MMOL/L — HIGH (ref 22–26)
HCT VFR BLD CALC: 33.3 % — LOW (ref 34.5–45)
HCT VFR BLDA CALC: 33.6 % — LOW (ref 34.5–46.5)
HGB BLD-MCNC: 10.4 G/DL — LOW (ref 11.5–15.5)
HGB BLDA-MCNC: 10.9 G/DL — LOW (ref 11.5–15.5)
IMM GRANULOCYTES NFR BLD AUTO: 12.6 % — HIGH (ref 0–1.5)
LYMPHOCYTES # BLD AUTO: 0.72 K/UL — LOW (ref 1–3.3)
LYMPHOCYTES # BLD AUTO: 32.3 % — SIGNIFICANT CHANGE UP (ref 13–44)
LYMPHOCYTES NFR SPEC AUTO: 27 % — SIGNIFICANT CHANGE UP (ref 13–44)
MACROCYTES BLD QL: SLIGHT — SIGNIFICANT CHANGE UP
MAGNESIUM SERPL-MCNC: 1.5 MG/DL — LOW (ref 1.6–2.6)
MANUAL SMEAR VERIFICATION: SIGNIFICANT CHANGE UP
MCHC RBC-ENTMCNC: 31.2 % — LOW (ref 32–36)
MCHC RBC-ENTMCNC: 31.3 PG — SIGNIFICANT CHANGE UP (ref 27–34)
MCV RBC AUTO: 100.3 FL — HIGH (ref 80–100)
METAMYELOCYTES # FLD: 1 % — SIGNIFICANT CHANGE UP (ref 0–1)
MONOCYTES # BLD AUTO: 0.26 K/UL — SIGNIFICANT CHANGE UP (ref 0–0.9)
MONOCYTES NFR BLD AUTO: 11.7 % — SIGNIFICANT CHANGE UP (ref 2–14)
MONOCYTES NFR BLD: 12 % — HIGH (ref 2–9)
MYELOCYTES NFR BLD: 6 % — HIGH (ref 0–0)
NEUTROPHIL AB SER-ACNC: 38 % — LOW (ref 43–77)
NEUTROPHILS # BLD AUTO: 0.92 K/UL — LOW (ref 1.8–7.4)
NEUTROPHILS NFR BLD AUTO: 41.2 % — LOW (ref 43–77)
NEUTS BAND # BLD: 5 % — SIGNIFICANT CHANGE UP (ref 0–6)
NRBC # BLD: 0 /100WBC — SIGNIFICANT CHANGE UP
NRBC # FLD: 0 K/UL — SIGNIFICANT CHANGE UP (ref 0–0)
PCO2 BLDA: 45 MMHG — SIGNIFICANT CHANGE UP (ref 32–48)
PH BLDA: 7.43 PH — SIGNIFICANT CHANGE UP (ref 7.35–7.45)
PHOSPHATE SERPL-MCNC: 2.6 MG/DL — SIGNIFICANT CHANGE UP (ref 2.5–4.5)
PLATELET # BLD AUTO: 121 K/UL — LOW (ref 150–400)
PLATELET COUNT - ESTIMATE: SIGNIFICANT CHANGE UP
PMV BLD: 10.2 FL — SIGNIFICANT CHANGE UP (ref 7–13)
PO2 BLDA: 107 MMHG — SIGNIFICANT CHANGE UP (ref 83–108)
POIKILOCYTOSIS BLD QL AUTO: SLIGHT — SIGNIFICANT CHANGE UP
POTASSIUM BLDA-SCNC: 3.1 MMOL/L — LOW (ref 3.4–4.5)
POTASSIUM SERPL-MCNC: 4.3 MMOL/L — SIGNIFICANT CHANGE UP (ref 3.5–5.3)
POTASSIUM SERPL-SCNC: 4.3 MMOL/L — SIGNIFICANT CHANGE UP (ref 3.5–5.3)
RBC # BLD: 3.32 M/UL — LOW (ref 3.8–5.2)
RBC # FLD: 18.9 % — HIGH (ref 10.3–14.5)
SAO2 % BLDA: 98.2 % — SIGNIFICANT CHANGE UP (ref 95–99)
SODIUM BLDA-SCNC: 131 MMOL/L — LOW (ref 136–146)
SODIUM SERPL-SCNC: 141 MMOL/L — SIGNIFICANT CHANGE UP (ref 135–145)
VARIANT LYMPHS # BLD: 9 % — SIGNIFICANT CHANGE UP
WBC # BLD: 2.23 K/UL — LOW (ref 3.8–10.5)
WBC # FLD AUTO: 2.23 K/UL — LOW (ref 3.8–10.5)

## 2019-07-07 PROCEDURE — 99233 SBSQ HOSP IP/OBS HIGH 50: CPT

## 2019-07-07 PROCEDURE — 71045 X-RAY EXAM CHEST 1 VIEW: CPT | Mod: 26

## 2019-07-07 PROCEDURE — 99233 SBSQ HOSP IP/OBS HIGH 50: CPT | Mod: GC

## 2019-07-07 RX ORDER — MAGNESIUM SULFATE 500 MG/ML
1 VIAL (ML) INJECTION ONCE
Refills: 0 | Status: COMPLETED | OUTPATIENT
Start: 2019-07-07 | End: 2019-07-07

## 2019-07-07 RX ORDER — PIPERACILLIN AND TAZOBACTAM 4; .5 G/20ML; G/20ML
3.38 INJECTION, POWDER, LYOPHILIZED, FOR SOLUTION INTRAVENOUS EVERY 8 HOURS
Refills: 0 | Status: DISCONTINUED | OUTPATIENT
Start: 2019-07-07 | End: 2019-07-08

## 2019-07-07 RX ORDER — PIPERACILLIN AND TAZOBACTAM 4; .5 G/20ML; G/20ML
3.38 INJECTION, POWDER, LYOPHILIZED, FOR SOLUTION INTRAVENOUS ONCE
Refills: 0 | Status: COMPLETED | OUTPATIENT
Start: 2019-07-07 | End: 2019-07-07

## 2019-07-07 RX ADMIN — Medication 20 MILLIGRAM(S): at 05:08

## 2019-07-07 RX ADMIN — ATOVAQUONE 1500 MILLIGRAM(S): 750 SUSPENSION ORAL at 11:09

## 2019-07-07 RX ADMIN — Medication 200 MILLIGRAM(S): at 22:48

## 2019-07-07 RX ADMIN — VORICONAZOLE 200 MILLIGRAM(S): 10 INJECTION, POWDER, LYOPHILIZED, FOR SOLUTION INTRAVENOUS at 05:09

## 2019-07-07 RX ADMIN — Medication 100 MILLIGRAM(S): at 05:10

## 2019-07-07 RX ADMIN — SODIUM CHLORIDE 4 MILLILITER(S): 9 INJECTION INTRAMUSCULAR; INTRAVENOUS; SUBCUTANEOUS at 07:06

## 2019-07-07 RX ADMIN — LEVALBUTEROL 0.63 MILLIGRAM(S): 1.25 SOLUTION, CONCENTRATE RESPIRATORY (INHALATION) at 00:01

## 2019-07-07 RX ADMIN — LEVALBUTEROL 0.63 MILLIGRAM(S): 1.25 SOLUTION, CONCENTRATE RESPIRATORY (INHALATION) at 04:01

## 2019-07-07 RX ADMIN — PIPERACILLIN AND TAZOBACTAM 200 GRAM(S): 4; .5 INJECTION, POWDER, LYOPHILIZED, FOR SOLUTION INTRAVENOUS at 17:36

## 2019-07-07 RX ADMIN — APIXABAN 2.5 MILLIGRAM(S): 2.5 TABLET, FILM COATED ORAL at 17:41

## 2019-07-07 RX ADMIN — BENZOCAINE AND MENTHOL 1 LOZENGE: 5; 1 LIQUID ORAL at 05:10

## 2019-07-07 RX ADMIN — LEVALBUTEROL 0.63 MILLIGRAM(S): 1.25 SOLUTION, CONCENTRATE RESPIRATORY (INHALATION) at 07:05

## 2019-07-07 RX ADMIN — SODIUM CHLORIDE 4 MILLILITER(S): 9 INJECTION INTRAMUSCULAR; INTRAVENOUS; SUBCUTANEOUS at 00:15

## 2019-07-07 RX ADMIN — LEVALBUTEROL 0.63 MILLIGRAM(S): 1.25 SOLUTION, CONCENTRATE RESPIRATORY (INHALATION) at 11:45

## 2019-07-07 RX ADMIN — SODIUM CHLORIDE 2 GRAM(S): 9 INJECTION INTRAMUSCULAR; INTRAVENOUS; SUBCUTANEOUS at 17:38

## 2019-07-07 RX ADMIN — Medication 100 MILLIGRAM(S): at 22:42

## 2019-07-07 RX ADMIN — BENZOCAINE AND MENTHOL 1 LOZENGE: 5; 1 LIQUID ORAL at 22:42

## 2019-07-07 RX ADMIN — SODIUM CHLORIDE 2 GRAM(S): 9 INJECTION INTRAMUSCULAR; INTRAVENOUS; SUBCUTANEOUS at 05:09

## 2019-07-07 RX ADMIN — BENZOCAINE AND MENTHOL 1 LOZENGE: 5; 1 LIQUID ORAL at 17:36

## 2019-07-07 RX ADMIN — Medication 100 GRAM(S): at 09:03

## 2019-07-07 RX ADMIN — Medication 50 MILLIGRAM(S): at 05:08

## 2019-07-07 RX ADMIN — Medication 300 MILLIGRAM(S): at 11:09

## 2019-07-07 RX ADMIN — BENZOCAINE AND MENTHOL 1 LOZENGE: 5; 1 LIQUID ORAL at 09:03

## 2019-07-07 RX ADMIN — SODIUM CHLORIDE 4 MILLILITER(S): 9 INJECTION INTRAMUSCULAR; INTRAVENOUS; SUBCUTANEOUS at 04:10

## 2019-07-07 RX ADMIN — APIXABAN 2.5 MILLIGRAM(S): 2.5 TABLET, FILM COATED ORAL at 05:08

## 2019-07-07 RX ADMIN — Medication 200 MILLIGRAM(S): at 04:40

## 2019-07-07 RX ADMIN — VORICONAZOLE 200 MILLIGRAM(S): 10 INJECTION, POWDER, LYOPHILIZED, FOR SOLUTION INTRAVENOUS at 17:38

## 2019-07-07 RX ADMIN — SODIUM CHLORIDE 4 MILLILITER(S): 9 INJECTION INTRAMUSCULAR; INTRAVENOUS; SUBCUTANEOUS at 11:47

## 2019-07-07 RX ADMIN — Medication 100 MILLIGRAM(S): at 17:37

## 2019-07-07 RX ADMIN — LEVALBUTEROL 0.63 MILLIGRAM(S): 1.25 SOLUTION, CONCENTRATE RESPIRATORY (INHALATION) at 20:01

## 2019-07-07 RX ADMIN — Medication 100 MILLIGRAM(S): at 05:08

## 2019-07-07 RX ADMIN — PANTOPRAZOLE SODIUM 40 MILLIGRAM(S): 20 TABLET, DELAYED RELEASE ORAL at 05:09

## 2019-07-07 RX ADMIN — SODIUM CHLORIDE 4 MILLILITER(S): 9 INJECTION INTRAMUSCULAR; INTRAVENOUS; SUBCUTANEOUS at 20:14

## 2019-07-07 NOTE — PROGRESS NOTE ADULT - SUBJECTIVE AND OBJECTIVE BOX
f/u fungal pneumonia    Interval History:  events noted this morning s/p RRT for increased work of breathing.  secretions noted.  CXR repeat looks slightly worse on the R side.  No fever.  Better now.  s/p MICU consult.  son is at bedside.  No confusion. Cough but is not productive. no CP.  All other systems negative    Allergies  No Known Allergies    ANTIMICROBIALS:    atovaquone Suspension 1500 daily  valACYclovir 500 <User Schedule>  voriconazole 200 every 12 hours    MEDICATIONS  (STANDING):  allopurinol 300 daily  apixaban 2.5 every 12 hours  docusate sodium 100 three times a day  levalbuterol Inhalation 0.63 every 4 hours  metoprolol succinate ER 50 daily  pantoprazole    Tablet 40 before breakfast  predniSONE   Tablet 20 daily  sodium chloride 3%  Inhalation 4 every 4 hours  PRN  benzonatate 100 milliGRAM(s) Oral three times a day PRN  bisacodyl 5 milliGRAM(s) Oral every 12 hours PRN  guaiFENesin    Syrup 200 milliGRAM(s) Oral every 6 hours PRN  polyethylene glycol 3350 17 Gram(s) Oral daily PRN    Vital Signs Last 24 Hrs  T(F): 97.7 (07-07-19 @ 06:49), Max: 98.3 (07-06-19 @ 15:07)  HR: 114 (07-07-19 @ 11:48)  BP: 154/109 (07-07-19 @ 06:49)  RR: 30 (07-07-19 @ 06:49)  SpO2: 94% (07-07-19 @ 06:49) (94% - 100%)    Physical Exam: son at bedside  General:  has on a face mask.  Head/EYES:  no icterus  ENT: supple  Cardio:  S1, S2  Respiratory:  diffusely rhoncherous; wheezing  abd:     soft,   BS +  :   no  bledsoe  Musculoskeletal:   no joint swelling  Skin:    no rash  Neurologic:   grossly non-focal  psych:  normal affect  Vascular: no phlebitis                             10.4   2.23  )-----------( 121      ( 07 Jul 2019 06:08 )             33.3 07-07    141  |  98  |  8   ----------------------------<  107  4.3   |  31  |  0.42  Ca    9.6      07 Jul 2019 06:08Phos  2.6     07-07Mg     1.5     07-07    MICROBIOLOGY:  Culture - Respiratory with Gram Stain (collected 01 Jul 2019 14:45)  Source: BRONCHIAL LAVAGE  Preliminary Report (03 Jul 2019 11:32):    Normal Respiratory Le Also Present    ***** CRITICAL RESULT *****    PERSON CALLED / READ-BACK: SOMMER HAGAN R.N./MICHELLE    DATE / TIME CALLED: 07/03/19 1128    CALLED BY: MARILUZ FERRELL    MOLD^MOLD - Further ID to Follow  Preliminary Report (02 Jul 2019 09:51):    NRF^Normal Respiratory Le    QUANTITY OF GROWTH: RARE    Culture - Respiratory with Gram Stain (collected 01 Jul 2019 14:29)  Source: BRONCHIAL LAVAGE  Preliminary Report (03 Jul 2019 11:52):    NO GROWTH - PRELIMINARY RESULTS    NRF^Normal Respiratory Le    QUANTITY OF GROWTH: RARE    Culture - Blood (collected 30 Jun 2019 22:54)  Source: BLOOD VENOUS  Preliminary Report (03 Jul 2019 22:55):    NO ORGANISMS ISOLATED    NO ORGANISMS ISOLATED AT 72 HRS.    Culture - Blood (collected 30 Jun 2019 22:54)  Source: BLOOD PERIPHERAL  Preliminary Report (03 Jul 2019 22:55):    NO ORGANISMS ISOLATED    NO ORGANISMS ISOLATED AT 72 HRS.    Culture - Urine (collected 26 Jun 2019 13:04)  Source: URINE MIDSTREAM  Final Report (27 Jun 2019 09:03):    NO GROWTH AT 24 HOURS    Culture - Blood (collected 26 Jun 2019 10:29)  Source: BLOOD VENOUS  Final Report (01 Jul 2019 10:31):    NO ORGANISMS ISOLATED    Culture - Blood (collected 26 Jun 2019 10:29)  Source: BLOOD PERIPHERAL  Final Report (01 Jul 2019 10:31):    NO ORGANISMS ISOLATED    Bronch path- fungal hyphae in a background of necrotic tissue    RADIOLOGY:  repeat CXR 7/7 - increased opacity of the R lung    ct chest- 1.  The bilateral nodules have increased in size in number. In particular, there are multiple masses in the bilateral lobes, with extension into the right lower lobe bronchus.  2.  Complete occlusion of the right lower lobe bronchus and the segmental bronchi of the right lower lobe with a central opacity. It is uncertain if this represents a combination of atelectasis and malignancy or alternatively atelectasis and pneumonia given the reported history.  3.  The chest lymph nodes are malignant. The prevascular lymph node, in particular, has increased in size.

## 2019-07-07 NOTE — PROGRESS NOTE ADULT - PROBLEM SELECTOR PLAN 1
RLL bronchus obstruction with concern for postobstructive PNA s/p BAL 7/1 with thoracic sx  -had RRT this morning for increased work of breathing, improved after nebs/solumedrol/lasix, repeat CXR 7/7 right lung infiltrate/atelectasis to my view, f/u official read  -d/w ID fellow, need follow up  -evaluated by MICU, no need for admission at this time, ordered acapella device   -remains hypoxic, cont O2 to maintain SpO2>90, wean as tolerated   -BAL cx NTD, path with Fungal hyphae in a background of necrotic tissue  - F/U fungal cx that was added on   - c/w voriconazole pending ID to cover for possible invasive aspergilloses  - f/u Galactomannen, beta-d-glucan  -off zosyn (completed 10 days), consider restarting zosyn, defer to ID  - continue mepron 1500 mg po daily  - trend cbc w diff  - standing airway clearance therapy, Xopenex q4 hours, following by nebulized 3% hypertonic saline, and then Aerobika device as per Pulm recommendations  - supportive care for rhino virus- hycodan prn for cough  - Pulmonary and ID consulted, recommendations appreciated  - Nurse instructed to give cough meds

## 2019-07-07 NOTE — CONSULT NOTE ADULT - SUBJECTIVE AND OBJECTIVE BOX
CHIEF COMPLAINT: SOB, increased WOB    HPI:  79F hx of DLBC lymphoma, Afib on eliquis presenting with three days of worsening SOB and productive cough. Patient states she was having worsening phlegm and felt like should "couldn't breath" this AM, so she went to ED. Has had chronic cough and per son was started on prednisone 40mg x2 days, now on prednisone 20mg by Dr. Nguyen for worsening cough. She denies sick contacts, fevers, chills, LE edema. She denies chest pain or palpitations. Endorsing decreased PO intake. Denies dysuria, n/v/d.       Pt admitted for pna superimposed on entero/rhinovirus.     RRT:  RRT called for tachypnea. Upon arrival, patient breathing at rate of 40, saturating 100% on nasal cannula, in afib with RVR in 130s-140s, and Bps 160s/70s. Patient given xopinex. Physical exam revealed course breath sounds and rales at bases, as well as upper airway secretions. Patient deep suctioned with minimal mucous retreived. She was given chest PT with some mucous expectorated. Patient placed on venti-mask. BiPAP not used given patient with mucous plugging and marked secretions. Patient then given 50 mg of IV solu-medrol, and 40mg of IV lasix for airway inflammation and diuresis respectively. Patient with improvement in respiration. ABG done and chest xray to be followed up by primary team. MICU consulted for respiratory distress.    PAST MEDICAL & SURGICAL HISTORY:  Alopecia: wears a wig -- hair regrowing back  Murmur, cardiac  Varicose veins  GERD (gastroesophageal reflux disease)  Tracheal compression  Thrombocytopenia  Lymphadenopathy: as per Dr. 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 Palacios&#x27;s consult on 12-4-17  Family history of ovarian cancer (Aunt)      SOCIAL HISTORY:  Occupation: retired    	Lives with: son and    	Substance Use: denies   	Tobacco Usage:  denies   Alcohol Usage: denies      Allergies  No Known Allergies    HOME MEDICATIONS:  Home Medications:  allopurinol 300 mg oral tablet: 1 tab(s) orally once a day (26 Jun 2019 17:27)  Bactrim  mg-160 mg oral tablet: 1 tab(s) orally every other day  (26 Jun 2019 17:27)  benzonatate 100 mg oral capsule: 1 cap(s) orally 3 times a day, As Needed (26 Jun 2019 17:27)  Eliquis 2.5 mg oral tablet: 1 tab(s) orally 2 times a day  (26 Jun 2019 17:27)  Metoprolol Succinate ER 50 mg oral tablet, extended release: 1 tab(s) orally once a day in the morning (26 Jun 2019 17:27)  olmesartan 20 mg oral tablet: 1 tab(s) orally once a day (26 Jun 2019 17:27)  pantoprazole 40 mg oral delayed release tablet: 1 tab(s) orally once a day (26 Jun 2019 17:27)  predniSONE 20 mg oral tablet: 1 tab(s) orally once a day (26 Jun 2019 17:27)  valACYclovir 500 mg oral tablet: 1 tab(s) orally every other day  (26 Jun 2019 17:27)      REVIEW OF SYSTEMS:  CONSTITUTIONAL: No weakness, fevers or chills  EYES/ENT: No visual changes;  No vertigo or throat pain   NECK: No pain or stiffness  RESPIRATORY: pt SOB, but reports breathing improved after RRT  CARDIOVASCULAR: No chest pain or palpitations  GASTROINTESTINAL: No abdominal or epigastric pain. No n/v/c/d, melena, or hematochezia.  GENITOURINARY: No dysuria, frequency or hematuria  NEUROLOGICAL:  No headache, dizziness, syncope.  MUSCULOSKELETAL:  No muscle, back pain, joint pain or stiffness.  HEMATOLOGIC:  No anemia, bleeding or bruising.    OBJECTIVE:  T(C): 36.5 (07-07-19 @ 06:49), Max: 36.8 (07-06-19 @ 15:07)  HR: 121 (07-07-19 @ 07:06) (77 - 127)  BP: 154/109 (07-07-19 @ 06:49) (124/83 - 154/109)  RR: 30 (07-07-19 @ 06:49) (17 - 30)  SpO2: 94% (07-07-19 @ 06:49) (94% - 100%)    GENERAL: mild resp distress, but reports breathingbetter  HEAD:  Atraumatic, Normocephalic  EYES: EOMI, conjunctiva and sclera clear  NECK: Supple, No JVD  CHEST/LUNG: diffuse rhonchi and wheezing bilaterally  HEART: tachycardic, irregular; No murmurs, rubs, or gallops  ABDOMEN: Soft, Nontender, Nondistended; Bowel sounds present  EXTREMITIES:  2+ Peripheral Pulses, No clubbing, cyanosis, or edema  PSYCH: AAOx3  NEUROLOGY: non-focal  SKIN: No rashes or lesions      HOSPITAL MEDICATIONS:  MEDICATIONS  (STANDING):  allopurinol 300 milliGRAM(s) Oral daily  apixaban 2.5 milliGRAM(s) Oral every 12 hours  atovaquone Suspension 1500 milliGRAM(s) Oral daily  benzocaine 15 mG/menthol 3.6 mG (Sugar-Free) Lozenge 1 Lozenge Oral every 4 hours  docusate sodium 100 milliGRAM(s) Oral three times a day  lactated ringers. 1000 milliLiter(s) (30 mL/Hr) IV Continuous <Continuous>  levalbuterol Inhalation 0.63 milliGRAM(s) Inhalation every 4 hours  metoprolol succinate ER 50 milliGRAM(s) Oral daily  pantoprazole    Tablet 40 milliGRAM(s) Oral before breakfast  predniSONE   Tablet 20 milliGRAM(s) Oral daily  sodium chloride 2 Gram(s) Oral two times a day  sodium chloride 3%  Inhalation 4 milliLiter(s) Inhalation every 4 hours  valACYclovir 500 milliGRAM(s) Oral <User Schedule>  voriconazole 200 milliGRAM(s) Oral every 12 hours    MEDICATIONS  (PRN):  benzonatate 100 milliGRAM(s) Oral three times a day PRN Cough  bisacodyl 5 milliGRAM(s) Oral every 12 hours PRN Constipation  guaiFENesin    Syrup 200 milliGRAM(s) Oral every 6 hours PRN Cough  polyethylene glycol 3350 17 Gram(s) Oral daily PRN Constipation  sodium chloride 0.65% Nasal 1 Spray(s) Both Nostrils daily PRN Nasal Congestion      LABS:                        10.4   2.23  )-----------( 121      ( 07 Jul 2019 06:08 )             33.3     07-07    141  |  98  |  8   ----------------------------<  107<H>  4.3   |  31  |  0.42<L>    Ca    9.6      07 Jul 2019 06:08  Phos  2.6     07-07  Mg     1.5     07-07    Arterial Blood Gas:  07-07 @ 07:19  7.43/45/107/29/98.2/4.8  ABG lactate: --    MICROBIOLOGY:   Culture - Respiratory with Gram Stain (07.01.19 @ 14:45)    Culture - Respiratory:   NRF^Normal Respiratory Le  QUANTITY OF GROWTH: RARE    Culture - Respiratory:   Normal Respiratory Le Also Present  ***** CRITICAL RESULT *****  PERSON CALLED / READ-BACK: SOMMER HAGAN R.N./MICHELLE  DATE / TIME CALLED: 07/03/19 1128  CALLED BY: MARILUZ FERRELL  ---------------PRELIMINARY REPORT---------------------  MOLD LIKE FUNGUS ISOLATED  REFERRED TO [facility]  [address] [place] [address]  Phone: [phone]  Fax:   [phone]  :           BETHANY GREGORY MD  MOLD^MOLD - Further ID to Follow    Gram Stain Sputum:   NOS^No Organisms Seen  WBC^White Blood Cells  QNTY CELLS IN GRAM STAIN: RARE (1+)    Specimen Source: BRONCHIAL LAVAGE    RADIOLOGY:  < from: Xray Chest 1 View AP/PA (07.01.19 @ 11:28) >  INTERPRETATION:     No complications such as pneumomediastinum or pneumothorax post   bronchoscopy and biopsy. Lungs are unchanged. Bilateral airspace   opacities. No effusions.      COMPARISON:  June 26, 2019      IMPRESSION:  Status post bronchoscopy and biopsy without complications   from this procedure.    < end of copied text >

## 2019-07-07 NOTE — PROGRESS NOTE ADULT - SUBJECTIVE AND OBJECTIVE BOX
Priti Novoa MD  Pager [phone]    CHIEF COMPLAINT: Patient is a 79y old  female who presents with a chief complaint of SOB (07 Jul 2019 09:11)      SUBJECTIVE / OVERNIGHT EVENTS:  pt had RRT early this morning for tachypnea and increased work of breathing, given xopenex, deep suctioned, chest PT, given solumedrol and iv lasix with improvement, denies chest pain/fever; evaluated by MICU, recommended acapella and no ICU admission at this time    MEDICATIONS  (STANDING):  allopurinol 300 milliGRAM(s) Oral daily  apixaban 2.5 milliGRAM(s) Oral every 12 hours  atovaquone Suspension 1500 milliGRAM(s) Oral daily  benzocaine 15 mG/menthol 3.6 mG (Sugar-Free) Lozenge 1 Lozenge Oral every 4 hours  docusate sodium 100 milliGRAM(s) Oral three times a day  lactated ringers. 1000 milliLiter(s) (30 mL/Hr) IV Continuous <Continuous>  levalbuterol Inhalation 0.63 milliGRAM(s) Inhalation every 4 hours  metoprolol succinate ER 50 milliGRAM(s) Oral daily  pantoprazole    Tablet 40 milliGRAM(s) Oral before breakfast  predniSONE   Tablet 20 milliGRAM(s) Oral daily  sodium chloride 2 Gram(s) Oral two times a day  sodium chloride 3%  Inhalation 4 milliLiter(s) Inhalation every 4 hours  valACYclovir 500 milliGRAM(s) Oral <User Schedule>  voriconazole 200 milliGRAM(s) Oral every 12 hours    MEDICATIONS  (PRN):  benzonatate 100 milliGRAM(s) Oral three times a day PRN Cough  bisacodyl 5 milliGRAM(s) Oral every 12 hours PRN Constipation  guaiFENesin    Syrup 200 milliGRAM(s) Oral every 6 hours PRN Cough  polyethylene glycol 3350 17 Gram(s) Oral daily PRN Constipation  sodium chloride 0.65% Nasal 1 Spray(s) Both Nostrils daily PRN Nasal Congestion      VITALS:  T(F): 97.7 (07-07-19 @ 06:49), Max: 98.3 (07-06-19 @ 15:07)  HR: 114 (07-07-19 @ 11:48) (77 - 127)  BP: 154/109 (07-07-19 @ 06:49) (124/83 - 154/109)  RR: 30 (07-07-19 @ 06:49) (17 - 30)  SpO2: 94% (07-07-19 @ 06:49)      CAPILLARY BLOOD GLUCOSE    Output     I&O's Summary  T(F): 97.7 (07-07-19 @ 06:49), Max: 98.3 (07-06-19 @ 15:07)  HR: 114 (07-07-19 @ 11:48) (77 - 127)  BP: 154/109 (07-07-19 @ 06:49) (124/83 - 154/109)  RR: 30 (07-07-19 @ 06:49) (17 - 30)  SpO2: 94% (07-07-19 @ 06:49)    PHYSICAL EXAM:  GENERAL: NAD, well-developed  HEAD:  Atraumatic, Normocephalic  EYES: EOMI,  conjunctiva and sclera clear  NECK: Supple, No JVD  CHEST/LUNG: diffuse rhonchi/coarse breath sounds  HEART: Regular rate and rhythm; No murmurs, rubs, or gallops  ABDOMEN: Soft, Nontender, Nondistended; Bowel sounds present  EXTREMITIES:  2+ Peripheral Pulses, No clubbing, cyanosis, or edema  PSYCH: AAOx3  NEUROLOGY: non-focal  SKIN: No rashes or lesions    LABS:              10.4                 141  | 31   | 8            2.23  >-----------< 121     ------------------------< 107                   33.3                 4.3  | 98   | 0.42                                         Ca 9.6   Mg 1.5   Ph 2.6        ABG - ( 07 Jul 2019 07:19 )  pH, Arterial: 7.43  pH, Blood: x     /  pCO2: 45    /  pO2: 107   / HCO3: 29    / Base Excess: 4.8   /  SaO2: 98.2          MICROBIOLOGY:    RADIOLOGY & ADDITIONAL TESTS:    Imaging Personally Reviewed:      [ ] Consultant(s) Notes Reviewed:  [ ] Care Discussed with Consultants/Other Providers:

## 2019-07-07 NOTE — PROGRESS NOTE ADULT - ASSESSMENT
79F with relapsed B cell lymphoma (dx 2017, now relaplse).  Treated with rituximab and revlimid in 3/2019.  Treated with Obintuzmab and Bendamustine iin 4/2019.  Presents with shortness of breath.  She had abnormal imaging with lung nodules.  On 7/1- she had a bronchoscopy - path and culture suggestive of fungal process.  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  Neutropenic.  Acute respiratory     Suggest:  - c/w voriconazole - seems to be tolerating  - f/u Galactomannen  - check beta-d-glucan  - continue mepron 1500 mg po daily  - trend cbc w diff 79F with relapsed B cell lymphoma (dx 2017, now relaplse).  Treated with rituximab and revlimid in 3/2019.  Treated with Obintuzmab and Bendamustine iin 4/2019.  Presents with shortness of breath.  She had abnormal imaging with lung nodules.  On 7/1- she had a bronchoscopy - path and culture suggestive of fungal process.  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  Neutropenic.  Acute respiratory distress this morning with increased secretions and a changing CXR.  Agree with restarting antibiotics    Suggest:  - c/w voriconazole  - restart zosyn  - f/u Galactomannen  - check beta-d-glucan  - continue mepron 1500 mg po daily  - trend cbc w diff  - acapella    d/w housestaff

## 2019-07-07 NOTE — PROGRESS NOTE ADULT - PROBLEM SELECTOR PLAN 6
Follows with Dr. Nguyen at [facility] [place]. as per family, [facility] is not offering any disease modifying treatment. Pt is looking into enrollment in a clinical trial   - C/w ppx mepron, valacyclovir and allopurinol  - GOC discussed with pt and daughter, want full code for now

## 2019-07-07 NOTE — CONSULT NOTE ADULT - ASSESSMENT
79F hx of DLBC lymphoma, Afib on eliquis presenting with three days of worsening SOB and productive cough x3 days found to have +entero/rhinovirus with possible superimposed fungal PNA confirmed on bronchoscopy. 79F hx of DLBC lymphoma, Afib on eliquis presenting with three days of worsening SOB and productive cough x3 days found to have +entero/rhinovirus with possible superimposed fungal PNA confirmed on bronchoscopy.    #Neuro  -no acute issues, AAox3    #Resp  Tachypnea, increased WOB  -likely in the setting of poor secretion mobilization  recommend Acapella for airways mobilization, aggressive pulm toilet, frequent Ipratropium nebulization, 3% NaCl Neb for mucus mobilization    PNA  -previously on Zosyn, now only on antifungals  -would consider bacterial coverage for now in setting of decompensation, consider ID c/s for further manamgnet    #CV  Afib, RVR  -likely rapid afib in setting of increased WOB and PNA  -c/w home rate control and AC    #GI  -no acute issues    #ID  PNA  -fungal, enterorhino, ?bacterial  -c/w fungal coverage  -would give bacterial coverage in setting of worsening breathing status    #Renal  -no acute issues    #Heme  DLBCL c/b pancytopenia  -c/w management as per primary team    #Endo  -no acute issues    Pt is not currently candidate for MICU, please reconsult as necessary.

## 2019-07-08 DIAGNOSIS — B49 UNSPECIFIED MYCOSIS: ICD-10-CM

## 2019-07-08 LAB
ANION GAP SERPL CALC-SCNC: 14 MMO/L — SIGNIFICANT CHANGE UP (ref 7–14)
BASOPHILS # BLD AUTO: 0.04 K/UL — SIGNIFICANT CHANGE UP (ref 0–0.2)
BASOPHILS NFR BLD AUTO: 1.4 % — SIGNIFICANT CHANGE UP (ref 0–2)
BUN SERPL-MCNC: 10 MG/DL — SIGNIFICANT CHANGE UP (ref 7–23)
CALCIUM SERPL-MCNC: 9.5 MG/DL — SIGNIFICANT CHANGE UP (ref 8.4–10.5)
CHLORIDE SERPL-SCNC: 93 MMOL/L — LOW (ref 98–107)
CO2 SERPL-SCNC: 31 MMOL/L — SIGNIFICANT CHANGE UP (ref 22–31)
CREAT SERPL-MCNC: 0.42 MG/DL — LOW (ref 0.5–1.3)
EOSINOPHIL # BLD AUTO: 0.01 K/UL — SIGNIFICANT CHANGE UP (ref 0–0.5)
EOSINOPHIL NFR BLD AUTO: 0.4 % — SIGNIFICANT CHANGE UP (ref 0–6)
GLUCOSE SERPL-MCNC: 127 MG/DL — HIGH (ref 70–99)
GRAM STN SPT: SIGNIFICANT CHANGE UP
HCT VFR BLD CALC: 33.8 % — LOW (ref 34.5–45)
HGB BLD-MCNC: 10.7 G/DL — LOW (ref 11.5–15.5)
IMM GRANULOCYTES NFR BLD AUTO: 11.2 % — HIGH (ref 0–1.5)
LYMPHOCYTES # BLD AUTO: 0.83 K/UL — LOW (ref 1–3.3)
LYMPHOCYTES # BLD AUTO: 30 % — SIGNIFICANT CHANGE UP (ref 13–44)
MAGNESIUM SERPL-MCNC: 1.7 MG/DL — SIGNIFICANT CHANGE UP (ref 1.6–2.6)
MANUAL SMEAR VERIFICATION: SIGNIFICANT CHANGE UP
MCHC RBC-ENTMCNC: 31.3 PG — SIGNIFICANT CHANGE UP (ref 27–34)
MCHC RBC-ENTMCNC: 31.7 % — LOW (ref 32–36)
MCV RBC AUTO: 98.8 FL — SIGNIFICANT CHANGE UP (ref 80–100)
MISCELLANEOUS - CHEM: SIGNIFICANT CHANGE UP
MONOCYTES # BLD AUTO: 0.31 K/UL — SIGNIFICANT CHANGE UP (ref 0–0.9)
MONOCYTES NFR BLD AUTO: 11.2 % — SIGNIFICANT CHANGE UP (ref 2–14)
NEUTROPHILS # BLD AUTO: 1.27 K/UL — LOW (ref 1.8–7.4)
NEUTROPHILS NFR BLD AUTO: 45.8 % — SIGNIFICANT CHANGE UP (ref 43–77)
NRBC # FLD: 0.02 K/UL — SIGNIFICANT CHANGE UP (ref 0–0)
PHOSPHATE SERPL-MCNC: 2.7 MG/DL — SIGNIFICANT CHANGE UP (ref 2.5–4.5)
PLATELET # BLD AUTO: 136 K/UL — LOW (ref 150–400)
PMV BLD: 11.6 FL — SIGNIFICANT CHANGE UP (ref 7–13)
POTASSIUM SERPL-MCNC: 4.3 MMOL/L — SIGNIFICANT CHANGE UP (ref 3.5–5.3)
POTASSIUM SERPL-SCNC: 4.3 MMOL/L — SIGNIFICANT CHANGE UP (ref 3.5–5.3)
RBC # BLD: 3.42 M/UL — LOW (ref 3.8–5.2)
RBC # FLD: 18.9 % — HIGH (ref 10.3–14.5)
SODIUM SERPL-SCNC: 138 MMOL/L — SIGNIFICANT CHANGE UP (ref 135–145)
SPECIMEN SOURCE: SIGNIFICANT CHANGE UP
WBC # BLD: 2.77 K/UL — LOW (ref 3.8–10.5)
WBC # FLD AUTO: 2.77 K/UL — LOW (ref 3.8–10.5)

## 2019-07-08 PROCEDURE — 99233 SBSQ HOSP IP/OBS HIGH 50: CPT

## 2019-07-08 PROCEDURE — 99232 SBSQ HOSP IP/OBS MODERATE 35: CPT

## 2019-07-08 RX ORDER — LEVALBUTEROL 1.25 MG/.5ML
0.63 SOLUTION, CONCENTRATE RESPIRATORY (INHALATION) EVERY 6 HOURS
Refills: 0 | Status: DISCONTINUED | OUTPATIENT
Start: 2019-07-08 | End: 2019-07-09

## 2019-07-08 RX ADMIN — LEVALBUTEROL 0.63 MILLIGRAM(S): 1.25 SOLUTION, CONCENTRATE RESPIRATORY (INHALATION) at 12:55

## 2019-07-08 RX ADMIN — LEVALBUTEROL 0.63 MILLIGRAM(S): 1.25 SOLUTION, CONCENTRATE RESPIRATORY (INHALATION) at 09:26

## 2019-07-08 RX ADMIN — SODIUM CHLORIDE 4 MILLILITER(S): 9 INJECTION INTRAMUSCULAR; INTRAVENOUS; SUBCUTANEOUS at 20:43

## 2019-07-08 RX ADMIN — Medication 100 MILLIGRAM(S): at 06:00

## 2019-07-08 RX ADMIN — VALACYCLOVIR 500 MILLIGRAM(S): 500 TABLET, FILM COATED ORAL at 17:15

## 2019-07-08 RX ADMIN — Medication 300 MILLIGRAM(S): at 15:10

## 2019-07-08 RX ADMIN — Medication 20 MILLIGRAM(S): at 06:01

## 2019-07-08 RX ADMIN — PIPERACILLIN AND TAZOBACTAM 25 GRAM(S): 4; .5 INJECTION, POWDER, LYOPHILIZED, FOR SOLUTION INTRAVENOUS at 08:50

## 2019-07-08 RX ADMIN — PIPERACILLIN AND TAZOBACTAM 25 GRAM(S): 4; .5 INJECTION, POWDER, LYOPHILIZED, FOR SOLUTION INTRAVENOUS at 15:09

## 2019-07-08 RX ADMIN — Medication 100 MILLIGRAM(S): at 21:15

## 2019-07-08 RX ADMIN — Medication 100 MILLIGRAM(S): at 15:09

## 2019-07-08 RX ADMIN — PIPERACILLIN AND TAZOBACTAM 25 GRAM(S): 4; .5 INJECTION, POWDER, LYOPHILIZED, FOR SOLUTION INTRAVENOUS at 00:01

## 2019-07-08 RX ADMIN — APIXABAN 2.5 MILLIGRAM(S): 2.5 TABLET, FILM COATED ORAL at 05:59

## 2019-07-08 RX ADMIN — PANTOPRAZOLE SODIUM 40 MILLIGRAM(S): 20 TABLET, DELAYED RELEASE ORAL at 06:00

## 2019-07-08 RX ADMIN — LEVALBUTEROL 0.63 MILLIGRAM(S): 1.25 SOLUTION, CONCENTRATE RESPIRATORY (INHALATION) at 16:24

## 2019-07-08 RX ADMIN — VORICONAZOLE 200 MILLIGRAM(S): 10 INJECTION, POWDER, LYOPHILIZED, FOR SOLUTION INTRAVENOUS at 07:35

## 2019-07-08 RX ADMIN — BENZOCAINE AND MENTHOL 1 LOZENGE: 5; 1 LIQUID ORAL at 15:09

## 2019-07-08 RX ADMIN — SODIUM CHLORIDE 4 MILLILITER(S): 9 INJECTION INTRAMUSCULAR; INTRAVENOUS; SUBCUTANEOUS at 09:27

## 2019-07-08 RX ADMIN — VORICONAZOLE 200 MILLIGRAM(S): 10 INJECTION, POWDER, LYOPHILIZED, FOR SOLUTION INTRAVENOUS at 17:15

## 2019-07-08 RX ADMIN — ATOVAQUONE 1500 MILLIGRAM(S): 750 SUSPENSION ORAL at 15:12

## 2019-07-08 RX ADMIN — APIXABAN 2.5 MILLIGRAM(S): 2.5 TABLET, FILM COATED ORAL at 17:15

## 2019-07-08 RX ADMIN — SODIUM CHLORIDE 4 MILLILITER(S): 9 INJECTION INTRAMUSCULAR; INTRAVENOUS; SUBCUTANEOUS at 04:15

## 2019-07-08 RX ADMIN — LEVALBUTEROL 0.63 MILLIGRAM(S): 1.25 SOLUTION, CONCENTRATE RESPIRATORY (INHALATION) at 00:01

## 2019-07-08 RX ADMIN — LEVALBUTEROL 0.63 MILLIGRAM(S): 1.25 SOLUTION, CONCENTRATE RESPIRATORY (INHALATION) at 04:00

## 2019-07-08 RX ADMIN — LEVALBUTEROL 0.63 MILLIGRAM(S): 1.25 SOLUTION, CONCENTRATE RESPIRATORY (INHALATION) at 20:45

## 2019-07-08 RX ADMIN — Medication 50 MILLIGRAM(S): at 06:01

## 2019-07-08 RX ADMIN — SODIUM CHLORIDE 4 MILLILITER(S): 9 INJECTION INTRAMUSCULAR; INTRAVENOUS; SUBCUTANEOUS at 00:15

## 2019-07-08 RX ADMIN — SODIUM CHLORIDE 4 MILLILITER(S): 9 INJECTION INTRAMUSCULAR; INTRAVENOUS; SUBCUTANEOUS at 16:24

## 2019-07-08 RX ADMIN — SODIUM CHLORIDE 2 GRAM(S): 9 INJECTION INTRAMUSCULAR; INTRAVENOUS; SUBCUTANEOUS at 06:01

## 2019-07-08 RX ADMIN — SODIUM CHLORIDE 2 GRAM(S): 9 INJECTION INTRAMUSCULAR; INTRAVENOUS; SUBCUTANEOUS at 17:15

## 2019-07-08 RX ADMIN — BENZOCAINE AND MENTHOL 1 LOZENGE: 5; 1 LIQUID ORAL at 21:15

## 2019-07-08 NOTE — PROGRESS NOTE ADULT - SUBJECTIVE AND OBJECTIVE BOX
CC: F/U for fungal PNA    SUBJECTIVE / OVERNIGHT EVENTS:  Patient states that her breathing is better compared to yesterday but still with significant LAWRENCE and cough.  No F/C, N/V, CP, lightheadedness, dizziness, abdominal pain, diarrhea, dysuria.      MEDICATIONS  (STANDING):  allopurinol 300 milliGRAM(s) Oral daily  apixaban 2.5 milliGRAM(s) Oral every 12 hours  atovaquone Suspension 1500 milliGRAM(s) Oral daily  benzocaine 15 mG/menthol 3.6 mG (Sugar-Free) Lozenge 1 Lozenge Oral every 4 hours  docusate sodium 100 milliGRAM(s) Oral three times a day  lactated ringers. 1000 milliLiter(s) (30 mL/Hr) IV Continuous <Continuous>  levalbuterol Inhalation 0.63 milliGRAM(s) Inhalation every 4 hours  metoprolol succinate ER 50 milliGRAM(s) Oral daily  pantoprazole    Tablet 40 milliGRAM(s) Oral before breakfast  piperacillin/tazobactam IVPB.. 3.375 Gram(s) IV Intermittent every 8 hours  predniSONE   Tablet 20 milliGRAM(s) Oral daily  sodium chloride 2 Gram(s) Oral two times a day  sodium chloride 3%  Inhalation 4 milliLiter(s) Inhalation every 4 hours  valACYclovir 500 milliGRAM(s) Oral <User Schedule>  voriconazole 200 milliGRAM(s) Oral every 12 hours    MEDICATIONS  (PRN):  benzonatate 100 milliGRAM(s) Oral three times a day PRN Cough  bisacodyl 5 milliGRAM(s) Oral every 12 hours PRN Constipation  guaiFENesin    Syrup 200 milliGRAM(s) Oral every 6 hours PRN Cough  polyethylene glycol 3350 17 Gram(s) Oral daily PRN Constipation  sodium chloride 0.65% Nasal 1 Spray(s) Both Nostrils daily PRN Nasal Congestion      Vital Signs Last 24 Hrs  T(C): 36.5 (08 Jul 2019 15:07), Max: 36.6 (07 Jul 2019 18:27)  T(F): 97.7 (08 Jul 2019 15:07), Max: 97.8 (07 Jul 2019 18:27)  HR: 112 (08 Jul 2019 15:07) (80 - 125)  BP: 132/95 (08 Jul 2019 15:07) (125/84 - 152/97)  BP(mean): --  RR: 18 (08 Jul 2019 15:07) (18 - 20)  SpO2: 97% (08 Jul 2019 15:07) (95% - 100%)  CAPILLARY BLOOD GLUCOSE        I&O's Summary      PHYSICAL EXAM:  GENERAL: NAD, well-developed  HEAD:  Atraumatic, Normocephalic  EYES: EOMI, PERRLA, conjunctiva and sclera clear  NECK: Supple, No JVD  CHEST/LUNG: Diffuse rhonchi and crackles.  HEART: Regular rate and rhythm; No murmurs, rubs, or gallops  ABDOMEN: Soft, Nontender, Nondistended; Bowel sounds present  EXTREMITIES:  2+ Peripheral Pulses, No clubbing, cyanosis, or edema  PSYCH: Calm  NEUROLOGY: A/Ox3, non-focal  SKIN: No rashes or lesions    LABS:                        10.7   2.77  )-----------( 136      ( 08 Jul 2019 06:00 )             33.8     07-08    138  |  93<L>  |  10  ----------------------------<  127<H>  4.3   |  31  |  0.42<L>    Ca    9.5      08 Jul 2019 06:00  Phos  2.7     07-08  Mg     1.7     07-08                RADIOLOGY & ADDITIONAL TESTS:    Imaging Personally Reviewed:    Care Discussed with Consultants/Other Providers:

## 2019-07-08 NOTE — PROGRESS NOTE ADULT - PROBLEM SELECTOR PLAN 1
RLL bronchus obstruction with concern for postobstructive fungal PNA  - appreciate MICU eval  - cont O2 to maintain SpO2>90, wean as tolerated   - trend cbc w diff  - standing airway clearance therapy, Xopenex q4 hours, following by nebulized 3% hypertonic saline, and then Aerobika device as per Pulm recommendations  - supportive care for rhino virus- hycodan prn for cough

## 2019-07-08 NOTE — PROGRESS NOTE ADULT - PROBLEM SELECTOR PLAN 2
- c/w voriconazole pending ID to cover for possible invasive aspergilloses  - f/u Galactomannen, beta-d-glucan  - continue mepron 1500 mg po daily  - Pulmonary and ID consulted, recommendations appreciated

## 2019-07-08 NOTE — PROGRESS NOTE ADULT - SUBJECTIVE AND OBJECTIVE BOX
Follow Up:      Inverval History/ROS:Patient is a 79y old  Female who presents with a chief complaint of SOB (08 Jul 2019 15:38)  Still SOB  Less cough  No halluciantions      Allergies    No Known Allergies    Intolerances        ANTIMICROBIALS:  atovaquone Suspension 1500 daily  valACYclovir 500 <User Schedule>  voriconazole 200 every 12 hours      OTHER MEDS:  allopurinol 300 milliGRAM(s) Oral daily  apixaban 2.5 milliGRAM(s) Oral every 12 hours  benzocaine 15 mG/menthol 3.6 mG (Sugar-Free) Lozenge 1 Lozenge Oral every 4 hours  benzonatate 100 milliGRAM(s) Oral three times a day PRN  bisacodyl 5 milliGRAM(s) Oral every 12 hours PRN  docusate sodium 100 milliGRAM(s) Oral three times a day  guaiFENesin    Syrup 200 milliGRAM(s) Oral every 6 hours PRN  lactated ringers. 1000 milliLiter(s) IV Continuous <Continuous>  levalbuterol Inhalation 0.63 milliGRAM(s) Inhalation every 6 hours  metoprolol succinate ER 50 milliGRAM(s) Oral daily  pantoprazole    Tablet 40 milliGRAM(s) Oral before breakfast  polyethylene glycol 3350 17 Gram(s) Oral daily PRN  predniSONE   Tablet 20 milliGRAM(s) Oral daily  sodium chloride 2 Gram(s) Oral two times a day  sodium chloride 0.65% Nasal 1 Spray(s) Both Nostrils daily PRN  sodium chloride 3%  Inhalation 4 milliLiter(s) Inhalation every 4 hours      Vital Signs Last 24 Hrs  T(C): 36.5 (08 Jul 2019 15:07), Max: 36.6 (07 Jul 2019 18:27)  T(F): 97.7 (08 Jul 2019 15:07), Max: 97.8 (07 Jul 2019 18:27)  HR: 106 (08 Jul 2019 16:25) (80 - 125)  BP: 132/95 (08 Jul 2019 15:07) (125/84 - 152/97)  BP(mean): --  RR: 18 (08 Jul 2019 15:07) (18 - 20)  SpO2: 97% (08 Jul 2019 15:07) (95% - 100%)    PHYSICAL EXAM:  General: [x ] non-toxic  HEAD/EYES: [ ] PERRL [x ] white sclera [ ] icterus  ENT:  [ ] normal [x ] supple [ ] thrush [ ] pharyngeal exudate  Cardiovascular:   [ ] murmur [x ] normal [ ] PPM/AICD  Respiratory:  [ x] clear to ausculation bilaterally  GI:  [ x] soft, non-tender, normal bowel sounds  :  [ ] bledsoe [x ] no CVA tenderness   Musculoskeletal:  [x ] no synovitis  Neurologic:  [ x] non-focal exam   Skin:  [x ] no rash  Lymph: [x ] no lymphadenopathy  Psychiatric:  [ ] appropriate affect [x ] alert & oriented  Lines:  [x ] no phlebitis [ ] central line                                10.7   2.77  )-----------( 136      ( 08 Jul 2019 06:00 )             33.8       07-08    138  |  93<L>  |  10  ----------------------------<  127<H>  4.3   |  31  |  0.42<L>    Ca    9.5      08 Jul 2019 06:00  Phos  2.7     07-08  Mg     1.7     07-08            MICROBIOLOGY:    RADIOLOGY:

## 2019-07-08 NOTE — PROGRESS NOTE ADULT - ASSESSMENT
80F B-cell lymphoma s/p chemo, Breast CA s/p mastectomy, Lung CA, Afib on Eliquis, admitted for acute hypoxic respiratory failure from Viral URI with obstructive PNA likely due to cancer, fungal PNA due to immunosuppression from neutropenia.

## 2019-07-08 NOTE — PROGRESS NOTE ADULT - ASSESSMENT
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 related to disease vs Bactrim  Change Bactrim to Mepron 1500 mg po daily    3) Immunosuppressed secondary to cancer tx  PCP prophylaxis  Continue mepron  At some point, reasonable to re-challenge with Bactrim with close monitoring of WBC    4) Delirium: improved with sleep

## 2019-07-08 NOTE — PROGRESS NOTE ADULT - SUBJECTIVE AND OBJECTIVE BOX
CHIEF COMPLAINT: SOB    Interval Events: Patient states that she is feeling better than yesterday, but notes that she still has     REVIEW OF SYSTEMS:  Constitutional: No fevers or chills. No weight loss. No fatigue or generalised malaise.  Eyes: No itching or discharge from the eyes  ENT: No ear pain. No ear discharge. No nasal congestion. No post nasal drip. No epistaxis. No throat pain. No sore throat. No difficulty swallowing.   CV: No chest pain. No palpitations. No lightheadedness or dizziness.   Resp: No dyspnea at rest. No dyspnea on exertion. No orthopnea. No wheezing. No cough. No stridor. No sputum production. No chest pain with respiration.  GI: No nausea. No vomiting. No diarrhea.  MSK: No joint pain or pain in any extremities  Integumentary: No skin lesions. No pedal edema.  Neurological: No gross motor weakness. No sensory changes.  [ ] All other systems negative  [ ] Unable to assess ROS because ________    OBJECTIVE:  ICU Vital Signs Last 24 Hrs  T(C): 36.3 (08 Jul 2019 05:55), Max: 36.6 (07 Jul 2019 18:27)  T(F): 97.4 (08 Jul 2019 05:55), Max: 97.8 (07 Jul 2019 18:27)  HR: 102 (08 Jul 2019 09:27) (80 - 125)  BP: 152/97 (08 Jul 2019 05:55) (125/84 - 152/97)  BP(mean): --  ABP: --  ABP(mean): --  RR: 18 (08 Jul 2019 10:45) (18 - 20)  SpO2: 96% (08 Jul 2019 10:45) (95% - 100%)        CAPILLARY BLOOD GLUCOSE      POCT Blood Glucose.: 161 mg/dL (07 Jul 2019 07:23)      PHYSICAL EXAM:  General: Awake, alert, oriented X 3.   HEENT: Atraumatic, normocephalic.                 Mallampatti Grade                 No nasal congestion.                No tonsillar or pharyngeal exudates.  Lymph Nodes: No palpable lymphadenopathy  Neck: No JVD. No carotid bruit.   Respiratory: Normal chest expansion                         Normal percussion                         Normal and equal air entry                         No wheeze, rhonchi or rales.  Cardiovascular: S1 S2 normal. No murmurs, rubs or gallops.   Abdomen: Soft, non-tender, non-distended. No organomegaly.  Extremities: Warm to touch. Peripheral pulse palpable. No pedal edema.   Skin: No rashes or skin lesions  Neurological: Motor and sensory examination equal and normal in all four extremities.  Psychiatry: Appropriate mood and affect.    HOSPITAL MEDICATIONS:  MEDICATIONS  (STANDING):  allopurinol 300 milliGRAM(s) Oral daily  apixaban 2.5 milliGRAM(s) Oral every 12 hours  atovaquone Suspension 1500 milliGRAM(s) Oral daily  benzocaine 15 mG/menthol 3.6 mG (Sugar-Free) Lozenge 1 Lozenge Oral every 4 hours  docusate sodium 100 milliGRAM(s) Oral three times a day  lactated ringers. 1000 milliLiter(s) (30 mL/Hr) IV Continuous <Continuous>  levalbuterol Inhalation 0.63 milliGRAM(s) Inhalation every 4 hours  metoprolol succinate ER 50 milliGRAM(s) Oral daily  pantoprazole    Tablet 40 milliGRAM(s) Oral before breakfast  piperacillin/tazobactam IVPB.. 3.375 Gram(s) IV Intermittent every 8 hours  predniSONE   Tablet 20 milliGRAM(s) Oral daily  sodium chloride 2 Gram(s) Oral two times a day  sodium chloride 3%  Inhalation 4 milliLiter(s) Inhalation every 4 hours  valACYclovir 500 milliGRAM(s) Oral <User Schedule>  voriconazole 200 milliGRAM(s) Oral every 12 hours    MEDICATIONS  (PRN):  benzonatate 100 milliGRAM(s) Oral three times a day PRN Cough  bisacodyl 5 milliGRAM(s) Oral every 12 hours PRN Constipation  guaiFENesin    Syrup 200 milliGRAM(s) Oral every 6 hours PRN Cough  polyethylene glycol 3350 17 Gram(s) Oral daily PRN Constipation  sodium chloride 0.65% Nasal 1 Spray(s) Both Nostrils daily PRN Nasal Congestion      LABS:                        10.7   2.77  )-----------( 136      ( 08 Jul 2019 06:00 )             33.8     07-08    138  |  93<L>  |  10  ----------------------------<  127<H>  4.3   |  31  |  0.42<L>    Ca    9.5      08 Jul 2019 06:00  Phos  2.7     07-08  Mg     1.7     07-08          Arterial Blood Gas:  07-07 @ 07:19  7.43/45/107/29/98.2/4.8  ABG lactate: --        MICROBIOLOGY:     RADIOLOGY:  [ ] Reviewed and interpreted by me    Point of Care Ultrasound Findings:    PFT:    EKG: CHIEF COMPLAINT: SOB    Interval Events: Patient states that she is feeling better than yesterday, but notes that she still has sensation of SOB and feels like phlegm is caught in her upper chest.       REVIEW OF SYSTEMS:  Constitutional: No fevers or chills. No weight loss. No fatigue or generalised malaise. +SOB, +congestion  Eyes: No itching or discharge from the eyes  ENT: No ear pain. No ear discharge. No nasal congestion. No post nasal drip. No epistaxis. No throat pain. No sore throat. No difficulty swallowing.   CV: No chest pain. No palpitations. No lightheadedness or dizziness.   Resp: No dyspnea at rest. No dyspnea on exertion. +cough. +sputum production. No chest pain with respiration.  GI: No nausea. No vomiting. No diarrhea.  MSK: No joint pain or pain in any extremities  Integumentary: No skin lesions. No pedal edema.  Neurological: No gross motor weakness. No sensory changes.  [X ] All other systems negative      OBJECTIVE:  ICU Vital Signs Last 24 Hrs  T(C): 36.3 (08 Jul 2019 05:55), Max: 36.6 (07 Jul 2019 18:27)  T(F): 97.4 (08 Jul 2019 05:55), Max: 97.8 (07 Jul 2019 18:27)  HR: 102 (08 Jul 2019 09:27) (80 - 125)  BP: 152/97 (08 Jul 2019 05:55) (125/84 - 152/97)  BP(mean): --  ABP: --  ABP(mean): --  RR: 18 (08 Jul 2019 10:45) (18 - 20)  SpO2: 96% (08 Jul 2019 10:45) (95% - 100%)        CAPILLARY BLOOD GLUCOSE      POCT Blood Glucose.: 161 mg/dL (07 Jul 2019 07:23)      PHYSICAL EXAM:  General: Awake, alert, oriented X 3.   HEENT: Atraumatic, normocephalic.   Lymph Nodes: No palpable lymphadenopathy  Neck: No JVD. No carotid bruit.   Respiratory: Normal chest expansion, +Rhonchi KASEY, +mild wheezes KASEY                     Normal and equal air entry  Cardiovascular: S1 S2 normal. No murmurs, rubs or gallops.   Abdomen: Soft, non-tender, non-distended. No organomegaly.  Extremities: Warm to touch. Peripheral pulse palpable. No pedal edema.   Skin: No rashes or skin lesions  Neurological: Motor and sensory examination equal and normal in all four extremities.  Psychiatry: Appropriate mood and affect.    HOSPITAL MEDICATIONS:  MEDICATIONS  (STANDING):  allopurinol 300 milliGRAM(s) Oral daily  apixaban 2.5 milliGRAM(s) Oral every 12 hours  atovaquone Suspension 1500 milliGRAM(s) Oral daily  benzocaine 15 mG/menthol 3.6 mG (Sugar-Free) Lozenge 1 Lozenge Oral every 4 hours  docusate sodium 100 milliGRAM(s) Oral three times a day  lactated ringers. 1000 milliLiter(s) (30 mL/Hr) IV Continuous <Continuous>  levalbuterol Inhalation 0.63 milliGRAM(s) Inhalation every 4 hours  metoprolol succinate ER 50 milliGRAM(s) Oral daily  pantoprazole    Tablet 40 milliGRAM(s) Oral before breakfast  piperacillin/tazobactam IVPB.. 3.375 Gram(s) IV Intermittent every 8 hours  predniSONE   Tablet 20 milliGRAM(s) Oral daily  sodium chloride 2 Gram(s) Oral two times a day  sodium chloride 3%  Inhalation 4 milliLiter(s) Inhalation every 4 hours  valACYclovir 500 milliGRAM(s) Oral <User Schedule>  voriconazole 200 milliGRAM(s) Oral every 12 hours    MEDICATIONS  (PRN):  benzonatate 100 milliGRAM(s) Oral three times a day PRN Cough  bisacodyl 5 milliGRAM(s) Oral every 12 hours PRN Constipation  guaiFENesin    Syrup 200 milliGRAM(s) Oral every 6 hours PRN Cough  polyethylene glycol 3350 17 Gram(s) Oral daily PRN Constipation  sodium chloride 0.65% Nasal 1 Spray(s) Both Nostrils daily PRN Nasal Congestion      LABS:                        10.7   2.77  )-----------( 136      ( 08 Jul 2019 06:00 )             33.8     07-08    138  |  93<L>  |  10  ----------------------------<  127<H>  4.3   |  31  |  0.42<L>    Ca    9.5      08 Jul 2019 06:00  Phos  2.7     07-08  Mg     1.7     07-08          Arterial Blood Gas:  07-07 @ 07:19  7.43/45/107/29/98.2/4.8  ABG lactate: --        MICROBIOLOGY:     RADIOLOGY:   [X] Reviewed and interpreted by me    EXAM:  XR CHEST PORTABLE URGENT 1V      PROCEDURE DATE:  Jul 7 2019     INTERPRETATION:  Clinical indications: Respiratory distress.    Frontal view of the chest is obtained.    Comparison is made with July 1, 2019.    IMPRESSION: There is asmall right pleural effusion may have increased in   size since the prior study with bilateral nodular and patchy opacities,   right greater than left predominantly unchanged. There is no pneumothorax.

## 2019-07-08 NOTE — PROGRESS NOTE ADULT - PROBLEM SELECTOR PLAN 5
Follows with Dr. Nguyen at [facility] [place]. as per family, [facility] is not offering any disease modifying treatment. Pt is looking into enrollment in a clinical trial   - C/w ppx mepron, valacyclovir and allopurinol  - GOC discussed with pt and daughter for poor prognosis - They wish to be full code for now

## 2019-07-08 NOTE — PROGRESS NOTE ADULT - ASSESSMENT
79F PMHx DLBC lymphoma, Afib (on Eliquis), p/w productive cough x1 month, SOB x3 days, s/p bronchoscopy & BAL 7/1 for concern of RLL bronchus obstruction found to have mucus impaction, with recent acute respiratory distress (7/7), now improved. Initial cx demonstrates fungal hyphae in necrotic tissue on bx. Patient restarted on Zosyn after RRT/MICU consult.   -c/w levalbuterol/HS 3%/aerobika device BID  -initiate chest PT with chest vest following pulm toilet treatment as above.   -levalbuterol q6hr standing  -antifungal and abx per ID  -repeat sputum culture    -consider palliative consult for pain control/comfort    Attending recommendations to follow.

## 2019-07-09 LAB
ANION GAP SERPL CALC-SCNC: 14 MMO/L — SIGNIFICANT CHANGE UP (ref 7–14)
BUN SERPL-MCNC: 14 MG/DL — SIGNIFICANT CHANGE UP (ref 7–23)
CALCIUM SERPL-MCNC: 9.2 MG/DL — SIGNIFICANT CHANGE UP (ref 8.4–10.5)
CHLORIDE SERPL-SCNC: 93 MMOL/L — LOW (ref 98–107)
CO2 SERPL-SCNC: 30 MMOL/L — SIGNIFICANT CHANGE UP (ref 22–31)
CREAT SERPL-MCNC: 0.45 MG/DL — LOW (ref 0.5–1.3)
GLUCOSE SERPL-MCNC: 125 MG/DL — HIGH (ref 70–99)
HCT VFR BLD CALC: 31.4 % — LOW (ref 34.5–45)
HGB BLD-MCNC: 10 G/DL — LOW (ref 11.5–15.5)
MAGNESIUM SERPL-MCNC: 2 MG/DL — SIGNIFICANT CHANGE UP (ref 1.6–2.6)
MCHC RBC-ENTMCNC: 31.5 PG — SIGNIFICANT CHANGE UP (ref 27–34)
MCHC RBC-ENTMCNC: 31.8 % — LOW (ref 32–36)
MCV RBC AUTO: 99.1 FL — SIGNIFICANT CHANGE UP (ref 80–100)
NRBC # FLD: 0.05 K/UL — SIGNIFICANT CHANGE UP (ref 0–0)
NRBC FLD-RTO: 1.7 — SIGNIFICANT CHANGE UP
PLATELET # BLD AUTO: 131 K/UL — LOW (ref 150–400)
PMV BLD: 11.6 FL — SIGNIFICANT CHANGE UP (ref 7–13)
POTASSIUM SERPL-MCNC: 3.1 MMOL/L — LOW (ref 3.5–5.3)
POTASSIUM SERPL-SCNC: 3.1 MMOL/L — LOW (ref 3.5–5.3)
RBC # BLD: 3.17 M/UL — LOW (ref 3.8–5.2)
RBC # FLD: 19 % — HIGH (ref 10.3–14.5)
SODIUM SERPL-SCNC: 137 MMOL/L — SIGNIFICANT CHANGE UP (ref 135–145)
SPECIMEN SOURCE: SIGNIFICANT CHANGE UP
SPECIMEN SOURCE: SIGNIFICANT CHANGE UP
WBC # BLD: 2.93 K/UL — LOW (ref 3.8–10.5)
WBC # FLD AUTO: 2.93 K/UL — LOW (ref 3.8–10.5)

## 2019-07-09 PROCEDURE — 99233 SBSQ HOSP IP/OBS HIGH 50: CPT

## 2019-07-09 PROCEDURE — 99233 SBSQ HOSP IP/OBS HIGH 50: CPT | Mod: GC

## 2019-07-09 PROCEDURE — 99232 SBSQ HOSP IP/OBS MODERATE 35: CPT

## 2019-07-09 RX ORDER — MEROPENEM 1 G/30ML
1000 INJECTION INTRAVENOUS EVERY 8 HOURS
Refills: 0 | Status: DISCONTINUED | OUTPATIENT
Start: 2019-07-09 | End: 2019-07-09

## 2019-07-09 RX ORDER — MEROPENEM 1 G/30ML
1000 INJECTION INTRAVENOUS EVERY 12 HOURS
Refills: 0 | Status: DISCONTINUED | OUTPATIENT
Start: 2019-07-09 | End: 2019-07-13

## 2019-07-09 RX ORDER — MAGNESIUM SULFATE 500 MG/ML
2 VIAL (ML) INJECTION ONCE
Refills: 0 | Status: COMPLETED | OUTPATIENT
Start: 2019-07-09 | End: 2019-07-09

## 2019-07-09 RX ORDER — POTASSIUM CHLORIDE 20 MEQ
10 PACKET (EA) ORAL
Refills: 0 | Status: COMPLETED | OUTPATIENT
Start: 2019-07-09 | End: 2019-07-09

## 2019-07-09 RX ORDER — VANCOMYCIN HCL 1 G
750 VIAL (EA) INTRAVENOUS EVERY 24 HOURS
Refills: 0 | Status: DISCONTINUED | OUTPATIENT
Start: 2019-07-09 | End: 2019-07-10

## 2019-07-09 RX ORDER — POTASSIUM CHLORIDE 20 MEQ
40 PACKET (EA) ORAL ONCE
Refills: 0 | Status: COMPLETED | OUTPATIENT
Start: 2019-07-09 | End: 2019-07-09

## 2019-07-09 RX ORDER — VANCOMYCIN HCL 1 G
1000 VIAL (EA) INTRAVENOUS EVERY 12 HOURS
Refills: 0 | Status: DISCONTINUED | OUTPATIENT
Start: 2019-07-09 | End: 2019-07-09

## 2019-07-09 RX ORDER — LEVALBUTEROL 1.25 MG/.5ML
0.63 SOLUTION, CONCENTRATE RESPIRATORY (INHALATION) EVERY 4 HOURS
Refills: 0 | Status: DISCONTINUED | OUTPATIENT
Start: 2019-07-09 | End: 2019-08-06

## 2019-07-09 RX ORDER — SODIUM CHLORIDE 9 MG/ML
4 INJECTION INTRAMUSCULAR; INTRAVENOUS; SUBCUTANEOUS EVERY 6 HOURS
Refills: 0 | Status: DISCONTINUED | OUTPATIENT
Start: 2019-07-09 | End: 2019-07-15

## 2019-07-09 RX ADMIN — Medication 200 MILLIGRAM(S): at 01:57

## 2019-07-09 RX ADMIN — LEVALBUTEROL 0.63 MILLIGRAM(S): 1.25 SOLUTION, CONCENTRATE RESPIRATORY (INHALATION) at 21:20

## 2019-07-09 RX ADMIN — SODIUM CHLORIDE 30 MILLILITER(S): 9 INJECTION, SOLUTION INTRAVENOUS at 23:20

## 2019-07-09 RX ADMIN — SODIUM CHLORIDE 4 MILLILITER(S): 9 INJECTION INTRAMUSCULAR; INTRAVENOUS; SUBCUTANEOUS at 09:09

## 2019-07-09 RX ADMIN — PANTOPRAZOLE SODIUM 40 MILLIGRAM(S): 20 TABLET, DELAYED RELEASE ORAL at 05:24

## 2019-07-09 RX ADMIN — Medication 300 MILLIGRAM(S): at 11:50

## 2019-07-09 RX ADMIN — Medication 100 MILLIGRAM(S): at 23:15

## 2019-07-09 RX ADMIN — VORICONAZOLE 200 MILLIGRAM(S): 10 INJECTION, POWDER, LYOPHILIZED, FOR SOLUTION INTRAVENOUS at 17:29

## 2019-07-09 RX ADMIN — ATOVAQUONE 1500 MILLIGRAM(S): 750 SUSPENSION ORAL at 11:50

## 2019-07-09 RX ADMIN — Medication 50 GRAM(S): at 02:17

## 2019-07-09 RX ADMIN — MEROPENEM 100 MILLIGRAM(S): 1 INJECTION INTRAVENOUS at 17:57

## 2019-07-09 RX ADMIN — Medication 100 MILLIEQUIVALENT(S): at 12:42

## 2019-07-09 RX ADMIN — LEVALBUTEROL 0.63 MILLIGRAM(S): 1.25 SOLUTION, CONCENTRATE RESPIRATORY (INHALATION) at 12:15

## 2019-07-09 RX ADMIN — APIXABAN 2.5 MILLIGRAM(S): 2.5 TABLET, FILM COATED ORAL at 05:23

## 2019-07-09 RX ADMIN — Medication 1 SPRAY(S): at 02:22

## 2019-07-09 RX ADMIN — BENZOCAINE AND MENTHOL 1 LOZENGE: 5; 1 LIQUID ORAL at 14:13

## 2019-07-09 RX ADMIN — Medication 200 MILLIGRAM(S): at 09:59

## 2019-07-09 RX ADMIN — Medication 100 MILLIGRAM(S): at 02:21

## 2019-07-09 RX ADMIN — SODIUM CHLORIDE 4 MILLILITER(S): 9 INJECTION INTRAMUSCULAR; INTRAVENOUS; SUBCUTANEOUS at 04:23

## 2019-07-09 RX ADMIN — LEVALBUTEROL 0.63 MILLIGRAM(S): 1.25 SOLUTION, CONCENTRATE RESPIRATORY (INHALATION) at 04:45

## 2019-07-09 RX ADMIN — SODIUM CHLORIDE 4 MILLILITER(S): 9 INJECTION INTRAMUSCULAR; INTRAVENOUS; SUBCUTANEOUS at 16:35

## 2019-07-09 RX ADMIN — Medication 100 MILLIGRAM(S): at 05:24

## 2019-07-09 RX ADMIN — Medication 100 MILLIEQUIVALENT(S): at 11:01

## 2019-07-09 RX ADMIN — SODIUM CHLORIDE 4 MILLILITER(S): 9 INJECTION INTRAMUSCULAR; INTRAVENOUS; SUBCUTANEOUS at 21:23

## 2019-07-09 RX ADMIN — Medication 20 MILLIGRAM(S): at 17:29

## 2019-07-09 RX ADMIN — LEVALBUTEROL 0.63 MILLIGRAM(S): 1.25 SOLUTION, CONCENTRATE RESPIRATORY (INHALATION) at 01:30

## 2019-07-09 RX ADMIN — BENZOCAINE AND MENTHOL 1 LOZENGE: 5; 1 LIQUID ORAL at 23:15

## 2019-07-09 RX ADMIN — Medication 100 MILLIEQUIVALENT(S): at 09:59

## 2019-07-09 RX ADMIN — LEVALBUTEROL 0.63 MILLIGRAM(S): 1.25 SOLUTION, CONCENTRATE RESPIRATORY (INHALATION) at 09:02

## 2019-07-09 RX ADMIN — SODIUM CHLORIDE 2 GRAM(S): 9 INJECTION INTRAMUSCULAR; INTRAVENOUS; SUBCUTANEOUS at 17:29

## 2019-07-09 RX ADMIN — SODIUM CHLORIDE 4 MILLILITER(S): 9 INJECTION INTRAMUSCULAR; INTRAVENOUS; SUBCUTANEOUS at 00:06

## 2019-07-09 RX ADMIN — LEVALBUTEROL 0.63 MILLIGRAM(S): 1.25 SOLUTION, CONCENTRATE RESPIRATORY (INHALATION) at 16:29

## 2019-07-09 RX ADMIN — SODIUM CHLORIDE 2 GRAM(S): 9 INJECTION INTRAMUSCULAR; INTRAVENOUS; SUBCUTANEOUS at 05:23

## 2019-07-09 RX ADMIN — VORICONAZOLE 200 MILLIGRAM(S): 10 INJECTION, POWDER, LYOPHILIZED, FOR SOLUTION INTRAVENOUS at 05:24

## 2019-07-09 RX ADMIN — Medication 100 MILLIGRAM(S): at 14:13

## 2019-07-09 RX ADMIN — Medication 200 MILLIGRAM(S): at 17:58

## 2019-07-09 RX ADMIN — Medication 50 MILLIGRAM(S): at 05:24

## 2019-07-09 RX ADMIN — SODIUM CHLORIDE 30 MILLILITER(S): 9 INJECTION, SOLUTION INTRAVENOUS at 12:43

## 2019-07-09 RX ADMIN — Medication 20 MILLIGRAM(S): at 05:23

## 2019-07-09 RX ADMIN — Medication 40 MILLIEQUIVALENT(S): at 10:00

## 2019-07-09 RX ADMIN — Medication 250 MILLIGRAM(S): at 18:32

## 2019-07-09 RX ADMIN — APIXABAN 2.5 MILLIGRAM(S): 2.5 TABLET, FILM COATED ORAL at 17:29

## 2019-07-09 NOTE — PROGRESS NOTE ADULT - SUBJECTIVE AND OBJECTIVE BOX
CHIEF COMPLAINT: SOB    Interval Events: Patient states that she is feeling better with less congestion than yesterday. Patient denies fevers/chills/nausea/vomitting/diarrhea, still w productive cough w scant sputum.     REVIEW OF SYSTEMS:  Constitutional: No fevers or chills. No weight loss. No fatigue or generalised malaise.  Eyes: No itching or discharge from the eyes  ENT: No ear pain. No ear discharge. No nasal congestion. No post nasal drip. No epistaxis. No throat pain. No sore throat. No difficulty swallowing.   CV: No chest pain. No palpitations. No lightheadedness or dizziness.   Resp: No dyspnea at rest. No dyspnea on exertion. No orthopnea.  +cough. No stridor. + scant sputum production. No chest pain with respiration.  GI: No nausea. No vomiting. No diarrhea.  MSK: No joint pain or pain in any extremities  Integumentary: No skin lesions. No pedal edema.  Neurological: No gross motor weakness. No sensory changes.  [X ] All other systems negative      OBJECTIVE:  ICU Vital Signs Last 24 Hrs  T(C): 36.6 (09 Jul 2019 05:10), Max: 36.6 (08 Jul 2019 21:18)  T(F): 97.8 (09 Jul 2019 05:10), Max: 97.8 (08 Jul 2019 21:18)  HR: 109 (09 Jul 2019 05:10) (90 - 112)  BP: 144/96 (09 Jul 2019 05:10) (132/95 - 150/98)  BP(mean): --  ABP: --  ABP(mean): --  RR: 18 (09 Jul 2019 05:10) (16 - 18)  SpO2: 99% (09 Jul 2019 05:10) (95% - 99%)        CAPILLARY BLOOD GLUCOSE    PHYSICAL EXAM:  General: Awake, alert, oriented X 3, sitting up comfortably in bed.   HEENT: Atraumatic, normocephalic.   Lymph Nodes: No palpable lymphadenopathy  Neck: No JVD. No carotid bruit.   Respiratory: Normal chest expansion, normal and equal air entry, mild rhonchi KASEY.   Cardiovascular: S1 S2 normal. No murmurs, rubs or gallops.   Abdomen: Soft, non-tender, non-distended. No organomegaly.  Extremities: Warm to touch. Peripheral pulse palpable. No pedal edema.   Skin: No rashes or skin lesions  Neurological: Motor and sensory examination equal and normal in all four extremities.  Psychiatry: Appropriate mood and affect.    HOSPITAL MEDICATIONS:  MEDICATIONS  (STANDING):  allopurinol 300 milliGRAM(s) Oral daily  apixaban 2.5 milliGRAM(s) Oral every 12 hours  atovaquone Suspension 1500 milliGRAM(s) Oral daily  benzocaine 15 mG/menthol 3.6 mG (Sugar-Free) Lozenge 1 Lozenge Oral every 4 hours  docusate sodium 100 milliGRAM(s) Oral three times a day  lactated ringers. 1000 milliLiter(s) (30 mL/Hr) IV Continuous <Continuous>  levalbuterol Inhalation 0.63 milliGRAM(s) Inhalation every 4 hours  metoprolol succinate ER 50 milliGRAM(s) Oral daily  pantoprazole    Tablet 40 milliGRAM(s) Oral before breakfast  potassium chloride    Tablet ER 40 milliEquivalent(s) Oral once  potassium chloride  10 mEq/100 mL IVPB 10 milliEquivalent(s) IV Intermittent every 1 hour  predniSONE   Tablet 20 milliGRAM(s) Oral daily  sodium chloride 2 Gram(s) Oral two times a day  sodium chloride 3%  Inhalation 4 milliLiter(s) Inhalation every 4 hours  valACYclovir 500 milliGRAM(s) Oral <User Schedule>  voriconazole 200 milliGRAM(s) Oral every 12 hours    MEDICATIONS  (PRN):  benzonatate 100 milliGRAM(s) Oral three times a day PRN Cough  bisacodyl 5 milliGRAM(s) Oral every 12 hours PRN Constipation  guaiFENesin    Syrup 200 milliGRAM(s) Oral every 6 hours PRN Cough  polyethylene glycol 3350 17 Gram(s) Oral daily PRN Constipation  sodium chloride 0.65% Nasal 1 Spray(s) Both Nostrils daily PRN Nasal Congestion      LABS:                        10.0   2.93  )-----------( 131      ( 09 Jul 2019 06:20 )             31.4     07-09    137  |  93<L>  |  14  ----------------------------<  125<H>  3.1<L>   |  30  |  0.45<L>    Ca    9.2      09 Jul 2019 06:20  Phos  2.7     07-08  Mg     2.0     07-09      MICROBIOLOGY:     Culture - Respiratory with Gram Stain (collected 08 Jul 2019 18:59)  Source: SPUTUM  gram + cocci in pairs, CHIEF COMPLAINT: SOB    Interval Events: Patient states that she is feeling better with less congestion than yesterday. Patient denies fevers/chills/nausea/vomitting/diarrhea, still w productive cough w scant sputum. Later on during rounds, patient found to be in acute respiratory distress and started on bipap.     REVIEW OF SYSTEMS:  Constitutional: No fevers or chills. No weight loss. No fatigue or generalised malaise.  Eyes: No itching or discharge from the eyes  ENT: No ear pain. No ear discharge. No nasal congestion. No post nasal drip. No epistaxis. No throat pain. No sore throat. No difficulty swallowing.   CV: No chest pain. No palpitations. No lightheadedness or dizziness.   Resp: No dyspnea at rest. No dyspnea on exertion. No orthopnea.  +cough. No stridor. + scant sputum production. No chest pain with respiration.  GI: No nausea. No vomiting. No diarrhea.  MSK: No joint pain or pain in any extremities  Integumentary: No skin lesions. No pedal edema.  Neurological: No gross motor weakness. No sensory changes.  [X ] All other systems negative      OBJECTIVE:  ICU Vital Signs Last 24 Hrs  T(C): 36.6 (09 Jul 2019 05:10), Max: 36.6 (08 Jul 2019 21:18)  T(F): 97.8 (09 Jul 2019 05:10), Max: 97.8 (08 Jul 2019 21:18)  HR: 109 (09 Jul 2019 05:10) (90 - 112)  BP: 144/96 (09 Jul 2019 05:10) (132/95 - 150/98)  BP(mean): --  ABP: --  ABP(mean): --  RR: 18 (09 Jul 2019 05:10) (16 - 18)  SpO2: 99% (09 Jul 2019 05:10) (95% - 99%)        CAPILLARY BLOOD GLUCOSE    PHYSICAL EXAM:  General: Awake, alert, oriented X 3, sitting up comfortably in bed.   HEENT: Atraumatic, normocephalic.   Lymph Nodes: No palpable lymphadenopathy  Neck: No JVD. No carotid bruit.   Respiratory: Normal chest expansion, normal and equal air entry, mild rhonchi KASEY.   Cardiovascular: S1 S2 normal. No murmurs, rubs or gallops.   Abdomen: Soft, non-tender, non-distended. No organomegaly.  Extremities: Warm to touch. Peripheral pulse palpable. No pedal edema.   Skin: No rashes or skin lesions  Neurological: Motor and sensory examination equal and normal in all four extremities.  Psychiatry: Appropriate mood and affect.    HOSPITAL MEDICATIONS:  MEDICATIONS  (STANDING):  allopurinol 300 milliGRAM(s) Oral daily  apixaban 2.5 milliGRAM(s) Oral every 12 hours  atovaquone Suspension 1500 milliGRAM(s) Oral daily  benzocaine 15 mG/menthol 3.6 mG (Sugar-Free) Lozenge 1 Lozenge Oral every 4 hours  docusate sodium 100 milliGRAM(s) Oral three times a day  lactated ringers. 1000 milliLiter(s) (30 mL/Hr) IV Continuous <Continuous>  levalbuterol Inhalation 0.63 milliGRAM(s) Inhalation every 4 hours  metoprolol succinate ER 50 milliGRAM(s) Oral daily  pantoprazole    Tablet 40 milliGRAM(s) Oral before breakfast  potassium chloride    Tablet ER 40 milliEquivalent(s) Oral once  potassium chloride  10 mEq/100 mL IVPB 10 milliEquivalent(s) IV Intermittent every 1 hour  predniSONE   Tablet 20 milliGRAM(s) Oral daily  sodium chloride 2 Gram(s) Oral two times a day  sodium chloride 3%  Inhalation 4 milliLiter(s) Inhalation every 4 hours  valACYclovir 500 milliGRAM(s) Oral <User Schedule>  voriconazole 200 milliGRAM(s) Oral every 12 hours    MEDICATIONS  (PRN):  benzonatate 100 milliGRAM(s) Oral three times a day PRN Cough  bisacodyl 5 milliGRAM(s) Oral every 12 hours PRN Constipation  guaiFENesin    Syrup 200 milliGRAM(s) Oral every 6 hours PRN Cough  polyethylene glycol 3350 17 Gram(s) Oral daily PRN Constipation  sodium chloride 0.65% Nasal 1 Spray(s) Both Nostrils daily PRN Nasal Congestion      LABS:                        10.0   2.93  )-----------( 131      ( 09 Jul 2019 06:20 )             31.4     07-09    137  |  93<L>  |  14  ----------------------------<  125<H>  3.1<L>   |  30  |  0.45<L>    Ca    9.2      09 Jul 2019 06:20  Phos  2.7     07-08  Mg     2.0     07-09      MICROBIOLOGY:     Culture - Respiratory with Gram Stain (collected 08 Jul 2019 18:59)  Source: SPUTUM  gram + cocci in pairs,

## 2019-07-09 NOTE — PROGRESS NOTE ADULT - ASSESSMENT
79F with DLBC lymphoma, Afib on eliquis presenting with three days of worsening SOB and productive cough, found to have entero/rhinovirus and fungal infection.    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 Then esophageal biopsy in 12/2017 consistent with high grade lymphoma  - Patient was started on revlimid and rituximab followed by obi and bendamustine  - Most recently underwent thoracentesis which was positive for lymphoma  - CT chest from 6/27: The bilateral nodules have increased in size in number. In particular, there are multiple masses in the bilateral lobes, with extension into the right lower lobe bronchus. The chest lymph nodes are malignant. The prevascular lymph node, in particular, has increased in size.  - Palliative care consult   - Outpatient follow up with Dr. Nguyen who will determine if a patient is a candidate for further treatment     # Pancytopenia  - Now improving from admission   - Continue to check CBC diff daily  - goal hgb > 7 and plt >10k if afebrile, >15k if febrile     # Hypoxic respiratory failure 2/2 fungal PNA  - CT chest: complete occlusion of the right lower lobe bronchus and the segmental bronchi of the right lower lobe with a central opacity  - Bronch with biopsy done, showed fungal infection  - Appreciate ID recs: Continue voriconazole pending ID of mold. Plan 6 weeks of antifungal coverage  - Pulmonary following   - CXR 7/7: There is a small right pleural effusion may have increased in size since the prior study with bilateral nodular and patchy opacities, right greater than left predominantly unchanged    Coral Olazagasti  Hematology Fellow  [phone] 79F with DLBC lymphoma, Afib on eliquis presenting with three days of worsening SOB and productive cough, found to have entero/rhinovirus and fungal infection.    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 Then esophageal biopsy in 12/2017 consistent with high grade lymphoma  - Patient was started on revlimid and rituximab followed by obi and bendamustine  - Most recently underwent thoracentesis which was positive for lymphoma  - CT chest from 6/27: The bilateral nodules have increased in size in number. In particular, there are multiple masses in the bilateral lobes, with extension into the right lower lobe bronchus. The chest lymph nodes are malignant. The prevascular lymph node, in particular, has increased in size.  - Palliative care consult   - Outpatient follow up with Dr. Nguyen who will determine if a patient is a candidate for further treatment     # Pancytopenia  - Now improving from admission   - Continue to check CBC diff daily  - goal hgb > 7 and plt >10k if afebrile, >15k if febrile     # Hypoxic respiratory failure   - CT chest: complete occlusion of the right lower lobe bronchus and the segmental bronchi of the right lower lobe with a central opacity  - Bronch with biopsy done, showed fungal infection  - Appreciate ID recs: Continue voriconazole pending ID of mold. Plan 6 weeks of antifungal coverage  - Pulmonary following; recommend Bipap, vest PT and broadened abx coverage   - CXR 7/7: There is a small right pleural effusion may have increased in size since the prior study with bilateral nodular and patchy opacities, right greater than left predominantly unchanged    Coral Olazagasti  Hematology Fellow  [phone]

## 2019-07-09 NOTE — PROGRESS NOTE ADULT - SUBJECTIVE AND OBJECTIVE BOX
CC: F/U for lung CA and fungal PNA    SUBJECTIVE / OVERNIGHT EVENTS:  Patient with significant LAWRENCE with minimal exertion.   No F/C, N/V, CP, lightheadedness, dizziness, abdominal pain, diarrhea, dysuria.    MEDICATIONS  (STANDING):  allopurinol 300 milliGRAM(s) Oral daily  apixaban 2.5 milliGRAM(s) Oral every 12 hours  atovaquone Suspension 1500 milliGRAM(s) Oral daily  benzocaine 15 mG/menthol 3.6 mG (Sugar-Free) Lozenge 1 Lozenge Oral every 4 hours  docusate sodium 100 milliGRAM(s) Oral three times a day  lactated ringers. 1000 milliLiter(s) (30 mL/Hr) IV Continuous <Continuous>  levalbuterol Inhalation 0.63 milliGRAM(s) Inhalation every 4 hours  meropenem  IVPB 1000 milliGRAM(s) IV Intermittent every 8 hours  metoprolol succinate ER 50 milliGRAM(s) Oral daily  pantoprazole    Tablet 40 milliGRAM(s) Oral before breakfast  predniSONE   Tablet 20 milliGRAM(s) Oral daily  predniSONE   Tablet 20 milliGRAM(s) Oral once  sodium chloride 2 Gram(s) Oral two times a day  sodium chloride 3%  Inhalation 4 milliLiter(s) Inhalation every 6 hours  valACYclovir 500 milliGRAM(s) Oral <User Schedule>  vancomycin  IVPB 1000 milliGRAM(s) IV Intermittent every 12 hours  voriconazole 200 milliGRAM(s) Oral every 12 hours    MEDICATIONS  (PRN):  benzonatate 100 milliGRAM(s) Oral three times a day PRN Cough  bisacodyl 5 milliGRAM(s) Oral every 12 hours PRN Constipation  guaiFENesin    Syrup 200 milliGRAM(s) Oral every 6 hours PRN Cough  polyethylene glycol 3350 17 Gram(s) Oral daily PRN Constipation  sodium chloride 0.65% Nasal 1 Spray(s) Both Nostrils daily PRN Nasal Congestion      Vital Signs Last 24 Hrs  T(C): 36.2 (09 Jul 2019 13:23), Max: 36.6 (08 Jul 2019 21:18)  T(F): 97.2 (09 Jul 2019 13:23), Max: 97.8 (08 Jul 2019 21:18)  HR: 112 (09 Jul 2019 13:23) (90 - 128)  BP: 110/88 (09 Jul 2019 13:23) (110/88 - 150/98)  BP(mean): --  RR: 16 (09 Jul 2019 13:23) (16 - 18)  SpO2: 98% (09 Jul 2019 13:23) (95% - 100%)  CAPILLARY BLOOD GLUCOSE        I&O's Summary      PHYSICAL EXAM:  GENERAL: NAD  HEAD:  Atraumatic, Normocephalic  EYES: EOMI, PERRLA, conjunctiva and sclera clear  NECK: Supple, No JVD  CHEST/LUNG: Diffuse rhonchi and crackles.  HEART: Regular rate and rhythm; No murmurs, rubs, or gallops  ABDOMEN: Soft, Nontender, Nondistended; Bowel sounds present  EXTREMITIES:  2+ Peripheral Pulses, No clubbing, cyanosis, or edema  PSYCH: Calm  NEUROLOGY: A/Ox3, non-focal  SKIN: No rashes or lesions    LABS:                        10.0   2.93  )-----------( 131      ( 09 Jul 2019 06:20 )             31.4     07-09    137  |  93<L>  |  14  ----------------------------<  125<H>  3.1<L>   |  30  |  0.45<L>    Ca    9.2      09 Jul 2019 06:20  Phos  2.7     07-08  Mg     2.0     07-09                RADIOLOGY & ADDITIONAL TESTS:    Imaging Personally Reviewed:    Care Discussed with Consultants/Other Providers:

## 2019-07-09 NOTE — CHART NOTE - NSCHARTNOTEFT_GEN_A_CORE
Called by Bre CORREA pt was SOB requesting respiratory treatment.    pt a/w SOB, cough, Entero/ rhinovirus, PNA/ worsening lung DM,CHOL,HTN 2/2 lymphoma on Abx, Prednisone, cough regimen, Xopenex nebulizer, seen and evaluated by Pulmonary team.    VITAL SIGNS:  Vital Signs Last 24 Hrs  T(C): 36.6 (09 Jul 2019 05:10), Max: 36.6 (08 Jul 2019 21:18)  T(F): 97.8 (09 Jul 2019 05:10), Max: 97.8 (08 Jul 2019 21:18)  HR: 109 (09 Jul 2019 05:10) (85 - 125)  BP: 144/96 (09 Jul 2019 05:10) (132/95 - 152/97)  BP(mean): --  RR: 18 (09 Jul 2019 05:10) (16 - 18)  SpO2: 99% (09 Jul 2019 05:10) (95% - 99%)    O:  Well developed, well nourished elderly white Female found laying in bed w HOB @ around 30 deg elevation,  alert and oriented x 3 appeared in no acute distress.      HEENT:  No JVD     Chest / Lungs: ++ bilateral scattered rhonchi, mild wheezing auscultated, rhonchi partially cleared by coughing   Poor respiratory effort,  no accessory muscles of respiration were being used.     Heart: S1, S2, regular rate and rhythm,     No Known Allergies      MEDICATIONS  (STANDING):  allopurinol 300 milliGRAM(s) Oral daily  apixaban 2.5 milliGRAM(s) Oral every 12 hours  atovaquone Suspension 1500 milliGRAM(s) Oral daily  benzocaine 15 mG/menthol 3.6 mG (Sugar-Free) Lozenge 1 Lozenge Oral every 4 hours  docusate sodium 100 milliGRAM(s) Oral three times a day  lactated ringers. 1000 milliLiter(s) (30 mL/Hr) IV Continuous <Continuous>  levalbuterol Inhalation 0.63 milliGRAM(s) Inhalation every 4 hours  metoprolol succinate ER 50 milliGRAM(s) Oral daily  pantoprazole    Tablet 40 milliGRAM(s) Oral before breakfast  predniSONE   Tablet 20 milliGRAM(s) Oral daily  sodium chloride 2 Gram(s) Oral two times a day  sodium chloride 3%  Inhalation 4 milliLiter(s) Inhalation every 4 hours  valACYclovir 500 milliGRAM(s) Oral <User Schedule>  voriconazole 200 milliGRAM(s) Oral every 12 hours    MEDICATIONS  (PRN):  benzonatate 100 milliGRAM(s) Oral three times a day PRN Cough  bisacodyl 5 milliGRAM(s) Oral every 12 hours PRN Constipation  guaiFENesin    Syrup 200 milliGRAM(s) Oral every 6 hours PRN Cough  polyethylene glycol 3350 17 Gram(s) Oral daily PRN Constipation  sodium chloride 0.65% Nasal 1 Spray(s) Both Nostrils daily PRN Nasal Congestion                            10.7   2.77  )-----------( 136      ( 08 Jul 2019 06:00 )             33.8     07-08    138  |  93<L>  |  10  ----------------------------<  127<H>  4.3   |  31  |  0.42<L>    Ca    9.5      08 Jul 2019 06:00  Phos  2.7     07-08  Mg     1.7     07-08          ABG - ( 07 Jul 2019 07:19 )  pH, Arterial: 7.43  pH, Blood: x     /  pCO2: 45    /  pO2: 107   / HCO3: 29    / Base Excess: 4.8   /  SaO2: 98.2        Patient was given Xopenex neb treatment, prn cough medications were given,  Nursing staff to perform Chest PT / PD, pt encouraged to perform incentive spirometry  pt hemodynamically stable will continue to monitor     Addendum Note: pt felt much better after nebulizer treatment & chest PT / PD therapy

## 2019-07-09 NOTE — PROGRESS NOTE ADULT - PROBLEM SELECTOR PLAN 5
[place]. [place] [facility] per family, [facility] is not offering any disease modifying treatment. Pt is looking into enrollment in a clinical trial   - C/w ppx mepron, valacyclovir and allopurinol  - GOC discussed with pt and daughter for poor prognosis - They wish to be full code for now

## 2019-07-09 NOTE — PROGRESS NOTE ADULT - ASSESSMENT
79F PMHx DLBC lymphoma, Afib (on Eliquis), p/w productive cough x1 month, SOB x3 days, s/p bronchoscopy & BAL 7/1 for concern of RLL bronchus obstruction found to have mucus impaction, with recent acute respiratory distress (7/7), now improved. Initial cx demonstrates fungal hyphae in necrotic tissue on bx. Patient restarted on Zosyn after RRT/MICU consult. 79F PMHx DLBC lymphoma, Afib (on Eliquis), admitted w acute hypoxic respiratory failure, +RVP for entero/rhinovirus, s/p bronchoscopy & BAL 7/1 for concern of RLL bronchus obstruction found to have mucus impaction. Initial cx demonstrates fungal hyphae in necrotic tissue on bx. Patient w recent episode of acute respiratory distress (7/7), again found to be in acute respiratory distress today on afternoon rounds, and was started on BiPaP.   -c/w voriconazle for coverage of fungal PNA  -abx per ID, patient not improved s/p 10 days zosyn, now on vanc/meropenem  -c/w chest vest, pulm toilet (xoponex, HS 3, aerobika device)  -f/u fungal cx (added on 7/9) to previous sputum cx.     attending recs to follow

## 2019-07-09 NOTE — PROGRESS NOTE ADULT - SUBJECTIVE AND OBJECTIVE BOX
INTERVAL HPI/OVERNIGHT EVENTS:  RRT was called on 7/7 for acute hypoxic respiratory failure, now improving.     VITAL SIGNS:  T(F): 97.8 (07-09-19 @ 05:10)  HR: 110 (07-09-19 @ 09:02)  BP: 144/96 (07-09-19 @ 05:10)  RR: 18 (07-09-19 @ 05:10)  SpO2: 97% (07-09-19 @ 09:02)  Wt(kg): --    PHYSICAL EXAM:    Constitutional: NAD  Eyes: EOMI, sclera non-icteric  Neck: supple, no masses  Respiratory:   Cardiovascular: RRR, no M/R/G  Gastrointestinal: soft, NTND, no masses palpable  Extremities: no c/c/e  Neurological: AAOx3      MEDICATIONS  (STANDING):  allopurinol 300 milliGRAM(s) Oral daily  apixaban 2.5 milliGRAM(s) Oral every 12 hours  atovaquone Suspension 1500 milliGRAM(s) Oral daily  benzocaine 15 mG/menthol 3.6 mG (Sugar-Free) Lozenge 1 Lozenge Oral every 4 hours  docusate sodium 100 milliGRAM(s) Oral three times a day  lactated ringers. 1000 milliLiter(s) (30 mL/Hr) IV Continuous <Continuous>  levalbuterol Inhalation 0.63 milliGRAM(s) Inhalation every 4 hours  metoprolol succinate ER 50 milliGRAM(s) Oral daily  pantoprazole    Tablet 40 milliGRAM(s) Oral before breakfast  potassium chloride  10 mEq/100 mL IVPB 10 milliEquivalent(s) IV Intermittent every 1 hour  predniSONE   Tablet 20 milliGRAM(s) Oral daily  sodium chloride 2 Gram(s) Oral two times a day  sodium chloride 3%  Inhalation 4 milliLiter(s) Inhalation every 4 hours  valACYclovir 500 milliGRAM(s) Oral <User Schedule>  voriconazole 200 milliGRAM(s) Oral every 12 hours    MEDICATIONS  (PRN):  benzonatate 100 milliGRAM(s) Oral three times a day PRN Cough  bisacodyl 5 milliGRAM(s) Oral every 12 hours PRN Constipation  guaiFENesin    Syrup 200 milliGRAM(s) Oral every 6 hours PRN Cough  polyethylene glycol 3350 17 Gram(s) Oral daily PRN Constipation  sodium chloride 0.65% Nasal 1 Spray(s) Both Nostrils daily PRN Nasal Congestion      Allergies    No Known Allergies    Intolerances        LABS:                        10.0   2.93  )-----------( 131      ( 09 Jul 2019 06:20 )             31.4     07-09    137  |  93<L>  |  14  ----------------------------<  125<H>  3.1<L>   |  30  |  0.45<L>    Ca    9.2      09 Jul 2019 06:20  Phos  2.7     07-08  Mg     2.0     07-09            RADIOLOGY & ADDITIONAL TESTS:  Studies reviewed.    ASSESSMENT & PLAN: INTERVAL HPI/OVERNIGHT EVENTS:  RRT was called on 7/7 for acute hypoxic respiratory failure. Patient was improving yesterday but complaining of worsening dyspnea today and increased work of breathing    VITAL SIGNS:  T(F): 97.8 (07-09-19 @ 05:10)  HR: 110 (07-09-19 @ 09:02)  BP: 144/96 (07-09-19 @ 05:10)  RR: 18 (07-09-19 @ 05:10)  SpO2: 97% (07-09-19 @ 09:02)  Wt(kg): --    PHYSICAL EXAM:    Constitutional: elderly female in mild resp distress  Eyes: EOMI, sclera non-icteric  Neck: supple, no masses  Respiratory: poor respiratory effort, course breath sounds, +rhonchi   Cardiovascular: RRR,  Gastrointestinal: soft, NTND, no masses palpable  Extremities: no c/c/e  Neurological: AAOx3      MEDICATIONS  (STANDING):  allopurinol 300 milliGRAM(s) Oral daily  apixaban 2.5 milliGRAM(s) Oral every 12 hours  atovaquone Suspension 1500 milliGRAM(s) Oral daily  benzocaine 15 mG/menthol 3.6 mG (Sugar-Free) Lozenge 1 Lozenge Oral every 4 hours  docusate sodium 100 milliGRAM(s) Oral three times a day  lactated ringers. 1000 milliLiter(s) (30 mL/Hr) IV Continuous <Continuous>  levalbuterol Inhalation 0.63 milliGRAM(s) Inhalation every 4 hours  metoprolol succinate ER 50 milliGRAM(s) Oral daily  pantoprazole    Tablet 40 milliGRAM(s) Oral before breakfast  potassium chloride  10 mEq/100 mL IVPB 10 milliEquivalent(s) IV Intermittent every 1 hour  predniSONE   Tablet 20 milliGRAM(s) Oral daily  sodium chloride 2 Gram(s) Oral two times a day  sodium chloride 3%  Inhalation 4 milliLiter(s) Inhalation every 4 hours  valACYclovir 500 milliGRAM(s) Oral <User Schedule>  voriconazole 200 milliGRAM(s) Oral every 12 hours    MEDICATIONS  (PRN):  benzonatate 100 milliGRAM(s) Oral three times a day PRN Cough  bisacodyl 5 milliGRAM(s) Oral every 12 hours PRN Constipation  guaiFENesin    Syrup 200 milliGRAM(s) Oral every 6 hours PRN Cough  polyethylene glycol 3350 17 Gram(s) Oral daily PRN Constipation  sodium chloride 0.65% Nasal 1 Spray(s) Both Nostrils daily PRN Nasal Congestion      Allergies    No Known Allergies    Intolerances        LABS:                        10.0   2.93  )-----------( 131      ( 09 Jul 2019 06:20 )             31.4     07-09    137  |  93<L>  |  14  ----------------------------<  125<H>  3.1<L>   |  30  |  0.45<L>    Ca    9.2      09 Jul 2019 06:20  Phos  2.7     07-08  Mg     2.0     07-09            RADIOLOGY & ADDITIONAL TESTS:  Studies reviewed.    ASSESSMENT & PLAN:

## 2019-07-09 NOTE — PROGRESS NOTE ADULT - PROBLEM SELECTOR PLAN 1
RLL bronchus obstruction with concern for postobstructive fungal PNA  - appreciate MICU eval  - cont O2 to maintain SpO2>90, wean as tolerated   - trend cbc w diff  - standing airway clearance therapy, Xopenex q4 hours, following by nebulized 3% hypertonic saline, and then Aerobika device as per Pulm recommendations  - patient started on meropenem and Vancomycin IV - will continue to monitor.

## 2019-07-10 DIAGNOSIS — G47.9 SLEEP DISORDER, UNSPECIFIED: ICD-10-CM

## 2019-07-10 DIAGNOSIS — R63.0 ANOREXIA: ICD-10-CM

## 2019-07-10 DIAGNOSIS — F41.9 ANXIETY DISORDER, UNSPECIFIED: ICD-10-CM

## 2019-07-10 DIAGNOSIS — Z51.5 ENCOUNTER FOR PALLIATIVE CARE: ICD-10-CM

## 2019-07-10 DIAGNOSIS — Z71.89 OTHER SPECIFIED COUNSELING: ICD-10-CM

## 2019-07-10 DIAGNOSIS — R06.00 DYSPNEA, UNSPECIFIED: ICD-10-CM

## 2019-07-10 LAB
ANION GAP SERPL CALC-SCNC: 12 MMO/L — SIGNIFICANT CHANGE UP (ref 7–14)
BUN SERPL-MCNC: 19 MG/DL — SIGNIFICANT CHANGE UP (ref 7–23)
CALCIUM SERPL-MCNC: 9.1 MG/DL — SIGNIFICANT CHANGE UP (ref 8.4–10.5)
CHLORIDE SERPL-SCNC: 94 MMOL/L — LOW (ref 98–107)
CO2 SERPL-SCNC: 28 MMOL/L — SIGNIFICANT CHANGE UP (ref 22–31)
CREAT SERPL-MCNC: 0.43 MG/DL — LOW (ref 0.5–1.3)
GLUCOSE SERPL-MCNC: 135 MG/DL — HIGH (ref 70–99)
HCT VFR BLD CALC: 33.2 % — LOW (ref 34.5–45)
HGB BLD-MCNC: 10.6 G/DL — LOW (ref 11.5–15.5)
MAGNESIUM SERPL-MCNC: 1.6 MG/DL — SIGNIFICANT CHANGE UP (ref 1.6–2.6)
MCHC RBC-ENTMCNC: 31.6 PG — SIGNIFICANT CHANGE UP (ref 27–34)
MCHC RBC-ENTMCNC: 31.9 % — LOW (ref 32–36)
MCV RBC AUTO: 99.1 FL — SIGNIFICANT CHANGE UP (ref 80–100)
NRBC # FLD: 0.03 K/UL — SIGNIFICANT CHANGE UP (ref 0–0)
PHOSPHATE SERPL-MCNC: 2.8 MG/DL — SIGNIFICANT CHANGE UP (ref 2.5–4.5)
PLATELET # BLD AUTO: 146 K/UL — LOW (ref 150–400)
PMV BLD: 12 FL — SIGNIFICANT CHANGE UP (ref 7–13)
POTASSIUM SERPL-MCNC: 4.6 MMOL/L — SIGNIFICANT CHANGE UP (ref 3.5–5.3)
POTASSIUM SERPL-SCNC: 4.6 MMOL/L — SIGNIFICANT CHANGE UP (ref 3.5–5.3)
RBC # BLD: 3.35 M/UL — LOW (ref 3.8–5.2)
RBC # FLD: 19.3 % — HIGH (ref 10.3–14.5)
SODIUM SERPL-SCNC: 134 MMOL/L — LOW (ref 135–145)
WBC # BLD: 3.22 K/UL — LOW (ref 3.8–10.5)
WBC # FLD AUTO: 3.22 K/UL — LOW (ref 3.8–10.5)

## 2019-07-10 PROCEDURE — 99223 1ST HOSP IP/OBS HIGH 75: CPT | Mod: GC

## 2019-07-10 PROCEDURE — 99233 SBSQ HOSP IP/OBS HIGH 50: CPT

## 2019-07-10 PROCEDURE — 99497 ADVNCD CARE PLAN 30 MIN: CPT | Mod: 25

## 2019-07-10 PROCEDURE — 99232 SBSQ HOSP IP/OBS MODERATE 35: CPT

## 2019-07-10 RX ORDER — MAGNESIUM SULFATE 500 MG/ML
1 VIAL (ML) INJECTION ONCE
Refills: 0 | Status: COMPLETED | OUTPATIENT
Start: 2019-07-10 | End: 2019-07-10

## 2019-07-10 RX ORDER — MORPHINE SULFATE 50 MG/1
0.5 CAPSULE, EXTENDED RELEASE ORAL EVERY 4 HOURS
Refills: 0 | Status: DISCONTINUED | OUTPATIENT
Start: 2019-07-10 | End: 2019-07-10

## 2019-07-10 RX ADMIN — Medication 100 MILLIGRAM(S): at 05:26

## 2019-07-10 RX ADMIN — LEVALBUTEROL 0.63 MILLIGRAM(S): 1.25 SOLUTION, CONCENTRATE RESPIRATORY (INHALATION) at 01:02

## 2019-07-10 RX ADMIN — LEVALBUTEROL 0.63 MILLIGRAM(S): 1.25 SOLUTION, CONCENTRATE RESPIRATORY (INHALATION) at 16:47

## 2019-07-10 RX ADMIN — Medication 40 MILLIGRAM(S): at 05:26

## 2019-07-10 RX ADMIN — LEVALBUTEROL 0.63 MILLIGRAM(S): 1.25 SOLUTION, CONCENTRATE RESPIRATORY (INHALATION) at 13:16

## 2019-07-10 RX ADMIN — SODIUM CHLORIDE 2 GRAM(S): 9 INJECTION INTRAMUSCULAR; INTRAVENOUS; SUBCUTANEOUS at 17:32

## 2019-07-10 RX ADMIN — BENZOCAINE AND MENTHOL 1 LOZENGE: 5; 1 LIQUID ORAL at 22:08

## 2019-07-10 RX ADMIN — LEVALBUTEROL 0.63 MILLIGRAM(S): 1.25 SOLUTION, CONCENTRATE RESPIRATORY (INHALATION) at 04:46

## 2019-07-10 RX ADMIN — APIXABAN 2.5 MILLIGRAM(S): 2.5 TABLET, FILM COATED ORAL at 17:32

## 2019-07-10 RX ADMIN — APIXABAN 2.5 MILLIGRAM(S): 2.5 TABLET, FILM COATED ORAL at 05:26

## 2019-07-10 RX ADMIN — SODIUM CHLORIDE 4 MILLILITER(S): 9 INJECTION INTRAMUSCULAR; INTRAVENOUS; SUBCUTANEOUS at 16:55

## 2019-07-10 RX ADMIN — MEROPENEM 100 MILLIGRAM(S): 1 INJECTION INTRAVENOUS at 05:27

## 2019-07-10 RX ADMIN — MEROPENEM 100 MILLIGRAM(S): 1 INJECTION INTRAVENOUS at 17:33

## 2019-07-10 RX ADMIN — ATOVAQUONE 1500 MILLIGRAM(S): 750 SUSPENSION ORAL at 12:29

## 2019-07-10 RX ADMIN — VORICONAZOLE 200 MILLIGRAM(S): 10 INJECTION, POWDER, LYOPHILIZED, FOR SOLUTION INTRAVENOUS at 17:32

## 2019-07-10 RX ADMIN — LEVALBUTEROL 0.63 MILLIGRAM(S): 1.25 SOLUTION, CONCENTRATE RESPIRATORY (INHALATION) at 09:11

## 2019-07-10 RX ADMIN — LEVALBUTEROL 0.63 MILLIGRAM(S): 1.25 SOLUTION, CONCENTRATE RESPIRATORY (INHALATION) at 21:10

## 2019-07-10 RX ADMIN — Medication 100 GRAM(S): at 11:03

## 2019-07-10 RX ADMIN — BENZOCAINE AND MENTHOL 1 LOZENGE: 5; 1 LIQUID ORAL at 17:32

## 2019-07-10 RX ADMIN — VALACYCLOVIR 500 MILLIGRAM(S): 500 TABLET, FILM COATED ORAL at 17:32

## 2019-07-10 RX ADMIN — SODIUM CHLORIDE 2 GRAM(S): 9 INJECTION INTRAMUSCULAR; INTRAVENOUS; SUBCUTANEOUS at 05:25

## 2019-07-10 RX ADMIN — Medication 50 MILLIGRAM(S): at 05:26

## 2019-07-10 RX ADMIN — Medication 300 MILLIGRAM(S): at 12:29

## 2019-07-10 RX ADMIN — SODIUM CHLORIDE 4 MILLILITER(S): 9 INJECTION INTRAMUSCULAR; INTRAVENOUS; SUBCUTANEOUS at 21:10

## 2019-07-10 RX ADMIN — Medication 0.25 MILLIGRAM(S): at 22:08

## 2019-07-10 RX ADMIN — PANTOPRAZOLE SODIUM 40 MILLIGRAM(S): 20 TABLET, DELAYED RELEASE ORAL at 05:26

## 2019-07-10 RX ADMIN — SODIUM CHLORIDE 4 MILLILITER(S): 9 INJECTION INTRAMUSCULAR; INTRAVENOUS; SUBCUTANEOUS at 04:47

## 2019-07-10 RX ADMIN — VORICONAZOLE 200 MILLIGRAM(S): 10 INJECTION, POWDER, LYOPHILIZED, FOR SOLUTION INTRAVENOUS at 05:26

## 2019-07-10 RX ADMIN — SODIUM CHLORIDE 4 MILLILITER(S): 9 INJECTION INTRAMUSCULAR; INTRAVENOUS; SUBCUTANEOUS at 09:17

## 2019-07-10 RX ADMIN — BENZOCAINE AND MENTHOL 1 LOZENGE: 5; 1 LIQUID ORAL at 05:27

## 2019-07-10 RX ADMIN — BENZOCAINE AND MENTHOL 1 LOZENGE: 5; 1 LIQUID ORAL at 11:03

## 2019-07-10 NOTE — PROGRESS NOTE ADULT - PROBLEM SELECTOR PLAN 8
Discussed with daughter and son by the bedside for 30 minutes. Leaning towards avoiding intubation and resuscitation but wants to discuss further with the patient and family.

## 2019-07-10 NOTE — CONSULT NOTE ADULT - ASSESSMENT
80F w/ diffuse B-cell lymphoma s/p chemo/RT to chest, DCIS of L breast s/p mastectomy, Atrial fibrillation on Eliquis, admitted for acute hypoxic respiratory failure 2/2 viral URI w/ obstructive PNA, found to have progression of lymphoma s/p bronch 7/1 and RLL fungal PNA, currently on BS antibiotics and on BIPAP 2/2 worsening resp distress. Palliative care consulted 2/2 worsening dyspnea and for GOC discussion.

## 2019-07-10 NOTE — CONSULT NOTE ADULT - PROBLEM SELECTOR RECOMMENDATION 9
-currently comfortable on BIPAP  -rec -currently comfortable on BIPAP, wean as tolerated  -rec  -c/w aggressive airway clearance measures -currently comfortable on NC. pt states that she experiences dyspnea after just walking a few feet  -encourage PT  -rec 0.5mg IV morphine q4hr if needed for dyspnea  -c/w aggressive airway clearance measures

## 2019-07-10 NOTE — CONSULT NOTE ADULT - PROBLEM SELECTOR RECOMMENDATION 5
-pt able to eat lunch this afternoon, feels that her appetite has improved slightly  -will continue to monitor, encourage family to bring desired foods from home

## 2019-07-10 NOTE — CONSULT NOTE ADULT - ATTENDING COMMENTS
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 related to disease vs Bactrim  Change Bactrim to Mepron 1500 mg po daily    3) Continue zosyn while WBC low and until bronch cx final
79F hx of DLBC lymphoma, Afib on eliquis presenting with three days of worsening SOB and productive cough x3 days found to have +entero/rhinovirus with possible superimposed fungal PNA confirmed on bronchoscopy. Called due to a RRT which occurred on the floor for increase work of breathing. Was found to have tachypnea with lung sounds suggestive of rhonchi. CXR and CT scan reviewed. Agree with venturi mask, but would give aggressive chest PT, ipratropium, and hypersaline nebulized in order to help mobilize secretions and cough up her inflammation. Would weight the risk and benefits of steroids in the setting of fungal pneumonia. Otherwise, the patient is hemodynamically stable. She can continue to receive her current care on the medical floor.     Thank yo for your consult. Please call back if the patient requires a higher level of care.
Pt with hx of DLBC lymphoma, Afib on eliquus, obstruction of RLL bronchus, s/p BAL w/ bx 7/1 by CT surgery. Pt feeling better today. agree with continuing prednisone, empiric antibiotics for possible postobstructive pna.
Patient with lymphoma admitted for evaluation of fever from [facility]. She is currently on antibiotic and her examination shows diffuse rhonchi
endobronchial tumor vs. mucous plugging in right main . plan for bronch possible fulguration.
Pt seen with resident.  Agree with above.  DBCL now with respiratory failure- improving.  Can trial low dose morphine prn and ativan prn anxiety.  FM 11am to discuss discharge plan and AD

## 2019-07-10 NOTE — CONSULT NOTE ADULT - PROBLEM SELECTOR RECOMMENDATION 3
-pt would like to assign her son George as HCP. form filled out at another hospital, will fill out HCP form and place in chart.  -pt states that she has been giving more thought to code status but is still undecided.   -will continue to follow -rec po ativan 0.25mg qhs  -monitor for agitation or delirium  -will continue to monitor

## 2019-07-10 NOTE — PROGRESS NOTE ADULT - ASSESSMENT
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  Doubt this exacerbation was due to new bacterial pna- jessa given her imrpvement over 24 hours    Stop vancomycin  Stop meropenem in 48 hours      2) Leukopenia  Continue to monitor    3) Immunosuppressed secondary to cancer tx  PCP prophylaxis  Continue mepron  At some point, reasonable to re-challenge with Bactrim with close monitoring of WBC    4) Delirium: improved with sleep

## 2019-07-10 NOTE — PROGRESS NOTE ADULT - ASSESSMENT
INCOMPLETE Pt is n 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 (on Eliquis) initially admitted w acute hypoxic respiratory failure of multifactorial etiology 2/2 entero/rhinovirus URI s/p bronchoscopy & BAL 7/1 for concern of RLL bronchus obstruction found to have mucus impaction and initial cx c/w fungal PNA with 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  -c/w voriconazole for coverage of fungal PNA  -c/w vanc/meropenem  -c/w chest vest, pulm toilet (xoponex, HS 3, aerobika device). Can try MetaNebs as may be better tolerated  -f/u fungal cx (added on 7/9) to previous sputum cx  -BIPAP prn  -GOC conversations today. Agree with Palliative consult  -Pulmonary will continue to follow

## 2019-07-10 NOTE — PROGRESS NOTE ADULT - SUBJECTIVE AND OBJECTIVE BOX
CHIEF COMPLAINT: Dyspnea    Interval Events: Pt seen and examined at bedside. No acute events overnight. Pt with increase dyspnea yesterday afternoon, started on BIPAP for work of breathing. Abx broadened to Vanc/Meropenem as well as vorizonazole. Airway clearance continued, vest initiated.     REVIEW OF SYSTEMS:  Constitutional: No fevers or chills. No weight loss. No fatigue or generalised malaise.  Eyes: No itching or discharge from the eyes  ENT: No ear pain. No ear discharge. No nasal congestion. No post nasal drip. No epistaxis. No throat pain. No sore throat. No difficulty swallowing.   CV: No chest pain. No palpitations. No lightheadedness or dizziness.   Resp: No dyspnea at rest. No dyspnea on exertion. No orthopnea.  +cough. No stridor. + scant sputum production. No chest pain with respiration.  GI: No nausea. No vomiting. No diarrhea.  MSK: No joint pain or pain in any extremities  Integumentary: No skin lesions. No pedal edema.  Neurological: No gross motor weakness. No sensory changes.  [X ] All other systems negative      OBJECTIVE:  ICU Vital Signs Last 24 Hrs  T(C): 36.7 (10 Jul 2019 05:19), Max: 36.7 (10 Jul 2019 05:19)  T(F): 98 (10 Jul 2019 05:19), Max: 98 (10 Jul 2019 05:19)  HR: 115 (10 Jul 2019 07:35) (98 - 128)  BP: 141/88 (10 Jul 2019 05:19) (110/88 - 141/88)  BP(mean): --  ABP: --  ABP(mean): --  RR: 16 (10 Jul 2019 05:19) (16 - 16)  SpO2: 99% (10 Jul 2019 07:35) (96% - 100%)        CAPILLARY BLOOD GLUCOSE      PHYSICAL EXAM:  General: Awake, alert, oriented X 3, sitting up comfortably in bed.   HEENT: Atraumatic, normocephalic.   Lymph Nodes: No palpable lymphadenopathy  Neck: No JVD. No carotid bruit.   Respiratory: Normal chest expansion, normal and equal air entry, mild rhonchi KASEY.   Cardiovascular: S1 S2 normal. No murmurs, rubs or gallops.   Abdomen: Soft, non-tender, non-distended. No organomegaly.  Extremities: Warm to touch. Peripheral pulse palpable. No pedal edema.   Skin: No rashes or skin lesions  Neurological: Motor and sensory examination equal and normal in all four extremities.  Psychiatry: Appropriate mood and affect.    HOSPITAL MEDICATIONS:  MEDICATIONS  (STANDING):  allopurinol 300 milliGRAM(s) Oral daily  apixaban 2.5 milliGRAM(s) Oral every 12 hours  atovaquone Suspension 1500 milliGRAM(s) Oral daily  benzocaine 15 mG/menthol 3.6 mG (Sugar-Free) Lozenge 1 Lozenge Oral every 4 hours  docusate sodium 100 milliGRAM(s) Oral three times a day  lactated ringers. 1000 milliLiter(s) (30 mL/Hr) IV Continuous <Continuous>  levalbuterol Inhalation 0.63 milliGRAM(s) Inhalation every 4 hours  meropenem  IVPB 1000 milliGRAM(s) IV Intermittent every 12 hours  metoprolol succinate ER 50 milliGRAM(s) Oral daily  pantoprazole    Tablet 40 milliGRAM(s) Oral before breakfast  predniSONE   Tablet 40 milliGRAM(s) Oral daily  sodium chloride 2 Gram(s) Oral two times a day  sodium chloride 3%  Inhalation 4 milliLiter(s) Inhalation every 6 hours  valACYclovir 500 milliGRAM(s) Oral <User Schedule>  vancomycin  IVPB 750 milliGRAM(s) IV Intermittent every 24 hours  voriconazole 200 milliGRAM(s) Oral every 12 hours    MEDICATIONS  (PRN):  benzonatate 100 milliGRAM(s) Oral three times a day PRN Cough  bisacodyl 5 milliGRAM(s) Oral every 12 hours PRN Constipation  guaiFENesin    Syrup 200 milliGRAM(s) Oral every 6 hours PRN Cough  polyethylene glycol 3350 17 Gram(s) Oral daily PRN Constipation  sodium chloride 0.65% Nasal 1 Spray(s) Both Nostrils daily PRN Nasal Congestion      LABS:                        10.0   2.93  )-----------( 131      ( 09 Jul 2019 06:20 )             31.4     Hgb Trend: 10.0<--, 10.7<--, 10.4<--, 9.8<--, 9.4<--  07-09    137  |  93<L>  |  14  ----------------------------<  125<H>  3.1<L>   |  30  |  0.45<L>    Ca    9.2      09 Jul 2019 06:20  Mg     2.0     07-09      Creatinine Trend: 0.45<--, 0.42<--, 0.42<--, 0.41<--, 0.35<--, 0.39<--            MICROBIOLOGY:     Culture - Respiratory with Gram Stain (collected 08 Jul 2019 18:59)  Source: SPUTUM        RADIOLOGY:  [ ] Reviewed and interpreted by me    PULMONARY FUNCTION TESTS:    EKG: CHIEF COMPLAINT: Dyspnea    Interval Events: Pt seen and examined at bedside. No acute events overnight. Pt with increase dyspnea yesterday afternoon, started on BIPAP for work of breathing. Abx broadened to Vanc/Meropenem as well as vorizonazole. Airway clearance continued, vest initiated. Pt felt better, was off BIPAP for a few hours.     REVIEW OF SYSTEMS:  Constitutional: No fevers or chills. No weight loss. No fatigue or generalised malaise.  Eyes: No itching or discharge from the eyes  ENT: No ear pain. No ear discharge. No nasal congestion. No post nasal drip. No epistaxis. No throat pain. No sore throat. No difficulty swallowing.   CV: No chest pain. No palpitations. No lightheadedness or dizziness.   Resp: No dyspnea at rest. No dyspnea on exertion. No orthopnea.  +cough. No stridor. + scant sputum production. No chest pain with respiration.  GI: No nausea. No vomiting. No diarrhea.  MSK: No joint pain or pain in any extremities  Integumentary: No skin lesions. No pedal edema.  Neurological: No gross motor weakness. No sensory changes.  [X ] All other systems negative      OBJECTIVE:  ICU Vital Signs Last 24 Hrs  T(C): 36.7 (10 Jul 2019 05:19), Max: 36.7 (10 Jul 2019 05:19)  T(F): 98 (10 Jul 2019 05:19), Max: 98 (10 Jul 2019 05:19)  HR: 115 (10 Jul 2019 07:35) (98 - 128)  BP: 141/88 (10 Jul 2019 05:19) (110/88 - 141/88)  BP(mean): --  ABP: --  ABP(mean): --  RR: 16 (10 Jul 2019 05:19) (16 - 16)  SpO2: 99% (10 Jul 2019 07:35) (96% - 100%)        CAPILLARY BLOOD GLUCOSE      PHYSICAL EXAM:  General: Awake, alert, oriented X 3, sitting up comfortably in bed.   HEENT: Atraumatic, normocephalic.   Lymph Nodes: No palpable lymphadenopathy  Neck: No JVD. No carotid bruit.   Respiratory: Normal chest expansion, normal and equal air entry, mild rhonchi KASEY. (+) b/l expiratory wheezing :>R with decreased air entry on right  Cardiovascular: S1 S2 normal. No murmurs, rubs or gallops.   Abdomen: Soft, non-tender, non-distended. No organomegaly.  Extremities: Warm to touch. Peripheral pulse palpable. No pedal edema.   Skin: No rashes or skin lesions  Neurological: Motor and sensory examination equal and normal in all four extremities.  Psychiatry: Appropriate mood and affect.    HOSPITAL MEDICATIONS:  MEDICATIONS  (STANDING):  allopurinol 300 milliGRAM(s) Oral daily  apixaban 2.5 milliGRAM(s) Oral every 12 hours  atovaquone Suspension 1500 milliGRAM(s) Oral daily  benzocaine 15 mG/menthol 3.6 mG (Sugar-Free) Lozenge 1 Lozenge Oral every 4 hours  docusate sodium 100 milliGRAM(s) Oral three times a day  lactated ringers. 1000 milliLiter(s) (30 mL/Hr) IV Continuous <Continuous>  levalbuterol Inhalation 0.63 milliGRAM(s) Inhalation every 4 hours  meropenem  IVPB 1000 milliGRAM(s) IV Intermittent every 12 hours  metoprolol succinate ER 50 milliGRAM(s) Oral daily  pantoprazole    Tablet 40 milliGRAM(s) Oral before breakfast  predniSONE   Tablet 40 milliGRAM(s) Oral daily  sodium chloride 2 Gram(s) Oral two times a day  sodium chloride 3%  Inhalation 4 milliLiter(s) Inhalation every 6 hours  valACYclovir 500 milliGRAM(s) Oral <User Schedule>  vancomycin  IVPB 750 milliGRAM(s) IV Intermittent every 24 hours  voriconazole 200 milliGRAM(s) Oral every 12 hours    MEDICATIONS  (PRN):  benzonatate 100 milliGRAM(s) Oral three times a day PRN Cough  bisacodyl 5 milliGRAM(s) Oral every 12 hours PRN Constipation  guaiFENesin    Syrup 200 milliGRAM(s) Oral every 6 hours PRN Cough  polyethylene glycol 3350 17 Gram(s) Oral daily PRN Constipation  sodium chloride 0.65% Nasal 1 Spray(s) Both Nostrils daily PRN Nasal Congestion      LABS:                        10.0   2.93  )-----------( 131      ( 09 Jul 2019 06:20 )             31.4     Hgb Trend: 10.0<--, 10.7<--, 10.4<--, 9.8<--, 9.4<--  07-09    137  |  93<L>  |  14  ----------------------------<  125<H>  3.1<L>   |  30  |  0.45<L>    Ca    9.2      09 Jul 2019 06:20  Mg     2.0     07-09      Creatinine Trend: 0.45<--, 0.42<--, 0.42<--, 0.41<--, 0.35<--, 0.39<--            MICROBIOLOGY:     Culture - Respiratory with Gram Stain (collected 08 Jul 2019 18:59)  Source: SPUTUM        RADIOLOGY:  [ ] Reviewed and interpreted by me    PULMONARY FUNCTION TESTS:    EKG:

## 2019-07-10 NOTE — PROGRESS NOTE ADULT - PROBLEM SELECTOR PLAN 5
Follows with Dr. Nguyen at [facility]. as per family, [facility] is not offering any disease modifying treatment. Pt is looking into enrollment in a clinical trial   - C/w ppx mepron, valacyclovir and allopurinol  - GOC discussed with pt and daughter for poor prognosis - They wish to be full code for now

## 2019-07-10 NOTE — PROGRESS NOTE ADULT - PROBLEM SELECTOR PLAN 1
RLL bronchus obstruction with concern for postobstructive fungal PNA  - appreciate MICU eval  - cont O2 to maintain SpO2>90, wean as tolerated   - trend cbc w diff  - standing airway clearance therapy, Xopenex q4 hours, following by nebulized 3% hypertonic saline, and then Aerobika device as per Pulm recommendations  - patient started on meropenem and Vancomycin IV - will continue to monitor. RLL bronchus obstruction with concern for postobstructive fungal PNA  - cont O2 to maintain SpO2>90  - standing airway clearance therapy, Xopenex q4 hours, following by nebulized 3% hypertonic saline, and then Aerobika device as per Pulm recommendations  - patient started on meropenem and Vancomycin IV  - poor prognosis

## 2019-07-10 NOTE — CONSULT NOTE ADULT - CONSULT REASON
shortness of breath
Hx DLBCL
bronch cx w fungal hyphae
increased WOB
SOB
GOC, worsening respiratory status

## 2019-07-10 NOTE — CONSULT NOTE ADULT - PROBLEM SELECTOR RECOMMENDATION 6
-pt would like to assign her son George as HCP. form filled out at another hospital, will fill out HCP form and place in chart.  -pt states that she has been giving more thought to code status but is still undecided.   -will continue to follow -pt has assigned her son George as HCP. HCP form filled out and placed in chart  -per d/w w. family, would like to pursue DMT but unsure of code status  -plan for family meeting at 11am tmrw 7/11 w/ son (George), daughter (Clarissa) and pt  -will continue to follow

## 2019-07-10 NOTE — PROGRESS NOTE ADULT - SUBJECTIVE AND OBJECTIVE BOX
CC: F/U for     SUBJECTIVE / OVERNIGHT EVENTS:      MEDICATIONS  (STANDING):  allopurinol 300 milliGRAM(s) Oral daily  apixaban 2.5 milliGRAM(s) Oral every 12 hours  atovaquone Suspension 1500 milliGRAM(s) Oral daily  benzocaine 15 mG/menthol 3.6 mG (Sugar-Free) Lozenge 1 Lozenge Oral every 4 hours  docusate sodium 100 milliGRAM(s) Oral three times a day  lactated ringers. 1000 milliLiter(s) (30 mL/Hr) IV Continuous <Continuous>  levalbuterol Inhalation 0.63 milliGRAM(s) Inhalation every 4 hours  meropenem  IVPB 1000 milliGRAM(s) IV Intermittent every 12 hours  metoprolol succinate ER 50 milliGRAM(s) Oral daily  pantoprazole    Tablet 40 milliGRAM(s) Oral before breakfast  predniSONE   Tablet 40 milliGRAM(s) Oral daily  sodium chloride 2 Gram(s) Oral two times a day  sodium chloride 3%  Inhalation 4 milliLiter(s) Inhalation every 6 hours  valACYclovir 500 milliGRAM(s) Oral <User Schedule>  vancomycin  IVPB 750 milliGRAM(s) IV Intermittent every 24 hours  voriconazole 200 milliGRAM(s) Oral every 12 hours    MEDICATIONS  (PRN):  benzonatate 100 milliGRAM(s) Oral three times a day PRN Cough  bisacodyl 5 milliGRAM(s) Oral every 12 hours PRN Constipation  guaiFENesin    Syrup 200 milliGRAM(s) Oral every 6 hours PRN Cough  polyethylene glycol 3350 17 Gram(s) Oral daily PRN Constipation  sodium chloride 0.65% Nasal 1 Spray(s) Both Nostrils daily PRN Nasal Congestion      Vital Signs Last 24 Hrs  T(C): 36.7 (10 Jul 2019 05:19), Max: 36.7 (10 Jul 2019 05:19)  T(F): 98 (10 Jul 2019 05:19), Max: 98 (10 Jul 2019 05:19)  HR: 125 (10 Jul 2019 13:19) (98 - 135)  BP: 141/88 (10 Jul 2019 05:19) (131/80 - 141/88)  BP(mean): --  RR: 24 (10 Jul 2019 13:19) (16 - 24)  SpO2: 100% (10 Jul 2019 13:19) (96% - 100%)  CAPILLARY BLOOD GLUCOSE        I&O's Summary      PHYSICAL EXAM:  GENERAL: NAD, well-developed  HEAD:  Atraumatic, Normocephalic  EYES: EOMI, PERRLA, conjunctiva and sclera clear  NECK: Supple, No JVD  CHEST/LUNG: Clear to auscultation bilaterally; No wheeze  HEART: Regular rate and rhythm; No murmurs, rubs, or gallops  ABDOMEN: Soft, Nontender, Nondistended; Bowel sounds present  EXTREMITIES:  2+ Peripheral Pulses, No clubbing, cyanosis, or edema  PSYCH: Calm  NEUROLOGY: A/Ox3, non-focal  SKIN: No rashes or lesions    LABS:                        10.6   3.22  )-----------( 146      ( 10 Jul 2019 07:11 )             33.2     07-10    134<L>  |  94<L>  |  19  ----------------------------<  135<H>  4.6   |  28  |  0.43<L>    Ca    9.1      10 Jul 2019 07:11  Phos  2.8     07-10  Mg     1.6     07-10                RADIOLOGY & ADDITIONAL TESTS:    Imaging Personally Reviewed:    Care Discussed with Consultants/Other Providers: CC: F/U for PNA and lung cancer    SUBJECTIVE / OVERNIGHT EVENTS:  Patient has continuing issues with dyspnea. Tolerating BiPAP for now.  No F/C, N/V, CP, SOB, Cough, lightheadedness, dizziness, abdominal pain, diarrhea, dysuria.    MEDICATIONS  (STANDING):  allopurinol 300 milliGRAM(s) Oral daily  apixaban 2.5 milliGRAM(s) Oral every 12 hours  atovaquone Suspension 1500 milliGRAM(s) Oral daily  benzocaine 15 mG/menthol 3.6 mG (Sugar-Free) Lozenge 1 Lozenge Oral every 4 hours  docusate sodium 100 milliGRAM(s) Oral three times a day  lactated ringers. 1000 milliLiter(s) (30 mL/Hr) IV Continuous <Continuous>  levalbuterol Inhalation 0.63 milliGRAM(s) Inhalation every 4 hours  meropenem  IVPB 1000 milliGRAM(s) IV Intermittent every 12 hours  metoprolol succinate ER 50 milliGRAM(s) Oral daily  pantoprazole    Tablet 40 milliGRAM(s) Oral before breakfast  predniSONE   Tablet 40 milliGRAM(s) Oral daily  sodium chloride 2 Gram(s) Oral two times a day  sodium chloride 3%  Inhalation 4 milliLiter(s) Inhalation every 6 hours  valACYclovir 500 milliGRAM(s) Oral <User Schedule>  vancomycin  IVPB 750 milliGRAM(s) IV Intermittent every 24 hours  voriconazole 200 milliGRAM(s) Oral every 12 hours    MEDICATIONS  (PRN):  benzonatate 100 milliGRAM(s) Oral three times a day PRN Cough  bisacodyl 5 milliGRAM(s) Oral every 12 hours PRN Constipation  guaiFENesin    Syrup 200 milliGRAM(s) Oral every 6 hours PRN Cough  polyethylene glycol 3350 17 Gram(s) Oral daily PRN Constipation  sodium chloride 0.65% Nasal 1 Spray(s) Both Nostrils daily PRN Nasal Congestion      Vital Signs Last 24 Hrs  T(C): 36.7 (10 Jul 2019 05:19), Max: 36.7 (10 Jul 2019 05:19)  T(F): 98 (10 Jul 2019 05:19), Max: 98 (10 Jul 2019 05:19)  HR: 125 (10 Jul 2019 13:19) (98 - 135)  BP: 141/88 (10 Jul 2019 05:19) (131/80 - 141/88)  BP(mean): --  RR: 24 (10 Jul 2019 13:19) (16 - 24)  SpO2: 100% (10 Jul 2019 13:19) (96% - 100%)  CAPILLARY BLOOD GLUCOSE        I&O's Summary      PHYSICAL EXAM:  GENERAL: NAD  HEAD:  Atraumatic, Normocephalic  EYES: EOMI, PERRLA, conjunctiva and sclera clear  NECK: Supple, No JVD  CHEST/LUNG: Diffuse rhonchi and crackles.  HEART: Regular rate and rhythm; No murmurs, rubs, or gallops  ABDOMEN: Soft, Nontender, Nondistended; Bowel sounds present  EXTREMITIES:  2+ Peripheral Pulses, No clubbing, cyanosis, or edema  PSYCH: Calm  NEUROLOGY: A/Ox3, non-focal  SKIN: No rashes or lesions    LABS:                        10.6   3.22  )-----------( 146      ( 10 Jul 2019 07:11 )             33.2     07-10    134<L>  |  94<L>  |  19  ----------------------------<  135<H>  4.6   |  28  |  0.43<L>    Ca    9.1      10 Jul 2019 07:11  Phos  2.8     07-10  Mg     1.6     07-10                RADIOLOGY & ADDITIONAL TESTS:    Imaging Personally Reviewed:    Care Discussed with Consultants/Other Providers:

## 2019-07-10 NOTE — CONSULT NOTE ADULT - CONSULT REQUESTED DATE/TIME
03-Jul-2019
03-Jul-2019 11:22
07-Jul-2019 09:12
01-Jul-2019 17:00
28-Jun-2019 13:00
10-Jul-2019 11:47

## 2019-07-10 NOTE — CHART NOTE - NSCHARTNOTEFT_GEN_A_CORE
GOC discussion with palliative scheduled for tomorrow. Spoke with HCP Cleve- patient is to remain FULL CODE for now.

## 2019-07-10 NOTE — PROGRESS NOTE ADULT - SUBJECTIVE AND OBJECTIVE BOX
Follow Up:      [place] History/ROS:Patient is a 79y old  Female who presents with a chief complaint of SOB (10 Jul 2019 15:29)    Increased SOB yesterday- better today,  Denies cough  No fever    Allergies    No Known Allergies    Intolerances        ANTIMICROBIALS:  atovaquone Suspension 1500 daily  meropenem  IVPB 1000 every 12 hours  valACYclovir 500 <User Schedule>  vancomycin  IVPB 750 every 24 hours  voriconazole 200 every 12 hours      OTHER MEDS:  allopurinol 300 milliGRAM(s) Oral daily  apixaban 2.5 milliGRAM(s) Oral every 12 hours  benzocaine 15 mG/menthol 3.6 mG (Sugar-Free) Lozenge 1 Lozenge Oral every 4 hours  benzonatate 100 milliGRAM(s) Oral three times a day PRN  bisacodyl 5 milliGRAM(s) Oral every 12 hours PRN  docusate sodium 100 milliGRAM(s) Oral three times a day  guaiFENesin    Syrup 200 milliGRAM(s) Oral every 6 hours PRN  lactated ringers. 1000 milliLiter(s) IV Continuous <Continuous>  levalbuterol Inhalation 0.63 milliGRAM(s) Inhalation every 4 hours  LORazepam     Tablet 0.25 milliGRAM(s) Oral at bedtime  metoprolol succinate ER 50 milliGRAM(s) Oral daily  morphine  - Injectable 0.5 milliGRAM(s) IV Push every 4 hours PRN  pantoprazole    Tablet 40 milliGRAM(s) Oral before breakfast  polyethylene glycol 3350 17 Gram(s) Oral daily PRN  predniSONE   Tablet 40 milliGRAM(s) Oral daily  sodium chloride 2 Gram(s) Oral two times a day  sodium chloride 0.65% Nasal 1 Spray(s) Both Nostrils daily PRN  sodium chloride 3%  Inhalation 4 milliLiter(s) Inhalation every 6 hours      Vital Signs Last 24 Hrs  T(C): 36.7 (10 Jul 2019 05:19), Max: 36.7 (10 Jul 2019 05:19)  T(F): 98 (10 Jul 2019 05:19), Max: 98 (10 Jul 2019 05:19)  HR: 109 (10 Jul 2019 16:47) (98 - 135)  BP: 141/88 (10 Jul 2019 05:19) (131/80 - 141/88)  BP(mean): --  RR: 24 (10 Jul 2019 13:19) (16 - 24)  SpO2: 99% (10 Jul 2019 16:47) (98% - 100%)    PHYSICAL EXAM:  General: [ x] non-toxic  HEAD/EYES: [ ] PERRL [x ] white sclera [ ] icterus  ENT:  [ ] normal [x ] supple [ ] thrush [ ] pharyngeal exudate  Cardiovascular:   [ ] murmur x[ ] normal [ ] PPM/AICD  Respiratory:  [x ] course BS  GI:  [x ] soft, non-tender, normal bowel sounds  :  [ ] bledsoe [x ] no CVA tenderness   Musculoskeletal:  [ ] no synovitis  Neurologic:  [ ] non-focal exam   Skin:  x[ ] no rash  Lymph: [x ] no lymphadenopathy  Psychiatric:  x[x ] appropriate affect [ ] alert & oriented  Lines:  x[x ] no phlebitis [ ] central line                                10.6   3.22  )-----------( 146      ( 10 Jul 2019 07:11 )             33.2       07-10    134<L>  |  94<L>  |  19  ----------------------------<  135<H>  4.6   |  28  |  0.43<L>    Ca    9.1      10 Jul 2019 07:11  Phos  2.8     07-10  Mg     1.6     07-10            MICROBIOLOGY:Culture - Respiratory:   NRF^Normal Respiratory Le  QUANTITY OF GROWTH: MODERATE (07-08-19 @ 18:59)      RADIOLOGY:

## 2019-07-10 NOTE — CONSULT NOTE ADULT - SUBJECTIVE AND OBJECTIVE BOX
HPI:  79F hx of DLBC lymphoma, Afib on eliquis presenting with three days of worsening SOB and productive cough. Patient states she was having worsening phlegm and felt like should "couldn't breath" this AM, so she went to ED. Has had chronic cough and per son was started on prednisone 40mg x2 days, now on prednisone 20mg by Dr. Nguyen for worsening cough. She denies sick contacts, fevers, chills, LE edema. She denies chest pain or palpitations. Endorsing decreased PO intake. Denies dysuria, n/v/d. (26 Jun 2019 17:29)    Pt lives    PERTINENT PM/SXH:   Alopecia  Murmur, cardiac  Varicose veins  GERD (gastroesophageal reflux disease)  Tracheal compression  Thrombocytopenia  Lymphadenopathy  Hyponatremia  Anemia  S/P chemotherapy, time since 4-12 weeks  Lung mass  Lymphoma, unspecified body region, unspecified lymphoma type  Ductal carcinoma in situ (DCIS) of left breast  HTN (hypertension)  Afib    Cataract  S/P tonsillectomy  Arthropathy  H/O bilateral mastectomy  No significant past surgical history    FAMILY HISTORY:  Family history of lymphoma: low grade, as per Dr. Palacios&#x27;s consult on 12-4-17  Family history of ovarian cancer (Aunt)      SOCIAL HISTORY:   Significant other/partner: Yes [ ]  No [ ] Children:  Yes [ ]  No [ ] Mormon/Spirituality:  Substance hx: Yes[ ]  No [ ]   Tobacco hx:  Yes [ ] No [ ]   Alcohol hx: Yes [ ] No [ ]   Home Opioid hx:  Yes [ ] No [ ]  [ ] I-Stop Reference No:  Living Situation: [ ]Home  [ ]Long term care  [ ]Rehab [ ]Other    ADVANCE DIRECTIVES:    DNR  MOLST  Yes [ ] No [ ]  Living Will  Yes [ ]  No [ ]     [ ] Health Care Proxy(s)  [ ] Surrogate(s)  [ ] Guardian           Name(s): Phone Number(s):    BASELINE (I)ADL(s) (prior to admission):  [place]: [ ]Total  [ ] Moderate [ ]Dependent    Allergies    No Known Allergies    Intolerances    MEDICATIONS  (STANDING):  allopurinol 300 milliGRAM(s) Oral daily  apixaban 2.5 milliGRAM(s) Oral every 12 hours  atovaquone Suspension 1500 milliGRAM(s) Oral daily  benzocaine 15 mG/menthol 3.6 mG (Sugar-Free) Lozenge 1 Lozenge Oral every 4 hours  docusate sodium 100 milliGRAM(s) Oral three times a day  lactated ringers. 1000 milliLiter(s) (30 mL/Hr) IV Continuous <Continuous>  levalbuterol Inhalation 0.63 milliGRAM(s) Inhalation every 4 hours  meropenem  IVPB 1000 milliGRAM(s) IV Intermittent every 12 hours  metoprolol succinate ER 50 milliGRAM(s) Oral daily  pantoprazole    Tablet 40 milliGRAM(s) Oral before breakfast  predniSONE   Tablet 40 milliGRAM(s) Oral daily  sodium chloride 2 Gram(s) Oral two times a day  sodium chloride 3%  Inhalation 4 milliLiter(s) Inhalation every 6 hours  valACYclovir 500 milliGRAM(s) Oral <User Schedule>  vancomycin  IVPB 750 milliGRAM(s) IV Intermittent every 24 hours  voriconazole 200 milliGRAM(s) Oral every 12 hours    MEDICATIONS  (PRN):  benzonatate 100 milliGRAM(s) Oral three times a day PRN Cough  bisacodyl 5 milliGRAM(s) Oral every 12 hours PRN Constipation  guaiFENesin    Syrup 200 milliGRAM(s) Oral every 6 hours PRN Cough  polyethylene glycol 3350 17 Gram(s) Oral daily PRN Constipation  sodium chloride 0.65% Nasal 1 Spray(s) Both Nostrils daily PRN Nasal Congestion    PRESENT SYMPTOMS: [ ]Unable to obtain due to poor mentation   Source if other than patient:  [ ]Family   [ ]Team     Pain (Impact on QOL):    Location -   Severity -        Minimal acceptable level (0-10 scale):  Quality:   Onset:   Duration:                 Aggravating factors -  Relieving factors -  Radiation -    PAIN AD Score:     [email or web address]/[email or web address] (press ctrl +  left click to view)    Dyspnea:  Yes [ ] No [ ] - [ ]Mild [ ]Moderate [ ]Severe  Anxiety:    Yes [ ] No [ ] - [ ]Mild [ ]Moderate [ ]Severe  Fatigue:    Yes [ ] No [ ] - [ ]Mild [ ]Moderate [ ]Severe  Nausea:    Yes [ ] No [ ] - [ ]Mild [ ]Moderate [ ]Severe                         Loss of appetite: Yes [ ] No [ ] - [ ]Mild [ ]Moderate [ ]Severe             Constipation:  Yes [ ] No [ ] - [ ]Mild [ ]Moderate [ ]Severe  Grief: Yes [ ] No [ ]     Other Symptoms:  [ ]All other review of systems negative     Karnofsky Performance Score/Palliative Performance Status Version 2:         %    http://palliative.info/resource_material/[email or web address].pdf    PHYSICAL EXAM:  Vital Signs Last 24 Hrs  T(C): 36.7 (10 Jul 2019 05:19), Max: 36.7 (10 Jul 2019 05:19)  T(F): 98 (10 Jul 2019 05:19), Max: 98 (10 Jul 2019 05:19)  HR: 125 (10 Jul 2019 10:36) (98 - 135)  BP: 141/88 (10 Jul 2019 05:19) (110/88 - 141/88)  BP(mean): --  RR: 16 (10 Jul 2019 05:19) (16 - 16)  SpO2: 98% (10 Jul 2019 10:36) (96% - 100%) I&O's Summary      GENERAL:  [ ]Alert  [ ]Oriented x   [ ]Lethargic  [ ]Cachexia  [ ]Unarousable  [ ]Verbal  [ ]Non-Verbal  Behavioral:   [ ] Anxiety  [ ] Delirium [ ] Agitation [ ] Other  HEENT:  [ ]Normal   [ ]Dry mouth   [ ]ET Tube/Trach  [ ]Oral lesions  PULMONARY:   [ ]Clear  [ ]Tachypnea  [ ]Audible excessive secretions   [ ]Rhonchi        [ ]Right [ ]Left [ ]Bilateral  [ ]Crackles        [ ]Right [ ]Left [ ]Bilateral  [ ]Wheezing     [ ]Right [ ]Left [ ]Bilateral  CARDIOVASCULAR:    [ ]Regular [ ]Irregular [ ]Tachy  [ ]Butch [ ]Murmur [ ]Other  GASTROINTESTINAL:  [ ]Soft  [ ]Distended   [ ]+BS  [ ]Non tender [ ]Tender  [ ]PEG [ ]OGT/ NGT  Last BM:   GENITOURINARY:  [ ]Normal [ ] Incontinent   [ ]Oliguria/Anuria   [ ]Cespedes  MUSCULOSKELETAL:   [ ]Normal   [ ]Weakness  [ ]Bed/Wheelchair bound [ ]Edema  NEUROLOGIC:   [ ]No focal deficits  [ ] Cognitive impairment  [ ] Dysphagia [ ]Dysarthria [ ] Paresis [ ]Other   SKIN:   [ ]Normal   [ ]Pressure ulcer(s)  [ ]Rash    LABS:                        10.6   3.22  )-----------( 146      ( 10 Jul 2019 07:11 )             33.2   07-10    134<L>  |  94<L>  |  19  ----------------------------<  135<H>  4.6   |  28  |  0.43<L>    Ca    9.1      10 Jul 2019 07:11  Phos  2.8     07-10  Mg     1.6     07-10          RADIOLOGY & ADDITIONAL STUDIES:    PROTEIN CALORIE MALNUTRITION PRESENT: [ ] Yes [ ] No  [ ] PPSV2 < or = to 30% [ ] significant weight loss  [ ] poor nutritional intake [ ] catabolic state [ ] anasarca     Albumin, Serum: 3.1 g/dL (06-27-19 @ 06:16)      REFERRALS:   [ ]Chaplaincy  [ ] Hospice  [ ]Child Life  [ ]Social Work  [ ]Case management [ ]Holistic Therapy   Goals of Care Discussion Document: HPI:  79F hx of DLBC lymphoma, Afib on eliquis presenting with three days of worsening SOB and productive cough. Patient states she was having worsening phlegm and felt like should "couldn't breath" this AM, so she went to ED. Has had chronic cough and per son was started on prednisone 40mg x2 days, now on prednisone 20mg by Dr. Nguyen for worsening cough. She denies sick contacts, fevers, chills, LE edema. She denies chest pain or palpitations. Endorsing decreased PO intake. Denies dysuria, n/v/d. (26 Jun 2019 17:29)    Pt seen along w/ daughter at bedside.   Pt lives in a private home with her son and . She was fully functional prior to her admission, ambulates independently, cooks/cleans/manages finances all on her own. 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 Has previously assigned her son as HCP and still wishes to but does not have the form with her. States that she is not a spiritual person.    Pt states that she feels significantly better after having started BIPAP yesterday afternoon when she developed significant resp distress. Was able to tolerate being off bipap for a few hours. She continues to tolerate aggressive airway clearance measures, including chest vest and continues to have prod cough. Denies any symptoms of pain/constipation/diarrhea/difficulty urinating. States she feels anxious at times.     PERTINENT PM/SXH:   Alopecia  Murmur, cardiac  Varicose veins  GERD (gastroesophageal reflux disease)  Tracheal compression  Thrombocytopenia  Lymphadenopathy  Hyponatremia  Anemia  S/P chemotherapy, time since 4-12 weeks  Lung mass  Lymphoma, unspecified body region, unspecified lymphoma type  Ductal carcinoma in situ (DCIS) of left breast  HTN (hypertension)  Afib    Cataract  S/P tonsillectomy  Arthropathy  H/O bilateral mastectomy  No significant past surgical history    FAMILY HISTORY:  Family history of lymphoma: low grade, as per Dr. Palacios&#x27;s consult on 12-4-17  Family history of ovarian cancer (Aunt)      SOCIAL HISTORY:   Significant other/partner: Yes [ x]  No [ ] Children:  Yes [x ]  No [ ] Hoahaoism/Spirituality: Samaritan, not spiritual  Substance hx: Yes[ ]  No [x ]   Tobacco hx:  Yes [ ] No [x ]   Alcohol hx: Yes [ ] No [x ]   Home Opioid hx:  Yes [ ] No [x ]  [ ] I-Stop Reference No:  Living Situation: [x ]Home  [ ]Long term care  [ ]Rehab [ ]Other    ADVANCE DIRECTIVES:    DNR  MOLST  Yes [ ] No [x ]  Living Will  Yes [ ]  No [x ]     [x ] Health Care Proxy(s)  [ ] Surrogate(s)  [ ] Guardian           Name(s): George White Phone Number(s): [phone]    BASELINE (I)ADL(s) (prior to admission):  [place]: [x ]Total  [ ] Moderate [ ]Dependent    Allergies    No Known Allergies    Intolerances    MEDICATIONS  (STANDING):  allopurinol 300 milliGRAM(s) Oral daily  apixaban 2.5 milliGRAM(s) Oral every 12 hours  atovaquone Suspension 1500 milliGRAM(s) Oral daily  benzocaine 15 mG/menthol 3.6 mG (Sugar-Free) Lozenge 1 Lozenge Oral every 4 hours  docusate sodium 100 milliGRAM(s) Oral three times a day  lactated ringers. 1000 milliLiter(s) (30 mL/Hr) IV Continuous <Continuous>  levalbuterol Inhalation 0.63 milliGRAM(s) Inhalation every 4 hours  meropenem  IVPB 1000 milliGRAM(s) IV Intermittent every 12 hours  metoprolol succinate ER 50 milliGRAM(s) Oral daily  pantoprazole    Tablet 40 milliGRAM(s) Oral before breakfast  predniSONE   Tablet 40 milliGRAM(s) Oral daily  sodium chloride 2 Gram(s) Oral two times a day  sodium chloride 3%  Inhalation 4 milliLiter(s) Inhalation every 6 hours  valACYclovir 500 milliGRAM(s) Oral <User Schedule>  vancomycin  IVPB 750 milliGRAM(s) IV Intermittent every 24 hours  voriconazole 200 milliGRAM(s) Oral every 12 hours    MEDICATIONS  (PRN):  benzonatate 100 milliGRAM(s) Oral three times a day PRN Cough  bisacodyl 5 milliGRAM(s) Oral every 12 hours PRN Constipation  guaiFENesin    Syrup 200 milliGRAM(s) Oral every 6 hours PRN Cough  polyethylene glycol 3350 17 Gram(s) Oral daily PRN Constipation  sodium chloride 0.65% Nasal 1 Spray(s) Both Nostrils daily PRN Nasal Congestion    PRESENT SYMPTOMS: [ ]Unable to obtain due to poor mentation   Source if other than patient:  [ ]Family   [ ]Team     Pain (Impact on QOL):  denies  Location -   Severity -        Minimal acceptable level (0-10 scale):  Quality:   Onset:   Duration:                 Aggravating factors -  Relieving factors -  Radiation -    PAIN AD Score:     [email or web address]/painad.pdf (press ctrl +  left click to view)    Dyspnea:  Yes [x ] No [ ] - [ ]Mild [x ]Moderate [ ]Severe  Anxiety:    Yes [x ] No [ ] - [ ]Mild [ ]Moderate [ ]Severe  Fatigue:    Yes [ ] No [x ] - [ ]Mild [ ]Moderate [ ]Severe  Nausea:    Yes [ ] No [x ] - [ ]Mild [ ]Moderate [ ]Severe                         Loss of appetite: Yes [ ] No [ ] - [ ]Mild [ ]Moderate [ ]Severe             Constipation:  Yes [ ] No [x ] - [ ]Mild [ ]Moderate [ ]Severe  Grief: Yes [ ] No [x ]     Other Symptoms:  [ ]All other review of systems negative     Karnofsky Performance Score/Palliative Performance Status Version 2:         %    http://palliative.info/resource_material/[email or web address].pdf    PHYSICAL EXAM:  Vital Signs Last 24 Hrs  T(C): 36.7 (10 Jul 2019 05:19), Max: 36.7 (10 Jul 2019 05:19)  T(F): 98 (10 Jul 2019 05:19), Max: 98 (10 Jul 2019 05:19)  HR: 125 (10 Jul 2019 10:36) (98 - 135)  BP: 141/88 (10 Jul 2019 05:19) (110/88 - 141/88)  BP(mean): --  RR: 16 (10 Jul 2019 05:19) (16 - 16)  SpO2: 98% (10 Jul 2019 10:36) (96% - 100%) I&O's Summary      GENERAL:  [x ]Alert  [ x]Oriented x name, place, month   [ ]Lethargic  [ ]Cachexia  [ ]Unarousable  [ ]Verbal  [ ]Non-Verbal  Behavioral:   [ ] Anxiety  [ ] Delirium [ ] Agitation [ ] Other  HEENT:  [x ]Normal   [ ]Dry mouth   [ ]ET Tube/Trach  [ ]Oral lesions  PULMONARY:   [ ]Clear  [ ]Tachypnea  [ ]Audible excessive secretions   [x ]Rhonchi        [ ]Right [ ]Left [ ]Bilateral  [ ]Crackles        [ ]Right [ ]Left [ ]Bilateral  [ ]Wheezing     [ ]Right [ ]Left [ ]Bilateral  CARDIOVASCULAR:    [ ]Regular [ x]Irregular [ ]Tachy  [ ]Butch [ ]Murmur [ ]Other  GASTROINTESTINAL:  [x ]Soft  [ ]Distended   [ ]+BS  [ ]Non tender [ ]Tender  [ ]PEG [ ]OGT/ NGT  Last BM:   GENITOURINARY:  [x ]Normal [ ] Incontinent   [ ]Oliguria/Anuria   [ ]Cespedes  MUSCULOSKELETAL:   [ x]Normal   [ ]Weakness  [ ]Bed/Wheelchair bound [ ]Edema  NEUROLOGIC:   [x ]No focal deficits  [ ] Cognitive impairment  [ ] Dysphagia [ ]Dysarthria [ ] Paresis [ ]Other   SKIN:   [x ]Normal   [ ]Pressure ulcer(s)  [ ]Rash    LABS:                        10.6   3.22  )-----------( 146      ( 10 Jul 2019 07:11 )             33.2   07-10    134<L>  |  94<L>  |  19  ----------------------------<  135<H>  4.6   |  28  |  0.43<L>    Ca    9.1      10 Jul 2019 07:11  Phos  2.8     07-10  Mg     1.6     07-10          RADIOLOGY & ADDITIONAL STUDIES:    PROTEIN CALORIE MALNUTRITION PRESENT: [ ] Yes [ ] No  [ ] PPSV2 < or = to 30% [ ] significant weight loss  [ ] poor nutritional intake [ ] catabolic state [ ] anasarca     Albumin, Serum: 3.1 g/dL (06-27-19 @ 06:16)      REFERRALS:   [ ]Chaplaincy  [ ] Hospice  [ ]Child Life  [ ]Social Work  [ ]Case management [ ]Holistic Therapy   Goals of Care Discussion Document: HPI:  79F hx of DLBC lymphoma, Afib on eliquis presenting with three days of worsening SOB and productive cough. Patient states she was having worsening phlegm and felt like should "couldn't breath" this AM, so she went to ED. Has had chronic cough and per son was started on prednisone 40mg x2 days, now on prednisone 20mg by Dr. Nguyen for worsening cough. She denies sick contacts, fevers, chills, LE edema. She denies chest pain or palpitations. Endorsing decreased PO intake. Denies dysuria, n/v/d. (26 Jun 2019 17:29)    Pt seen along w/ daughter at bedside.   Pt lives in a private home with her son Cleve (Jr) and  George (Sr) in [place]. She was fully functional prior to her admission, ambulates independently, cooks/cleans/manages finances all on her own. 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 Has previously assigned her son as HCP and still wishes to but does not have the form with her. States that she is not a spiritual person. Daughter Clarissa has been visiting pt daily; she commutes from [place]. Per daughter, plan is to continue to pursue experimental chemo once pt recovers from this admission.     Pt states that she feels significantly better after having started BIPAP yesterday afternoon when she developed significant resp distress. Was able to tolerate being off bipap for a few hours. She continues to tolerate aggressive airway clearance measures, including chest vest and continues to have prod cough. Denies any symptoms of pain/constipation/diarrhea/difficulty urinating. States she feels anxious at times and would like to try medication to relieve her anxiety, which has been making it difficult for her to sleep at night.   Pt observed in the afternoon on NC; states that she feels well; however, has dyspnea when walking to the bathroom which is new since her admission. Would like to try to eat lunch now that she is off bipap but does not have the appetite for it.    PERTINENT PM/SXH:   Alopecia  Murmur, cardiac  Varicose veins  GERD (gastroesophageal reflux disease)  Tracheal compression  Thrombocytopenia  Lymphadenopathy  Hyponatremia  Anemia  S/P chemotherapy, time since 4-12 weeks  Lung mass  Lymphoma, unspecified body region, unspecified lymphoma type  Ductal carcinoma in situ (DCIS) of left breast  HTN (hypertension)  Afib    Cataract  S/P tonsillectomy  Arthropathy  H/O bilateral mastectomy  No significant past surgical history    FAMILY HISTORY:  Family history of lymphoma: low grade, as per Dr. Palacios&#x27;s consult on 12-4-17  Family history of ovarian cancer (Aunt)      SOCIAL HISTORY:   Significant other/partner: Yes [ x]  No [ ] Children:  Yes [x ]  No [ ] Gnosticism/Spirituality: Voodoo, not spiritual  Substance hx: Yes[ ]  No [x ]   Tobacco hx:  Yes [ ] No [x ]   Alcohol hx: Yes [ ] No [x ]   Home Opioid hx:  Yes [ ] No [x ]  [ ] I-Stop Reference No:  Living Situation: [x ]Home  [ ]Long term care  [ ]Rehab [ ]Other    ADVANCE DIRECTIVES:    DNR  MOLST  Yes [ ] No [x ]  Living Will  Yes [ ]  No [x ]     [x ] Health Care Proxy(s)  [ ] Surrogate(s)  [ ] Guardian           Name(s): George White Phone Number(s): [phone]    BASELINE (I)ADL(s) (prior to admission):  [place]: [x ]Total  [ ] Moderate [ ]Dependent    Allergies    No Known Allergies    Intolerances    MEDICATIONS  (STANDING):  allopurinol 300 milliGRAM(s) Oral daily  apixaban 2.5 milliGRAM(s) Oral every 12 hours  atovaquone Suspension 1500 milliGRAM(s) Oral daily  benzocaine 15 mG/menthol 3.6 mG (Sugar-Free) Lozenge 1 Lozenge Oral every 4 hours  docusate sodium 100 milliGRAM(s) Oral three times a day  lactated ringers. 1000 milliLiter(s) (30 mL/Hr) IV Continuous <Continuous>  levalbuterol Inhalation 0.63 milliGRAM(s) Inhalation every 4 hours  meropenem  IVPB 1000 milliGRAM(s) IV Intermittent every 12 hours  metoprolol succinate ER 50 milliGRAM(s) Oral daily  pantoprazole    Tablet 40 milliGRAM(s) Oral before breakfast  predniSONE   Tablet 40 milliGRAM(s) Oral daily  sodium chloride 2 Gram(s) Oral two times a day  sodium chloride 3%  Inhalation 4 milliLiter(s) Inhalation every 6 hours  valACYclovir 500 milliGRAM(s) Oral <User Schedule>  vancomycin  IVPB 750 milliGRAM(s) IV Intermittent every 24 hours  voriconazole 200 milliGRAM(s) Oral every 12 hours    MEDICATIONS  (PRN):  benzonatate 100 milliGRAM(s) Oral three times a day PRN Cough  bisacodyl 5 milliGRAM(s) Oral every 12 hours PRN Constipation  guaiFENesin    Syrup 200 milliGRAM(s) Oral every 6 hours PRN Cough  polyethylene glycol 3350 17 Gram(s) Oral daily PRN Constipation  sodium chloride 0.65% Nasal 1 Spray(s) Both Nostrils daily PRN Nasal Congestion    PRESENT SYMPTOMS: [ ]Unable to obtain due to poor mentation   Source if other than patient:  [ ]Family   [ ]Team     Pain (Impact on QOL):  denies  Location -   Severity -        Minimal acceptable level (0-10 scale):  Quality:   Onset:   Duration:                 Aggravating factors -  Relieving factors -  Radiation -    PAIN AD Score:     [email or web address]/cog/painad.pdf (press ctrl +  left click to view)    Dyspnea:  Yes [x ] No [ ] - [ ]Mild [x ]Moderate [ ]Severe  Anxiety:    Yes [x ] No [ ] - [ ]Mild [ ]Moderate [ ]Severe  Fatigue:    Yes [ ] No [x ] - [ ]Mild [ ]Moderate [ ]Severe  Nausea:    Yes [ ] No [x ] - [ ]Mild [ ]Moderate [ ]Severe                         Loss of appetite: Yes [ ] No [ ] - [ ]Mild [ ]Moderate [ ]Severe             Constipation:  Yes [ ] No [x ] - [ ]Mild [ ]Moderate [ ]Severe  Grief: Yes [ ] No [x ]     Other Symptoms:  [ ]All other review of systems negative     Karnofsky Performance Score/Palliative Performance Status Version 2:         %    http://palliative.info/resource_material/[email or web address].pdf    PHYSICAL EXAM:  Vital Signs Last 24 Hrs  T(C): 36.7 (10 Jul 2019 05:19), Max: 36.7 (10 Jul 2019 05:19)  T(F): 98 (10 Jul 2019 05:19), Max: 98 (10 Jul 2019 05:19)  HR: 125 (10 Jul 2019 10:36) (98 - 135)  BP: 141/88 (10 Jul 2019 05:19) (110/88 - 141/88)  BP(mean): --  RR: 16 (10 Jul 2019 05:19) (16 - 16)  SpO2: 98% (10 Jul 2019 10:36) (96% - 100%) I&O's Summary      GENERAL:  [x ]Alert  [ x]Oriented x name, place, month   [ ]Lethargic  [ ]Cachexia  [ ]Unarousable  [ ]Verbal  [ ]Non-Verbal  Behavioral:   [ ] Anxiety  [ ] Delirium [ ] Agitation [ ] Other  HEENT:  [x ]Normal   [ ]Dry mouth   [ ]ET Tube/Trach  [ ]Oral lesions  PULMONARY:   [ ]Clear  [ ]Tachypnea  [ ]Audible excessive secretions   [x ]Rhonchi        [ ]Right [ ]Left [ ]Bilateral  [ ]Crackles        [ ]Right [ ]Left [ ]Bilateral  [ ]Wheezing     [ ]Right [ ]Left [ ]Bilateral  CARDIOVASCULAR:    [ ]Regular [ x]Irregular [ ]Tachy  [ ]Butch [ ]Murmur [ ]Other  GASTROINTESTINAL:  [x ]Soft  [ ]Distended   [ ]+BS  [ ]Non tender [ ]Tender  [ ]PEG [ ]OGT/ NGT  Last BM:   GENITOURINARY:  [x ]Normal [ ] Incontinent   [ ]Oliguria/Anuria   [ ]Cespedes  MUSCULOSKELETAL:   [ x]Normal   [ ]Weakness  [ ]Bed/Wheelchair bound [ ]Edema  NEUROLOGIC:   [x ]No focal deficits  [ ] Cognitive impairment  [ ] Dysphagia [ ]Dysarthria [ ] Paresis [ ]Other   SKIN:   [x ]Normal   [ ]Pressure ulcer(s)  [ ]Rash    LABS:                        10.6   3.22  )-----------( 146      ( 10 Jul 2019 07:11 )             33.2   07-10    134<L>  |  94<L>  |  19  ----------------------------<  135<H>  4.6   |  28  |  0.43<L>    Ca    9.1      10 Jul 2019 07:11  Phos  2.8     07-10  Mg     1.6     07-10          RADIOLOGY & ADDITIONAL STUDIES:    PROTEIN CALORIE MALNUTRITION PRESENT: [ ] Yes [ ] No  [ ] PPSV2 < or = to 30% [ ] significant weight loss  [ ] poor nutritional intake [ ] catabolic state [ ] anasarca     Albumin, Serum: 3.1 g/dL (06-27-19 @ 06:16)      REFERRALS:   [ ]Chaplaincy  [ ] Hospice  [ ]Child Life  [ ]Social Work  [ ]Case management [ ]Holistic Therapy   Goals of Care Discussion Document:

## 2019-07-10 NOTE — CONSULT NOTE ADULT - PROBLEM SELECTOR RECOMMENDATION 2
[place] [facility] [place]was planning on starting an experimental chemo regimen as outpt; is currently unsure if this is still what she wants  -will need o/p f/u w/ Dr. Nguyen to determine if pt is still a candidate for DMT -[place] [facility] [place]was planning on starting an experimental chemo regimen as outpt; is currently unsure if this is still what she wants  -will need o/p f/u w/ Dr. Nguyen to determine if pt is still a candidate for DMT

## 2019-07-11 LAB
ANION GAP SERPL CALC-SCNC: 12 MMO/L — SIGNIFICANT CHANGE UP (ref 7–14)
BACTERIA SPT RESP CULT: SIGNIFICANT CHANGE UP
BUN SERPL-MCNC: 20 MG/DL — SIGNIFICANT CHANGE UP (ref 7–23)
CALCIUM SERPL-MCNC: 9.1 MG/DL — SIGNIFICANT CHANGE UP (ref 8.4–10.5)
CHLORIDE SERPL-SCNC: 94 MMOL/L — LOW (ref 98–107)
CO2 SERPL-SCNC: 28 MMOL/L — SIGNIFICANT CHANGE UP (ref 22–31)
CREAT SERPL-MCNC: 0.41 MG/DL — LOW (ref 0.5–1.3)
GLUCOSE SERPL-MCNC: 111 MG/DL — HIGH (ref 70–99)
HCT VFR BLD CALC: 32.7 % — LOW (ref 34.5–45)
HGB BLD-MCNC: 10.3 G/DL — LOW (ref 11.5–15.5)
MAGNESIUM SERPL-MCNC: 1.7 MG/DL — SIGNIFICANT CHANGE UP (ref 1.6–2.6)
MCHC RBC-ENTMCNC: 31 PG — SIGNIFICANT CHANGE UP (ref 27–34)
MCHC RBC-ENTMCNC: 31.5 % — LOW (ref 32–36)
MCV RBC AUTO: 98.5 FL — SIGNIFICANT CHANGE UP (ref 80–100)
NRBC # FLD: 0.07 K/UL — SIGNIFICANT CHANGE UP (ref 0–0)
NRBC FLD-RTO: 1.9 — SIGNIFICANT CHANGE UP
PHOSPHATE SERPL-MCNC: 2.7 MG/DL — SIGNIFICANT CHANGE UP (ref 2.5–4.5)
PLATELET # BLD AUTO: 141 K/UL — LOW (ref 150–400)
PMV BLD: 12.4 FL — SIGNIFICANT CHANGE UP (ref 7–13)
POTASSIUM SERPL-MCNC: 4.3 MMOL/L — SIGNIFICANT CHANGE UP (ref 3.5–5.3)
POTASSIUM SERPL-SCNC: 4.3 MMOL/L — SIGNIFICANT CHANGE UP (ref 3.5–5.3)
RBC # BLD: 3.32 M/UL — LOW (ref 3.8–5.2)
RBC # FLD: 19.7 % — HIGH (ref 10.3–14.5)
SODIUM SERPL-SCNC: 134 MMOL/L — LOW (ref 135–145)
WBC # BLD: 3.63 K/UL — LOW (ref 3.8–10.5)
WBC # FLD AUTO: 3.63 K/UL — LOW (ref 3.8–10.5)

## 2019-07-11 PROCEDURE — 99497 ADVNCD CARE PLAN 30 MIN: CPT | Mod: 25

## 2019-07-11 PROCEDURE — 99233 SBSQ HOSP IP/OBS HIGH 50: CPT | Mod: GC

## 2019-07-11 PROCEDURE — 99232 SBSQ HOSP IP/OBS MODERATE 35: CPT

## 2019-07-11 PROCEDURE — 99233 SBSQ HOSP IP/OBS HIGH 50: CPT

## 2019-07-11 RX ORDER — MAGNESIUM OXIDE 400 MG ORAL TABLET 241.3 MG
400 TABLET ORAL ONCE
Refills: 0 | Status: COMPLETED | OUTPATIENT
Start: 2019-07-11 | End: 2019-07-11

## 2019-07-11 RX ADMIN — APIXABAN 2.5 MILLIGRAM(S): 2.5 TABLET, FILM COATED ORAL at 06:26

## 2019-07-11 RX ADMIN — MEROPENEM 100 MILLIGRAM(S): 1 INJECTION INTRAVENOUS at 06:27

## 2019-07-11 RX ADMIN — APIXABAN 2.5 MILLIGRAM(S): 2.5 TABLET, FILM COATED ORAL at 18:14

## 2019-07-11 RX ADMIN — SODIUM CHLORIDE 30 MILLILITER(S): 9 INJECTION, SOLUTION INTRAVENOUS at 04:57

## 2019-07-11 RX ADMIN — BENZOCAINE AND MENTHOL 1 LOZENGE: 5; 1 LIQUID ORAL at 06:26

## 2019-07-11 RX ADMIN — MEROPENEM 100 MILLIGRAM(S): 1 INJECTION INTRAVENOUS at 19:38

## 2019-07-11 RX ADMIN — LEVALBUTEROL 0.63 MILLIGRAM(S): 1.25 SOLUTION, CONCENTRATE RESPIRATORY (INHALATION) at 21:26

## 2019-07-11 RX ADMIN — MAGNESIUM OXIDE 400 MG ORAL TABLET 400 MILLIGRAM(S): 241.3 TABLET ORAL at 11:58

## 2019-07-11 RX ADMIN — LEVALBUTEROL 0.63 MILLIGRAM(S): 1.25 SOLUTION, CONCENTRATE RESPIRATORY (INHALATION) at 04:16

## 2019-07-11 RX ADMIN — Medication 50 MILLIGRAM(S): at 06:26

## 2019-07-11 RX ADMIN — SODIUM CHLORIDE 2 GRAM(S): 9 INJECTION INTRAMUSCULAR; INTRAVENOUS; SUBCUTANEOUS at 18:14

## 2019-07-11 RX ADMIN — LEVALBUTEROL 0.63 MILLIGRAM(S): 1.25 SOLUTION, CONCENTRATE RESPIRATORY (INHALATION) at 17:07

## 2019-07-11 RX ADMIN — SODIUM CHLORIDE 2 GRAM(S): 9 INJECTION INTRAMUSCULAR; INTRAVENOUS; SUBCUTANEOUS at 06:26

## 2019-07-11 RX ADMIN — SODIUM CHLORIDE 4 MILLILITER(S): 9 INJECTION INTRAMUSCULAR; INTRAVENOUS; SUBCUTANEOUS at 21:26

## 2019-07-11 RX ADMIN — Medication 100 MILLIGRAM(S): at 21:18

## 2019-07-11 RX ADMIN — VORICONAZOLE 200 MILLIGRAM(S): 10 INJECTION, POWDER, LYOPHILIZED, FOR SOLUTION INTRAVENOUS at 18:14

## 2019-07-11 RX ADMIN — BENZOCAINE AND MENTHOL 1 LOZENGE: 5; 1 LIQUID ORAL at 18:14

## 2019-07-11 RX ADMIN — LEVALBUTEROL 0.63 MILLIGRAM(S): 1.25 SOLUTION, CONCENTRATE RESPIRATORY (INHALATION) at 13:17

## 2019-07-11 RX ADMIN — ATOVAQUONE 1500 MILLIGRAM(S): 750 SUSPENSION ORAL at 11:59

## 2019-07-11 RX ADMIN — PANTOPRAZOLE SODIUM 40 MILLIGRAM(S): 20 TABLET, DELAYED RELEASE ORAL at 06:26

## 2019-07-11 RX ADMIN — LEVALBUTEROL 0.63 MILLIGRAM(S): 1.25 SOLUTION, CONCENTRATE RESPIRATORY (INHALATION) at 01:59

## 2019-07-11 RX ADMIN — LEVALBUTEROL 0.63 MILLIGRAM(S): 1.25 SOLUTION, CONCENTRATE RESPIRATORY (INHALATION) at 09:42

## 2019-07-11 RX ADMIN — SODIUM CHLORIDE 4 MILLILITER(S): 9 INJECTION INTRAMUSCULAR; INTRAVENOUS; SUBCUTANEOUS at 04:16

## 2019-07-11 RX ADMIN — Medication 300 MILLIGRAM(S): at 11:58

## 2019-07-11 RX ADMIN — VORICONAZOLE 200 MILLIGRAM(S): 10 INJECTION, POWDER, LYOPHILIZED, FOR SOLUTION INTRAVENOUS at 06:26

## 2019-07-11 RX ADMIN — SODIUM CHLORIDE 4 MILLILITER(S): 9 INJECTION INTRAMUSCULAR; INTRAVENOUS; SUBCUTANEOUS at 17:07

## 2019-07-11 RX ADMIN — BENZOCAINE AND MENTHOL 1 LOZENGE: 5; 1 LIQUID ORAL at 13:39

## 2019-07-11 RX ADMIN — Medication 40 MILLIGRAM(S): at 06:26

## 2019-07-11 NOTE — PROGRESS NOTE ADULT - PROBLEM SELECTOR PLAN 1
-currently comfortable on NC. pt states that she experiences dyspnea after just walking a few feet  -encourage PT  -rec 0.5mg IV morphine q4hr if needed for dyspnea  -c/w aggressive airway clearance measures. -currently comfortable on NC at rest; experiences [place]WR[place] when walking to bathroom  -encourage PT  -rec 0.5mg IV morphine q4hr if needed for dyspnea  -c/w aggressive airway clearance measures

## 2019-07-11 NOTE — PROGRESS NOTE ADULT - SUBJECTIVE AND OBJECTIVE BOX
CHIEF COMPLAINT: SOB    Interval Events: Patient w continued sensation of chest congestion, denies active SOB, states she has been able to cough up some phlegm. Patient without need for bipap today.     REVIEW OF SYSTEMS:  Constitutional: No fevers or chills. No weight loss. No fatigue or generalised malaise.  Eyes: No itching or discharge from the eyes  ENT: No ear pain. No ear discharge. No nasal congestion. No post nasal drip. No epistaxis. No throat pain. No sore throat. No difficulty swallowing.   CV: No chest pain. No palpitations. No lightheadedness or dizziness.   Resp: +sputum production, +cough, -SOB on NC.   GI: No nausea. No vomiting. No diarrhea.  MSK: No joint pain or pain in any extremities  Integumentary: No skin lesions. No pedal edema.  Neurological: No gross motor weakness. No sensory changes.  [ X] All other systems negative      OBJECTIVE:  ICU Vital Signs Last 24 Hrs  T(C): 36.3 (11 Jul 2019 06:24), Max: 36.7 (10 Jul 2019 17:39)  T(F): 97.4 (11 Jul 2019 06:24), Max: 98.1 (10 Jul 2019 17:39)  HR: 115 (11 Jul 2019 09:42) (96 - 125)  BP: 140/99 (11 Jul 2019 06:24) (114/81 - 140/99)  BP(mean): --  ABP: --  ABP(mean): --  RR: 20 (11 Jul 2019 06:24) (19 - 20)  SpO2: 96% (11 Jul 2019 09:42) (95% - 100%)        CAPILLARY BLOOD GLUCOSE          PHYSICAL EXAM:  General: Awake, alert, oriented X 3.   HEENT: Atraumatic, normocephalic.   Lymph Nodes: No palpable lymphadenopathy  Neck: No JVD. No carotid bruit.   Respiratory: +KASEY Rhonchi, normal and equal air entry KASEY  Cardiovascular: S1 S2 normal. No murmurs, rubs or gallops.   Abdomen: Soft, non-tender, non-distended. No organomegaly.  Extremities: Warm to touch. Peripheral pulse palpable. No pedal edema.   Skin: No rashes or skin lesions  Neurological: Motor and sensory examination equal and normal in all four extremities.  Psychiatry: Appropriate mood and affect.    HOSPITAL MEDICATIONS:  MEDICATIONS  (STANDING):  allopurinol 300 milliGRAM(s) Oral daily  apixaban 2.5 milliGRAM(s) Oral every 12 hours  atovaquone Suspension 1500 milliGRAM(s) Oral daily  benzocaine 15 mG/menthol 3.6 mG (Sugar-Free) Lozenge 1 Lozenge Oral every 4 hours  docusate sodium 100 milliGRAM(s) Oral three times a day  lactated ringers. 1000 milliLiter(s) (30 mL/Hr) IV Continuous <Continuous>  levalbuterol Inhalation 0.63 milliGRAM(s) Inhalation every 4 hours  meropenem  IVPB 1000 milliGRAM(s) IV Intermittent every 12 hours  metoprolol succinate ER 50 milliGRAM(s) Oral daily  pantoprazole    Tablet 40 milliGRAM(s) Oral before breakfast  predniSONE   Tablet 40 milliGRAM(s) Oral daily  sodium chloride 2 Gram(s) Oral two times a day  sodium chloride 3%  Inhalation 4 milliLiter(s) Inhalation every 6 hours  valACYclovir 500 milliGRAM(s) Oral <User Schedule>  voriconazole 200 milliGRAM(s) Oral every 12 hours    MEDICATIONS  (PRN):  benzonatate 100 milliGRAM(s) Oral three times a day PRN Cough  bisacodyl 5 milliGRAM(s) Oral every 12 hours PRN Constipation  guaiFENesin    Syrup 200 milliGRAM(s) Oral every 6 hours PRN Cough  LORazepam     Tablet 0.25 milliGRAM(s) Oral at bedtime PRN Anxiety  morphine  - Injectable 0.5 milliGRAM(s) IV Push every 4 hours PRN dyspnea  polyethylene glycol 3350 17 Gram(s) Oral daily PRN Constipation  sodium chloride 0.65% Nasal 1 Spray(s) Both Nostrils daily PRN Nasal Congestion      LABS:                        10.3   3.63  )-----------( 141      ( 11 Jul 2019 07:30 )             32.7     07-11    134<L>  |  94<L>  |  20  ----------------------------<  111<H>  4.3   |  28  |  0.41<L>    Ca    9.1      11 Jul 2019 07:30  Phos  2.7     07-11  Mg     1.7     07-11                MICROBIOLOGY:     RADIOLOGY:  [ ] Reviewed and interpreted by me

## 2019-07-11 NOTE — PROGRESS NOTE ADULT - SUBJECTIVE AND OBJECTIVE BOX
Follow Up:      [facility] History/ROS:Patient is a 79y old  Female who presents with a chief complaint of SOB (11 Jul 2019 13:49)  Less SOB  No fever      Allergies    No Known Allergies    Intolerances        ANTIMICROBIALS:  atovaquone Suspension 1500 daily  meropenem  IVPB 1000 every 12 hours  valACYclovir 500 <User Schedule>  voriconazole 200 every 12 hours      OTHER MEDS:  allopurinol 300 milliGRAM(s) Oral daily  apixaban 2.5 milliGRAM(s) Oral every 12 hours  benzocaine 15 mG/menthol 3.6 mG (Sugar-Free) Lozenge 1 Lozenge Oral every 4 hours  benzonatate 100 milliGRAM(s) Oral three times a day PRN  bisacodyl 5 milliGRAM(s) Oral every 12 hours PRN  docusate sodium 100 milliGRAM(s) Oral three times a day  guaiFENesin    Syrup 200 milliGRAM(s) Oral every 6 hours PRN  lactated ringers. 1000 milliLiter(s) IV Continuous <Continuous>  levalbuterol Inhalation 0.63 milliGRAM(s) Inhalation every 4 hours  LORazepam     Tablet 0.25 milliGRAM(s) Oral at bedtime PRN  metoprolol succinate ER 50 milliGRAM(s) Oral daily  morphine  - Injectable 0.5 milliGRAM(s) IV Push every 4 hours PRN  pantoprazole    Tablet 40 milliGRAM(s) Oral before breakfast  polyethylene glycol 3350 17 Gram(s) Oral daily PRN  predniSONE   Tablet 40 milliGRAM(s) Oral daily  sodium chloride 2 Gram(s) Oral two times a day  sodium chloride 0.65% Nasal 1 Spray(s) Both Nostrils daily PRN  sodium chloride 3%  Inhalation 4 milliLiter(s) Inhalation every 6 hours      Vital Signs Last 24 Hrs  T(C): 36.3 (11 Jul 2019 06:24), Max: 36.7 (10 Jul 2019 17:39)  T(F): 97.4 (11 Jul 2019 06:24), Max: 98.1 (10 Jul 2019 17:39)  HR: 115 (11 Jul 2019 09:42) (96 - 125)  BP: 140/99 (11 Jul 2019 06:24) (114/81 - 140/99)  BP(mean): --  RR: 20 (11 Jul 2019 06:24) (19 - 20)  SpO2: 96% (11 Jul 2019 09:42) (95% - 100%)    PHYSICAL EXAM:  General: [x ] non-toxic  HEAD/EYES: [ ] PERRL [ ] white sclera [ ] icterus  ENT:  [ ] normal [ x] supple [ ] thrush [ ] pharyngeal exudate  Cardiovascular:   [ ] murmur [x ] normal [ ] PPM/AICD  Respiratory:  [x ] clear to ausculation bilaterally  GI:  [x ] soft, non-tender, normal bowel sounds  :  [ ] bledsoe [ x] no CVA tenderness   Musculoskeletal:  [x ] no synovitis  Neurologic:  [x ] non-focal exam   Skin:  [ ] no rash  Lymph: [ x] no lymphadenopathy  Psychiatric:  [x ] appropriate affect [ ] alert & oriented  Lines:  [x ] no phlebitis [ ] central line                                10.3   3.63  )-----------( 141      ( 11 Jul 2019 07:30 )             32.7       07-11    134<L>  |  94<L>  |  20  ----------------------------<  111<H>  4.3   |  28  |  0.41<L>    Ca    9.1      11 Jul 2019 07:30  Phos  2.7     07-11  Mg     1.7     07-11            MICROBIOLOGY:Culture - Respiratory:   NRF^Normal Respiratory Le  QUANTITY OF GROWTH: MODERATE (07-08-19 @ 18:59)      RADIOLOGY:

## 2019-07-11 NOTE — PROGRESS NOTE ADULT - PROBLEM SELECTOR PLAN 1
RLL bronchus obstruction with concern for postobstructive fungal PNA  - cont O2 to maintain SpO2>90  - standing airway clearance therapy, Xopenex q4 hours, following by nebulized 3% hypertonic saline, and then Aerobika device as per Pulm recommendations  - patient started on meropenem and Vancomycin IV  - poor prognosis

## 2019-07-11 NOTE — PROGRESS NOTE ADULT - PROBLEM SELECTOR PLAN 2
rec po ativan 0.25mg qhs prn  -monitor for agitation or delirium  -will continue to monitor. rec po ativan 0.25mg qhs prn; appears to be helping w/ pt's anxiety  -monitor for agitation or delirium  -will continue to monitor

## 2019-07-11 NOTE — PROGRESS NOTE ADULT - ASSESSMENT
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  Doubt this exacerbation was due to new bacterial pna- jessa given her imrpvement over 24 hours    Stop meropenem on 7/13    2) Leukopenia  Continue to monitor    3) Immunosuppressed secondary to cancer tx  PCP prophylaxis  Continue mepron  At some point, reasonable to re-challenge with Bactrim with close monitoring of WBC    4) Delirium: improved with sleep

## 2019-07-11 NOTE — PROGRESS NOTE ADULT - ASSESSMENT
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 (on Eliquis) initially admitted w acute hypoxic respiratory failure of multifactorial etiology 2/2 entero/rhinovirus URI s/p bronchoscopy & BAL 7/1 for concern of RLL bronchus obstruction found to have mucus impaction and initial cx c/w fungal PNA with 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  -c/w voriconazole for coverage of fungal PNA  -c/w vanc/meropenem  -recommend trial of MetaNebs in lieu of chest vest as may be better tolerated. Treatment in advance of pulm toilet as below.   -c/w pulm toilet (xoponex, HS 3, aerobika device).   -f/u fungal cx (added on 7/9) to previous sputum cx  -BIPAP prn  -Pulmonary will continue to follow

## 2019-07-11 NOTE — PROGRESS NOTE ADULT - SUBJECTIVE AND OBJECTIVE BOX
INTERVAL HPI/OVERNIGHT EVENTS:  Given ativan overnight, pt states  Did not require PRN morphine.  Antibiotics d/c'd given lower suspicion for bacterial PNA.    Allergies    No Known Allergies    Intolerances        Code Status:  full code    PRESENT SYMPTOMS:   SOURCE:  [ ] Patient   [ ] Family   [ ] Team     Pain:   Onset:      Location:          Duration:        Character:         Aggravating factors:          Relieving Factors:             Timing:         Severity:      Dyspnea [ ] YES [ ] NO - Mild [ ]  Moderate [ ]  Severe [ ]   Anxiety:  [ ] YES [ ] NO  Fatigue: [ ] YES [ ] NO  Nausea: [ ] YES [ ] NO  Loss of Appetite: [ ] YES [ ] NO  Constipation [ ] YES   [ ] No     OTHER SYMPTOMS:  [ ] All other ROS negative     [ ] Unable to obtain due to poor mentation    Does the patient meet criteria for Severe Protein Calorie Malnutrition?  Yes [ ]  No [ ]   PPSV 30% or below [ ]  Anasarca [ ]  Albumin <2 [ ]  Catabolic State [ ]  Poor nutritional intake [ ]  Significant weight loss [ ]     MEDICATIONS  (STANDING):  allopurinol 300 milliGRAM(s) Oral daily  apixaban 2.5 milliGRAM(s) Oral every 12 hours  atovaquone Suspension 1500 milliGRAM(s) Oral daily  benzocaine 15 mG/menthol 3.6 mG (Sugar-Free) Lozenge 1 Lozenge Oral every 4 hours  docusate sodium 100 milliGRAM(s) Oral three times a day  lactated ringers. 1000 milliLiter(s) (30 mL/Hr) IV Continuous <Continuous>  levalbuterol Inhalation 0.63 milliGRAM(s) Inhalation every 4 hours  magnesium oxide 400 milliGRAM(s) Oral once  meropenem  IVPB 1000 milliGRAM(s) IV Intermittent every 12 hours  metoprolol succinate ER 50 milliGRAM(s) Oral daily  pantoprazole    Tablet 40 milliGRAM(s) Oral before breakfast  predniSONE   Tablet 40 milliGRAM(s) Oral daily  sodium chloride 2 Gram(s) Oral two times a day  sodium chloride 3%  Inhalation 4 milliLiter(s) Inhalation every 6 hours  valACYclovir 500 milliGRAM(s) Oral <User Schedule>  voriconazole 200 milliGRAM(s) Oral every 12 hours    MEDICATIONS  (PRN):  benzonatate 100 milliGRAM(s) Oral three times a day PRN Cough  bisacodyl 5 milliGRAM(s) Oral every 12 hours PRN Constipation  guaiFENesin    Syrup 200 milliGRAM(s) Oral every 6 hours PRN Cough  LORazepam     Tablet 0.25 milliGRAM(s) Oral at bedtime PRN Anxiety  morphine  - Injectable 0.5 milliGRAM(s) IV Push every 4 hours PRN dyspnea  polyethylene glycol 3350 17 Gram(s) Oral daily PRN Constipation  sodium chloride 0.65% Nasal 1 Spray(s) Both Nostrils daily PRN Nasal Congestion      Palliative Performance Status Version 2:         %  ECOG -        Physical Exam:    General: [ ] Alert,  A&O x     [ ] lethargic   [ ] Agitated   [ ] Cachexia   HEENT: [ ] Normal   [ ] Dry mouth   [ ] ET Tube    [ ] Trach   Lungs: [ ] Clear [ ] Rhonchi  [ ] Crackles [ ] Wheezing [ ] Tachypnea  [ ] Audible excessive secretions   Cardiovascular:  [ ] Regular rate and rhythm  [ ] Irregular [ ] Tachycardia   [ ] Bradycardia   Abdomen: [ ] Soft  [ ] Distended  [ ] +BS  [ ] Non tender [ ] Tender  [ ]PEG   [ ] NGT   Last BM:     Genitourinary:  [ ] Normal [ ] Incontinent   [ ] Oliguria/Anuria   [ ] Cespedes  Musculoskeletal:  [ ] Normal   [ ] Generalized weakness  [ ] Bedbound  [ ] Edema   Neurological: [ ] No focal deficits  [ ] Cognitive impairment     Skin: [ ] Normal   [ ] Pressure ulcers     Vital Signs Last 24 Hrs  T(C): 36.3 (11 Jul 2019 06:24), Max: 36.7 (10 Jul 2019 17:39)  T(F): 97.4 (11 Jul 2019 06:24), Max: 98.1 (10 Jul 2019 17:39)  HR: 115 (11 Jul 2019 09:42) (96 - 128)  BP: 140/99 (11 Jul 2019 06:24) (114/81 - 140/99)  BP(mean): --  RR: 20 (11 Jul 2019 06:24) (19 - 24)  SpO2: 96% (11 Jul 2019 09:42) (95% - 100%)    LABS:                        10.3   3.63  )-----------( 141      ( 11 Jul 2019 07:30 )             32.7     07-11    134<L>  |  94<L>  |  20  ----------------------------<  111<H>  4.3   |  28  |  0.41<L>    Ca    9.1      11 Jul 2019 07:30  Phos  2.7     07-11  Mg     1.7     07-11          I&O's Summary      RADIOLOGY & ADDITIONAL STUDIES: INTERVAL HPI/OVERNIGHT EVENTS:  Given ativan overnight, pt states that she was able to sleep well. Continues to complain of fatigue, which she feels is 2/2 not having slept the night before. Otherwise, denies SOB dottie. Did not require PRN morphine overnight.   Antibiotics d/c'd given lower suspicion for bacterial PNA.  Last BM this AM    Spoke with patient (who stated that she wished to be involved in discussion), son George and daughter Clarissa at bedside. George states that his mother has had 'memory issues' for some time now and is often 'loopy.' Pt is aware that she is having difficulty with recall. Both son and daughter are still very interested in pursuing DMT and hope to enroll pt in trial if she is able to recover from her current condition. Pt's children had spoke to primary team briefly regarding advanced directives; both understand the nature of what resuscitation and intubation may entail; however, would like to speak to their mother alone before making a final decision.     Allergies    No Known Allergies    Intolerances      Code Status:  full code    PRESENT SYMPTOMS:   SOURCE:  [x ] Patient   [x ] Family   [x ] Team     Pain: denies  Onset:      Location:          Duration:        Character:         Aggravating factors:          Relieving Factors:             Timing:         Severity:      Dyspnea [x ] YES [ ] NO - Mild [ ]  Moderate [x ]  Severe [ ]   Anxiety:  [x ] YES [ ] NO  Fatigue: [x ] YES [ ] NO  Nausea: [ ] YES [ x] NO  Loss of Appetite: [ x] YES [ ] NO  Constipation [ ] YES   [x ] No     OTHER SYMPTOMS:  [ ] All other ROS negative     [ ] Unable to obtain due to poor mentation    Does the patient meet criteria for Severe Protein Calorie Malnutrition?  Yes [ ]  No [x ]     MEDICATIONS  (STANDING):  allopurinol 300 milliGRAM(s) Oral daily  apixaban 2.5 milliGRAM(s) Oral every 12 hours  atovaquone Suspension 1500 milliGRAM(s) Oral daily  benzocaine 15 mG/menthol 3.6 mG (Sugar-Free) Lozenge 1 Lozenge Oral every 4 hours  docusate sodium 100 milliGRAM(s) Oral three times a day  lactated ringers. 1000 milliLiter(s) (30 mL/Hr) IV Continuous <Continuous>  levalbuterol Inhalation 0.63 milliGRAM(s) Inhalation every 4 hours  magnesium oxide 400 milliGRAM(s) Oral once  meropenem  IVPB 1000 milliGRAM(s) IV Intermittent every 12 hours  metoprolol succinate ER 50 milliGRAM(s) Oral daily  pantoprazole    Tablet 40 milliGRAM(s) Oral before breakfast  predniSONE   Tablet 40 milliGRAM(s) Oral daily  sodium chloride 2 Gram(s) Oral two times a day  sodium chloride 3%  Inhalation 4 milliLiter(s) Inhalation every 6 hours  valACYclovir 500 milliGRAM(s) Oral <User Schedule>  voriconazole 200 milliGRAM(s) Oral every 12 hours    MEDICATIONS  (PRN):  benzonatate 100 milliGRAM(s) Oral three times a day PRN Cough  bisacodyl 5 milliGRAM(s) Oral every 12 hours PRN Constipation  guaiFENesin    Syrup 200 milliGRAM(s) Oral every 6 hours PRN Cough  LORazepam     Tablet 0.25 milliGRAM(s) Oral at bedtime PRN Anxiety  morphine  - Injectable 0.5 milliGRAM(s) IV Push every 4 hours PRN dyspnea  polyethylene glycol 3350 17 Gram(s) Oral daily PRN Constipation  sodium chloride 0.65% Nasal 1 Spray(s) Both Nostrils daily PRN Nasal Congestion      Physical Exam:    General: [ x] Alert,  A&O x  name, place, month     HEENT: [x ] Normal   [ ] Dry mouth   [ ] ET Tube    [ ] Trach   Lungs: [ ] Clear [ x] Rhonchi  [ ] Crackles [ ] Wheezing [ ] Tachypnea  [ ] Audible excessive secretions   Cardiovascular:  [ ] Regular rate and rhythm  [x ] Irregular [ ] Tachycardia   [ ] Bradycardia   Abdomen: [x ] Soft  [ ] Distended  [ ] +BS  [ ] Non tender [ ] Tender  [ ]PEG   [ ] NGT   Last BM:     Genitourinary:  [x ] Normal [ ] Incontinent   [ ] Oliguria/Anuria   [ ] Cespedes  Musculoskeletal:  [x ] Normal   [ ] Generalized weakness  [ ] Bedbound  [ ] Edema   Neurological: [ x] No focal deficits  [ ] Cognitive impairment     Skin: [ x] Normal   [ ] Pressure ulcers     Vital Signs Last 24 Hrs  T(C): 36.3 (11 Jul 2019 06:24), Max: 36.7 (10 Jul 2019 17:39)  T(F): 97.4 (11 Jul 2019 06:24), Max: 98.1 (10 Jul 2019 17:39)  HR: 115 (11 Jul 2019 09:42) (96 - 128)  BP: 140/99 (11 Jul 2019 06:24) (114/81 - 140/99)  BP(mean): --  RR: 20 (11 Jul 2019 06:24) (19 - 24)  SpO2: 96% (11 Jul 2019 09:42) (95% - 100%)    LABS:                        10.3   3.63  )-----------( 141      ( 11 Jul 2019 07:30 )             32.7     07-11    134<L>  |  94<L>  |  20  ----------------------------<  111<H>  4.3   |  28  |  0.41<L>    Ca    9.1      11 Jul 2019 07:30  Phos  2.7     07-11  Mg     1.7     07-11          I&O's Summary      RADIOLOGY & ADDITIONAL STUDIES:

## 2019-07-11 NOTE — PROGRESS NOTE ADULT - PROBLEM SELECTOR PLAN 5
: -[place] [facility] [place]  -was planning on starting an experimental chemo regimen as outpt; is currently unsure if this is still what she wants  -will need o/p f/u w/ Dr. Nguyen to determine if pt is still a candidate for DMT. [place] [facility] [place] [facility]  -was planning on starting an experimental chemo regimen as outpt; is currently unsure if this is still what she wants  -family would like to pursue DMT  -will need o/p f/u w/ Dr. Nguyen/[facility] to determine if pt is still a candidate for DMT

## 2019-07-11 NOTE — PROGRESS NOTE ADULT - SUBJECTIVE AND OBJECTIVE BOX
CC: F/U for [facility]    SUBJECTIVE / OVERNIGHT EVENTS:  Had difficulty sleeping last night.  No F/C, N/V, CP, lightheadedness, dizziness, abdominal pain, diarrhea, dysuria.      MEDICATIONS  (STANDING):  allopurinol 300 milliGRAM(s) Oral daily  apixaban 2.5 milliGRAM(s) Oral every 12 hours  atovaquone Suspension 1500 milliGRAM(s) Oral daily  benzocaine 15 mG/menthol 3.6 mG (Sugar-Free) Lozenge 1 Lozenge Oral every 4 hours  docusate sodium 100 milliGRAM(s) Oral three times a day  lactated ringers. 1000 milliLiter(s) (30 mL/Hr) IV Continuous <Continuous>  levalbuterol Inhalation 0.63 milliGRAM(s) Inhalation every 4 hours  meropenem  IVPB 1000 milliGRAM(s) IV Intermittent every 12 hours  metoprolol succinate ER 50 milliGRAM(s) Oral daily  pantoprazole    Tablet 40 milliGRAM(s) Oral before breakfast  predniSONE   Tablet 40 milliGRAM(s) Oral daily  sodium chloride 2 Gram(s) Oral two times a day  sodium chloride 3%  Inhalation 4 milliLiter(s) Inhalation every 6 hours  valACYclovir 500 milliGRAM(s) Oral <User Schedule>  voriconazole 200 milliGRAM(s) Oral every 12 hours    MEDICATIONS  (PRN):  benzonatate 100 milliGRAM(s) Oral three times a day PRN Cough  bisacodyl 5 milliGRAM(s) Oral every 12 hours PRN Constipation  guaiFENesin    Syrup 200 milliGRAM(s) Oral every 6 hours PRN Cough  LORazepam     Tablet 0.25 milliGRAM(s) Oral at bedtime PRN Anxiety  morphine  - Injectable 0.5 milliGRAM(s) IV Push every 4 hours PRN dyspnea  polyethylene glycol 3350 17 Gram(s) Oral daily PRN Constipation  sodium chloride 0.65% Nasal 1 Spray(s) Both Nostrils daily PRN Nasal Congestion      Vital Signs Last 24 Hrs  T(C): 36.3 (11 Jul 2019 06:24), Max: 36.7 (10 Jul 2019 17:39)  T(F): 97.4 (11 Jul 2019 06:24), Max: 98.1 (10 Jul 2019 17:39)  HR: 115 (11 Jul 2019 09:42) (96 - 125)  BP: 140/99 (11 Jul 2019 06:24) (114/81 - 140/99)  BP(mean): --  RR: 20 (11 Jul 2019 06:24) (19 - 20)  SpO2: 96% (11 Jul 2019 09:42) (95% - 100%)  CAPILLARY BLOOD GLUCOSE        I&O's Summary      PHYSICAL EXAM:  GENERAL: NAD  HEAD:  Atraumatic, Normocephalic  EYES: EOMI, PERRLA, conjunctiva and sclera clear  NECK: Supple, No JVD  CHEST/LUNG: Diffuse rhonchi and crackles.  HEART: Regular rate and rhythm; No murmurs, rubs, or gallops  ABDOMEN: Soft, Nontender, Nondistended; Bowel sounds present  EXTREMITIES:  2+ Peripheral Pulses, No clubbing, cyanosis, or edema  PSYCH: Calm  NEUROLOGY: A/Ox3, non-focal  SKIN: No rashes or lesions    LABS:                        10.3   3.63  )-----------( 141      ( 11 Jul 2019 07:30 )             32.7     07-11    134<L>  |  94<L>  |  20  ----------------------------<  111<H>  4.3   |  28  |  0.41<L>    Ca    9.1      11 Jul 2019 07:30  Phos  2.7     07-11  Mg     1.7     07-11                RADIOLOGY & ADDITIONAL TESTS:    Imaging Personally Reviewed:    Care Discussed with Consultants/Other Providers:

## 2019-07-11 NOTE — PROGRESS NOTE ADULT - PROBLEM SELECTOR PLAN 8
Discussed with daughter and son by the bedside for 30 minutes. Ongoing discussion about goals of care.

## 2019-07-11 NOTE — PROGRESS NOTE ADULT - PROBLEM SELECTOR PLAN 3
largely driven by anxiety; will assess whether ativan is helpful. largely driven by anxiety, pt experienced relief w/ one dose of 0.25mg last night; encouraged to notify RN if she is unable to sleep on her own  -can trial melatonin as well

## 2019-07-11 NOTE — PROGRESS NOTE ADULT - ASSESSMENT
80F w/ diffuse B-cell lymphoma met to lungs s/p chemo/RT to chest, DCIS of L breast s/p mastectomy, Atrial fibrillation on Eliquis, admitted for acute hypoxic respiratory failure 2/2 viral URI w/ obstructive PNA, found to have progression of lymphoma s/p bronch 7/1 and RLL fungal PNA, currently on BS antibiotics and on BIPAP 2/2 worsening resp distress. Palliative care consulted 2/2 worsening dyspnea and for GOC discussion.

## 2019-07-11 NOTE — PROGRESS NOTE ADULT - PROBLEM SELECTOR PLAN 6
pt has assigned her son George as HCP. HCP form filled out and placed in chart  -per d/w w. family, would like to pursue DMT but unsure of code status  -plan for family meeting at 11am tmrw 7/11 w/ son (George), daughter (Clarissa) and pt  -will continue to follow. pt has assigned son George as HCP. HCP form filled out and placed in chart  -met with children and pt together this morning. family relayed wish to pursue DMT. relayed that pt will likely need PT either via [facility] or home PT prior to starting treatment, if offered. family would like to talk to both pt and her  about advanced directives prior to relaying their decision to the team  -will continue to follow

## 2019-07-11 NOTE — PROGRESS NOTE ADULT - PROBLEM SELECTOR PLAN 4
-pt able to eat lunch this afternoon, feels that her appetite has improved slightly  -will continue to monitor, encourage family to bring desired foods from home. -will continue to monitor, encourage family to bring desired foods from home

## 2019-07-11 NOTE — PROGRESS NOTE ADULT - PROBLEM SELECTOR PLAN 5
Follows with Dr. Nguyen at [facility] [place]. as per family, [facility] is not offering any disease modifying treatment. Pt is looking into enrollment in a clinical trial   - C/w ppx mepron, valacyclovir and allopurinol  - GOC discussed with pt and daughter for poor prognosis - They wish to be full code for now

## 2019-07-12 LAB
ANION GAP SERPL CALC-SCNC: 8 MMO/L — SIGNIFICANT CHANGE UP (ref 7–14)
BUN SERPL-MCNC: 15 MG/DL — SIGNIFICANT CHANGE UP (ref 7–23)
CALCIUM SERPL-MCNC: 8.9 MG/DL — SIGNIFICANT CHANGE UP (ref 8.4–10.5)
CHLORIDE SERPL-SCNC: 95 MMOL/L — LOW (ref 98–107)
CO2 SERPL-SCNC: 31 MMOL/L — SIGNIFICANT CHANGE UP (ref 22–31)
CREAT SERPL-MCNC: 0.41 MG/DL — LOW (ref 0.5–1.3)
GLUCOSE SERPL-MCNC: 137 MG/DL — HIGH (ref 70–99)
HCT VFR BLD CALC: 31.7 % — LOW (ref 34.5–45)
HGB BLD-MCNC: 10.2 G/DL — LOW (ref 11.5–15.5)
MAGNESIUM SERPL-MCNC: 1.6 MG/DL — SIGNIFICANT CHANGE UP (ref 1.6–2.6)
MCHC RBC-ENTMCNC: 31.7 PG — SIGNIFICANT CHANGE UP (ref 27–34)
MCHC RBC-ENTMCNC: 32.2 % — SIGNIFICANT CHANGE UP (ref 32–36)
MCV RBC AUTO: 98.4 FL — SIGNIFICANT CHANGE UP (ref 80–100)
NRBC # FLD: 0.1 K/UL — SIGNIFICANT CHANGE UP (ref 0–0)
NRBC FLD-RTO: 2.6 — SIGNIFICANT CHANGE UP
PHOSPHATE SERPL-MCNC: 2.8 MG/DL — SIGNIFICANT CHANGE UP (ref 2.5–4.5)
PLATELET # BLD AUTO: 141 K/UL — LOW (ref 150–400)
PMV BLD: 11.7 FL — SIGNIFICANT CHANGE UP (ref 7–13)
POTASSIUM SERPL-MCNC: 4.9 MMOL/L — SIGNIFICANT CHANGE UP (ref 3.5–5.3)
POTASSIUM SERPL-SCNC: 4.9 MMOL/L — SIGNIFICANT CHANGE UP (ref 3.5–5.3)
RBC # BLD: 3.22 M/UL — LOW (ref 3.8–5.2)
RBC # FLD: 20 % — HIGH (ref 10.3–14.5)
SODIUM SERPL-SCNC: 134 MMOL/L — LOW (ref 135–145)
WBC # BLD: 3.83 K/UL — SIGNIFICANT CHANGE UP (ref 3.8–10.5)
WBC # FLD AUTO: 3.83 K/UL — SIGNIFICANT CHANGE UP (ref 3.8–10.5)

## 2019-07-12 PROCEDURE — 99233 SBSQ HOSP IP/OBS HIGH 50: CPT

## 2019-07-12 PROCEDURE — 99232 SBSQ HOSP IP/OBS MODERATE 35: CPT

## 2019-07-12 RX ADMIN — BENZOCAINE AND MENTHOL 1 LOZENGE: 5; 1 LIQUID ORAL at 17:21

## 2019-07-12 RX ADMIN — Medication 40 MILLIGRAM(S): at 05:46

## 2019-07-12 RX ADMIN — SODIUM CHLORIDE 4 MILLILITER(S): 9 INJECTION INTRAMUSCULAR; INTRAVENOUS; SUBCUTANEOUS at 09:39

## 2019-07-12 RX ADMIN — LEVALBUTEROL 0.63 MILLIGRAM(S): 1.25 SOLUTION, CONCENTRATE RESPIRATORY (INHALATION) at 01:14

## 2019-07-12 RX ADMIN — SODIUM CHLORIDE 2 GRAM(S): 9 INJECTION INTRAMUSCULAR; INTRAVENOUS; SUBCUTANEOUS at 05:43

## 2019-07-12 RX ADMIN — Medication 300 MILLIGRAM(S): at 12:55

## 2019-07-12 RX ADMIN — ATOVAQUONE 1500 MILLIGRAM(S): 750 SUSPENSION ORAL at 12:55

## 2019-07-12 RX ADMIN — BENZOCAINE AND MENTHOL 1 LOZENGE: 5; 1 LIQUID ORAL at 21:15

## 2019-07-12 RX ADMIN — SODIUM CHLORIDE 30 MILLILITER(S): 9 INJECTION, SOLUTION INTRAVENOUS at 21:15

## 2019-07-12 RX ADMIN — MEROPENEM 100 MILLIGRAM(S): 1 INJECTION INTRAVENOUS at 17:20

## 2019-07-12 RX ADMIN — SODIUM CHLORIDE 30 MILLILITER(S): 9 INJECTION, SOLUTION INTRAVENOUS at 13:09

## 2019-07-12 RX ADMIN — Medication 50 MILLIGRAM(S): at 05:45

## 2019-07-12 RX ADMIN — LEVALBUTEROL 0.63 MILLIGRAM(S): 1.25 SOLUTION, CONCENTRATE RESPIRATORY (INHALATION) at 04:36

## 2019-07-12 RX ADMIN — SODIUM CHLORIDE 4 MILLILITER(S): 9 INJECTION INTRAMUSCULAR; INTRAVENOUS; SUBCUTANEOUS at 20:36

## 2019-07-12 RX ADMIN — SODIUM CHLORIDE 4 MILLILITER(S): 9 INJECTION INTRAMUSCULAR; INTRAVENOUS; SUBCUTANEOUS at 17:29

## 2019-07-12 RX ADMIN — VORICONAZOLE 200 MILLIGRAM(S): 10 INJECTION, POWDER, LYOPHILIZED, FOR SOLUTION INTRAVENOUS at 17:21

## 2019-07-12 RX ADMIN — APIXABAN 2.5 MILLIGRAM(S): 2.5 TABLET, FILM COATED ORAL at 05:43

## 2019-07-12 RX ADMIN — SODIUM CHLORIDE 2 GRAM(S): 9 INJECTION INTRAMUSCULAR; INTRAVENOUS; SUBCUTANEOUS at 17:21

## 2019-07-12 RX ADMIN — VORICONAZOLE 200 MILLIGRAM(S): 10 INJECTION, POWDER, LYOPHILIZED, FOR SOLUTION INTRAVENOUS at 05:46

## 2019-07-12 RX ADMIN — LEVALBUTEROL 0.63 MILLIGRAM(S): 1.25 SOLUTION, CONCENTRATE RESPIRATORY (INHALATION) at 09:39

## 2019-07-12 RX ADMIN — PANTOPRAZOLE SODIUM 40 MILLIGRAM(S): 20 TABLET, DELAYED RELEASE ORAL at 05:45

## 2019-07-12 RX ADMIN — MEROPENEM 100 MILLIGRAM(S): 1 INJECTION INTRAVENOUS at 08:08

## 2019-07-12 RX ADMIN — BENZOCAINE AND MENTHOL 1 LOZENGE: 5; 1 LIQUID ORAL at 05:43

## 2019-07-12 RX ADMIN — APIXABAN 2.5 MILLIGRAM(S): 2.5 TABLET, FILM COATED ORAL at 17:22

## 2019-07-12 RX ADMIN — VALACYCLOVIR 500 MILLIGRAM(S): 500 TABLET, FILM COATED ORAL at 17:23

## 2019-07-12 RX ADMIN — SODIUM CHLORIDE 4 MILLILITER(S): 9 INJECTION INTRAMUSCULAR; INTRAVENOUS; SUBCUTANEOUS at 04:36

## 2019-07-12 RX ADMIN — LEVALBUTEROL 0.63 MILLIGRAM(S): 1.25 SOLUTION, CONCENTRATE RESPIRATORY (INHALATION) at 20:30

## 2019-07-12 RX ADMIN — Medication 100 MILLIGRAM(S): at 05:44

## 2019-07-12 RX ADMIN — LEVALBUTEROL 0.63 MILLIGRAM(S): 1.25 SOLUTION, CONCENTRATE RESPIRATORY (INHALATION) at 17:29

## 2019-07-12 RX ADMIN — LEVALBUTEROL 0.63 MILLIGRAM(S): 1.25 SOLUTION, CONCENTRATE RESPIRATORY (INHALATION) at 13:41

## 2019-07-12 NOTE — PROGRESS NOTE ADULT - SUBJECTIVE AND OBJECTIVE BOX
CHIEF COMPLAINT: SOB    Interval Events: Patient with ongoing SOB, chest congestion, productive cough.    REVIEW OF SYSTEMS:  Constitutional: No fevers or chills. No weight loss. No fatigue or generalised malaise.  Eyes: No itching or discharge from the eyes  ENT: No ear pain. No ear discharge. No nasal congestion. No post nasal drip. No epistaxis. No throat pain. No sore throat. No difficulty swallowing.   CV: No chest pain. No palpitations. No lightheadedness or dizziness.   Resp: +dyspnea, +productive cough, +chest congestion  GI: No nausea. No vomiting. No diarrhea.  MSK: No joint pain or pain in any extremities  Integumentary: No skin lesions. No pedal edema.  Neurological: No gross motor weakness. No sensory changes.  [X ] All other systems negative      OBJECTIVE:  ICU Vital Signs Last 24 Hrs  T(C): 36.5 (12 Jul 2019 05:36), Max: 36.5 (11 Jul 2019 22:06)  T(F): 97.7 (12 Jul 2019 05:36), Max: 97.7 (11 Jul 2019 22:06)  HR: 118 (12 Jul 2019 05:36) (110 - 127)  BP: 128/96 (12 Jul 2019 05:36) (124/87 - 132/90)  BP(mean): --  ABP: --  ABP(mean): --  RR: 16 (12 Jul 2019 05:36) (16 - 20)  SpO2: 100% (12 Jul 2019 05:36) (95% - 100%)        CAPILLARY BLOOD GLUCOSE          PHYSICAL EXAM:  General: Awake, alert, oriented X 3.   HEENT: Atraumatic, normocephalic.   Lymph Nodes: No palpable lymphadenopathy  Neck: No JVD. No carotid bruit.   Respiratory: diffuse rhonchi, mild wheezing KASEY   Cardiovascular: S1 S2 normal. No murmurs, rubs or gallops.   Abdomen: Soft, non-tender, non-distended. No organomegaly.  Extremities: Warm to touch. Peripheral pulse palpable. No pedal edema.   Skin: No rashes or skin lesions  Neurological: Motor and sensory examination equal and normal in all four extremities.  Psychiatry: Appropriate mood and affect, patient concerned about breathing.     HOSPITAL MEDICATIONS:  MEDICATIONS  (STANDING):  allopurinol 300 milliGRAM(s) Oral daily  apixaban 2.5 milliGRAM(s) Oral every 12 hours  atovaquone Suspension 1500 milliGRAM(s) Oral daily  benzocaine 15 mG/menthol 3.6 mG (Sugar-Free) Lozenge 1 Lozenge Oral every 4 hours  docusate sodium 100 milliGRAM(s) Oral three times a day  lactated ringers. 1000 milliLiter(s) (30 mL/Hr) IV Continuous <Continuous>  levalbuterol Inhalation 0.63 milliGRAM(s) Inhalation every 4 hours  meropenem  IVPB 1000 milliGRAM(s) IV Intermittent every 12 hours  metoprolol succinate ER 50 milliGRAM(s) Oral daily  pantoprazole    Tablet 40 milliGRAM(s) Oral before breakfast  predniSONE   Tablet 40 milliGRAM(s) Oral daily  sodium chloride 2 Gram(s) Oral two times a day  sodium chloride 3%  Inhalation 4 milliLiter(s) Inhalation every 6 hours  valACYclovir 500 milliGRAM(s) Oral <User Schedule>  voriconazole 200 milliGRAM(s) Oral every 12 hours    MEDICATIONS  (PRN):  benzonatate 100 milliGRAM(s) Oral three times a day PRN Cough  bisacodyl 5 milliGRAM(s) Oral every 12 hours PRN Constipation  guaiFENesin    Syrup 200 milliGRAM(s) Oral every 6 hours PRN Cough  LORazepam     Tablet 0.25 milliGRAM(s) Oral at bedtime PRN Anxiety  morphine  - Injectable 0.5 milliGRAM(s) IV Push every 4 hours PRN dyspnea  polyethylene glycol 3350 17 Gram(s) Oral daily PRN Constipation  sodium chloride 0.65% Nasal 1 Spray(s) Both Nostrils daily PRN Nasal Congestion      LABS:                        10.3   3.63  )-----------( 141      ( 11 Jul 2019 07:30 )             32.7     07-11    134<L>  |  94<L>  |  20  ----------------------------<  111<H>  4.3   |  28  |  0.41<L>    Ca    9.1      11 Jul 2019 07:30  Phos  2.7     07-11  Mg     1.7     07-11

## 2019-07-12 NOTE — PROGRESS NOTE ADULT - PROBLEM SELECTOR PLAN 2
- c/w voriconazole pending ID to cover for possible invasive aspergilloses  - f/u final culture  - continue mepron 1500 mg po daily  - Pulmonary and ID consulted, recommendations appreciated

## 2019-07-12 NOTE — PROGRESS NOTE ADULT - PROBLEM SELECTOR PLAN 1
RLL bronchus obstruction with concern for postobstructive fungal PNA  - cont O2 to maintain SpO2>90  - standing airway clearance therapy, Xopenex q4 hours, following by nebulized 3% hypertonic saline, and then Aerobika device as per Pulm recommendations  - patient on meropenem and Vancomycin IV - anticipate finishing on 7/13  - poor prognosis

## 2019-07-12 NOTE — PROGRESS NOTE ADULT - ASSESSMENT
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  -c/w voriconazole for coverage of fungal PNA, speciated as Aspergillus Fumigatus from 7/3 specimen.   -c/w vanc/meropenem per ID  -recommend continued use of MetaNebs in lieu of chest vest as may be better tolerated. Treatment in advance of pulm toilet as below.   -c/w pulm toilet (xoponex, HS 3, aerobika device).   -recommend manual chest PT in addition to MetaNebs.   -Repeat CXR to evaluate for changes in bronchiectasis/mucoid plugging   -f/u fungal cx (added on 7/9) to previous sputum cx  -BIPAP prn  -Pulmonary will continue to follow

## 2019-07-12 NOTE — CHART NOTE - NSCHARTNOTEFT_GEN_A_CORE
Follow up with family.  Goal is for medical management of infection with hope of experimental treatment. Pt remains full code.  Kia Landeros DO

## 2019-07-12 NOTE — PROGRESS NOTE ADULT - PROBLEM SELECTOR PLAN 5
Follows with Dr. Nguyen at [facility] [place]. as per family, [facility] is not offering any disease modifying treatment. Pt is looking for experimental trial if pt clinically improves from current medical issues.  - C/w ppx mepron, valacyclovir and allopurinol  - GOC discussed with pt and daughter for poor prognosis - They wish to be full code for now  - Patient and family agreeable to defer experimental chemo until after  VALERIO.

## 2019-07-12 NOTE — PROGRESS NOTE ADULT - PROBLEM SELECTOR PLAN 8
Discussed with daughter and son by the bedside for 30 minutes. Ongoing discussion about goals of care. Full code for now.  Appreciate palliative care consult.

## 2019-07-12 NOTE — PROGRESS NOTE ADULT - SUBJECTIVE AND OBJECTIVE BOX
CC: F/U for dyspnea    SUBJECTIVE / OVERNIGHT EVENTS:  Patient states that she feels about same as yesterday.  No F/C, N/V, CP, lightheadedness, dizziness, abdominal pain, diarrhea, dysuria.      MEDICATIONS  (STANDING):  allopurinol 300 milliGRAM(s) Oral daily  apixaban 2.5 milliGRAM(s) Oral every 12 hours  atovaquone Suspension 1500 milliGRAM(s) Oral daily  benzocaine 15 mG/menthol 3.6 mG (Sugar-Free) Lozenge 1 Lozenge Oral every 4 hours  docusate sodium 100 milliGRAM(s) Oral three times a day  lactated ringers. 1000 milliLiter(s) (30 mL/Hr) IV Continuous <Continuous>  levalbuterol Inhalation 0.63 milliGRAM(s) Inhalation every 4 hours  meropenem  IVPB 1000 milliGRAM(s) IV Intermittent every 12 hours  metoprolol succinate ER 50 milliGRAM(s) Oral daily  pantoprazole    Tablet 40 milliGRAM(s) Oral before breakfast  predniSONE   Tablet 40 milliGRAM(s) Oral daily  sodium chloride 2 Gram(s) Oral two times a day  sodium chloride 3%  Inhalation 4 milliLiter(s) Inhalation every 6 hours  valACYclovir 500 milliGRAM(s) Oral <User Schedule>  voriconazole 200 milliGRAM(s) Oral every 12 hours    MEDICATIONS  (PRN):  benzonatate 100 milliGRAM(s) Oral three times a day PRN Cough  bisacodyl 5 milliGRAM(s) Oral every 12 hours PRN Constipation  guaiFENesin    Syrup 200 milliGRAM(s) Oral every 6 hours PRN Cough  LORazepam     Tablet 0.25 milliGRAM(s) Oral at bedtime PRN Anxiety  morphine  - Injectable 0.5 milliGRAM(s) IV Push every 4 hours PRN dyspnea  polyethylene glycol 3350 17 Gram(s) Oral daily PRN Constipation  sodium chloride 0.65% Nasal 1 Spray(s) Both Nostrils daily PRN Nasal Congestion      Vital Signs Last 24 Hrs  T(C): 36.5 (12 Jul 2019 05:36), Max: 36.5 (11 Jul 2019 22:06)  T(F): 97.7 (12 Jul 2019 05:36), Max: 97.7 (11 Jul 2019 22:06)  HR: 105 (12 Jul 2019 09:40) (105 - 127)  BP: 128/96 (12 Jul 2019 05:36) (124/87 - 132/90)  BP(mean): --  RR: 16 (12 Jul 2019 05:36) (16 - 20)  SpO2: 96% (12 Jul 2019 09:40) (95% - 100%)  CAPILLARY BLOOD GLUCOSE        I&O's Summary      PHYSICAL EXAM:  GENERAL: NAD, cachexic  HEAD:  Atraumatic, Normocephalic  EYES: EOMI, PERRLA, conjunctiva and sclera clear  NECK: Supple, No JVD  CHEST/LUNG: Diffuse rhonchi and crackles.  HEART: Regular rate and rhythm; No murmurs, rubs, or gallops  ABDOMEN: Soft, Nontender, Nondistended; Bowel sounds present  EXTREMITIES:  2+ Peripheral Pulses, No clubbing, cyanosis, or edema  PSYCH: Calm  NEUROLOGY: A/Ox3, non-focal  SKIN: No rashes or lesions    LABS:                        10.3   3.63  )-----------( 141      ( 11 Jul 2019 07:30 )             32.7     07-11    134<L>  |  94<L>  |  20  ----------------------------<  111<H>  4.3   |  28  |  0.41<L>    Ca    9.1      11 Jul 2019 07:30  Phos  2.7     07-11  Mg     1.7     07-11                RADIOLOGY & ADDITIONAL TESTS:    Imaging Personally Reviewed:    Care Discussed with Consultants/Other Providers: Dr. Jacques - Palliative care - advanced care directives.

## 2019-07-12 NOTE — PROGRESS NOTE ADULT - ASSESSMENT
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  Doubt this exacerbation was due to new bacterial pna- jessa given her imrpvement over 24 hours    Stop meropenem on 7/13    2) Leukopenia  Continue to monitor    3) Immunosuppressed secondary to cancer tx  PCP prophylaxis  Continue mepron  At some point, reasonable to re-challenge with Bactrim with close monitoring of WBC    4) Delirium: improved with sleep     Call the ID service with questions or concerns over the weekend.  [phone]

## 2019-07-12 NOTE — PROGRESS NOTE ADULT - SUBJECTIVE AND OBJECTIVE BOX
Follow Up:      Inverval History/ROS:Patient is a 79y old  Female who presents with a chief complaint of SOB (12 Jul 2019 12:00)      Allergies    No Known Allergies    Intolerances        ANTIMICROBIALS:  atovaquone Suspension 1500 daily  meropenem  IVPB 1000 every 12 hours  valACYclovir 500 <User Schedule>  voriconazole 200 every 12 hours      OTHER MEDS:  allopurinol 300 milliGRAM(s) Oral daily  apixaban 2.5 milliGRAM(s) Oral every 12 hours  benzocaine 15 mG/menthol 3.6 mG (Sugar-Free) Lozenge 1 Lozenge Oral every 4 hours  benzonatate 100 milliGRAM(s) Oral three times a day PRN  bisacodyl 5 milliGRAM(s) Oral every 12 hours PRN  docusate sodium 100 milliGRAM(s) Oral three times a day  guaiFENesin    Syrup 200 milliGRAM(s) Oral every 6 hours PRN  lactated ringers. 1000 milliLiter(s) IV Continuous <Continuous>  levalbuterol Inhalation 0.63 milliGRAM(s) Inhalation every 4 hours  LORazepam     Tablet 0.25 milliGRAM(s) Oral at bedtime PRN  metoprolol succinate ER 50 milliGRAM(s) Oral daily  morphine  - Injectable 0.5 milliGRAM(s) IV Push every 4 hours PRN  pantoprazole    Tablet 40 milliGRAM(s) Oral before breakfast  polyethylene glycol 3350 17 Gram(s) Oral daily PRN  predniSONE   Tablet 40 milliGRAM(s) Oral daily  sodium chloride 2 Gram(s) Oral two times a day  sodium chloride 0.65% Nasal 1 Spray(s) Both Nostrils daily PRN  sodium chloride 3%  Inhalation 4 milliLiter(s) Inhalation every 6 hours      Vital Signs Last 24 Hrs  T(C): 36.5 (12 Jul 2019 05:36), Max: 36.5 (11 Jul 2019 22:06)  T(F): 97.7 (12 Jul 2019 05:36), Max: 97.7 (11 Jul 2019 22:06)  HR: 105 (12 Jul 2019 09:40) (105 - 127)  BP: 128/96 (12 Jul 2019 05:36) (124/87 - 128/96)  BP(mean): --  RR: 16 (12 Jul 2019 05:36) (16 - 18)  SpO2: 96% (12 Jul 2019 09:40) (95% - 100%)    PHYSICAL EXAM:  General: [x ] non-toxic  HEAD/EYES: [ ] PERRL [x ] white sclera [ ] icterus  ENT:  [ ] normal [x ] supple [ ] thrush [ ] pharyngeal exudate  Cardiovascular:   [ ] murmur [x ] normal [ ] PPM/AICD  Respiratory:  [x ] clear to ausculation bilaterally  GI:  [x ] soft, non-tender, normal bowel sounds  :  [ ] bledsoe [ ] no CVA tenderness   Musculoskeletal:  [ ] no synovitis  Neurologic:  [x ] non-focal exam   Skin:  [x ] no rash  Lymph: [ ] no lymphadenopathy  Psychiatric:  [ ] appropriate affect [x ] alert & oriented  Lines:  [x ] no phlebitis [ ] central line                                10.3   3.63  )-----------( 141      ( 11 Jul 2019 07:30 )             32.7       07-11    134<L>  |  94<L>  |  20  ----------------------------<  111<H>  4.3   |  28  |  0.41<L>    Ca    9.1      11 Jul 2019 07:30  Phos  2.7     07-11  Mg     1.7     07-11            MICROBIOLOGY:Culture - Respiratory:   NRF^Normal Respiratory Le  QUANTITY OF GROWTH: MODERATE (07-08-19 @ 18:59)      RADIOLOGY:

## 2019-07-13 LAB
ANION GAP SERPL CALC-SCNC: 13 MMO/L — SIGNIFICANT CHANGE UP (ref 7–14)
BUN SERPL-MCNC: 17 MG/DL — SIGNIFICANT CHANGE UP (ref 7–23)
CALCIUM SERPL-MCNC: 9.4 MG/DL — SIGNIFICANT CHANGE UP (ref 8.4–10.5)
CHLORIDE SERPL-SCNC: 95 MMOL/L — LOW (ref 98–107)
CO2 SERPL-SCNC: 28 MMOL/L — SIGNIFICANT CHANGE UP (ref 22–31)
CREAT SERPL-MCNC: 0.43 MG/DL — LOW (ref 0.5–1.3)
GLUCOSE SERPL-MCNC: 113 MG/DL — HIGH (ref 70–99)
HCT VFR BLD CALC: 36.5 % — SIGNIFICANT CHANGE UP (ref 34.5–45)
HGB BLD-MCNC: 11.4 G/DL — LOW (ref 11.5–15.5)
MAGNESIUM SERPL-MCNC: 1.8 MG/DL — SIGNIFICANT CHANGE UP (ref 1.6–2.6)
MCHC RBC-ENTMCNC: 31.2 % — LOW (ref 32–36)
MCHC RBC-ENTMCNC: 31.8 PG — SIGNIFICANT CHANGE UP (ref 27–34)
MCV RBC AUTO: 101.7 FL — HIGH (ref 80–100)
NRBC # FLD: 0.13 K/UL — SIGNIFICANT CHANGE UP (ref 0–0)
NRBC FLD-RTO: 2.7 — SIGNIFICANT CHANGE UP
PLATELET # BLD AUTO: 141 K/UL — LOW (ref 150–400)
PMV BLD: 12.8 FL — SIGNIFICANT CHANGE UP (ref 7–13)
POTASSIUM SERPL-MCNC: 4.6 MMOL/L — SIGNIFICANT CHANGE UP (ref 3.5–5.3)
POTASSIUM SERPL-SCNC: 4.6 MMOL/L — SIGNIFICANT CHANGE UP (ref 3.5–5.3)
RBC # BLD: 3.59 M/UL — LOW (ref 3.8–5.2)
RBC # FLD: 20.7 % — HIGH (ref 10.3–14.5)
SODIUM SERPL-SCNC: 136 MMOL/L — SIGNIFICANT CHANGE UP (ref 135–145)
WBC # BLD: 4.88 K/UL — SIGNIFICANT CHANGE UP (ref 3.8–10.5)
WBC # FLD AUTO: 4.88 K/UL — SIGNIFICANT CHANGE UP (ref 3.8–10.5)

## 2019-07-13 PROCEDURE — 99233 SBSQ HOSP IP/OBS HIGH 50: CPT

## 2019-07-13 RX ORDER — METOPROLOL TARTRATE 50 MG
25 TABLET ORAL ONCE
Refills: 0 | Status: COMPLETED | OUTPATIENT
Start: 2019-07-13 | End: 2019-07-13

## 2019-07-13 RX ORDER — METOPROLOL TARTRATE 50 MG
75 TABLET ORAL DAILY
Refills: 0 | Status: DISCONTINUED | OUTPATIENT
Start: 2019-07-14 | End: 2019-07-16

## 2019-07-13 RX ADMIN — SODIUM CHLORIDE 4 MILLILITER(S): 9 INJECTION INTRAMUSCULAR; INTRAVENOUS; SUBCUTANEOUS at 10:12

## 2019-07-13 RX ADMIN — PANTOPRAZOLE SODIUM 40 MILLIGRAM(S): 20 TABLET, DELAYED RELEASE ORAL at 04:44

## 2019-07-13 RX ADMIN — APIXABAN 2.5 MILLIGRAM(S): 2.5 TABLET, FILM COATED ORAL at 17:25

## 2019-07-13 RX ADMIN — Medication 100 MILLIGRAM(S): at 12:05

## 2019-07-13 RX ADMIN — LEVALBUTEROL 0.63 MILLIGRAM(S): 1.25 SOLUTION, CONCENTRATE RESPIRATORY (INHALATION) at 01:19

## 2019-07-13 RX ADMIN — LEVALBUTEROL 0.63 MILLIGRAM(S): 1.25 SOLUTION, CONCENTRATE RESPIRATORY (INHALATION) at 10:00

## 2019-07-13 RX ADMIN — VORICONAZOLE 200 MILLIGRAM(S): 10 INJECTION, POWDER, LYOPHILIZED, FOR SOLUTION INTRAVENOUS at 17:25

## 2019-07-13 RX ADMIN — SODIUM CHLORIDE 4 MILLILITER(S): 9 INJECTION INTRAMUSCULAR; INTRAVENOUS; SUBCUTANEOUS at 16:48

## 2019-07-13 RX ADMIN — SODIUM CHLORIDE 4 MILLILITER(S): 9 INJECTION INTRAMUSCULAR; INTRAVENOUS; SUBCUTANEOUS at 21:16

## 2019-07-13 RX ADMIN — APIXABAN 2.5 MILLIGRAM(S): 2.5 TABLET, FILM COATED ORAL at 04:45

## 2019-07-13 RX ADMIN — Medication 100 MILLIGRAM(S): at 04:44

## 2019-07-13 RX ADMIN — Medication 0.25 MILLIGRAM(S): at 00:39

## 2019-07-13 RX ADMIN — LEVALBUTEROL 0.63 MILLIGRAM(S): 1.25 SOLUTION, CONCENTRATE RESPIRATORY (INHALATION) at 16:42

## 2019-07-13 RX ADMIN — SODIUM CHLORIDE 30 MILLILITER(S): 9 INJECTION, SOLUTION INTRAVENOUS at 21:51

## 2019-07-13 RX ADMIN — VORICONAZOLE 200 MILLIGRAM(S): 10 INJECTION, POWDER, LYOPHILIZED, FOR SOLUTION INTRAVENOUS at 04:45

## 2019-07-13 RX ADMIN — Medication 25 MILLIGRAM(S): at 12:05

## 2019-07-13 RX ADMIN — MEROPENEM 100 MILLIGRAM(S): 1 INJECTION INTRAVENOUS at 04:45

## 2019-07-13 RX ADMIN — ATOVAQUONE 1500 MILLIGRAM(S): 750 SUSPENSION ORAL at 12:08

## 2019-07-13 RX ADMIN — Medication 300 MILLIGRAM(S): at 12:05

## 2019-07-13 RX ADMIN — Medication 40 MILLIGRAM(S): at 04:44

## 2019-07-13 RX ADMIN — LEVALBUTEROL 0.63 MILLIGRAM(S): 1.25 SOLUTION, CONCENTRATE RESPIRATORY (INHALATION) at 13:13

## 2019-07-13 RX ADMIN — LEVALBUTEROL 0.63 MILLIGRAM(S): 1.25 SOLUTION, CONCENTRATE RESPIRATORY (INHALATION) at 04:34

## 2019-07-13 RX ADMIN — SODIUM CHLORIDE 2 GRAM(S): 9 INJECTION INTRAMUSCULAR; INTRAVENOUS; SUBCUTANEOUS at 17:25

## 2019-07-13 RX ADMIN — SODIUM CHLORIDE 2 GRAM(S): 9 INJECTION INTRAMUSCULAR; INTRAVENOUS; SUBCUTANEOUS at 04:44

## 2019-07-13 RX ADMIN — Medication 50 MILLIGRAM(S): at 04:45

## 2019-07-13 RX ADMIN — MEROPENEM 100 MILLIGRAM(S): 1 INJECTION INTRAVENOUS at 18:22

## 2019-07-13 RX ADMIN — Medication 0.25 MILLIGRAM(S): at 04:44

## 2019-07-13 RX ADMIN — LEVALBUTEROL 0.63 MILLIGRAM(S): 1.25 SOLUTION, CONCENTRATE RESPIRATORY (INHALATION) at 20:50

## 2019-07-13 RX ADMIN — LEVALBUTEROL 0.63 MILLIGRAM(S): 1.25 SOLUTION, CONCENTRATE RESPIRATORY (INHALATION) at 08:50

## 2019-07-13 RX ADMIN — SODIUM CHLORIDE 4 MILLILITER(S): 9 INJECTION INTRAMUSCULAR; INTRAVENOUS; SUBCUTANEOUS at 04:36

## 2019-07-13 NOTE — PROGRESS NOTE ADULT - PROBLEM SELECTOR PLAN 3
Afib w/ RVR likely d/t infection and hypoxia, now better rate controlled   - Monitor on tele   - C/w home metoprolol 50mg ER  - cont apixaban Afib w/ RVR likely d/t infection and hypoxia  - Monitor on tele   - increase to metoprolol 75   - cont apixaban

## 2019-07-13 NOTE — PROGRESS NOTE ADULT - PROBLEM SELECTOR PLAN 5
[place] [facility] [place] as per family, [facility] is not offering any disease modifying treatment. Pt is looking for experimental trial if pt clinically improves from current medical issues.  - C/w ppx mepron, valacyclovir and allopurinol  - GOC discussed with pt and daughter for poor prognosis - They wish to be full code for now  - Patient and family agreeable to defer experimental chemo until after VALERIO.

## 2019-07-13 NOTE — PROGRESS NOTE ADULT - SUBJECTIVE AND OBJECTIVE BOX
Patient is a 79y old  Female who presents with a chief complaint of SOB (12 Jul 2019 16:50)      SUBJECTIVE / OVERNIGHT EVENTS:    MEDICATIONS  (STANDING):  allopurinol 300 milliGRAM(s) Oral daily  apixaban 2.5 milliGRAM(s) Oral every 12 hours  atovaquone Suspension 1500 milliGRAM(s) Oral daily  benzocaine 15 mG/menthol 3.6 mG (Sugar-Free) Lozenge 1 Lozenge Oral every 4 hours  docusate sodium 100 milliGRAM(s) Oral three times a day  lactated ringers. 1000 milliLiter(s) (30 mL/Hr) IV Continuous <Continuous>  levalbuterol Inhalation 0.63 milliGRAM(s) Inhalation every 4 hours  meropenem  IVPB 1000 milliGRAM(s) IV Intermittent every 12 hours  metoprolol succinate ER 50 milliGRAM(s) Oral daily  pantoprazole    Tablet 40 milliGRAM(s) Oral before breakfast  predniSONE   Tablet 40 milliGRAM(s) Oral daily  sodium chloride 2 Gram(s) Oral two times a day  sodium chloride 3%  Inhalation 4 milliLiter(s) Inhalation every 6 hours  valACYclovir 500 milliGRAM(s) Oral <User Schedule>  voriconazole 200 milliGRAM(s) Oral every 12 hours    MEDICATIONS  (PRN):  benzonatate 100 milliGRAM(s) Oral three times a day PRN Cough  bisacodyl 5 milliGRAM(s) Oral every 12 hours PRN Constipation  guaiFENesin    Syrup 200 milliGRAM(s) Oral every 6 hours PRN Cough  LORazepam     Tablet 0.25 milliGRAM(s) Oral at bedtime PRN Anxiety  morphine  - Injectable 0.5 milliGRAM(s) IV Push every 4 hours PRN dyspnea  polyethylene glycol 3350 17 Gram(s) Oral daily PRN Constipation  sodium chloride 0.65% Nasal 1 Spray(s) Both Nostrils daily PRN Nasal Congestion        CAPILLARY BLOOD GLUCOSE        I&O's Summary      T(C): 36.6 (07-13-19 @ 04:22), Max: 36.6 (07-13-19 @ 04:22)  HR: 114 (07-13-19 @ 07:53) (82 - 136)  BP: 147/105 (07-13-19 @ 04:22) (139/99 - 147/105)  RR: 18 (07-13-19 @ 06:55) (18 - 33)  SpO2: 93% (07-13-19 @ 07:53) (93% - 99%)    PHYSICAL EXAM:  GENERAL: NAD, well-developed  HEAD:  Atraumatic, Normocephalic  EYES: EOMI, PERRLA, conjunctiva and sclera clear  NECK: Supple, No JVD  CHEST/LUNG: Clear to auscultation bilaterally; No wheeze  HEART: Regular rate and rhythm; No murmurs, rubs, or gallops  ABDOMEN: Soft, Nontender, Nondistended; Bowel sounds present  EXTREMITIES:  2+ Peripheral Pulses, No clubbing, cyanosis, or edema  PSYCH: AAOx3  NEUROLOGY: non-focal  SKIN: No rashes or lesions    LABS:                        11.4   4.88  )-----------( 141      ( 13 Jul 2019 06:40 )             36.5     07-13    136  |  95<L>  |  17  ----------------------------<  113<H>  4.6   |  28  |  0.43<L>    Ca    9.4      13 Jul 2019 06:40  Phos  2.8     07-12  Mg     1.8     07-13                  RADIOLOGY & ADDITIONAL TESTS:    Imaging Personally Reviewed:    Consultant(s) Notes Reviewed:      Care Discussed with Consultants/Other Providers: Patient is a 79y old  Female who presents with a chief complaint of SOB (12 Jul 2019 16:50)      SUBJECTIVE / OVERNIGHT EVENTS: Family at bedside pt got ativan felt like breathing was labored had to be placed on BiPAP does not want ativan to be given. Tele Afib     MEDICATIONS  (STANDING):  allopurinol 300 milliGRAM(s) Oral daily  apixaban 2.5 milliGRAM(s) Oral every 12 hours  atovaquone Suspension 1500 milliGRAM(s) Oral daily  benzocaine 15 mG/menthol 3.6 mG (Sugar-Free) Lozenge 1 Lozenge Oral every 4 hours  docusate sodium 100 milliGRAM(s) Oral three times a day  lactated ringers. 1000 milliLiter(s) (30 mL/Hr) IV Continuous <Continuous>  levalbuterol Inhalation 0.63 milliGRAM(s) Inhalation every 4 hours  meropenem  IVPB 1000 milliGRAM(s) IV Intermittent every 12 hours  metoprolol succinate ER 50 milliGRAM(s) Oral daily  pantoprazole    Tablet 40 milliGRAM(s) Oral before breakfast  predniSONE   Tablet 40 milliGRAM(s) Oral daily  sodium chloride 2 Gram(s) Oral two times a day  sodium chloride 3%  Inhalation 4 milliLiter(s) Inhalation every 6 hours  valACYclovir 500 milliGRAM(s) Oral <User Schedule>  voriconazole 200 milliGRAM(s) Oral every 12 hours    MEDICATIONS  (PRN):  benzonatate 100 milliGRAM(s) Oral three times a day PRN Cough  bisacodyl 5 milliGRAM(s) Oral every 12 hours PRN Constipation  guaiFENesin    Syrup 200 milliGRAM(s) Oral every 6 hours PRN Cough  LORazepam     Tablet 0.25 milliGRAM(s) Oral at bedtime PRN Anxiety  morphine  - Injectable 0.5 milliGRAM(s) IV Push every 4 hours PRN dyspnea  polyethylene glycol 3350 17 Gram(s) Oral daily PRN Constipation  sodium chloride 0.65% Nasal 1 Spray(s) Both Nostrils daily PRN Nasal Congestion        CAPILLARY BLOOD GLUCOSE        I&O's Summary      T(C): 36.6 (07-13-19 @ 04:22), Max: 36.6 (07-13-19 @ 04:22)  HR: 114 (07-13-19 @ 07:53) (82 - 136)  BP: 147/105 (07-13-19 @ 04:22) (139/99 - 147/105)  RR: 18 (07-13-19 @ 06:55) (18 - 33)  SpO2: 93% (07-13-19 @ 07:53) (93% - 99%)    PHYSICAL EXAM:  GENERAL: NAD, cachexic  HEAD:  Atraumatic, Normocephalic  EYES: EOMI, PERRLA, conjunctiva and sclera clear  NECK: Supple, No JVD  CHEST/LUNG: Diffuse rhonchi and crackles.  HEART: irregularly irregular   ABDOMEN: Soft, Nontender, Nondistended; Bowel sounds present  EXTREMITIES:  2+ Peripheral Pulses, No clubbing, cyanosis, or edema  PSYCH: Calm  NEUROLOGY: A/Ox3, non-focal  SKIN: No rashes or lesions    LABS:                        11.4   4.88  )-----------( 141      ( 13 Jul 2019 06:40 )             36.5     07-13    136  |  95<L>  |  17  ----------------------------<  113<H>  4.6   |  28  |  0.43<L>    Ca    9.4      13 Jul 2019 06:40  Phos  2.8     07-12  Mg     1.8     07-13                  RADIOLOGY & ADDITIONAL TESTS:    Imaging Personally Reviewed:    Consultant(s) Notes Reviewed:      Care Discussed with Consultants/Other Providers:

## 2019-07-13 NOTE — PROGRESS NOTE ADULT - PROBLEM SELECTOR PLAN 1
RLL bronchus obstruction with concern for postobstructive fungal PNA  - cont O2 to maintain SpO2>90  - standing airway clearance therapy, Xopenex q4 hours, following by nebulized 3% hypertonic saline, and then Aerobika device as per Pulm recommendations  - DC meropenem   - poor prognosis RLL bronchus obstruction with concern for postobstructive fungal PNA  - cont O2 to maintain SpO2>90  - standing airway clearance therapy, Xopenex q4 hours, following by nebulized 3% hypertonic saline, and then Aerobika device as per Pulm recommendations  - DC meropenem if Cx Neg x 48 hrs   - poor prognosis  -d/c ativan monitor

## 2019-07-14 LAB
ANION GAP SERPL CALC-SCNC: 15 MMO/L — HIGH (ref 7–14)
BUN SERPL-MCNC: 18 MG/DL — SIGNIFICANT CHANGE UP (ref 7–23)
CALCIUM SERPL-MCNC: 9 MG/DL — SIGNIFICANT CHANGE UP (ref 8.4–10.5)
CHLORIDE SERPL-SCNC: 96 MMOL/L — LOW (ref 98–107)
CO2 SERPL-SCNC: 24 MMOL/L — SIGNIFICANT CHANGE UP (ref 22–31)
CREAT SERPL-MCNC: 0.4 MG/DL — LOW (ref 0.5–1.3)
GLUCOSE SERPL-MCNC: 76 MG/DL — SIGNIFICANT CHANGE UP (ref 70–99)
HCT VFR BLD CALC: 34.4 % — LOW (ref 34.5–45)
HGB BLD-MCNC: 10.8 G/DL — LOW (ref 11.5–15.5)
MAGNESIUM SERPL-MCNC: 1.6 MG/DL — SIGNIFICANT CHANGE UP (ref 1.6–2.6)
MCHC RBC-ENTMCNC: 31.4 % — LOW (ref 32–36)
MCHC RBC-ENTMCNC: 32.4 PG — SIGNIFICANT CHANGE UP (ref 27–34)
MCV RBC AUTO: 103.3 FL — HIGH (ref 80–100)
NRBC # FLD: 0.14 K/UL — SIGNIFICANT CHANGE UP (ref 0–0)
NRBC FLD-RTO: 3.5 — SIGNIFICANT CHANGE UP
PLATELET # BLD AUTO: 132 K/UL — LOW (ref 150–400)
PMV BLD: 12.7 FL — SIGNIFICANT CHANGE UP (ref 7–13)
POTASSIUM SERPL-MCNC: 4.3 MMOL/L — SIGNIFICANT CHANGE UP (ref 3.5–5.3)
POTASSIUM SERPL-SCNC: 4.3 MMOL/L — SIGNIFICANT CHANGE UP (ref 3.5–5.3)
RBC # BLD: 3.33 M/UL — LOW (ref 3.8–5.2)
RBC # FLD: 21 % — HIGH (ref 10.3–14.5)
SODIUM SERPL-SCNC: 135 MMOL/L — SIGNIFICANT CHANGE UP (ref 135–145)
WBC # BLD: 4.03 K/UL — SIGNIFICANT CHANGE UP (ref 3.8–10.5)
WBC # FLD AUTO: 4.03 K/UL — SIGNIFICANT CHANGE UP (ref 3.8–10.5)

## 2019-07-14 PROCEDURE — 99233 SBSQ HOSP IP/OBS HIGH 50: CPT

## 2019-07-14 RX ORDER — LANOLIN ALCOHOL/MO/W.PET/CERES
1.5 CREAM (GRAM) TOPICAL AT BEDTIME
Refills: 0 | Status: DISCONTINUED | OUTPATIENT
Start: 2019-07-14 | End: 2019-08-06

## 2019-07-14 RX ORDER — LANOLIN ALCOHOL/MO/W.PET/CERES
1 CREAM (GRAM) TOPICAL AT BEDTIME
Refills: 0 | Status: DISCONTINUED | OUTPATIENT
Start: 2019-07-14 | End: 2019-07-14

## 2019-07-14 RX ADMIN — Medication 300 MILLIGRAM(S): at 11:57

## 2019-07-14 RX ADMIN — VORICONAZOLE 200 MILLIGRAM(S): 10 INJECTION, POWDER, LYOPHILIZED, FOR SOLUTION INTRAVENOUS at 16:27

## 2019-07-14 RX ADMIN — VORICONAZOLE 200 MILLIGRAM(S): 10 INJECTION, POWDER, LYOPHILIZED, FOR SOLUTION INTRAVENOUS at 05:20

## 2019-07-14 RX ADMIN — LEVALBUTEROL 0.63 MILLIGRAM(S): 1.25 SOLUTION, CONCENTRATE RESPIRATORY (INHALATION) at 16:52

## 2019-07-14 RX ADMIN — Medication 100 MILLIGRAM(S): at 05:19

## 2019-07-14 RX ADMIN — Medication 75 MILLIGRAM(S): at 05:19

## 2019-07-14 RX ADMIN — PANTOPRAZOLE SODIUM 40 MILLIGRAM(S): 20 TABLET, DELAYED RELEASE ORAL at 05:19

## 2019-07-14 RX ADMIN — Medication 1.5 MILLIGRAM(S): at 23:23

## 2019-07-14 RX ADMIN — SODIUM CHLORIDE 4 MILLILITER(S): 9 INJECTION INTRAMUSCULAR; INTRAVENOUS; SUBCUTANEOUS at 21:39

## 2019-07-14 RX ADMIN — LEVALBUTEROL 0.63 MILLIGRAM(S): 1.25 SOLUTION, CONCENTRATE RESPIRATORY (INHALATION) at 13:52

## 2019-07-14 RX ADMIN — APIXABAN 2.5 MILLIGRAM(S): 2.5 TABLET, FILM COATED ORAL at 05:19

## 2019-07-14 RX ADMIN — LEVALBUTEROL 0.63 MILLIGRAM(S): 1.25 SOLUTION, CONCENTRATE RESPIRATORY (INHALATION) at 09:32

## 2019-07-14 RX ADMIN — SODIUM CHLORIDE 2 GRAM(S): 9 INJECTION INTRAMUSCULAR; INTRAVENOUS; SUBCUTANEOUS at 05:20

## 2019-07-14 RX ADMIN — SODIUM CHLORIDE 4 MILLILITER(S): 9 INJECTION INTRAMUSCULAR; INTRAVENOUS; SUBCUTANEOUS at 03:23

## 2019-07-14 RX ADMIN — SODIUM CHLORIDE 4 MILLILITER(S): 9 INJECTION INTRAMUSCULAR; INTRAVENOUS; SUBCUTANEOUS at 17:00

## 2019-07-14 RX ADMIN — BENZOCAINE AND MENTHOL 1 LOZENGE: 5; 1 LIQUID ORAL at 05:20

## 2019-07-14 RX ADMIN — LEVALBUTEROL 0.63 MILLIGRAM(S): 1.25 SOLUTION, CONCENTRATE RESPIRATORY (INHALATION) at 00:20

## 2019-07-14 RX ADMIN — Medication 100 MILLIGRAM(S): at 11:57

## 2019-07-14 RX ADMIN — Medication 40 MILLIGRAM(S): at 05:19

## 2019-07-14 RX ADMIN — LEVALBUTEROL 0.63 MILLIGRAM(S): 1.25 SOLUTION, CONCENTRATE RESPIRATORY (INHALATION) at 03:33

## 2019-07-14 RX ADMIN — APIXABAN 2.5 MILLIGRAM(S): 2.5 TABLET, FILM COATED ORAL at 16:27

## 2019-07-14 RX ADMIN — VALACYCLOVIR 500 MILLIGRAM(S): 500 TABLET, FILM COATED ORAL at 16:27

## 2019-07-14 RX ADMIN — LEVALBUTEROL 0.63 MILLIGRAM(S): 1.25 SOLUTION, CONCENTRATE RESPIRATORY (INHALATION) at 21:31

## 2019-07-14 RX ADMIN — Medication 100 MILLIGRAM(S): at 23:23

## 2019-07-14 RX ADMIN — SODIUM CHLORIDE 2 GRAM(S): 9 INJECTION INTRAMUSCULAR; INTRAVENOUS; SUBCUTANEOUS at 16:27

## 2019-07-14 RX ADMIN — ATOVAQUONE 750 MILLIGRAM(S): 750 SUSPENSION ORAL at 12:04

## 2019-07-14 RX ADMIN — SODIUM CHLORIDE 4 MILLILITER(S): 9 INJECTION INTRAMUSCULAR; INTRAVENOUS; SUBCUTANEOUS at 09:38

## 2019-07-14 NOTE — PROGRESS NOTE ADULT - PROBLEM SELECTOR PLAN 1
RLL bronchus obstruction with concern for postobstructive fungal PNA  - cont O2 to maintain SpO2>90  - standing airway clearance therapy, Xopenex q4 hours, following by nebulized 3% hypertonic saline, and then Aerobika device as per Pulm recommendations  - DC meropenem as per pulm   - poor prognosis  -d/c ativan monitor

## 2019-07-14 NOTE — PROGRESS NOTE ADULT - PROBLEM SELECTOR PLAN 5
Follows with Dr. Nguyen at [facility] [place]. as per family, [facility] is not offering any disease modifying treatment. Pt is looking for experimental trial if pt clinically improves from current medical issues.  - C/w ppx mepron, valacyclovir and allopurinol  - GOC discussed with pt and daughter for poor prognosis - They wish to be full code for now  - Patient and family agreeable to defer experimental chemo until after VALERIO.

## 2019-07-14 NOTE — PROGRESS NOTE ADULT - SUBJECTIVE AND OBJECTIVE BOX
Patient is a 79y old  Female who presents with a chief complaint of SOB (13 Jul 2019 09:52)      SUBJECTIVE / OVERNIGHT EVENTS: states feels SOB off BiPAP when placed on NC. Denies N/V/dizziness    MEDICATIONS  (STANDING):  allopurinol 300 milliGRAM(s) Oral daily  apixaban 2.5 milliGRAM(s) Oral every 12 hours  atovaquone Suspension 1500 milliGRAM(s) Oral daily  benzocaine 15 mG/menthol 3.6 mG (Sugar-Free) Lozenge 1 Lozenge Oral every 4 hours  docusate sodium 100 milliGRAM(s) Oral three times a day  lactated ringers. 1000 milliLiter(s) (30 mL/Hr) IV Continuous <Continuous>  levalbuterol Inhalation 0.63 milliGRAM(s) Inhalation every 4 hours  metoprolol succinate ER 75 milliGRAM(s) Oral daily  pantoprazole    Tablet 40 milliGRAM(s) Oral before breakfast  predniSONE   Tablet 40 milliGRAM(s) Oral daily  sodium chloride 2 Gram(s) Oral two times a day  sodium chloride 3%  Inhalation 4 milliLiter(s) Inhalation every 6 hours  valACYclovir 500 milliGRAM(s) Oral <User Schedule>  voriconazole 200 milliGRAM(s) Oral every 12 hours    MEDICATIONS  (PRN):  benzonatate 100 milliGRAM(s) Oral three times a day PRN Cough  bisacodyl 5 milliGRAM(s) Oral every 12 hours PRN Constipation  guaiFENesin    Syrup 200 milliGRAM(s) Oral every 6 hours PRN Cough  morphine  - Injectable 0.5 milliGRAM(s) IV Push every 4 hours PRN dyspnea  polyethylene glycol 3350 17 Gram(s) Oral daily PRN Constipation  sodium chloride 0.65% Nasal 1 Spray(s) Both Nostrils daily PRN Nasal Congestion        CAPILLARY BLOOD GLUCOSE        I&O's Summary      T(C): 36.4 (07-14-19 @ 05:17), Max: 36.6 (07-13-19 @ 11:58)  HR: 122 (07-14-19 @ 09:32) (61 - 125)  BP: 137/95 (07-14-19 @ 05:17) (133/90 - 143/87)  RR: 18 (07-14-19 @ 05:17) (18 - 22)  SpO2: 98% (07-14-19 @ 09:32) (94% - 100%)    PHYSICAL EXAM:  GENERAL: NAD, cachexic  HEAD:  Atraumatic, Normocephalic  EYES: EOMI, PERRLA, conjunctiva and sclera clear  NECK: Supple, No JVD  CHEST/LUNG: transmitted BS no overt wheezing  HEART: irregularly irregular   ABDOMEN: Soft, Nontender, Nondistended; Bowel sounds present  EXTREMITIES:  2+ Peripheral Pulses, No clubbing, cyanosis, or edema  PSYCH: Calm  NEUROLOGY: A/Ox3, non-focal  SKIN: No rashes or lesions    LABS:                        10.8   4.03  )-----------( 132      ( 14 Jul 2019 05:20 )             34.4     07-14    135  |  96<L>  |  18  ----------------------------<  76  4.3   |  24  |  0.40<L>    Ca    9.0      14 Jul 2019 05:20  Phos  2.8     07-12  Mg     1.6     07-14                  RADIOLOGY & ADDITIONAL TESTS:    Imaging Personally Reviewed:    Consultant(s) Notes Reviewed:      Care Discussed with Consultants/Other Providers:

## 2019-07-15 LAB
ANION GAP SERPL CALC-SCNC: 15 MMO/L — HIGH (ref 7–14)
BACTERIA SPT RESP CULT: SIGNIFICANT CHANGE UP
BASE EXCESS BLDA CALC-SCNC: 6.2 MMOL/L — SIGNIFICANT CHANGE UP
BUN SERPL-MCNC: 24 MG/DL — HIGH (ref 7–23)
CALCIUM SERPL-MCNC: 9.2 MG/DL — SIGNIFICANT CHANGE UP (ref 8.4–10.5)
CHLORIDE SERPL-SCNC: 96 MMOL/L — LOW (ref 98–107)
CO2 SERPL-SCNC: 27 MMOL/L — SIGNIFICANT CHANGE UP (ref 22–31)
CREAT SERPL-MCNC: 0.47 MG/DL — LOW (ref 0.5–1.3)
GLUCOSE BLDA-MCNC: 122 MG/DL — HIGH (ref 70–99)
GLUCOSE SERPL-MCNC: 113 MG/DL — HIGH (ref 70–99)
HCO3 BLDA-SCNC: 30 MMOL/L — HIGH (ref 22–26)
HCT VFR BLD CALC: 35.7 % — SIGNIFICANT CHANGE UP (ref 34.5–45)
HCT VFR BLDA CALC: 34.2 % — LOW (ref 34.5–46.5)
HGB BLD-MCNC: 11.4 G/DL — LOW (ref 11.5–15.5)
HGB BLDA-MCNC: 11.1 G/DL — LOW (ref 11.5–15.5)
MAGNESIUM SERPL-MCNC: 1.7 MG/DL — SIGNIFICANT CHANGE UP (ref 1.6–2.6)
MCHC RBC-ENTMCNC: 31.9 % — LOW (ref 32–36)
MCHC RBC-ENTMCNC: 32.8 PG — SIGNIFICANT CHANGE UP (ref 27–34)
MCV RBC AUTO: 102.6 FL — HIGH (ref 80–100)
NRBC # FLD: 0.18 K/UL — SIGNIFICANT CHANGE UP (ref 0–0)
NRBC FLD-RTO: 4.2 — SIGNIFICANT CHANGE UP
PCO2 BLDA: 41 MMHG — SIGNIFICANT CHANGE UP (ref 32–48)
PH BLDA: 7.48 PH — HIGH (ref 7.35–7.45)
PLATELET # BLD AUTO: 140 K/UL — LOW (ref 150–400)
PMV BLD: 12.6 FL — SIGNIFICANT CHANGE UP (ref 7–13)
PO2 BLDA: 149 MMHG — HIGH (ref 83–108)
POTASSIUM BLDA-SCNC: 3.8 MMOL/L — SIGNIFICANT CHANGE UP (ref 3.4–4.5)
POTASSIUM SERPL-MCNC: 4.2 MMOL/L — SIGNIFICANT CHANGE UP (ref 3.5–5.3)
POTASSIUM SERPL-SCNC: 4.2 MMOL/L — SIGNIFICANT CHANGE UP (ref 3.5–5.3)
RBC # BLD: 3.48 M/UL — LOW (ref 3.8–5.2)
RBC # FLD: 21.2 % — HIGH (ref 10.3–14.5)
SAO2 % BLDA: 99.6 % — HIGH (ref 95–99)
SODIUM BLDA-SCNC: 132 MMOL/L — LOW (ref 136–146)
SODIUM SERPL-SCNC: 138 MMOL/L — SIGNIFICANT CHANGE UP (ref 135–145)
WBC # BLD: 4.3 K/UL — SIGNIFICANT CHANGE UP (ref 3.8–10.5)
WBC # FLD AUTO: 4.3 K/UL — SIGNIFICANT CHANGE UP (ref 3.8–10.5)

## 2019-07-15 PROCEDURE — 71045 X-RAY EXAM CHEST 1 VIEW: CPT | Mod: 26

## 2019-07-15 PROCEDURE — 99233 SBSQ HOSP IP/OBS HIGH 50: CPT

## 2019-07-15 PROCEDURE — 99232 SBSQ HOSP IP/OBS MODERATE 35: CPT

## 2019-07-15 PROCEDURE — 99232 SBSQ HOSP IP/OBS MODERATE 35: CPT | Mod: GC

## 2019-07-15 PROCEDURE — 99233 SBSQ HOSP IP/OBS HIGH 50: CPT | Mod: GC

## 2019-07-15 RX ORDER — SODIUM CHLORIDE 9 MG/ML
4 INJECTION INTRAMUSCULAR; INTRAVENOUS; SUBCUTANEOUS THREE TIMES A DAY
Refills: 0 | Status: DISCONTINUED | OUTPATIENT
Start: 2019-07-15 | End: 2019-08-06

## 2019-07-15 RX ORDER — MEROPENEM 1 G/30ML
1000 INJECTION INTRAVENOUS EVERY 8 HOURS
Refills: 0 | Status: COMPLETED | OUTPATIENT
Start: 2019-07-15 | End: 2019-07-20

## 2019-07-15 RX ORDER — VANCOMYCIN HCL 1 G
1000 VIAL (EA) INTRAVENOUS ONCE
Refills: 0 | Status: COMPLETED | OUTPATIENT
Start: 2019-07-15 | End: 2019-07-15

## 2019-07-15 RX ORDER — SODIUM CHLORIDE 9 MG/ML
7 INJECTION INTRAMUSCULAR; INTRAVENOUS; SUBCUTANEOUS THREE TIMES A DAY
Refills: 0 | Status: DISCONTINUED | OUTPATIENT
Start: 2019-07-15 | End: 2019-07-15

## 2019-07-15 RX ORDER — ALBUTEROL 90 UG/1
2.5 AEROSOL, METERED ORAL EVERY 8 HOURS
Refills: 0 | Status: DISCONTINUED | OUTPATIENT
Start: 2019-07-15 | End: 2019-07-15

## 2019-07-15 RX ORDER — LEVALBUTEROL 1.25 MG/.5ML
0.63 SOLUTION, CONCENTRATE RESPIRATORY (INHALATION) ONCE
Refills: 0 | Status: COMPLETED | OUTPATIENT
Start: 2019-07-15 | End: 2019-07-15

## 2019-07-15 RX ORDER — METOPROLOL TARTRATE 50 MG
2.5 TABLET ORAL ONCE
Refills: 0 | Status: COMPLETED | OUTPATIENT
Start: 2019-07-15 | End: 2019-07-15

## 2019-07-15 RX ADMIN — Medication 250 MILLIGRAM(S): at 18:26

## 2019-07-15 RX ADMIN — SODIUM CHLORIDE 4 MILLILITER(S): 9 INJECTION INTRAMUSCULAR; INTRAVENOUS; SUBCUTANEOUS at 17:20

## 2019-07-15 RX ADMIN — LEVALBUTEROL 0.63 MILLIGRAM(S): 1.25 SOLUTION, CONCENTRATE RESPIRATORY (INHALATION) at 13:45

## 2019-07-15 RX ADMIN — MEROPENEM 100 MILLIGRAM(S): 1 INJECTION INTRAVENOUS at 22:12

## 2019-07-15 RX ADMIN — BENZOCAINE AND MENTHOL 1 LOZENGE: 5; 1 LIQUID ORAL at 17:52

## 2019-07-15 RX ADMIN — LEVALBUTEROL 0.63 MILLIGRAM(S): 1.25 SOLUTION, CONCENTRATE RESPIRATORY (INHALATION) at 05:05

## 2019-07-15 RX ADMIN — Medication 100 MILLIGRAM(S): at 05:29

## 2019-07-15 RX ADMIN — Medication 1.5 MILLIGRAM(S): at 22:12

## 2019-07-15 RX ADMIN — LEVALBUTEROL 0.63 MILLIGRAM(S): 1.25 SOLUTION, CONCENTRATE RESPIRATORY (INHALATION) at 00:50

## 2019-07-15 RX ADMIN — SODIUM CHLORIDE 2 GRAM(S): 9 INJECTION INTRAMUSCULAR; INTRAVENOUS; SUBCUTANEOUS at 05:31

## 2019-07-15 RX ADMIN — APIXABAN 2.5 MILLIGRAM(S): 2.5 TABLET, FILM COATED ORAL at 17:51

## 2019-07-15 RX ADMIN — BENZOCAINE AND MENTHOL 1 LOZENGE: 5; 1 LIQUID ORAL at 22:12

## 2019-07-15 RX ADMIN — LEVALBUTEROL 0.63 MILLIGRAM(S): 1.25 SOLUTION, CONCENTRATE RESPIRATORY (INHALATION) at 17:11

## 2019-07-15 RX ADMIN — VORICONAZOLE 200 MILLIGRAM(S): 10 INJECTION, POWDER, LYOPHILIZED, FOR SOLUTION INTRAVENOUS at 17:51

## 2019-07-15 RX ADMIN — Medication 100 MILLIGRAM(S): at 14:10

## 2019-07-15 RX ADMIN — SODIUM CHLORIDE 4 MILLILITER(S): 9 INJECTION INTRAMUSCULAR; INTRAVENOUS; SUBCUTANEOUS at 22:35

## 2019-07-15 RX ADMIN — Medication 40 MILLIGRAM(S): at 05:31

## 2019-07-15 RX ADMIN — Medication 100 MILLIGRAM(S): at 22:12

## 2019-07-15 RX ADMIN — LEVALBUTEROL 0.63 MILLIGRAM(S): 1.25 SOLUTION, CONCENTRATE RESPIRATORY (INHALATION) at 02:45

## 2019-07-15 RX ADMIN — SODIUM CHLORIDE 4 MILLILITER(S): 9 INJECTION INTRAMUSCULAR; INTRAVENOUS; SUBCUTANEOUS at 09:30

## 2019-07-15 RX ADMIN — ATOVAQUONE 1500 MILLIGRAM(S): 750 SUSPENSION ORAL at 14:10

## 2019-07-15 RX ADMIN — SODIUM CHLORIDE 4 MILLILITER(S): 9 INJECTION INTRAMUSCULAR; INTRAVENOUS; SUBCUTANEOUS at 05:15

## 2019-07-15 RX ADMIN — PANTOPRAZOLE SODIUM 40 MILLIGRAM(S): 20 TABLET, DELAYED RELEASE ORAL at 05:30

## 2019-07-15 RX ADMIN — Medication 2.5 MILLIGRAM(S): at 09:59

## 2019-07-15 RX ADMIN — LEVALBUTEROL 0.63 MILLIGRAM(S): 1.25 SOLUTION, CONCENTRATE RESPIRATORY (INHALATION) at 21:42

## 2019-07-15 RX ADMIN — LEVALBUTEROL 0.63 MILLIGRAM(S): 1.25 SOLUTION, CONCENTRATE RESPIRATORY (INHALATION) at 09:20

## 2019-07-15 RX ADMIN — VORICONAZOLE 200 MILLIGRAM(S): 10 INJECTION, POWDER, LYOPHILIZED, FOR SOLUTION INTRAVENOUS at 05:31

## 2019-07-15 RX ADMIN — SODIUM CHLORIDE 2 GRAM(S): 9 INJECTION INTRAMUSCULAR; INTRAVENOUS; SUBCUTANEOUS at 17:51

## 2019-07-15 RX ADMIN — Medication 75 MILLIGRAM(S): at 05:30

## 2019-07-15 RX ADMIN — APIXABAN 2.5 MILLIGRAM(S): 2.5 TABLET, FILM COATED ORAL at 05:30

## 2019-07-15 RX ADMIN — Medication 300 MILLIGRAM(S): at 14:10

## 2019-07-15 NOTE — PROGRESS NOTE ADULT - ASSESSMENT
Assessment    79F with worsening B cell lymphoma, invasive aspergillosis pneumonia now admitted for 3 weeks with respiratory failure.   Has minimal improvement despite fungal coverage.   Now on Bipap, short of breath and hypoxemic with evidence of mucus plugging on the right side.    Recs    D/C Bipap now. Would start high flow nasal cannula 60L/min 40% FiO2  Avoid bipap unless absolutely necessary. Her major issue is secretion and mucus clearance which the bipap is inhibiting. Currently is over-ventilated on bipap with P/F of 300.     - high flow  - metanebs at least 3x day preceded by albuterol and 7% saline nebs  - continue anti-fungals

## 2019-07-15 NOTE — PROGRESS NOTE ADULT - SUBJECTIVE AND OBJECTIVE BOX
Patient is a 79y old  Female who presents with a chief complaint of SOB (15 Jul 2019 13:34)      SUBJECTIVE / OVERNIGHT EVENTS: Pt noted to tachypneic and SOB BiPAP setting changed and noted to be in afib on tele     MEDICATIONS  (STANDING):  allopurinol 300 milliGRAM(s) Oral daily  apixaban 2.5 milliGRAM(s) Oral every 12 hours  atovaquone Suspension 1500 milliGRAM(s) Oral daily  benzocaine 15 mG/menthol 3.6 mG (Sugar-Free) Lozenge 1 Lozenge Oral every 4 hours  docusate sodium 100 milliGRAM(s) Oral three times a day  lactated ringers. 1000 milliLiter(s) (30 mL/Hr) IV Continuous <Continuous>  levalbuterol Inhalation 0.63 milliGRAM(s) Inhalation every 4 hours  melatonin 1.5 milliGRAM(s) Oral at bedtime  metoprolol succinate ER 75 milliGRAM(s) Oral daily  pantoprazole    Tablet 40 milliGRAM(s) Oral before breakfast  predniSONE   Tablet 40 milliGRAM(s) Oral daily  sodium chloride 2 Gram(s) Oral two times a day  sodium chloride 3%  Inhalation 4 milliLiter(s) Inhalation every 6 hours  valACYclovir 500 milliGRAM(s) Oral <User Schedule>  voriconazole 200 milliGRAM(s) Oral every 12 hours    MEDICATIONS  (PRN):  benzonatate 100 milliGRAM(s) Oral three times a day PRN Cough  bisacodyl 5 milliGRAM(s) Oral every 12 hours PRN Constipation  guaiFENesin    Syrup 200 milliGRAM(s) Oral every 6 hours PRN Cough  morphine  - Injectable 0.5 milliGRAM(s) IV Push every 4 hours PRN dyspnea  polyethylene glycol 3350 17 Gram(s) Oral daily PRN Constipation  sodium chloride 0.65% Nasal 1 Spray(s) Both Nostrils daily PRN Nasal Congestion        CAPILLARY BLOOD GLUCOSE        I&O's Summary      T(C): 36.6 (07-15-19 @ 05:27), Max: 36.6 (07-14-19 @ 23:09)  HR: 118 (07-15-19 @ 12:39) (54 - 128)  BP: 122/76 (07-15-19 @ 12:39) (111/74 - 145/96)  RR: 22 (07-15-19 @ 12:39) (18 - 23)  SpO2: 97% (07-15-19 @ 12:39) (93% - 100%)    PHYSICAL EXAM:  GENERAL: tachypneic , cachexic  HEAD:  Atraumatic, Normocephalic  EYES: EOMI, PERRLA, conjunctiva and sclera clear  NECK: Supple, No JVD  CHEST/LUNG: transmitted BS no overt wheezing  HEART: irregularly irregular   ABDOMEN: Soft, Nontender, Nondistended; Bowel sounds present  EXTREMITIES:  2+ Peripheral Pulses, No clubbing, cyanosis, or edema  NEUROLOGY: A/Ox3, non-focal  SKIN: No rashes or lesions    LABS:                        11.4   4.30  )-----------( 140      ( 15 Jul 2019 05:57 )             35.7     07-15    138  |  96<L>  |  24<H>  ----------------------------<  113<H>  4.2   |  27  |  0.47<L>    Ca    9.2      15 Jul 2019 05:57  Mg     1.7     07-15      Blood Gas Profile w/Lytes - Arterial (07.15.19 @ 09:59)    pH, Arterial: 7.48 pH    pCO2, Arterial: 41 mmHg    pO2, Arterial: 149 mmHg    HCO3, Arterial: 30 mmol/L    Base Excess, Arterial: 6.2: BASE EXCESS: REFERENCE RANGE = 0 (+/-) 2 mmol/l mmol/L    Oxygen Saturation, Arterial: 99.6 %    Blood Gas Arterial - Sodium: 132 mmol/L    Blood Gas Arterial - Potassium: 3.8 mmol/L    Blood Gas Arterial - Glucose: 122 mg/dL    Blood Gas Arterial - Hemoglobin: 11.1 g/dL    Blood Gas Arterial - Hematocrit: 34.2 %                RADIOLOGY & ADDITIONAL TESTS:    Imaging Personally Reviewed:< from: Xray Chest 1 View- PORTABLE-Urgent (07.15.19 @ 10:49) >  IMPRESSION:   Increased right-sided pleural effusion. Redemonstrated bilateral patchy   opacities, increasedon the right.    < end of copied text >      Consultant(s) Notes Reviewed:      Care Discussed with Consultants/Other Providers:

## 2019-07-15 NOTE — PROGRESS NOTE ADULT - SUBJECTIVE AND OBJECTIVE BOX
CHIEF COMPLAINT:    Interval Events:    REVIEW OF SYSTEMS:  Constitutional: [ ] negative [ ] fevers [ ] chills [ ] weight loss [ ] weight gain  HEENT: [ ] negative [ ] dry eyes [ ] eye irritation [ ] postnasal drip [ ] nasal congestion  CV: [ ] negative  [ ] chest pain [ ] orthopnea [ ] palpitations [ ] murmur  Resp: [ ] negative [ ] cough [ ] shortness of breath [ ] dyspnea [ ] wheezing [ ] sputum [ ] hemoptysis  GI: [ ] negative [ ] nausea [ ] vomiting [ ] diarrhea [ ] constipation [ ] abd pain [ ] dysphagia   : [ ] negative [ ] dysuria [ ] nocturia [ ] hematuria [ ] increased urinary frequency  Musculoskeletal: [ ] negative [ ] back pain [ ] myalgias [ ] arthralgias [ ] fracture  Skin: [ ] negative [ ] rash [ ] itch  Neurological: [ ] negative [ ] headache [ ] dizziness [ ] syncope [ ] weakness [ ] numbness  Psychiatric: [ ] negative [ ] anxiety [ ] depression  Endocrine: [ ] negative [ ] diabetes [ ] thyroid problem  Hematologic/Lymphatic: [ ] negative [ ] anemia [ ] bleeding problem  Allergic/Immunologic: [ ] negative [ ] itchy eyes [ ] nasal discharge [ ] hives [ ] angioedema  [ ] All other systems negative  [ ] Unable to assess ROS because ________    OBJECTIVE:  ICU Vital Signs Last 24 Hrs  T(C): 36.6 (15 Jul 2019 05:27), Max: 36.8 (14 Jul 2019 11:51)  T(F): 97.9 (15 Jul 2019 05:27), Max: 98.2 (14 Jul 2019 11:51)  HR: 128 (15 Jul 2019 10:16) (54 - 128)  BP: 111/74 (15 Jul 2019 10:16) (111/74 - 145/96)  BP(mean): 85 (15 Jul 2019 10:16) (79 - 115)  ABP: --  ABP(mean): --  RR: 22 (15 Jul 2019 09:35) (18 - 22)  SpO2: 99% (15 Jul 2019 10:16) (93% - 100%)        CAPILLARY BLOOD GLUCOSE          PHYSICAL EXAM:  General:   HEENT:   Lymph Nodes:  Neck:   Respiratory:   Cardiovascular:   Abdomen:   Extremities:   Skin:   Neurological:  Psychiatry:    HOSPITAL MEDICATIONS:  MEDICATIONS  (STANDING):  allopurinol 300 milliGRAM(s) Oral daily  apixaban 2.5 milliGRAM(s) Oral every 12 hours  atovaquone Suspension 1500 milliGRAM(s) Oral daily  benzocaine 15 mG/menthol 3.6 mG (Sugar-Free) Lozenge 1 Lozenge Oral every 4 hours  docusate sodium 100 milliGRAM(s) Oral three times a day  lactated ringers. 1000 milliLiter(s) (30 mL/Hr) IV Continuous <Continuous>  levalbuterol Inhalation 0.63 milliGRAM(s) Inhalation every 4 hours  melatonin 1.5 milliGRAM(s) Oral at bedtime  metoprolol succinate ER 75 milliGRAM(s) Oral daily  pantoprazole    Tablet 40 milliGRAM(s) Oral before breakfast  predniSONE   Tablet 40 milliGRAM(s) Oral daily  sodium chloride 2 Gram(s) Oral two times a day  sodium chloride 3%  Inhalation 4 milliLiter(s) Inhalation every 6 hours  valACYclovir 500 milliGRAM(s) Oral <User Schedule>  voriconazole 200 milliGRAM(s) Oral every 12 hours    MEDICATIONS  (PRN):  benzonatate 100 milliGRAM(s) Oral three times a day PRN Cough  bisacodyl 5 milliGRAM(s) Oral every 12 hours PRN Constipation  guaiFENesin    Syrup 200 milliGRAM(s) Oral every 6 hours PRN Cough  morphine  - Injectable 0.5 milliGRAM(s) IV Push every 4 hours PRN dyspnea  polyethylene glycol 3350 17 Gram(s) Oral daily PRN Constipation  sodium chloride 0.65% Nasal 1 Spray(s) Both Nostrils daily PRN Nasal Congestion      LABS:                        11.4   4.30  )-----------( 140      ( 15 Jul 2019 05:57 )             35.7     Hgb Trend: 11.4<--, 10.8<--, 11.4<--, 10.2<--, 10.3<--  07-15    138  |  96<L>  |  24<H>  ----------------------------<  113<H>  4.2   |  27  |  0.47<L>    Ca    9.2      15 Jul 2019 05:57  Mg     1.7     07-15      Creatinine Trend: 0.47<--, 0.40<--, 0.43<--, 0.41<--, 0.41<--, 0.43<--      Arterial Blood Gas:  07-15 @ 09:59  7.48/41/149/30/99.6/6.2  ABG lactate: --        MICROBIOLOGY:       RADIOLOGY:  [ ] Reviewed and interpreted by me    PULMONARY FUNCTION TESTS:    EKG: CHIEF COMPLAINT:  shortness of breath    Interval Events:    patient with worse tachypnea this AM, increased work of breathing but now not hypoxemic    REVIEW OF SYSTEMS:  Constitutional: [X ] negative [ ] fevers [ ] chills [ ] weight loss [ ] weight gain  HEENT: [X ] negative [ ] dry eyes [ ] eye irritation [ ] postnasal drip [ ] nasal congestion  CV: [ X] negative  [ ] chest pain [ ] orthopnea [ ] palpitations [ ] murmur  Resp: [ ] negative [ X] cough [ X] shortness of breath [ X] dyspnea [ ] wheezing [X ] sputum [ ] hemoptysis  GI: [X ] negative [ ] nausea [ ] vomiting [ ] diarrhea [ ] constipation [ ] abd pain [ ] dysphagia   : [ X] negative [ ] dysuria [ ] nocturia [ ] hematuria [ ] increased urinary frequency  Musculoskeletal: [X ] negative [ ] back pain [ ] myalgias [ ] arthralgias [ ] fracture  Skin: [ X] negative [ ] rash [ ] itch  Neurological: [ ] negative [ ] headache [ ] dizziness [ ] syncope [ ] weakness [ ] numbness  Psychiatric: [ ] negative [ ] anxiety [ ] depression  Endocrine: [ ] negative [ ] diabetes [ ] thyroid problem  Hematologic/Lymphatic: [ ] negative [ ] anemia [ ] bleeding problem  Allergic/Immunologic: [ ] negative [ ] itchy eyes [ ] nasal discharge [ ] hives [ ] angioedema  [X ] All other systems negative  [ ] Unable to assess ROS because ________    OBJECTIVE:  ICU Vital Signs Last 24 Hrs  T(C): 36.6 (15 Jul 2019 05:27), Max: 36.6 (14 Jul 2019 23:09)  T(F): 97.9 (15 Jul 2019 05:27), Max: 97.9 (15 Jul 2019 05:27)  HR: 116 (15 Jul 2019 11:45) (54 - 128)  BP: 111/74 (15 Jul 2019 10:16) (111/74 - 145/96)  BP(mean): 85 (15 Jul 2019 10:16) (79 - 115)  RR: 23 (15 Jul 2019 11:45) (18 - 23)  SpO2: 97% (15 Jul 2019 11:45) (93% - 100%)        CAPILLARY BLOOD GLUCOSE          PHYSICAL EXAM:  General: ill appearing  HEENT:   Lymph Nodes:  Neck:   Respiratory:   Cardiovascular:   Abdomen:   Extremities:   Skin:   Neurological:  Psychiatry:    HOSPITAL MEDICATIONS:  MEDICATIONS  (STANDING):  allopurinol 300 milliGRAM(s) Oral daily  apixaban 2.5 milliGRAM(s) Oral every 12 hours  atovaquone Suspension 1500 milliGRAM(s) Oral daily  benzocaine 15 mG/menthol 3.6 mG (Sugar-Free) Lozenge 1 Lozenge Oral every 4 hours  docusate sodium 100 milliGRAM(s) Oral three times a day  lactated ringers. 1000 milliLiter(s) (30 mL/Hr) IV Continuous <Continuous>  levalbuterol Inhalation 0.63 milliGRAM(s) Inhalation every 4 hours  melatonin 1.5 milliGRAM(s) Oral at bedtime  metoprolol succinate ER 75 milliGRAM(s) Oral daily  pantoprazole    Tablet 40 milliGRAM(s) Oral before breakfast  predniSONE   Tablet 40 milliGRAM(s) Oral daily  sodium chloride 2 Gram(s) Oral two times a day  sodium chloride 3%  Inhalation 4 milliLiter(s) Inhalation every 6 hours  valACYclovir 500 milliGRAM(s) Oral <User Schedule>  voriconazole 200 milliGRAM(s) Oral every 12 hours    MEDICATIONS  (PRN):  benzonatate 100 milliGRAM(s) Oral three times a day PRN Cough  bisacodyl 5 milliGRAM(s) Oral every 12 hours PRN Constipation  guaiFENesin    Syrup 200 milliGRAM(s) Oral every 6 hours PRN Cough  morphine  - Injectable 0.5 milliGRAM(s) IV Push every 4 hours PRN dyspnea  polyethylene glycol 3350 17 Gram(s) Oral daily PRN Constipation  sodium chloride 0.65% Nasal 1 Spray(s) Both Nostrils daily PRN Nasal Congestion      LABS:                        11.4   4.30  )-----------( 140      ( 15 Jul 2019 05:57 )             35.7     Hgb Trend: 11.4<--, 10.8<--, 11.4<--, 10.2<--, 10.3<--  07-15    138  |  96<L>  |  24<H>  ----------------------------<  113<H>  4.2   |  27  |  0.47<L>    Ca    9.2      15 Jul 2019 05:57  Mg     1.7     07-15      Creatinine Trend: 0.47<--, 0.40<--, 0.43<--, 0.41<--, 0.41<--, 0.43<--      Arterial Blood Gas:  07-15 @ 09:59  7.48/41/149/30/99.6/6.2  ABG lactate: --        MICROBIOLOGY:       RADIOLOGY:  [ ] Reviewed and interpreted by me    PULMONARY FUNCTION TESTS:    EKG: CHIEF COMPLAINT:  shortness of breath    Interval Events:    patient with worse tachypnea this AM, increased work of breathing but now not hypoxemic    REVIEW OF SYSTEMS:  Constitutional: [X ] negative [ ] fevers [ ] chills [ ] weight loss [ ] weight gain  HEENT: [X ] negative [ ] dry eyes [ ] eye irritation [ ] postnasal drip [ ] nasal congestion  CV: [ X] negative  [ ] chest pain [ ] orthopnea [ ] palpitations [ ] murmur  Resp: [ ] negative [ X] cough [ X] shortness of breath [ X] dyspnea [ ] wheezing [X ] sputum [ ] hemoptysis  GI: [X ] negative [ ] nausea [ ] vomiting [ ] diarrhea [ ] constipation [ ] abd pain [ ] dysphagia   : [ X] negative [ ] dysuria [ ] nocturia [ ] hematuria [ ] increased urinary frequency  Musculoskeletal: [X ] negative [ ] back pain [ ] myalgias [ ] arthralgias [ ] fracture  Skin: [ X] negative [ ] rash [ ] itch  Neurological: [ ] negative [ ] headache [ ] dizziness [ ] syncope [ ] weakness [ ] numbness  Psychiatric: [ ] negative [ ] anxiety [ ] depression  Endocrine: [ ] negative [ ] diabetes [ ] thyroid problem  Hematologic/Lymphatic: [ ] negative [ ] anemia [ ] bleeding problem  Allergic/Immunologic: [ ] negative [ ] itchy eyes [ ] nasal discharge [ ] hives [ ] angioedema  [X ] All other systems negative  [ ] Unable to assess ROS because ________    OBJECTIVE:  ICU Vital Signs Last 24 Hrs  T(C): 36.6 (15 Jul 2019 05:27), Max: 36.6 (14 Jul 2019 23:09)  T(F): 97.9 (15 Jul 2019 05:27), Max: 97.9 (15 Jul 2019 05:27)  HR: 116 (15 Jul 2019 11:45) (54 - 128)  BP: 111/74 (15 Jul 2019 10:16) (111/74 - 145/96)  BP(mean): 85 (15 Jul 2019 10:16) (79 - 115)  RR: 23 (15 Jul 2019 11:45) (18 - 23)  SpO2: 97% (15 Jul 2019 11:45) (93% - 100%)        CAPILLARY BLOOD GLUCOSE          PHYSICAL EXAM:  General: ill appearing  HEENT: bipap mask in place  Lymph Nodes: no cervical LAD  Neck: no JVD  Respiratory: rhonchi RLL  Cardiovascular: tachycardia, no murmurs  Abdomen: soft, NT/ND, +BS  Extremities: no edema, no cyanosis  Skin: normal turgor  Neurological: no focal deficits  Psychiatry: normal affect    HOSPITAL MEDICATIONS:  MEDICATIONS  (STANDING):  allopurinol 300 milliGRAM(s) Oral daily  apixaban 2.5 milliGRAM(s) Oral every 12 hours  atovaquone Suspension 1500 milliGRAM(s) Oral daily  benzocaine 15 mG/menthol 3.6 mG (Sugar-Free) Lozenge 1 Lozenge Oral every 4 hours  docusate sodium 100 milliGRAM(s) Oral three times a day  lactated ringers. 1000 milliLiter(s) (30 mL/Hr) IV Continuous <Continuous>  levalbuterol Inhalation 0.63 milliGRAM(s) Inhalation every 4 hours  melatonin 1.5 milliGRAM(s) Oral at bedtime  metoprolol succinate ER 75 milliGRAM(s) Oral daily  pantoprazole    Tablet 40 milliGRAM(s) Oral before breakfast  predniSONE   Tablet 40 milliGRAM(s) Oral daily  sodium chloride 2 Gram(s) Oral two times a day  sodium chloride 3%  Inhalation 4 milliLiter(s) Inhalation every 6 hours  valACYclovir 500 milliGRAM(s) Oral <User Schedule>  voriconazole 200 milliGRAM(s) Oral every 12 hours    MEDICATIONS  (PRN):  benzonatate 100 milliGRAM(s) Oral three times a day PRN Cough  bisacodyl 5 milliGRAM(s) Oral every 12 hours PRN Constipation  guaiFENesin    Syrup 200 milliGRAM(s) Oral every 6 hours PRN Cough  morphine  - Injectable 0.5 milliGRAM(s) IV Push every 4 hours PRN dyspnea  polyethylene glycol 3350 17 Gram(s) Oral daily PRN Constipation  sodium chloride 0.65% Nasal 1 Spray(s) Both Nostrils daily PRN Nasal Congestion      LABS:                        11.4   4.30  )-----------( 140      ( 15 Jul 2019 05:57 )             35.7     Hgb Trend: 11.4<--, 10.8<--, 11.4<--, 10.2<--, 10.3<--  07-15    138  |  96<L>  |  24<H>  ----------------------------<  113<H>  4.2   |  27  |  0.47<L>    Ca    9.2      15 Jul 2019 05:57  Mg     1.7     07-15      Creatinine Trend: 0.47<--, 0.40<--, 0.43<--, 0.41<--, 0.41<--, 0.43<--      Arterial Blood Gas:  07-15 @ 09:59  7.48/41/149/30/99.6/6.2  ABG lactate: --        MICROBIOLOGY:       RADIOLOGY:  [ x] Reviewed and interpreted by me  cxr: 7/15/19: RLL atelectasis +/- consolidation, ?mucous plugging, RLL effusion, left opacity  PULMONARY FUNCTION TESTS:    EKG: CHIEF COMPLAINT:  shortness of breath    Interval Events:    patient with worse tachypnea this AM, increased work of breathing but now not hypoxemic.  +sob, +cough that is productive, afebrile.  Improved with bipap but unable to expectorate fully.	    REVIEW OF SYSTEMS:  Constitutional: [X ] negative [ ] fevers [ ] chills [ ] weight loss [ ] weight gain  HEENT: [X ] negative [ ] dry eyes [ ] eye irritation [ ] postnasal drip [ ] nasal congestion  CV: [ X] negative  [ ] chest pain [ ] orthopnea [ ] palpitations [ ] murmur  Resp: [ ] negative [ X] cough [ X] shortness of breath [ X] dyspnea [ ] wheezing [X ] sputum [ ] hemoptysis  GI: [X ] negative [ ] nausea [ ] vomiting [ ] diarrhea [ ] constipation [ ] abd pain [ ] dysphagia   : [ X] negative [ ] dysuria [ ] nocturia [ ] hematuria [ ] increased urinary frequency  Musculoskeletal: [X ] negative [ ] back pain [ ] myalgias [ ] arthralgias [ ] fracture  Skin: [ X] negative [ ] rash [ ] itch  Neurological: [ ] negative [ ] headache [ ] dizziness [ ] syncope [ ] weakness [ ] numbness  Psychiatric: [ ] negative [ ] anxiety [ ] depression  Endocrine: [ ] negative [ ] diabetes [ ] thyroid problem  Hematologic/Lymphatic: [ ] negative [ ] anemia [ ] bleeding problem  Allergic/Immunologic: [ ] negative [ ] itchy eyes [ ] nasal discharge [ ] hives [ ] angioedema  [X ] All other systems negative  [ ] Unable to assess ROS because ________    OBJECTIVE:  ICU Vital Signs Last 24 Hrs  T(C): 36.6 (15 Jul 2019 05:27), Max: 36.6 (14 Jul 2019 23:09)  T(F): 97.9 (15 Jul 2019 05:27), Max: 97.9 (15 Jul 2019 05:27)  HR: 116 (15 Jul 2019 11:45) (54 - 128)  BP: 111/74 (15 Jul 2019 10:16) (111/74 - 145/96)  BP(mean): 85 (15 Jul 2019 10:16) (79 - 115)  RR: 23 (15 Jul 2019 11:45) (18 - 23)  SpO2: 97% (15 Jul 2019 11:45) (93% - 100%)        CAPILLARY BLOOD GLUCOSE          PHYSICAL EXAM:  General: ill appearing  HEENT: bipap mask in place  Lymph Nodes: no cervical LAD  Neck: no JVD  Respiratory: rhonchi RLL  Cardiovascular: tachycardia, no murmurs  Abdomen: soft, NT/ND, +BS  Extremities: no edema, no cyanosis  Skin: normal turgor  Neurological: no focal deficits  Psychiatry: normal affect    HOSPITAL MEDICATIONS:  MEDICATIONS  (STANDING):  allopurinol 300 milliGRAM(s) Oral daily  apixaban 2.5 milliGRAM(s) Oral every 12 hours  atovaquone Suspension 1500 milliGRAM(s) Oral daily  benzocaine 15 mG/menthol 3.6 mG (Sugar-Free) Lozenge 1 Lozenge Oral every 4 hours  docusate sodium 100 milliGRAM(s) Oral three times a day  lactated ringers. 1000 milliLiter(s) (30 mL/Hr) IV Continuous <Continuous>  levalbuterol Inhalation 0.63 milliGRAM(s) Inhalation every 4 hours  melatonin 1.5 milliGRAM(s) Oral at bedtime  metoprolol succinate ER 75 milliGRAM(s) Oral daily  pantoprazole    Tablet 40 milliGRAM(s) Oral before breakfast  predniSONE   Tablet 40 milliGRAM(s) Oral daily  sodium chloride 2 Gram(s) Oral two times a day  sodium chloride 3%  Inhalation 4 milliLiter(s) Inhalation every 6 hours  valACYclovir 500 milliGRAM(s) Oral <User Schedule>  voriconazole 200 milliGRAM(s) Oral every 12 hours    MEDICATIONS  (PRN):  benzonatate 100 milliGRAM(s) Oral three times a day PRN Cough  bisacodyl 5 milliGRAM(s) Oral every 12 hours PRN Constipation  guaiFENesin    Syrup 200 milliGRAM(s) Oral every 6 hours PRN Cough  morphine  - Injectable 0.5 milliGRAM(s) IV Push every 4 hours PRN dyspnea  polyethylene glycol 3350 17 Gram(s) Oral daily PRN Constipation  sodium chloride 0.65% Nasal 1 Spray(s) Both Nostrils daily PRN Nasal Congestion      LABS:                        11.4   4.30  )-----------( 140      ( 15 Jul 2019 05:57 )             35.7     Hgb Trend: 11.4<--, 10.8<--, 11.4<--, 10.2<--, 10.3<--  07-15    138  |  96<L>  |  24<H>  ----------------------------<  113<H>  4.2   |  27  |  0.47<L>    Ca    9.2      15 Jul 2019 05:57  Mg     1.7     07-15      Creatinine Trend: 0.47<--, 0.40<--, 0.43<--, 0.41<--, 0.41<--, 0.43<--      Arterial Blood Gas:  07-15 @ 09:59  7.48/41/149/30/99.6/6.2  ABG lactate: --        MICROBIOLOGY:       RADIOLOGY:  [ x] Reviewed and interpreted by me  cxr: 7/15/19: RLL atelectasis +/- consolidation, ?mucous plugging, RLL effusion, left opacity  PULMONARY FUNCTION TESTS:    EKG: CHIEF COMPLAINT:  shortness of breath    Interval Events:    patient with worse tachypnea this AM, increased work of breathing but now not hypoxemic.  +sob, +cough that is productive, afebrile.  Improved with bipap but unable to expectorate fully.	    REVIEW OF SYSTEMS:  Constitutional: [X ] negative [ ] fevers [ ] chills [ ] weight loss [ ] weight gain  HEENT: [X ] negative [ ] dry eyes [ ] eye irritation [ ] postnasal drip [ ] nasal congestion  CV: [ X] negative  [ ] chest pain [ ] orthopnea [ ] palpitations [ ] murmur  Resp: [ ] negative [ X] cough [ X] shortness of breath [ X] dyspnea [ ] wheezing [X ] sputum [ ] hemoptysis  GI: [X ] negative [ ] nausea [ ] vomiting [ ] diarrhea [ ] constipation [ ] abd pain [ ] dysphagia   : [ X] negative [ ] dysuria [ ] nocturia [ ] hematuria [ ] increased urinary frequency  Musculoskeletal: [X ] negative [ ] back pain [ ] myalgias [ ] arthralgias [ ] fracture  Skin: [ X] negative [ ] rash [ ] itch  Neurological: [ ] negative [ ] headache [ ] dizziness [ ] syncope [ ] weakness [ ] numbness  Psychiatric: [ ] negative [ ] anxiety [ ] depression  Endocrine: [ ] negative [ ] diabetes [ ] thyroid problem  Hematologic/Lymphatic: [ ] negative [ ] anemia [ ] bleeding problem  Allergic/Immunologic: [ ] negative [ ] itchy eyes [ ] nasal discharge [ ] hives [ ] angioedema  [X ] All other systems negative      OBJECTIVE:  ICU Vital Signs Last 24 Hrs  T(C): 36.6 (15 Jul 2019 05:27), Max: 36.6 (14 Jul 2019 23:09)  T(F): 97.9 (15 Jul 2019 05:27), Max: 97.9 (15 Jul 2019 05:27)  HR: 116 (15 Jul 2019 11:45) (54 - 128)  BP: 111/74 (15 Jul 2019 10:16) (111/74 - 145/96)  BP(mean): 85 (15 Jul 2019 10:16) (79 - 115)  RR: 23 (15 Jul 2019 11:45) (18 - 23)  SpO2: 97% (15 Jul 2019 11:45) (93% - 100%)        CAPILLARY BLOOD GLUCOSE          PHYSICAL EXAM:  General: ill appearing  HEENT: bipap mask in place  Lymph Nodes: no cervical LAD  Neck: no JVD  Respiratory: rhonchi RLL  Cardiovascular: tachycardia, no murmurs  Abdomen: soft, NT/ND, +BS  Extremities: no edema, no cyanosis  Skin: normal turgor  Neurological: no focal deficits  Psychiatry: normal affect    HOSPITAL MEDICATIONS:  MEDICATIONS  (STANDING):  allopurinol 300 milliGRAM(s) Oral daily  apixaban 2.5 milliGRAM(s) Oral every 12 hours  atovaquone Suspension 1500 milliGRAM(s) Oral daily  benzocaine 15 mG/menthol 3.6 mG (Sugar-Free) Lozenge 1 Lozenge Oral every 4 hours  docusate sodium 100 milliGRAM(s) Oral three times a day  lactated ringers. 1000 milliLiter(s) (30 mL/Hr) IV Continuous <Continuous>  levalbuterol Inhalation 0.63 milliGRAM(s) Inhalation every 4 hours  melatonin 1.5 milliGRAM(s) Oral at bedtime  metoprolol succinate ER 75 milliGRAM(s) Oral daily  pantoprazole    Tablet 40 milliGRAM(s) Oral before breakfast  predniSONE   Tablet 40 milliGRAM(s) Oral daily  sodium chloride 2 Gram(s) Oral two times a day  sodium chloride 3%  Inhalation 4 milliLiter(s) Inhalation every 6 hours  valACYclovir 500 milliGRAM(s) Oral <User Schedule>  voriconazole 200 milliGRAM(s) Oral every 12 hours    MEDICATIONS  (PRN):  benzonatate 100 milliGRAM(s) Oral three times a day PRN Cough  bisacodyl 5 milliGRAM(s) Oral every 12 hours PRN Constipation  guaiFENesin    Syrup 200 milliGRAM(s) Oral every 6 hours PRN Cough  morphine  - Injectable 0.5 milliGRAM(s) IV Push every 4 hours PRN dyspnea  polyethylene glycol 3350 17 Gram(s) Oral daily PRN Constipation  sodium chloride 0.65% Nasal 1 Spray(s) Both Nostrils daily PRN Nasal Congestion      LABS:                        11.4   4.30  )-----------( 140      ( 15 Jul 2019 05:57 )             35.7     Hgb Trend: 11.4<--, 10.8<--, 11.4<--, 10.2<--, 10.3<--  07-15    138  |  96<L>  |  24<H>  ----------------------------<  113<H>  4.2   |  27  |  0.47<L>    Ca    9.2      15 Jul 2019 05:57  Mg     1.7     07-15      Creatinine Trend: 0.47<--, 0.40<--, 0.43<--, 0.41<--, 0.41<--, 0.43<--      Arterial Blood Gas:  07-15 @ 09:59  7.48/41/149/30/99.6/6.2  ABG lactate: --        MICROBIOLOGY:       RADIOLOGY:  [ x] Reviewed and interpreted by me  cxr: 7/15/19: RLL atelectasis +/- consolidation, ?mucous plugging, RLL effusion, left opacity  PULMONARY FUNCTION TESTS:    EKG:

## 2019-07-15 NOTE — PROGRESS NOTE ADULT - PROBLEM SELECTOR PLAN 1
RLL bronchus obstruction with concern for postobstructive fungal PNA  - cont O2 to maintain SpO2>90  - standing airway clearance therapy, Xopenex q4 hours, following by nebulized 3% hypertonic saline, and then Aerobika device as per Pulm recommendations  - DC meropenem as per pulm/ID  -repeat CXR reviewed with pulm worsening RT sided opacity likely worsening underlying pna and atelectasis in setting of endobronchial obstruction (mucous vs fungal vs tumor) ABG reviewed WOB likely due to hypoxia recommending HiFlo this was reviewed with family at bedside.   -palliative following d/w palliative care attending Dr. Landeros conveyed poor prognosis to family still wishes pt to be full code.

## 2019-07-15 NOTE — PROGRESS NOTE ADULT - PROBLEM SELECTOR PLAN 5
[place] [facility] [place] per family, [facility] is not offering any disease modifying treatment. Pt is looking for experimental trial if pt clinically improves from current medical issues.  - C/w ppx mepron, valacyclovir and allopurinol  - GOC discussed with pt and daughter for poor prognosis - They wish to be full code for now  - Patient and family agreeable to defer experimental chemo until after VALERIO.

## 2019-07-15 NOTE — PROGRESS NOTE ADULT - ASSESSMENT
79 year old with relapsed B cell lymphoma (dx 2017, now relaplse)   Treated with rituximab and revlimid in 3/2019  Treated with Obintuzmab and Bendamustine iin 4/2019    Presents with shortness of breath  She had abnormal imaging with lung nodules    On 7/1- she had a bronchoscopy    Path and culture suggestive of fungal process    1) Fungal pneumonia   aspergillosis on culture  Minimize steroids as much as feasible  Continue voriconazole- tx fo choice    Even with optimal tx, outcomes of pt with underlying heme malignancy is not always good    2) Increased SOB    Worse gain today  Has had prolonged abx course    Doubt adding back antibacterial coverage will help, but options limitted as she is declining        3) Immunosuppressed secondary to cancer tx  PCP prophylaxis  Continue mepron  At some point, reasonable to re-challenge with Bactrim with close monitoring of WBC    4) Delirium: improved with sleep

## 2019-07-15 NOTE — PROGRESS NOTE ADULT - SUBJECTIVE AND OBJECTIVE BOX
SUBJECTIVE AND OBJECTIVE: Pt with worsening sob over the last 24 hours.  Now on BIPAP.  Son, daughter and  at bedside.  Son and  HCP.    INTERVAL HPI/OVERNIGHT EVENTS:    DNR on chart:   Allergies    No Known Allergies    Intolerances    MEDICATIONS  (STANDING):  allopurinol 300 milliGRAM(s) Oral daily  apixaban 2.5 milliGRAM(s) Oral every 12 hours  atovaquone Suspension 1500 milliGRAM(s) Oral daily  benzocaine 15 mG/menthol 3.6 mG (Sugar-Free) Lozenge 1 Lozenge Oral every 4 hours  docusate sodium 100 milliGRAM(s) Oral three times a day  lactated ringers. 1000 milliLiter(s) (30 mL/Hr) IV Continuous <Continuous>  levalbuterol Inhalation 0.63 milliGRAM(s) Inhalation every 4 hours  melatonin 1.5 milliGRAM(s) Oral at bedtime  metoprolol succinate ER 75 milliGRAM(s) Oral daily  pantoprazole    Tablet 40 milliGRAM(s) Oral before breakfast  predniSONE   Tablet 40 milliGRAM(s) Oral daily  sodium chloride 2 Gram(s) Oral two times a day  sodium chloride 3%  Inhalation 4 milliLiter(s) Inhalation every 6 hours  valACYclovir 500 milliGRAM(s) Oral <User Schedule>  voriconazole 200 milliGRAM(s) Oral every 12 hours    MEDICATIONS  (PRN):  benzonatate 100 milliGRAM(s) Oral three times a day PRN Cough  bisacodyl 5 milliGRAM(s) Oral every 12 hours PRN Constipation  guaiFENesin    Syrup 200 milliGRAM(s) Oral every 6 hours PRN Cough  morphine  - Injectable 0.5 milliGRAM(s) IV Push every 4 hours PRN dyspnea  polyethylene glycol 3350 17 Gram(s) Oral daily PRN Constipation  sodium chloride 0.65% Nasal 1 Spray(s) Both Nostrils daily PRN Nasal Congestion      ITEMS UNCHECKED ARE NOT PRESENT    PRESENT SYMPTOMS: [x ]Unable to obtain due to poor mentation   Source if other than patient:  [ ]Family   [ ]Team     Pain (Impact on QOL):  none  Location:  Minimal acceptable level (0-10 scale):            Aggravating factors:  Quality:  Radiation:  Severity (0-10 scale):    Timing:    Dyspnea:                           [ ]Mild [ ]Moderate [ ]Severe  Anxiety:                             [ ]Mild [ ]Moderate [ ]Severe  Fatigue:                             [ ]Mild [ ]Moderate [ ]Severe  Nausea:                             [ ]Mild [ ]Moderate [ ]Severe  Loss of appetite:              [ ]Mild [ ]Moderate [ ]Severe  Constipation:                    [ ]Mild [ ]Moderate [ ]Severe    PAIN AD Score:	  http://geriatrictoolkit.[facility]/cog/painad.pdf (Ctrl + left click to view)    Other Symptoms:  [x ]All other review of systems negative     Karnofsky Performance Score/Palliative Performance Status Version 2:  30       %    http://palliative.info/resource_material/PPSv2.pdf  PHYSICAL EXAM:  Vital Signs Last 24 Hrs  T(C): 36.6 (15 Jul 2019 05:27), Max: 36.6 (14 Jul 2019 23:09)  T(F): 97.9 (15 Jul 2019 05:27), Max: 97.9 (15 Jul 2019 05:27)  HR: 118 (15 Jul 2019 12:39) (54 - 128)  BP: 122/76 (15 Jul 2019 12:39) (111/74 - 145/96)  BP(mean): 85 (15 Jul 2019 10:16) (79 - 115)  RR: 22 (15 Jul 2019 12:39) (18 - 23)  SpO2: 97% (15 Jul 2019 12:39) (93% - 100%) I&O's Summary   GENERAL:  [ ]Alert  [ ]Oriented x   [ ]Lethargic  [ ]Cachexia  [ ]Unarousable  [ ]Verbal  [ ]Non-Verbal    Behavioral:   [ ] Anxiety  [ ] Delirium [ ] Agitation [ ] Other    HEENT:  [x ]Normal   [ ]Dry mouth   [ ]ET Tube/Trach  [ ]Oral lesions    PULMONARY:   [ ]Clear [ x]Tachypnea  [ ]Audible excessive secretions   [x ]Rhonchi        [ ]Right [ ]Left [x ]Bilateral  [ ]Crackles        [ ]Right [ ]Left [ ]Bilateral  [ ]Wheezing     [ ]Right [ ]Left [ ]Bilateral    CARDIOVASCULAR:    [ ]Regular [ ]Irregular [x ]Tachy  [ ]Butch [ ]Murmur [ ]Other    GASTROINTESTINAL:  [ x]Soft  [ ]Distended   [x ]+BS  [x ]Non tender [ ]Tender  [ ]PEG [ ]OGT/ NGT   Last BM:  GENITOURINARY:  [ ]Normal [x ] Incontinent   [ ]Oliguria/Anuria   [ ]Cespedes    MUSCULOSKELETAL:   [ ]Normal   [x ]Weakness  [ ]Bed/Wheelchair bound [ ]Edema    NEUROLOGIC:   [ ]No focal deficits  [ x] Cognitive impairment  [ ] Dysphagia [ ]Dysarthria [ ] Paresis [ ]Other     SKIN:   [x ]Normal   [ ]Pressure ulcer(s)  [ ]Rash    CRITICAL CARE:  [ ] Shock Present  [ ]Septic [ ]Cardiogenic [ ]Neurologic [ ]Hypovolemic  [ ]  Vasopressors [ ]  Inotropes   [ ] Respiratory failure present  [ ] Acute  [ ] Chronic [ ] Hypoxic  [ ] Hypercarbic [ ] Other  [ ] Other organ failure     LABS:                        11.4   4.30  )-----------( 140      ( 15 Jul 2019 05:57 )             35.7   07-15    138  |  96<L>  |  24<H>  ----------------------------<  113<H>  4.2   |  27  |  0.47<L>    Ca    9.2      15 Jul 2019 05:57  Mg     1.7     07-15          RADIOLOGY & ADDITIONAL STUDIES:    Protein Calorie Malnutrition Present: [ ] yes [ ] no  [ ] PPSV2 < or = 30%  [ ] significant weight loss [ ] poor nutritional intake [ ] anasarca [ ] catabolic state Albumin, Serum: 3.1 g/dL (06-27-19 @ 06:16)  Artificial Nutrition [ ]     REFERRALS:   [ ]Chaplaincy  [ ] Hospice  [ ]Child Life  [ ]Social Work  [ ]Case management [ ]Holistic Therapy   Goals of Care Document: VITALY Riley (07-11-19 @ 14:11)  Goals of Care Conversation:   Participants:  · Participants  Patient  · Child(isaac)  George White; son and Clarissa Castaneda; dtr  · Provider  Dr. Landeros; Dr. Burns  ·   Kelsey Riley    Advance Directives:  · Does patient have Advance Directive  Yes  · Indicate Type  Health Care Proxy (HCP)  · Agent's Name  George White  · Phone #  [phone]  · Are any of the items on the chart  Yes  · Specify which ones are on chart  Health Care Proxy (HCP)    Conversation Discussion:  · Conversation  Diagnosis; Prognosis; MOLST Discussed; Treatment Options  · Conversation Details  Referral to palliative care for complex decision making in the setting of advanced illness. Meeting held with pt, son who is HCP, and dtr at bedside. Reviewed and summarized pt's overall medical condition. Pt and family are aware that despite treatment pt's cancer has progressed. Pt was planning on starting experimental chemotherapy as an out-pt. Pt and family still want to pursue DMT if pt can recover from her current hospitalization. Discussed the role of hospice care in the evident pt and family wanted to transition care from DMT to supportive care where the focus would be on symptom management. Advanced care planning discussed. MOLST discussed and explained. Family are aware of what the nature of resuscitation and intubation entails. Family are not ready to complete a MOLST at this time stating they will continue to discuss  preferences for end of life care with their mother. Pt remains a full code at this time.      Electronic Signatures:  Kelsey Riley ([facility] [place]  (Signed 11-Jul-2019 14:29)  	Authored: Goals of Care Conversation      Last Updated: 11-Jul-2019 14:29 by Kelsey Riley ([facility] [place]

## 2019-07-15 NOTE — PROGRESS NOTE ADULT - SUBJECTIVE AND OBJECTIVE BOX
Follow Up:      Inverval History/ROS:Patient is a 79y old  Female who presents with a chief complaint of SOB (15 Jul 2019 13:40)  Seen at 11 am    More lethargic  On high flow      Allergies    No Known Allergies    Intolerances        ANTIMICROBIALS:  atovaquone Suspension 1500 daily  valACYclovir 500 <User Schedule>  voriconazole 200 every 12 hours      OTHER MEDS:  ALBUTerol    0.083% 2.5 milliGRAM(s) Nebulizer every 8 hours  allopurinol 300 milliGRAM(s) Oral daily  apixaban 2.5 milliGRAM(s) Oral every 12 hours  benzocaine 15 mG/menthol 3.6 mG (Sugar-Free) Lozenge 1 Lozenge Oral every 4 hours  benzonatate 100 milliGRAM(s) Oral three times a day PRN  bisacodyl 5 milliGRAM(s) Oral every 12 hours PRN  docusate sodium 100 milliGRAM(s) Oral three times a day  guaiFENesin    Syrup 200 milliGRAM(s) Oral every 6 hours PRN  lactated ringers. 1000 milliLiter(s) IV Continuous <Continuous>  levalbuterol Inhalation 0.63 milliGRAM(s) Inhalation every 4 hours  melatonin 1.5 milliGRAM(s) Oral at bedtime  metoprolol succinate ER 75 milliGRAM(s) Oral daily  morphine  - Injectable 0.5 milliGRAM(s) IV Push every 4 hours PRN  pantoprazole    Tablet 40 milliGRAM(s) Oral before breakfast  polyethylene glycol 3350 17 Gram(s) Oral daily PRN  predniSONE   Tablet 40 milliGRAM(s) Oral daily  sodium chloride 2 Gram(s) Oral two times a day  sodium chloride 0.65% Nasal 1 Spray(s) Both Nostrils daily PRN  sodium chloride 3%  Inhalation 4 milliLiter(s) Inhalation every 6 hours  sodium chloride 7% Inhalation 7 milliLiter(s) Inhalation three times a day      Vital Signs Last 24 Hrs  T(C): 36.6 (15 Jul 2019 05:27), Max: 36.6 (14 Jul 2019 23:09)  T(F): 97.9 (15 Jul 2019 05:27), Max: 97.9 (15 Jul 2019 05:27)  HR: 109 (15 Jul 2019 17:13) (54 - 128)  BP: 122/76 (15 Jul 2019 12:39) (111/74 - 145/96)  BP(mean): 85 (15 Jul 2019 10:16) (79 - 115)  RR: 23 (15 Jul 2019 17:13) (18 - 23)  SpO2: 97% (15 Jul 2019 17:13) (93% - 100%)    PHYSICAL EXAM:  General: [x ] lethargic  HEAD/EYES: [ ] PERRL [x ] white sclera [ ] icterus  ENT:  [ ] normal [x ] supple [ ] thrush [ ] pharyngeal exudate  Cardiovascular:   [ ] murmur [x ] normal [ ] PPM/AICD  Respiratory:  [x ] course BS  GI:  [ x] soft, non-tender, normal bowel sounds  :  [ ] bledsoe x[ ] no CVA tenderness   Musculoskeletal:  [ ] no synovitis  Neurologic:  [ ] non-focal exam   Skin:  [x ] no rash  Lymph: [ ] no lymphadenopathy  Psychiatric:  [ ] appropriate affect [ ] alert & oriented  Lines:  x[ ] no phlebitis [ ] central line                                11.4   4.30  )-----------( 140      ( 15 Jul 2019 05:57 )             35.7       07-15    138  |  96<L>  |  24<H>  ----------------------------<  113<H>  4.2   |  27  |  0.47<L>    Ca    9.2      15 Jul 2019 05:57  Mg     1.7     07-15            MICROBIOLOGY:Culture - Respiratory:   NRF^Normal Respiratory Le  QUANTITY OF GROWTH: MODERATE (07-08-19 @ 18:59)      RADIOLOGY:

## 2019-07-15 NOTE — PROGRESS NOTE ADULT - PROBLEM SELECTOR PLAN 2
rec po ativan 0.25mg qhs prn; appears to be helping w/ pt's anxiety  -monitor for agitation or delirium  -will continue to monitor

## 2019-07-15 NOTE — PROGRESS NOTE ADULT - PROBLEM SELECTOR PLAN 5
-follows w/ Dr Nguyen at [facility] [place]; Dr Davies at [facility]  -was planning on starting an experimental chemo regimen as outpt; is currently unsure if this is still what she wants  -family would like to pursue DMT  -will need o/p f/u w/ Dr. Nguyen/Samson to determine if pt is still a candidate for DMT

## 2019-07-15 NOTE — PROGRESS NOTE ADULT - PROBLEM SELECTOR PLAN 6
pt has assigned son George as HCP. HCP form filled out and placed in chart  -met with son, daughter again- they are aware of her worsening respiratory failure  - daughter wishes to focus on comfort but son who is hcp is still hoping for recovery although, starting to understand that if she needs to be intubated, she will most likely not be able to come off.    - discussed difficulty with removal of vent emotionally for family  -they understand and will continue to discuss themselves about ad  -overall prognosis is poor.

## 2019-07-15 NOTE — PROGRESS NOTE ADULT - PROBLEM SELECTOR PLAN 2
- c/w voriconazole pending ID to cover for possible invasive aspergilloses  - f/u final culture  - continue mepron 1500 mg po daily  - Pulmonary and ID following

## 2019-07-15 NOTE — PROGRESS NOTE ADULT - PROBLEM SELECTOR PLAN 1
-Back on BIPAP  -rec 0.5mg IV morphine q4hr if needed for dyspnea  -c/w aggressive airway clearance measures

## 2019-07-15 NOTE — CHART NOTE - NSCHARTNOTEFT_GEN_A_CORE
Informed by RN that patient with labored breathing on bipap. Tele monitor reveals persistent sinus tachycardia.   Patient assessed at beside. Patient on bipap nose mask 12/6 with concurrent nebulizer treatment. Patient is alert and responsive to commands.      PE:  General Appearance: In distress  Lungs: Labored respirations. Coarse breath sounds with diffuse ronchi. Decreased breath sounds in RLL   Heart: Tachycardic. +S1, S2   PV: Radial and DP pulses palpable     Chest PT performed at bedside. Patient switched to full size bipap mask and settings increased to 15/8. Patient assessed with Dr. Gomez at bedside. Metoprolol 2.5mg IVP x1 given for tachycardia. STAT CXR and ABG ordered.     Theolivia Naik PA-C  [id number]

## 2019-07-16 LAB
ANION GAP SERPL CALC-SCNC: 12 MMO/L — SIGNIFICANT CHANGE UP (ref 7–14)
BUN SERPL-MCNC: 25 MG/DL — HIGH (ref 7–23)
CALCIUM SERPL-MCNC: 9 MG/DL — SIGNIFICANT CHANGE UP (ref 8.4–10.5)
CHLORIDE SERPL-SCNC: 97 MMOL/L — LOW (ref 98–107)
CO2 SERPL-SCNC: 28 MMOL/L — SIGNIFICANT CHANGE UP (ref 22–31)
CREAT SERPL-MCNC: 0.41 MG/DL — LOW (ref 0.5–1.3)
GLUCOSE SERPL-MCNC: 96 MG/DL — SIGNIFICANT CHANGE UP (ref 70–99)
HCT VFR BLD CALC: 35.3 % — SIGNIFICANT CHANGE UP (ref 34.5–45)
HGB BLD-MCNC: 11.3 G/DL — LOW (ref 11.5–15.5)
MAGNESIUM SERPL-MCNC: 1.7 MG/DL — SIGNIFICANT CHANGE UP (ref 1.6–2.6)
MCHC RBC-ENTMCNC: 32 % — SIGNIFICANT CHANGE UP (ref 32–36)
MCHC RBC-ENTMCNC: 32.9 PG — SIGNIFICANT CHANGE UP (ref 27–34)
MCV RBC AUTO: 102.9 FL — HIGH (ref 80–100)
NRBC # FLD: 0.11 K/UL — SIGNIFICANT CHANGE UP (ref 0–0)
NRBC FLD-RTO: 2.6 — SIGNIFICANT CHANGE UP
PHOSPHATE SERPL-MCNC: 2.9 MG/DL — SIGNIFICANT CHANGE UP (ref 2.5–4.5)
PLATELET # BLD AUTO: 137 K/UL — LOW (ref 150–400)
PMV BLD: 12.7 FL — SIGNIFICANT CHANGE UP (ref 7–13)
POTASSIUM SERPL-MCNC: 4 MMOL/L — SIGNIFICANT CHANGE UP (ref 3.5–5.3)
POTASSIUM SERPL-SCNC: 4 MMOL/L — SIGNIFICANT CHANGE UP (ref 3.5–5.3)
RBC # BLD: 3.43 M/UL — LOW (ref 3.8–5.2)
RBC # FLD: 21.2 % — HIGH (ref 10.3–14.5)
SODIUM SERPL-SCNC: 137 MMOL/L — SIGNIFICANT CHANGE UP (ref 135–145)
WBC # BLD: 4.27 K/UL — SIGNIFICANT CHANGE UP (ref 3.8–10.5)
WBC # FLD AUTO: 4.27 K/UL — SIGNIFICANT CHANGE UP (ref 3.8–10.5)

## 2019-07-16 PROCEDURE — 99233 SBSQ HOSP IP/OBS HIGH 50: CPT

## 2019-07-16 PROCEDURE — 99233 SBSQ HOSP IP/OBS HIGH 50: CPT | Mod: GC

## 2019-07-16 PROCEDURE — 99232 SBSQ HOSP IP/OBS MODERATE 35: CPT

## 2019-07-16 PROCEDURE — 99232 SBSQ HOSP IP/OBS MODERATE 35: CPT | Mod: GC

## 2019-07-16 RX ORDER — METOPROLOL TARTRATE 50 MG
100 TABLET ORAL DAILY
Refills: 0 | Status: DISCONTINUED | OUTPATIENT
Start: 2019-07-16 | End: 2019-07-20

## 2019-07-16 RX ORDER — METOPROLOL TARTRATE 50 MG
25 TABLET ORAL ONCE
Refills: 0 | Status: COMPLETED | OUTPATIENT
Start: 2019-07-16 | End: 2019-07-16

## 2019-07-16 RX ADMIN — VALACYCLOVIR 500 MILLIGRAM(S): 500 TABLET, FILM COATED ORAL at 18:32

## 2019-07-16 RX ADMIN — SODIUM CHLORIDE 2 GRAM(S): 9 INJECTION INTRAMUSCULAR; INTRAVENOUS; SUBCUTANEOUS at 05:18

## 2019-07-16 RX ADMIN — LEVALBUTEROL 0.63 MILLIGRAM(S): 1.25 SOLUTION, CONCENTRATE RESPIRATORY (INHALATION) at 12:33

## 2019-07-16 RX ADMIN — MEROPENEM 100 MILLIGRAM(S): 1 INJECTION INTRAVENOUS at 05:18

## 2019-07-16 RX ADMIN — Medication 40 MILLIGRAM(S): at 05:16

## 2019-07-16 RX ADMIN — Medication 75 MILLIGRAM(S): at 05:17

## 2019-07-16 RX ADMIN — LEVALBUTEROL 0.63 MILLIGRAM(S): 1.25 SOLUTION, CONCENTRATE RESPIRATORY (INHALATION) at 04:04

## 2019-07-16 RX ADMIN — VORICONAZOLE 200 MILLIGRAM(S): 10 INJECTION, POWDER, LYOPHILIZED, FOR SOLUTION INTRAVENOUS at 05:19

## 2019-07-16 RX ADMIN — BENZOCAINE AND MENTHOL 1 LOZENGE: 5; 1 LIQUID ORAL at 21:42

## 2019-07-16 RX ADMIN — VORICONAZOLE 200 MILLIGRAM(S): 10 INJECTION, POWDER, LYOPHILIZED, FOR SOLUTION INTRAVENOUS at 18:32

## 2019-07-16 RX ADMIN — APIXABAN 2.5 MILLIGRAM(S): 2.5 TABLET, FILM COATED ORAL at 05:18

## 2019-07-16 RX ADMIN — LEVALBUTEROL 0.63 MILLIGRAM(S): 1.25 SOLUTION, CONCENTRATE RESPIRATORY (INHALATION) at 09:17

## 2019-07-16 RX ADMIN — PANTOPRAZOLE SODIUM 40 MILLIGRAM(S): 20 TABLET, DELAYED RELEASE ORAL at 05:17

## 2019-07-16 RX ADMIN — MEROPENEM 100 MILLIGRAM(S): 1 INJECTION INTRAVENOUS at 14:52

## 2019-07-16 RX ADMIN — SODIUM CHLORIDE 4 MILLILITER(S): 9 INJECTION INTRAMUSCULAR; INTRAVENOUS; SUBCUTANEOUS at 16:37

## 2019-07-16 RX ADMIN — APIXABAN 2.5 MILLIGRAM(S): 2.5 TABLET, FILM COATED ORAL at 18:33

## 2019-07-16 RX ADMIN — Medication 300 MILLIGRAM(S): at 14:52

## 2019-07-16 RX ADMIN — SODIUM CHLORIDE 4 MILLILITER(S): 9 INJECTION INTRAMUSCULAR; INTRAVENOUS; SUBCUTANEOUS at 21:06

## 2019-07-16 RX ADMIN — Medication 100 MILLIGRAM(S): at 14:53

## 2019-07-16 RX ADMIN — SODIUM CHLORIDE 4 MILLILITER(S): 9 INJECTION INTRAMUSCULAR; INTRAVENOUS; SUBCUTANEOUS at 09:16

## 2019-07-16 RX ADMIN — ATOVAQUONE 1500 MILLIGRAM(S): 750 SUSPENSION ORAL at 14:53

## 2019-07-16 RX ADMIN — LEVALBUTEROL 0.63 MILLIGRAM(S): 1.25 SOLUTION, CONCENTRATE RESPIRATORY (INHALATION) at 16:37

## 2019-07-16 RX ADMIN — Medication 1.5 MILLIGRAM(S): at 21:41

## 2019-07-16 RX ADMIN — MEROPENEM 100 MILLIGRAM(S): 1 INJECTION INTRAVENOUS at 21:42

## 2019-07-16 RX ADMIN — SODIUM CHLORIDE 30 MILLILITER(S): 9 INJECTION, SOLUTION INTRAVENOUS at 14:54

## 2019-07-16 RX ADMIN — LEVALBUTEROL 0.63 MILLIGRAM(S): 1.25 SOLUTION, CONCENTRATE RESPIRATORY (INHALATION) at 01:29

## 2019-07-16 RX ADMIN — LEVALBUTEROL 0.63 MILLIGRAM(S): 1.25 SOLUTION, CONCENTRATE RESPIRATORY (INHALATION) at 21:05

## 2019-07-16 RX ADMIN — Medication 100 MILLIGRAM(S): at 21:41

## 2019-07-16 RX ADMIN — Medication 25 MILLIGRAM(S): at 14:53

## 2019-07-16 RX ADMIN — Medication 100 MILLIGRAM(S): at 05:18

## 2019-07-16 RX ADMIN — SODIUM CHLORIDE 2 GRAM(S): 9 INJECTION INTRAMUSCULAR; INTRAVENOUS; SUBCUTANEOUS at 18:32

## 2019-07-16 NOTE — PROGRESS NOTE ADULT - SUBJECTIVE AND OBJECTIVE BOX
Patient is a 79y old  Female who presents with a chief complaint of SOB (16 Jul 2019 09:51)      SUBJECTIVE / OVERNIGHT EVENTS: Pt still SOB slighlty improved from yesterday. Tele Afib RVR    MEDICATIONS  (STANDING):  allopurinol 300 milliGRAM(s) Oral daily  apixaban 2.5 milliGRAM(s) Oral every 12 hours  atovaquone Suspension 1500 milliGRAM(s) Oral daily  benzocaine 15 mG/menthol 3.6 mG (Sugar-Free) Lozenge 1 Lozenge Oral every 4 hours  docusate sodium 100 milliGRAM(s) Oral three times a day  lactated ringers. 1000 milliLiter(s) (30 mL/Hr) IV Continuous <Continuous>  levalbuterol Inhalation 0.63 milliGRAM(s) Inhalation every 4 hours  melatonin 1.5 milliGRAM(s) Oral at bedtime  meropenem  IVPB 1000 milliGRAM(s) IV Intermittent every 8 hours  metoprolol succinate  milliGRAM(s) Oral daily  metoprolol succinate ER 25 milliGRAM(s) Oral once  pantoprazole    Tablet 40 milliGRAM(s) Oral before breakfast  predniSONE   Tablet 40 milliGRAM(s) Oral daily  sodium chloride 2 Gram(s) Oral two times a day  sodium chloride 7% Inhalation 4 milliLiter(s) Inhalation three times a day  valACYclovir 500 milliGRAM(s) Oral <User Schedule>  voriconazole 200 milliGRAM(s) Oral every 12 hours    MEDICATIONS  (PRN):  benzonatate 100 milliGRAM(s) Oral three times a day PRN Cough  bisacodyl 5 milliGRAM(s) Oral every 12 hours PRN Constipation  guaiFENesin    Syrup 200 milliGRAM(s) Oral every 6 hours PRN Cough  morphine  - Injectable 0.5 milliGRAM(s) IV Push every 4 hours PRN dyspnea  polyethylene glycol 3350 17 Gram(s) Oral daily PRN Constipation  sodium chloride 0.65% Nasal 1 Spray(s) Both Nostrils daily PRN Nasal Congestion        CAPILLARY BLOOD GLUCOSE        I&O's Summary      T(C): 36.6 (07-16-19 @ 05:14), Max: 36.6 (07-16-19 @ 05:14)  HR: 110 (07-16-19 @ 09:19) (108 - 132)  BP: 138/93 (07-16-19 @ 05:14) (111/74 - 138/93)  RR: 24 (07-16-19 @ 07:21) (20 - 24)  SpO2: 97% (07-16-19 @ 09:19) (94% - 100%)    PHYSICAL EXAM:  GENERAL: dsypneic with speaking on HiFlo  HEAD:  Atraumatic, Normocephalic  EYES: EOMI, conjunctiva and sclera clear  NECK: Supple, No JVD  CHEST/LUNG:rhonci b/l   HEART: s1/s2 iregularly irregular   ABDOMEN: Soft, Nontender, Nondistended; Bowel sounds present  EXTREMITIES:  2+ Peripheral Pulses, No clubbing, cyanosis, or edema  PSYCH: AAOx3      LABS:                        11.3   4.27  )-----------( 137      ( 16 Jul 2019 05:45 )             35.3     07-16    137  |  97<L>  |  25<H>  ----------------------------<  96  4.0   |  28  |  0.41<L>    Ca    9.0      16 Jul 2019 05:45  Phos  2.9     07-16  Mg     1.7     07-16                  RADIOLOGY & ADDITIONAL TESTS:    Imaging Personally Reviewed:    Consultant(s) Notes Reviewed:      Care Discussed with Consultants/Other Providers: Patient is a 79y old  Female who presents with a chief complaint of SOB (16 Jul 2019 09:51)      SUBJECTIVE / OVERNIGHT EVENTS: Pt still SOB slighlty improved from yesterday. Tele Afib RVR in 120's     MEDICATIONS  (STANDING):  allopurinol 300 milliGRAM(s) Oral daily  apixaban 2.5 milliGRAM(s) Oral every 12 hours  atovaquone Suspension 1500 milliGRAM(s) Oral daily  benzocaine 15 mG/menthol 3.6 mG (Sugar-Free) Lozenge 1 Lozenge Oral every 4 hours  docusate sodium 100 milliGRAM(s) Oral three times a day  lactated ringers. 1000 milliLiter(s) (30 mL/Hr) IV Continuous <Continuous>  levalbuterol Inhalation 0.63 milliGRAM(s) Inhalation every 4 hours  melatonin 1.5 milliGRAM(s) Oral at bedtime  meropenem  IVPB 1000 milliGRAM(s) IV Intermittent every 8 hours  metoprolol succinate  milliGRAM(s) Oral daily  metoprolol succinate ER 25 milliGRAM(s) Oral once  pantoprazole    Tablet 40 milliGRAM(s) Oral before breakfast  predniSONE   Tablet 40 milliGRAM(s) Oral daily  sodium chloride 2 Gram(s) Oral two times a day  sodium chloride 7% Inhalation 4 milliLiter(s) Inhalation three times a day  valACYclovir 500 milliGRAM(s) Oral <User Schedule>  voriconazole 200 milliGRAM(s) Oral every 12 hours    MEDICATIONS  (PRN):  benzonatate 100 milliGRAM(s) Oral three times a day PRN Cough  bisacodyl 5 milliGRAM(s) Oral every 12 hours PRN Constipation  guaiFENesin    Syrup 200 milliGRAM(s) Oral every 6 hours PRN Cough  morphine  - Injectable 0.5 milliGRAM(s) IV Push every 4 hours PRN dyspnea  polyethylene glycol 3350 17 Gram(s) Oral daily PRN Constipation  sodium chloride 0.65% Nasal 1 Spray(s) Both Nostrils daily PRN Nasal Congestion        CAPILLARY BLOOD GLUCOSE        I&O's Summary      T(C): 36.6 (07-16-19 @ 05:14), Max: 36.6 (07-16-19 @ 05:14)  HR: 110 (07-16-19 @ 09:19) (108 - 132)  BP: 138/93 (07-16-19 @ 05:14) (111/74 - 138/93)  RR: 24 (07-16-19 @ 07:21) (20 - 24)  SpO2: 97% (07-16-19 @ 09:19) (94% - 100%)    PHYSICAL EXAM:  GENERAL: dsypneic with speaking on HiFlo  HEAD:  Atraumatic, Normocephalic  EYES: EOMI, conjunctiva and sclera clear  NECK: Supple, No JVD  CHEST/LUNG:rhonci b/l   HEART: s1/s2 iregularly irregular   ABDOMEN: Soft, Nontender, Nondistended; Bowel sounds present  EXTREMITIES:  2+ Peripheral Pulses, No clubbing, cyanosis, or edema  PSYCH: AAOx3      LABS:                        11.3   4.27  )-----------( 137      ( 16 Jul 2019 05:45 )             35.3     07-16    137  |  97<L>  |  25<H>  ----------------------------<  96  4.0   |  28  |  0.41<L>    Ca    9.0      16 Jul 2019 05:45  Phos  2.9     07-16  Mg     1.7     07-16        Culture - Respiratory:   NRF^Normal Respiratory Le  QUANTITY OF GROWTH: MODERATE (07.08.19 @ 18:59)    Gram Stain Sputum:   GPCPR^Gram Pos Cocci in Pairs  QUANTITY OF BACTERIA SEEN: RARE (1+)  WBC^White Blood Cells  QNTY CELLS IN GRAM STAIN: FEW (2+) (07.08.19 @ 18:59)    7/1 BAL- aspergillus fumigatus          RADIOLOGY & ADDITIONAL TESTS:    Imaging Personally Reviewed:    Consultant(s) Notes Reviewed:      Care Discussed with Consultants/Other Providers:

## 2019-07-16 NOTE — PROGRESS NOTE ADULT - SUBJECTIVE AND OBJECTIVE BOX
INTERVAL HPI/OVERNIGHT EVENTS:  Patient seen at bedside, she is currently on high flow NC.     VITAL SIGNS:  T(F): 97.9 (07-16-19 @ 05:14)  HR: 110 (07-16-19 @ 09:19)  BP: 138/93 (07-16-19 @ 05:14)  RR: 24 (07-16-19 @ 07:21)  SpO2: 97% (07-16-19 @ 09:19)  Wt(kg): --      MEDICATIONS  (STANDING):  allopurinol 300 milliGRAM(s) Oral daily  apixaban 2.5 milliGRAM(s) Oral every 12 hours  atovaquone Suspension 1500 milliGRAM(s) Oral daily  benzocaine 15 mG/menthol 3.6 mG (Sugar-Free) Lozenge 1 Lozenge Oral every 4 hours  docusate sodium 100 milliGRAM(s) Oral three times a day  lactated ringers. 1000 milliLiter(s) (30 mL/Hr) IV Continuous <Continuous>  levalbuterol Inhalation 0.63 milliGRAM(s) Inhalation every 4 hours  melatonin 1.5 milliGRAM(s) Oral at bedtime  meropenem  IVPB 1000 milliGRAM(s) IV Intermittent every 8 hours  metoprolol succinate  milliGRAM(s) Oral daily  metoprolol succinate ER 25 milliGRAM(s) Oral once  pantoprazole    Tablet 40 milliGRAM(s) Oral before breakfast  predniSONE   Tablet 40 milliGRAM(s) Oral daily  sodium chloride 2 Gram(s) Oral two times a day  sodium chloride 7% Inhalation 4 milliLiter(s) Inhalation three times a day  valACYclovir 500 milliGRAM(s) Oral <User Schedule>  voriconazole 200 milliGRAM(s) Oral every 12 hours    MEDICATIONS  (PRN):  benzonatate 100 milliGRAM(s) Oral three times a day PRN Cough  bisacodyl 5 milliGRAM(s) Oral every 12 hours PRN Constipation  guaiFENesin    Syrup 200 milliGRAM(s) Oral every 6 hours PRN Cough  morphine  - Injectable 0.5 milliGRAM(s) IV Push every 4 hours PRN dyspnea  polyethylene glycol 3350 17 Gram(s) Oral daily PRN Constipation  sodium chloride 0.65% Nasal 1 Spray(s) Both Nostrils daily PRN Nasal Congestion      Allergies    No Known Allergies    Intolerances        LABS:                        11.3   4.27  )-----------( 137      ( 16 Jul 2019 05:45 )             35.3     07-16    137  |  97<L>  |  25<H>  ----------------------------<  96  4.0   |  28  |  0.41<L>    Ca    9.0      16 Jul 2019 05:45  Phos  2.9     07-16  Mg     1.7     07-16            RADIOLOGY & ADDITIONAL TESTS:  Studies reviewed.    ASSESSMENT & PLAN:

## 2019-07-16 NOTE — PROGRESS NOTE ADULT - ASSESSMENT
Assessment  79yF with advanced DLBCL, now complicated by fungal pneumonia, progression of disease causing hypoxic respiratory failure.   Had been on/off bipap for many days, now on high flow, able to converse, eat, feels somewhat improved.     Would continue antimicrobials and avoid positive pressure. Needs mucus/secretions clearance which is inhibited by bipap.   Overall, not improving despite appropriate antimicrobial therapy. Most likely reason is impaired host response and immune dysfunction due to her cancer.     Agree with continued meetings with palliative care and goals of care discussion.     Recs  Continue high flow   Continue aerobika for airway clearance  Continue metanebs at least 3x day preceded by albuterol and 7% saline nebs  Continue treatment for fungal pneumonia.

## 2019-07-16 NOTE — PROGRESS NOTE ADULT - SUBJECTIVE AND OBJECTIVE BOX
CHIEF COMPLAINT:  cough, dyspnea    Interval Events:  Feels better on high flow,  expectorating  Has sore throat and would like some honey.    REVIEW OF SYSTEMS:  Constitutional: [X ] negative [ ] fevers [ ] chills [ ] weight loss [ ] weight gain  HEENT: [X ] negative [ ] dry eyes [ ] eye irritation [ ] postnasal drip [ ] nasal congestion  CV: [ X] negative  [ ] chest pain [ ] orthopnea [ ] palpitations [ ] murmur  Resp: [ ] negative [ X] cough [ X] shortness of breath [ X] dyspnea [ ] wheezing [X ] sputum [ ] hemoptysis  GI: [X ] negative [ ] nausea [ ] vomiting [ ] diarrhea [ ] constipation [ ] abd pain [ ] dysphagia   : [ X] negative [ ] dysuria [ ] nocturia [ ] hematuria [ ] increased urinary frequency  Musculoskeletal: [X ] negative [ ] back pain [ ] myalgias [ ] arthralgias [ ] fracture  Skin: [ X] negative [ ] rash [ ] itch  Neurological: [ ] negative [ ] headache [ ] dizziness [ ] syncope [ ] weakness [ ] numbness  Psychiatric: [ ] negative [ ] anxiety [ ] depression  Endocrine: [ ] negative [ ] diabetes [ ] thyroid problem  Hematologic/Lymphatic: [ ] negative [ ] anemia [ ] bleeding problem  Allergic/Immunologic: [ ] negative [ ] itchy eyes [ ] nasal discharge [ ] hives [ ] angioedema  [X ] All other systems negative    OBJECTIVE:  ICU Vital Signs Last 24 Hrs  T(C): 36.6 (16 Jul 2019 05:14), Max: 36.6 (16 Jul 2019 05:14)  T(F): 97.9 (16 Jul 2019 05:14), Max: 97.9 (16 Jul 2019 05:14)  HR: 110 (16 Jul 2019 09:19) (108 - 132)  BP: 138/93 (16 Jul 2019 05:14) (111/74 - 138/93)  BP(mean): 85 (15 Jul 2019 10:16) (79 - 85)  RR: 24 (16 Jul 2019 07:21) (20 - 24)  SpO2: 97% (16 Jul 2019 09:19) (94% - 100%)      CAPILLARY BLOOD GLUCOSE      PHYSICAL EXAM:  General: ill appearing woman in no distress  Respiratory: normal effort on high flow 60%/40L  bibasilar rhonchi  Cardiovascular:  rate regular, normal heart sounds  Abdomen: thin, NT, ND  Extremities: minimal LE edema  Neurological: awake alert, attentive, sluggish        HOSPITAL MEDICATIONS:  MEDICATIONS  (STANDING):  allopurinol 300 milliGRAM(s) Oral daily  apixaban 2.5 milliGRAM(s) Oral every 12 hours  atovaquone Suspension 1500 milliGRAM(s) Oral daily  benzocaine 15 mG/menthol 3.6 mG (Sugar-Free) Lozenge 1 Lozenge Oral every 4 hours  docusate sodium 100 milliGRAM(s) Oral three times a day  lactated ringers. 1000 milliLiter(s) (30 mL/Hr) IV Continuous <Continuous>  levalbuterol Inhalation 0.63 milliGRAM(s) Inhalation every 4 hours  melatonin 1.5 milliGRAM(s) Oral at bedtime  meropenem  IVPB 1000 milliGRAM(s) IV Intermittent every 8 hours  metoprolol succinate ER 75 milliGRAM(s) Oral daily  pantoprazole    Tablet 40 milliGRAM(s) Oral before breakfast  predniSONE   Tablet 40 milliGRAM(s) Oral daily  sodium chloride 2 Gram(s) Oral two times a day  sodium chloride 7% Inhalation 4 milliLiter(s) Inhalation three times a day  valACYclovir 500 milliGRAM(s) Oral <User Schedule>  voriconazole 200 milliGRAM(s) Oral every 12 hours    MEDICATIONS  (PRN):  benzonatate 100 milliGRAM(s) Oral three times a day PRN Cough  bisacodyl 5 milliGRAM(s) Oral every 12 hours PRN Constipation  guaiFENesin    Syrup 200 milliGRAM(s) Oral every 6 hours PRN Cough  morphine  - Injectable 0.5 milliGRAM(s) IV Push every 4 hours PRN dyspnea  polyethylene glycol 3350 17 Gram(s) Oral daily PRN Constipation  sodium chloride 0.65% Nasal 1 Spray(s) Both Nostrils daily PRN Nasal Congestion      LABS:                        11.3   4.27  )-----------( 137      ( 16 Jul 2019 05:45 )             35.3     Hgb Trend: 11.3<--, 11.4<--, 10.8<--, 11.4<--, 10.2<--  07-16    137  |  97<L>  |  25<H>  ----------------------------<  96  4.0   |  28  |  0.41<L>    Ca    9.0      16 Jul 2019 05:45  Phos  2.9     07-16  Mg     1.7     07-16      Creatinine Trend: 0.41<--, 0.47<--, 0.40<--, 0.43<--, 0.41<--, 0.41<--      Arterial Blood Gas:  07-15 @ 09:59  7.48/41/149/30/99.6/6.2  ABG lactate: --        MICROBIOLOGY:       RADIOLOGY:  [ ] Reviewed and interpreted by me    PULMONARY FUNCTION TESTS:    EKG:

## 2019-07-16 NOTE — PROGRESS NOTE ADULT - PROBLEM SELECTOR PLAN 5
Follows with Dr. Nguyen at [facility]. as per family, [facility] is not offering any disease modifying treatment. Pt is looking for experimental trial if pt clinically improves from current medical issues. Poss with Dr. Davies  - C/w ppx mepron, valacyclovir and allopurinol  - [facility] discussed with pt and daughter for poor prognosis - They wish to be full code for now  - Patient and family agreeable to defer experimental chemo until after VALERIO.

## 2019-07-16 NOTE — PROGRESS NOTE ADULT - PROBLEM SELECTOR PLAN 1
RLL bronchus obstruction with concern for postobstructive fungal PNA  - cont O2 to maintain SpO2>90 Hiflo if needed BiPAP  - standing airway clearance therapy, Xopenex q4 hours, following by nebulized 3% hypertonic saline, and then Aerobika device as per Pulm recommendations  -7/15 repeat CXR reviewed with pulm worsening RT sided opacity likely worsening underlying pna and atelectasis in setting of endobronchial obstruction (mucous vs fungal vs tumor) ABG reviewed WOB likely due to hypoxia recommending HiFlo this was reviewed with family at bedside.   -meropenem restarted as per ID   -Pulm recs appreciated  -palliative recs appreciated conveyed poor prognosis to family still wishes pt to be full code.

## 2019-07-16 NOTE — PROGRESS NOTE ADULT - PROBLEM SELECTOR PLAN 2
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

## 2019-07-16 NOTE — PROGRESS NOTE ADULT - PROBLEM SELECTOR PLAN 3
Afib w/ RVR likely d/t infection and hypoxia  - Monitor on tele   - increase metoprolol to 100  - cont apixaban

## 2019-07-16 NOTE — PROGRESS NOTE ADULT - SUBJECTIVE AND OBJECTIVE BOX
Follow Up:      Inverval History/ROS:Patient is a 79y old  Female who presents with a chief complaint of SOB (16 Jul 2019 11:26)    No fever  Imrproved in high flow.      Allergies    No Known Allergies    Intolerances        ANTIMICROBIALS:  atovaquone Suspension 1500 daily  meropenem  IVPB 1000 every 8 hours  valACYclovir 500 <User Schedule>  voriconazole 200 every 12 hours      OTHER MEDS:  allopurinol 300 milliGRAM(s) Oral daily  apixaban 2.5 milliGRAM(s) Oral every 12 hours  benzocaine 15 mG/menthol 3.6 mG (Sugar-Free) Lozenge 1 Lozenge Oral every 4 hours  benzonatate 100 milliGRAM(s) Oral three times a day PRN  bisacodyl 5 milliGRAM(s) Oral every 12 hours PRN  docusate sodium 100 milliGRAM(s) Oral three times a day  guaiFENesin    Syrup 200 milliGRAM(s) Oral every 6 hours PRN  lactated ringers. 1000 milliLiter(s) IV Continuous <Continuous>  levalbuterol Inhalation 0.63 milliGRAM(s) Inhalation every 4 hours  melatonin 1.5 milliGRAM(s) Oral at bedtime  metoprolol succinate  milliGRAM(s) Oral daily  morphine  - Injectable 0.5 milliGRAM(s) IV Push every 4 hours PRN  pantoprazole    Tablet 40 milliGRAM(s) Oral before breakfast  polyethylene glycol 3350 17 Gram(s) Oral daily PRN  predniSONE   Tablet 40 milliGRAM(s) Oral daily  sodium chloride 2 Gram(s) Oral two times a day  sodium chloride 0.65% Nasal 1 Spray(s) Both Nostrils daily PRN  sodium chloride 7% Inhalation 4 milliLiter(s) Inhalation three times a day      Vital Signs Last 24 Hrs  T(C): 36.4 (16 Jul 2019 14:50), Max: 36.6 (16 Jul 2019 05:14)  T(F): 97.6 (16 Jul 2019 14:50), Max: 97.9 (16 Jul 2019 05:14)  HR: 121 (16 Jul 2019 16:39) (109 - 132)  BP: 116/87 (16 Jul 2019 14:50) (116/87 - 138/93)  BP(mean): --  RR: 22 (16 Jul 2019 16:39) (20 - 24)  SpO2: 97% (16 Jul 2019 16:39) (94% - 100%)    PHYSICAL EXAM:  General: [x ] non-toxic  HEAD/EYES: [ ] PERRL x[ ] white sclera [ ] icterus  ENT:  [ ] normal [ x] supple [ ] thrush [ ] pharyngeal exudate  Cardiovascular:   [ ] murmur [x ] normal [ ] PPM/AICD  Respiratory:  [x ] clear to ausculation bilaterally  GI:  [x ] soft, non-tender, normal bowel sounds  :  [ ] bledsoe [ ] no CVA tenderness   Musculoskeletal:  [ ] no synovitis  Neurologic:  [ ] non-focal exam   Skin:  [x ] no rash  Lymph: [ ] no lymphadenopathy  Psychiatric:  [ ] appropriate affect [x ] alert & oriented  Lines:  [x ] no phlebitis [ ] central line                                11.3   4.27  )-----------( 137      ( 16 Jul 2019 05:45 )             35.3       07-16    137  |  97<L>  |  25<H>  ----------------------------<  96  4.0   |  28  |  0.41<L>    Ca    9.0      16 Jul 2019 05:45  Phos  2.9     07-16  Mg     1.7     07-16            MICROBIOLOGY:    RADIOLOGY:

## 2019-07-16 NOTE — PROGRESS NOTE ADULT - ASSESSMENT
79 year old with relapsed B cell lymphoma (dx 2017, now relaplse)   Treated with rituximab and revlimid in 3/2019  Treated with Obintuzmab and Bendamustine iin 4/2019    Presents with shortness of breath  She had abnormal imaging with lung nodules    On 7/1- she had a bronchoscopy    Path and culture suggestive of fungal process    1) Fungal pneumonia  aspergillosis on culture  Minimize steroids as much as feasible  Continue voriconazole- tx of choice    Even with optimal tx, outcomes of pt with underlying heme malignancy is not always good    2) Increased SOB- better today    Worse gain today  Has had prolonged abx course    Doubt adding back antibacterial coverage will help, but options limitted as she is declining    3) Immunosuppressed secondary to cancer tx  PCP prophylaxis  Continue mepron  At some point, reasonable to re-challenge with Bactrim with close monitoring of WBC    4) Delirium: improved with sleep

## 2019-07-16 NOTE — CHART NOTE - NSCHARTNOTEFT_GEN_A_CORE
Source: Patient [x ]   Family [x ]- at bedside   other [ ]    Malnutrition f/u. 80 y/o F with B cell lymphoma, respiratory failure on HFNC at time of RD visit. Per chart, pt is full code after GOC conversation and poor prognosis.  confirmed that pt continues with very poor PO during admission. Food preferences were obtained. Pt was encouraged to drink at least one Ensure/day.     Diet, Mechanical Soft:   DASH/TLC {Sodium & Cholesterol Restricted} (DASH)  Dysphagia 2, Mechancial Soft, Nectar Consistency Fluid (DYS2NC)  Supplement Feeding Modality:  Oral  Ensure Enlive Cans or Servings Per Day:  1       Frequency:  Two Times a day (07-10-19 @ 20:26)    Reported:  [ ] nausea  [ ] vomiting [ ] diarrhea [ ] constipation  [ ]chewing problems [ ] swallowing issues  [x ] other: denies all GI distress.   PO intake:  < 50% [x ] 50-75% [ ]   % [ ]  other : drinking less than 1 Ensure/day     Weight trend:  7/1 104.9 pounds   no other recent weights     Edema: none  Pressure Injuries: none    __________________ Pertinent Medications__________________   MEDICATIONS  (STANDING):  allopurinol 300 milliGRAM(s) Oral daily  apixaban 2.5 milliGRAM(s) Oral every 12 hours  atovaquone Suspension 1500 milliGRAM(s) Oral daily  benzocaine 15 mG/menthol 3.6 mG (Sugar-Free) Lozenge 1 Lozenge Oral every 4 hours  docusate sodium 100 milliGRAM(s) Oral three times a day  lactated ringers. 1000 milliLiter(s) (30 mL/Hr) IV Continuous <Continuous>  levalbuterol Inhalation 0.63 milliGRAM(s) Inhalation every 4 hours  melatonin 1.5 milliGRAM(s) Oral at bedtime  meropenem  IVPB 1000 milliGRAM(s) IV Intermittent every 8 hours  metoprolol succinate  milliGRAM(s) Oral daily  pantoprazole    Tablet 40 milliGRAM(s) Oral before breakfast  predniSONE   Tablet 40 milliGRAM(s) Oral daily  sodium chloride 2 Gram(s) Oral two times a day  sodium chloride 7% Inhalation 4 milliLiter(s) Inhalation three times a day  valACYclovir 500 milliGRAM(s) Oral <User Schedule>  voriconazole 200 milliGRAM(s) Oral every 12 hours          __________________ Pertinent Labs__________________   07-16 Na137 mmol/L Glu 96 mg/dL K+ 4.0 mmol/L Cr  0.41 mg/dL<L> BUN 25 mg/dL<H> 07-16 Phos 2.9 mg/dL      Estimated Needs:   [ x] no change since previous assessment  [ ] recalculated:       Previous Nutrition Diagnosis: severe protein calorie malnutrition     Nutrition Diagnosis is [x ] ongoing  [ ] resolved [ ] not applicable     Interventions:     Recommendations:  1. Recommend regular diet. D/c DASH   2. Continue Ensure Enlive 2x daily (700 des and 40 gm protein)   3. Encourage PO intake and honor food preferences as able.           Monitoring and Evaluation:     [x ] PO intake [ x] Tolerance to diet prescription [x ] weights [x ] follow up per protocol  [ ] other:

## 2019-07-16 NOTE — PROGRESS NOTE ADULT - ASSESSMENT
79F with DLBC lymphoma, Afib on eliquis presenting with three days of worsening SOB and productive cough, found to have entero/rhinovirus and fungal infection.    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 Then esophageal biopsy in 12/2017 consistent with high grade lymphoma  - Patient was started on revlimid and rituximab followed by obi and bendamustine  - Most recently underwent thoracentesis which was positive for lymphoma  - CT chest from 6/27: The bilateral nodules have increased in size in number. In particular, there are multiple masses in the bilateral lobes, with extension into the right lower lobe bronchus. The chest lymph nodes are malignant. The prevascular lymph node, in particular, has increased in size.  - Palliative care consult   - Outpatient follow up with Dr. Nguyen who will determine if a patient is a candidate for further treatment     # Pancytopenia  - Now improving from admission   - Continue to check CBC diff daily  - goal hgb > 7 and plt >10k if afebrile, >15k if febrile     # Hypoxic respiratory failure   - CT chest: complete occlusion of the right lower lobe bronchus and the segmental bronchi of the right lower lobe with a central opacity  - Bronch with biopsy done -> aspergillosis   - Appreciate ID recs: Continue voriconazole pending ID of mold. Plan 6 weeks of antifungal coverage  - Pulmonary following  - CXR 7/15: Increased right-sided pleural effusion. Redemonstrated bilateral patchy opacities, increased on the right  - Patient deteriorating clinically, family still wishes for her to be full code    Coral Olazagasti  Hematology Fellow  [phone]

## 2019-07-17 DIAGNOSIS — M79.89 OTHER SPECIFIED SOFT TISSUE DISORDERS: ICD-10-CM

## 2019-07-17 DIAGNOSIS — E43 UNSPECIFIED SEVERE PROTEIN-CALORIE MALNUTRITION: ICD-10-CM

## 2019-07-17 LAB
ANION GAP SERPL CALC-SCNC: 12 MMO/L — SIGNIFICANT CHANGE UP (ref 7–14)
BUN SERPL-MCNC: 27 MG/DL — HIGH (ref 7–23)
CALCIUM SERPL-MCNC: 9.2 MG/DL — SIGNIFICANT CHANGE UP (ref 8.4–10.5)
CHLORIDE SERPL-SCNC: 99 MMOL/L — SIGNIFICANT CHANGE UP (ref 98–107)
CO2 SERPL-SCNC: 28 MMOL/L — SIGNIFICANT CHANGE UP (ref 22–31)
CREAT SERPL-MCNC: 0.42 MG/DL — LOW (ref 0.5–1.3)
GLUCOSE SERPL-MCNC: 129 MG/DL — HIGH (ref 70–99)
HCT VFR BLD CALC: 38.3 % — SIGNIFICANT CHANGE UP (ref 34.5–45)
HGB BLD-MCNC: 11.9 G/DL — SIGNIFICANT CHANGE UP (ref 11.5–15.5)
MAGNESIUM SERPL-MCNC: 1.7 MG/DL — SIGNIFICANT CHANGE UP (ref 1.6–2.6)
MCHC RBC-ENTMCNC: 31.1 % — LOW (ref 32–36)
MCHC RBC-ENTMCNC: 31.9 PG — SIGNIFICANT CHANGE UP (ref 27–34)
MCV RBC AUTO: 102.7 FL — HIGH (ref 80–100)
NRBC # FLD: 0.27 K/UL — SIGNIFICANT CHANGE UP (ref 0–0)
NRBC FLD-RTO: 6.7 — SIGNIFICANT CHANGE UP
PHOSPHATE SERPL-MCNC: 2.6 MG/DL — SIGNIFICANT CHANGE UP (ref 2.5–4.5)
PLATELET # BLD AUTO: 146 K/UL — LOW (ref 150–400)
PMV BLD: 12.8 FL — SIGNIFICANT CHANGE UP (ref 7–13)
POTASSIUM SERPL-MCNC: 4.7 MMOL/L — SIGNIFICANT CHANGE UP (ref 3.5–5.3)
POTASSIUM SERPL-SCNC: 4.7 MMOL/L — SIGNIFICANT CHANGE UP (ref 3.5–5.3)
RBC # BLD: 3.73 M/UL — LOW (ref 3.8–5.2)
RBC # FLD: 21.9 % — HIGH (ref 10.3–14.5)
SODIUM SERPL-SCNC: 139 MMOL/L — SIGNIFICANT CHANGE UP (ref 135–145)
WBC # BLD: 4.06 K/UL — SIGNIFICANT CHANGE UP (ref 3.8–10.5)
WBC # FLD AUTO: 4.06 K/UL — SIGNIFICANT CHANGE UP (ref 3.8–10.5)

## 2019-07-17 PROCEDURE — 99233 SBSQ HOSP IP/OBS HIGH 50: CPT

## 2019-07-17 PROCEDURE — 99232 SBSQ HOSP IP/OBS MODERATE 35: CPT

## 2019-07-17 PROCEDURE — 99232 SBSQ HOSP IP/OBS MODERATE 35: CPT | Mod: GC

## 2019-07-17 RX ORDER — APIXABAN 2.5 MG/1
2.5 TABLET, FILM COATED ORAL EVERY 12 HOURS
Refills: 0 | Status: DISCONTINUED | OUTPATIENT
Start: 2019-07-18 | End: 2019-08-06

## 2019-07-17 RX ORDER — VALACYCLOVIR 500 MG/1
500 TABLET, FILM COATED ORAL
Refills: 0 | Status: DISCONTINUED | OUTPATIENT
Start: 2019-07-17 | End: 2019-08-06

## 2019-07-17 RX ORDER — VORICONAZOLE 10 MG/ML
200 INJECTION, POWDER, LYOPHILIZED, FOR SOLUTION INTRAVENOUS EVERY 12 HOURS
Refills: 0 | Status: DISCONTINUED | OUTPATIENT
Start: 2019-07-17 | End: 2019-07-26

## 2019-07-17 RX ORDER — ATOVAQUONE 750 MG/5ML
1500 SUSPENSION ORAL DAILY
Refills: 0 | Status: DISCONTINUED | OUTPATIENT
Start: 2019-07-17 | End: 2019-08-06

## 2019-07-17 RX ADMIN — MEROPENEM 100 MILLIGRAM(S): 1 INJECTION INTRAVENOUS at 05:41

## 2019-07-17 RX ADMIN — Medication 100 MILLIGRAM(S): at 05:40

## 2019-07-17 RX ADMIN — BENZOCAINE AND MENTHOL 1 LOZENGE: 5; 1 LIQUID ORAL at 22:43

## 2019-07-17 RX ADMIN — LEVALBUTEROL 0.63 MILLIGRAM(S): 1.25 SOLUTION, CONCENTRATE RESPIRATORY (INHALATION) at 16:22

## 2019-07-17 RX ADMIN — BENZOCAINE AND MENTHOL 1 LOZENGE: 5; 1 LIQUID ORAL at 11:11

## 2019-07-17 RX ADMIN — BENZOCAINE AND MENTHOL 1 LOZENGE: 5; 1 LIQUID ORAL at 05:41

## 2019-07-17 RX ADMIN — LEVALBUTEROL 0.63 MILLIGRAM(S): 1.25 SOLUTION, CONCENTRATE RESPIRATORY (INHALATION) at 20:01

## 2019-07-17 RX ADMIN — ATOVAQUONE 1500 MILLIGRAM(S): 750 SUSPENSION ORAL at 11:10

## 2019-07-17 RX ADMIN — Medication 100 MILLIGRAM(S): at 13:13

## 2019-07-17 RX ADMIN — LEVALBUTEROL 0.63 MILLIGRAM(S): 1.25 SOLUTION, CONCENTRATE RESPIRATORY (INHALATION) at 04:47

## 2019-07-17 RX ADMIN — Medication 300 MILLIGRAM(S): at 11:10

## 2019-07-17 RX ADMIN — SODIUM CHLORIDE 4 MILLILITER(S): 9 INJECTION INTRAMUSCULAR; INTRAVENOUS; SUBCUTANEOUS at 16:25

## 2019-07-17 RX ADMIN — LEVALBUTEROL 0.63 MILLIGRAM(S): 1.25 SOLUTION, CONCENTRATE RESPIRATORY (INHALATION) at 13:03

## 2019-07-17 RX ADMIN — SODIUM CHLORIDE 4 MILLILITER(S): 9 INJECTION INTRAMUSCULAR; INTRAVENOUS; SUBCUTANEOUS at 09:20

## 2019-07-17 RX ADMIN — SODIUM CHLORIDE 2 GRAM(S): 9 INJECTION INTRAMUSCULAR; INTRAVENOUS; SUBCUTANEOUS at 17:43

## 2019-07-17 RX ADMIN — Medication 100 MILLIGRAM(S): at 22:43

## 2019-07-17 RX ADMIN — Medication 40 MILLIGRAM(S): at 05:40

## 2019-07-17 RX ADMIN — BENZOCAINE AND MENTHOL 1 LOZENGE: 5; 1 LIQUID ORAL at 17:43

## 2019-07-17 RX ADMIN — SODIUM CHLORIDE 30 MILLILITER(S): 9 INJECTION, SOLUTION INTRAVENOUS at 22:42

## 2019-07-17 RX ADMIN — Medication 1.5 MILLIGRAM(S): at 22:43

## 2019-07-17 RX ADMIN — VORICONAZOLE 200 MILLIGRAM(S): 10 INJECTION, POWDER, LYOPHILIZED, FOR SOLUTION INTRAVENOUS at 05:40

## 2019-07-17 RX ADMIN — APIXABAN 2.5 MILLIGRAM(S): 2.5 TABLET, FILM COATED ORAL at 05:40

## 2019-07-17 RX ADMIN — MEROPENEM 100 MILLIGRAM(S): 1 INJECTION INTRAVENOUS at 22:42

## 2019-07-17 RX ADMIN — MEROPENEM 100 MILLIGRAM(S): 1 INJECTION INTRAVENOUS at 13:14

## 2019-07-17 RX ADMIN — VORICONAZOLE 200 MILLIGRAM(S): 10 INJECTION, POWDER, LYOPHILIZED, FOR SOLUTION INTRAVENOUS at 17:43

## 2019-07-17 RX ADMIN — PANTOPRAZOLE SODIUM 40 MILLIGRAM(S): 20 TABLET, DELAYED RELEASE ORAL at 05:41

## 2019-07-17 RX ADMIN — LEVALBUTEROL 0.63 MILLIGRAM(S): 1.25 SOLUTION, CONCENTRATE RESPIRATORY (INHALATION) at 01:00

## 2019-07-17 RX ADMIN — Medication 100 MILLIGRAM(S): at 05:41

## 2019-07-17 RX ADMIN — SODIUM CHLORIDE 4 MILLILITER(S): 9 INJECTION INTRAMUSCULAR; INTRAVENOUS; SUBCUTANEOUS at 19:50

## 2019-07-17 RX ADMIN — SODIUM CHLORIDE 2 GRAM(S): 9 INJECTION INTRAMUSCULAR; INTRAVENOUS; SUBCUTANEOUS at 05:40

## 2019-07-17 RX ADMIN — VALACYCLOVIR 500 MILLIGRAM(S): 500 TABLET, FILM COATED ORAL at 17:43

## 2019-07-17 RX ADMIN — LEVALBUTEROL 0.63 MILLIGRAM(S): 1.25 SOLUTION, CONCENTRATE RESPIRATORY (INHALATION) at 09:17

## 2019-07-17 NOTE — PROGRESS NOTE ADULT - PROBLEM SELECTOR PLAN 4
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

## 2019-07-17 NOTE — PROGRESS NOTE ADULT - PROBLEM SELECTOR PLAN 7
as per nutrition rec regular diet. D/c DASH   Ensure Enlive 2x daily (700 des and 40 gm protein) BP stable  - continue to hold home olmesartan

## 2019-07-17 NOTE — PROGRESS NOTE ADULT - SUBJECTIVE AND OBJECTIVE BOX
CHIEF COMPLAINT:  cough, dyspnea    Interval Events:  Feels better on high flow,  expectorating    REVIEW OF SYSTEMS:  Constitutional: [X ] negative [ ] fevers [ ] chills [ ] weight loss [ ] weight gain  HEENT: [X ] negative [ ] dry eyes [ ] eye irritation [ ] postnasal drip [ ] nasal congestion  CV: [ X] negative  [ ] chest pain [ ] orthopnea [ ] palpitations [ ] murmur  Resp: [ ] negative [ X] cough [ X] shortness of breath [ X] dyspnea [ ] wheezing [X ] sputum [ ] hemoptysis  GI: [X ] negative [ ] nausea [ ] vomiting [ ] diarrhea [ ] constipation [ ] abd pain [ ] dysphagia   : [ X] negative [ ] dysuria [ ] nocturia [ ] hematuria [ ] increased urinary frequency  Musculoskeletal: [X ] negative [ ] back pain [ ] myalgias [ ] arthralgias [ ] fracture  Skin: [ X] negative [ ] rash [ ] itch  Neurological: [ ] negative [ ] headache [ ] dizziness [ ] syncope [ ] weakness [ ] numbness  Psychiatric: [ ] negative [ ] anxiety [ ] depression  Endocrine: [ ] negative [ ] diabetes [ ] thyroid problem  Hematologic/Lymphatic: [ ] negative [ ] anemia [ ] bleeding problem  Allergic/Immunologic: [ ] negative [ ] itchy eyes [ ] nasal discharge [ ] hives [ ] angioedema  [X ] All other systems negative      OBJECTIVE:  ICU Vital Signs Last 24 Hrs  T(C): 36.8 (17 Jul 2019 05:38), Max: 36.8 (16 Jul 2019 22:46)  T(F): 98.3 (17 Jul 2019 05:38), Max: 98.3 (17 Jul 2019 05:38)  HR: 71 (17 Jul 2019 09:21) (71 - 129)  BP: 113/71 (17 Jul 2019 05:38) (113/71 - 128/71)  RR: 19 (17 Jul 2019 07:20) (19 - 24)  SpO2: 98% (17 Jul 2019 09:21) (90% - 98%)      CAPILLARY BLOOD GLUCOSE      PHYSICAL EXAM:  General: ill appearing woman in no distress  Respiratory: normal effort on high flow  40/45  bibasilar rhonchi  Cardiovascular:  rate regular, normal heart sounds  Abdomen: thin, NT, ND  Extremities: minimal LE edema  Neurological: awake alert, attentive, sluggish    HOSPITAL MEDICATIONS:  MEDICATIONS  (STANDING):  allopurinol 300 milliGRAM(s) Oral daily  apixaban 2.5 milliGRAM(s) Oral every 12 hours  atovaquone Suspension 1500 milliGRAM(s) Oral daily  benzocaine 15 mG/menthol 3.6 mG (Sugar-Free) Lozenge 1 Lozenge Oral every 4 hours  docusate sodium 100 milliGRAM(s) Oral three times a day  lactated ringers. 1000 milliLiter(s) (30 mL/Hr) IV Continuous <Continuous>  levalbuterol Inhalation 0.63 milliGRAM(s) Inhalation every 4 hours  melatonin 1.5 milliGRAM(s) Oral at bedtime  meropenem  IVPB 1000 milliGRAM(s) IV Intermittent every 8 hours  metoprolol succinate  milliGRAM(s) Oral daily  pantoprazole    Tablet 40 milliGRAM(s) Oral before breakfast  predniSONE   Tablet 40 milliGRAM(s) Oral daily  sodium chloride 2 Gram(s) Oral two times a day  sodium chloride 7% Inhalation 4 milliLiter(s) Inhalation three times a day  valACYclovir 500 milliGRAM(s) Oral <User Schedule>  voriconazole 200 milliGRAM(s) Oral every 12 hours    MEDICATIONS  (PRN):  benzonatate 100 milliGRAM(s) Oral three times a day PRN Cough  bisacodyl 5 milliGRAM(s) Oral every 12 hours PRN Constipation  guaiFENesin    Syrup 200 milliGRAM(s) Oral every 6 hours PRN Cough  morphine  - Injectable 0.5 milliGRAM(s) IV Push every 4 hours PRN dyspnea  polyethylene glycol 3350 17 Gram(s) Oral daily PRN Constipation  sodium chloride 0.65% Nasal 1 Spray(s) Both Nostrils daily PRN Nasal Congestion      LABS:                        11.9   4.06  )-----------( 146      ( 17 Jul 2019 06:59 )             38.3     Hgb Trend: 11.9<--, 11.3<--, 11.4<--, 10.8<--, 11.4<--  07-17    139  |  99  |  27<H>  ----------------------------<  129<H>  4.7   |  28  |  0.42<L>    Ca    9.2      17 Jul 2019 06:59  Phos  2.6     07-17  Mg     1.7     07-17      Creatinine Trend: 0.42<--, 0.41<--, 0.47<--, 0.40<--, 0.43<--, 0.41<--      Arterial Blood Gas:  07-15 @ 09:59  7.48/41/149/30/99.6/6.2  ABG lactate: -- CHIEF COMPLAINT:  cough, dyspnea    Interval Events:  Feels better on high flow,  expectorating with Chest PT  still sob, no wheezing, no fevers.    REVIEW OF SYSTEMS:  Constitutional: [X ] negative [ ] fevers [ ] chills [ ] weight loss [ ] weight gain  HEENT: [X ] negative [ ] dry eyes [ ] eye irritation [ ] postnasal drip [ ] nasal congestion  CV: [ X] negative  [ ] chest pain [ ] orthopnea [ ] palpitations [ ] murmur  Resp: [ ] negative [ X] cough [ X] shortness of breath [ X] dyspnea [ ] wheezing [X ] sputum [ ] hemoptysis  GI: [X ] negative [ ] nausea [ ] vomiting [ ] diarrhea [ ] constipation [ ] abd pain [ ] dysphagia   : [ X] negative [ ] dysuria [ ] nocturia [ ] hematuria [ ] increased urinary frequency  Musculoskeletal: [X ] negative [ ] back pain [ ] myalgias [ ] arthralgias [ ] fracture  Skin: [ X] negative [ ] rash [ ] itch  Neurological: [ ] negative [ ] headache [ ] dizziness [ ] syncope [ ] weakness [ ] numbness  Psychiatric: [ ] negative [ ] anxiety [ ] depression  Endocrine: [ ] negative [ ] diabetes [ ] thyroid problem  Hematologic/Lymphatic: [ ] negative [ ] anemia [ ] bleeding problem  Allergic/Immunologic: [ ] negative [ ] itchy eyes [ ] nasal discharge [ ] hives [ ] angioedema  [X ] All other systems negative      OBJECTIVE:  ICU Vital Signs Last 24 Hrs  T(C): 36.8 (17 Jul 2019 05:38), Max: 36.8 (16 Jul 2019 22:46)  T(F): 98.3 (17 Jul 2019 05:38), Max: 98.3 (17 Jul 2019 05:38)  HR: 71 (17 Jul 2019 09:21) (71 - 129)  BP: 113/71 (17 Jul 2019 05:38) (113/71 - 128/71)  RR: 19 (17 Jul 2019 07:20) (19 - 24)  SpO2: 98% (17 Jul 2019 09:21) (90% - 98%)      CAPILLARY BLOOD GLUCOSE      PHYSICAL EXAM:  General: ill appearing woman in no distress  Respiratory: normal effort on high flow  40/45  bibasilar rhonchi  Cardiovascular:  rate regular, normal heart sounds  Abdomen: thin, NT, ND  Extremities: minimal LE edema  Neurological: awake alert, attentive, sluggish    HOSPITAL MEDICATIONS:  MEDICATIONS  (STANDING):  allopurinol 300 milliGRAM(s) Oral daily  apixaban 2.5 milliGRAM(s) Oral every 12 hours  atovaquone Suspension 1500 milliGRAM(s) Oral daily  benzocaine 15 mG/menthol 3.6 mG (Sugar-Free) Lozenge 1 Lozenge Oral every 4 hours  docusate sodium 100 milliGRAM(s) Oral three times a day  lactated ringers. 1000 milliLiter(s) (30 mL/Hr) IV Continuous <Continuous>  levalbuterol Inhalation 0.63 milliGRAM(s) Inhalation every 4 hours  melatonin 1.5 milliGRAM(s) Oral at bedtime  meropenem  IVPB 1000 milliGRAM(s) IV Intermittent every 8 hours  metoprolol succinate  milliGRAM(s) Oral daily  pantoprazole    Tablet 40 milliGRAM(s) Oral before breakfast  predniSONE   Tablet 40 milliGRAM(s) Oral daily  sodium chloride 2 Gram(s) Oral two times a day  sodium chloride 7% Inhalation 4 milliLiter(s) Inhalation three times a day  valACYclovir 500 milliGRAM(s) Oral <User Schedule>  voriconazole 200 milliGRAM(s) Oral every 12 hours    MEDICATIONS  (PRN):  benzonatate 100 milliGRAM(s) Oral three times a day PRN Cough  bisacodyl 5 milliGRAM(s) Oral every 12 hours PRN Constipation  guaiFENesin    Syrup 200 milliGRAM(s) Oral every 6 hours PRN Cough  morphine  - Injectable 0.5 milliGRAM(s) IV Push every 4 hours PRN dyspnea  polyethylene glycol 3350 17 Gram(s) Oral daily PRN Constipation  sodium chloride 0.65% Nasal 1 Spray(s) Both Nostrils daily PRN Nasal Congestion      LABS:                        11.9   4.06  )-----------( 146      ( 17 Jul 2019 06:59 )             38.3     Hgb Trend: 11.9<--, 11.3<--, 11.4<--, 10.8<--, 11.4<--  07-17    139  |  99  |  27<H>  ----------------------------<  129<H>  4.7   |  28  |  0.42<L>    Ca    9.2      17 Jul 2019 06:59  Phos  2.6     07-17  Mg     1.7     07-17      Creatinine Trend: 0.42<--, 0.41<--, 0.47<--, 0.40<--, 0.43<--, 0.41<--      Arterial Blood Gas:  07-15 @ 09:59  7.48/41/149/30/99.6/6.2  ABG lactate: --

## 2019-07-17 NOTE — PROGRESS NOTE ADULT - PROBLEM SELECTOR PLAN 5
Follows with Dr. Nguyen at [facility]. as per family, [facility] is not offering any disease modifying treatment. Pt is looking for experimental trial if pt clinically improves from current medical issues. Poss with Dr. Davies  - C/w ppx mepron, valacyclovir and allopurinol  - [facility] discussed with pt and daughter for poor prognosis - They wish to be full code for now  - Patient and family agreeable to defer experimental chemo until after VALERIO. Likely BM suppression in setting of active CA  - continue to monitor CBC, transfuse if needed

## 2019-07-17 NOTE — PROGRESS NOTE ADULT - PROBLEM SELECTOR PLAN 9
Full code for now.  Appreciate palliative care consult. - DVT ppx: Eliquis  - Aspiration/fall precautions   - Diet: regular

## 2019-07-17 NOTE — PROGRESS NOTE ADULT - PROBLEM SELECTOR PLAN 8
- DVT ppx: Eliquis  - Aspiration/fall precautions   - Diet: regular as per nutrition rec regular diet. D/c DASH   Ensure Enlive 2x daily (700 des and 40 gm protein)

## 2019-07-17 NOTE — PROGRESS NOTE ADULT - PROBLEM SELECTOR PLAN 6
BP stable  - continue to hold home olmesartan Follows with Dr. Nguyen at [facility]. as per family, [facility] is not offering any disease modifying treatment. Pt is looking for experimental trial if pt clinically improves from current medical issues. Poss with Dr. Davies  - C/w ppx mepron, valacyclovir and allopurinol  - [facility] discussed with pt and daughter for poor prognosis - They wish to be full code for now  - Patient and family agreeable to defer experimental chemo until after VALERIO.

## 2019-07-17 NOTE — PROGRESS NOTE ADULT - ASSESSMENT
79yF with advanced DLBCL, now complicated by fungal pneumonia, progression of disease causing hypoxic respiratory failure.   Had been on/off bipap for many days, now on high flow for the past 2 days, able to converse, eat, feels somewhat improved. Doing better overall.     Would continue antimicrobials and avoid positive pressure. Needs mucus/secretions clearance which is inhibited by bipap.     Overall, not improving despite appropriate antimicrobial therapy. Most likely reason is impaired host response and immune dysfunction due to her cancer.       Recs  Continue high flow with goal of titrating down as she can. Improved with coughing and nebs!  Continue aerobika for airway clearance  Continue metanebs at least 3x day preceded by albuterol and 7% saline nebs  Continue treatment for fungal pneumonia per ID

## 2019-07-17 NOTE — PROGRESS NOTE ADULT - SUBJECTIVE AND OBJECTIVE BOX
Follow Up:      Inverval History/ROS:Patient is a 79y old  Female who presents with a chief complaint of SOB (17 Jul 2019 09:37)    On high flow.   No fever    Appears more comfortable    Allergies    No Known Allergies    Intolerances        ANTIMICROBIALS:  atovaquone Suspension 1500 daily  meropenem  IVPB 1000 every 8 hours  valACYclovir 500 <User Schedule>  voriconazole 200 every 12 hours      OTHER MEDS:  allopurinol 300 milliGRAM(s) Oral daily  benzocaine 15 mG/menthol 3.6 mG (Sugar-Free) Lozenge 1 Lozenge Oral every 4 hours  benzonatate 100 milliGRAM(s) Oral three times a day PRN  bisacodyl 5 milliGRAM(s) Oral every 12 hours PRN  docusate sodium 100 milliGRAM(s) Oral three times a day  guaiFENesin    Syrup 200 milliGRAM(s) Oral every 6 hours PRN  lactated ringers. 1000 milliLiter(s) IV Continuous <Continuous>  levalbuterol Inhalation 0.63 milliGRAM(s) Inhalation every 4 hours  melatonin 1.5 milliGRAM(s) Oral at bedtime  metoprolol succinate  milliGRAM(s) Oral daily  morphine  - Injectable 0.5 milliGRAM(s) IV Push every 4 hours PRN  pantoprazole    Tablet 40 milliGRAM(s) Oral before breakfast  polyethylene glycol 3350 17 Gram(s) Oral daily PRN  predniSONE   Tablet 40 milliGRAM(s) Oral daily  sodium chloride 2 Gram(s) Oral two times a day  sodium chloride 0.65% Nasal 1 Spray(s) Both Nostrils daily PRN  sodium chloride 7% Inhalation 4 milliLiter(s) Inhalation three times a day      Vital Signs Last 24 Hrs  T(C): 36.3 (17 Jul 2019 13:24), Max: 36.8 (16 Jul 2019 22:46)  T(F): 97.3 (17 Jul 2019 13:24), Max: 98.3 (17 Jul 2019 05:38)  HR: 70 (17 Jul 2019 16:25) (70 - 129)  BP: 127/93 (17 Jul 2019 13:24) (112/83 - 128/71)  BP(mean): --  RR: 19 (17 Jul 2019 16:21) (18 - 20)  SpO2: 93% (17 Jul 2019 16:25) (90% - 98%)    PHYSICAL EXAM:  General: [ x] on high flow  HEAD/EYES: [ ] PERRL [ x] white sclera [ ] icterus  ENT:  [ ] normal [x ] supple [ ] thrush [ ] pharyngeal exudate  Cardiovascular:   [ ] murmur [x ] normal [ ] PPM/AICD  Respiratory:  [x ] clear to ausculation bilaterally  GI:  [ x] soft, non-tender, normal bowel sounds  :  [ ] bledsoe [x ] no CVA tenderness   Musculoskeletal:  [ ] no synovitis  Neurologic:  [ ] non-focal exam   Skin:  [ x] no rash  Lymph: [x ] no lymphadenopathy  Psychiatric:  [ x] appropriate affect [ ] alert & oriented  Lines:  [x ] no phlebitis [ ] central line                                11.9   4.06  )-----------( 146      ( 17 Jul 2019 06:59 )             38.3       07-17    139  |  99  |  27<H>  ----------------------------<  129<H>  4.7   |  28  |  0.42<L>    Ca    9.2      17 Jul 2019 06:59  Phos  2.6     07-17  Mg     1.7     07-17            MICROBIOLOGY:    RADIOLOGY:

## 2019-07-17 NOTE — PROGRESS NOTE ADULT - PROBLEM SELECTOR PLAN 1
Afib w/ RVR likely d/t infection and hypoxia  - Monitor on tele   - increase metoprolol to 100  - cont apixaban LT>RT check LE duplex r/o DVT

## 2019-07-17 NOTE — PROGRESS NOTE ADULT - PROBLEM SELECTOR PLAN 2
-RLL bronchus obstruction with concern for postobstructive fungal PNA  - cont O2 to maintain SpO2>90 Hiflo if needed BiPAP  - standing airway clearance therapy, Xopenex q4 hours, following by nebulized 3% hypertonic saline, and then Aerobika device as per Pulm recommendations  -7/15 repeat CXR reviewed with pulm worsening RT sided opacity likely worsening underlying pna and atelectasis in setting of endobronchial obstruction (mucous vs fungal vs tumor) ABG reviewed WOB likely due to hypoxia recommending HiFlo this was reviewed with family at bedside.   -meropenem restarted as per ID   -palliative recs appreciated conveyed poor prognosis to family still wishes pt to be full code.  -currently on steroid will d/w Pulm in regards to tapering recs appreciated Afib w/ RVR likely d/t infection and hypoxia  - Monitor on tele   - metoprolol to 100  - cont apixaban

## 2019-07-17 NOTE — PROGRESS NOTE ADULT - ASSESSMENT
79 year old with relapsed B cell lymphoma (dx 2017, now relaplse)   Treated with rituximab and revlimid in 3/2019  Treated with Obintuzmab and Bendamustine iin 4/2019    Presents with shortness of breath  She had abnormal imaging with lung nodules    On 7/1- she had a bronchoscopy    Path and culture suggestive of fungal process    1) Fungal pneumonia  aspergillosis on culture  Minimize steroids as much as feasible  Continue voriconazole- tx of choice    Even with optimal tx, outcomes of pt with underlying heme malignancy is not always good    2) Increased SOB- better today  Stop meropenem 7/20    3) Immunosuppressed secondary to cancer tx  PCP prophylaxis  Continue mepron  At some point, reasonable to re-challenge with Bactrim with close monitoring of WBC    4) Delirium: improved with sleep    Check voriconazole through (a send out)

## 2019-07-17 NOTE — PROGRESS NOTE ADULT - PROBLEM SELECTOR PLAN 3
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  - f/u final culture  - continue mepron 1500 mg po daily  - ID following -RLL bronchus obstruction with concern for postobstructive fungal PNA  - cont O2 to maintain SpO2>90 Hiflo if needed BiPAP  - standing airway clearance therapy, Xopenex q4 hours, following by nebulized 3% hypertonic saline, and then Aerobika device as per Pulm recommendations  -7/15 repeat CXR reviewed with pulm worsening RT sided opacity likely worsening underlying pna and atelectasis in setting of endobronchial obstruction (mucous vs fungal vs tumor) ABG reviewed WOB likely due to hypoxia recommending HiFlo this was reviewed with family at bedside.   -meropenem restarted as per ID   -palliative recs appreciated conveyed poor prognosis to family still wishes pt to be full code.  -currently on steroid will d/w Pulm in regards to tapering recs appreciated

## 2019-07-17 NOTE — PROGRESS NOTE ADULT - SUBJECTIVE AND OBJECTIVE BOX
Patient is a 79y old  Female who presents with a chief complaint of SOB (16 Jul 2019 16:46)      SUBJECTIVE / OVERNIGHT EVENTS:    MEDICATIONS  (STANDING):  allopurinol 300 milliGRAM(s) Oral daily  apixaban 2.5 milliGRAM(s) Oral every 12 hours  atovaquone Suspension 1500 milliGRAM(s) Oral daily  benzocaine 15 mG/menthol 3.6 mG (Sugar-Free) Lozenge 1 Lozenge Oral every 4 hours  docusate sodium 100 milliGRAM(s) Oral three times a day  lactated ringers. 1000 milliLiter(s) (30 mL/Hr) IV Continuous <Continuous>  levalbuterol Inhalation 0.63 milliGRAM(s) Inhalation every 4 hours  melatonin 1.5 milliGRAM(s) Oral at bedtime  meropenem  IVPB 1000 milliGRAM(s) IV Intermittent every 8 hours  metoprolol succinate  milliGRAM(s) Oral daily  pantoprazole    Tablet 40 milliGRAM(s) Oral before breakfast  predniSONE   Tablet 40 milliGRAM(s) Oral daily  sodium chloride 2 Gram(s) Oral two times a day  sodium chloride 7% Inhalation 4 milliLiter(s) Inhalation three times a day  valACYclovir 500 milliGRAM(s) Oral <User Schedule>  voriconazole 200 milliGRAM(s) Oral every 12 hours    MEDICATIONS  (PRN):  benzonatate 100 milliGRAM(s) Oral three times a day PRN Cough  bisacodyl 5 milliGRAM(s) Oral every 12 hours PRN Constipation  guaiFENesin    Syrup 200 milliGRAM(s) Oral every 6 hours PRN Cough  morphine  - Injectable 0.5 milliGRAM(s) IV Push every 4 hours PRN dyspnea  polyethylene glycol 3350 17 Gram(s) Oral daily PRN Constipation  sodium chloride 0.65% Nasal 1 Spray(s) Both Nostrils daily PRN Nasal Congestion        CAPILLARY BLOOD GLUCOSE        I&O's Summary      T(C): 36.8 (07-17-19 @ 05:38), Max: 36.8 (07-16-19 @ 22:46)  HR: 71 (07-17-19 @ 09:21) (71 - 129)  BP: 113/71 (07-17-19 @ 05:38) (113/71 - 128/71)  RR: 19 (07-17-19 @ 07:20) (19 - 24)  SpO2: 98% (07-17-19 @ 09:21) (90% - 98%)    PHYSICAL EXAM:  GENERAL: dsypneic with speaking on HiFlo  HEAD:  Atraumatic, Normocephalic  EYES: EOMI, conjunctiva and sclera clear  NECK: Supple, No JVD  CHEST/LUNG:rhonci b/l   HEART: s1/s2 iregularly irregular   ABDOMEN: Soft, Nontender, Nondistended; Bowel sounds present  EXTREMITIES:  2+ Peripheral Pulses, No clubbing, cyanosis, or edema  PSYCH: AAOx3    LABS:                        11.9   4.06  )-----------( 146      ( 17 Jul 2019 06:59 )             38.3     07-17    139  |  99  |  27<H>  ----------------------------<  129<H>  4.7   |  28  |  0.42<L>    Ca    9.2      17 Jul 2019 06:59  Phos  2.6     07-17  Mg     1.7     07-17                  RADIOLOGY & ADDITIONAL TESTS:    Imaging Personally Reviewed:    Consultant(s) Notes Reviewed:      Care Discussed with Consultants/Other Providers: Patient is a 79y old  Female who presents with a chief complaint of SOB (16 Jul 2019 16:46)      SUBJECTIVE / OVERNIGHT EVENTS: Pt  at bedside notes increase LT LE swelling.     MEDICATIONS  (STANDING):  allopurinol 300 milliGRAM(s) Oral daily  apixaban 2.5 milliGRAM(s) Oral every 12 hours  atovaquone Suspension 1500 milliGRAM(s) Oral daily  benzocaine 15 mG/menthol 3.6 mG (Sugar-Free) Lozenge 1 Lozenge Oral every 4 hours  docusate sodium 100 milliGRAM(s) Oral three times a day  lactated ringers. 1000 milliLiter(s) (30 mL/Hr) IV Continuous <Continuous>  levalbuterol Inhalation 0.63 milliGRAM(s) Inhalation every 4 hours  melatonin 1.5 milliGRAM(s) Oral at bedtime  meropenem  IVPB 1000 milliGRAM(s) IV Intermittent every 8 hours  metoprolol succinate  milliGRAM(s) Oral daily  pantoprazole    Tablet 40 milliGRAM(s) Oral before breakfast  predniSONE   Tablet 40 milliGRAM(s) Oral daily  sodium chloride 2 Gram(s) Oral two times a day  sodium chloride 7% Inhalation 4 milliLiter(s) Inhalation three times a day  valACYclovir 500 milliGRAM(s) Oral <User Schedule>  voriconazole 200 milliGRAM(s) Oral every 12 hours    MEDICATIONS  (PRN):  benzonatate 100 milliGRAM(s) Oral three times a day PRN Cough  bisacodyl 5 milliGRAM(s) Oral every 12 hours PRN Constipation  guaiFENesin    Syrup 200 milliGRAM(s) Oral every 6 hours PRN Cough  morphine  - Injectable 0.5 milliGRAM(s) IV Push every 4 hours PRN dyspnea  polyethylene glycol 3350 17 Gram(s) Oral daily PRN Constipation  sodium chloride 0.65% Nasal 1 Spray(s) Both Nostrils daily PRN Nasal Congestion        CAPILLARY BLOOD GLUCOSE        I&O's Summary      T(C): 36.8 (07-17-19 @ 05:38), Max: 36.8 (07-16-19 @ 22:46)  HR: 71 (07-17-19 @ 09:21) (71 - 129)  BP: 113/71 (07-17-19 @ 05:38) (113/71 - 128/71)  RR: 19 (07-17-19 @ 07:20) (19 - 24)  SpO2: 98% (07-17-19 @ 09:21) (90% - 98%)    PHYSICAL EXAM:  GENERAL: dsypneic with speaking on HiFlo  HEAD:  Atraumatic, Normocephalic  EYES: EOMI, conjunctiva and sclera clear  NECK: Supple, No JVD  CHEST/LUNG:rhonci b/l   HEART: s1/s2 iregularly irregular   ABDOMEN: Soft, Nontender, Nondistended; Bowel sounds present  EXTREMITIES:  LT>RT LE     LABS:                        11.9   4.06  )-----------( 146      ( 17 Jul 2019 06:59 )             38.3     07-17    139  |  99  |  27<H>  ----------------------------<  129<H>  4.7   |  28  |  0.42<L>    Ca    9.2      17 Jul 2019 06:59  Phos  2.6     07-17  Mg     1.7     07-17                  RADIOLOGY & ADDITIONAL TESTS:    Imaging Personally Reviewed:    Consultant(s) Notes Reviewed:      Care Discussed with Consultants/Other Providers:

## 2019-07-18 LAB
ANION GAP SERPL CALC-SCNC: 13 MMO/L — SIGNIFICANT CHANGE UP (ref 7–14)
BUN SERPL-MCNC: 29 MG/DL — HIGH (ref 7–23)
CALCIUM SERPL-MCNC: 9.2 MG/DL — SIGNIFICANT CHANGE UP (ref 8.4–10.5)
CHLORIDE SERPL-SCNC: 98 MMOL/L — SIGNIFICANT CHANGE UP (ref 98–107)
CO2 SERPL-SCNC: 28 MMOL/L — SIGNIFICANT CHANGE UP (ref 22–31)
CREAT SERPL-MCNC: 0.41 MG/DL — LOW (ref 0.5–1.3)
GLUCOSE SERPL-MCNC: 140 MG/DL — HIGH (ref 70–99)
HCT VFR BLD CALC: 36 % — SIGNIFICANT CHANGE UP (ref 34.5–45)
HGB BLD-MCNC: 11.1 G/DL — LOW (ref 11.5–15.5)
MAGNESIUM SERPL-MCNC: 1.8 MG/DL — SIGNIFICANT CHANGE UP (ref 1.6–2.6)
MCHC RBC-ENTMCNC: 30.8 % — LOW (ref 32–36)
MCHC RBC-ENTMCNC: 32 PG — SIGNIFICANT CHANGE UP (ref 27–34)
MCV RBC AUTO: 103.7 FL — HIGH (ref 80–100)
NRBC # FLD: 0.13 K/UL — SIGNIFICANT CHANGE UP (ref 0–0)
NRBC FLD-RTO: 3.2 — SIGNIFICANT CHANGE UP
PHOSPHATE SERPL-MCNC: 3 MG/DL — SIGNIFICANT CHANGE UP (ref 2.5–4.5)
PLATELET # BLD AUTO: 144 K/UL — LOW (ref 150–400)
PMV BLD: 12.7 FL — SIGNIFICANT CHANGE UP (ref 7–13)
POTASSIUM SERPL-MCNC: 4.6 MMOL/L — SIGNIFICANT CHANGE UP (ref 3.5–5.3)
POTASSIUM SERPL-SCNC: 4.6 MMOL/L — SIGNIFICANT CHANGE UP (ref 3.5–5.3)
RBC # BLD: 3.47 M/UL — LOW (ref 3.8–5.2)
RBC # FLD: 22.1 % — HIGH (ref 10.3–14.5)
SODIUM SERPL-SCNC: 139 MMOL/L — SIGNIFICANT CHANGE UP (ref 135–145)
WBC # BLD: 4.01 K/UL — SIGNIFICANT CHANGE UP (ref 3.8–10.5)
WBC # FLD AUTO: 4.01 K/UL — SIGNIFICANT CHANGE UP (ref 3.8–10.5)

## 2019-07-18 PROCEDURE — 99232 SBSQ HOSP IP/OBS MODERATE 35: CPT | Mod: GC

## 2019-07-18 PROCEDURE — 93971 EXTREMITY STUDY: CPT | Mod: 26,LT

## 2019-07-18 PROCEDURE — 99233 SBSQ HOSP IP/OBS HIGH 50: CPT

## 2019-07-18 RX ADMIN — MEROPENEM 100 MILLIGRAM(S): 1 INJECTION INTRAVENOUS at 21:06

## 2019-07-18 RX ADMIN — Medication 40 MILLIGRAM(S): at 05:23

## 2019-07-18 RX ADMIN — Medication 100 MILLIGRAM(S): at 13:31

## 2019-07-18 RX ADMIN — SODIUM CHLORIDE 4 MILLILITER(S): 9 INJECTION INTRAMUSCULAR; INTRAVENOUS; SUBCUTANEOUS at 09:54

## 2019-07-18 RX ADMIN — Medication 1.5 MILLIGRAM(S): at 21:07

## 2019-07-18 RX ADMIN — Medication 300 MILLIGRAM(S): at 13:30

## 2019-07-18 RX ADMIN — LEVALBUTEROL 0.63 MILLIGRAM(S): 1.25 SOLUTION, CONCENTRATE RESPIRATORY (INHALATION) at 12:36

## 2019-07-18 RX ADMIN — SODIUM CHLORIDE 2 GRAM(S): 9 INJECTION INTRAMUSCULAR; INTRAVENOUS; SUBCUTANEOUS at 17:48

## 2019-07-18 RX ADMIN — MEROPENEM 100 MILLIGRAM(S): 1 INJECTION INTRAVENOUS at 14:55

## 2019-07-18 RX ADMIN — Medication 100 MILLIGRAM(S): at 05:23

## 2019-07-18 RX ADMIN — LEVALBUTEROL 0.63 MILLIGRAM(S): 1.25 SOLUTION, CONCENTRATE RESPIRATORY (INHALATION) at 16:50

## 2019-07-18 RX ADMIN — LEVALBUTEROL 0.63 MILLIGRAM(S): 1.25 SOLUTION, CONCENTRATE RESPIRATORY (INHALATION) at 00:30

## 2019-07-18 RX ADMIN — PANTOPRAZOLE SODIUM 40 MILLIGRAM(S): 20 TABLET, DELAYED RELEASE ORAL at 05:24

## 2019-07-18 RX ADMIN — SODIUM CHLORIDE 2 GRAM(S): 9 INJECTION INTRAMUSCULAR; INTRAVENOUS; SUBCUTANEOUS at 05:24

## 2019-07-18 RX ADMIN — BENZOCAINE AND MENTHOL 1 LOZENGE: 5; 1 LIQUID ORAL at 13:31

## 2019-07-18 RX ADMIN — ATOVAQUONE 1500 MILLIGRAM(S): 750 SUSPENSION ORAL at 13:31

## 2019-07-18 RX ADMIN — VORICONAZOLE 200 MILLIGRAM(S): 10 INJECTION, POWDER, LYOPHILIZED, FOR SOLUTION INTRAVENOUS at 05:24

## 2019-07-18 RX ADMIN — Medication 100 MILLIGRAM(S): at 21:07

## 2019-07-18 RX ADMIN — SODIUM CHLORIDE 4 MILLILITER(S): 9 INJECTION INTRAMUSCULAR; INTRAVENOUS; SUBCUTANEOUS at 21:22

## 2019-07-18 RX ADMIN — Medication 1 SPRAY(S): at 15:01

## 2019-07-18 RX ADMIN — LEVALBUTEROL 0.63 MILLIGRAM(S): 1.25 SOLUTION, CONCENTRATE RESPIRATORY (INHALATION) at 09:53

## 2019-07-18 RX ADMIN — LEVALBUTEROL 0.63 MILLIGRAM(S): 1.25 SOLUTION, CONCENTRATE RESPIRATORY (INHALATION) at 04:29

## 2019-07-18 RX ADMIN — VORICONAZOLE 200 MILLIGRAM(S): 10 INJECTION, POWDER, LYOPHILIZED, FOR SOLUTION INTRAVENOUS at 17:48

## 2019-07-18 RX ADMIN — APIXABAN 2.5 MILLIGRAM(S): 2.5 TABLET, FILM COATED ORAL at 17:48

## 2019-07-18 RX ADMIN — BENZOCAINE AND MENTHOL 1 LOZENGE: 5; 1 LIQUID ORAL at 21:07

## 2019-07-18 RX ADMIN — LEVALBUTEROL 0.63 MILLIGRAM(S): 1.25 SOLUTION, CONCENTRATE RESPIRATORY (INHALATION) at 21:10

## 2019-07-18 RX ADMIN — MEROPENEM 100 MILLIGRAM(S): 1 INJECTION INTRAVENOUS at 05:24

## 2019-07-18 RX ADMIN — APIXABAN 2.5 MILLIGRAM(S): 2.5 TABLET, FILM COATED ORAL at 05:24

## 2019-07-18 RX ADMIN — SODIUM CHLORIDE 4 MILLILITER(S): 9 INJECTION INTRAMUSCULAR; INTRAVENOUS; SUBCUTANEOUS at 15:29

## 2019-07-18 NOTE — PROGRESS NOTE ADULT - SUBJECTIVE AND OBJECTIVE BOX
full note to follow CHIEF COMPLAINT:  shortness of breath    Interval Events:  tolerating high flow 40/40  more awake,     REVIEW OF SYSTEMS:  Constitutional: [X ] negative [ ] fevers [ ] chills [ ] weight loss [ ] weight gain  HEENT: [X ] negative [ ] dry eyes [ ] eye irritation [ ] postnasal drip [ ] nasal congestion  CV: [ X] negative  [ ] chest pain [ ] orthopnea [ ] palpitations [ ] murmur  Resp: [ ] negative [ X] cough [ X] shortness of breath [ X] dyspnea [ ] wheezing [X ] sputum [ ] hemoptysis  GI: [X ] negative [ ] nausea [ ] vomiting [ ] diarrhea [ ] constipation [ ] abd pain [ ] dysphagia   : [ X] negative [ ] dysuria [ ] nocturia [ ] hematuria [ ] increased urinary frequency  Musculoskeletal: [X ] negative [ ] back pain [ ] myalgias [ ] arthralgias [ ] fracture  Skin: [ X] negative [ ] rash [ ] itch  Neurological: [ ] negative [ ] headache [ ] dizziness [ ] syncope [ ] weakness [ ] numbness  Psychiatric: [ ] negative [ ] anxiety [ ] depression  Endocrine: [ ] negative [ ] diabetes [ ] thyroid problem  Hematologic/Lymphatic: [ ] negative [ ] anemia [ ] bleeding problem  Allergic/Immunologic: [ ] negative [ ] itchy eyes [ ] nasal discharge [ ] hives [ ] angioedema  [X ] All other systems negative      OBJECTIVE:  ICU Vital Signs Last 24 Hrs  T(C): 36.6 (18 Jul 2019 13:22), Max: 36.7 (17 Jul 2019 17:40)  T(F): 97.8 (18 Jul 2019 13:22), Max: 98.1 (17 Jul 2019 21:29)  HR: 119 (18 Jul 2019 13:22) (63 - 120)  BP: 131/99 (18 Jul 2019 13:22) (121/95 - 139/93)  RR: 20 (18 Jul 2019 13:22) (16 - 20)  SpO2: 95% (18 Jul 2019 13:22) (93% - 100%)        CAPILLARY BLOOD GLUCOSE          PHYSICAL EXAM:  General: ill appearing woman in no distress  Respiratory: normal effort on high flow  40/45  bibasilar rhonchi  Cardiovascular:  rate regular, normal heart sounds  Abdomen: thin, NT, ND  Extremities: minimal LE edema  Neurological: awake alert, attentive, not sluggish as before      HOSPITAL MEDICATIONS:  MEDICATIONS  (STANDING):  allopurinol 300 milliGRAM(s) Oral daily  apixaban 2.5 milliGRAM(s) Oral every 12 hours  atovaquone Suspension 1500 milliGRAM(s) Oral daily  benzocaine 15 mG/menthol 3.6 mG (Sugar-Free) Lozenge 1 Lozenge Oral every 4 hours  docusate sodium 100 milliGRAM(s) Oral three times a day  lactated ringers. 1000 milliLiter(s) (30 mL/Hr) IV Continuous <Continuous>  levalbuterol Inhalation 0.63 milliGRAM(s) Inhalation every 4 hours  melatonin 1.5 milliGRAM(s) Oral at bedtime  meropenem  IVPB 1000 milliGRAM(s) IV Intermittent every 8 hours  metoprolol succinate  milliGRAM(s) Oral daily  pantoprazole    Tablet 40 milliGRAM(s) Oral before breakfast  predniSONE   Tablet 30 milliGRAM(s) Oral daily  sodium chloride 2 Gram(s) Oral two times a day  sodium chloride 7% Inhalation 4 milliLiter(s) Inhalation three times a day  valACYclovir 500 milliGRAM(s) Oral <User Schedule>  voriconazole 200 milliGRAM(s) Oral every 12 hours    MEDICATIONS  (PRN):  benzonatate 100 milliGRAM(s) Oral three times a day PRN Cough  bisacodyl 5 milliGRAM(s) Oral every 12 hours PRN Constipation  guaiFENesin    Syrup 200 milliGRAM(s) Oral every 6 hours PRN Cough  polyethylene glycol 3350 17 Gram(s) Oral daily PRN Constipation  sodium chloride 0.65% Nasal 1 Spray(s) Both Nostrils daily PRN Nasal Congestion      LABS:                        11.1   4.01  )-----------( 144      ( 18 Jul 2019 07:34 )             36.0     Hgb Trend: 11.1<--, 11.9<--, 11.3<--, 11.4<--, 10.8<--  07-18    139  |  98  |  29<H>  ----------------------------<  140<H>  4.6   |  28  |  0.41<L>    Ca    9.2      18 Jul 2019 07:34  Phos  3.0     07-18  Mg     1.8     07-18      Creatinine Trend: 0.41<--, 0.42<--, 0.41<--, 0.47<--, 0.40<--, 0.43<--

## 2019-07-18 NOTE — PROGRESS NOTE ADULT - PROBLEM SELECTOR PLAN 1
-RLL bronchus obstruction with concern for postobstructive fungal PNA  - cont O2 to maintain SpO2>90 Hiflo if needed BiPAP  - standing airway clearance therapy, Xopenex q4 hours, following by nebulized 3% hypertonic saline, and then Aerobika device as per Pulm recommendations  -7/15 repeat CXR reviewed with pulm worsening RT sided opacity likely worsening underlying pna and atelectasis in setting of endobronchial obstruction (mucous vs fungal vs tumor) ABG reviewed WOB likely due to hypoxia recommending HiFlo improving  -meropenem per ID till 7/20   -palliative recs appreciated conveyed poor prognosis to family still wishes pt to be full code.  -currently on steroid no objection from pulm to decrease steroid d/w heme fellow can decrease steroid to 30

## 2019-07-18 NOTE — PROGRESS NOTE ADULT - ASSESSMENT
79yF with advanced DLBCL, now complicated by fungal pneumonia, progression of disease causing hypoxic respiratory failure.   Had been on/off bipap for many days, now on high flow for the past 2 days, able to converse, eat, feels somewhat improved. Doing better overall.     Would continue antimicrobials and avoid positive pressure ventilation, like bipap.   Needs mucus/secretions clearance which is inhibited by bipap so high flow is better    Overall, not greatly improving despite appropriate antimicrobial therapy for prolonged time.   Most likely reason is impaired host response and immune dysfunction due to her cancer.       Recs  Continue high flow with goal of titrating down as she can. Improved with coughing and nebs!  Goal to get to 4-6L NC as she can  Continue aerobika for airway clearance  Continue metanebs at least 3x day preceded by albuterol and 7% saline nebs  Continue treatment for fungal pneumonia per ID    OOB to chair as much as possible during the day    Follow up with oncologist regarding prednisone use and dose. She was on it outpatient, likely for lymphoma.

## 2019-07-18 NOTE — PROGRESS NOTE ADULT - PROBLEM SELECTOR PLAN 6
Follows with Dr. Nguyen at [facility]. as per family, [facility] is not offering any disease modifying treatment. Pt is looking for experimental trial if pt clinically improves from current medical issues. Poss with Dr. Davies  - C/w ppx mepron, valacyclovir and allopurinol  - [facility] discussed with pt and daughter for poor prognosis - They wish to be full code for now  - Patient and family agreeable to defer experimental chemo until after VALERIO.

## 2019-07-18 NOTE — PROGRESS NOTE ADULT - SUBJECTIVE AND OBJECTIVE BOX
Patient is a 79y old  Female who presents with a chief complaint of SOB (18 Jul 2019 13:15)      SUBJECTIVE / OVERNIGHT EVENTS: Pt son at bedside cont to be on Hiflo more conversant decreasing Oxygen requirement    MEDICATIONS  (STANDING):  allopurinol 300 milliGRAM(s) Oral daily  apixaban 2.5 milliGRAM(s) Oral every 12 hours  atovaquone Suspension 1500 milliGRAM(s) Oral daily  benzocaine 15 mG/menthol 3.6 mG (Sugar-Free) Lozenge 1 Lozenge Oral every 4 hours  docusate sodium 100 milliGRAM(s) Oral three times a day  lactated ringers. 1000 milliLiter(s) (30 mL/Hr) IV Continuous <Continuous>  levalbuterol Inhalation 0.63 milliGRAM(s) Inhalation every 4 hours  melatonin 1.5 milliGRAM(s) Oral at bedtime  meropenem  IVPB 1000 milliGRAM(s) IV Intermittent every 8 hours  metoprolol succinate  milliGRAM(s) Oral daily  pantoprazole    Tablet 40 milliGRAM(s) Oral before breakfast  predniSONE   Tablet 30 milliGRAM(s) Oral daily  sodium chloride 2 Gram(s) Oral two times a day  sodium chloride 7% Inhalation 4 milliLiter(s) Inhalation three times a day  valACYclovir 500 milliGRAM(s) Oral <User Schedule>  voriconazole 200 milliGRAM(s) Oral every 12 hours    MEDICATIONS  (PRN):  benzonatate 100 milliGRAM(s) Oral three times a day PRN Cough  bisacodyl 5 milliGRAM(s) Oral every 12 hours PRN Constipation  guaiFENesin    Syrup 200 milliGRAM(s) Oral every 6 hours PRN Cough  polyethylene glycol 3350 17 Gram(s) Oral daily PRN Constipation  sodium chloride 0.65% Nasal 1 Spray(s) Both Nostrils daily PRN Nasal Congestion        CAPILLARY BLOOD GLUCOSE        I&O's Summary      T(C): 36.6 (07-18-19 @ 13:22), Max: 36.7 (07-17-19 @ 17:40)  HR: 119 (07-18-19 @ 13:22) (63 - 120)  BP: 131/99 (07-18-19 @ 13:22) (121/95 - 139/93)  RR: 20 (07-18-19 @ 13:22) (16 - 20)  SpO2: 95% (07-18-19 @ 13:22) (93% - 100%)    PHYSICAL EXAM:  GENERAL: NAD  HEAD:  Atraumatic, Normocephalic  EYES: EOMI, conjunctiva and sclera clear  NECK: Supple, No JVD  CHEST/LUNG: rhonci b/l   HEART: s1/s2 iregularly irregular   ABDOMEN: Soft, Nontender, Nondistended; Bowel sounds present  EXTREMITIES:  LT>RT LE     LABS:                        11.1   4.01  )-----------( 144      ( 18 Jul 2019 07:34 )             36.0     07-18    139  |  98  |  29<H>  ----------------------------<  140<H>  4.6   |  28  |  0.41<L>    Ca    9.2      18 Jul 2019 07:34  Phos  3.0     07-18  Mg     1.8     07-18                  RADIOLOGY & ADDITIONAL TESTS:    Imaging Personally Reviewed:< from: US Duplex Venous Lower Ext Ltd, Left (07.18.19 @ 09:30) >  IMPRESSION:     No evidence of left lower extremity deep venous thrombosis.    < end of copied text >      Consultant(s) Notes Reviewed:      Care Discussed with Consultants/Other Providers:

## 2019-07-19 LAB
ANION GAP SERPL CALC-SCNC: 11 MMO/L — SIGNIFICANT CHANGE UP (ref 7–14)
BASE EXCESS BLDV CALC-SCNC: 5.3 MMOL/L — SIGNIFICANT CHANGE UP
BLOOD GAS VENOUS - CREATININE: 0.42 MG/DL — LOW (ref 0.5–1.3)
BUN SERPL-MCNC: 28 MG/DL — HIGH (ref 7–23)
CALCIUM SERPL-MCNC: 9.1 MG/DL — SIGNIFICANT CHANGE UP (ref 8.4–10.5)
CHLORIDE BLDV-SCNC: 103 MMOL/L — SIGNIFICANT CHANGE UP (ref 96–108)
CHLORIDE SERPL-SCNC: 101 MMOL/L — SIGNIFICANT CHANGE UP (ref 98–107)
CO2 SERPL-SCNC: 29 MMOL/L — SIGNIFICANT CHANGE UP (ref 22–31)
CREAT SERPL-MCNC: 0.38 MG/DL — LOW (ref 0.5–1.3)
GAS PNL BLDV: 137 MMOL/L — SIGNIFICANT CHANGE UP (ref 136–146)
GLUCOSE BLDV-MCNC: 117 MG/DL — HIGH (ref 70–99)
GLUCOSE SERPL-MCNC: 118 MG/DL — HIGH (ref 70–99)
HCO3 BLDV-SCNC: 29 MMOL/L — HIGH (ref 20–27)
HCT VFR BLD CALC: 36.7 % — SIGNIFICANT CHANGE UP (ref 34.5–45)
HCT VFR BLDV CALC: 36.9 % — SIGNIFICANT CHANGE UP (ref 34.5–45)
HGB BLD-MCNC: 11.3 G/DL — LOW (ref 11.5–15.5)
HGB BLDV-MCNC: 12 G/DL — SIGNIFICANT CHANGE UP (ref 11.5–15.5)
LACTATE BLDV-MCNC: 2.8 MMOL/L — HIGH (ref 0.5–2)
MCHC RBC-ENTMCNC: 30.8 % — LOW (ref 32–36)
MCHC RBC-ENTMCNC: 32 PG — SIGNIFICANT CHANGE UP (ref 27–34)
MCV RBC AUTO: 104 FL — HIGH (ref 80–100)
NRBC # FLD: 0.19 K/UL — SIGNIFICANT CHANGE UP (ref 0–0)
NRBC FLD-RTO: 4.2 — SIGNIFICANT CHANGE UP
PCO2 BLDV: 50 MMHG — SIGNIFICANT CHANGE UP (ref 41–51)
PH BLDV: 7.4 PH — SIGNIFICANT CHANGE UP (ref 7.32–7.43)
PLATELET # BLD AUTO: 144 K/UL — LOW (ref 150–400)
PMV BLD: 12.6 FL — SIGNIFICANT CHANGE UP (ref 7–13)
PO2 BLDV: 132 MMHG — HIGH (ref 35–40)
POTASSIUM BLDV-SCNC: 4.1 MMOL/L — SIGNIFICANT CHANGE UP (ref 3.4–4.5)
POTASSIUM SERPL-MCNC: 4.4 MMOL/L — SIGNIFICANT CHANGE UP (ref 3.5–5.3)
POTASSIUM SERPL-SCNC: 4.4 MMOL/L — SIGNIFICANT CHANGE UP (ref 3.5–5.3)
RBC # BLD: 3.53 M/UL — LOW (ref 3.8–5.2)
RBC # FLD: 22.4 % — HIGH (ref 10.3–14.5)
SAO2 % BLDV: 99.1 % — HIGH (ref 60–85)
SODIUM SERPL-SCNC: 141 MMOL/L — SIGNIFICANT CHANGE UP (ref 135–145)
WBC # BLD: 4.56 K/UL — SIGNIFICANT CHANGE UP (ref 3.8–10.5)
WBC # FLD AUTO: 4.56 K/UL — SIGNIFICANT CHANGE UP (ref 3.8–10.5)

## 2019-07-19 PROCEDURE — 71045 X-RAY EXAM CHEST 1 VIEW: CPT | Mod: 26

## 2019-07-19 PROCEDURE — 99233 SBSQ HOSP IP/OBS HIGH 50: CPT

## 2019-07-19 PROCEDURE — 99232 SBSQ HOSP IP/OBS MODERATE 35: CPT | Mod: GC

## 2019-07-19 PROCEDURE — 99232 SBSQ HOSP IP/OBS MODERATE 35: CPT

## 2019-07-19 RX ADMIN — MEROPENEM 100 MILLIGRAM(S): 1 INJECTION INTRAVENOUS at 21:50

## 2019-07-19 RX ADMIN — Medication 100 MILLIGRAM(S): at 05:16

## 2019-07-19 RX ADMIN — LEVALBUTEROL 0.63 MILLIGRAM(S): 1.25 SOLUTION, CONCENTRATE RESPIRATORY (INHALATION) at 16:52

## 2019-07-19 RX ADMIN — BENZOCAINE AND MENTHOL 1 LOZENGE: 5; 1 LIQUID ORAL at 11:23

## 2019-07-19 RX ADMIN — LEVALBUTEROL 0.63 MILLIGRAM(S): 1.25 SOLUTION, CONCENTRATE RESPIRATORY (INHALATION) at 09:02

## 2019-07-19 RX ADMIN — LEVALBUTEROL 0.63 MILLIGRAM(S): 1.25 SOLUTION, CONCENTRATE RESPIRATORY (INHALATION) at 20:27

## 2019-07-19 RX ADMIN — LEVALBUTEROL 0.63 MILLIGRAM(S): 1.25 SOLUTION, CONCENTRATE RESPIRATORY (INHALATION) at 04:07

## 2019-07-19 RX ADMIN — Medication 100 MILLIGRAM(S): at 21:51

## 2019-07-19 RX ADMIN — SODIUM CHLORIDE 2 GRAM(S): 9 INJECTION INTRAMUSCULAR; INTRAVENOUS; SUBCUTANEOUS at 17:15

## 2019-07-19 RX ADMIN — SODIUM CHLORIDE 2 GRAM(S): 9 INJECTION INTRAMUSCULAR; INTRAVENOUS; SUBCUTANEOUS at 05:17

## 2019-07-19 RX ADMIN — APIXABAN 2.5 MILLIGRAM(S): 2.5 TABLET, FILM COATED ORAL at 05:16

## 2019-07-19 RX ADMIN — SODIUM CHLORIDE 4 MILLILITER(S): 9 INJECTION INTRAMUSCULAR; INTRAVENOUS; SUBCUTANEOUS at 09:02

## 2019-07-19 RX ADMIN — VORICONAZOLE 200 MILLIGRAM(S): 10 INJECTION, POWDER, LYOPHILIZED, FOR SOLUTION INTRAVENOUS at 05:16

## 2019-07-19 RX ADMIN — MEROPENEM 100 MILLIGRAM(S): 1 INJECTION INTRAVENOUS at 05:16

## 2019-07-19 RX ADMIN — APIXABAN 2.5 MILLIGRAM(S): 2.5 TABLET, FILM COATED ORAL at 17:16

## 2019-07-19 RX ADMIN — PANTOPRAZOLE SODIUM 40 MILLIGRAM(S): 20 TABLET, DELAYED RELEASE ORAL at 05:16

## 2019-07-19 RX ADMIN — MEROPENEM 100 MILLIGRAM(S): 1 INJECTION INTRAVENOUS at 13:11

## 2019-07-19 RX ADMIN — SODIUM CHLORIDE 4 MILLILITER(S): 9 INJECTION INTRAMUSCULAR; INTRAVENOUS; SUBCUTANEOUS at 16:21

## 2019-07-19 RX ADMIN — Medication 1.5 MILLIGRAM(S): at 21:50

## 2019-07-19 RX ADMIN — SODIUM CHLORIDE 4 MILLILITER(S): 9 INJECTION INTRAMUSCULAR; INTRAVENOUS; SUBCUTANEOUS at 20:27

## 2019-07-19 RX ADMIN — BENZOCAINE AND MENTHOL 1 LOZENGE: 5; 1 LIQUID ORAL at 21:50

## 2019-07-19 RX ADMIN — Medication 300 MILLIGRAM(S): at 13:12

## 2019-07-19 RX ADMIN — Medication 30 MILLIGRAM(S): at 05:16

## 2019-07-19 RX ADMIN — LEVALBUTEROL 0.63 MILLIGRAM(S): 1.25 SOLUTION, CONCENTRATE RESPIRATORY (INHALATION) at 00:46

## 2019-07-19 RX ADMIN — LEVALBUTEROL 0.63 MILLIGRAM(S): 1.25 SOLUTION, CONCENTRATE RESPIRATORY (INHALATION) at 12:58

## 2019-07-19 RX ADMIN — VORICONAZOLE 200 MILLIGRAM(S): 10 INJECTION, POWDER, LYOPHILIZED, FOR SOLUTION INTRAVENOUS at 17:16

## 2019-07-19 RX ADMIN — VALACYCLOVIR 500 MILLIGRAM(S): 500 TABLET, FILM COATED ORAL at 17:17

## 2019-07-19 RX ADMIN — ATOVAQUONE 1500 MILLIGRAM(S): 750 SUSPENSION ORAL at 13:11

## 2019-07-19 NOTE — PROGRESS NOTE ADULT - ASSESSMENT
79 year old with relapsed B cell lymphoma (dx 2017, now relaplse)   Treated with rituximab and revlimid in 3/2019  Treated with Obintuzmab and Bendamustine iin 4/2019    Presents with shortness of breath  She had abnormal imaging with lung nodules    Aspergillosis    1) Fungal pneumonia  aspergillosis on culture  Minimize steroids as much as feasible  Continue voriconazole- tx of choice    Even with optimal tx, outcomes of pt with underlying heme malignancy is not always good    2) Increased SOB- better today  Stop meropenem 7/20    3) Immunosuppressed secondary to cancer tx  PCP prophylaxis  Continue mepron  At some point, reasonable to re-challenge with Bactrim with close monitoring of WBC    4) Delirium: improved with sleep    Check voriconazole through (a send out)

## 2019-07-19 NOTE — PROVIDER CONTACT NOTE (OTHER) - RECOMMENDATIONS
administer xopenex now as ordered q4h. preform chest physiotherapy. pt HOB elevated to facilitate breathing w treatment

## 2019-07-19 NOTE — PROGRESS NOTE ADULT - SUBJECTIVE AND OBJECTIVE BOX
Patient is a 79y old  Female who presents with a chief complaint of SOB (19 Jul 2019 11:35)      SUBJECTIVE / OVERNIGHT EVENTS: Pt acutely SOB this AM s/p treatment improved slightly. denies CP/N/V    MEDICATIONS  (STANDING):  allopurinol 300 milliGRAM(s) Oral daily  apixaban 2.5 milliGRAM(s) Oral every 12 hours  atovaquone Suspension 1500 milliGRAM(s) Oral daily  benzocaine 15 mG/menthol 3.6 mG (Sugar-Free) Lozenge 1 Lozenge Oral every 4 hours  docusate sodium 100 milliGRAM(s) Oral three times a day  levalbuterol Inhalation 0.63 milliGRAM(s) Inhalation every 4 hours  melatonin 1.5 milliGRAM(s) Oral at bedtime  meropenem  IVPB 1000 milliGRAM(s) IV Intermittent every 8 hours  metoprolol succinate  milliGRAM(s) Oral daily  pantoprazole    Tablet 40 milliGRAM(s) Oral before breakfast  predniSONE   Tablet 30 milliGRAM(s) Oral daily  sodium chloride 2 Gram(s) Oral two times a day  sodium chloride 7% Inhalation 4 milliLiter(s) Inhalation three times a day  valACYclovir 500 milliGRAM(s) Oral <User Schedule>  voriconazole 200 milliGRAM(s) Oral every 12 hours    MEDICATIONS  (PRN):  benzonatate 100 milliGRAM(s) Oral three times a day PRN Cough  bisacodyl 5 milliGRAM(s) Oral every 12 hours PRN Constipation  guaiFENesin    Syrup 200 milliGRAM(s) Oral every 6 hours PRN Cough  polyethylene glycol 3350 17 Gram(s) Oral daily PRN Constipation  sodium chloride 0.65% Nasal 1 Spray(s) Both Nostrils daily PRN Nasal Congestion        CAPILLARY BLOOD GLUCOSE        I&O's Summary      T(C): 36.3 (07-19-19 @ 13:09), Max: 36.7 (07-18-19 @ 17:44)  HR: 91 (07-19-19 @ 13:09) (91 - 125)  BP: 139/86 (07-19-19 @ 13:09) (111/86 - 139/86)  RR: 24 (07-19-19 @ 13:09) (18 - 34)  SpO2: 99% (07-19-19 @ 13:09) (95% - 100%)    PHYSICAL EXAM:  GENERAL: NAD  HEAD:  Atraumatic, Normocephalic  EYES: EOMI, conjunctiva and sclera clear  NECK: Supple, No JVD  CHEST/LUNG: rhonci b/l   HEART: s1/s2 iregularly irregular   ABDOMEN: Soft, Nontender, Nondistended; Bowel sounds present  EXTREMITIES:  LT>RT LE     LABS:                        11.3   4.56  )-----------( 144      ( 19 Jul 2019 05:40 )             36.7     07-19    141  |  101  |  28<H>  ----------------------------<  118<H>  4.4   |  29  |  0.38<L>    Ca    9.1      19 Jul 2019 05:40  Phos  3.0     07-18  Mg     1.8     07-18                  RADIOLOGY & ADDITIONAL TESTS:    Imaging Personally Reviewed:    Consultant(s) Notes Reviewed:      Care Discussed with Consultants/Other Providers:

## 2019-07-19 NOTE — PROGRESS NOTE ADULT - PROBLEM SELECTOR PLAN 1
-RLL bronchus obstruction with concern for postobstructive fungal PNA  - cont O2 to maintain SpO2>90 Hiflo if needed BiPAP  - standing airway clearance therapy, Xopenex q4 hours, following by nebulized 3% hypertonic saline, and then Aerobika device as per Pulm recommendations  -7/15 repeat CXR reviewed with pulm worsening RT sided opacity likely worsening underlying pna and atelectasis in setting of endobronchial obstruction (mucous vs fungal vs tumor) ABG reviewed WOB likely due to hypoxia recommending HiFlo no biPAP due to drying of secretions  -meropenem per ID till 7/20   -palliative recs appreciated conveyed poor prognosis to family still wishes pt to be full code.  -cont prednisone 30  -repeat CXR and VBG

## 2019-07-19 NOTE — PROGRESS NOTE ADULT - SUBJECTIVE AND OBJECTIVE BOX
Follow Up:      Inverval History/ROS:Patient is a 79y old  Female who presents with a chief complaint of SOB (19 Jul 2019 14:01)    Still on high flow oxygen  No fever.     Allergies    No Known Allergies    Intolerances        ANTIMICROBIALS:  atovaquone Suspension 1500 daily  meropenem  IVPB 1000 every 8 hours  valACYclovir 500 <User Schedule>  voriconazole 200 every 12 hours      OTHER MEDS:  allopurinol 300 milliGRAM(s) Oral daily  apixaban 2.5 milliGRAM(s) Oral every 12 hours  benzocaine 15 mG/menthol 3.6 mG (Sugar-Free) Lozenge 1 Lozenge Oral every 4 hours  benzonatate 100 milliGRAM(s) Oral three times a day PRN  bisacodyl 5 milliGRAM(s) Oral every 12 hours PRN  docusate sodium 100 milliGRAM(s) Oral three times a day  guaiFENesin    Syrup 200 milliGRAM(s) Oral every 6 hours PRN  levalbuterol Inhalation 0.63 milliGRAM(s) Inhalation every 4 hours  melatonin 1.5 milliGRAM(s) Oral at bedtime  metoprolol succinate  milliGRAM(s) Oral daily  pantoprazole    Tablet 40 milliGRAM(s) Oral before breakfast  polyethylene glycol 3350 17 Gram(s) Oral daily PRN  predniSONE   Tablet 30 milliGRAM(s) Oral daily  sodium chloride 2 Gram(s) Oral two times a day  sodium chloride 0.65% Nasal 1 Spray(s) Both Nostrils daily PRN  sodium chloride 7% Inhalation 4 milliLiter(s) Inhalation three times a day      Vital Signs Last 24 Hrs  T(C): 36.3 (19 Jul 2019 13:09), Max: 36.7 (18 Jul 2019 17:44)  T(F): 97.3 (19 Jul 2019 13:09), Max: 98 (18 Jul 2019 17:44)  HR: 91 (19 Jul 2019 13:09) (91 - 125)  BP: 139/86 (19 Jul 2019 13:09) (111/86 - 139/86)  BP(mean): --  RR: 24 (19 Jul 2019 13:09) (18 - 34)  SpO2: 99% (19 Jul 2019 13:09) (95% - 100%)    PHYSICAL EXAM:  General: [x ] on high flow  HEAD/EYES: [ ] PERRL [x ] white sclera [ ] icterus  ENT:  [ ] normal [x ] supple [ ] thrush [ ] pharyngeal exudate  Cardiovascular:   [ ] murmur [x ] normal [ ] PPM/AICD  Respiratory:  [x ] course BS  GI:  [x ] soft, non-tender, normal bowel sounds  :  [ ] bledsoe [ x] no CVA tenderness   Musculoskeletal:  [ ] no synovitis  Neurologic:  [ ]x non-focal exam   Skin:  [x ] no rash  Lymph: [ x] no lymphadenopathy  Psychiatric:  [ ] appropriate affect [x ] alert & oriented  Lines:  [ x] no phlebitis [ ] central line                                11.3   4.56  )-----------( 144      ( 19 Jul 2019 05:40 )             36.7       07-19    141  |  101  |  28<H>  ----------------------------<  118<H>  4.4   |  29  |  0.38<L>    Ca    9.1      19 Jul 2019 05:40  Phos  3.0     07-18  Mg     1.8     07-18            MICROBIOLOGY:    RADIOLOGY:

## 2019-07-19 NOTE — PROGRESS NOTE ADULT - SUBJECTIVE AND OBJECTIVE BOX
CHIEF COMPLAINT:  shortness of breath, lymphoma, fungal pneumonia    Interval Events:  had mild dyspnea early this morning but overall doing the same on high flow, 40/40    REVIEW OF SYSTEMS:  Constitutional: [X ] negative [ ] fevers [ ] chills [ ] weight loss [ ] weight gain  HEENT: [X ] negative [ ] dry eyes [ ] eye irritation [ ] postnasal drip [ ] nasal congestion  CV: [ X] negative  [ ] chest pain [ ] orthopnea [ ] palpitations [ ] murmur  Resp: [ ] negative [ X] cough [ X] shortness of breath [ X] dyspnea [ ] wheezing [X ] sputum [ ] hemoptysis  GI: [X ] negative [ ] nausea [ ] vomiting [ ] diarrhea [ ] constipation [ ] abd pain [ ] dysphagia   : [ X] negative [ ] dysuria [ ] nocturia [ ] hematuria [ ] increased urinary frequency  Musculoskeletal: [X ] negative [ ] back pain [ ] myalgias [ ] arthralgias [ ] fracture  Skin: [ X] negative [ ] rash [ ] itch  Neurological: [ ] negative [ ] headache [ ] dizziness [ ] syncope [ ] weakness [ ] numbness  Psychiatric: [ ] negative [ ] anxiety [ ] depression  Endocrine: [ ] negative [ ] diabetes [ ] thyroid problem  Hematologic/Lymphatic: [ ] negative [ ] anemia [ ] bleeding problem  Allergic/Immunologic: [ ] negative [ ] itchy eyes [ ] nasal discharge [ ] hives [ ] angioedema  [X ] All other systems negative    OBJECTIVE:  ICU Vital Signs Last 24 Hrs  T(C): 36.6 (19 Jul 2019 05:05), Max: 36.7 (18 Jul 2019 17:44)  T(F): 97.8 (19 Jul 2019 05:05), Max: 98 (18 Jul 2019 17:44)  HR: 121 (19 Jul 2019 09:02) (105 - 125)  BP: 114/82 (19 Jul 2019 05:05) (111/86 - 131/99)  RR: 22 (19 Jul 2019 06:58) (18 - 34)  SpO2: 96% (19 Jul 2019 06:58) (95% - 100%)        PHYSICAL EXAM:  General: ill appearing woman in no distress  Respiratory: normal effort on high flow  40/45  bibasilar rhonchi, some brochial breath sounds throughout, no areas of normal breath sounds,   Cardiovascular:  rate regular, normal heart sounds  Abdomen: thin, NT, ND  Extremities: minimal LE edema  Neurological: awake alert, attentive, able to have conversations    HOSPITAL MEDICATIONS:  MEDICATIONS  (STANDING):  allopurinol 300 milliGRAM(s) Oral daily  apixaban 2.5 milliGRAM(s) Oral every 12 hours  atovaquone Suspension 1500 milliGRAM(s) Oral daily  benzocaine 15 mG/menthol 3.6 mG (Sugar-Free) Lozenge 1 Lozenge Oral every 4 hours  docusate sodium 100 milliGRAM(s) Oral three times a day  levalbuterol Inhalation 0.63 milliGRAM(s) Inhalation every 4 hours  melatonin 1.5 milliGRAM(s) Oral at bedtime  meropenem  IVPB 1000 milliGRAM(s) IV Intermittent every 8 hours  metoprolol succinate  milliGRAM(s) Oral daily  pantoprazole    Tablet 40 milliGRAM(s) Oral before breakfast  predniSONE   Tablet 30 milliGRAM(s) Oral daily  sodium chloride 2 Gram(s) Oral two times a day  sodium chloride 7% Inhalation 4 milliLiter(s) Inhalation three times a day  valACYclovir 500 milliGRAM(s) Oral <User Schedule>  voriconazole 200 milliGRAM(s) Oral every 12 hours    MEDICATIONS  (PRN):  benzonatate 100 milliGRAM(s) Oral three times a day PRN Cough  bisacodyl 5 milliGRAM(s) Oral every 12 hours PRN Constipation  guaiFENesin    Syrup 200 milliGRAM(s) Oral every 6 hours PRN Cough  polyethylene glycol 3350 17 Gram(s) Oral daily PRN Constipation  sodium chloride 0.65% Nasal 1 Spray(s) Both Nostrils daily PRN Nasal Congestion      LABS:                        11.3   4.56  )-----------( 144      ( 19 Jul 2019 05:40 )             36.7     Hgb Trend: 11.3<--, 11.1<--, 11.9<--, 11.3<--, 11.4<--  07-19    141  |  101  |  28<H>  ----------------------------<  118<H>  4.4   |  29  |  0.38<L>    Ca    9.1      19 Jul 2019 05:40  Phos  3.0     07-18  Mg     1.8     07-18      Creatinine Trend: 0.38<--, 0.41<--, 0.42<--, 0.41<--, 0.47<--, 0.40<--

## 2019-07-19 NOTE — PROGRESS NOTE ADULT - ASSESSMENT
79yF with advanced DLBCL, now complicated by fungal pneumonia with endobronchial lesion, progression of disease causing hypoxic respiratory failure.   Had been on/off bipap for many days, now on high flow for the past 2 days, able to converse, eat, feels somewhat improved. Doing better overall.     Would continue antimicrobials and avoid positive pressure. Needs mucus/secretions clearance which is inhibited by bipap.     Still hypoxemic, advanced cancer and fungal pneumonia. May improve if she can get off steroids given the fungal pneumonia, but unclear if they are offering any benefit for her lymphoma.    Placed on non-rebreather briefly earlier today, however high flow can deliver significantly more oxygen at higher flows so if dyspneic or hypoxic, would increase high flow settings, not use non-rebreather.     Recs  Continue high flow with goal of titrating down as she can. Improved with coughing and nebs. Needs to maintain ability to cough. Would continue to avoid bipap.   Continue aerobika for airway clearance  Continue metanebs at least 3x day preceded by albuterol and 7% saline nebs  Continue treatment for fungal pneumonia per ID

## 2019-07-19 NOTE — PROGRESS NOTE ADULT - PROBLEM SELECTOR PLAN 6
Follows with Dr. Nguyen at [facility] [place]. as per family, [facility] is not offering any disease modifying treatment. Pt is looking for experimental trial if pt clinically improves from current medical issues. Poss with Dr. Davies  - C/w ppx mepron, valacyclovir and allopurinol  - [facility] discussed  poor prognosis - They wish to be full code for now  - Patient and family agreeable to defer experimental chemo until after VALERIO.

## 2019-07-19 NOTE — PROGRESS NOTE ADULT - PROBLEM SELECTOR PLAN 4
Afib w/ RVR likely d/t infection and hypoxia  - Monitor on tele suspect some tachycardia due to nebs  - metoprolol to 100  - cont apixaban

## 2019-07-20 LAB
ANION GAP SERPL CALC-SCNC: 12 MMO/L — SIGNIFICANT CHANGE UP (ref 7–14)
BUN SERPL-MCNC: 27 MG/DL — HIGH (ref 7–23)
CALCIUM SERPL-MCNC: 9 MG/DL — SIGNIFICANT CHANGE UP (ref 8.4–10.5)
CHLORIDE SERPL-SCNC: 100 MMOL/L — SIGNIFICANT CHANGE UP (ref 98–107)
CO2 SERPL-SCNC: 28 MMOL/L — SIGNIFICANT CHANGE UP (ref 22–31)
CREAT SERPL-MCNC: 0.33 MG/DL — LOW (ref 0.5–1.3)
GLUCOSE SERPL-MCNC: 113 MG/DL — HIGH (ref 70–99)
HCT VFR BLD CALC: 36.5 % — SIGNIFICANT CHANGE UP (ref 34.5–45)
HGB BLD-MCNC: 11.2 G/DL — LOW (ref 11.5–15.5)
MAGNESIUM SERPL-MCNC: 1.7 MG/DL — SIGNIFICANT CHANGE UP (ref 1.6–2.6)
MCHC RBC-ENTMCNC: 30.7 % — LOW (ref 32–36)
MCHC RBC-ENTMCNC: 32 PG — SIGNIFICANT CHANGE UP (ref 27–34)
MCV RBC AUTO: 104.3 FL — HIGH (ref 80–100)
NRBC # FLD: 0.15 K/UL — SIGNIFICANT CHANGE UP (ref 0–0)
NRBC FLD-RTO: 4.2 — SIGNIFICANT CHANGE UP
NT-PROBNP SERPL-SCNC: SIGNIFICANT CHANGE UP PG/ML
PLATELET # BLD AUTO: 136 K/UL — LOW (ref 150–400)
PMV BLD: 12.1 FL — SIGNIFICANT CHANGE UP (ref 7–13)
POTASSIUM SERPL-MCNC: 4.3 MMOL/L — SIGNIFICANT CHANGE UP (ref 3.5–5.3)
POTASSIUM SERPL-SCNC: 4.3 MMOL/L — SIGNIFICANT CHANGE UP (ref 3.5–5.3)
RBC # BLD: 3.5 M/UL — LOW (ref 3.8–5.2)
RBC # FLD: 22.1 % — HIGH (ref 10.3–14.5)
SODIUM SERPL-SCNC: 140 MMOL/L — SIGNIFICANT CHANGE UP (ref 135–145)
WBC # BLD: 3.54 K/UL — LOW (ref 3.8–10.5)
WBC # FLD AUTO: 3.54 K/UL — LOW (ref 3.8–10.5)

## 2019-07-20 PROCEDURE — 99233 SBSQ HOSP IP/OBS HIGH 50: CPT | Mod: GC,25

## 2019-07-20 PROCEDURE — 76604 US EXAM CHEST: CPT | Mod: 26,GC

## 2019-07-20 PROCEDURE — 99232 SBSQ HOSP IP/OBS MODERATE 35: CPT

## 2019-07-20 PROCEDURE — 99233 SBSQ HOSP IP/OBS HIGH 50: CPT

## 2019-07-20 RX ORDER — MAGNESIUM SULFATE 500 MG/ML
2 VIAL (ML) INJECTION ONCE
Refills: 0 | Status: COMPLETED | OUTPATIENT
Start: 2019-07-20 | End: 2019-07-20

## 2019-07-20 RX ORDER — FUROSEMIDE 40 MG
40 TABLET ORAL ONCE
Refills: 0 | Status: COMPLETED | OUTPATIENT
Start: 2019-07-20 | End: 2019-07-20

## 2019-07-20 RX ORDER — SODIUM CHLORIDE 9 MG/ML
1 INJECTION INTRAMUSCULAR; INTRAVENOUS; SUBCUTANEOUS
Refills: 0 | Status: DISCONTINUED | OUTPATIENT
Start: 2019-07-20 | End: 2019-07-20

## 2019-07-20 RX ORDER — FUROSEMIDE 40 MG
20 TABLET ORAL ONCE
Refills: 0 | Status: DISCONTINUED | OUTPATIENT
Start: 2019-07-20 | End: 2019-07-20

## 2019-07-20 RX ORDER — METOPROLOL TARTRATE 50 MG
150 TABLET ORAL DAILY
Refills: 0 | Status: DISCONTINUED | OUTPATIENT
Start: 2019-07-21 | End: 2019-08-06

## 2019-07-20 RX ORDER — METOPROLOL TARTRATE 50 MG
50 TABLET ORAL ONCE
Refills: 0 | Status: COMPLETED | OUTPATIENT
Start: 2019-07-20 | End: 2019-07-20

## 2019-07-20 RX ORDER — LEVALBUTEROL 1.25 MG/.5ML
0.63 SOLUTION, CONCENTRATE RESPIRATORY (INHALATION) ONCE
Refills: 0 | Status: COMPLETED | OUTPATIENT
Start: 2019-07-20 | End: 2019-07-20

## 2019-07-20 RX ADMIN — LEVALBUTEROL 0.63 MILLIGRAM(S): 1.25 SOLUTION, CONCENTRATE RESPIRATORY (INHALATION) at 16:55

## 2019-07-20 RX ADMIN — SODIUM CHLORIDE 4 MILLILITER(S): 9 INJECTION INTRAMUSCULAR; INTRAVENOUS; SUBCUTANEOUS at 21:43

## 2019-07-20 RX ADMIN — SODIUM CHLORIDE 1 GRAM(S): 9 INJECTION INTRAMUSCULAR; INTRAVENOUS; SUBCUTANEOUS at 18:37

## 2019-07-20 RX ADMIN — Medication 1.5 MILLIGRAM(S): at 22:18

## 2019-07-20 RX ADMIN — SODIUM CHLORIDE 4 MILLILITER(S): 9 INJECTION INTRAMUSCULAR; INTRAVENOUS; SUBCUTANEOUS at 16:55

## 2019-07-20 RX ADMIN — BENZOCAINE AND MENTHOL 1 LOZENGE: 5; 1 LIQUID ORAL at 22:18

## 2019-07-20 RX ADMIN — LEVALBUTEROL 0.63 MILLIGRAM(S): 1.25 SOLUTION, CONCENTRATE RESPIRATORY (INHALATION) at 00:00

## 2019-07-20 RX ADMIN — VORICONAZOLE 200 MILLIGRAM(S): 10 INJECTION, POWDER, LYOPHILIZED, FOR SOLUTION INTRAVENOUS at 18:40

## 2019-07-20 RX ADMIN — Medication 300 MILLIGRAM(S): at 14:03

## 2019-07-20 RX ADMIN — LEVALBUTEROL 0.63 MILLIGRAM(S): 1.25 SOLUTION, CONCENTRATE RESPIRATORY (INHALATION) at 06:27

## 2019-07-20 RX ADMIN — APIXABAN 2.5 MILLIGRAM(S): 2.5 TABLET, FILM COATED ORAL at 18:37

## 2019-07-20 RX ADMIN — ATOVAQUONE 1500 MILLIGRAM(S): 750 SUSPENSION ORAL at 14:03

## 2019-07-20 RX ADMIN — SODIUM CHLORIDE 4 MILLILITER(S): 9 INJECTION INTRAMUSCULAR; INTRAVENOUS; SUBCUTANEOUS at 09:35

## 2019-07-20 RX ADMIN — Medication 100 MILLIGRAM(S): at 22:19

## 2019-07-20 RX ADMIN — LEVALBUTEROL 0.63 MILLIGRAM(S): 1.25 SOLUTION, CONCENTRATE RESPIRATORY (INHALATION) at 13:26

## 2019-07-20 RX ADMIN — Medication 30 MILLIGRAM(S): at 05:31

## 2019-07-20 RX ADMIN — Medication 100 MILLIGRAM(S): at 05:32

## 2019-07-20 RX ADMIN — Medication 50 MILLIGRAM(S): at 14:04

## 2019-07-20 RX ADMIN — Medication 40 MILLIGRAM(S): at 18:40

## 2019-07-20 RX ADMIN — Medication 50 GRAM(S): at 16:47

## 2019-07-20 RX ADMIN — VORICONAZOLE 200 MILLIGRAM(S): 10 INJECTION, POWDER, LYOPHILIZED, FOR SOLUTION INTRAVENOUS at 05:32

## 2019-07-20 RX ADMIN — LEVALBUTEROL 0.63 MILLIGRAM(S): 1.25 SOLUTION, CONCENTRATE RESPIRATORY (INHALATION) at 04:06

## 2019-07-20 RX ADMIN — BENZOCAINE AND MENTHOL 1 LOZENGE: 5; 1 LIQUID ORAL at 14:10

## 2019-07-20 RX ADMIN — BENZOCAINE AND MENTHOL 1 LOZENGE: 5; 1 LIQUID ORAL at 18:40

## 2019-07-20 RX ADMIN — MEROPENEM 100 MILLIGRAM(S): 1 INJECTION INTRAVENOUS at 14:09

## 2019-07-20 RX ADMIN — Medication 40 MILLIGRAM(S): at 06:20

## 2019-07-20 RX ADMIN — SODIUM CHLORIDE 2 GRAM(S): 9 INJECTION INTRAMUSCULAR; INTRAVENOUS; SUBCUTANEOUS at 05:32

## 2019-07-20 RX ADMIN — LEVALBUTEROL 0.63 MILLIGRAM(S): 1.25 SOLUTION, CONCENTRATE RESPIRATORY (INHALATION) at 21:30

## 2019-07-20 RX ADMIN — PANTOPRAZOLE SODIUM 40 MILLIGRAM(S): 20 TABLET, DELAYED RELEASE ORAL at 05:32

## 2019-07-20 RX ADMIN — MEROPENEM 100 MILLIGRAM(S): 1 INJECTION INTRAVENOUS at 05:33

## 2019-07-20 RX ADMIN — APIXABAN 2.5 MILLIGRAM(S): 2.5 TABLET, FILM COATED ORAL at 05:32

## 2019-07-20 RX ADMIN — LEVALBUTEROL 0.63 MILLIGRAM(S): 1.25 SOLUTION, CONCENTRATE RESPIRATORY (INHALATION) at 09:30

## 2019-07-20 RX ADMIN — BENZOCAINE AND MENTHOL 1 LOZENGE: 5; 1 LIQUID ORAL at 05:33

## 2019-07-20 NOTE — PROGRESS NOTE ADULT - PROBLEM SELECTOR PLAN 3
LT>RT neg for DVT, likely due to poor nutritional state  - elevation LT>RT neg for DVT, likely due to poor nutritional state  - elevation  - lasix 40 mg PO daily

## 2019-07-20 NOTE — PROGRESS NOTE ADULT - PROBLEM SELECTOR PLAN 4
Afib w/ RVR likely due infection and hypoxia/lung disease   - increase metoprolol 100 mg ER to 150 mg ER  - c/w apixaban (age >80, weight <60kg)

## 2019-07-20 NOTE — CHART NOTE - NSCHARTNOTEFT_GEN_A_CORE
: Bre Buenrostro    INDICATION: Respiratory distress    PROCEDURE:  [x] LIMITED CHEST    FINDINGS:  CHEST: Bilateral A line pattern anteriorly, Bilateral B lines posteriorly, bilateral complex pleural effusions. Large right sided pleural effusion, moderate left sided pleural effusion.     INTERPRETATION:  Pulmonary edema.   Bilateral pleural effusion. Large on the right, moderate on the left.

## 2019-07-20 NOTE — PROGRESS NOTE ADULT - PROBLEM SELECTOR PLAN 6
Follows with Dr. Nguyen at [facility] [place]. as per family, [facility] is not offering any disease modifying treatment. Pt is looking for experimental trial if pt clinically improves from current medical issues.   - C/w ppx mepron, valacyclovir and allopurinol  - C discussed  poor prognosis - They wish to be full code for now  - patient and family agreeable to defer experimental chemo until after VALERIO.

## 2019-07-20 NOTE — PROGRESS NOTE ADULT - PROBLEM SELECTOR PLAN 1
RLL bronchus obstruction with concern for postobstructive & fungal PNA  - c/w continuous pulse ox  - c/w chest PT and incentive spirometry   - continues high flow NC O2, titrate off as tolerated  - c/w standing nebs; Xopenex q4 hours, following by nebulized 3% hypertonic saline, and then Aerobika device as per Pulm recommendations  - meropenem per ID to end today 7/20   - palliative recs appreciated conveyed poor prognosis to family still wishes pt to be full code.  -cont prednisone 30 mg daily  - pulmonary rec appreciated RLL bronchus obstruction with concern for postobstructive & fungal PNA  - c/w continuous pulse ox  - c/w chest PT and incentive spirometry   - continues high flow NC O2, titrate off as tolerated  - c/w standing nebs; Xopenex q4 hours, following by nebulized 3% hypertonic saline, and then Aerobika device as per Pulm recommendations  - meropenem per ID to end today 7/20   - palliative recs appreciated conveyed poor prognosis to family still wishes pt to be full code.  -cont prednisone 30 mg daily  - pulmonary rec appreciated  - CXR w/ near complete white out of R, ?effusion +/- bronchial obstruction, f/u pulm   - will start on lasix daily given generalized fluid overload

## 2019-07-20 NOTE — PROVIDER CONTACT NOTE (OTHER) - RECOMMENDATIONS
order additional treatment of xopenex now and increase frequency as these episodes occur approximately every three and a half hours.

## 2019-07-20 NOTE — PROGRESS NOTE ADULT - SUBJECTIVE AND OBJECTIVE BOX
Patient is a 79y old  Female who presents with a chief complaint of SOB    SUBJECTIVE / OVERNIGHT EVENTS:    Patient is confused and thus not answering in way that makes sense  Reports coughing all day however daughter states she has been at bedside and has not been coughing  per daughter breathing better than prior, no CP  ate a good amount today--banana, ensure, some bread  has some LE swelling and feels weak  urinating a lot     MEDICATIONS  (STANDING):  allopurinol 300 milliGRAM(s) Oral daily  apixaban 2.5 milliGRAM(s) Oral every 12 hours  atovaquone Suspension 1500 milliGRAM(s) Oral daily  benzocaine 15 mG/menthol 3.6 mG (Sugar-Free) Lozenge 1 Lozenge Oral every 4 hours  docusate sodium 100 milliGRAM(s) Oral three times a day  levalbuterol Inhalation 0.63 milliGRAM(s) Inhalation every 4 hours  melatonin 1.5 milliGRAM(s) Oral at bedtime  meropenem  IVPB 1000 milliGRAM(s) IV Intermittent every 8 hours  metoprolol succinate ER 50 milliGRAM(s) Oral once  pantoprazole    Tablet 40 milliGRAM(s) Oral before breakfast  predniSONE   Tablet 30 milliGRAM(s) Oral daily  sodium chloride 2 Gram(s) Oral two times a day  sodium chloride 7% Inhalation 4 milliLiter(s) Inhalation three times a day  valACYclovir 500 milliGRAM(s) Oral <User Schedule>  voriconazole 200 milliGRAM(s) Oral every 12 hours    MEDICATIONS  (PRN):  benzonatate 100 milliGRAM(s) Oral three times a day PRN Cough  bisacodyl 5 milliGRAM(s) Oral every 12 hours PRN Constipation  guaiFENesin    Syrup 200 milliGRAM(s) Oral every 6 hours PRN Cough  polyethylene glycol 3350 17 Gram(s) Oral daily PRN Constipation  sodium chloride 0.65% Nasal 1 Spray(s) Both Nostrils daily PRN Nasal Congestion    T(C): 36.2 (07-20-19 @ 05:29), Max: 36.4 (07-20-19 @ 03:55)  HR: 112 (07-20-19 @ 13:27) (55 - 150)  BP: 144/113 (07-20-19 @ 05:55) (114/88 - 146/84)  RR: 20 (07-20-19 @ 06:31) (20 - 26)  SpO2: 96% (07-20-19 @ 06:31) (92% - 98%)    PHYSICAL EXAM:  GENERAL: NAD, well-developed  HEAD:  Atraumatic, Normocephalic  CHEST/LUNG: coarse BS b/l   HEART: tachycardia; No murmurs, rubs, or gallops  ABDOMEN: Soft, Nontender, Nondistended; Bowel sounds present  EXTREMITIES:   cool, edematous, pitting  PSYCH: AAOx2  NEUROLOGY: non-focal  SKIN: No rashes or lesions    LABS:                        11.2   3.54  )-----------( 136      ( 20 Jul 2019 06:25 )             36.5     07-20    140  |  100  |  27<H>  ----------------------------<  113<H>  4.3   |  28  |  0.33<L>    Ca    9.0      20 Jul 2019 06:25    Microbiology:   ELMER 07-19 @ 05:40  pH: 7.40/pCO2: 50/pO2: 132/HCO3: 29/lactate: 2.8      Consultant(s) Notes Reviewed:    Care Discussed with Consultants/Other Providers: Patient is a 79y old  Female who presents with a chief complaint of SOB    SUBJECTIVE / OVERNIGHT EVENTS:    Patient is confused and thus not answering in way that makes sense  Reports coughing all day however daughter states she has been at bedside and has not been coughing  per daughter breathing better than prior, no CP  ate a good amount today--banana, ensure, some bread  has some LE swelling and feels weak  urinating a lot     overnight some SOB s/p dose lasix 40 x 1    tele: afib with RVR to 150s    MEDICATIONS  (STANDING):  allopurinol 300 milliGRAM(s) Oral daily  apixaban 2.5 milliGRAM(s) Oral every 12 hours  atovaquone Suspension 1500 milliGRAM(s) Oral daily  benzocaine 15 mG/menthol 3.6 mG (Sugar-Free) Lozenge 1 Lozenge Oral every 4 hours  docusate sodium 100 milliGRAM(s) Oral three times a day  levalbuterol Inhalation 0.63 milliGRAM(s) Inhalation every 4 hours  melatonin 1.5 milliGRAM(s) Oral at bedtime  meropenem  IVPB 1000 milliGRAM(s) IV Intermittent every 8 hours  metoprolol succinate ER 50 milliGRAM(s) Oral once  pantoprazole    Tablet 40 milliGRAM(s) Oral before breakfast  predniSONE   Tablet 30 milliGRAM(s) Oral daily  sodium chloride 2 Gram(s) Oral two times a day  sodium chloride 7% Inhalation 4 milliLiter(s) Inhalation three times a day  valACYclovir 500 milliGRAM(s) Oral <User Schedule>  voriconazole 200 milliGRAM(s) Oral every 12 hours    MEDICATIONS  (PRN):  benzonatate 100 milliGRAM(s) Oral three times a day PRN Cough  bisacodyl 5 milliGRAM(s) Oral every 12 hours PRN Constipation  guaiFENesin    Syrup 200 milliGRAM(s) Oral every 6 hours PRN Cough  polyethylene glycol 3350 17 Gram(s) Oral daily PRN Constipation  sodium chloride 0.65% Nasal 1 Spray(s) Both Nostrils daily PRN Nasal Congestion    T(C): 36.2 (07-20-19 @ 05:29), Max: 36.4 (07-20-19 @ 03:55)  HR: 112 (07-20-19 @ 13:27) (55 - 150)  BP: 144/113 (07-20-19 @ 05:55) (114/88 - 146/84)  RR: 20 (07-20-19 @ 06:31) (20 - 26)  SpO2: 96% (07-20-19 @ 06:31) (92% - 98%)    PHYSICAL EXAM:  GENERAL: NAD, well-developed  HEAD:  Atraumatic, Normocephalic  CHEST/LUNG: coarse BS b/l   HEART: tachycardia; No murmurs, rubs, or gallops  ABDOMEN: Soft, Nontender, Nondistended; Bowel sounds present  EXTREMITIES:   cool, edematous, pitting  PSYCH: AAOx2  NEUROLOGY: non-focal  SKIN: No rashes or lesions    LABS:                        11.2   3.54  )-----------( 136      ( 20 Jul 2019 06:25 )             36.5     07-20    140  |  100  |  27<H>  ----------------------------<  113<H>  4.3   |  28  |  0.33<L>    Ca    9.0      20 Jul 2019 06:25    Microbiology:   ELMER 07-19 @ 05:40  pH: 7.40/pCO2: 50/pO2: 132/HCO3: 29/lactate: 2.8      Consultant(s) Notes Reviewed:    Care Discussed with Consultants/Other Providers: Patient is a 79y old  Female who presents with a chief complaint of SOB    SUBJECTIVE / OVERNIGHT EVENTS:    Patient is confused and thus not answering in way that makes sense  Reports coughing all day however daughter states she has been at bedside and has not been coughing  per daughter breathing better than prior, no CP  ate a good amount today--banana, ensure, some bread  has some LE swelling and feels weak  urinating a lot     overnight some SOB s/p dose lasix 40 x 1    tele: afib with RVR to 150s    MEDICATIONS  (STANDING):  allopurinol 300 milliGRAM(s) Oral daily  apixaban 2.5 milliGRAM(s) Oral every 12 hours  atovaquone Suspension 1500 milliGRAM(s) Oral daily  benzocaine 15 mG/menthol 3.6 mG (Sugar-Free) Lozenge 1 Lozenge Oral every 4 hours  docusate sodium 100 milliGRAM(s) Oral three times a day  levalbuterol Inhalation 0.63 milliGRAM(s) Inhalation every 4 hours  melatonin 1.5 milliGRAM(s) Oral at bedtime  meropenem  IVPB 1000 milliGRAM(s) IV Intermittent every 8 hours  metoprolol succinate ER 50 milliGRAM(s) Oral once  pantoprazole    Tablet 40 milliGRAM(s) Oral before breakfast  predniSONE   Tablet 30 milliGRAM(s) Oral daily  sodium chloride 2 Gram(s) Oral two times a day  sodium chloride 7% Inhalation 4 milliLiter(s) Inhalation three times a day  valACYclovir 500 milliGRAM(s) Oral <User Schedule>  voriconazole 200 milliGRAM(s) Oral every 12 hours    MEDICATIONS  (PRN):  benzonatate 100 milliGRAM(s) Oral three times a day PRN Cough  bisacodyl 5 milliGRAM(s) Oral every 12 hours PRN Constipation  guaiFENesin    Syrup 200 milliGRAM(s) Oral every 6 hours PRN Cough  polyethylene glycol 3350 17 Gram(s) Oral daily PRN Constipation  sodium chloride 0.65% Nasal 1 Spray(s) Both Nostrils daily PRN Nasal Congestion    T(C): 36.2 (07-20-19 @ 05:29), Max: 36.4 (07-20-19 @ 03:55)  HR: 112 (07-20-19 @ 13:27) (55 - 150)  BP: 144/113 (07-20-19 @ 05:55) (114/88 - 146/84)  RR: 20 (07-20-19 @ 06:31) (20 - 26)  SpO2: 96% (07-20-19 @ 06:31) (92% - 98%)    PHYSICAL EXAM:  GENERAL: NAD, well-developed  HEAD:  Atraumatic, Normocephalic  CHEST/LUNG: coarse BS b/l   HEART: tachycardia; No murmurs, rubs, or gallops  ABDOMEN: Soft, Nontender, Nondistended; Bowel sounds present  EXTREMITIES:   cool, edematous, pitting  PSYCH: AAOx2  NEUROLOGY: non-focal  SKIN: No rashes or lesions    LABS:                        11.2   3.54  )-----------( 136      ( 20 Jul 2019 06:25 )             36.5     07-20    140  |  100  |  27<H>  ----------------------------<  113<H>  4.3   |  28  |  0.33<L>    Ca    9.0      20 Jul 2019 06:25    CXR:  Cardiac silhouette not well evaluated. Large right pleural effusion with   adjacent opacity is increased compared to the prior study. Small left   pleural effusion with adjacent opacity is unchanged. No pneumothorax.    IMPRESSION:   Worsening aeration right lung.    Microbiology:   ELMER 07-19 @ 05:40  pH: 7.40/pCO2: 50/pO2: 132/HCO3: 29/lactate: 2.8      Consultant(s) Notes Reviewed:    Care Discussed with Consultants/Other Providers:

## 2019-07-20 NOTE — CHART NOTE - NSCHARTNOTEFT_GEN_A_CORE
Notified by RN that patient had a burst of tachycardia to 150s and is SOB.  On exam patient using accessory muscles to breath and is noted to have decreased breath sounds on the right and +3 pitting edema bilateral lower extremities.      HR: 150   BP: 144/113   RR: 26   SpO2: 95%     Plan: Increase high flow oxygen for now, titrate down as tolerated  Lasix 40mg IV x 1 dose  STAT Xopenex treatment  Continue aerobika for airway clearance  Continue metanebs at least 3x day preceded by albuterol and 7% saline nebs  Continue treatment for fungal pneumonia per ID

## 2019-07-20 NOTE — PROGRESS NOTE ADULT - SUBJECTIVE AND OBJECTIVE BOX
Follow Up:      Inverval History/ROS:Patient is a 79y old  Female who presents with a chief complaint of SOB (19 Jul 2019 14:30)    On high flow O2.  Still sob with cough- but better than earlier int he week    Allergies    No Known Allergies    Intolerances        ANTIMICROBIALS:  atovaquone Suspension 1500 daily  meropenem  IVPB 1000 every 8 hours  valACYclovir 500 <User Schedule>  voriconazole 200 every 12 hours      OTHER MEDS:  allopurinol 300 milliGRAM(s) Oral daily  apixaban 2.5 milliGRAM(s) Oral every 12 hours  benzocaine 15 mG/menthol 3.6 mG (Sugar-Free) Lozenge 1 Lozenge Oral every 4 hours  benzonatate 100 milliGRAM(s) Oral three times a day PRN  bisacodyl 5 milliGRAM(s) Oral every 12 hours PRN  docusate sodium 100 milliGRAM(s) Oral three times a day  guaiFENesin    Syrup 200 milliGRAM(s) Oral every 6 hours PRN  levalbuterol Inhalation 0.63 milliGRAM(s) Inhalation every 4 hours  melatonin 1.5 milliGRAM(s) Oral at bedtime  metoprolol succinate ER 50 milliGRAM(s) Oral once  pantoprazole    Tablet 40 milliGRAM(s) Oral before breakfast  polyethylene glycol 3350 17 Gram(s) Oral daily PRN  predniSONE   Tablet 30 milliGRAM(s) Oral daily  sodium chloride 2 Gram(s) Oral two times a day  sodium chloride 0.65% Nasal 1 Spray(s) Both Nostrils daily PRN  sodium chloride 7% Inhalation 4 milliLiter(s) Inhalation three times a day      Vital Signs Last 24 Hrs  T(C): 36.2 (20 Jul 2019 05:29), Max: 36.4 (20 Jul 2019 03:55)  T(F): 97.1 (20 Jul 2019 05:29), Max: 97.6 (20 Jul 2019 03:55)  HR: 59 (20 Jul 2019 09:30) (55 - 150)  BP: 144/113 (20 Jul 2019 05:55) (114/88 - 146/84)  BP(mean): --  RR: 20 (20 Jul 2019 06:31) (20 - 26)  SpO2: 96% (20 Jul 2019 06:31) (92% - 99%)    PHYSICAL EXAM:  General: [x ]high flow    HEAD/EYES: [ ] PERRL [ ] white sclera [ ] icterus  ENT:  [ ] normal [x ] supple [ ] thrush [ ] pharyngeal exudate  Cardiovascular:   [ ] murmur [x ] normal [ ] PPM/AICD  Respiratory:  [ ] clear to ausculation bilaterally  GI:  [x ] soft, non-tender, normal bowel sounds  :  [ ] bledsoe [x ] no CVA tenderness   Musculoskeletal:  [ ] no synovitis  Neurologic:  [ ] non-focal exam   Skin:  [ ] no rash  Lymph: [ ] no lymphadenopathy  Psychiatric:  [ ] appropriate affect [ x] alert & oriented  Lines:  [x ] no phlebitis [ ] central line                                11.2   3.54  )-----------( 136      ( 20 Jul 2019 06:25 )             36.5       07-20    140  |  100  |  27<H>  ----------------------------<  113<H>  4.3   |  28  |  0.33<L>    Ca    9.0      20 Jul 2019 06:25            MICROBIOLOGY:    RADIOLOGY:

## 2019-07-20 NOTE — PROGRESS NOTE ADULT - ASSESSMENT
78 y/o female with Afib, advanced DLBCL, now complicated by fungal pneumonia with endobronchial lesion, progression of disease causing hypoxic respiratory failure. Found to have aspergillus on endobronchial biopsy, on voriconazole with continued hypoxic respiratory failure due to mucous plugging, lymphoma, and fungal PNA. Now breathing comfortably on high flow, able to converse, eat, feels somewhat improved. Doing better overall.     Bedside ultrasound notable for pulmonary edema and moderate/large pleural effusion. Feeling better after being given IV diuretic this morning with good response in UOP. Would continue antimicrobials and avoid positive pressure. Needs mucus/secretions clearance which is inhibited by bipap.     Recs  Continue with Diuresis  Continue high flow with goal of titrating down as she can.           - Improved with coughing and nebs. Needs to maintain ability to cough. Would continue to avoid bipap.   Continue aerobika for airway clearance  Continue metanebs at least 3x day preceded by albuterol and 7% saline nebs  Continue treatment for fungal pneumonia per ID  Continue with chest pt and deep NT suctioning prn, antifungals per ID, nebulizers and weaning O2 as tolerated.   Continue management at this time and please try to titrate off HFNC if possible.     Bre Buenrostro MD  Pulmonary & Critical Care Medicine Fellow | PGY-4  [phone]

## 2019-07-20 NOTE — PROGRESS NOTE ADULT - ASSESSMENT
79 yo F with B-cell lymphoma s/p chemo, Breast CA s/p mastectomy, Lung CA, Afib on Eliquis, admitted for acute hypoxic respiratory failure from Viral URI with obstructive PNA likely due to cancer, fungal PNA due to immunosuppression from neutropenia.

## 2019-07-20 NOTE — PROGRESS NOTE ADULT - PROBLEM SELECTOR PLAN 2
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

## 2019-07-21 LAB
ANION GAP SERPL CALC-SCNC: 10 MMO/L — SIGNIFICANT CHANGE UP (ref 7–14)
BUN SERPL-MCNC: 24 MG/DL — HIGH (ref 7–23)
CALCIUM SERPL-MCNC: 9 MG/DL — SIGNIFICANT CHANGE UP (ref 8.4–10.5)
CHLORIDE SERPL-SCNC: 98 MMOL/L — SIGNIFICANT CHANGE UP (ref 98–107)
CO2 SERPL-SCNC: 34 MMOL/L — HIGH (ref 22–31)
CREAT SERPL-MCNC: 0.37 MG/DL — LOW (ref 0.5–1.3)
GLUCOSE SERPL-MCNC: 93 MG/DL — SIGNIFICANT CHANGE UP (ref 70–99)
HCT VFR BLD CALC: 38.2 % — SIGNIFICANT CHANGE UP (ref 34.5–45)
HGB BLD-MCNC: 12 G/DL — SIGNIFICANT CHANGE UP (ref 11.5–15.5)
MAGNESIUM SERPL-MCNC: 1.9 MG/DL — SIGNIFICANT CHANGE UP (ref 1.6–2.6)
MCHC RBC-ENTMCNC: 31.4 % — LOW (ref 32–36)
MCHC RBC-ENTMCNC: 33.2 PG — SIGNIFICANT CHANGE UP (ref 27–34)
MCV RBC AUTO: 105.8 FL — HIGH (ref 80–100)
NRBC # FLD: 0.07 K/UL — SIGNIFICANT CHANGE UP (ref 0–0)
NRBC FLD-RTO: 2.5 — SIGNIFICANT CHANGE UP
PHOSPHATE SERPL-MCNC: 2.4 MG/DL — LOW (ref 2.5–4.5)
PLATELET # BLD AUTO: 112 K/UL — LOW (ref 150–400)
PMV BLD: 12.7 FL — SIGNIFICANT CHANGE UP (ref 7–13)
POTASSIUM SERPL-MCNC: 3.3 MMOL/L — LOW (ref 3.5–5.3)
POTASSIUM SERPL-SCNC: 3.3 MMOL/L — LOW (ref 3.5–5.3)
RBC # BLD: 3.61 M/UL — LOW (ref 3.8–5.2)
RBC # FLD: 22 % — HIGH (ref 10.3–14.5)
SODIUM SERPL-SCNC: 142 MMOL/L — SIGNIFICANT CHANGE UP (ref 135–145)
WBC # BLD: 2.76 K/UL — LOW (ref 3.8–10.5)
WBC # FLD AUTO: 2.76 K/UL — LOW (ref 3.8–10.5)

## 2019-07-21 PROCEDURE — 99233 SBSQ HOSP IP/OBS HIGH 50: CPT

## 2019-07-21 RX ORDER — POTASSIUM CHLORIDE 20 MEQ
40 PACKET (EA) ORAL ONCE
Refills: 0 | Status: COMPLETED | OUTPATIENT
Start: 2019-07-21 | End: 2019-07-21

## 2019-07-21 RX ORDER — FUROSEMIDE 40 MG
40 TABLET ORAL ONCE
Refills: 0 | Status: COMPLETED | OUTPATIENT
Start: 2019-07-21 | End: 2019-07-21

## 2019-07-21 RX ORDER — MAGNESIUM SULFATE 500 MG/ML
1 VIAL (ML) INJECTION ONCE
Refills: 0 | Status: COMPLETED | OUTPATIENT
Start: 2019-07-21 | End: 2019-07-21

## 2019-07-21 RX ORDER — ACETAMINOPHEN 500 MG
650 TABLET ORAL ONCE
Refills: 0 | Status: COMPLETED | OUTPATIENT
Start: 2019-07-21 | End: 2019-07-28

## 2019-07-21 RX ADMIN — APIXABAN 2.5 MILLIGRAM(S): 2.5 TABLET, FILM COATED ORAL at 17:40

## 2019-07-21 RX ADMIN — VALACYCLOVIR 500 MILLIGRAM(S): 500 TABLET, FILM COATED ORAL at 17:40

## 2019-07-21 RX ADMIN — LEVALBUTEROL 0.63 MILLIGRAM(S): 1.25 SOLUTION, CONCENTRATE RESPIRATORY (INHALATION) at 13:20

## 2019-07-21 RX ADMIN — Medication 40 MILLIEQUIVALENT(S): at 10:39

## 2019-07-21 RX ADMIN — LEVALBUTEROL 0.63 MILLIGRAM(S): 1.25 SOLUTION, CONCENTRATE RESPIRATORY (INHALATION) at 21:25

## 2019-07-21 RX ADMIN — Medication 150 MILLIGRAM(S): at 05:11

## 2019-07-21 RX ADMIN — SODIUM CHLORIDE 4 MILLILITER(S): 9 INJECTION INTRAMUSCULAR; INTRAVENOUS; SUBCUTANEOUS at 21:36

## 2019-07-21 RX ADMIN — VORICONAZOLE 200 MILLIGRAM(S): 10 INJECTION, POWDER, LYOPHILIZED, FOR SOLUTION INTRAVENOUS at 17:40

## 2019-07-21 RX ADMIN — ATOVAQUONE 1500 MILLIGRAM(S): 750 SUSPENSION ORAL at 14:06

## 2019-07-21 RX ADMIN — Medication 100 GRAM(S): at 14:06

## 2019-07-21 RX ADMIN — LEVALBUTEROL 0.63 MILLIGRAM(S): 1.25 SOLUTION, CONCENTRATE RESPIRATORY (INHALATION) at 09:04

## 2019-07-21 RX ADMIN — SODIUM CHLORIDE 4 MILLILITER(S): 9 INJECTION INTRAMUSCULAR; INTRAVENOUS; SUBCUTANEOUS at 17:00

## 2019-07-21 RX ADMIN — VORICONAZOLE 200 MILLIGRAM(S): 10 INJECTION, POWDER, LYOPHILIZED, FOR SOLUTION INTRAVENOUS at 05:11

## 2019-07-21 RX ADMIN — LEVALBUTEROL 0.63 MILLIGRAM(S): 1.25 SOLUTION, CONCENTRATE RESPIRATORY (INHALATION) at 04:30

## 2019-07-21 RX ADMIN — SODIUM CHLORIDE 4 MILLILITER(S): 9 INJECTION INTRAMUSCULAR; INTRAVENOUS; SUBCUTANEOUS at 09:04

## 2019-07-21 RX ADMIN — Medication 100 MILLIGRAM(S): at 21:10

## 2019-07-21 RX ADMIN — LEVALBUTEROL 0.63 MILLIGRAM(S): 1.25 SOLUTION, CONCENTRATE RESPIRATORY (INHALATION) at 17:00

## 2019-07-21 RX ADMIN — BENZOCAINE AND MENTHOL 1 LOZENGE: 5; 1 LIQUID ORAL at 21:10

## 2019-07-21 RX ADMIN — APIXABAN 2.5 MILLIGRAM(S): 2.5 TABLET, FILM COATED ORAL at 05:11

## 2019-07-21 RX ADMIN — BENZOCAINE AND MENTHOL 1 LOZENGE: 5; 1 LIQUID ORAL at 17:40

## 2019-07-21 RX ADMIN — Medication 30 MILLIGRAM(S): at 05:11

## 2019-07-21 RX ADMIN — Medication 40 MILLIGRAM(S): at 14:06

## 2019-07-21 RX ADMIN — BENZOCAINE AND MENTHOL 1 LOZENGE: 5; 1 LIQUID ORAL at 14:07

## 2019-07-21 RX ADMIN — Medication 300 MILLIGRAM(S): at 14:07

## 2019-07-21 RX ADMIN — Medication 1.5 MILLIGRAM(S): at 21:10

## 2019-07-21 RX ADMIN — Medication 100 MILLIGRAM(S): at 05:11

## 2019-07-21 RX ADMIN — PANTOPRAZOLE SODIUM 40 MILLIGRAM(S): 20 TABLET, DELAYED RELEASE ORAL at 05:12

## 2019-07-21 RX ADMIN — BENZOCAINE AND MENTHOL 1 LOZENGE: 5; 1 LIQUID ORAL at 05:11

## 2019-07-21 RX ADMIN — LEVALBUTEROL 0.63 MILLIGRAM(S): 1.25 SOLUTION, CONCENTRATE RESPIRATORY (INHALATION) at 00:48

## 2019-07-21 NOTE — PROGRESS NOTE ADULT - PROBLEM SELECTOR PLAN 2
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

## 2019-07-21 NOTE — PROGRESS NOTE ADULT - PROBLEM SELECTOR PLAN 3
LT>RT neg for DVT, likely due to poor nutritional state  - elevation  - lasix 40 mg PO daily LT>RT neg for DVT, likely due to poor nutritional state  - elevation  - getting lasix as above

## 2019-07-21 NOTE — PROGRESS NOTE ADULT - PROBLEM SELECTOR PLAN 4
Afib w/ RVR likely due infection and hypoxia/lung disease   - c/w metoprolol 150 mg ER  - c/w apixaban (age >80, weight <60kg)

## 2019-07-21 NOTE — PROGRESS NOTE ADULT - PROBLEM SELECTOR PLAN 1
RLL bronchus obstruction with concern for postobstructive & fungal PNA  - c/w continuous pulse ox  - c/w chest PT and incentive spirometry   - continues high flow NC O2, titrate off as tolerated  - c/w standing nebs; Xopenex q4 hours, following by nebulized 3% hypertonic saline, and then Aerobika device as per Pulm recommendations  - meropenem course ended on 7/20   - palliative recs appreciated conveyed poor prognosis to family still wishes pt to be full code.  - c/w  prednisone 30 mg daily  - pulmonary rec appreciated  - CXR w/ near complete white out of R on 7/19, large effusions on US, s/p lasix 40 IV x2 on 7/20, plan for 40 IV again today

## 2019-07-21 NOTE — PROGRESS NOTE ADULT - ASSESSMENT
81 yo F with B-cell lymphoma s/p chemo, Breast CA s/p mastectomy, Lung CA, Afib on Eliquis, admitted for acute hypoxic respiratory failure from Viral URI with obstructive PNA likely due to cancer, fungal PNA due to immunosuppression from neutropenia.

## 2019-07-21 NOTE — PROGRESS NOTE ADULT - PROBLEM SELECTOR PLAN 6
[place] [facility] [place], [facility] is not offering any disease modifying treatment. Pt is looking for experimental trial if pt clinically improves from current medical issues.   - C/w ppx mepron, valacyclovir and allopurinol  - C discussed  poor prognosis - They wish to be full code for now  - patient and family agreeable to defer experimental chemo until after VALERIO.

## 2019-07-21 NOTE — PROGRESS NOTE ADULT - SUBJECTIVE AND OBJECTIVE BOX
Patient is a 79y old  Female who presents with a chief complaint of SOB    SUBJECTIVE / OVERNIGHT EVENTS:    Tired this am, no CP  SOB overall better, voiding a lot  Has not yet eaten    Daughter visiting    Tele: afib with RVR, rates 110s     MEDICATIONS  (STANDING):  acetaminophen   Tablet .. 650 milliGRAM(s) Oral once  allopurinol 300 milliGRAM(s) Oral daily  apixaban 2.5 milliGRAM(s) Oral every 12 hours  atovaquone Suspension 1500 milliGRAM(s) Oral daily  benzocaine 15 mG/menthol 3.6 mG (Sugar-Free) Lozenge 1 Lozenge Oral every 4 hours  docusate sodium 100 milliGRAM(s) Oral three times a day  furosemide   Injectable 40 milliGRAM(s) IV Push once  levalbuterol Inhalation 0.63 milliGRAM(s) Inhalation every 4 hours  melatonin 1.5 milliGRAM(s) Oral at bedtime  metoprolol succinate  milliGRAM(s) Oral daily  pantoprazole    Tablet 40 milliGRAM(s) Oral before breakfast  predniSONE   Tablet 30 milliGRAM(s) Oral daily  sodium chloride 7% Inhalation 4 milliLiter(s) Inhalation three times a day  valACYclovir 500 milliGRAM(s) Oral <User Schedule>  voriconazole 200 milliGRAM(s) Oral every 12 hours    MEDICATIONS  (PRN):  benzonatate 100 milliGRAM(s) Oral three times a day PRN Cough  bisacodyl 5 milliGRAM(s) Oral every 12 hours PRN Constipation  guaiFENesin    Syrup 200 milliGRAM(s) Oral every 6 hours PRN Cough  polyethylene glycol 3350 17 Gram(s) Oral daily PRN Constipation  sodium chloride 0.65% Nasal 1 Spray(s) Both Nostrils daily PRN Nasal Congestion    T(C): 35.6 (07-21-19 @ 05:10), Max: 36.5 (07-20-19 @ 13:57)  HR: 98 (07-21-19 @ 09:04) (92 - 122)  BP: 112/71 (07-21-19 @ 05:10) (94/62 - 129/110)  RR: 22 (07-21-19 @ 07:15) (20 - 26)  SpO2: 97% (07-21-19 @ 07:15) (96% - 100%)    I&O's Summary    20 Jul 2019 07:01  -  21 Jul 2019 07:00  --------------------------------------------------------  IN: 938 mL / OUT: 700 mL / NET: 238 mL    21 Jul 2019 07:01  -  21 Jul 2019 13:25  --------------------------------------------------------  IN: 250 mL / OUT: 400 mL / NET: -150 mL    PHYSICAL EXAM:  GENERAL: NAD on high flow NC  HEAD:  Atraumatic, Normocephalic  CHEST/LUNG: coarse BS b/l, intermitent wheeze on L, decrease BS R nase  HEART: tachycardia; No murmurs, rubs, or gallops  ABDOMEN: Soft, Nontender, Nondistended; Bowel sounds present  EXTREMITIES:   cool, edematous, pitting; anasarca   PSYCH: AAOx2  NEUROLOGY: non-focal  SKIN: No rashes or lesions    LABS:                        12.0   2.76  )-----------( 112      ( 21 Jul 2019 07:25 )             38.2     07-21    142  |  98  |  24<H>  ----------------------------<  93  3.3<L>   |  34<H>  |  0.37<L>    Ca    9.0      21 Jul 2019 07:25  Phos  2.4     07-21  Mg     1.9     07-21      Consultant(s) Notes Reviewed:    Care Discussed with Consultants/Other Providers:

## 2019-07-21 NOTE — PROGRESS NOTE ADULT - PROBLEM SELECTOR PLAN 5
Likely BM suppression in setting of active CA  - continue to monitor CBC, transfuse as needed Likely BM suppression in setting of active CA  - transfuse as needed  - WBC and plt downtrending, monitor closely  - hold ac for plt <50

## 2019-07-22 LAB
ANION GAP SERPL CALC-SCNC: 10 MMO/L — SIGNIFICANT CHANGE UP (ref 7–14)
BUN SERPL-MCNC: 23 MG/DL — SIGNIFICANT CHANGE UP (ref 7–23)
CALCIUM SERPL-MCNC: 8.9 MG/DL — SIGNIFICANT CHANGE UP (ref 8.4–10.5)
CHLORIDE SERPL-SCNC: 96 MMOL/L — LOW (ref 98–107)
CO2 SERPL-SCNC: 36 MMOL/L — HIGH (ref 22–31)
CREAT SERPL-MCNC: 0.31 MG/DL — LOW (ref 0.5–1.3)
GLUCOSE SERPL-MCNC: 94 MG/DL — SIGNIFICANT CHANGE UP (ref 70–99)
HCT VFR BLD CALC: 38.1 % — SIGNIFICANT CHANGE UP (ref 34.5–45)
HGB BLD-MCNC: 11.9 G/DL — SIGNIFICANT CHANGE UP (ref 11.5–15.5)
MCHC RBC-ENTMCNC: 31.2 % — LOW (ref 32–36)
MCHC RBC-ENTMCNC: 32.2 PG — SIGNIFICANT CHANGE UP (ref 27–34)
MCV RBC AUTO: 103 FL — HIGH (ref 80–100)
NRBC # FLD: 0.04 K/UL — SIGNIFICANT CHANGE UP (ref 0–0)
NRBC FLD-RTO: 1.5 — SIGNIFICANT CHANGE UP
PLATELET # BLD AUTO: 123 K/UL — LOW (ref 150–400)
PMV BLD: 12.2 FL — SIGNIFICANT CHANGE UP (ref 7–13)
POTASSIUM SERPL-MCNC: 3.3 MMOL/L — LOW (ref 3.5–5.3)
POTASSIUM SERPL-SCNC: 3.3 MMOL/L — LOW (ref 3.5–5.3)
RBC # BLD: 3.7 M/UL — LOW (ref 3.8–5.2)
RBC # FLD: 21.9 % — HIGH (ref 10.3–14.5)
SODIUM SERPL-SCNC: 142 MMOL/L — SIGNIFICANT CHANGE UP (ref 135–145)
WBC # BLD: 2.64 K/UL — LOW (ref 3.8–10.5)
WBC # FLD AUTO: 2.64 K/UL — LOW (ref 3.8–10.5)

## 2019-07-22 PROCEDURE — 99232 SBSQ HOSP IP/OBS MODERATE 35: CPT

## 2019-07-22 PROCEDURE — 99233 SBSQ HOSP IP/OBS HIGH 50: CPT | Mod: GC

## 2019-07-22 PROCEDURE — 99233 SBSQ HOSP IP/OBS HIGH 50: CPT

## 2019-07-22 RX ORDER — POTASSIUM CHLORIDE 20 MEQ
40 PACKET (EA) ORAL EVERY 4 HOURS
Refills: 0 | Status: COMPLETED | OUTPATIENT
Start: 2019-07-22 | End: 2019-07-22

## 2019-07-22 RX ADMIN — Medication 150 MILLIGRAM(S): at 05:51

## 2019-07-22 RX ADMIN — SODIUM CHLORIDE 4 MILLILITER(S): 9 INJECTION INTRAMUSCULAR; INTRAVENOUS; SUBCUTANEOUS at 21:10

## 2019-07-22 RX ADMIN — APIXABAN 2.5 MILLIGRAM(S): 2.5 TABLET, FILM COATED ORAL at 05:50

## 2019-07-22 RX ADMIN — LEVALBUTEROL 0.63 MILLIGRAM(S): 1.25 SOLUTION, CONCENTRATE RESPIRATORY (INHALATION) at 00:58

## 2019-07-22 RX ADMIN — LEVALBUTEROL 0.63 MILLIGRAM(S): 1.25 SOLUTION, CONCENTRATE RESPIRATORY (INHALATION) at 12:12

## 2019-07-22 RX ADMIN — Medication 100 MILLIGRAM(S): at 21:56

## 2019-07-22 RX ADMIN — SODIUM CHLORIDE 4 MILLILITER(S): 9 INJECTION INTRAMUSCULAR; INTRAVENOUS; SUBCUTANEOUS at 09:40

## 2019-07-22 RX ADMIN — LEVALBUTEROL 0.63 MILLIGRAM(S): 1.25 SOLUTION, CONCENTRATE RESPIRATORY (INHALATION) at 09:53

## 2019-07-22 RX ADMIN — PANTOPRAZOLE SODIUM 40 MILLIGRAM(S): 20 TABLET, DELAYED RELEASE ORAL at 05:50

## 2019-07-22 RX ADMIN — Medication 30 MILLIGRAM(S): at 05:50

## 2019-07-22 RX ADMIN — LEVALBUTEROL 0.63 MILLIGRAM(S): 1.25 SOLUTION, CONCENTRATE RESPIRATORY (INHALATION) at 20:59

## 2019-07-22 RX ADMIN — Medication 1.5 MILLIGRAM(S): at 21:56

## 2019-07-22 RX ADMIN — LEVALBUTEROL 0.63 MILLIGRAM(S): 1.25 SOLUTION, CONCENTRATE RESPIRATORY (INHALATION) at 04:44

## 2019-07-22 RX ADMIN — SODIUM CHLORIDE 4 MILLILITER(S): 9 INJECTION INTRAMUSCULAR; INTRAVENOUS; SUBCUTANEOUS at 16:44

## 2019-07-22 RX ADMIN — Medication 300 MILLIGRAM(S): at 11:56

## 2019-07-22 RX ADMIN — LEVALBUTEROL 0.63 MILLIGRAM(S): 1.25 SOLUTION, CONCENTRATE RESPIRATORY (INHALATION) at 16:43

## 2019-07-22 RX ADMIN — BENZOCAINE AND MENTHOL 1 LOZENGE: 5; 1 LIQUID ORAL at 17:46

## 2019-07-22 RX ADMIN — Medication 40 MILLIEQUIVALENT(S): at 17:46

## 2019-07-22 RX ADMIN — APIXABAN 2.5 MILLIGRAM(S): 2.5 TABLET, FILM COATED ORAL at 17:48

## 2019-07-22 RX ADMIN — ATOVAQUONE 1500 MILLIGRAM(S): 750 SUSPENSION ORAL at 11:56

## 2019-07-22 RX ADMIN — BENZOCAINE AND MENTHOL 1 LOZENGE: 5; 1 LIQUID ORAL at 14:00

## 2019-07-22 RX ADMIN — VORICONAZOLE 200 MILLIGRAM(S): 10 INJECTION, POWDER, LYOPHILIZED, FOR SOLUTION INTRAVENOUS at 05:50

## 2019-07-22 RX ADMIN — BENZOCAINE AND MENTHOL 1 LOZENGE: 5; 1 LIQUID ORAL at 21:56

## 2019-07-22 RX ADMIN — VORICONAZOLE 200 MILLIGRAM(S): 10 INJECTION, POWDER, LYOPHILIZED, FOR SOLUTION INTRAVENOUS at 17:46

## 2019-07-22 RX ADMIN — Medication 40 MILLIEQUIVALENT(S): at 14:00

## 2019-07-22 NOTE — PROGRESS NOTE ADULT - PROBLEM SELECTOR PLAN 2
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

## 2019-07-22 NOTE — PROGRESS NOTE ADULT - SUBJECTIVE AND OBJECTIVE BOX
CHIEF COMPLAINT:  shortness of breath    Interval Events:  less dyspneic over the weekend  Worked with PT today  Improved with diuretics  Very short of breath sitting up though    REVIEW OF SYSTEMS:  Constitutional: [X ] negative [ ] fevers [ ] chills [ ] weight loss [ ] weight gain  HEENT: [X ] negative [ ] dry eyes [ ] eye irritation [ ] postnasal drip [ ] nasal congestion  CV: [ X] negative  [ ] chest pain [ ] orthopnea [ ] palpitations [ ] murmur  Resp: [ ] negative [ X] cough [ X] shortness of breath [ X] dyspnea [ ] wheezing [X ] sputum [ ] hemoptysis  GI: [X ] negative [ ] nausea [ ] vomiting [ ] diarrhea [ ] constipation [ ] abd pain [ ] dysphagia   : [ X] negative [ ] dysuria [ ] nocturia [ ] hematuria [ ] increased urinary frequency  Musculoskeletal: [X ] negative [ ] back pain [ ] myalgias [ ] arthralgias [ ] fracture  Skin: [ X] negative [ ] rash [ ] itch  Neurological: [ ] negative [ ] headache [ ] dizziness [ ] syncope [ ] weakness [ ] numbness  Psychiatric: [ ] negative [ ] anxiety [ ] depression  Endocrine: [ ] negative [ ] diabetes [ ] thyroid problem  Hematologic/Lymphatic: [ ] negative [ ] anemia [ ] bleeding problem  Allergic/Immunologic: [ ] negative [ ] itchy eyes [ ] nasal discharge [ ] hives [ ] angioedema  [X ] All other systems negative    OBJECTIVE:  ICU Vital Signs Last 24 Hrs  T(C): 36.3 (22 Jul 2019 12:04), Max: 36.6 (22 Jul 2019 05:45)  T(F): 97.3 (22 Jul 2019 12:04), Max: 97.8 (22 Jul 2019 05:45)  HR: 87 (22 Jul 2019 12:13) (85 - 104)  BP: 118/82 (22 Jul 2019 12:04) (90/65 - 123/85)  RR: 18 (22 Jul 2019 12:04) (18 - 22)  SpO2: 97% (22 Jul 2019 12:13) (97% - 100%)        07-21 @ 07:01  -  07-22 @ 07:00  --------------------------------------------------------  IN: 840 mL / OUT: 900 mL / NET: -60 mL    07-22 @ 07:01 - 07-22 @ 14:14  --------------------------------------------------------  IN: 400 mL / OUT: 600 mL / NET: -200 mL      CAPILLARY BLOOD GLUCOSE          PHYSICAL EXAM:  General: ill appearing woman in no distress  Respiratory: normal effort on high flow  40/45  bibasilar rhonchi, some brochial breath sounds throughout, no areas of normal breath sounds,   decreased significantly at both bases  Cardiovascular:  rate regular, normal heart sounds  Abdomen: thin, NT, ND  Extremities: minimal LE edema  Neurological: awake alert, attentive, able to have conversations    HOSPITAL MEDICATIONS:  MEDICATIONS  (STANDING):  acetaminophen   Tablet .. 650 milliGRAM(s) Oral once  allopurinol 300 milliGRAM(s) Oral daily  apixaban 2.5 milliGRAM(s) Oral every 12 hours  atovaquone Suspension 1500 milliGRAM(s) Oral daily  benzocaine 15 mG/menthol 3.6 mG (Sugar-Free) Lozenge 1 Lozenge Oral every 4 hours  docusate sodium 100 milliGRAM(s) Oral three times a day  levalbuterol Inhalation 0.63 milliGRAM(s) Inhalation every 4 hours  melatonin 1.5 milliGRAM(s) Oral at bedtime  metoprolol succinate  milliGRAM(s) Oral daily  pantoprazole    Tablet 40 milliGRAM(s) Oral before breakfast  potassium chloride    Tablet ER 40 milliEquivalent(s) Oral every 4 hours  predniSONE   Tablet 30 milliGRAM(s) Oral daily  sodium chloride 7% Inhalation 4 milliLiter(s) Inhalation three times a day  valACYclovir 500 milliGRAM(s) Oral <User Schedule>  voriconazole 200 milliGRAM(s) Oral every 12 hours    MEDICATIONS  (PRN):  benzonatate 100 milliGRAM(s) Oral three times a day PRN Cough  bisacodyl 5 milliGRAM(s) Oral every 12 hours PRN Constipation  guaiFENesin    Syrup 200 milliGRAM(s) Oral every 6 hours PRN Cough  polyethylene glycol 3350 17 Gram(s) Oral daily PRN Constipation  sodium chloride 0.65% Nasal 1 Spray(s) Both Nostrils daily PRN Nasal Congestion      LABS:                        11.9   2.64  )-----------( 123      ( 22 Jul 2019 07:42 )             38.1     Hgb Trend: 11.9<--, 12.0<--, 11.2<--, 11.3<--, 11.1<--  07-22    142  |  96<L>  |  23  ----------------------------<  94  3.3<L>   |  36<H>  |  0.31<L>    Ca    8.9      22 Jul 2019 07:42  Phos  2.4     07-21  Mg     1.9     07-21      Creatinine Trend: 0.31<--, 0.37<--, 0.33<--, 0.38<--, 0.41<--, 0.42<--

## 2019-07-22 NOTE — PROGRESS NOTE ADULT - ASSESSMENT
79 year old with relapsed B cell lymphoma (dx 2017, now relaplse)   Treated with rituximab and revlimid in 3/2019  Treated with Obintuzmab and Bendamustine iin 4/2019    Presents with shortness of breath  She had abnormal imaging with lung nodules    Aspergillosis    1) Fungal pneumonia  aspergillosis on culture    Continue voriconazole- tx of choice  Minimum of 6 weeks but reasonable to continue for duration of immunosuppression    Even with optimal tx, outcomes of pt with underlying heme malignancy is not always good    2) Immunosuppressed secondary to cancer tx  PCP prophylaxis  Continue mepron  At some point, reasonable to re-challenge with Bactrim with close monitoring of WBC  Valtrex prophylaxis    4) Delirium: improved with sleep    Check voriconazole through (a send out) - sent

## 2019-07-22 NOTE — PROGRESS NOTE ADULT - PROBLEM SELECTOR PLAN 5
Likely BM suppression in setting of active CA  - transfuse as needed  - WBC and plt downtrending, monitor closely  - hold ac for plt <50

## 2019-07-22 NOTE — PROGRESS NOTE ADULT - ASSESSMENT
79yF with advanced DLBCL, now complicated by fungal pneumonia with endobronchial lesion, progression of disease causing hypoxic respiratory failure.   Had been on/off bipap for many days, now on high flow for the past 2 days, able to converse, eat, feels somewhat improved. Doing better overall.     Now with pleural effusions. Today similar to slightly smaller than over the weekend. Responded to diuretics.   She became very short of breath just moving her into a sitting position to assess for thoracentesis. Would not tolerate thora right now.     Would continue antimicrobials and avoid positive pressure. Needs mucus/secretions clearance which is inhibited by bipap.     Still hypoxemic, advanced cancer and fungal pneumonia. May improve if she can get off steroids given the fungal pneumonia, but unclear if they are offering any benefit for her lymphoma.      Recs  Continue diuresis with goal of relieving peripheral edema and the effusions. Not in shape for a thoracentesis right now but did get better with diuretics.   Continue high flow with goal of titrating down as she can. Improved with coughing and nebs. Needs to maintain ability to cough. Would continue to avoid bipap.   Continue aerobika for airway clearance  Continue metanebs at least 3x day preceded by albuterol and 7% saline nebs  Continue treatment for fungal pneumonia per ID

## 2019-07-22 NOTE — PROGRESS NOTE ADULT - PROBLEM SELECTOR PLAN 6
Follows with Dr. Nguyen at [facility] [place]. as per family, [facility] is not offering any disease modifying treatment. Pt is looking for experimental trial if pt clinically improves from current medical issues.   - C/w ppx mepron, valacyclovir and allopurinol  - C discussed  poor prognosis - They wish to be full code for now  - patient and family agreeable to defer experimental chemo until after VALERIO.

## 2019-07-22 NOTE — PROGRESS NOTE ADULT - SUBJECTIVE AND OBJECTIVE BOX
Follow Up:      Inverval History/ROS:Patient is a 79y old  Female who presents with a chief complaint of SOB (22 Jul 2019 14:13)    Still sob.  Requiring high flow oxygen  No fever    Allergies    No Known Allergies    Intolerances        ANTIMICROBIALS:  atovaquone Suspension 1500 daily  valACYclovir 500 <User Schedule>  voriconazole 200 every 12 hours      OTHER MEDS:  acetaminophen   Tablet .. 650 milliGRAM(s) Oral once  allopurinol 300 milliGRAM(s) Oral daily  apixaban 2.5 milliGRAM(s) Oral every 12 hours  benzocaine 15 mG/menthol 3.6 mG (Sugar-Free) Lozenge 1 Lozenge Oral every 4 hours  benzonatate 100 milliGRAM(s) Oral three times a day PRN  bisacodyl 5 milliGRAM(s) Oral every 12 hours PRN  docusate sodium 100 milliGRAM(s) Oral three times a day  guaiFENesin    Syrup 200 milliGRAM(s) Oral every 6 hours PRN  levalbuterol Inhalation 0.63 milliGRAM(s) Inhalation every 4 hours  melatonin 1.5 milliGRAM(s) Oral at bedtime  metoprolol succinate  milliGRAM(s) Oral daily  pantoprazole    Tablet 40 milliGRAM(s) Oral before breakfast  polyethylene glycol 3350 17 Gram(s) Oral daily PRN  potassium chloride    Tablet ER 40 milliEquivalent(s) Oral every 4 hours  predniSONE   Tablet 30 milliGRAM(s) Oral daily  sodium chloride 0.65% Nasal 1 Spray(s) Both Nostrils daily PRN  sodium chloride 7% Inhalation 4 milliLiter(s) Inhalation three times a day      Vital Signs Last 24 Hrs  T(C): 36.3 (22 Jul 2019 12:04), Max: 36.6 (22 Jul 2019 05:45)  T(F): 97.3 (22 Jul 2019 12:04), Max: 97.8 (22 Jul 2019 05:45)  HR: 87 (22 Jul 2019 12:13) (85 - 104)  BP: 118/82 (22 Jul 2019 12:04) (90/65 - 123/85)  BP(mean): --  RR: 18 (22 Jul 2019 12:04) (18 - 20)  SpO2: 97% (22 Jul 2019 12:13) (97% - 100%)    PHYSICAL EXAM:  General: [ x] non-toxic  HEAD/EYES: [ ] PERRL [ x] white sclera [ ] icterus  ENT:  [ ] normal [x ] supple [ ] thrush [ ] pharyngeal exudate  Cardiovascular:   [ ] murmur [ x] normal [ ] PPM/AICD  Respiratory:  [x ] clear to ausculation bilaterally  GI:  [ x] soft, non-tender, normal bowel sounds  :  [ ] bledsoe [x ] no CVA tenderness   Musculoskeletal:  [x ] no synovitis  Neurologic:  [ ] non-focal exam   Skin:  [x ] no rash  Lymph: [ ] no lymphadenopathy  Psychiatric:  [ ] appropriate affect x[ ] alert & oriented  Lines:  [x ] no phlebitis [ ] central line                                11.9   2.64  )-----------( 123      ( 22 Jul 2019 07:42 )             38.1       07-22    142  |  96<L>  |  23  ----------------------------<  94  3.3<L>   |  36<H>  |  0.31<L>    Ca    8.9      22 Jul 2019 07:42  Phos  2.4     07-21  Mg     1.9     07-21            MICROBIOLOGY:    RADIOLOGY:

## 2019-07-23 LAB
ANION GAP SERPL CALC-SCNC: 9 MMO/L — SIGNIFICANT CHANGE UP (ref 7–14)
BUN SERPL-MCNC: 25 MG/DL — HIGH (ref 7–23)
CALCIUM SERPL-MCNC: 8.8 MG/DL — SIGNIFICANT CHANGE UP (ref 8.4–10.5)
CHLORIDE SERPL-SCNC: 95 MMOL/L — LOW (ref 98–107)
CO2 SERPL-SCNC: 34 MMOL/L — HIGH (ref 22–31)
CREAT SERPL-MCNC: 0.3 MG/DL — LOW (ref 0.5–1.3)
GLUCOSE SERPL-MCNC: 103 MG/DL — HIGH (ref 70–99)
HCT VFR BLD CALC: 36.7 % — SIGNIFICANT CHANGE UP (ref 34.5–45)
HGB BLD-MCNC: 11.4 G/DL — LOW (ref 11.5–15.5)
MCHC RBC-ENTMCNC: 31.1 % — LOW (ref 32–36)
MCHC RBC-ENTMCNC: 32.2 PG — SIGNIFICANT CHANGE UP (ref 27–34)
MCV RBC AUTO: 103.7 FL — HIGH (ref 80–100)
NRBC # FLD: 0.03 K/UL — SIGNIFICANT CHANGE UP (ref 0–0)
NRBC FLD-RTO: 1.1 — SIGNIFICANT CHANGE UP
PLATELET # BLD AUTO: 118 K/UL — LOW (ref 150–400)
PMV BLD: 12.5 FL — SIGNIFICANT CHANGE UP (ref 7–13)
POTASSIUM SERPL-MCNC: 3.9 MMOL/L — SIGNIFICANT CHANGE UP (ref 3.5–5.3)
POTASSIUM SERPL-SCNC: 3.9 MMOL/L — SIGNIFICANT CHANGE UP (ref 3.5–5.3)
RBC # BLD: 3.54 M/UL — LOW (ref 3.8–5.2)
RBC # FLD: 21.5 % — HIGH (ref 10.3–14.5)
SODIUM SERPL-SCNC: 138 MMOL/L — SIGNIFICANT CHANGE UP (ref 135–145)
WBC # BLD: 2.77 K/UL — LOW (ref 3.8–10.5)
WBC # FLD AUTO: 2.77 K/UL — LOW (ref 3.8–10.5)

## 2019-07-23 PROCEDURE — 99233 SBSQ HOSP IP/OBS HIGH 50: CPT

## 2019-07-23 PROCEDURE — 99232 SBSQ HOSP IP/OBS MODERATE 35: CPT | Mod: GC

## 2019-07-23 PROCEDURE — 99233 SBSQ HOSP IP/OBS HIGH 50: CPT | Mod: GC

## 2019-07-23 RX ORDER — FUROSEMIDE 40 MG
20 TABLET ORAL DAILY
Refills: 0 | Status: DISCONTINUED | OUTPATIENT
Start: 2019-07-23 | End: 2019-07-30

## 2019-07-23 RX ADMIN — LEVALBUTEROL 0.63 MILLIGRAM(S): 1.25 SOLUTION, CONCENTRATE RESPIRATORY (INHALATION) at 00:35

## 2019-07-23 RX ADMIN — LEVALBUTEROL 0.63 MILLIGRAM(S): 1.25 SOLUTION, CONCENTRATE RESPIRATORY (INHALATION) at 21:29

## 2019-07-23 RX ADMIN — Medication 100 MILLIGRAM(S): at 01:43

## 2019-07-23 RX ADMIN — Medication 100 MILLIGRAM(S): at 13:16

## 2019-07-23 RX ADMIN — VORICONAZOLE 200 MILLIGRAM(S): 10 INJECTION, POWDER, LYOPHILIZED, FOR SOLUTION INTRAVENOUS at 18:46

## 2019-07-23 RX ADMIN — Medication 200 MILLIGRAM(S): at 04:15

## 2019-07-23 RX ADMIN — Medication 100 MILLIGRAM(S): at 21:36

## 2019-07-23 RX ADMIN — Medication 1.5 MILLIGRAM(S): at 21:36

## 2019-07-23 RX ADMIN — ATOVAQUONE 1500 MILLIGRAM(S): 750 SUSPENSION ORAL at 13:16

## 2019-07-23 RX ADMIN — Medication 150 MILLIGRAM(S): at 05:06

## 2019-07-23 RX ADMIN — PANTOPRAZOLE SODIUM 40 MILLIGRAM(S): 20 TABLET, DELAYED RELEASE ORAL at 05:06

## 2019-07-23 RX ADMIN — LEVALBUTEROL 0.63 MILLIGRAM(S): 1.25 SOLUTION, CONCENTRATE RESPIRATORY (INHALATION) at 08:56

## 2019-07-23 RX ADMIN — LEVALBUTEROL 0.63 MILLIGRAM(S): 1.25 SOLUTION, CONCENTRATE RESPIRATORY (INHALATION) at 12:44

## 2019-07-23 RX ADMIN — VALACYCLOVIR 500 MILLIGRAM(S): 500 TABLET, FILM COATED ORAL at 18:46

## 2019-07-23 RX ADMIN — Medication 300 MILLIGRAM(S): at 14:08

## 2019-07-23 RX ADMIN — Medication 30 MILLIGRAM(S): at 05:06

## 2019-07-23 RX ADMIN — VORICONAZOLE 200 MILLIGRAM(S): 10 INJECTION, POWDER, LYOPHILIZED, FOR SOLUTION INTRAVENOUS at 05:06

## 2019-07-23 RX ADMIN — APIXABAN 2.5 MILLIGRAM(S): 2.5 TABLET, FILM COATED ORAL at 05:07

## 2019-07-23 RX ADMIN — Medication 20 MILLIGRAM(S): at 13:16

## 2019-07-23 RX ADMIN — APIXABAN 2.5 MILLIGRAM(S): 2.5 TABLET, FILM COATED ORAL at 18:46

## 2019-07-23 RX ADMIN — LEVALBUTEROL 0.63 MILLIGRAM(S): 1.25 SOLUTION, CONCENTRATE RESPIRATORY (INHALATION) at 16:26

## 2019-07-23 RX ADMIN — Medication 100 MILLIGRAM(S): at 04:16

## 2019-07-23 RX ADMIN — LEVALBUTEROL 0.63 MILLIGRAM(S): 1.25 SOLUTION, CONCENTRATE RESPIRATORY (INHALATION) at 04:16

## 2019-07-23 RX ADMIN — BENZOCAINE AND MENTHOL 1 LOZENGE: 5; 1 LIQUID ORAL at 21:40

## 2019-07-23 RX ADMIN — Medication 100 MILLIGRAM(S): at 05:06

## 2019-07-23 RX ADMIN — SODIUM CHLORIDE 4 MILLILITER(S): 9 INJECTION INTRAMUSCULAR; INTRAVENOUS; SUBCUTANEOUS at 16:26

## 2019-07-23 RX ADMIN — SODIUM CHLORIDE 4 MILLILITER(S): 9 INJECTION INTRAMUSCULAR; INTRAVENOUS; SUBCUTANEOUS at 08:56

## 2019-07-23 RX ADMIN — SODIUM CHLORIDE 4 MILLILITER(S): 9 INJECTION INTRAMUSCULAR; INTRAVENOUS; SUBCUTANEOUS at 21:35

## 2019-07-23 NOTE — PROGRESS NOTE ADULT - ASSESSMENT
79F with DLBC lymphoma, Afib on eliquis presenting with three days of worsening SOB and productive cough, found to have entero/rhinovirus and fungal infection.    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 Then esophageal biopsy in 12/2017 consistent with high grade lymphoma  - Patient was started on revlimid and rituximab followed by obi and bendamustine  - Most recently underwent thoracentesis which was positive for lymphoma  - CT chest from 6/27: The bilateral nodules have increased in size in number. In particular, there are multiple masses in the bilateral lobes, with extension into the right lower lobe bronchus. The chest lymph nodes are malignant. The prevascular lymph node, in particular, has increased in size.  - Palliative care following   - Outpatient follow up with Dr. Nguyen who will determine if a patient is a candidate for further treatment     # Pancytopenia  - Stable   - Continue to check CBC diff daily  - goal hgb > 7 and plt >10k if afebrile, >15k if febrile     # Hypoxic respiratory failure   - CT chest: complete occlusion of the right lower lobe bronchus and the segmental bronchi of the right lower lobe with a central opacity  - Bronch with biopsy done -> aspergillosis with surrounding necrotic tissue   - Appreciate ID recs: Continue voriconazole pending ID of mold. Plan 6 weeks of antifungal coverage  - Pulmonary following  - CXR 7/15: Increased right-sided pleural effusion. Redemonstrated bilateral patchy opacities, increased on the right  - CXR 7/19: Worsening aeration right lung    Coral Olazagasti  Hematology Fellow  [phone]

## 2019-07-23 NOTE — PROGRESS NOTE ADULT - ASSESSMENT
79yF with advanced DLBCL, now complicated by fungal pneumonia with endobronchial lesion, progression of disease causing hypoxic respiratory failure.   Overall minimal improvement despite almost one month of anti-fungals. Patient mostly bedbound at this point, very deconditioned.     Has pleural effusions. Slow-growing. May be due to passive atelectasis from her immobility or an endobronchial lesion. She does not tolerate positioning for thoracentesis right now and there is not much benefit at this point as her oxygen requirements have not changed much over the past week despite the effusion. Would not try bronchoscopy to eval for endobronchial lesion given her current respiratory failure and expected need for mechanical ventilation if she had bronchoscopy.     Would continue antimicrobials and avoid non-invasive positive pressure. Needs mucus/secretions clearance which is inhibited by bipap.     Still hypoxemic, advanced cancer, and fungal pneumonia. May improve if she can get off steroids given the fungal pneumonia, but unclear if they are offering any benefit for her lymphoma.    Not getting treated for her lymphoma at this point and with a Karnofsky performance status of 20-30% (ECOG of 4) is not  a candidate for any systemic chemotherapy.     Needs to be out of bed - just to chair for the day may help with atelectasis and deconditioning. Would keep out of bed unless absolutely necessary.     Recs    OOB to chair as much as she tolerates    Continue diuresis with goal of relieving peripheral edema and the effusions. Not in shape for a thoracentesis right now but did get better with diuretics.   Continue high flow with goal of titrating down as she can for SpO2 of 90%. Improved with coughing and nebs. Needs to maintain ability to cough. Would continue to avoid bipap.   Continue aerobika for airway clearance  Continue metanebs at least 3x day preceded by albuterol and 7% saline nebs  Continue treatment for fungal pneumonia per ID. Will need prolonged course based on her poor response

## 2019-07-23 NOTE — PROGRESS NOTE ADULT - PROBLEM SELECTOR PLAN 1
RLL bronchus obstruction with concern for postobstructive & fungal PNA  - c/w continuous pulse ox  - c/w chest PT and incentive spirometry   - continues high flow NC O2, titrate off as tolerated  - c/w standing nebs; Xopenex q4 hours, following by nebulized 3% hypertonic saline, and then Aerobika device as per Pulm recommendations  - meropenem course ended on 7/20   - palliative recs appreciated conveyed poor prognosis to family still wishes pt to be full code.  - CXR w/ near complete white out of R on 7/19, large effusions on US, s/p lasix 40 IV x 3  -decrease prednisone 20 mg daily  - case d/w pulm fellow states will not benefit from thoracentesis due to endobronchial lesion cont diuresis for now lasix 20 IV daily slow diuresis as HCO3 increased due to contraction alkalosis repeat CXR in AM.   -Pulm following appreciate recs

## 2019-07-23 NOTE — CHART NOTE - NSCHARTNOTEFT_GEN_A_CORE
No change.  Pt remains on high flow.  Pulm appreciated.  Heme at [facility] not offering dmt.  Let message with Dr. Davies.  Await call back.  Family reluctant to make decisions without his recommendations.  Kia Landeros DO

## 2019-07-23 NOTE — PROGRESS NOTE ADULT - SUBJECTIVE AND OBJECTIVE BOX
CHIEF COMPLAINT:  shortness of breath, fungal pneumonia  Interval Events:  sleepy this morning  back up to 50%/40LPM on high flow    REVIEW OF SYSTEMS:  Constitutional: [X ] negative [ ] fevers [ ] chills [ ] weight loss [ ] weight gain  HEENT: [X ] negative [ ] dry eyes [ ] eye irritation [ ] postnasal drip [ ] nasal congestion  CV: [ X] negative  [ ] chest pain [ ] orthopnea [ ] palpitations [ ] murmur  Resp: [ ] negative [ X] cough [ X] shortness of breath [ X] dyspnea [ ] wheezing [X ] sputum [ ] hemoptysis  GI: [X ] negative [ ] nausea [ ] vomiting [ ] diarrhea [ ] constipation [ ] abd pain [ ] dysphagia   : [ X] negative [ ] dysuria [ ] nocturia [ ] hematuria [ ] increased urinary frequency  Musculoskeletal: [X ] negative [ ] back pain [ ] myalgias [ ] arthralgias [ ] fracture  Skin: [ X] negative [ ] rash [ ] itch  Neurological: [ ] negative [ ] headache [ ] dizziness [ ] syncope [ ] weakness [ ] numbness  Psychiatric: [ ] negative [ ] anxiety [ ] depression  Endocrine: [ ] negative [ ] diabetes [ ] thyroid problem  Hematologic/Lymphatic: [ ] negative [ ] anemia [ ] bleeding problem  Allergic/Immunologic: [ ] negative [ ] itchy eyes [ ] nasal discharge [ ] hives [ ] angioedema  [X ] All other systems negative    OBJECTIVE:  ICU Vital Signs Last 24 Hrs  T(C): 36.6 (23 Jul 2019 05:02), Max: 36.6 (23 Jul 2019 05:02)  T(F): 97.8 (23 Jul 2019 05:02), Max: 97.8 (23 Jul 2019 05:02)  HR: 82 (23 Jul 2019 05:02) (73 - 104)  BP: 123/88 (23 Jul 2019 05:02) (102/72 - 123/88)  RR: 18 (23 Jul 2019 07:16) (17 - 18)  SpO2: 99% (23 Jul 2019 07:16) (96% - 100%)        07-22 @ 07:01  -  07-23 @ 07:00  --------------------------------------------------------  IN: 1010 mL / OUT: 900 mL / NET: 110 mL      CAPILLARY BLOOD GLUCOSE          PHYSICAL EXAM:  General: ill appearing woman in no distress  Respiratory: normal effort on high flow  40/45  bibasilar rhonchi, some brochial breath sounds throughout, no areas of normal breath sounds,   decreased significantly at both bases  Cardiovascular:  rate regular, normal heart sounds  Abdomen: thin, NT, ND  Extremities: +LE edema  Neurological: awake alert, attentive, able to have conversations      HOSPITAL MEDICATIONS:  MEDICATIONS  (STANDING):  acetaminophen   Tablet .. 650 milliGRAM(s) Oral once  allopurinol 300 milliGRAM(s) Oral daily  apixaban 2.5 milliGRAM(s) Oral every 12 hours  atovaquone Suspension 1500 milliGRAM(s) Oral daily  benzocaine 15 mG/menthol 3.6 mG (Sugar-Free) Lozenge 1 Lozenge Oral every 4 hours  docusate sodium 100 milliGRAM(s) Oral three times a day  levalbuterol Inhalation 0.63 milliGRAM(s) Inhalation every 4 hours  melatonin 1.5 milliGRAM(s) Oral at bedtime  metoprolol succinate  milliGRAM(s) Oral daily  pantoprazole    Tablet 40 milliGRAM(s) Oral before breakfast  predniSONE   Tablet 30 milliGRAM(s) Oral daily  sodium chloride 7% Inhalation 4 milliLiter(s) Inhalation three times a day  valACYclovir 500 milliGRAM(s) Oral <User Schedule>  voriconazole 200 milliGRAM(s) Oral every 12 hours    MEDICATIONS  (PRN):  benzonatate 100 milliGRAM(s) Oral three times a day PRN Cough  bisacodyl 5 milliGRAM(s) Oral every 12 hours PRN Constipation  guaiFENesin    Syrup 200 milliGRAM(s) Oral every 6 hours PRN Cough  polyethylene glycol 3350 17 Gram(s) Oral daily PRN Constipation  sodium chloride 0.65% Nasal 1 Spray(s) Both Nostrils daily PRN Nasal Congestion      LABS:                        11.4   2.77  )-----------( 118      ( 23 Jul 2019 06:01 )             36.7     Hgb Trend: 11.4<--, 11.9<--, 12.0<--, 11.2<--, 11.3<--  07-23    138  |  95<L>  |  25<H>  ----------------------------<  103<H>  3.9   |  34<H>  |  0.30<L>    Ca    8.8      23 Jul 2019 06:01      Creatinine Trend: 0.30<--, 0.31<--, 0.37<--, 0.33<--, 0.38<--, 0.41<--

## 2019-07-23 NOTE — PROGRESS NOTE ADULT - PROBLEM SELECTOR PLAN 4
Follows with Dr. Nguyen at [facility]. as per family, [facility] is not offering any disease modifying treatment. Pt is looking for experimental trial if pt clinically improves from current medical issues. Pt son states Dr. Sidhu [phone] will be her doctor will attempt to call Dr. Sidhu. Email sent to Dr. Nguyen by Dr Landeros would need good performance status for experimental therapy.   - C/w ppx mepron, valacyclovir and allopurinol  - [facility] discussed  poor prognosis -still full code

## 2019-07-23 NOTE — PROGRESS NOTE ADULT - SUBJECTIVE AND OBJECTIVE BOX
INTERVAL HPI/OVERNIGHT EVENTS:  Patient seen at bedside. She is resting comfortably with high flow NC in place. Per son, she did well over the weekend and her breathing has been less labored.     VITAL SIGNS:  T(F): 97.8 (07-23-19 @ 05:02)  HR: 110 (07-23-19 @ 08:57)  BP: 123/88 (07-23-19 @ 05:02)  RR: 22 (07-23-19 @ 11:32)  SpO2: 100% (07-23-19 @ 11:32)  Wt(kg): --      MEDICATIONS  (STANDING):  acetaminophen   Tablet .. 650 milliGRAM(s) Oral once  allopurinol 300 milliGRAM(s) Oral daily  apixaban 2.5 milliGRAM(s) Oral every 12 hours  atovaquone Suspension 1500 milliGRAM(s) Oral daily  benzocaine 15 mG/menthol 3.6 mG (Sugar-Free) Lozenge 1 Lozenge Oral every 4 hours  docusate sodium 100 milliGRAM(s) Oral three times a day  furosemide   Injectable 20 milliGRAM(s) IV Push daily  levalbuterol Inhalation 0.63 milliGRAM(s) Inhalation every 4 hours  melatonin 1.5 milliGRAM(s) Oral at bedtime  metoprolol succinate  milliGRAM(s) Oral daily  pantoprazole    Tablet 40 milliGRAM(s) Oral before breakfast  predniSONE   Tablet 30 milliGRAM(s) Oral daily  sodium chloride 7% Inhalation 4 milliLiter(s) Inhalation three times a day  valACYclovir 500 milliGRAM(s) Oral <User Schedule>  voriconazole 200 milliGRAM(s) Oral every 12 hours    MEDICATIONS  (PRN):  benzonatate 100 milliGRAM(s) Oral three times a day PRN Cough  bisacodyl 5 milliGRAM(s) Oral every 12 hours PRN Constipation  guaiFENesin    Syrup 200 milliGRAM(s) Oral every 6 hours PRN Cough  polyethylene glycol 3350 17 Gram(s) Oral daily PRN Constipation  sodium chloride 0.65% Nasal 1 Spray(s) Both Nostrils daily PRN Nasal Congestion      Allergies    No Known Allergies    Intolerances        LABS:                        11.4   2.77  )-----------( 118      ( 23 Jul 2019 06:01 )             36.7     07-23    138  |  95<L>  |  25<H>  ----------------------------<  103<H>  3.9   |  34<H>  |  0.30<L>    Ca    8.8      23 Jul 2019 06:01            RADIOLOGY & ADDITIONAL TESTS:  Studies reviewed.    ASSESSMENT & PLAN:

## 2019-07-23 NOTE — PROGRESS NOTE ADULT - SUBJECTIVE AND OBJECTIVE BOX
Patient is a 79y old  Female who presents with a chief complaint of SOB (23 Jul 2019 11:34)      SUBJECTIVE / OVERNIGHT EVENTS: states had SOB last night now ok. Denies CP/N/V    MEDICATIONS  (STANDING):  acetaminophen   Tablet .. 650 milliGRAM(s) Oral once  allopurinol 300 milliGRAM(s) Oral daily  apixaban 2.5 milliGRAM(s) Oral every 12 hours  atovaquone Suspension 1500 milliGRAM(s) Oral daily  benzocaine 15 mG/menthol 3.6 mG (Sugar-Free) Lozenge 1 Lozenge Oral every 4 hours  docusate sodium 100 milliGRAM(s) Oral three times a day  furosemide   Injectable 20 milliGRAM(s) IV Push daily  levalbuterol Inhalation 0.63 milliGRAM(s) Inhalation every 4 hours  melatonin 1.5 milliGRAM(s) Oral at bedtime  metoprolol succinate  milliGRAM(s) Oral daily  pantoprazole    Tablet 40 milliGRAM(s) Oral before breakfast  predniSONE   Tablet 30 milliGRAM(s) Oral daily  sodium chloride 7% Inhalation 4 milliLiter(s) Inhalation three times a day  valACYclovir 500 milliGRAM(s) Oral <User Schedule>  voriconazole 200 milliGRAM(s) Oral every 12 hours    MEDICATIONS  (PRN):  benzonatate 100 milliGRAM(s) Oral three times a day PRN Cough  bisacodyl 5 milliGRAM(s) Oral every 12 hours PRN Constipation  guaiFENesin    Syrup 200 milliGRAM(s) Oral every 6 hours PRN Cough  polyethylene glycol 3350 17 Gram(s) Oral daily PRN Constipation  sodium chloride 0.65% Nasal 1 Spray(s) Both Nostrils daily PRN Nasal Congestion        CAPILLARY BLOOD GLUCOSE        I&O's Summary    22 Jul 2019 07:01  -  23 Jul 2019 07:00  --------------------------------------------------------  IN: 1010 mL / OUT: 900 mL / NET: 110 mL        T(C): 36.6 (07-23-19 @ 05:02), Max: 36.6 (07-23-19 @ 05:02)  HR: 110 (07-23-19 @ 08:57) (73 - 112)  BP: 123/88 (07-23-19 @ 05:02) (102/72 - 123/88)  RR: 22 (07-23-19 @ 11:32) (17 - 22)  SpO2: 100% (07-23-19 @ 11:32) (96% - 100%)    PHYSICAL EXAM:  GENERAL:  on high flow NC  HEAD:  Atraumatic, Normocephalic  CHEST/LUNG: rhonci b/l   HEART: tachycardia; No murmurs, rubs, or gallops  ABDOMEN: Soft, Nontender, Nondistended; Bowel sounds present  EXTREMITIES:   cool, edematous, pitting; anasarca L>R  PSYCH: AAOx2  NEUROLOGY: non-focal  SKIN: No rashes or lesions    LABS:                        11.4   2.77  )-----------( 118      ( 23 Jul 2019 06:01 )             36.7     07-23    138  |  95<L>  |  25<H>  ----------------------------<  103<H>  3.9   |  34<H>  |  0.30<L>    Ca    8.8      23 Jul 2019 06:01                  RADIOLOGY & ADDITIONAL TESTS:    Imaging Personally Reviewed:    Consultant(s) Notes Reviewed:      Care Discussed with Consultants/Other Providers:

## 2019-07-23 NOTE — PROGRESS NOTE ADULT - PROBLEM SELECTOR PLAN 3
Likely BM suppression in setting of active CA  - transfuse as needed  - WBC and plt downtrending, monitor closely  - hold ac for plt <50  - counts decreasing Heme input

## 2019-07-24 LAB
ANION GAP SERPL CALC-SCNC: 10 MMO/L — SIGNIFICANT CHANGE UP (ref 7–14)
ANISOCYTOSIS BLD QL: SLIGHT — SIGNIFICANT CHANGE UP
BASE EXCESS BLDV CALC-SCNC: 15.6 MMOL/L — SIGNIFICANT CHANGE UP
BASOPHILS # BLD AUTO: 0.04 K/UL — SIGNIFICANT CHANGE UP (ref 0–0.2)
BASOPHILS NFR BLD AUTO: 1.6 % — SIGNIFICANT CHANGE UP (ref 0–2)
BASOPHILS NFR SPEC: 0 % — SIGNIFICANT CHANGE UP (ref 0–2)
BLASTS # FLD: 0 % — SIGNIFICANT CHANGE UP (ref 0–0)
BUN SERPL-MCNC: 22 MG/DL — SIGNIFICANT CHANGE UP (ref 7–23)
CALCIUM SERPL-MCNC: 9.3 MG/DL — SIGNIFICANT CHANGE UP (ref 8.4–10.5)
CHLORIDE SERPL-SCNC: 92 MMOL/L — LOW (ref 98–107)
CO2 SERPL-SCNC: 38 MMOL/L — HIGH (ref 22–31)
CREAT SERPL-MCNC: 0.35 MG/DL — LOW (ref 0.5–1.3)
EOSINOPHIL # BLD AUTO: 0.03 K/UL — SIGNIFICANT CHANGE UP (ref 0–0.5)
EOSINOPHIL NFR BLD AUTO: 1.2 % — SIGNIFICANT CHANGE UP (ref 0–6)
EOSINOPHIL NFR FLD: 1.8 % — SIGNIFICANT CHANGE UP (ref 0–6)
GAS PNL BLDV: 134 MMOL/L — LOW (ref 136–146)
GIANT PLATELETS BLD QL SMEAR: PRESENT — SIGNIFICANT CHANGE UP
GLUCOSE BLDV-MCNC: 103 MG/DL — HIGH (ref 70–99)
GLUCOSE SERPL-MCNC: 101 MG/DL — HIGH (ref 70–99)
HCO3 BLDV-SCNC: 35 MMOL/L — HIGH (ref 20–27)
HCT VFR BLD CALC: 41.8 % — SIGNIFICANT CHANGE UP (ref 34.5–45)
HCT VFR BLDV CALC: 42.3 % — SIGNIFICANT CHANGE UP (ref 34.5–45)
HGB BLD-MCNC: 13.2 G/DL — SIGNIFICANT CHANGE UP (ref 11.5–15.5)
HGB BLDV-MCNC: 13.8 G/DL — SIGNIFICANT CHANGE UP (ref 11.5–15.5)
IMM GRANULOCYTES NFR BLD AUTO: 13.2 % — HIGH (ref 0–1.5)
LYMPHOCYTES # BLD AUTO: 0.46 K/UL — LOW (ref 1–3.3)
LYMPHOCYTES # BLD AUTO: 18.9 % — SIGNIFICANT CHANGE UP (ref 13–44)
LYMPHOCYTES NFR SPEC AUTO: 7.4 % — LOW (ref 13–44)
MACROCYTES BLD QL: SLIGHT — SIGNIFICANT CHANGE UP
MCHC RBC-ENTMCNC: 31.6 % — LOW (ref 32–36)
MCHC RBC-ENTMCNC: 33.2 PG — SIGNIFICANT CHANGE UP (ref 27–34)
MCV RBC AUTO: 105 FL — HIGH (ref 80–100)
METAMYELOCYTES # FLD: 35.2 % — HIGH (ref 0–1)
MONOCYTES # BLD AUTO: 0.14 K/UL — SIGNIFICANT CHANGE UP (ref 0–0.9)
MONOCYTES NFR BLD AUTO: 5.8 % — SIGNIFICANT CHANGE UP (ref 2–14)
MONOCYTES NFR BLD: 2.8 % — SIGNIFICANT CHANGE UP (ref 2–9)
MYELOCYTES NFR BLD: 16.7 % — HIGH (ref 0–0)
NEUTROPHIL AB SER-ACNC: 25 % — LOW (ref 43–77)
NEUTROPHILS # BLD AUTO: 1.44 K/UL — LOW (ref 1.8–7.4)
NEUTROPHILS NFR BLD AUTO: 59.3 % — SIGNIFICANT CHANGE UP (ref 43–77)
NEUTS BAND # BLD: 0.9 % — SIGNIFICANT CHANGE UP (ref 0–6)
NRBC # FLD: 0.07 K/UL — SIGNIFICANT CHANGE UP (ref 0–0)
NRBC FLD-RTO: 2.9 — SIGNIFICANT CHANGE UP
OTHER - HEMATOLOGY %: 0 — SIGNIFICANT CHANGE UP
OVALOCYTES BLD QL SMEAR: SLIGHT — SIGNIFICANT CHANGE UP
PCO2 BLDV: 67 MMHG — HIGH (ref 41–51)
PH BLDV: 7.4 PH — SIGNIFICANT CHANGE UP (ref 7.32–7.43)
PLATELET # BLD AUTO: 129 K/UL — LOW (ref 150–400)
PLATELET COUNT - ESTIMATE: SIGNIFICANT CHANGE UP
PMV BLD: 12.1 FL — SIGNIFICANT CHANGE UP (ref 7–13)
PO2 BLDV: 22 MMHG — LOW (ref 35–40)
POLYCHROMASIA BLD QL SMEAR: SLIGHT — SIGNIFICANT CHANGE UP
POTASSIUM BLDV-SCNC: 4 MMOL/L — SIGNIFICANT CHANGE UP (ref 3.4–4.5)
POTASSIUM SERPL-MCNC: 4.3 MMOL/L — SIGNIFICANT CHANGE UP (ref 3.5–5.3)
POTASSIUM SERPL-SCNC: 4.3 MMOL/L — SIGNIFICANT CHANGE UP (ref 3.5–5.3)
PROMYELOCYTES # FLD: 0 % — SIGNIFICANT CHANGE UP (ref 0–0)
RBC # BLD: 3.98 M/UL — SIGNIFICANT CHANGE UP (ref 3.8–5.2)
RBC # FLD: 21.8 % — HIGH (ref 10.3–14.5)
REVIEW TO FOLLOW: YES — SIGNIFICANT CHANGE UP
SAO2 % BLDV: 26.7 % — LOW (ref 60–85)
SMUDGE CELLS # BLD: PRESENT — SIGNIFICANT CHANGE UP
SODIUM SERPL-SCNC: 140 MMOL/L — SIGNIFICANT CHANGE UP (ref 135–145)
VARIANT LYMPHS # BLD: 10.2 % — SIGNIFICANT CHANGE UP
WBC # BLD: 2.43 K/UL — LOW (ref 3.8–10.5)
WBC # FLD AUTO: 2.43 K/UL — LOW (ref 3.8–10.5)

## 2019-07-24 PROCEDURE — 99232 SBSQ HOSP IP/OBS MODERATE 35: CPT

## 2019-07-24 PROCEDURE — 99233 SBSQ HOSP IP/OBS HIGH 50: CPT

## 2019-07-24 PROCEDURE — 71045 X-RAY EXAM CHEST 1 VIEW: CPT | Mod: 26

## 2019-07-24 RX ADMIN — VORICONAZOLE 200 MILLIGRAM(S): 10 INJECTION, POWDER, LYOPHILIZED, FOR SOLUTION INTRAVENOUS at 18:28

## 2019-07-24 RX ADMIN — SODIUM CHLORIDE 4 MILLILITER(S): 9 INJECTION INTRAMUSCULAR; INTRAVENOUS; SUBCUTANEOUS at 15:05

## 2019-07-24 RX ADMIN — LEVALBUTEROL 0.63 MILLIGRAM(S): 1.25 SOLUTION, CONCENTRATE RESPIRATORY (INHALATION) at 00:52

## 2019-07-24 RX ADMIN — VORICONAZOLE 200 MILLIGRAM(S): 10 INJECTION, POWDER, LYOPHILIZED, FOR SOLUTION INTRAVENOUS at 05:52

## 2019-07-24 RX ADMIN — BENZOCAINE AND MENTHOL 1 LOZENGE: 5; 1 LIQUID ORAL at 05:52

## 2019-07-24 RX ADMIN — LEVALBUTEROL 0.63 MILLIGRAM(S): 1.25 SOLUTION, CONCENTRATE RESPIRATORY (INHALATION) at 12:41

## 2019-07-24 RX ADMIN — ATOVAQUONE 1500 MILLIGRAM(S): 750 SUSPENSION ORAL at 12:20

## 2019-07-24 RX ADMIN — SODIUM CHLORIDE 4 MILLILITER(S): 9 INJECTION INTRAMUSCULAR; INTRAVENOUS; SUBCUTANEOUS at 08:44

## 2019-07-24 RX ADMIN — Medication 20 MILLIGRAM(S): at 05:51

## 2019-07-24 RX ADMIN — Medication 20 MILLIGRAM(S): at 05:52

## 2019-07-24 RX ADMIN — LEVALBUTEROL 0.63 MILLIGRAM(S): 1.25 SOLUTION, CONCENTRATE RESPIRATORY (INHALATION) at 04:56

## 2019-07-24 RX ADMIN — Medication 1.5 MILLIGRAM(S): at 22:20

## 2019-07-24 RX ADMIN — Medication 300 MILLIGRAM(S): at 12:20

## 2019-07-24 RX ADMIN — Medication 100 MILLIGRAM(S): at 05:52

## 2019-07-24 RX ADMIN — Medication 150 MILLIGRAM(S): at 05:52

## 2019-07-24 RX ADMIN — Medication 100 MILLIGRAM(S): at 22:20

## 2019-07-24 RX ADMIN — SODIUM CHLORIDE 4 MILLILITER(S): 9 INJECTION INTRAMUSCULAR; INTRAVENOUS; SUBCUTANEOUS at 20:51

## 2019-07-24 RX ADMIN — LEVALBUTEROL 0.63 MILLIGRAM(S): 1.25 SOLUTION, CONCENTRATE RESPIRATORY (INHALATION) at 08:43

## 2019-07-24 RX ADMIN — LEVALBUTEROL 0.63 MILLIGRAM(S): 1.25 SOLUTION, CONCENTRATE RESPIRATORY (INHALATION) at 16:36

## 2019-07-24 RX ADMIN — PANTOPRAZOLE SODIUM 40 MILLIGRAM(S): 20 TABLET, DELAYED RELEASE ORAL at 05:52

## 2019-07-24 RX ADMIN — APIXABAN 2.5 MILLIGRAM(S): 2.5 TABLET, FILM COATED ORAL at 18:28

## 2019-07-24 RX ADMIN — LEVALBUTEROL 0.63 MILLIGRAM(S): 1.25 SOLUTION, CONCENTRATE RESPIRATORY (INHALATION) at 20:50

## 2019-07-24 RX ADMIN — APIXABAN 2.5 MILLIGRAM(S): 2.5 TABLET, FILM COATED ORAL at 05:52

## 2019-07-24 NOTE — PROGRESS NOTE ADULT - PROBLEM SELECTOR PLAN 1
RLL bronchus obstruction with concern for postobstructive & fungal PNA  - c/w continuous pulse ox  - c/w chest PT and incentive spirometry   - continues high flow NC O2, titrate off as tolerated  - c/w standing nebs; Xopenex q4 hours, following by nebulized 3% hypertonic saline, and then Aerobika device as per Pulm recommendations  - meropenem course ended on 7/20   - palliative recs appreciated conveyed poor prognosis to family still wishes pt to be full code.  - CXR w/ near complete white out of R on 7/19, large effusions on US, s/p lasix 40 IV x 3  -decrease prednisone 20 mg daily  - as per pulm fellow states will not benefit from thoracentesis due to endobronchial lesion cont diuresis for now lasix 20 IV daily slow diuresis as HCO3 increased due to contraction alkalosis    -Pulm following appreciate recs  -repeat CXR improved but diuresis maybe limited by rising HCO3 RLL bronchus obstruction with concern for postobstructive & fungal PNA  - c/w continuous pulse ox  - c/w chest PT and incentive spirometry   - continues high flow NC O2, titrate off as tolerated  - c/w standing nebs; Xopenex q4 hours, following by nebulized 3% hypertonic saline, and then Aerobika device as per Pulm recommendations  - meropenem course ended on 7/20   - palliative recs appreciated conveyed poor prognosis to family still wishes pt to be full code.  - CXR w/ near complete white out of R on 7/19, large effusions on US, s/p lasix 40 IV x 3  -decrease prednisone 20 mg daily  - as per pulm fellow states will not benefit from thoracentesis due to endobronchial lesion cont diuresis for now lasix 20 IV daily slow diuresis as HCO3 increased due to contraction alkalosis    -repeat CXR improved but diuresis maybe limited by rising HCO3  -Pulm following appreciate recs

## 2019-07-24 NOTE — PROGRESS NOTE ADULT - SUBJECTIVE AND OBJECTIVE BOX
Patient is a 79y old  Female who presents with a chief complaint of SOB (23 Jul 2019 11:44)      SUBJECTIVE / OVERNIGHT EVENTS: Pt states had a hard time breathing during evening yesterday     MEDICATIONS  (STANDING):  acetaminophen   Tablet .. 650 milliGRAM(s) Oral once  allopurinol 300 milliGRAM(s) Oral daily  apixaban 2.5 milliGRAM(s) Oral every 12 hours  atovaquone Suspension 1500 milliGRAM(s) Oral daily  benzocaine 15 mG/menthol 3.6 mG (Sugar-Free) Lozenge 1 Lozenge Oral every 4 hours  docusate sodium 100 milliGRAM(s) Oral three times a day  furosemide   Injectable 20 milliGRAM(s) IV Push daily  levalbuterol Inhalation 0.63 milliGRAM(s) Inhalation every 4 hours  melatonin 1.5 milliGRAM(s) Oral at bedtime  metoprolol succinate  milliGRAM(s) Oral daily  pantoprazole    Tablet 40 milliGRAM(s) Oral before breakfast  predniSONE   Tablet 20 milliGRAM(s) Oral daily  sodium chloride 7% Inhalation 4 milliLiter(s) Inhalation three times a day  valACYclovir 500 milliGRAM(s) Oral <User Schedule>  voriconazole 200 milliGRAM(s) Oral every 12 hours    MEDICATIONS  (PRN):  benzonatate 100 milliGRAM(s) Oral three times a day PRN Cough  bisacodyl 5 milliGRAM(s) Oral every 12 hours PRN Constipation  guaiFENesin    Syrup 200 milliGRAM(s) Oral every 6 hours PRN Cough  polyethylene glycol 3350 17 Gram(s) Oral daily PRN Constipation  sodium chloride 0.65% Nasal 1 Spray(s) Both Nostrils daily PRN Nasal Congestion        CAPILLARY BLOOD GLUCOSE        I&O's Summary    23 Jul 2019 07:01  -  24 Jul 2019 07:00  --------------------------------------------------------  IN: 440 mL / OUT: 900 mL / NET: -460 mL        T(C): 36.4 (07-24-19 @ 12:57), Max: 36.4 (07-24-19 @ 05:49)  HR: 107 (07-24-19 @ 12:57) (84 - 107)  BP: 102/75 (07-24-19 @ 12:57) (102/75 - 114/84)  RR: 20 (07-24-19 @ 12:57) (17 - 22)  SpO2: 96% (07-24-19 @ 12:57) (96% - 100%)    PHYSICAL EXAM:  GENERAL:  on high flow NC  HEAD:  Atraumatic, Normocephalic  CHEST/LUNG: upper airway transmitted sounds   HEART: tachycardia; No murmurs, rubs, or gallops  ABDOMEN: Soft, Nontender, Nondistended; Bowel sounds present  EXTREMITIES:   cool, edematous, pitting; anasarca L>R  PSYCH: AAOx2  NEUROLOGY: non-focal    LABS:                        13.2   2.43  )-----------( 129      ( 24 Jul 2019 06:30 )             41.8     07-24    140  |  92<L>  |  22  ----------------------------<  101<H>  4.3   |  38<H>  |  0.35<L>    Ca    9.3      24 Jul 2019 06:30                  RADIOLOGY & ADDITIONAL TESTS:    Imaging Personally Reviewed:< from: Xray Chest 1 View- PORTABLE-Routine (07.24.19 @ 09:54) >  IMPRESSION:   Some decrease in previous right chest opacity with improved aeration and   some decrease in left basilar and retrocardiac opacity. The findings may   represent slight decrease in a large right and small left pleural   effusion with associated passive atelectasis. Other underlying pathology   is not excluded.    Multiple bilateral lung nodules are better appreciated on CT from   6/27/2019.      < end of copied text >      Consultant(s) Notes Reviewed:      Care Discussed with Consultants/Other Providers:

## 2019-07-24 NOTE — PROGRESS NOTE ADULT - PROBLEM SELECTOR PLAN 10
Full code.  Appreciate palliative care consult.  Ongoing GOC.  Care d/w Son and  at bedside. Full code.  Appreciate palliative care consult.  Ongoing GOC.

## 2019-07-24 NOTE — PROGRESS NOTE ADULT - PROBLEM SELECTOR PLAN 4
Follows with Dr. Nguyen at [facility] [place]. as per family, [facility] is not offering any disease modifying treatment. Pt is looking for experimental trial if pt clinically improves from current medical issues. Pt son states Dr. Sidhu [phone] will be her doctor will attempt to call Dr. Sidhu. Email sent to Dr. Nguyen by Dr Landeros would need good performance status for experimental therapy.   - C/w ppx mepron, valacyclovir and allopurinol  - [facility] discussed  poor prognosis -still full code Follows with Dr. Nguyen at [facility] [place]. as per family, [facility] is not offering any disease modifying treatment. Pt is looking for experimental trial if pt clinically improves from current medical issues. Email sent to Dr. Nguyen by Dr Landeros would need good performance status for experimental therapy. Pt son states Dr. Sidhu [phone] will be her doctor will attempt to call Dr. Sidhu.   - C/w ppx mepron, valacyclovir and allopurinol  - [facility] discussed  poor prognosis -still full code

## 2019-07-25 LAB
ANION GAP SERPL CALC-SCNC: 12 MMO/L — SIGNIFICANT CHANGE UP (ref 7–14)
BASOPHILS # BLD AUTO: 0.03 K/UL — SIGNIFICANT CHANGE UP (ref 0–0.2)
BASOPHILS NFR BLD AUTO: 1.4 % — SIGNIFICANT CHANGE UP (ref 0–2)
BUN SERPL-MCNC: 21 MG/DL — SIGNIFICANT CHANGE UP (ref 7–23)
CALCIUM SERPL-MCNC: 8.9 MG/DL — SIGNIFICANT CHANGE UP (ref 8.4–10.5)
CHLORIDE SERPL-SCNC: 90 MMOL/L — LOW (ref 98–107)
CO2 SERPL-SCNC: 37 MMOL/L — HIGH (ref 22–31)
CREAT SERPL-MCNC: 0.3 MG/DL — LOW (ref 0.5–1.3)
EOSINOPHIL # BLD AUTO: 0.03 K/UL — SIGNIFICANT CHANGE UP (ref 0–0.5)
EOSINOPHIL NFR BLD AUTO: 1.4 % — SIGNIFICANT CHANGE UP (ref 0–6)
GLUCOSE SERPL-MCNC: 92 MG/DL — SIGNIFICANT CHANGE UP (ref 70–99)
HCT VFR BLD CALC: 39.9 % — SIGNIFICANT CHANGE UP (ref 34.5–45)
HGB BLD-MCNC: 12.4 G/DL — SIGNIFICANT CHANGE UP (ref 11.5–15.5)
IMM GRANULOCYTES NFR BLD AUTO: 12.2 % — HIGH (ref 0–1.5)
LYMPHOCYTES # BLD AUTO: 0.48 K/UL — LOW (ref 1–3.3)
LYMPHOCYTES # BLD AUTO: 22.5 % — SIGNIFICANT CHANGE UP (ref 13–44)
MAGNESIUM SERPL-MCNC: 1.7 MG/DL — SIGNIFICANT CHANGE UP (ref 1.6–2.6)
MANUAL SMEAR VERIFICATION: SIGNIFICANT CHANGE UP
MCHC RBC-ENTMCNC: 31.1 % — LOW (ref 32–36)
MCHC RBC-ENTMCNC: 32.5 PG — SIGNIFICANT CHANGE UP (ref 27–34)
MCV RBC AUTO: 104.7 FL — HIGH (ref 80–100)
MISCELLANEOUS - CHEM: SIGNIFICANT CHANGE UP
MONOCYTES # BLD AUTO: 0.15 K/UL — SIGNIFICANT CHANGE UP (ref 0–0.9)
MONOCYTES NFR BLD AUTO: 7 % — SIGNIFICANT CHANGE UP (ref 2–14)
NEUTROPHILS # BLD AUTO: 1.18 K/UL — LOW (ref 1.8–7.4)
NEUTROPHILS NFR BLD AUTO: 55.5 % — SIGNIFICANT CHANGE UP (ref 43–77)
NRBC # FLD: 0.02 K/UL — SIGNIFICANT CHANGE UP (ref 0–0)
PHOSPHATE SERPL-MCNC: 2.4 MG/DL — LOW (ref 2.5–4.5)
PLATELET # BLD AUTO: 119 K/UL — LOW (ref 150–400)
PMV BLD: 12.5 FL — SIGNIFICANT CHANGE UP (ref 7–13)
POTASSIUM SERPL-MCNC: 3.6 MMOL/L — SIGNIFICANT CHANGE UP (ref 3.5–5.3)
POTASSIUM SERPL-SCNC: 3.6 MMOL/L — SIGNIFICANT CHANGE UP (ref 3.5–5.3)
RBC # BLD: 3.81 M/UL — SIGNIFICANT CHANGE UP (ref 3.8–5.2)
RBC # FLD: 21.5 % — HIGH (ref 10.3–14.5)
SODIUM SERPL-SCNC: 139 MMOL/L — SIGNIFICANT CHANGE UP (ref 135–145)
WBC # BLD: 2.13 K/UL — LOW (ref 3.8–10.5)
WBC # FLD AUTO: 2.13 K/UL — LOW (ref 3.8–10.5)

## 2019-07-25 PROCEDURE — 99232 SBSQ HOSP IP/OBS MODERATE 35: CPT

## 2019-07-25 PROCEDURE — 99233 SBSQ HOSP IP/OBS HIGH 50: CPT

## 2019-07-25 PROCEDURE — 99233 SBSQ HOSP IP/OBS HIGH 50: CPT | Mod: GC

## 2019-07-25 RX ORDER — MAGNESIUM SULFATE 500 MG/ML
2 VIAL (ML) INJECTION ONCE
Refills: 0 | Status: COMPLETED | OUTPATIENT
Start: 2019-07-25 | End: 2019-07-25

## 2019-07-25 RX ORDER — POTASSIUM CHLORIDE 20 MEQ
40 PACKET (EA) ORAL ONCE
Refills: 0 | Status: COMPLETED | OUTPATIENT
Start: 2019-07-25 | End: 2019-07-25

## 2019-07-25 RX ADMIN — SODIUM CHLORIDE 4 MILLILITER(S): 9 INJECTION INTRAMUSCULAR; INTRAVENOUS; SUBCUTANEOUS at 16:53

## 2019-07-25 RX ADMIN — LEVALBUTEROL 0.63 MILLIGRAM(S): 1.25 SOLUTION, CONCENTRATE RESPIRATORY (INHALATION) at 01:46

## 2019-07-25 RX ADMIN — ATOVAQUONE 1500 MILLIGRAM(S): 750 SUSPENSION ORAL at 11:42

## 2019-07-25 RX ADMIN — APIXABAN 2.5 MILLIGRAM(S): 2.5 TABLET, FILM COATED ORAL at 06:49

## 2019-07-25 RX ADMIN — VORICONAZOLE 200 MILLIGRAM(S): 10 INJECTION, POWDER, LYOPHILIZED, FOR SOLUTION INTRAVENOUS at 06:49

## 2019-07-25 RX ADMIN — LEVALBUTEROL 0.63 MILLIGRAM(S): 1.25 SOLUTION, CONCENTRATE RESPIRATORY (INHALATION) at 12:32

## 2019-07-25 RX ADMIN — LEVALBUTEROL 0.63 MILLIGRAM(S): 1.25 SOLUTION, CONCENTRATE RESPIRATORY (INHALATION) at 04:44

## 2019-07-25 RX ADMIN — APIXABAN 2.5 MILLIGRAM(S): 2.5 TABLET, FILM COATED ORAL at 17:56

## 2019-07-25 RX ADMIN — SODIUM CHLORIDE 4 MILLILITER(S): 9 INJECTION INTRAMUSCULAR; INTRAVENOUS; SUBCUTANEOUS at 08:26

## 2019-07-25 RX ADMIN — Medication 50 GRAM(S): at 11:47

## 2019-07-25 RX ADMIN — PANTOPRAZOLE SODIUM 40 MILLIGRAM(S): 20 TABLET, DELAYED RELEASE ORAL at 06:49

## 2019-07-25 RX ADMIN — Medication 20 MILLIGRAM(S): at 06:49

## 2019-07-25 RX ADMIN — Medication 150 MILLIGRAM(S): at 06:50

## 2019-07-25 RX ADMIN — Medication 40 MILLIEQUIVALENT(S): at 11:47

## 2019-07-25 RX ADMIN — Medication 20 MILLIGRAM(S): at 06:50

## 2019-07-25 RX ADMIN — LEVALBUTEROL 0.63 MILLIGRAM(S): 1.25 SOLUTION, CONCENTRATE RESPIRATORY (INHALATION) at 16:52

## 2019-07-25 RX ADMIN — Medication 100 MILLIGRAM(S): at 14:29

## 2019-07-25 RX ADMIN — Medication 100 MILLIGRAM(S): at 06:50

## 2019-07-25 RX ADMIN — VALACYCLOVIR 500 MILLIGRAM(S): 500 TABLET, FILM COATED ORAL at 17:56

## 2019-07-25 RX ADMIN — Medication 1.5 MILLIGRAM(S): at 22:54

## 2019-07-25 RX ADMIN — VORICONAZOLE 200 MILLIGRAM(S): 10 INJECTION, POWDER, LYOPHILIZED, FOR SOLUTION INTRAVENOUS at 17:56

## 2019-07-25 RX ADMIN — Medication 300 MILLIGRAM(S): at 11:42

## 2019-07-25 RX ADMIN — LEVALBUTEROL 0.63 MILLIGRAM(S): 1.25 SOLUTION, CONCENTRATE RESPIRATORY (INHALATION) at 08:27

## 2019-07-25 RX ADMIN — SODIUM CHLORIDE 4 MILLILITER(S): 9 INJECTION INTRAMUSCULAR; INTRAVENOUS; SUBCUTANEOUS at 21:19

## 2019-07-25 RX ADMIN — LEVALBUTEROL 0.63 MILLIGRAM(S): 1.25 SOLUTION, CONCENTRATE RESPIRATORY (INHALATION) at 21:18

## 2019-07-25 RX ADMIN — Medication 100 MILLIGRAM(S): at 22:53

## 2019-07-25 NOTE — PROGRESS NOTE ADULT - PROBLEM SELECTOR PLAN 1
-continue high flow as tolerated  son requesting pulm follow up as he still remains hopeful to get patient off high flow  -can give low dose morphine prn dyspnea

## 2019-07-25 NOTE — PROGRESS NOTE ADULT - PROBLEM SELECTOR PLAN 3
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

## 2019-07-25 NOTE — PROGRESS NOTE ADULT - SUBJECTIVE AND OBJECTIVE BOX
CHIEF COMPLAINT:  shortness of breath    Interval Events:    Off of high flow, now on nasal cannula    REVIEW OF SYSTEMS:  Constitutional: [X ] negative [ ] fevers [ ] chills [ ] weight loss [ ] weight gain  HEENT: [X ] negative [ ] dry eyes [ ] eye irritation [ ] postnasal drip [ ] nasal congestion  CV: [ X] negative  [ ] chest pain [ ] orthopnea [ ] palpitations [ ] murmur  Resp: [ ] negative [ X] cough [ X] shortness of breath [ X] dyspnea [ ] wheezing [X ] sputum [ ] hemoptysis  GI: [X ] negative [ ] nausea [ ] vomiting [ ] diarrhea [ ] constipation [ ] abd pain [ ] dysphagia   : [ X] negative [ ] dysuria [ ] nocturia [ ] hematuria [ ] increased urinary frequency  Musculoskeletal: [X ] negative [ ] back pain [ ] myalgias [ ] arthralgias [ ] fracture  Skin: [ X] negative [ ] rash [ ] itch  Neurological: [ ] negative [ ] headache [ ] dizziness [ ] syncope [ ] weakness [ ] numbness  Psychiatric: [ ] negative [ ] anxiety [ ] depression  Endocrine: [ ] negative [ ] diabetes [ ] thyroid problem  Hematologic/Lymphatic: [ ] negative [ ] anemia [ ] bleeding problem  Allergic/Immunologic: [ ] negative [ ] itchy eyes [ ] nasal discharge [ ] hives [ ] angioedema  [X ] All other systems negative    OBJECTIVE:  ICU Vital Signs Last 24 Hrs  T(C): 36.2 (25 Jul 2019 12:24), Max: 36.4 (25 Jul 2019 06:46)  T(F): 97.2 (25 Jul 2019 12:24), Max: 97.5 (25 Jul 2019 06:46)  HR: 95 (25 Jul 2019 12:35) (91 - 107)  BP: 100/70 (25 Jul 2019 12:24) (100/70 - 117/83)  RR: 18 (25 Jul 2019 15:27) (18 - 22)  SpO2: 97% (25 Jul 2019 15:27) (96% - 99%)        07-24 @ 07:01  -  07-25 @ 07:00  --------------------------------------------------------  IN: 240 mL / OUT: 0 mL / NET: 240 mL      CAPILLARY BLOOD GLUCOSE          PHYSICAL EXAM:  General: ill appearing woman in no distress  Respiratory: normal effort on high flow  40/45  bibasilar rhonchi, some brochial breath sounds throughout, no areas of normal breath sounds,   decreased significantly at both bases  Cardiovascular:  rate regular, normal heart sounds  Abdomen: thin, NT, ND  Extremities: +LE edema  Neurological: awake alert, attentive, able to have conversations    HOSPITAL MEDICATIONS:  MEDICATIONS  (STANDING):  acetaminophen   Tablet .. 650 milliGRAM(s) Oral once  allopurinol 300 milliGRAM(s) Oral daily  apixaban 2.5 milliGRAM(s) Oral every 12 hours  atovaquone Suspension 1500 milliGRAM(s) Oral daily  benzocaine 15 mG/menthol 3.6 mG (Sugar-Free) Lozenge 1 Lozenge Oral every 4 hours  docusate sodium 100 milliGRAM(s) Oral three times a day  furosemide   Injectable 20 milliGRAM(s) IV Push daily  levalbuterol Inhalation 0.63 milliGRAM(s) Inhalation every 4 hours  melatonin 1.5 milliGRAM(s) Oral at bedtime  metoprolol succinate  milliGRAM(s) Oral daily  pantoprazole    Tablet 40 milliGRAM(s) Oral before breakfast  predniSONE   Tablet 20 milliGRAM(s) Oral daily  sodium chloride 7% Inhalation 4 milliLiter(s) Inhalation three times a day  valACYclovir 500 milliGRAM(s) Oral <User Schedule>  voriconazole 200 milliGRAM(s) Oral every 12 hours    MEDICATIONS  (PRN):  benzonatate 100 milliGRAM(s) Oral three times a day PRN Cough  bisacodyl 5 milliGRAM(s) Oral every 12 hours PRN Constipation  guaiFENesin    Syrup 200 milliGRAM(s) Oral every 6 hours PRN Cough  polyethylene glycol 3350 17 Gram(s) Oral daily PRN Constipation  sodium chloride 0.65% Nasal 1 Spray(s) Both Nostrils daily PRN Nasal Congestion      LABS:                        12.4   2.13  )-----------( 119      ( 25 Jul 2019 06:30 )             39.9     Hgb Trend: 12.4<--, 13.2<--, 11.4<--, 11.9<--, 12.0<--  07-25    139  |  90<L>  |  21  ----------------------------<  92  3.6   |  37<H>  |  0.30<L>    Ca    8.9      25 Jul 2019 06:30  Phos  2.4     07-25  Mg     1.7     07-25      Creatinine Trend: 0.30<--, 0.35<--, 0.30<--, 0.31<--, 0.37<--, 0.33<--        Venous Blood Gas:  07-24 @ 06:31  7.40/67/22/35/26.7  VBG Lactate: --      MICROBIOLOGY:       RADIOLOGY:  [ ] Reviewed and interpreted by me    PULMONARY FUNCTION TESTS:    EKG:

## 2019-07-25 NOTE — PROGRESS NOTE ADULT - PROBLEM SELECTOR PLAN 2
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 Nguyen at [facility] [place]. as per family, [facility] is not offering any disease modifying treatment. Pt is looking for experimental trial if pt clinically improves from current medical issues. Email sent to Dr. Nguyen by Dr Landeros would need good performance status for experimental therapy. Pt son states Dr. Sidhu [phone] will be her doctor will attempt to call Dr. Sidhu.   - C/w ppx mepron, valacyclovir and allopurinol  - [facility] discussed  poor prognosis   - still full code  - palliative care f/u

## 2019-07-25 NOTE — PROGRESS NOTE ADULT - SUBJECTIVE AND OBJECTIVE BOX
Follow Up:      Inverval History/ROS:Patient is a 79y old  Female who presents with a chief complaint of SOB (25 Jul 2019 13:26)    Still SOB  No fever    Allergies    No Known Allergies    Intolerances        ANTIMICROBIALS:  atovaquone Suspension 1500 daily  valACYclovir 500 <User Schedule>  voriconazole 200 every 12 hours      OTHER MEDS:  acetaminophen   Tablet .. 650 milliGRAM(s) Oral once  allopurinol 300 milliGRAM(s) Oral daily  apixaban 2.5 milliGRAM(s) Oral every 12 hours  benzocaine 15 mG/menthol 3.6 mG (Sugar-Free) Lozenge 1 Lozenge Oral every 4 hours  benzonatate 100 milliGRAM(s) Oral three times a day PRN  bisacodyl 5 milliGRAM(s) Oral every 12 hours PRN  docusate sodium 100 milliGRAM(s) Oral three times a day  furosemide   Injectable 20 milliGRAM(s) IV Push daily  guaiFENesin    Syrup 200 milliGRAM(s) Oral every 6 hours PRN  levalbuterol Inhalation 0.63 milliGRAM(s) Inhalation every 4 hours  melatonin 1.5 milliGRAM(s) Oral at bedtime  metoprolol succinate  milliGRAM(s) Oral daily  pantoprazole    Tablet 40 milliGRAM(s) Oral before breakfast  polyethylene glycol 3350 17 Gram(s) Oral daily PRN  predniSONE   Tablet 20 milliGRAM(s) Oral daily  sodium chloride 0.65% Nasal 1 Spray(s) Both Nostrils daily PRN  sodium chloride 7% Inhalation 4 milliLiter(s) Inhalation three times a day      Vital Signs Last 24 Hrs  T(C): 36.2 (25 Jul 2019 12:24), Max: 36.4 (25 Jul 2019 06:46)  T(F): 97.2 (25 Jul 2019 12:24), Max: 97.5 (25 Jul 2019 06:46)  HR: 95 (25 Jul 2019 12:35) (91 - 107)  BP: 100/70 (25 Jul 2019 12:24) (100/70 - 117/83)  BP(mean): --  RR: 20 (25 Jul 2019 12:24) (19 - 118)  SpO2: 96% (25 Jul 2019 12:24) (96% - 99%)    PHYSICAL EXAM:  General: x] non-toxic  HEAD/EYES: [ ] PERRL [ ] white sclera [ ] icterus  ENT:  [ ] normal [ ]x supple [ ] thrush [ ] pharyngeal exudate  Cardiovascular:   [ ] murmur [x ] normal [ ] PPM/AICD  Respiratory:  [ xcourse BS  GI:  [x ] soft, non-tender, normal bowel sounds  :  [ ] bledsoe [x ] no CVA tenderness   Musculoskeletal:  [ ] no synovitis  Neurologic:  [ ] non-focal exam   Skin:  [x ] no rash  Lymph: [ ] no lymphadenopathy  Psychiatric:  [ ] appropriate affect [ ] alert & oriented  Lines:  [x ] no phlebitis [ ] central line                                12.4   2.13  )-----------( 119      ( 25 Jul 2019 06:30 )             39.9       07-25    139  |  90<L>  |  21  ----------------------------<  92  3.6   |  37<H>  |  0.30<L>    Ca    8.9      25 Jul 2019 06:30  Phos  2.4     07-25  Mg     1.7     07-25            MICROBIOLOGY:    RADIOLOGY:

## 2019-07-25 NOTE — PROGRESS NOTE ADULT - PROBLEM SELECTOR PLAN 6
pt has assigned son George as HCP. HCP form filled out and placed in chart  -met with son, he is aware pt is not improving and won't be a candidate at this time for dmt  - discussed option of hospice if her breathing remains the same  -he was open to the idea  -we agreed to reeval in am

## 2019-07-25 NOTE — PROGRESS NOTE ADULT - SUBJECTIVE AND OBJECTIVE BOX
SUBJECTIVE AND OBJECTIVE: Pt remains on high flow.  Lethargic.  Awake alert.  States she is tired.   and son at bedside  INTERVAL HPI/OVERNIGHT EVENTS:    DNR on chart:   Allergies    No Known Allergies    Intolerances    MEDICATIONS  (STANDING):  acetaminophen   Tablet .. 650 milliGRAM(s) Oral once  allopurinol 300 milliGRAM(s) Oral daily  apixaban 2.5 milliGRAM(s) Oral every 12 hours  atovaquone Suspension 1500 milliGRAM(s) Oral daily  benzocaine 15 mG/menthol 3.6 mG (Sugar-Free) Lozenge 1 Lozenge Oral every 4 hours  docusate sodium 100 milliGRAM(s) Oral three times a day  furosemide   Injectable 20 milliGRAM(s) IV Push daily  levalbuterol Inhalation 0.63 milliGRAM(s) Inhalation every 4 hours  melatonin 1.5 milliGRAM(s) Oral at bedtime  metoprolol succinate  milliGRAM(s) Oral daily  pantoprazole    Tablet 40 milliGRAM(s) Oral before breakfast  predniSONE   Tablet 20 milliGRAM(s) Oral daily  sodium chloride 7% Inhalation 4 milliLiter(s) Inhalation three times a day  valACYclovir 500 milliGRAM(s) Oral <User Schedule>  voriconazole 200 milliGRAM(s) Oral every 12 hours    MEDICATIONS  (PRN):  benzonatate 100 milliGRAM(s) Oral three times a day PRN Cough  bisacodyl 5 milliGRAM(s) Oral every 12 hours PRN Constipation  guaiFENesin    Syrup 200 milliGRAM(s) Oral every 6 hours PRN Cough  polyethylene glycol 3350 17 Gram(s) Oral daily PRN Constipation  sodium chloride 0.65% Nasal 1 Spray(s) Both Nostrils daily PRN Nasal Congestion      ITEMS UNCHECKED ARE NOT PRESENT    PRESENT SYMPTOMS: [ ]Unable to obtain due to poor mentation   Source if other than patient:  [ ]Family   [ ]Team     Pain (Impact on QOL):  none  Location:  Minimal acceptable level (0-10 scale):            Aggravating factors:  Quality:  Radiation:  Severity (0-10 scale):    Timing:    Dyspnea:                           [ ]Mild [x ]Moderate [ ]Severe  Anxiety:                             [ ]Mild [x ]Moderate [ ]Severe  Fatigue:                             [ ]Mild [ ]Moderate [x ]Severe  Nausea:               none              [ ]Mild [ ]Moderate [ ]Severe  Loss of appetite:              [ ]Mild [x ]Moderate [ ]Severe  Constipation:             none       [ ]Mild [ ]Moderate [ ]Severe    PAIN AD Score:	  http://geriatrictoolkit.[place].[email or web address]/cog/painad.pdf (Ctrl + left click to view)    Other Symptoms:  [x ]All other review of systems negative     Karnofsky Performance Score/Palliative Performance Status Version 2:       40  %    http://palliative.info/resource_material/[email or web address].pdf  PHYSICAL EXAM:  Vital Signs Last 24 Hrs  T(C): 36.2 (25 Jul 2019 12:24), Max: 36.4 (25 Jul 2019 06:46)  T(F): 97.2 (25 Jul 2019 12:24), Max: 97.5 (25 Jul 2019 06:46)  HR: 95 (25 Jul 2019 12:35) (91 - 107)  BP: 100/70 (25 Jul 2019 12:24) (100/70 - 117/83)  BP(mean): --  RR: 20 (25 Jul 2019 12:24) (19 - 118)  SpO2: 96% (25 Jul 2019 12:24) (96% - 99%) I&O's Summary    24 Jul 2019 07:01  -  25 Jul 2019 07:00  --------------------------------------------------------  IN: 240 mL / OUT: 0 mL / NET: 240 mL     GENERAL:  [x ]Alert  [x ]Oriented x1-2   [ ]Lethargic  [ ]Cachexia  [ ]Unarousable  [ ]Verbal  [ ]Non-Verbal    Behavioral:   [ ] Anxiety  [ ] Delirium [ ] Agitation [ ] Other    HEENT:  [ ]Normal   [ ]Dry mouth   [ ]ET Tube/Trach  [ ]Oral lesions  +high flow    PULMONARY:   [ ]Clear [x ]Tachypnea  [ ]Audible excessive secretions   [x ]Rhonchi        [ ]Right [ ]Left [ x]Bilateral  [ ]Crackles        [ ]Right [ ]Left [ ]Bilateral  [ ]Wheezing     [ ]Right [ ]Left [ ]Bilateral    CARDIOVASCULAR:    [x ]Regular [ ]Irregular [ ]Tachy  [ ]Butch [ ]Murmur [ ]Other    GASTROINTESTINAL:  [x ]Soft  [ ]Distended   [x ]+BS  [x ]Non tender [ ]Tender  [ ]PEG [ ]OGT/ NGT   Last BM:    GENITOURINARY:  [ ]Normal [x ] Incontinent   [ ]Oliguria/Anuria   [ ]Cespedes    MUSCULOSKELETAL:   [ ]Normal   [x ]Weakness  [ ]Bed/Wheelchair bound [ ]Edema    NEUROLOGIC:   [ ]No focal deficits  [x ] Cognitive impairment  [ ] Dysphagia [ ]Dysarthria [ ] Paresis [ ]Other     SKIN:   [ ]Normal   [ ]Pressure ulcer(s)  [ ]Rash    CRITICAL CARE:  [ ] Shock Present  [ ]Septic [ ]Cardiogenic [ ]Neurologic [ ]Hypovolemic  [ ]  Vasopressors [ ]  Inotropes   [ ] Respiratory failure present  [ ] Acute  [ ] Chronic [ ] Hypoxic  [ ] Hypercarbic [ ] Other  [ ] Other organ failure     LABS:                        12.4   2.13  )-----------( 119      ( 25 Jul 2019 06:30 )             39.9   07-25    139  |  90<L>  |  21  ----------------------------<  92  3.6   |  37<H>  |  0.30<L>    Ca    8.9      25 Jul 2019 06:30  Phos  2.4     07-25  Mg     1.7     07-25          RADIOLOGY & ADDITIONAL STUDIES:    Protein Calorie Malnutrition Present: [ ] yes [ ] no  [ ] PPSV2 < or = 30%  [ ] significant weight loss [ ] poor nutritional intake [ ] anasarca [ ] catabolic state Albumin, Serum: 3.1 g/dL (06-27-19 @ 06:16)  Artificial Nutrition [ ]     REFERRALS:   [ ]Chaplaincy  [ ] Hospice  [ ]Child Life  [ ]Social Work  [ ]Case management [ ]Holistic Therapy   Goals of Care Document: VITALY Riley (07-11-19 @ 14:11)  Goals of Care Conversation:   Participants:  · Participants  Patient  · Child(isaac)  George White; son and Clarissa Castaneda; dtr  · Provider  Dr. Landeros; Dr. Burns  ·   Kelsey Riley    Advance Directives:  · Does patient have Advance Directive  Yes  · Indicate Type  Health Care Proxy (HCP)  · Agent's Name  George White  · Phone #  [phone]  · Are any of the items on the chart  Yes  · Specify which ones are on chart  Health Care Proxy (HCP)    Conversation Discussion:  · Conversation  Diagnosis; Prognosis; MOLST Discussed; Treatment Options  · Conversation Details  Referral to palliative care for complex decision making in the setting of advanced illness. Meeting held with pt, son who is HCP, and dtr at bedside. Reviewed and summarized pt's overall medical condition. Pt and family are aware that despite treatment pt's cancer has progressed. Pt was planning on starting experimental chemotherapy as an out-pt. Pt and family still want to pursue DMT if pt can recover from her current hospitalization. Discussed the role of hospice care in the evident pt and family wanted to transition care from DMT to supportive care where the focus would be on symptom management. Advanced care planning discussed. MOLST discussed and explained. Family are aware of what the nature of resuscitation and intubation entails. Family are not ready to complete a MOLST at this time stating they will continue to discuss  preferences for end of life care with their mother. Pt remains a full code at this time.      Electronic Signatures:  Kelsey Riley ([facility])  (Signed 11-Jul-2019 14:29)  	Authored: Goals of Care Conversation      Last Updated: 11-Jul-2019 14:29 by Kelsey Riley ([facility])

## 2019-07-25 NOTE — PROGRESS NOTE ADULT - PROBLEM SELECTOR PLAN 1
RLL bronchus obstruction with concern for postobstructive & fungal PNA  - c/w continuous pulse ox  - c/w chest PT and incentive spirometry   - continues high flow NC O2, titrate off as tolerated  - c/w standing nebs; Xopenex q4 hours, following by nebulized 3% hypertonic saline, and then Aerobika device as per Pulm recommendations  - meropenem course ended on 7/20   - palliative recs appreciated conveyed poor prognosis to family still wishes pt to be full code.  - CXR w/ near complete white out of R on 7/19, large effusions on US, s/p lasix 40 IV x 3  -prednisone 20 mg daily   - as per pulm fellow states will not benefit from thoracentesis due to endobronchial lesion cont diuresis for now lasix 20 IV daily slow diuresis as HCO3 increased due to contraction alkalosis    -repeat CXR improved but diuresis maybe limited by rising HCO3  -Pulm following appreciate recs

## 2019-07-25 NOTE — PROGRESS NOTE ADULT - PROBLEM SELECTOR PLAN 4
Follows with Dr. Nguyen at [facility] [place]. as per family, [facility] is not offering any disease modifying treatment. Pt is looking for experimental trial if pt clinically improves from current medical issues. Email sent to Dr. Nguyen by Dr Landeros would need good performance status for experimental therapy. Pt son states Dr. Sidhu [phone] will be her doctor will attempt to call Dr. Sidhu.   - C/w ppx mepron, valacyclovir and allopurinol  - [facility] discussed  poor prognosis   -still full code Likely BM suppression in setting of active CA  - transfuse as needed  - WBC and plt downtrending, monitor closely  - hold ac for plt <50  - monitor CBC

## 2019-07-25 NOTE — PROGRESS NOTE ADULT - ASSESSMENT
79yF with advanced DLBCL, now complicated by fungal pneumonia with endobronchial lesion, progression of disease causing hypoxic respiratory failure.   Overall minimal improvement despite almost one month of anti-fungals. Patient mostly bedbound at this point, very deconditioned.     Right lung aeration improved on repeat CXR. Bronchus intermedius abruptly terminates consistent with known right sided endobronchial lesion.     Improved oxygenation and now on regular nasal cannula.       Recs  Would continue antimicrobials and avoid non-invasive positive pressure. Needs mucus/secretions clearance which is inhibited by bipap.   Agree with discussions regarding palliative care and transition to hospice if the patient and family is amenable. Lymphoma is untreatable at this point and the fungal pneumonia does not appear to be improving.     Regarding thoracentesis - her oxygenation is improved and she has an endobronchial lesion in the bronchus intermedius that will prevent the lower lobe from opening.

## 2019-07-25 NOTE — PROGRESS NOTE ADULT - SUBJECTIVE AND OBJECTIVE BOX
Patient is a 79y old  Female who presents with a chief complaint of SOB (24 Jul 2019 14:29)      SUBJECTIVE / OVERNIGHT EVENTS: Pt in bed on [place] continues to be on 45L at 40%    MEDICATIONS  (STANDING):  acetaminophen   Tablet .. 650 milliGRAM(s) Oral once  allopurinol 300 milliGRAM(s) Oral daily  apixaban 2.5 milliGRAM(s) Oral every 12 hours  atovaquone Suspension 1500 milliGRAM(s) Oral daily  benzocaine 15 mG/menthol 3.6 mG (Sugar-Free) Lozenge 1 Lozenge Oral every 4 hours  docusate sodium 100 milliGRAM(s) Oral three times a day  furosemide   Injectable 20 milliGRAM(s) IV Push daily  levalbuterol Inhalation 0.63 milliGRAM(s) Inhalation every 4 hours  magnesium sulfate  IVPB 2 Gram(s) IV Intermittent once  melatonin 1.5 milliGRAM(s) Oral at bedtime  metoprolol succinate  milliGRAM(s) Oral daily  pantoprazole    Tablet 40 milliGRAM(s) Oral before breakfast  potassium chloride    Tablet ER 40 milliEquivalent(s) Oral once  predniSONE   Tablet 20 milliGRAM(s) Oral daily  sodium chloride 7% Inhalation 4 milliLiter(s) Inhalation three times a day  valACYclovir 500 milliGRAM(s) Oral <User Schedule>  voriconazole 200 milliGRAM(s) Oral every 12 hours    MEDICATIONS  (PRN):  benzonatate 100 milliGRAM(s) Oral three times a day PRN Cough  bisacodyl 5 milliGRAM(s) Oral every 12 hours PRN Constipation  guaiFENesin    Syrup 200 milliGRAM(s) Oral every 6 hours PRN Cough  polyethylene glycol 3350 17 Gram(s) Oral daily PRN Constipation  sodium chloride 0.65% Nasal 1 Spray(s) Both Nostrils daily PRN Nasal Congestion        CAPILLARY BLOOD GLUCOSE        I&O's Summary    24 Jul 2019 07:01  -  25 Jul 2019 07:00  --------------------------------------------------------  IN: 240 mL / OUT: 0 mL / NET: 240 mL        T(C): 36.4 (07-25-19 @ 06:46), Max: 36.4 (07-24-19 @ 12:57)  HR: 100 (07-25-19 @ 08:27) (84 - 107)  BP: 117/83 (07-25-19 @ 06:46) (101/77 - 117/83)  RR: 22 (07-25-19 @ 06:46) (19 - 118)  SpO2: 96% (07-25-19 @ 06:46) (96% - 99%)    PHYSICAL EXAM:  GENERAL:  on high flow NC  HEAD:  Atraumatic, Normocephalic  CHEST/LUNG: upper airway transmitted sounds   HEART: tachycardia; No murmurs, rubs, or gallops  ABDOMEN: Soft, Nontender, Nondistended; Bowel sounds present  EXTREMITIES:   cool, edematous, pitting L>R  PSYCH: AAOx2  NEUROLOGY: non-focal      LABS:                        12.4   2.13  )-----------( 119      ( 25 Jul 2019 06:30 )             39.9     07-25    139  |  90<L>  |  21  ----------------------------<  92  3.6   |  37<H>  |  0.30<L>    Ca    8.9      25 Jul 2019 06:30  Phos  2.4     07-25  Mg     1.7     07-25                  RADIOLOGY & ADDITIONAL TESTS:    Imaging Personally Reviewed:    Consultant(s) Notes Reviewed:      Care Discussed with Consultants/Other Providers: Patient is a 79y old  Female who presents with a chief complaint of SOB (24 Jul 2019 14:29)      SUBJECTIVE / OVERNIGHT EVENTS: Pt in bed on [place] continues to be on 45L at 40% son at bedside discussed with pt in regards to further treatment in light of the fact that no treatment being offered for lung cancer due to poor performance status states "if Im not getting better then I need to speak to my family...cant stay in the hospital forever"     MEDICATIONS  (STANDING):  acetaminophen   Tablet .. 650 milliGRAM(s) Oral once  allopurinol 300 milliGRAM(s) Oral daily  apixaban 2.5 milliGRAM(s) Oral every 12 hours  atovaquone Suspension 1500 milliGRAM(s) Oral daily  benzocaine 15 mG/menthol 3.6 mG (Sugar-Free) Lozenge 1 Lozenge Oral every 4 hours  docusate sodium 100 milliGRAM(s) Oral three times a day  furosemide   Injectable 20 milliGRAM(s) IV Push daily  levalbuterol Inhalation 0.63 milliGRAM(s) Inhalation every 4 hours  magnesium sulfate  IVPB 2 Gram(s) IV Intermittent once  melatonin 1.5 milliGRAM(s) Oral at bedtime  metoprolol succinate  milliGRAM(s) Oral daily  pantoprazole    Tablet 40 milliGRAM(s) Oral before breakfast  potassium chloride    Tablet ER 40 milliEquivalent(s) Oral once  predniSONE   Tablet 20 milliGRAM(s) Oral daily  sodium chloride 7% Inhalation 4 milliLiter(s) Inhalation three times a day  valACYclovir 500 milliGRAM(s) Oral <User Schedule>  voriconazole 200 milliGRAM(s) Oral every 12 hours    MEDICATIONS  (PRN):  benzonatate 100 milliGRAM(s) Oral three times a day PRN Cough  bisacodyl 5 milliGRAM(s) Oral every 12 hours PRN Constipation  guaiFENesin    Syrup 200 milliGRAM(s) Oral every 6 hours PRN Cough  polyethylene glycol 3350 17 Gram(s) Oral daily PRN Constipation  sodium chloride 0.65% Nasal 1 Spray(s) Both Nostrils daily PRN Nasal Congestion        CAPILLARY BLOOD GLUCOSE        I&O's Summary    24 Jul 2019 07:01  -  25 Jul 2019 07:00  --------------------------------------------------------  IN: 240 mL / OUT: 0 mL / NET: 240 mL        T(C): 36.4 (07-25-19 @ 06:46), Max: 36.4 (07-24-19 @ 12:57)  HR: 100 (07-25-19 @ 08:27) (84 - 107)  BP: 117/83 (07-25-19 @ 06:46) (101/77 - 117/83)  RR: 22 (07-25-19 @ 06:46) (19 - 118)  SpO2: 96% (07-25-19 @ 06:46) (96% - 99%)    PHYSICAL EXAM:  GENERAL:  on high flow NC  HEAD:  Atraumatic, Normocephalic  CHEST/LUNG: upper airway transmitted sounds   HEART: tachycardia; No murmurs, rubs, or gallops  ABDOMEN: Soft, Nontender, Nondistended; Bowel sounds present  EXTREMITIES:   cool, edematous, pitting L>R  PSYCH: AAOx2  NEUROLOGY: non-focal      LABS:                        12.4   2.13  )-----------( 119      ( 25 Jul 2019 06:30 )             39.9     07-25    139  |  90<L>  |  21  ----------------------------<  92  3.6   |  37<H>  |  0.30<L>    Ca    8.9      25 Jul 2019 06:30  Phos  2.4     07-25  Mg     1.7     07-25                  RADIOLOGY & ADDITIONAL TESTS:    Imaging Personally Reviewed:    Consultant(s) Notes Reviewed:      Care Discussed with Consultants/Other Providers:

## 2019-07-25 NOTE — PROGRESS NOTE ADULT - PROBLEM SELECTOR PLAN 5
[place] [facility] [place] further [place]mt  -spoke with  Dr Davies at [facility]- he also states no further dmt at this time as pt not functional enough and poor respiratory status

## 2019-07-25 NOTE — PROGRESS NOTE ADULT - ASSESSMENT
79 year old with relapsed B cell lymphoma (dx 2017, now relaplse)   Treated with rituximab and revlimid in 3/2019  Treated with Obintuzmab and Bendamustine iin 4/2019    Presents with shortness of breath  She had abnormal imaging with lung nodules    Aspergillosis    1) Fungal pneumonia  aspergillosis on culture    Continue voriconazole- tx of choice  Minimum of 6 weeks but reasonable to continue for duration of immunosuppression    Even with optimal tx, outcomes of pt with underlying heme malignancy is not always good    Voriconazole level sent on 7/20    Check LFTS    2) Immunosuppressed secondary to cancer tx  PCP prophylaxis  Continue mepron  At some point, reasonable to re-challenge with Bactrim with close monitoring of WBC  Valtrex prophylaxis    4) Delirium: improved with sleep

## 2019-07-26 DIAGNOSIS — R94.5 ABNORMAL RESULTS OF LIVER FUNCTION STUDIES: ICD-10-CM

## 2019-07-26 LAB
ALBUMIN SERPL ELPH-MCNC: 3.1 G/DL — LOW (ref 3.3–5)
ALP SERPL-CCNC: 236 U/L — HIGH (ref 40–120)
ALT FLD-CCNC: 98 U/L — HIGH (ref 4–33)
ANION GAP SERPL CALC-SCNC: 11 MMO/L — SIGNIFICANT CHANGE UP (ref 7–14)
AST SERPL-CCNC: 100 U/L — HIGH (ref 4–32)
BASOPHILS # BLD AUTO: 0.04 K/UL — SIGNIFICANT CHANGE UP (ref 0–0.2)
BASOPHILS NFR BLD AUTO: 1.8 % — SIGNIFICANT CHANGE UP (ref 0–2)
BILIRUB SERPL-MCNC: 1.7 MG/DL — HIGH (ref 0.2–1.2)
BUN SERPL-MCNC: 22 MG/DL — SIGNIFICANT CHANGE UP (ref 7–23)
CALCIUM SERPL-MCNC: 9.3 MG/DL — SIGNIFICANT CHANGE UP (ref 8.4–10.5)
CHLORIDE SERPL-SCNC: 88 MMOL/L — LOW (ref 98–107)
CO2 SERPL-SCNC: 38 MMOL/L — HIGH (ref 22–31)
CREAT SERPL-MCNC: 0.29 MG/DL — LOW (ref 0.5–1.3)
EOSINOPHIL # BLD AUTO: 0.03 K/UL — SIGNIFICANT CHANGE UP (ref 0–0.5)
EOSINOPHIL NFR BLD AUTO: 1.3 % — SIGNIFICANT CHANGE UP (ref 0–6)
GLUCOSE SERPL-MCNC: 88 MG/DL — SIGNIFICANT CHANGE UP (ref 70–99)
HCT VFR BLD CALC: 40.9 % — SIGNIFICANT CHANGE UP (ref 34.5–45)
HGB BLD-MCNC: 13.1 G/DL — SIGNIFICANT CHANGE UP (ref 11.5–15.5)
IMM GRANULOCYTES NFR BLD AUTO: 11.2 % — HIGH (ref 0–1.5)
LYMPHOCYTES # BLD AUTO: 0.54 K/UL — LOW (ref 1–3.3)
LYMPHOCYTES # BLD AUTO: 24.1 % — SIGNIFICANT CHANGE UP (ref 13–44)
MANUAL SMEAR VERIFICATION: SIGNIFICANT CHANGE UP
MCHC RBC-ENTMCNC: 32 % — SIGNIFICANT CHANGE UP (ref 32–36)
MCHC RBC-ENTMCNC: 32.6 PG — SIGNIFICANT CHANGE UP (ref 27–34)
MCV RBC AUTO: 101.7 FL — HIGH (ref 80–100)
MONOCYTES # BLD AUTO: 0.15 K/UL — SIGNIFICANT CHANGE UP (ref 0–0.9)
MONOCYTES NFR BLD AUTO: 6.7 % — SIGNIFICANT CHANGE UP (ref 2–14)
NEUTROPHILS # BLD AUTO: 1.23 K/UL — LOW (ref 1.8–7.4)
NEUTROPHILS NFR BLD AUTO: 54.9 % — SIGNIFICANT CHANGE UP (ref 43–77)
NRBC # FLD: 0 K/UL — SIGNIFICANT CHANGE UP (ref 0–0)
PLATELET # BLD AUTO: 127 K/UL — LOW (ref 150–400)
PMV BLD: 12 FL — SIGNIFICANT CHANGE UP (ref 7–13)
POTASSIUM SERPL-MCNC: 3.4 MMOL/L — LOW (ref 3.5–5.3)
POTASSIUM SERPL-SCNC: 3.4 MMOL/L — LOW (ref 3.5–5.3)
PROT SERPL-MCNC: 5 G/DL — LOW (ref 6–8.3)
RBC # BLD: 4.02 M/UL — SIGNIFICANT CHANGE UP (ref 3.8–5.2)
RBC # FLD: 21.2 % — HIGH (ref 10.3–14.5)
SODIUM SERPL-SCNC: 137 MMOL/L — SIGNIFICANT CHANGE UP (ref 135–145)
WBC # BLD: 2.24 K/UL — LOW (ref 3.8–10.5)
WBC # FLD AUTO: 2.24 K/UL — LOW (ref 3.8–10.5)

## 2019-07-26 PROCEDURE — 99233 SBSQ HOSP IP/OBS HIGH 50: CPT

## 2019-07-26 PROCEDURE — 99232 SBSQ HOSP IP/OBS MODERATE 35: CPT

## 2019-07-26 PROCEDURE — 76705 ECHO EXAM OF ABDOMEN: CPT | Mod: 26

## 2019-07-26 RX ORDER — POSACONAZOLE 100 MG/1
300 TABLET, DELAYED RELEASE ORAL EVERY 24 HOURS
Refills: 0 | Status: DISCONTINUED | OUTPATIENT
Start: 2019-07-26 | End: 2019-08-06

## 2019-07-26 RX ORDER — POTASSIUM CHLORIDE 20 MEQ
40 PACKET (EA) ORAL ONCE
Refills: 0 | Status: COMPLETED | OUTPATIENT
Start: 2019-07-26 | End: 2019-07-26

## 2019-07-26 RX ADMIN — SODIUM CHLORIDE 4 MILLILITER(S): 9 INJECTION INTRAMUSCULAR; INTRAVENOUS; SUBCUTANEOUS at 21:48

## 2019-07-26 RX ADMIN — Medication 300 MILLIGRAM(S): at 11:48

## 2019-07-26 RX ADMIN — VORICONAZOLE 200 MILLIGRAM(S): 10 INJECTION, POWDER, LYOPHILIZED, FOR SOLUTION INTRAVENOUS at 06:18

## 2019-07-26 RX ADMIN — PANTOPRAZOLE SODIUM 40 MILLIGRAM(S): 20 TABLET, DELAYED RELEASE ORAL at 06:14

## 2019-07-26 RX ADMIN — LEVALBUTEROL 0.63 MILLIGRAM(S): 1.25 SOLUTION, CONCENTRATE RESPIRATORY (INHALATION) at 13:23

## 2019-07-26 RX ADMIN — POSACONAZOLE 300 MILLIGRAM(S): 100 TABLET, DELAYED RELEASE ORAL at 18:22

## 2019-07-26 RX ADMIN — Medication 40 MILLIEQUIVALENT(S): at 10:16

## 2019-07-26 RX ADMIN — Medication 20 MILLIGRAM(S): at 06:16

## 2019-07-26 RX ADMIN — BENZOCAINE AND MENTHOL 1 LOZENGE: 5; 1 LIQUID ORAL at 18:23

## 2019-07-26 RX ADMIN — LEVALBUTEROL 0.63 MILLIGRAM(S): 1.25 SOLUTION, CONCENTRATE RESPIRATORY (INHALATION) at 00:52

## 2019-07-26 RX ADMIN — Medication 1.5 MILLIGRAM(S): at 22:42

## 2019-07-26 RX ADMIN — BENZOCAINE AND MENTHOL 1 LOZENGE: 5; 1 LIQUID ORAL at 10:16

## 2019-07-26 RX ADMIN — Medication 20 MILLIGRAM(S): at 06:15

## 2019-07-26 RX ADMIN — ATOVAQUONE 1500 MILLIGRAM(S): 750 SUSPENSION ORAL at 11:48

## 2019-07-26 RX ADMIN — LEVALBUTEROL 0.63 MILLIGRAM(S): 1.25 SOLUTION, CONCENTRATE RESPIRATORY (INHALATION) at 10:22

## 2019-07-26 RX ADMIN — APIXABAN 2.5 MILLIGRAM(S): 2.5 TABLET, FILM COATED ORAL at 06:21

## 2019-07-26 RX ADMIN — Medication 100 MILLIGRAM(S): at 06:15

## 2019-07-26 RX ADMIN — APIXABAN 2.5 MILLIGRAM(S): 2.5 TABLET, FILM COATED ORAL at 18:23

## 2019-07-26 RX ADMIN — LEVALBUTEROL 0.63 MILLIGRAM(S): 1.25 SOLUTION, CONCENTRATE RESPIRATORY (INHALATION) at 04:31

## 2019-07-26 RX ADMIN — SODIUM CHLORIDE 4 MILLILITER(S): 9 INJECTION INTRAMUSCULAR; INTRAVENOUS; SUBCUTANEOUS at 10:22

## 2019-07-26 RX ADMIN — Medication 150 MILLIGRAM(S): at 06:14

## 2019-07-26 RX ADMIN — LEVALBUTEROL 0.63 MILLIGRAM(S): 1.25 SOLUTION, CONCENTRATE RESPIRATORY (INHALATION) at 21:38

## 2019-07-26 RX ADMIN — Medication 100 MILLIGRAM(S): at 22:42

## 2019-07-26 NOTE — PROGRESS NOTE ADULT - PROBLEM SELECTOR PLAN 2
Follows with Dr. Nguyen at [facility]. as per family, [facility] is not offering any disease modifying treatment. Pt is looking for experimental trial if pt clinically improves from current medical issues. Email sent to Dr. Nguyen by Dr Landeros would need good performance status for experimental therapy. As per palliative care d/w Dr. Sidhu no further DMT due to poor functional status.   - C/w ppx mepron, valacyclovir and allopurinol  - [facility] discussed  poor prognosis   - still full code  - palliative care appreciated RLL bronchus obstruction with concern for postobstructive & fungal PNA  - c/w continuous pulse ox  - c/w chest PT and incentive spirometry   - continues high flow NC O2, titrate off as tolerated  - c/w standing nebs; Xopenex q4 hours, following by nebulized 3% hypertonic saline, and then Aerobika device as per Pulm recommendations  - s/p meropenem on 7/20   - palliative recs appreciated conveyed poor prognosis to family still wishes pt to be full code.  - CXR w/ near complete white out of R on 7/19, large effusions on US, s/p lasix 40 IV x 3  -prednisone 20 mg daily   - as per pulm fellow states will not benefit from thoracentesis due to endobronchial lesion cont diuresis for now lasix 20 IV daily slow diuresis as HCO3 increased due to contraction alkalosis    -repeat CXR improved but diuresis maybe limited by elevated HCO3 and borderline BP  -Pulm following appreciate recs RLL bronchus obstruction with concern for postobstructive & fungal PNA  - c/w continuous pulse ox  - c/w chest PT and incentive spirometry   - continues high flow NC O2, titrate off as tolerated  - c/w standing nebs; Xopenex q4 hours, following by nebulized 3% hypertonic saline, and then Aerobika device as per Pulm recommendations  - s/p meropenem on 7/20   - palliative recs appreciated conveyed poor prognosis to family still wishes pt to be full code.  - CXR w/ near complete white out of R on 7/19, large effusions on US, s/p lasix 40 IV x 3  -prednisone 20 mg daily   - as per pulm fellow states will not benefit from thoracentesis due to endobronchial lesion cont diuresis for now lasix 20 IV daily slow diuresis as HCO3 increased due to contraction alkalosis    -repeat CXR improved but diuresis maybe limited by elevated HCO3 and borderline BP  -repeat CXR on Monday 7/29  -Pulm following appreciate recs

## 2019-07-26 NOTE — PROGRESS NOTE ADULT - SUBJECTIVE AND OBJECTIVE BOX
Patient is a 79y old  Female who presents with a chief complaint of SOB (25 Jul 2019 16:51)      SUBJECTIVE / OVERNIGHT EVENTS: Pt placed on NC yesterday    MEDICATIONS  (STANDING):  acetaminophen   Tablet .. 650 milliGRAM(s) Oral once  allopurinol 300 milliGRAM(s) Oral daily  apixaban 2.5 milliGRAM(s) Oral every 12 hours  atovaquone Suspension 1500 milliGRAM(s) Oral daily  benzocaine 15 mG/menthol 3.6 mG (Sugar-Free) Lozenge 1 Lozenge Oral every 4 hours  docusate sodium 100 milliGRAM(s) Oral three times a day  furosemide   Injectable 20 milliGRAM(s) IV Push daily  levalbuterol Inhalation 0.63 milliGRAM(s) Inhalation every 4 hours  melatonin 1.5 milliGRAM(s) Oral at bedtime  metoprolol succinate  milliGRAM(s) Oral daily  pantoprazole    Tablet 40 milliGRAM(s) Oral before breakfast  predniSONE   Tablet 20 milliGRAM(s) Oral daily  sodium chloride 7% Inhalation 4 milliLiter(s) Inhalation three times a day  valACYclovir 500 milliGRAM(s) Oral <User Schedule>  voriconazole 200 milliGRAM(s) Oral every 12 hours    MEDICATIONS  (PRN):  benzonatate 100 milliGRAM(s) Oral three times a day PRN Cough  bisacodyl 5 milliGRAM(s) Oral every 12 hours PRN Constipation  guaiFENesin    Syrup 200 milliGRAM(s) Oral every 6 hours PRN Cough  polyethylene glycol 3350 17 Gram(s) Oral daily PRN Constipation  sodium chloride 0.65% Nasal 1 Spray(s) Both Nostrils daily PRN Nasal Congestion        CAPILLARY BLOOD GLUCOSE        I&O's Summary    25 Jul 2019 07:01  -  26 Jul 2019 07:00  --------------------------------------------------------  IN: 240 mL / OUT: 500 mL / NET: -260 mL        T(C): 36.3 (07-26-19 @ 06:08), Max: 36.3 (07-25-19 @ 22:49)  HR: 102 (07-26-19 @ 07:30) (80 - 108)  BP: 117/89 (07-26-19 @ 06:08) (97/70 - 117/89)  RR: 20 (07-26-19 @ 07:30) (18 - 22)  SpO2: 95% (07-26-19 @ 07:30) (95% - 98%)    PHYSICAL EXAM:  GENERAL:  on high flow NC  HEAD:  Atraumatic, Normocephalic  CHEST/LUNG: upper airway transmitted sounds   HEART: tachycardia; No murmurs, rubs, or gallops  ABDOMEN: Soft, Nontender, Nondistended; Bowel sounds present  EXTREMITIES:   cool, edematous, pitting L>R  NEUROLOGY: non-focal      LABS:                        13.1   2.24  )-----------( 127      ( 26 Jul 2019 07:25 )             40.9     07-25    139  |  90<L>  |  21  ----------------------------<  92  3.6   |  37<H>  |  0.30<L>    Ca    8.9      25 Jul 2019 06:30  Phos  2.4     07-25  Mg     1.7     07-25                  RADIOLOGY & ADDITIONAL TESTS:    Imaging Personally Reviewed:    Consultant(s) Notes Reviewed:      Care Discussed with Consultants/Other Providers: Patient is a 79y old  Female who presents with a chief complaint of SOB (25 Jul 2019 16:51)      SUBJECTIVE / OVERNIGHT EVENTS: Pt placed on NC yesterday LFT elevated     MEDICATIONS  (STANDING):  acetaminophen   Tablet .. 650 milliGRAM(s) Oral once  allopurinol 300 milliGRAM(s) Oral daily  apixaban 2.5 milliGRAM(s) Oral every 12 hours  atovaquone Suspension 1500 milliGRAM(s) Oral daily  benzocaine 15 mG/menthol 3.6 mG (Sugar-Free) Lozenge 1 Lozenge Oral every 4 hours  docusate sodium 100 milliGRAM(s) Oral three times a day  furosemide   Injectable 20 milliGRAM(s) IV Push daily  levalbuterol Inhalation 0.63 milliGRAM(s) Inhalation every 4 hours  melatonin 1.5 milliGRAM(s) Oral at bedtime  metoprolol succinate  milliGRAM(s) Oral daily  pantoprazole    Tablet 40 milliGRAM(s) Oral before breakfast  predniSONE   Tablet 20 milliGRAM(s) Oral daily  sodium chloride 7% Inhalation 4 milliLiter(s) Inhalation three times a day  valACYclovir 500 milliGRAM(s) Oral <User Schedule>  voriconazole 200 milliGRAM(s) Oral every 12 hours    MEDICATIONS  (PRN):  benzonatate 100 milliGRAM(s) Oral three times a day PRN Cough  bisacodyl 5 milliGRAM(s) Oral every 12 hours PRN Constipation  guaiFENesin    Syrup 200 milliGRAM(s) Oral every 6 hours PRN Cough  polyethylene glycol 3350 17 Gram(s) Oral daily PRN Constipation  sodium chloride 0.65% Nasal 1 Spray(s) Both Nostrils daily PRN Nasal Congestion        CAPILLARY BLOOD GLUCOSE        I&O's Summary    25 Jul 2019 07:01  -  26 Jul 2019 07:00  --------------------------------------------------------  IN: 240 mL / OUT: 500 mL / NET: -260 mL        T(C): 36.3 (07-26-19 @ 06:08), Max: 36.3 (07-25-19 @ 22:49)  HR: 102 (07-26-19 @ 07:30) (80 - 108)  BP: 117/89 (07-26-19 @ 06:08) (97/70 - 117/89)  RR: 20 (07-26-19 @ 07:30) (18 - 22)  SpO2: 95% (07-26-19 @ 07:30) (95% - 98%)    PHYSICAL EXAM:  GENERAL:  on high flow NC  HEAD:  Atraumatic, Normocephalic  CHEST/LUNG: upper airway transmitted sounds   HEART: tachycardia; No murmurs, rubs, or gallops  ABDOMEN: Soft, Nontender, Nondistended; Bowel sounds present  EXTREMITIES:   cool, edematous, pitting L>R  NEUROLOGY: non-focal      LABS:                        13.1   2.24  )-----------( 127      ( 26 Jul 2019 07:25 )             40.9     07-25 07-26    137  |  88<L>  |  22  ----------------------------<  88  3.4<L>   |  38<H>  |  0.29<L>    Ca    9.3      26 Jul 2019 07:25  Phos  2.4     07-25  Mg     1.7     07-25    TPro  5.0<L>  /  Alb  3.1<L>  /  TBili  1.7<H>  /  DBili  x   /  AST  100<H>  /  ALT  98<H>  /  AlkPhos  236<H>  07-26                RADIOLOGY & ADDITIONAL TESTS:    Imaging Personally Reviewed:    Consultant(s) Notes Reviewed:      Care Discussed with Consultants/Other Providers: Patient is a 79y old  Female who presents with a chief complaint of SOB (25 Jul 2019 16:51)      SUBJECTIVE / OVERNIGHT EVENTS: Pt placed on NC yesterday unclear if desaturated or became symptomatically SOB LFT elevated. no fever/N/V/abdominal pain      MEDICATIONS  (STANDING):  acetaminophen   Tablet .. 650 milliGRAM(s) Oral once  allopurinol 300 milliGRAM(s) Oral daily  apixaban 2.5 milliGRAM(s) Oral every 12 hours  atovaquone Suspension 1500 milliGRAM(s) Oral daily  benzocaine 15 mG/menthol 3.6 mG (Sugar-Free) Lozenge 1 Lozenge Oral every 4 hours  docusate sodium 100 milliGRAM(s) Oral three times a day  furosemide   Injectable 20 milliGRAM(s) IV Push daily  levalbuterol Inhalation 0.63 milliGRAM(s) Inhalation every 4 hours  melatonin 1.5 milliGRAM(s) Oral at bedtime  metoprolol succinate  milliGRAM(s) Oral daily  pantoprazole    Tablet 40 milliGRAM(s) Oral before breakfast  predniSONE   Tablet 20 milliGRAM(s) Oral daily  sodium chloride 7% Inhalation 4 milliLiter(s) Inhalation three times a day  valACYclovir 500 milliGRAM(s) Oral <User Schedule>  voriconazole 200 milliGRAM(s) Oral every 12 hours    MEDICATIONS  (PRN):  benzonatate 100 milliGRAM(s) Oral three times a day PRN Cough  bisacodyl 5 milliGRAM(s) Oral every 12 hours PRN Constipation  guaiFENesin    Syrup 200 milliGRAM(s) Oral every 6 hours PRN Cough  polyethylene glycol 3350 17 Gram(s) Oral daily PRN Constipation  sodium chloride 0.65% Nasal 1 Spray(s) Both Nostrils daily PRN Nasal Congestion        CAPILLARY BLOOD GLUCOSE        I&O's Summary    25 Jul 2019 07:01  -  26 Jul 2019 07:00  --------------------------------------------------------  IN: 240 mL / OUT: 500 mL / NET: -260 mL        T(C): 36.3 (07-26-19 @ 06:08), Max: 36.3 (07-25-19 @ 22:49)  HR: 102 (07-26-19 @ 07:30) (80 - 108)  BP: 117/89 (07-26-19 @ 06:08) (97/70 - 117/89)  RR: 20 (07-26-19 @ 07:30) (18 - 22)  SpO2: 95% (07-26-19 @ 07:30) (95% - 98%)    PHYSICAL EXAM:  GENERAL:  on high flow NC  HEAD:  Atraumatic, Normocephalic  CHEST/LUNG: upper airway transmitted sounds   HEART: tachycardia; No murmurs, rubs, or gallops  ABDOMEN: Soft, Nontender, Nondistended; Bowel sounds present  EXTREMITIES:   cool, edematous, pitting L>R  NEUROLOGY: non-focal      LABS:                        13.1   2.24  )-----------( 127      ( 26 Jul 2019 07:25 )             40.9     07-25 07-26    137  |  88<L>  |  22  ----------------------------<  88  3.4<L>   |  38<H>  |  0.29<L>    Ca    9.3      26 Jul 2019 07:25  Phos  2.4     07-25  Mg     1.7     07-25    TPro  5.0<L>  /  Alb  3.1<L>  /  TBili  1.7<H>  /  DBili  x   /  AST  100<H>  /  ALT  98<H>  /  AlkPhos  236<H>  07-26                RADIOLOGY & ADDITIONAL TESTS:    Imaging Personally Reviewed:    Consultant(s) Notes Reviewed:      Care Discussed with Consultants/Other Providers:

## 2019-07-26 NOTE — PROGRESS NOTE ADULT - PROBLEM SELECTOR PLAN 7
BP stable  - continue to hold home olmesartan  - c/w metoprolol Afib w/ RVR likely due infection and hypoxia/lung disease   - c/w metoprolol 150 mg ER  - c/w apixaban (age >80, weight <60kg)

## 2019-07-26 NOTE — PROGRESS NOTE ADULT - ASSESSMENT
79 year old with relapsed B cell lymphoma (dx 2017, now relaplse)   Treated with rituximab and revlimid in 3/2019  Treated with Obintuzmab and Bendamustine iin 4/2019    Presents with shortness of breath  She had abnormal imaging with lung nodules    Aspergillosis    1) Fungal pneumonia  aspergillosis on culture    Continue voriconazole- tx of choice  Minimum of 6 weeks but reasonable to continue for duration of immunosuppression    Even with optimal tx, outcomes of pt with underlying heme malignancy is not always good    Voriconazole level sent on 7/20      2) Transaminitis:   LFTS are elevated.  Check daily  Check RUQ sonogram  .   Voriconazole can cause this   Change voriconazole to posaconazole  for now      3) Immunosuppressed secondary to cancer tx  PCP prophylaxis  Continue mepron  At some point, reasonable to re-challenge with Bactrim with close monitoring of WBC  Valtrex prophylaxis    4) Delirium: improved with sleep    Prognosis guarded.     Call the ID service with questions or concerns over the weekend.  [phone] 79 year old with relapsed B cell lymphoma (dx 2017, now relaplse)   Treated with rituximab and revlimid in 3/2019  Treated with Obintuzmab and Bendamustine iin 4/2019    Presents with shortness of breath  She had abnormal imaging with lung nodules    Aspergillosis    1) Fungal pneumonia  aspergillosis on culture      Minimum of 6 weeks antifungals but reasonable to continue for duration of immunosuppression      Voriconazole level sent on 7/20 was 3 which is therapeutic.    I am concerned that she is not improving.  Even with optimal tx, outcomes of pt with underlying heme malignancy is not always good      2) Transaminitis:   LFTS are elevated.  Check daily  Check RUQ sonogram  .   Voriconazole can cause this   Change voriconazole to posaconazole  for now      3) Immunosuppressed secondary to cancer tx  PCP prophylaxis  Continue mepron  At some point, reasonable to re-challenge with Bactrim with close monitoring of WBC  Valtrex prophylaxis    4) Delirium: improved with sleep    Prognosis guarded.     Call the ID service with questions or concerns over the weekend.  [phone]

## 2019-07-26 NOTE — PROGRESS NOTE ADULT - PROBLEM SELECTOR PLAN 10
Full code.  Appreciate palliative care consult.  Ongoing GOC. Full code.  Appreciate palliative care consult. Plan for family meeting with pulmonary and palliative care Monday 12 PM   Ongoing GOC.

## 2019-07-26 NOTE — PROGRESS NOTE ADULT - PROBLEM SELECTOR PLAN 3
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 Nguyen at [facility] [place]. as per family, [facility] is not offering any disease modifying treatment. Pt is looking for experimental trial if pt clinically improves from current medical issues. Email sent to Dr. Nguyen by Dr Landeros would need good performance status for experimental therapy. As per palliative care d/w Dr. Sidhu no further DMT due to poor functional status.   - C/w ppx mepron, valacyclovir and allopurinol  - GOC discussed  poor prognosis   - still full code  - palliative care appreciated

## 2019-07-26 NOTE — PROGRESS NOTE ADULT - PROBLEM SELECTOR PLAN 5
LT>RT neg for DVT, likely due to poor nutritional state  - elevation Likely BM suppression in setting of active CA  - transfuse as needed  - WBC and plt downtrending, monitor closely  - hold ac for plt <50  - monitor CBC

## 2019-07-26 NOTE — CHART NOTE - NSCHARTNOTEFT_GEN_A_CORE
Family meeting scheduled for Monday at 12noon with primary team, pulmonary and PC.  Please call [phone] over the weekend with any questions.  Kia Landeros DO

## 2019-07-26 NOTE — PROGRESS NOTE ADULT - PROBLEM SELECTOR PLAN 6
Afib w/ RVR likely due infection and hypoxia/lung disease   - c/w metoprolol 150 mg ER  - c/w apixaban (age >80, weight <60kg) LT>RT neg for DVT, likely due to poor nutritional state  - elevation

## 2019-07-26 NOTE — PROGRESS NOTE ADULT - PROBLEM SELECTOR PLAN 1
RLL bronchus obstruction with concern for postobstructive & fungal PNA  - c/w continuous pulse ox  - c/w chest PT and incentive spirometry   - continues high flow NC O2, titrate off as tolerated  - c/w standing nebs; Xopenex q4 hours, following by nebulized 3% hypertonic saline, and then Aerobika device as per Pulm recommendations  - s/p meropenem on 7/20   - palliative recs appreciated conveyed poor prognosis to family still wishes pt to be full code.  - CXR w/ near complete white out of R on 7/19, large effusions on US, s/p lasix 40 IV x 3  -prednisone 20 mg daily   - as per pulm fellow states will not benefit from thoracentesis due to endobronchial lesion cont diuresis for now lasix 20 IV daily slow diuresis as HCO3 increased due to contraction alkalosis    -repeat CXR improved but diuresis maybe limited by elevated HCO3 and borderline BP  -Pulm following appreciate recs -check US no overt abdominal pain   -monitor LFTs  -check TTE evaluate EF and LE edema prior MUGA scan normal 2017

## 2019-07-26 NOTE — PROGRESS NOTE ADULT - SUBJECTIVE AND OBJECTIVE BOX
Follow Up:      Inverval History/ROS:Patient is a 79y old  Female who presents with a chief complaint of SOB (26 Jul 2019 08:47)  Lethargic .   Changed back to high flow oxygen    More alert this am per family.  Now sleeping.      Allergies    No Known Allergies    Intolerances        ANTIMICROBIALS:  atovaquone Suspension 1500 daily  valACYclovir 500 <User Schedule>  voriconazole 200 every 12 hours      OTHER MEDS:  acetaminophen   Tablet .. 650 milliGRAM(s) Oral once  allopurinol 300 milliGRAM(s) Oral daily  apixaban 2.5 milliGRAM(s) Oral every 12 hours  benzocaine 15 mG/menthol 3.6 mG (Sugar-Free) Lozenge 1 Lozenge Oral every 4 hours  benzonatate 100 milliGRAM(s) Oral three times a day PRN  bisacodyl 5 milliGRAM(s) Oral every 12 hours PRN  docusate sodium 100 milliGRAM(s) Oral three times a day  furosemide   Injectable 20 milliGRAM(s) IV Push daily  guaiFENesin    Syrup 200 milliGRAM(s) Oral every 6 hours PRN  levalbuterol Inhalation 0.63 milliGRAM(s) Inhalation every 4 hours  melatonin 1.5 milliGRAM(s) Oral at bedtime  metoprolol succinate  milliGRAM(s) Oral daily  pantoprazole    Tablet 40 milliGRAM(s) Oral before breakfast  polyethylene glycol 3350 17 Gram(s) Oral daily PRN  predniSONE   Tablet 20 milliGRAM(s) Oral daily  sodium chloride 0.65% Nasal 1 Spray(s) Both Nostrils daily PRN  sodium chloride 7% Inhalation 4 milliLiter(s) Inhalation three times a day      Vital Signs Last 24 Hrs  T(C): 36.3 (26 Jul 2019 06:08), Max: 36.3 (25 Jul 2019 22:49)  T(F): 97.3 (26 Jul 2019 06:08), Max: 97.4 (25 Jul 2019 22:49)  HR: 93 (26 Jul 2019 13:29) (80 - 108)  BP: 117/89 (26 Jul 2019 06:08) (97/70 - 117/89)  BP(mean): --  RR: 20 (26 Jul 2019 10:31) (18 - 22)  SpO2: 97% (26 Jul 2019 13:29) (95% - 98%)    PHYSICAL EXAM:  General: [x ] lethargic  HEAD/EYES: [ ] PERRL x ] white sclera [ ] icterus  ENT:  [ ] normal [x ] supple [ ] thrush [ ] pharyngeal exudate  Cardiovascular:   [ ] murmur [x ] normal [ ] PPM/AICD  Respiratory:  [x ] clear to ausculation bilaterally  GI:  [ x] soft, non-tender, normal bowel sounds  :  [ ] bledsoe [ ] no CVA tenderness   Musculoskeletal:  [ ] no synovitis  Neurologic:  [ ] non-focal exam   Skin:  [x ] no rash  Lymph: [ ] no lymphadenopathy  Psychiatric:  [x ] appropriate affect [ ] alert & oriented  Lines:  [x ] no phlebitis [ ] central line                                13.1   2.24  )-----------( 127      ( 26 Jul 2019 07:25 )             40.9       07-26    137  |  88<L>  |  22  ----------------------------<  88  3.4<L>   |  38<H>  |  0.29<L>    Ca    9.3      26 Jul 2019 07:25  Phos  2.4     07-25  Mg     1.7     07-25    TPro  5.0<L>  /  Alb  3.1<L>  /  TBili  1.7<H>  /  DBili  x   /  AST  100<H>  /  ALT  98<H>  /  AlkPhos  236<H>  07-26          MICROBIOLOGY:    RADIOLOGY:

## 2019-07-26 NOTE — PROGRESS NOTE ADULT - PROBLEM SELECTOR PLAN 4
Likely BM suppression in setting of active CA  - transfuse as needed  - WBC and plt downtrending, monitor closely  - hold ac for plt <50  - monitor CBC Aspergillus fumigatus in bronchial lavage from bronch   - c/w voriconazole for aspergillosis fumigatus;   - started on 7/3 will require at least 6 weeks of treatment as per ID   - continue mepron 1500 mg po daily  - VORICONAZOLE LEVEL  3.0 in therapeutic range     - ID following, consult appreciated

## 2019-07-27 LAB
ALBUMIN SERPL ELPH-MCNC: 3 G/DL — LOW (ref 3.3–5)
ALP SERPL-CCNC: 219 U/L — HIGH (ref 40–120)
ALT FLD-CCNC: 97 U/L — HIGH (ref 4–33)
ANION GAP SERPL CALC-SCNC: 8 MMO/L — SIGNIFICANT CHANGE UP (ref 7–14)
AST SERPL-CCNC: 101 U/L — HIGH (ref 4–32)
BASOPHILS # BLD AUTO: 0.02 K/UL — SIGNIFICANT CHANGE UP (ref 0–0.2)
BASOPHILS NFR BLD AUTO: 0.9 % — SIGNIFICANT CHANGE UP (ref 0–2)
BILIRUB DIRECT SERPL-MCNC: 0.6 MG/DL — HIGH (ref 0.1–0.2)
BILIRUB SERPL-MCNC: 1.5 MG/DL — HIGH (ref 0.2–1.2)
BUN SERPL-MCNC: 20 MG/DL — SIGNIFICANT CHANGE UP (ref 7–23)
CALCIUM SERPL-MCNC: 8.8 MG/DL — SIGNIFICANT CHANGE UP (ref 8.4–10.5)
CHLORIDE SERPL-SCNC: 89 MMOL/L — LOW (ref 98–107)
CO2 SERPL-SCNC: 39 MMOL/L — HIGH (ref 22–31)
CREAT SERPL-MCNC: 0.31 MG/DL — LOW (ref 0.5–1.3)
EOSINOPHIL # BLD AUTO: 0.05 K/UL — SIGNIFICANT CHANGE UP (ref 0–0.5)
EOSINOPHIL NFR BLD AUTO: 2.3 % — SIGNIFICANT CHANGE UP (ref 0–6)
GLUCOSE SERPL-MCNC: 99 MG/DL — SIGNIFICANT CHANGE UP (ref 70–99)
HCT VFR BLD CALC: 39.1 % — SIGNIFICANT CHANGE UP (ref 34.5–45)
HGB BLD-MCNC: 12.5 G/DL — SIGNIFICANT CHANGE UP (ref 11.5–15.5)
IMM GRANULOCYTES NFR BLD AUTO: 11.1 % — HIGH (ref 0–1.5)
LYMPHOCYTES # BLD AUTO: 0.63 K/UL — LOW (ref 1–3.3)
LYMPHOCYTES # BLD AUTO: 29.2 % — SIGNIFICANT CHANGE UP (ref 13–44)
MAGNESIUM SERPL-MCNC: 1.8 MG/DL — SIGNIFICANT CHANGE UP (ref 1.6–2.6)
MANUAL SMEAR VERIFICATION: SIGNIFICANT CHANGE UP
MCHC RBC-ENTMCNC: 32 % — SIGNIFICANT CHANGE UP (ref 32–36)
MCHC RBC-ENTMCNC: 32.6 PG — SIGNIFICANT CHANGE UP (ref 27–34)
MCV RBC AUTO: 101.8 FL — HIGH (ref 80–100)
MONOCYTES # BLD AUTO: 0.15 K/UL — SIGNIFICANT CHANGE UP (ref 0–0.9)
MONOCYTES NFR BLD AUTO: 6.9 % — SIGNIFICANT CHANGE UP (ref 2–14)
NEUTROPHILS # BLD AUTO: 1.07 K/UL — LOW (ref 1.8–7.4)
NEUTROPHILS NFR BLD AUTO: 49.6 % — SIGNIFICANT CHANGE UP (ref 43–77)
NRBC # FLD: 0 K/UL — SIGNIFICANT CHANGE UP (ref 0–0)
PHOSPHATE SERPL-MCNC: 2.3 MG/DL — LOW (ref 2.5–4.5)
PLATELET # BLD AUTO: 128 K/UL — LOW (ref 150–400)
PMV BLD: 12.1 FL — SIGNIFICANT CHANGE UP (ref 7–13)
POTASSIUM SERPL-MCNC: 3.4 MMOL/L — LOW (ref 3.5–5.3)
POTASSIUM SERPL-SCNC: 3.4 MMOL/L — LOW (ref 3.5–5.3)
PROT SERPL-MCNC: 5 G/DL — LOW (ref 6–8.3)
RBC # BLD: 3.84 M/UL — SIGNIFICANT CHANGE UP (ref 3.8–5.2)
RBC # FLD: 20.9 % — HIGH (ref 10.3–14.5)
SODIUM SERPL-SCNC: 136 MMOL/L — SIGNIFICANT CHANGE UP (ref 135–145)
WBC # BLD: 2.16 K/UL — LOW (ref 3.8–10.5)
WBC # FLD AUTO: 2.16 K/UL — LOW (ref 3.8–10.5)

## 2019-07-27 PROCEDURE — 93306 TTE W/DOPPLER COMPLETE: CPT | Mod: 26

## 2019-07-27 PROCEDURE — 99233 SBSQ HOSP IP/OBS HIGH 50: CPT

## 2019-07-27 RX ORDER — POTASSIUM CHLORIDE 20 MEQ
10 PACKET (EA) ORAL ONCE
Refills: 0 | Status: COMPLETED | OUTPATIENT
Start: 2019-07-27 | End: 2019-07-27

## 2019-07-27 RX ORDER — POTASSIUM CHLORIDE 20 MEQ
20 PACKET (EA) ORAL ONCE
Refills: 0 | Status: COMPLETED | OUTPATIENT
Start: 2019-07-27 | End: 2019-07-27

## 2019-07-27 RX ORDER — MAGNESIUM OXIDE 400 MG ORAL TABLET 241.3 MG
400 TABLET ORAL ONCE
Refills: 0 | Status: COMPLETED | OUTPATIENT
Start: 2019-07-27 | End: 2019-07-27

## 2019-07-27 RX ADMIN — PANTOPRAZOLE SODIUM 40 MILLIGRAM(S): 20 TABLET, DELAYED RELEASE ORAL at 05:41

## 2019-07-27 RX ADMIN — VALACYCLOVIR 500 MILLIGRAM(S): 500 TABLET, FILM COATED ORAL at 17:30

## 2019-07-27 RX ADMIN — LEVALBUTEROL 0.63 MILLIGRAM(S): 1.25 SOLUTION, CONCENTRATE RESPIRATORY (INHALATION) at 16:31

## 2019-07-27 RX ADMIN — LEVALBUTEROL 0.63 MILLIGRAM(S): 1.25 SOLUTION, CONCENTRATE RESPIRATORY (INHALATION) at 09:37

## 2019-07-27 RX ADMIN — SODIUM CHLORIDE 4 MILLILITER(S): 9 INJECTION INTRAMUSCULAR; INTRAVENOUS; SUBCUTANEOUS at 09:39

## 2019-07-27 RX ADMIN — LEVALBUTEROL 0.63 MILLIGRAM(S): 1.25 SOLUTION, CONCENTRATE RESPIRATORY (INHALATION) at 00:36

## 2019-07-27 RX ADMIN — POSACONAZOLE 300 MILLIGRAM(S): 100 TABLET, DELAYED RELEASE ORAL at 17:30

## 2019-07-27 RX ADMIN — LEVALBUTEROL 0.63 MILLIGRAM(S): 1.25 SOLUTION, CONCENTRATE RESPIRATORY (INHALATION) at 05:00

## 2019-07-27 RX ADMIN — BENZOCAINE AND MENTHOL 1 LOZENGE: 5; 1 LIQUID ORAL at 21:49

## 2019-07-27 RX ADMIN — Medication 20 MILLIGRAM(S): at 05:41

## 2019-07-27 RX ADMIN — Medication 100 MILLIGRAM(S): at 21:50

## 2019-07-27 RX ADMIN — LEVALBUTEROL 0.63 MILLIGRAM(S): 1.25 SOLUTION, CONCENTRATE RESPIRATORY (INHALATION) at 21:14

## 2019-07-27 RX ADMIN — APIXABAN 2.5 MILLIGRAM(S): 2.5 TABLET, FILM COATED ORAL at 05:40

## 2019-07-27 RX ADMIN — Medication 100 MILLIEQUIVALENT(S): at 10:25

## 2019-07-27 RX ADMIN — Medication 20 MILLIEQUIVALENT(S): at 10:25

## 2019-07-27 RX ADMIN — MAGNESIUM OXIDE 400 MG ORAL TABLET 400 MILLIGRAM(S): 241.3 TABLET ORAL at 10:25

## 2019-07-27 RX ADMIN — SODIUM CHLORIDE 4 MILLILITER(S): 9 INJECTION INTRAMUSCULAR; INTRAVENOUS; SUBCUTANEOUS at 16:33

## 2019-07-27 RX ADMIN — APIXABAN 2.5 MILLIGRAM(S): 2.5 TABLET, FILM COATED ORAL at 17:30

## 2019-07-27 RX ADMIN — ATOVAQUONE 1500 MILLIGRAM(S): 750 SUSPENSION ORAL at 11:56

## 2019-07-27 RX ADMIN — Medication 20 MILLIGRAM(S): at 05:40

## 2019-07-27 RX ADMIN — BENZOCAINE AND MENTHOL 1 LOZENGE: 5; 1 LIQUID ORAL at 05:40

## 2019-07-27 RX ADMIN — Medication 1.5 MILLIGRAM(S): at 21:50

## 2019-07-27 RX ADMIN — Medication 100 MILLIGRAM(S): at 05:40

## 2019-07-27 RX ADMIN — LEVALBUTEROL 0.63 MILLIGRAM(S): 1.25 SOLUTION, CONCENTRATE RESPIRATORY (INHALATION) at 13:58

## 2019-07-27 RX ADMIN — Medication 150 MILLIGRAM(S): at 05:40

## 2019-07-27 RX ADMIN — SODIUM CHLORIDE 4 MILLILITER(S): 9 INJECTION INTRAMUSCULAR; INTRAVENOUS; SUBCUTANEOUS at 21:14

## 2019-07-27 RX ADMIN — Medication 300 MILLIGRAM(S): at 11:56

## 2019-07-27 NOTE — PROGRESS NOTE ADULT - PROBLEM SELECTOR PLAN 1
-check US no overt abdominal pain   -monitor LFTs  -check TTE evaluate EF and LE edema prior MUGA scan normal 2017

## 2019-07-27 NOTE — PROGRESS NOTE ADULT - PROBLEM SELECTOR PLAN 3
Follows with Dr. Nguyen at [facility] [place]. as per family, [facility] is not offering any disease modifying treatment. Pt is looking for experimental trial if pt clinically improves from current medical issues. Email sent to Dr. Nguyen by Dr Landeros would need good performance status for experimental therapy. As per palliative care d/w Dr. Sidhu no further DMT due to poor functional status.   - C/w ppx mepron, valacyclovir and allopurinol  - GOC discussed  poor prognosis   - still full code  - palliative care appreciated

## 2019-07-27 NOTE — PROGRESS NOTE ADULT - PROBLEM SELECTOR PLAN 5
Likely BM suppression in setting of active CA  - transfuse as needed  - WBC and plt downtrending, monitor closely  - hold ac for plt <50  - monitor CBC

## 2019-07-27 NOTE — PROGRESS NOTE ADULT - PROBLEM SELECTOR PLAN 10
Full code.  Appreciate palliative care consult. Plan for family meeting with pulmonary and palliative care Monday 12 PM   Ongoing GOC.

## 2019-07-27 NOTE — PROGRESS NOTE ADULT - PROBLEM SELECTOR PLAN 2
RLL bronchus obstruction with concern for postobstructive & fungal PNA  - c/w continuous pulse ox  - c/w chest PT and incentive spirometry   - continues high flow NC O2, titrate off as tolerated  - c/w standing nebs; Xopenex q4 hours, following by nebulized 3% hypertonic saline, and then Aerobika device as per Pulm recommendations  - s/p meropenem on 7/20   - palliative recs appreciated conveyed poor prognosis to family still wishes pt to be full code.  - CXR w/ near complete white out of R on 7/19, large effusions on US, s/p lasix 40 IV x 3  -prednisone 20 mg daily   - as per pulm fellow states will not benefit from thoracentesis due to endobronchial lesion cont diuresis for now lasix 20 IV daily slow diuresis as HCO3 increased due to contraction alkalosis    -repeat CXR improved but diuresis maybe limited by elevated HCO3 and borderline BP  -repeat CXR on Monday 7/29  -Pulm following appreciate recs

## 2019-07-27 NOTE — PROGRESS NOTE ADULT - PROBLEM SELECTOR PLAN 4
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

## 2019-07-27 NOTE — PROGRESS NOTE ADULT - SUBJECTIVE AND OBJECTIVE BOX
CC: F/U for dyspnea    SUBJECTIVE / OVERNIGHT EVENTS:  Patient still with dyspnea and [place].  No new complaints.  No F/C, N/V, CP, SOB, Cough, lightheadedness, dizziness, abdominal pain, diarrhea, dysuria.    MEDICATIONS  (STANDING):  acetaminophen   Tablet .. 650 milliGRAM(s) Oral once  allopurinol 300 milliGRAM(s) Oral daily  apixaban 2.5 milliGRAM(s) Oral every 12 hours  atovaquone Suspension 1500 milliGRAM(s) Oral daily  benzocaine 15 mG/menthol 3.6 mG (Sugar-Free) Lozenge 1 Lozenge Oral every 4 hours  docusate sodium 100 milliGRAM(s) Oral three times a day  furosemide   Injectable 20 milliGRAM(s) IV Push daily  levalbuterol Inhalation 0.63 milliGRAM(s) Inhalation every 4 hours  melatonin 1.5 milliGRAM(s) Oral at bedtime  metoprolol succinate  milliGRAM(s) Oral daily  pantoprazole    Tablet 40 milliGRAM(s) Oral before breakfast  posaconazole DR Tablet 300 milliGRAM(s) Oral every 24 hours  predniSONE   Tablet 20 milliGRAM(s) Oral daily  sodium chloride 7% Inhalation 4 milliLiter(s) Inhalation three times a day  valACYclovir 500 milliGRAM(s) Oral <User Schedule>    MEDICATIONS  (PRN):  benzonatate 100 milliGRAM(s) Oral three times a day PRN Cough  bisacodyl 5 milliGRAM(s) Oral every 12 hours PRN Constipation  guaiFENesin    Syrup 200 milliGRAM(s) Oral every 6 hours PRN Cough  polyethylene glycol 3350 17 Gram(s) Oral daily PRN Constipation  sodium chloride 0.65% Nasal 1 Spray(s) Both Nostrils daily PRN Nasal Congestion      Vital Signs Last 24 Hrs  T(C): 36.3 (27 Jul 2019 09:29), Max: 36.8 (26 Jul 2019 22:39)  T(F): 97.3 (27 Jul 2019 09:29), Max: 98.2 (26 Jul 2019 22:39)  HR: 71 (27 Jul 2019 09:39) (71 - 100)  BP: 111/73 (27 Jul 2019 09:29) (100/67 - 111/73)  BP(mean): --  RR: 19 (27 Jul 2019 09:29) (18 - 98)  SpO2: 93% (27 Jul 2019 09:39) (93% - 98%)  CAPILLARY BLOOD GLUCOSE        I&O's Summary    26 Jul 2019 07:01  -  27 Jul 2019 07:00  --------------------------------------------------------  IN: 120 mL / OUT: 1400 mL / NET: -1280 mL    27 Jul 2019 07:01  -  27 Jul 2019 13:21  --------------------------------------------------------  IN: 236 mL / OUT: 0 mL / NET: 236 mL        PHYSICAL EXAM:  GENERAL:  on high flow NC  HEAD:  Atraumatic, Normocephalic  CHEST/LUNG: upper airway transmitted sounds   HEART: tachycardia; No murmurs, rubs, or gallops  ABDOMEN: Soft, Nontender, Nondistended; Bowel sounds present  EXTREMITIES:   cool, edematous, pitting L>R  NEUROLOGY: non-focal    LABS:                        12.5   2.16  )-----------( 128      ( 27 Jul 2019 07:18 )             39.1     07-27    136  |  89<L>  |  20  ----------------------------<  99  3.4<L>   |  39<H>  |  0.31<L>    Ca    8.8      27 Jul 2019 07:18  Phos  2.3     07-27  Mg     1.8     07-27    TPro  5.0<L>  /  Alb  3.0<L>  /  TBili  1.5<H>  /  DBili  0.6<H>  /  AST  101<H>  /  ALT  97<H>  /  AlkPhos  219<H>  07-27              RADIOLOGY & ADDITIONAL TESTS:    Imaging Personally Reviewed:    Care Discussed with Consultants/Other Providers:

## 2019-07-28 LAB
ALBUMIN SERPL ELPH-MCNC: 2.7 G/DL — LOW (ref 3.3–5)
ALP SERPL-CCNC: 194 U/L — HIGH (ref 40–120)
ALT FLD-CCNC: 86 U/L — HIGH (ref 4–33)
ANION GAP SERPL CALC-SCNC: 12 MMO/L — SIGNIFICANT CHANGE UP (ref 7–14)
AST SERPL-CCNC: 93 U/L — HIGH (ref 4–32)
BASOPHILS # BLD AUTO: 0.03 K/UL — SIGNIFICANT CHANGE UP (ref 0–0.2)
BASOPHILS NFR BLD AUTO: 1.4 % — SIGNIFICANT CHANGE UP (ref 0–2)
BILIRUB DIRECT SERPL-MCNC: 0.5 MG/DL — HIGH (ref 0.1–0.2)
BILIRUB SERPL-MCNC: 1.4 MG/DL — HIGH (ref 0.2–1.2)
BUN SERPL-MCNC: 18 MG/DL — SIGNIFICANT CHANGE UP (ref 7–23)
CALCIUM SERPL-MCNC: 8.9 MG/DL — SIGNIFICANT CHANGE UP (ref 8.4–10.5)
CHLORIDE SERPL-SCNC: 88 MMOL/L — LOW (ref 98–107)
CO2 SERPL-SCNC: 35 MMOL/L — HIGH (ref 22–31)
CREAT SERPL-MCNC: 0.3 MG/DL — LOW (ref 0.5–1.3)
EOSINOPHIL # BLD AUTO: 0.03 K/UL — SIGNIFICANT CHANGE UP (ref 0–0.5)
EOSINOPHIL NFR BLD AUTO: 1.4 % — SIGNIFICANT CHANGE UP (ref 0–6)
GLUCOSE SERPL-MCNC: 86 MG/DL — SIGNIFICANT CHANGE UP (ref 70–99)
HCT VFR BLD CALC: 38.8 % — SIGNIFICANT CHANGE UP (ref 34.5–45)
HGB BLD-MCNC: 12.5 G/DL — SIGNIFICANT CHANGE UP (ref 11.5–15.5)
IMM GRANULOCYTES NFR BLD AUTO: 19 % — HIGH (ref 0–1.5)
LYMPHOCYTES # BLD AUTO: 0.5 K/UL — LOW (ref 1–3.3)
LYMPHOCYTES # BLD AUTO: 23.8 % — SIGNIFICANT CHANGE UP (ref 13–44)
MAGNESIUM SERPL-MCNC: 1.7 MG/DL — SIGNIFICANT CHANGE UP (ref 1.6–2.6)
MANUAL SMEAR VERIFICATION: SIGNIFICANT CHANGE UP
MCHC RBC-ENTMCNC: 32.2 % — SIGNIFICANT CHANGE UP (ref 32–36)
MCHC RBC-ENTMCNC: 32.7 PG — SIGNIFICANT CHANGE UP (ref 27–34)
MCV RBC AUTO: 101.6 FL — HIGH (ref 80–100)
MONOCYTES # BLD AUTO: 0.17 K/UL — SIGNIFICANT CHANGE UP (ref 0–0.9)
MONOCYTES NFR BLD AUTO: 8.1 % — SIGNIFICANT CHANGE UP (ref 2–14)
NEUTROPHILS # BLD AUTO: 0.97 K/UL — LOW (ref 1.8–7.4)
NEUTROPHILS NFR BLD AUTO: 46.3 % — SIGNIFICANT CHANGE UP (ref 43–77)
NRBC # FLD: 0.04 K/UL — SIGNIFICANT CHANGE UP (ref 0–0)
NRBC FLD-RTO: 1.9 — SIGNIFICANT CHANGE UP
PHOSPHATE SERPL-MCNC: 2.4 MG/DL — LOW (ref 2.5–4.5)
PLATELET # BLD AUTO: 122 K/UL — LOW (ref 150–400)
PMV BLD: 11.7 FL — SIGNIFICANT CHANGE UP (ref 7–13)
POTASSIUM SERPL-MCNC: 3.8 MMOL/L — SIGNIFICANT CHANGE UP (ref 3.5–5.3)
POTASSIUM SERPL-SCNC: 3.8 MMOL/L — SIGNIFICANT CHANGE UP (ref 3.5–5.3)
PROT SERPL-MCNC: 4.4 G/DL — LOW (ref 6–8.3)
RBC # BLD: 3.82 M/UL — SIGNIFICANT CHANGE UP (ref 3.8–5.2)
RBC # FLD: 20.6 % — HIGH (ref 10.3–14.5)
SODIUM SERPL-SCNC: 135 MMOL/L — SIGNIFICANT CHANGE UP (ref 135–145)
WBC # BLD: 2.1 K/UL — LOW (ref 3.8–10.5)
WBC # FLD AUTO: 2.1 K/UL — LOW (ref 3.8–10.5)

## 2019-07-28 PROCEDURE — 99233 SBSQ HOSP IP/OBS HIGH 50: CPT

## 2019-07-28 RX ADMIN — LEVALBUTEROL 0.63 MILLIGRAM(S): 1.25 SOLUTION, CONCENTRATE RESPIRATORY (INHALATION) at 20:29

## 2019-07-28 RX ADMIN — POSACONAZOLE 300 MILLIGRAM(S): 100 TABLET, DELAYED RELEASE ORAL at 18:39

## 2019-07-28 RX ADMIN — APIXABAN 2.5 MILLIGRAM(S): 2.5 TABLET, FILM COATED ORAL at 05:12

## 2019-07-28 RX ADMIN — Medication 650 MILLIGRAM(S): at 19:48

## 2019-07-28 RX ADMIN — APIXABAN 2.5 MILLIGRAM(S): 2.5 TABLET, FILM COATED ORAL at 18:39

## 2019-07-28 RX ADMIN — SODIUM CHLORIDE 4 MILLILITER(S): 9 INJECTION INTRAMUSCULAR; INTRAVENOUS; SUBCUTANEOUS at 21:30

## 2019-07-28 RX ADMIN — LEVALBUTEROL 0.63 MILLIGRAM(S): 1.25 SOLUTION, CONCENTRATE RESPIRATORY (INHALATION) at 16:25

## 2019-07-28 RX ADMIN — SODIUM CHLORIDE 4 MILLILITER(S): 9 INJECTION INTRAMUSCULAR; INTRAVENOUS; SUBCUTANEOUS at 10:47

## 2019-07-28 RX ADMIN — Medication 100 MILLIGRAM(S): at 05:12

## 2019-07-28 RX ADMIN — PANTOPRAZOLE SODIUM 40 MILLIGRAM(S): 20 TABLET, DELAYED RELEASE ORAL at 05:12

## 2019-07-28 RX ADMIN — ATOVAQUONE 1500 MILLIGRAM(S): 750 SUSPENSION ORAL at 12:58

## 2019-07-28 RX ADMIN — Medication 100 MILLIGRAM(S): at 12:59

## 2019-07-28 RX ADMIN — BENZOCAINE AND MENTHOL 1 LOZENGE: 5; 1 LIQUID ORAL at 22:12

## 2019-07-28 RX ADMIN — Medication 300 MILLIGRAM(S): at 12:58

## 2019-07-28 RX ADMIN — Medication 20 MILLIGRAM(S): at 05:12

## 2019-07-28 RX ADMIN — Medication 650 MILLIGRAM(S): at 20:25

## 2019-07-28 RX ADMIN — LEVALBUTEROL 0.63 MILLIGRAM(S): 1.25 SOLUTION, CONCENTRATE RESPIRATORY (INHALATION) at 04:24

## 2019-07-28 RX ADMIN — Medication 1.5 MILLIGRAM(S): at 22:11

## 2019-07-28 RX ADMIN — SODIUM CHLORIDE 4 MILLILITER(S): 9 INJECTION INTRAMUSCULAR; INTRAVENOUS; SUBCUTANEOUS at 16:39

## 2019-07-28 RX ADMIN — LEVALBUTEROL 0.63 MILLIGRAM(S): 1.25 SOLUTION, CONCENTRATE RESPIRATORY (INHALATION) at 10:33

## 2019-07-28 RX ADMIN — LEVALBUTEROL 0.63 MILLIGRAM(S): 1.25 SOLUTION, CONCENTRATE RESPIRATORY (INHALATION) at 13:59

## 2019-07-28 NOTE — PROGRESS NOTE ADULT - SUBJECTIVE AND OBJECTIVE BOX
CC: F/U for dyspnea    SUBJECTIVE / OVERNIGHT EVENTS:  No new overnight issues.  No F/C, N/V, CP, lightheadedness, dizziness, abdominal pain, diarrhea, dysuria.      MEDICATIONS  (STANDING):  acetaminophen   Tablet .. 650 milliGRAM(s) Oral once  allopurinol 300 milliGRAM(s) Oral daily  apixaban 2.5 milliGRAM(s) Oral every 12 hours  atovaquone Suspension 1500 milliGRAM(s) Oral daily  benzocaine 15 mG/menthol 3.6 mG (Sugar-Free) Lozenge 1 Lozenge Oral every 4 hours  docusate sodium 100 milliGRAM(s) Oral three times a day  furosemide   Injectable 20 milliGRAM(s) IV Push daily  levalbuterol Inhalation 0.63 milliGRAM(s) Inhalation every 4 hours  melatonin 1.5 milliGRAM(s) Oral at bedtime  metoprolol succinate  milliGRAM(s) Oral daily  pantoprazole    Tablet 40 milliGRAM(s) Oral before breakfast  posaconazole DR Tablet 300 milliGRAM(s) Oral every 24 hours  predniSONE   Tablet 20 milliGRAM(s) Oral daily  sodium chloride 7% Inhalation 4 milliLiter(s) Inhalation three times a day  valACYclovir 500 milliGRAM(s) Oral <User Schedule>    MEDICATIONS  (PRN):  benzonatate 100 milliGRAM(s) Oral three times a day PRN Cough  bisacodyl 5 milliGRAM(s) Oral every 12 hours PRN Constipation  guaiFENesin    Syrup 200 milliGRAM(s) Oral every 6 hours PRN Cough  polyethylene glycol 3350 17 Gram(s) Oral daily PRN Constipation  sodium chloride 0.65% Nasal 1 Spray(s) Both Nostrils daily PRN Nasal Congestion      Vital Signs Last 24 Hrs  T(C): 36.1 (28 Jul 2019 05:07), Max: 36.3 (27 Jul 2019 17:10)  T(F): 97 (28 Jul 2019 05:07), Max: 97.3 (27 Jul 2019 17:10)  HR: 102 (28 Jul 2019 07:30) (62 - 110)  BP: 92/56 (28 Jul 2019 05:08) (87/60 - 109/78)  BP(mean): --  RR: 21 (28 Jul 2019 07:30) (20 - 22)  SpO2: 97% (28 Jul 2019 07:30) (94% - 100%)  CAPILLARY BLOOD GLUCOSE        I&O's Summary    27 Jul 2019 07:01  -  28 Jul 2019 07:00  --------------------------------------------------------  IN: 822 mL / OUT: 400 mL / NET: 422 mL        PHYSICAL EXAM:  GENERAL:  on high flow NC  HEAD:  Atraumatic, Normocephalic  CHEST/LUNG: upper airway transmitted sounds   HEART: tachycardia; No murmurs, rubs, or gallops  ABDOMEN: Soft, Nontender, Nondistended; Bowel sounds present  EXTREMITIES:   cool, edematous, pitting L>R  NEUROLOGY: non-focal    LABS:                        12.5   2.10  )-----------( 122      ( 28 Jul 2019 06:39 )             38.8     07-28    135  |  88<L>  |  18  ----------------------------<  86  3.8   |  35<H>  |  0.30<L>    Ca    8.9      28 Jul 2019 06:39  Phos  2.4     07-28  Mg     1.7     07-28    TPro  4.4<L>  /  Alb  2.7<L>  /  TBili  1.4<H>  /  DBili  0.5<H>  /  AST  93<H>  /  ALT  86<H>  /  AlkPhos  194<H>  07-28              RADIOLOGY & ADDITIONAL TESTS:    Imaging Personally Reviewed:    Care Discussed with Consultants/Other Providers:

## 2019-07-28 NOTE — PROGRESS NOTE ADULT - PROBLEM SELECTOR PLAN 3
Follows with Dr. Nguyen at [facility]. as per family, [facility] is not offering any disease modifying treatment. Pt is looking for experimental trial if pt clinically improves from current medical issues. Email sent to Dr. Nguyen by Dr Landeros would need good performance status for experimental therapy. As per palliative care d/w Dr. Sidhu no further DMT due to poor functional status.   - C/w ppx mepron, valacyclovir and allopurinol  - GOC discussed  poor prognosis   - still full code  - palliative care appreciated

## 2019-07-28 NOTE — PROGRESS NOTE ADULT - PROBLEM SELECTOR PLAN 1
-check US no overt abdominal pain   -monitor LFTs - improving  -check TTE evaluate EF and LE edema prior MUGA scan normal 2017

## 2019-07-29 LAB
ALBUMIN SERPL ELPH-MCNC: 2.9 G/DL — LOW (ref 3.3–5)
ALP SERPL-CCNC: 216 U/L — HIGH (ref 40–120)
ALT FLD-CCNC: 81 U/L — HIGH (ref 4–33)
ANION GAP SERPL CALC-SCNC: 10 MMO/L — SIGNIFICANT CHANGE UP (ref 7–14)
AST SERPL-CCNC: 84 U/L — HIGH (ref 4–32)
BASOPHILS # BLD AUTO: 0.03 K/UL — SIGNIFICANT CHANGE UP (ref 0–0.2)
BASOPHILS NFR BLD AUTO: 1.4 % — SIGNIFICANT CHANGE UP (ref 0–2)
BILIRUB DIRECT SERPL-MCNC: 0.6 MG/DL — HIGH (ref 0.1–0.2)
BILIRUB SERPL-MCNC: 1.6 MG/DL — HIGH (ref 0.2–1.2)
BUN SERPL-MCNC: 15 MG/DL — SIGNIFICANT CHANGE UP (ref 7–23)
CALCIUM SERPL-MCNC: 8.8 MG/DL — SIGNIFICANT CHANGE UP (ref 8.4–10.5)
CHLORIDE SERPL-SCNC: 86 MMOL/L — LOW (ref 98–107)
CO2 SERPL-SCNC: 38 MMOL/L — HIGH (ref 22–31)
CREAT SERPL-MCNC: 0.32 MG/DL — LOW (ref 0.5–1.3)
EOSINOPHIL # BLD AUTO: 0.03 K/UL — SIGNIFICANT CHANGE UP (ref 0–0.5)
EOSINOPHIL NFR BLD AUTO: 1.4 % — SIGNIFICANT CHANGE UP (ref 0–6)
GGT SERPL-CCNC: 174 U/L — HIGH (ref 5–36)
GLUCOSE SERPL-MCNC: 90 MG/DL — SIGNIFICANT CHANGE UP (ref 70–99)
HCT VFR BLD CALC: 37.2 % — SIGNIFICANT CHANGE UP (ref 34.5–45)
HGB BLD-MCNC: 12 G/DL — SIGNIFICANT CHANGE UP (ref 11.5–15.5)
IMM GRANULOCYTES NFR BLD AUTO: 11.3 % — HIGH (ref 0–1.5)
LYMPHOCYTES # BLD AUTO: 0.66 K/UL — LOW (ref 1–3.3)
LYMPHOCYTES # BLD AUTO: 29.7 % — SIGNIFICANT CHANGE UP (ref 13–44)
MAGNESIUM SERPL-MCNC: 1.7 MG/DL — SIGNIFICANT CHANGE UP (ref 1.6–2.6)
MCHC RBC-ENTMCNC: 32.3 % — SIGNIFICANT CHANGE UP (ref 32–36)
MCHC RBC-ENTMCNC: 33 PG — SIGNIFICANT CHANGE UP (ref 27–34)
MCV RBC AUTO: 102.2 FL — HIGH (ref 80–100)
MONOCYTES # BLD AUTO: 0.18 K/UL — SIGNIFICANT CHANGE UP (ref 0–0.9)
MONOCYTES NFR BLD AUTO: 8.1 % — SIGNIFICANT CHANGE UP (ref 2–14)
NEUTROPHILS # BLD AUTO: 1.07 K/UL — LOW (ref 1.8–7.4)
NEUTROPHILS NFR BLD AUTO: 48.1 % — SIGNIFICANT CHANGE UP (ref 43–77)
NRBC # FLD: 0 K/UL — SIGNIFICANT CHANGE UP (ref 0–0)
PHOSPHATE SERPL-MCNC: 2.1 MG/DL — LOW (ref 2.5–4.5)
PLATELET # BLD AUTO: 117 K/UL — LOW (ref 150–400)
PMV BLD: 11.3 FL — SIGNIFICANT CHANGE UP (ref 7–13)
POTASSIUM SERPL-MCNC: 4 MMOL/L — SIGNIFICANT CHANGE UP (ref 3.5–5.3)
POTASSIUM SERPL-SCNC: 4 MMOL/L — SIGNIFICANT CHANGE UP (ref 3.5–5.3)
PROT SERPL-MCNC: 4.6 G/DL — LOW (ref 6–8.3)
RBC # BLD: 3.64 M/UL — LOW (ref 3.8–5.2)
RBC # FLD: 20 % — HIGH (ref 10.3–14.5)
SODIUM SERPL-SCNC: 134 MMOL/L — LOW (ref 135–145)
WBC # BLD: 2.22 K/UL — LOW (ref 3.8–10.5)
WBC # FLD AUTO: 2.22 K/UL — LOW (ref 3.8–10.5)

## 2019-07-29 PROCEDURE — 99232 SBSQ HOSP IP/OBS MODERATE 35: CPT

## 2019-07-29 PROCEDURE — 99233 SBSQ HOSP IP/OBS HIGH 50: CPT

## 2019-07-29 PROCEDURE — 99233 SBSQ HOSP IP/OBS HIGH 50: CPT | Mod: GC

## 2019-07-29 RX ORDER — HYDROMORPHONE HYDROCHLORIDE 2 MG/ML
2 INJECTION INTRAMUSCULAR; INTRAVENOUS; SUBCUTANEOUS
Refills: 0 | Status: DISCONTINUED | OUTPATIENT
Start: 2019-07-29 | End: 2019-08-01

## 2019-07-29 RX ORDER — POSACONAZOLE 100 MG/1
3 TABLET, DELAYED RELEASE ORAL
Qty: 90 | Refills: 0
Start: 2019-07-29 | End: 2019-08-27

## 2019-07-29 RX ADMIN — LEVALBUTEROL 0.63 MILLIGRAM(S): 1.25 SOLUTION, CONCENTRATE RESPIRATORY (INHALATION) at 16:57

## 2019-07-29 RX ADMIN — BENZOCAINE AND MENTHOL 1 LOZENGE: 5; 1 LIQUID ORAL at 15:07

## 2019-07-29 RX ADMIN — LEVALBUTEROL 0.63 MILLIGRAM(S): 1.25 SOLUTION, CONCENTRATE RESPIRATORY (INHALATION) at 09:50

## 2019-07-29 RX ADMIN — Medication 300 MILLIGRAM(S): at 12:30

## 2019-07-29 RX ADMIN — POSACONAZOLE 300 MILLIGRAM(S): 100 TABLET, DELAYED RELEASE ORAL at 16:39

## 2019-07-29 RX ADMIN — SODIUM CHLORIDE 4 MILLILITER(S): 9 INJECTION INTRAMUSCULAR; INTRAVENOUS; SUBCUTANEOUS at 09:53

## 2019-07-29 RX ADMIN — ATOVAQUONE 1500 MILLIGRAM(S): 750 SUSPENSION ORAL at 12:30

## 2019-07-29 RX ADMIN — Medication 150 MILLIGRAM(S): at 05:29

## 2019-07-29 RX ADMIN — Medication 20 MILLIGRAM(S): at 05:29

## 2019-07-29 RX ADMIN — LEVALBUTEROL 0.63 MILLIGRAM(S): 1.25 SOLUTION, CONCENTRATE RESPIRATORY (INHALATION) at 04:20

## 2019-07-29 RX ADMIN — PANTOPRAZOLE SODIUM 40 MILLIGRAM(S): 20 TABLET, DELAYED RELEASE ORAL at 05:29

## 2019-07-29 RX ADMIN — Medication 1.5 MILLIGRAM(S): at 22:58

## 2019-07-29 RX ADMIN — LEVALBUTEROL 0.63 MILLIGRAM(S): 1.25 SOLUTION, CONCENTRATE RESPIRATORY (INHALATION) at 13:53

## 2019-07-29 RX ADMIN — BENZOCAINE AND MENTHOL 1 LOZENGE: 5; 1 LIQUID ORAL at 22:50

## 2019-07-29 RX ADMIN — SODIUM CHLORIDE 4 MILLILITER(S): 9 INJECTION INTRAMUSCULAR; INTRAVENOUS; SUBCUTANEOUS at 16:58

## 2019-07-29 RX ADMIN — Medication 100 MILLIGRAM(S): at 22:50

## 2019-07-29 RX ADMIN — BENZOCAINE AND MENTHOL 1 LOZENGE: 5; 1 LIQUID ORAL at 18:33

## 2019-07-29 RX ADMIN — VALACYCLOVIR 500 MILLIGRAM(S): 500 TABLET, FILM COATED ORAL at 18:33

## 2019-07-29 RX ADMIN — APIXABAN 2.5 MILLIGRAM(S): 2.5 TABLET, FILM COATED ORAL at 05:29

## 2019-07-29 RX ADMIN — APIXABAN 2.5 MILLIGRAM(S): 2.5 TABLET, FILM COATED ORAL at 18:33

## 2019-07-29 NOTE — PROGRESS NOTE ADULT - PROBLEM SELECTOR PLAN 1
-off high flow.  On 4L NC   Met with pt's son, jeramie, primary team and pulm  -not a candidate for pleurex or even tap at this time  - encouraged lasix as tolerated for pleural effusion  -lesion in endobroncheal tube causing dyspnea  -can give low dose morphine prn dyspnea

## 2019-07-29 NOTE — PROGRESS NOTE ADULT - SUBJECTIVE AND OBJECTIVE BOX
Patient is a 79y old  Female who presents with a chief complaint of SOB (28 Jul 2019 13:21)      SUBJECTIVE / OVERNIGHT EVENTS: Currently on 4 L NC SOB at baseline. lasix given this AM     MEDICATIONS  (STANDING):  allopurinol 300 milliGRAM(s) Oral daily  apixaban 2.5 milliGRAM(s) Oral every 12 hours  atovaquone Suspension 1500 milliGRAM(s) Oral daily  benzocaine 15 mG/menthol 3.6 mG (Sugar-Free) Lozenge 1 Lozenge Oral every 4 hours  docusate sodium 100 milliGRAM(s) Oral three times a day  furosemide   Injectable 20 milliGRAM(s) IV Push daily  levalbuterol Inhalation 0.63 milliGRAM(s) Inhalation every 4 hours  melatonin 1.5 milliGRAM(s) Oral at bedtime  metoprolol succinate  milliGRAM(s) Oral daily  pantoprazole    Tablet 40 milliGRAM(s) Oral before breakfast  posaconazole DR Tablet 300 milliGRAM(s) Oral every 24 hours  predniSONE   Tablet 20 milliGRAM(s) Oral daily  sodium chloride 7% Inhalation 4 milliLiter(s) Inhalation three times a day  valACYclovir 500 milliGRAM(s) Oral <User Schedule>    MEDICATIONS  (PRN):  benzonatate 100 milliGRAM(s) Oral three times a day PRN Cough  bisacodyl 5 milliGRAM(s) Oral every 12 hours PRN Constipation  guaiFENesin    Syrup 200 milliGRAM(s) Oral every 6 hours PRN Cough  polyethylene glycol 3350 17 Gram(s) Oral daily PRN Constipation  sodium chloride 0.65% Nasal 1 Spray(s) Both Nostrils daily PRN Nasal Congestion        CAPILLARY BLOOD GLUCOSE        I&O's Summary    28 Jul 2019 07:01  -  29 Jul 2019 07:00  --------------------------------------------------------  IN: 1440 mL / OUT: 350 mL / NET: 1090 mL        T(C): 36.4 (07-29-19 @ 05:26), Max: 36.4 (07-28-19 @ 21:20)  HR: 76 (07-29-19 @ 09:51) (68 - 108)  BP: 105/75 (07-29-19 @ 05:26) (94/62 - 105/75)  RR: 20 (07-29-19 @ 08:12) (19 - 23)  SpO2: 98% (07-29-19 @ 09:51) (95% - 99%)    PHYSICAL EXAM:  GENERAL:  on high flow NC  HEAD:  Atraumatic, Normocephalic  CHEST/LUNG: decrease BS RT no overt wheezing   HEART: tachycardia; No murmurs, rubs, or gallops  ABDOMEN: Soft, Nontender, Nondistended; Bowel sounds present  EXTREMITIES:   cool, edematous, pitting L>R  NEUROLOGY: non-focal    LABS:                        12.0   2.22  )-----------( 117      ( 29 Jul 2019 04:40 )             37.2     07-29    134<L>  |  86<L>  |  15  ----------------------------<  90  4.0   |  38<H>  |  0.32<L>    Ca    8.8      29 Jul 2019 04:40  Phos  2.1     07-29  Mg     1.7     07-29    TPro  4.6<L>  /  Alb  2.9<L>  /  TBili  1.6<H>  /  DBili  0.6<H>  /  AST  84<H>  /  ALT  81<H>  /  AlkPhos  216<H>  07-29                RADIOLOGY & ADDITIONAL TESTS:    Imaging Personally Reviewed:< from: Transthoracic Echocardiogram (07.27.19 @ 08:20) >  CONCLUSIONS:  1. Moderate mitral regurgitation.  2. Severely dilated left atrium.  LA volume index = 53  cc/m2.  3. Normal left ventricular systolic function. Septal motion  consistent with right ventricular overload.  4. Moderate right atrial enlargement.  5. Right ventricular enlargement with decreased right  ventricular systolic function.  6. Normal tricuspid valve.  Mild-moderate tricuspid  regurgitation.  7. Estimated pulmonary artery systolic pressure equals 44  mm Hg, assuming right atrial pressure equals 10  mm Hg,  consistent with mild pulmonary hypertension.  8. Bilateral pleural effusions.  *** No previous Echo exam.    < end of copied text >    < from: US Abdomen Limited (07.26.19 @ 17:37) >  EXAM:  US ABDOMEN LIMITED        PROCEDURE DATE:  Jul 26 2019         INTERPRETATION:  CLINICAL INFORMATION: Elevated liver function tests.    COMPARISON: CT chest 6/27/2019.    TECHNIQUE: Sonography of the abdomen.     FINDINGS:    Liver: Within normal limits.  Bile ducts: Normal caliber. Common hepatic duct measures 2 mm.   Gallbladder: Within normal limits.      Pancreas: The visualized segments of the pancreatic body are   sonographically normal. The remainder the pancreas is obscured by   overlying bowel gas.  Right kidney: 10.0 cm. No hydronephrosis.  Incidental: Right pleural effusion and atelectasis.      IMPRESSION:     1.  Unremarkable abdominal ultrasound.  2.  Right pleural effusion and atelectasis.    < end of copied text >    Consultant(s) Notes Reviewed:      Care Discussed with Consultants/Other Providers:

## 2019-07-29 NOTE — PROGRESS NOTE ADULT - PROBLEM SELECTOR PLAN 2
RLL bronchus obstruction with concern for postobstructive & fungal PNA  - c/w continuous pulse ox  - c/w chest PT and incentive spirometry   - c/w standing nebs; Xopenex q4 hours, following by nebulized 3% hypertonic saline, and then Aerobika device as per Pulm recommendations  - s/p meropenem on 7/20   - palliative recs appreciated conveyed poor prognosis to family still wishes pt to be full code.  - CXR w/ near complete white out of R on 7/19, large effusions on US, s/p lasix 40 IV x 3  -prednisone 20 mg daily   - as per pulm fellow states will not benefit from thoracentesis due to endobronchial lesion cont diuresis for now lasix 20 IV daily slow diuresis as HCO3 increased due to contraction alkalosis    -repeat CXR improved but diuresis maybe limited by elevated HCO3 and borderline BP  -Pulm following appreciate recs

## 2019-07-29 NOTE — PROGRESS NOTE ADULT - PROBLEM SELECTOR PLAN 6
pt has assigned son George as HCP. HCP form filled out and placed in chart  -met with son, daughter, pmd and pulm  -agreed to hospice at home   -family aware that prognosis is poor   -however, it pt improves medically, can be discharged from hospice and follow up with onc if treatment is being offered at that time  - will sign off as goals and symptoms have been addressed

## 2019-07-29 NOTE — PROGRESS NOTE ADULT - PROBLEM SELECTOR PLAN 5
[place] [facility] [place]  -spoke with  Dr Davies at [facility]- he also states no further dmt at this time as pt not functional enough and poor respiratory status  -family aware of both recommendations

## 2019-07-29 NOTE — PROGRESS NOTE ADULT - SUBJECTIVE AND OBJECTIVE BOX
SUBJECTIVE AND OBJECTIVE: Pt lethargic.  Off high flow on 4 l nc.  Denies pain, sob  INTERVAL HPI/OVERNIGHT EVENTS:    DNR on chart:   Allergies    No Known Allergies    Intolerances    MEDICATIONS  (STANDING):  allopurinol 300 milliGRAM(s) Oral daily  apixaban 2.5 milliGRAM(s) Oral every 12 hours  atovaquone Suspension 1500 milliGRAM(s) Oral daily  benzocaine 15 mG/menthol 3.6 mG (Sugar-Free) Lozenge 1 Lozenge Oral every 4 hours  docusate sodium 100 milliGRAM(s) Oral three times a day  furosemide   Injectable 20 milliGRAM(s) IV Push daily  levalbuterol Inhalation 0.63 milliGRAM(s) Inhalation every 4 hours  melatonin 1.5 milliGRAM(s) Oral at bedtime  metoprolol succinate  milliGRAM(s) Oral daily  pantoprazole    Tablet 40 milliGRAM(s) Oral before breakfast  posaconazole DR Tablet 300 milliGRAM(s) Oral every 24 hours  predniSONE   Tablet 20 milliGRAM(s) Oral daily  sodium chloride 7% Inhalation 4 milliLiter(s) Inhalation three times a day  valACYclovir 500 milliGRAM(s) Oral <User Schedule>    MEDICATIONS  (PRN):  benzonatate 100 milliGRAM(s) Oral three times a day PRN Cough  bisacodyl 5 milliGRAM(s) Oral every 12 hours PRN Constipation  guaiFENesin    Syrup 200 milliGRAM(s) Oral every 6 hours PRN Cough  polyethylene glycol 3350 17 Gram(s) Oral daily PRN Constipation  sodium chloride 0.65% Nasal 1 Spray(s) Both Nostrils daily PRN Nasal Congestion      ITEMS UNCHECKED ARE NOT PRESENT    PRESENT SYMPTOMS: [ ]Unable to obtain due to poor mentation   Source if other than patient:  [ ]Family   [ ]Team     Pain (Impact on QOL):    Location:  Minimal acceptable level (0-10 scale):            Aggravating factors:  Quality:  Radiation:  Severity (0-10 scale):    Timing:    Dyspnea:                           [x ]Mild [ ]Moderate [ ]Severe  Anxiety:            none                 [ ]Mild [ ]Moderate [ ]Severe  Fatigue:                             [ ]Mild [ ]Moderate [x ]Severe  Nausea:          none                   [ ]Mild [ ]Moderate [ ]Severe  Loss of appetite:              [ ]Mild [ ]Moderate [x ]Severe  Constipation:          none          [ ]Mild [ ]Moderate [ ]Severe    PAIN AD Score:	  http://geriatrictoolkit.[facility]/cog/painad.pdf (Ctrl + left click to view)    Other Symptoms:  [x ]All other review of systems negative     Karnofsky Performance Score/Palliative Performance Status Version 2:    40     %    http://palliative.info/resource_material/PPSv2.pdf  PHYSICAL EXAM:  Vital Signs Last 24 Hrs  T(C): 36.3 (29 Jul 2019 12:25), Max: 36.4 (28 Jul 2019 21:20)  T(F): 97.4 (29 Jul 2019 12:25), Max: 97.5 (28 Jul 2019 21:20)  HR: 90 (29 Jul 2019 12:25) (68 - 108)  BP: 104/72 (29 Jul 2019 12:25) (94/62 - 105/75)  BP(mean): --  RR: 20 (29 Jul 2019 12:25) (19 - 23)  SpO2: 98% (29 Jul 2019 12:25) (95% - 99%) I&O's Summary    28 Jul 2019 07:01  -  29 Jul 2019 07:00  --------------------------------------------------------  IN: 1440 mL / OUT: 350 mL / NET: 1090 mL     GENERAL:  [ x]Alert  [x ]Oriented x  1-2 [ ]Lethargic  [ ]Cachexia  [ ]Unarousable  [ ]Verbal  [ ]Non-Verbal    Behavioral:   [ ] Anxiety  [ ] Delirium [ ] Agitation [ ] Other    HEENT:  [ ]Normal   [ x]Dry mouth   [ ]ET Tube/Trach  [ ]Oral lesions    PULMONARY:   [ ]Clear [ ]Tachypnea  [ ]Audible excessive secretions   [ ]Rhonchi        [ ]Right [ ]Left [ ]Bilateral  [x ]Crackles        [ ]Right [ ]Left [x ]Bilateral  [ ]Wheezing     [ ]Right [ ]Left [ ]Bilateral    CARDIOVASCULAR:    [ ]Regular [ ]Irregular [ x]Tachy  [ ]Butch [ ]Murmur [ ]Other    GASTROINTESTINAL:  [x ]Soft  [ ]Distended   [x ]+BS  [x ]Non tender [ ]Tender  [ ]PEG [ ]OGT/ NGT   Last BM:    GENITOURINARY:  [ ]Normal [ ] Incontinent   [ ]Oliguria/Anuria   [x ]Cespedes    MUSCULOSKELETAL:   [ ]Normal   [ ]Weakness  [ x]Bed/Wheelchair bound [ ]Edema    NEUROLOGIC:   [ ]No focal deficits  [ x] Cognitive impairment  [ ] Dysphagia [ ]Dysarthria [ ] Paresis [ ]Other     SKIN:   [x ]Normal   [ ]Pressure ulcer(s)  [ ]Rash    CRITICAL CARE:  [ ] Shock Present  [ ]Septic [ ]Cardiogenic [ ]Neurologic [ ]Hypovolemic  [ ]  Vasopressors [ ]  Inotropes   [ ] Respiratory failure present  [ ] Acute  [ ] Chronic [ ] Hypoxic  [ ] Hypercarbic [ ] Other  [ ] Other organ failure     LABS:                        12.0   2.22  )-----------( 117      ( 29 Jul 2019 04:40 )             37.2   07-29    134<L>  |  86<L>  |  15  ----------------------------<  90  4.0   |  38<H>  |  0.32<L>    Ca    8.8      29 Jul 2019 04:40  Phos  2.1     07-29  Mg     1.7     07-29    TPro  4.6<L>  /  Alb  2.9<L>  /  TBili  1.6<H>  /  DBili  0.6<H>  /  AST  84<H>  /  ALT  81<H>  /  AlkPhos  216<H>  07-29        RADIOLOGY & ADDITIONAL STUDIES:    Protein Calorie Malnutrition Present: [ ] yes [ ] no  [ ] PPSV2 < or = 30%  [ ] significant weight loss [ ] poor nutritional intake [ ] anasarca [ ] catabolic state Albumin, Serum: 2.9 g/dL (07-29-19 @ 04:40)  Artificial Nutrition [ ]     REFERRALS:   [ ]Chaplaincy  [ ] Hospice  [ ]Child Life  [ ]Social Work  [ ]Case management [ ]Holistic Therapy   Goals of Care Document: FANY Tsai (07-26-19 @ 14:33)  Goals of Care Conversation:   Advance Directives:  · Does patient have Advance Directive  Yes  · Indicate Type  Health Care Proxy (HCP)  · Agent's Name  George White  · Phone #  [phone]  · Are any of the items on the chart  Yes  · Specify which ones are on chart  Health Care Proxy (HCP)    Conversation Discussion:  · Conversation  Hospice Referral  · Conversation Details  [facility] - Spoke with pt's son who states that family is still seeking further treatment. Reported this conversation to Dr. LUCIO Landeros.      Electronic Signatures:  May Tsai (MADELINE)  (Signed 26-Jul-2019 14:34)  	Authored: Goals of Care Conversation      Last Updated: 26-Jul-2019 14:34 by May Tsai)

## 2019-07-29 NOTE — PROGRESS NOTE ADULT - PROBLEM SELECTOR PLAN 3
Follows with Dr. Nguyen at [facility] [place]. as per family, [facility] is not offering any disease modifying treatment. Pt is looking for experimental trial if pt clinically improves from current medical issues. Email sent to Dr. Nguyen by Dr Landeros would need good performance status for experimental therapy. As per palliative care d/w Dr. Sidhu no further DMT due to poor functional status.   - C/w ppx mepron, valacyclovir and allopurinol  - GOC discussed  poor prognosis   - still full code  - palliative care appreciated family meeting today at 12

## 2019-07-29 NOTE — PROGRESS NOTE ADULT - ASSESSMENT
79 year old with relapsed B cell lymphoma (dx 2017, now relaplse)   Treated with rituximab and revlimid in 3/2019  Treated with Obintuzmab and Bendamustine iin 4/2019    Presents with shortness of breath  She had abnormal imaging with lung nodules    Aspergillosis    1) Fungal pneumonia  aspergillosis on culture    Changed to posaconazole due to elevated lfts    Treatment of fungal pneumonia depends on antifungals and tx of underlying malignancy  At this time patient is not a candidate for lymphoma treatment.    Can continue posaconazole for 4 more weeks.  If her condition deteroriates, can discontinue antifungals if antimicrobials are not amenable with the patient's goals of care.     2) Transaminitis:   Improved  RUQ us unremarkable    3) Immunosuppressed secondary to cancer tx  PCP prophylaxis  Continue mepron    Valtrex prophylaxis    4) Delirium: improved with sleep    Prognosis guarded.     Call the ID service with questions or concerns .  Will follow intermitently

## 2019-07-29 NOTE — PROGRESS NOTE ADULT - PROBLEM SELECTOR PLAN 4
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

## 2019-07-29 NOTE — PROGRESS NOTE ADULT - PROBLEM SELECTOR PLAN 1
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

## 2019-07-29 NOTE — PROGRESS NOTE ADULT - SUBJECTIVE AND OBJECTIVE BOX
Follow Up:      Inverval History/ROS:Patient is a 79y old  Female who presents with a chief complaint of SOB (29 Jul 2019 13:59)    Off high flow.  Lethargic.    Allergies    No Known Allergies    Intolerances        ANTIMICROBIALS:  atovaquone Suspension 1500 daily  posaconazole DR Tablet 300 every 24 hours  valACYclovir 500 <User Schedule>      OTHER MEDS:  allopurinol 300 milliGRAM(s) Oral daily  apixaban 2.5 milliGRAM(s) Oral every 12 hours  benzocaine 15 mG/menthol 3.6 mG (Sugar-Free) Lozenge 1 Lozenge Oral every 4 hours  benzonatate 100 milliGRAM(s) Oral three times a day PRN  bisacodyl 5 milliGRAM(s) Oral every 12 hours PRN  docusate sodium 100 milliGRAM(s) Oral three times a day  furosemide   Injectable 20 milliGRAM(s) IV Push daily  guaiFENesin    Syrup 200 milliGRAM(s) Oral every 6 hours PRN  HYDROmorphone  Injectable 2 milliGRAM(s) IV Push every 2 hours PRN  levalbuterol Inhalation 0.63 milliGRAM(s) Inhalation every 4 hours  melatonin 1.5 milliGRAM(s) Oral at bedtime  metoprolol succinate  milliGRAM(s) Oral daily  pantoprazole    Tablet 40 milliGRAM(s) Oral before breakfast  polyethylene glycol 3350 17 Gram(s) Oral daily PRN  predniSONE   Tablet 20 milliGRAM(s) Oral daily  sodium chloride 0.65% Nasal 1 Spray(s) Both Nostrils daily PRN  sodium chloride 7% Inhalation 4 milliLiter(s) Inhalation three times a day      Vital Signs Last 24 Hrs  T(C): 36.3 (29 Jul 2019 12:25), Max: 36.4 (28 Jul 2019 21:20)  T(F): 97.4 (29 Jul 2019 12:25), Max: 97.5 (28 Jul 2019 21:20)  HR: 75 (29 Jul 2019 16:58) (68 - 108)  BP: 104/72 (29 Jul 2019 12:25) (94/62 - 105/75)  BP(mean): --  RR: 20 (29 Jul 2019 12:25) (19 - 22)  SpO2: 97% (29 Jul 2019 16:58) (95% - 99%)    PHYSICAL EXAM:  General: [x ] non-toxic  HEAD/EYES: [ ] PERRL [x ] white sclera [ ] icterus  ENT:  [ ] normal [x ] supple [ ] thrush [ ] pharyngeal exudate  Cardiovascular:   [ ] murmur [x ] normal [ ] PPM/AICD  Respiratory:  [x ] course BS  GI:  [x ] soft, non-tender, normal bowel sounds  :  [ ] bledsoe [ ] no CVA tenderness   Musculoskeletal:  [ ] no synovitis  Neurologic:  [ ] non-focal exam   Skin:  [ x] no rash  Lymph: [ ] no lymphadenopathy  Psychiatric:  [ ] appropriate affect [ x] alert & oriented  Lines:  [x ] no phlebitis [ ] central line                                12.0   2.22  )-----------( 117      ( 29 Jul 2019 04:40 )             37.2       07-29    134<L>  |  86<L>  |  15  ----------------------------<  90  4.0   |  38<H>  |  0.32<L>    Ca    8.8      29 Jul 2019 04:40  Phos  2.1     07-29  Mg     1.7     07-29    TPro  4.6<L>  /  Alb  2.9<L>  /  TBili  1.6<H>  /  DBili  0.6<H>  /  AST  84<H>  /  ALT  81<H>  /  AlkPhos  216<H>  07-29          MICROBIOLOGY:    RADIOLOGY:

## 2019-07-30 LAB
ALBUMIN SERPL ELPH-MCNC: 2.8 G/DL — LOW (ref 3.3–5)
ALP SERPL-CCNC: 180 U/L — HIGH (ref 40–120)
ALT FLD-CCNC: 62 U/L — HIGH (ref 4–33)
ANION GAP SERPL CALC-SCNC: 10 MMO/L — SIGNIFICANT CHANGE UP (ref 7–14)
AST SERPL-CCNC: 62 U/L — HIGH (ref 4–32)
BILIRUB DIRECT SERPL-MCNC: 0.5 MG/DL — HIGH (ref 0.1–0.2)
BILIRUB SERPL-MCNC: 1.5 MG/DL — HIGH (ref 0.2–1.2)
BUN SERPL-MCNC: 14 MG/DL — SIGNIFICANT CHANGE UP (ref 7–23)
CALCIUM SERPL-MCNC: 8.5 MG/DL — SIGNIFICANT CHANGE UP (ref 8.4–10.5)
CHLORIDE SERPL-SCNC: 87 MMOL/L — LOW (ref 98–107)
CO2 SERPL-SCNC: 37 MMOL/L — HIGH (ref 22–31)
CREAT SERPL-MCNC: 0.31 MG/DL — LOW (ref 0.5–1.3)
FUNGUS SPEC QL CULT: SIGNIFICANT CHANGE UP
FUNGUS SPEC QL CULT: SIGNIFICANT CHANGE UP
GLUCOSE SERPL-MCNC: 92 MG/DL — SIGNIFICANT CHANGE UP (ref 70–99)
HCT VFR BLD CALC: 36.1 % — SIGNIFICANT CHANGE UP (ref 34.5–45)
HGB BLD-MCNC: 11.7 G/DL — SIGNIFICANT CHANGE UP (ref 11.5–15.5)
MAGNESIUM SERPL-MCNC: 1.6 MG/DL — SIGNIFICANT CHANGE UP (ref 1.6–2.6)
MCHC RBC-ENTMCNC: 32.4 % — SIGNIFICANT CHANGE UP (ref 32–36)
MCHC RBC-ENTMCNC: 33.9 PG — SIGNIFICANT CHANGE UP (ref 27–34)
MCV RBC AUTO: 104.6 FL — HIGH (ref 80–100)
NRBC # FLD: 0 K/UL — SIGNIFICANT CHANGE UP (ref 0–0)
PLATELET # BLD AUTO: 112 K/UL — LOW (ref 150–400)
PMV BLD: 11.8 FL — SIGNIFICANT CHANGE UP (ref 7–13)
POTASSIUM SERPL-MCNC: 3.2 MMOL/L — LOW (ref 3.5–5.3)
POTASSIUM SERPL-SCNC: 3.2 MMOL/L — LOW (ref 3.5–5.3)
PROT SERPL-MCNC: 4.5 G/DL — LOW (ref 6–8.3)
RBC # BLD: 3.45 M/UL — LOW (ref 3.8–5.2)
RBC # FLD: 19.9 % — HIGH (ref 10.3–14.5)
SODIUM SERPL-SCNC: 134 MMOL/L — LOW (ref 135–145)
WBC # BLD: 2.03 K/UL — LOW (ref 3.8–10.5)
WBC # FLD AUTO: 2.03 K/UL — LOW (ref 3.8–10.5)

## 2019-07-30 PROCEDURE — 99233 SBSQ HOSP IP/OBS HIGH 50: CPT

## 2019-07-30 PROCEDURE — 99233 SBSQ HOSP IP/OBS HIGH 50: CPT | Mod: GC

## 2019-07-30 PROCEDURE — 71275 CT ANGIOGRAPHY CHEST: CPT | Mod: 26

## 2019-07-30 PROCEDURE — 74177 CT ABD & PELVIS W/CONTRAST: CPT | Mod: 26

## 2019-07-30 PROCEDURE — 99232 SBSQ HOSP IP/OBS MODERATE 35: CPT

## 2019-07-30 RX ORDER — POTASSIUM CHLORIDE 20 MEQ
40 PACKET (EA) ORAL EVERY 4 HOURS
Refills: 0 | Status: COMPLETED | OUTPATIENT
Start: 2019-07-30 | End: 2019-07-30

## 2019-07-30 RX ORDER — FUROSEMIDE 40 MG
20 TABLET ORAL
Refills: 0 | Status: DISCONTINUED | OUTPATIENT
Start: 2019-07-30 | End: 2019-07-31

## 2019-07-30 RX ADMIN — Medication 40 MILLIEQUIVALENT(S): at 10:16

## 2019-07-30 RX ADMIN — LEVALBUTEROL 0.63 MILLIGRAM(S): 1.25 SOLUTION, CONCENTRATE RESPIRATORY (INHALATION) at 05:08

## 2019-07-30 RX ADMIN — SODIUM CHLORIDE 4 MILLILITER(S): 9 INJECTION INTRAMUSCULAR; INTRAVENOUS; SUBCUTANEOUS at 10:30

## 2019-07-30 RX ADMIN — LEVALBUTEROL 0.63 MILLIGRAM(S): 1.25 SOLUTION, CONCENTRATE RESPIRATORY (INHALATION) at 21:01

## 2019-07-30 RX ADMIN — Medication 20 MILLIGRAM(S): at 06:01

## 2019-07-30 RX ADMIN — APIXABAN 2.5 MILLIGRAM(S): 2.5 TABLET, FILM COATED ORAL at 17:00

## 2019-07-30 RX ADMIN — LEVALBUTEROL 0.63 MILLIGRAM(S): 1.25 SOLUTION, CONCENTRATE RESPIRATORY (INHALATION) at 17:33

## 2019-07-30 RX ADMIN — ATOVAQUONE 1500 MILLIGRAM(S): 750 SUSPENSION ORAL at 14:25

## 2019-07-30 RX ADMIN — SODIUM CHLORIDE 4 MILLILITER(S): 9 INJECTION INTRAMUSCULAR; INTRAVENOUS; SUBCUTANEOUS at 17:33

## 2019-07-30 RX ADMIN — LEVALBUTEROL 0.63 MILLIGRAM(S): 1.25 SOLUTION, CONCENTRATE RESPIRATORY (INHALATION) at 10:29

## 2019-07-30 RX ADMIN — Medication 20 MILLIGRAM(S): at 05:59

## 2019-07-30 RX ADMIN — BENZOCAINE AND MENTHOL 1 LOZENGE: 5; 1 LIQUID ORAL at 05:58

## 2019-07-30 RX ADMIN — LEVALBUTEROL 0.63 MILLIGRAM(S): 1.25 SOLUTION, CONCENTRATE RESPIRATORY (INHALATION) at 01:00

## 2019-07-30 RX ADMIN — PANTOPRAZOLE SODIUM 40 MILLIGRAM(S): 20 TABLET, DELAYED RELEASE ORAL at 06:01

## 2019-07-30 RX ADMIN — Medication 300 MILLIGRAM(S): at 14:24

## 2019-07-30 RX ADMIN — Medication 100 MILLIGRAM(S): at 14:25

## 2019-07-30 RX ADMIN — LEVALBUTEROL 0.63 MILLIGRAM(S): 1.25 SOLUTION, CONCENTRATE RESPIRATORY (INHALATION) at 13:58

## 2019-07-30 RX ADMIN — Medication 150 MILLIGRAM(S): at 06:01

## 2019-07-30 RX ADMIN — Medication 100 MILLIGRAM(S): at 05:59

## 2019-07-30 RX ADMIN — BENZOCAINE AND MENTHOL 1 LOZENGE: 5; 1 LIQUID ORAL at 10:16

## 2019-07-30 RX ADMIN — POSACONAZOLE 300 MILLIGRAM(S): 100 TABLET, DELAYED RELEASE ORAL at 17:01

## 2019-07-30 RX ADMIN — Medication 40 MILLIEQUIVALENT(S): at 14:25

## 2019-07-30 RX ADMIN — APIXABAN 2.5 MILLIGRAM(S): 2.5 TABLET, FILM COATED ORAL at 05:59

## 2019-07-30 RX ADMIN — Medication 1.5 MILLIGRAM(S): at 23:18

## 2019-07-30 NOTE — PROGRESS NOTE ADULT - ASSESSMENT
----------------------------------------  Judie Kaur MD PGY-6  Pulmonary/Critical Care Fellow  Pager # [phone] (NS), [phone] ([facility]) 79F hx advanced DLBCL, aspergilloma, Afib on eliquis who p/w SOB and productive cough. Hypoxic, req HFNC, bilevel x 5 wks without significant improvement. Too deconditioned for chemo.       ----------------------------------------  Judie Kaur MD PGY-6  Pulmonary/Critical Care Fellow  Pager # [phone] (NS), [phone] ([facility]) 79F hx advanced DLBCL, aspergilloma, Afib on eliquis who p/w SOB and productive cough. Hypoxic, req HFNC, bilevel x 5 wks without significant improvement. Too deconditioned for chemo. Pulm consulted for R pleff and endobronchial lesion, which has now grown aspergillosis. At this time, risks of performing thora would outweigh the benefits. Endobronchial lesion is causing subsequent collapse of R lung, which in turn causes this pleural effusion. Removing the effusion may temporally improve her breathing, but it would likely rapidly accumulate.      ----------------------------------------  Judie Kaur MD PGY-6  Pulmonary/Critical Care Fellow  Pager # [phone] (NS), [phone] ([facility]) 79F hx advanced DLBCL, aspergilloma, Afib on eliquis who p/w SOB and productive cough. Hypoxic, req HFNC, bilevel x 5 wks without significant improvement. Too deconditioned for chemo. Pulm consulted for R pleff and endobronchial lesion, which has now grown aspergillosis. At this time, risks of performing thora would outweigh the benefits. Endobronchial lesion is causing subsequent collapse of R lung, which in turn causes this pleural effusion. Removing the effusion may temporally improve her breathing, but it would likely rapidly accumulate. She currently comfortable in regards to breathing.   - antifungals as per ID  - can cont levalbuterol q4h  - cont airway clearance: aerobika, hypersal tid  - unclear why pt is on steroids, can start to titrate down if for respiratory complaints  - titrate supplemental O2 as tolerated, maintain O2 > 90%; likely will need O2 when she goes home  - will sign off at this time, please reconsult if any further questions arise    ----------------------------------------  Judie Kaur MD PGY-6  Pulmonary/Critical Care Fellow  Pager # [phone] (NS), [phone] ([facility])

## 2019-07-30 NOTE — PROGRESS NOTE ADULT - PROBLEM SELECTOR PLAN 4
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

## 2019-07-30 NOTE — PROGRESS NOTE ADULT - ASSESSMENT
79 year old with relapsed B cell lymphoma (dx 2017, now relaplse)   Treated with rituximab and revlimid in 3/2019  Treated with Obintuzmab and Bendamustine iin 4/2019    Presents with shortness of breath  She had abnormal imaging with lung nodules    Aspergillosis    1) Fungal pneumonia  aspergillosis on culture    Changed to posaconazole due to elevated lfts  Lfts are now improving    Treatment of fungal pneumonia depends on antifungals and tx of underlying malignancy  At this time patient is not a candidate for lymphoma treatment.  CT shows progression of nodules.  I do not think this is antifungal failure, more likely due to ongoing immunosuppression from lymphoma.     Unclear that antifungals are going to significantly change outcome. Prognosis is grave.     Can continue posaconazole for 4 more weeks.  If her condition deteriorates can discontinue antifungals if antimicrobials are not amenable with the patient's goals of care.     2) Transaminitis:   Improved  RUQ us unremarkable    3) Immunosuppressed secondary to cancer tx  PCP prophylaxis  Continue mepron    Valtrex prophylaxis    4) Delirium: improved with sleep    Prognosis guarded.     Call the ID service with questions or concerns .  Will follow intermitently

## 2019-07-30 NOTE — CHART NOTE - NSCHARTNOTEFT_GEN_A_CORE
Hematology fellow note    Spoke with primary team, patient to go on home hospice. I notified Dr. Nguyen, patient's primary hematologist. We will sign off, please re-consult us with questions and if we can be of further help.  discussed with primary team.      Lauryn Alfredo, DO  Hematology Fellow PGY-6  Pager [phone]. After 5PM or weekends, please page on call heme/onc fellow

## 2019-07-30 NOTE — PROGRESS NOTE ADULT - PROBLEM SELECTOR PLAN 6
LT>RT neg for DVT, likely due to poor nutritional state  - elevation LT>RT neg for DVT, likely due to poor nutritional state  - elevation and compression stocking

## 2019-07-30 NOTE — PROGRESS NOTE ADULT - ATTENDING COMMENTS
79 F with DLBCL, afib now with acute hypoxic respiratory failure due to lymphoma and enterovirus s/p bronch, found to have aspergillus on endobronchial biopsy, on voriconazole with continued hypoxic respiratory failure due to mucous plugging, lymphoma, and fungal pna.  Slowly improving with HFNC, down to 43% on 40L, more alert today and conversant.  Continue with chest pt and deep NT suctioning prn, antifungals per ID, nebulizers and weaning O2 as tolerated.  Patient appears to be on steroids from prior to admission; unclear if it was on for lymphoma or for respiratory reasons.  Okay to slowly taper from pulmonary perspective if it is not needed for other purpose.
79 F with DLBCL, afib a/w acute hypoxic respiratory failure due to lymphoma and entero/rhinovirus s/p bronch, found to have endobronchial biopsy positive for aspergillus, now on voriconazole, again in worsening respiratory failure likely due to mucous plugging, lymphoma, and fungal pna.  Switched from bipap to HFNC yesterday and tolerating it well.  Currently on 45% and 40 lpm, able to clear secretions better this way than on bipap.  Still sob but no wheezing, no fevers, +productive cough.  Continue aggressive chest pt, aerobika, metaneb as above.  Abx per ID.  Ongoing goc discussion with family - full code at this time.
79 F with DLBCL, afib now with acute hypoxic respiratory failure due to lymphoma and enterovirus s/p bronch, found to have aspergillus on endobronchial biopsy, on voriconazole with continued hypoxic respiratory failure due to mucous plugging, lymphoma, and fungal pna.  No better today compared to yesterday and continues to be on HFNC, 40% on 40L, but appears in mild respiratory distress (just used the bathroom which made her very dyspneic).  Continue with chest pt and deep NT suctioning prn, antifungals per ID, nebulizers and weaning O2 as tolerated. Continue management at this time and please try to titrate off HFNC if possible. I am concerned that she is becoming to debilitated to cough her mucus and chest PT and deep NT suctioning is prudent to this treatment.     Please page pulmonary oncall over the weekend if there are any urgent issues that need to be addressed. Will plan to revisit on Monday if no issues occur over the weekend.
79 year old woman with diffuse B-cell lymphoma and aspergilloma has persistent dyspnea and hypoxia down to nasal cannula from high flow today    - continue supplemental oxygen as tolerated to keep saturation over 90%  - anti-fugal therapy  - effusion on right side with associated endobronchial lesion on the right, thoracentesis would not benefit patient as the right lung would not re-expand    continue supportive care  please call with questions
Agree with above. continue lasix for large bilateral pleural effusions. Pt appears to be feeling better on Hiflow, s/p lasix during RRT.
Patient seen and agree with Dr. Mckeon's note. Patient is 79F with DLBC lymphoma, Afib on Eliquis presenting with three days of worsening SOB and productive cough, found to have entero/rhinovirus and fungal infection.    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 Then esophageal biopsy in 12/2017 consistent with high grade lymphoma  - Patient was started on Revlimid and rituximab followed by obinutuzumab and bendamustine  - Most recently underwent thoracentesis which was positive for lymphoma  - CT chest from 6/27: The bilateral nodules have increased in size in number. In particular, there are multiple masses in the bilateral lobes, with extension into the right lower lobe bronchus. The chest lymph nodes are malignant. The prevascular lymph node, in particular, has increased in size.  - Palliative care following   - Outpatient follow up with Dr. Nguyen who will determine if a patient is a candidate for further treatment     # Pancytopenia  - Stable   - Continue to check CBC diff daily  - goal hgb > 7 and plt >10k if afebrile, >15k if febrile     # Hypoxic respiratory failure   - CT chest: complete occlusion of the right lower lobe bronchus and the segmental bronchi of the right lower lobe with a central opacity  - Bronch with biopsy done -> aspergillosis with surrounding necrotic tissue   - Appreciate ID recs: Continue voriconazole pending ID of mold. Plan 6 weeks of antifungal coverage  - Pulmonary following  - CXR 7/15: Increased right-sided pleural effusion. Re-demonstrated bilateral patchy opacities, increased on the right  - CXR 7/19: Worsening aeration right lung
Patient seen and agree with Dr. Mckeon's note. Patient is 79F with Diffuse Large B-Cell lymphoma, Afib on eliquis presenting with three days of worsening SOB and productive cough, found to have entero/rhinovirus and fungal infection.    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 Then esophageal biopsy in 12/2017 consistent with high grade lymphoma  - Patient was started on Revlimid and rituximab followed by obi and bendamustine  - Most recently underwent thoracentesis which was positive for lymphoma  - CT chest from 6/27: The bilateral nodules have increased in size in number. In particular, there are multiple masses in the bilateral lobes, with extension into the right lower lobe bronchus. The chest lymph nodes are malignant. The prevascular lymph node, in particular, has increased in size.  - Palliative care consult   - Outpatient follow up with Dr. Nguyen who will determine if a patient is a candidate for further treatment     # Pancytopenia  - Now improving from admission   - Continue to check CBC diff daily  - goal hgb > 7 and plt >10k if afebrile, >15k if febrile     # Hypoxic respiratory failure   - CT chest: complete occlusion of the right lower lobe bronchus and the segmental bronchi of the right lower lobe with a central opacity  - Bronch with biopsy done -> aspergillosis   - Appreciate ID recs: Continue voriconazole pending ID of mold. Plan 6 weeks of antifungal coverage  - Pulmonary following  - CXR 7/15: Increased right-sided pleural effusion. Re-demonstrated bilateral patchy opacities, increased on the right  - Patient deteriorating clinically, family still wishes for her to be full code
Patient seen and examined, Chart reviewed. Case discussed with resident agree with above.     Patient and family report symptomatic improvement  Clinically appears less dyspneic today  Continue diuresis.  Continue high flow oxygen now on 30 liters down from 40 yesterday.  Last CT of chest showed endobronchial lesion in the bronchus intermedius ? of airway obstruction.
Patient seen and examined, Chart reviewed. Case discussed with resident agree with above. Continue diuresis with Lasix patient and family report symptomatic improvement. Continue high flow oxygen.
79 F with DLBCL, afib a/w acute hypoxic respiratory failure due to lymphoma and entero/rhinovirus s/p bronch, found to have endobronchial biopsy positive for aspergillus, now on voriconazole, again in worsening respiratory failure likely due to mucous plugging, lymphoma, and fungal pna. Switched from bipap to HFNC and tolerating it well.  Continue with antifungal, aggressive chest PT, wean O2 as tolerated.
79F PMHx DLBC lymphoma and AFib (on Eliquis) presenting with productive cough x1 month, as well as SOB x3 days now s/p bronchoscopy for concer of RLL bronchus obstruction s/p BAL with bx 7/1 by CTSx found to have mucous impaction. Initial cx (+) fungal hyphae in necrotic tissue on biopsy.   Agree with starting voriconazole/ID consult.   Start airway clearance regimen in this order: levalbuterol/HS 3%/aerobika device BID.  Keep levalbuterol Q 6 standing.
Patient is seen and examined on morning rounds.   80 yo woman with relapsed DLBCL admitted with fungal PNA. Episode of tachypnea and hypoxemia yesterday suspected 2/2 mucus plugging. She needs better pulmonary toilet and secretion mobilization. C/w nebs and hypertonic saline. Would try chest vest as aerobica does not seem to be helping. On Voriconazole for 6 week course.
Patient seen and examined on morning rounds    80 yo woman with relapsed DLBCL with multiple mets to the lung admitted with hypoxemic respiratory failure in the setting of Entero/Rhino virus, RLL mucus plugging (s/p bronchoscopy) and fungal PNA. Respiratory status is still poor despite broad spectrum Abx, increased dose steroids, and airway clearance. Secretions and poor airway clearance are main issue at this point and she is at very high risk for further respiratory decompensation.   Ongoing GOC discussion with family  c/w Vori for fungal PNA  airway clearance with hypersal, nebs, Aerobica, Metanebs.   NIV as needed for increased WOB/respiratory distress
79 F with DLBCL (on chemo prior to admission), afib on a/c a/w acute hypoxic respiratory failure due to lymphoma and entero/rhinovirus URI s/p bronch for continued hypoxemia on 7/1 (RLL bronchus obstruction, mucous impaction), path on that endobronchial biopsy showing fungal hyphae ?aspergillosis), on voriconazole, s/p meropenem. She is currently in respiratory failure due to mucous plugging, her lymphoma, and likely fungal pneumonia (presumably b/c she is immunocompromised due to chemo and her lymphoma). She is very deconditioned.  Her chest xray today is worse; effusion on right is likely due to her underlying pna and atelectasis in setting of endobronchial obstruction (mucous vs fungal vs tumor).  No role for thoracentesis given unclear if that lung would open up.  She is ventilating well on bipap, but her WOB is likely due to mucous plugging and hypoxemia more than hypercanpia.  Would try hi flow with airway clearance therapies as above; if not tolerating hi flow, would switch back to bipap.  Please contact lab to get identification of mold. Prognosis guarded.  D/w family, full code.
Patient seen and examined on morning rounds    78 yo woman with relapsed DLBCL admitted with hypoxemic respiratory failure in the setting of Entero/Rhino virus and fungal PNA. Dyspneic with increased work of breathing during morning rounds today. Exam with diffuse wheezing and rhonchi. Stat nebs ordered and patient placed on BiPap for increased WOB. Sputum cultures from yesterday growing GPCs in pairs, fungal cultures have not speciated yet. Given respiratory decompensation, would re-start broad spectrum Abx, increased Prednisone to 40 mg, continue with Voriconazole while awaiting speciation of cultures. C/w  w standing nebs, hypertonic saline and aggressive airway clearance.   Pulmonary will follow closely.
Patient seen and examined on morning rounds    78 yo woman with relapsed DLBCL with multiple mets to the lung admitted with hypoxemic respiratory failure in the setting of Entero/Rhino virus, RLL mucus plugging (s/p bronchoscopy) and fungal PNA. Her Abx were broadened yesterday and steroid dose increased. She was placed on NIV for increased WOB and respiratory distress. She is much improved today. No wheezing on exam. Would continue with broad spectum Abx and anti-fungals. Await fungal cultures. c/w steroids and standing nebs. Attempt to wean of NIV - use prn respiratory distress. c/w aggressive airway clearance with nebs, hypersal. Would try metanebs as well.   Palliative care consult is very appreciated.
This patient with a history of lymphoma was admitted with respiratory distress. She has greater comfort and alertness with positive pressure bed side respiratory care oxygen. Management discussed with patient and family member.
agree with above. ACT - can change HS to 7%, aerobika. pt feeling she is able to expectorate more now. Remains on zosyn and voriconazole.
Patient with advanced DLBCL, aspergillosis, pleural effusion, chronic respiratory failure. Plan for home hospice, and treatment as outlined above.
Thomas Allison MD  pager# [phone]
Thomas Allison MD  pager# [phone]
Discussed with patient and daughter by the bedside for 30 minutes.  Answered all the questions.    Thomas Allison MD  pager# [phone]
Discussed with patient and daughter by the bedside for 30 minutes.  Answered all the questions.    Thomas Allison MD  pager# [phone]
Discussed with patient and daughter by the bedside for 30 minutes.  Answered all the questions.    Thomas Allison MD  pager# [phone]
Thomas Allison MD  pager# [phone]
Pt seen with resident. Agree with above. Symptom management improving.  GOC as noted.  Will follow
Thomas Allison MD  pager# [phone]

## 2019-07-30 NOTE — PROGRESS NOTE ADULT - PROBLEM SELECTOR PLAN 2
RLL bronchus obstruction with concern for postobstructive & fungal PNA  - c/w continuous pulse ox  - c/w chest PT and incentive spirometry   - c/w standing nebs; Xopenex q4 hours, following by nebulized 3% hypertonic saline, and then Aerobika device as per Pulm recommendations  - s/p meropenem on 7/20   - palliative recs appreciated conveyed poor prognosis to family still wishes pt to be full code.  - CXR w/ near complete white out of R on 7/19, large effusions on US, s/p lasix 40 IV x 3  -prednisone 20 mg daily   - as per pulm fellow states will not benefit from thoracentesis or pleurex due to endobronchial lesion cont diuresis.  - HCO3 increased due to contraction alkalosis and hyponatremia change lasix to 20 q 48 hrs   -Pulm following appreciate recs

## 2019-07-30 NOTE — PROGRESS NOTE ADULT - SUBJECTIVE AND OBJECTIVE BOX
OBJECTIVE:  ICU Vital Signs Last 24 Hrs  T(C): 36.4 (30 Jul 2019 05:52), Max: 36.4 (29 Jul 2019 22:44)  T(F): 97.6 (30 Jul 2019 05:52), Max: 97.6 (30 Jul 2019 05:52)  HR: 102 (30 Jul 2019 05:59) (75 - 104)  BP: 112/82 (30 Jul 2019 05:59) (100/66 - 112/82)  BP(mean): --  ABP: --  ABP(mean): --  RR: 20 (30 Jul 2019 05:52) (19 - 20)  SpO2: 96% (30 Jul 2019 05:52) (96% - 98%)        07-29 @ 07:01  -  07-30 @ 07:00  --------------------------------------------------------  IN: 350 mL / OUT: 950 mL / NET: -600 mL      CAPILLARY BLOOD GLUCOSE          PHYSICAL EXAM:  General:   HEENT:   Respiratory:   Cardiovascular:   Abdomen:   Extremities:   Skin:   Neurological:    HOSPITAL MEDICATIONS:  apixaban 2.5 milliGRAM(s) Oral every 12 hours    atovaquone Suspension 1500 milliGRAM(s) Oral daily  posaconazole DR Tablet 300 milliGRAM(s) Oral every 24 hours  valACYclovir 500 milliGRAM(s) Oral <User Schedule>    furosemide   Injectable 20 milliGRAM(s) IV Push daily  metoprolol succinate  milliGRAM(s) Oral daily    allopurinol 300 milliGRAM(s) Oral daily  predniSONE   Tablet 20 milliGRAM(s) Oral daily    benzonatate 100 milliGRAM(s) Oral three times a day PRN  guaiFENesin    Syrup 200 milliGRAM(s) Oral every 6 hours PRN  levalbuterol Inhalation 0.63 milliGRAM(s) Inhalation every 4 hours  sodium chloride 7% Inhalation 4 milliLiter(s) Inhalation three times a day    HYDROmorphone  Injectable 2 milliGRAM(s) IV Push every 2 hours PRN  melatonin 1.5 milliGRAM(s) Oral at bedtime    bisacodyl 5 milliGRAM(s) Oral every 12 hours PRN  docusate sodium 100 milliGRAM(s) Oral three times a day  pantoprazole    Tablet 40 milliGRAM(s) Oral before breakfast  polyethylene glycol 3350 17 Gram(s) Oral daily PRN            benzocaine 15 mG/menthol 3.6 mG (Sugar-Free) Lozenge 1 Lozenge Oral every 4 hours  sodium chloride 0.65% Nasal 1 Spray(s) Both Nostrils daily PRN        LABS:                        12.0   2.22  )-----------( 117      ( 29 Jul 2019 04:40 )             37.2     Hgb Trend: 12.0<--, 12.5<--, 12.5<--, 13.1<--, 12.4<--  07-29    134<L>  |  86<L>  |  15  ----------------------------<  90  4.0   |  38<H>  |  0.32<L>    Ca    8.8      29 Jul 2019 04:40  Phos  2.1     07-29  Mg     1.7     07-29    TPro  4.6<L>  /  Alb  2.9<L>  /  TBili  1.6<H>  /  DBili  0.6<H>  /  AST  84<H>  /  ALT  81<H>  /  AlkPhos  216<H>  07-29    Creatinine Trend: 0.32<--, 0.30<--, 0.31<--, 0.29<--, 0.30<--, 0.35<--            MICROBIOLOGY:     RADIOLOGY:  [ ] Reviewed and interpreted by me    PULMONARY FUNCTION TESTS: Pt seen and examined by the bedside. No acute events overnight. GO meeting held yesterday - pt going home with hospice once stable for discharge. Overall pt feels a little better in regards to her breathing.     OBJECTIVE:  ICU Vital Signs Last 24 Hrs  T(C): 36.4 (30 Jul 2019 05:52), Max: 36.4 (29 Jul 2019 22:44)  T(F): 97.6 (30 Jul 2019 05:52), Max: 97.6 (30 Jul 2019 05:52)  HR: 102 (30 Jul 2019 05:59) (75 - 104)  BP: 112/82 (30 Jul 2019 05:59) (100/66 - 112/82)  BP(mean): --  ABP: --  ABP(mean): --  RR: 20 (30 Jul 2019 05:52) (19 - 20)  SpO2: 96% (30 Jul 2019 05:52) (96% - 98%)      07-29 @ 07:01  -  07-30 @ 07:00  --------------------------------------------------------  IN: 350 mL / OUT: 950 mL / NET: -600 mL      PHYSICAL EXAM:  General: NAD, pleasant  HEENT: NCAT, moist mucosal membranes  Respiratory: decreased breath sounds on right; crackles  Cardiovascular: S1/S2 normal, tachycardia, no murmurs/rubs/gallops  Abdomen: soft, non-tender, non-distended with normal bowel sounds  Extremities: no cyanosis/clubbing, no b/l LE edema  Skin: warm/dry  Neurological: no focal deficits    HOSPITAL MEDICATIONS:  apixaban 2.5 milliGRAM(s) Oral every 12 hours    atovaquone Suspension 1500 milliGRAM(s) Oral daily  posaconazole DR Tablet 300 milliGRAM(s) Oral every 24 hours  valACYclovir 500 milliGRAM(s) Oral <User Schedule>    furosemide   Injectable 20 milliGRAM(s) IV Push daily  metoprolol succinate  milliGRAM(s) Oral daily    allopurinol 300 milliGRAM(s) Oral daily  predniSONE   Tablet 20 milliGRAM(s) Oral daily    benzonatate 100 milliGRAM(s) Oral three times a day PRN  guaiFENesin    Syrup 200 milliGRAM(s) Oral every 6 hours PRN  levalbuterol Inhalation 0.63 milliGRAM(s) Inhalation every 4 hours  sodium chloride 7% Inhalation 4 milliLiter(s) Inhalation three times a day    HYDROmorphone  Injectable 2 milliGRAM(s) IV Push every 2 hours PRN  melatonin 1.5 milliGRAM(s) Oral at bedtime    bisacodyl 5 milliGRAM(s) Oral every 12 hours PRN  docusate sodium 100 milliGRAM(s) Oral three times a day  pantoprazole    Tablet 40 milliGRAM(s) Oral before breakfast  polyethylene glycol 3350 17 Gram(s) Oral daily PRN    benzocaine 15 mG/menthol 3.6 mG (Sugar-Free) Lozenge 1 Lozenge Oral every 4 hours  sodium chloride 0.65% Nasal 1 Spray(s) Both Nostrils daily PRN    LABS:                        12.0   2.22  )-----------( 117      ( 29 Jul 2019 04:40 )             37.2     Hgb Trend: 12.0<--, 12.5<--, 12.5<--, 13.1<--, 12.4<--  07-29    134<L>  |  86<L>  |  15  ----------------------------<  90  4.0   |  38<H>  |  0.32<L>    Ca    8.8      29 Jul 2019 04:40  Phos  2.1     07-29  Mg     1.7     07-29    TPro  4.6<L>  /  Alb  2.9<L>  /  TBili  1.6<H>  /  DBili  0.6<H>  /  AST  84<H>  /  ALT  81<H>  /  AlkPhos  216<H>  07-29    Creatinine Trend: 0.32<--, 0.30<--, 0.31<--, 0.29<--, 0.30<--, 0.35<--

## 2019-07-30 NOTE — PROGRESS NOTE ADULT - PROBLEM SELECTOR PLAN 1
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

## 2019-07-30 NOTE — PROGRESS NOTE ADULT - PROBLEM SELECTOR PLAN 3
[place] [facility] [place] as per family, [facility] is not offering any disease modifying treatment. Pt is looking for experimental trial if pt clinically improves from current medical issues. Email sent to Dr. Nguyen by Dr Landeros would need good performance status for experimental therapy. As per palliative care d/w Dr. Sidhu no further DMT due to poor functional status.   - C/w ppx mepron, valacyclovir and allopurinol  - GOC discussed  poor prognosis   - case d/w family, palliative care and pulmonary family agreeable to home hospice

## 2019-07-30 NOTE — PROGRESS NOTE ADULT - SUBJECTIVE AND OBJECTIVE BOX
Patient is a 79y old  Female who presents with a chief complaint of SOB (30 Jul 2019 08:04)      SUBJECTIVE / OVERNIGHT EVENTS:    MEDICATIONS  (STANDING):  allopurinol 300 milliGRAM(s) Oral daily  apixaban 2.5 milliGRAM(s) Oral every 12 hours  atovaquone Suspension 1500 milliGRAM(s) Oral daily  benzocaine 15 mG/menthol 3.6 mG (Sugar-Free) Lozenge 1 Lozenge Oral every 4 hours  docusate sodium 100 milliGRAM(s) Oral three times a day  furosemide    Tablet 20 milliGRAM(s) Oral <User Schedule>  levalbuterol Inhalation 0.63 milliGRAM(s) Inhalation every 4 hours  melatonin 1.5 milliGRAM(s) Oral at bedtime  metoprolol succinate  milliGRAM(s) Oral daily  pantoprazole    Tablet 40 milliGRAM(s) Oral before breakfast  posaconazole DR Tablet 300 milliGRAM(s) Oral every 24 hours  potassium chloride    Tablet ER 40 milliEquivalent(s) Oral every 4 hours  predniSONE   Tablet 20 milliGRAM(s) Oral daily  sodium chloride 7% Inhalation 4 milliLiter(s) Inhalation three times a day  valACYclovir 500 milliGRAM(s) Oral <User Schedule>    MEDICATIONS  (PRN):  benzonatate 100 milliGRAM(s) Oral three times a day PRN Cough  bisacodyl 5 milliGRAM(s) Oral every 12 hours PRN Constipation  guaiFENesin    Syrup 200 milliGRAM(s) Oral every 6 hours PRN Cough  HYDROmorphone  Injectable 2 milliGRAM(s) IV Push every 2 hours PRN Severe Pain (7 - 10)  polyethylene glycol 3350 17 Gram(s) Oral daily PRN Constipation  sodium chloride 0.65% Nasal 1 Spray(s) Both Nostrils daily PRN Nasal Congestion        CAPILLARY BLOOD GLUCOSE        I&O's Summary    29 Jul 2019 07:01  -  30 Jul 2019 07:00  --------------------------------------------------------  IN: 350 mL / OUT: 950 mL / NET: -600 mL        T(C): 36.4 (07-30-19 @ 05:52), Max: 36.4 (07-29-19 @ 22:44)  HR: 102 (07-30-19 @ 05:59) (75 - 104)  BP: 112/82 (07-30-19 @ 05:59) (100/66 - 112/82)  RR: 20 (07-30-19 @ 05:52) (19 - 20)  SpO2: 96% (07-30-19 @ 05:52) (96% - 98%)    PHYSICAL EXAM:  GENERAL:  on high flow NC  HEAD:  Atraumatic, Normocephalic  CHEST/LUNG: decrease BS RT no overt wheezing   HEART: tachycardia; No murmurs, rubs, or gallops  ABDOMEN: Soft, Nontender, Nondistended; Bowel sounds present  EXTREMITIES:   cool, edematous, pitting L>R  NEUROLOGY: non-focal      LABS:                        11.7   2.03  )-----------( 112      ( 30 Jul 2019 06:15 )             36.1     07-30    134<L>  |  87<L>  |  14  ----------------------------<  92  3.2<L>   |  37<H>  |  0.31<L>    Ca    8.5      30 Jul 2019 06:15  Phos  2.1     07-29  Mg     1.7     07-29    TPro  4.5<L>  /  Alb  2.8<L>  /  TBili  1.5<H>  /  DBili  0.5<H>  /  AST  62<H>  /  ALT  62<H>  /  AlkPhos  180<H>  07-30                RADIOLOGY & ADDITIONAL TESTS:    Imaging Personally Reviewed:    Consultant(s) Notes Reviewed:      Care Discussed with Consultants/Other Providers: Patient is a 79y old  Female who presents with a chief complaint of SOB (30 Jul 2019 08:04)      SUBJECTIVE / OVERNIGHT EVENTS: Sitting up at edge of bed with NC states she feels weak. denies N/V/D    MEDICATIONS  (STANDING):  allopurinol 300 milliGRAM(s) Oral daily  apixaban 2.5 milliGRAM(s) Oral every 12 hours  atovaquone Suspension 1500 milliGRAM(s) Oral daily  benzocaine 15 mG/menthol 3.6 mG (Sugar-Free) Lozenge 1 Lozenge Oral every 4 hours  docusate sodium 100 milliGRAM(s) Oral three times a day  furosemide    Tablet 20 milliGRAM(s) Oral <User Schedule>  levalbuterol Inhalation 0.63 milliGRAM(s) Inhalation every 4 hours  melatonin 1.5 milliGRAM(s) Oral at bedtime  metoprolol succinate  milliGRAM(s) Oral daily  pantoprazole    Tablet 40 milliGRAM(s) Oral before breakfast  posaconazole DR Tablet 300 milliGRAM(s) Oral every 24 hours  potassium chloride    Tablet ER 40 milliEquivalent(s) Oral every 4 hours  predniSONE   Tablet 20 milliGRAM(s) Oral daily  sodium chloride 7% Inhalation 4 milliLiter(s) Inhalation three times a day  valACYclovir 500 milliGRAM(s) Oral <User Schedule>    MEDICATIONS  (PRN):  benzonatate 100 milliGRAM(s) Oral three times a day PRN Cough  bisacodyl 5 milliGRAM(s) Oral every 12 hours PRN Constipation  guaiFENesin    Syrup 200 milliGRAM(s) Oral every 6 hours PRN Cough  HYDROmorphone  Injectable 2 milliGRAM(s) IV Push every 2 hours PRN Severe Pain (7 - 10)  polyethylene glycol 3350 17 Gram(s) Oral daily PRN Constipation  sodium chloride 0.65% Nasal 1 Spray(s) Both Nostrils daily PRN Nasal Congestion        CAPILLARY BLOOD GLUCOSE        I&O's Summary    29 Jul 2019 07:01  -  30 Jul 2019 07:00  --------------------------------------------------------  IN: 350 mL / OUT: 950 mL / NET: -600 mL        T(C): 36.4 (07-30-19 @ 05:52), Max: 36.4 (07-29-19 @ 22:44)  HR: 102 (07-30-19 @ 05:59) (75 - 104)  BP: 112/82 (07-30-19 @ 05:59) (100/66 - 112/82)  RR: 20 (07-30-19 @ 05:52) (19 - 20)  SpO2: 96% (07-30-19 @ 05:52) (96% - 98%)    PHYSICAL EXAM:  GENERAL:  on high flow NC  HEAD:  Atraumatic, Normocephalic  CHEST/LUNG: decrease BS RT no overt wheezing   HEART: tachycardia; No murmurs, rubs, or gallops  ABDOMEN: Soft, Nontender, Nondistended; Bowel sounds present  EXTREMITIES:   cool, edematous, pitting L>R  NEUROLOGY: non-focal      LABS:                        11.7   2.03  )-----------( 112      ( 30 Jul 2019 06:15 )             36.1     07-30    134<L>  |  87<L>  |  14  ----------------------------<  92  3.2<L>   |  37<H>  |  0.31<L>    Ca    8.5      30 Jul 2019 06:15  Phos  2.1     07-29  Mg     1.7     07-29    TPro  4.5<L>  /  Alb  2.8<L>  /  TBili  1.5<H>  /  DBili  0.5<H>  /  AST  62<H>  /  ALT  62<H>  /  AlkPhos  180<H>  07-30                RADIOLOGY & ADDITIONAL TESTS:    Imaging Personally Reviewed:    Consultant(s) Notes Reviewed:      Care Discussed with Consultants/Other Providers:

## 2019-07-30 NOTE — PROGRESS NOTE ADULT - SUBJECTIVE AND OBJECTIVE BOX
Follow Up:      Inverval History/ROS:Patient is a 79y old  Female who presents with a chief complaint of SOB (30 Jul 2019 09:07)    Lethargic /short of breath    Allergies    No Known Allergies    Intolerances        ANTIMICROBIALS:  atovaquone Suspension 1500 daily  posaconazole DR Tablet 300 every 24 hours  valACYclovir 500 <User Schedule>      OTHER MEDS:  allopurinol 300 milliGRAM(s) Oral daily  apixaban 2.5 milliGRAM(s) Oral every 12 hours  benzocaine 15 mG/menthol 3.6 mG (Sugar-Free) Lozenge 1 Lozenge Oral every 4 hours  benzonatate 100 milliGRAM(s) Oral three times a day PRN  bisacodyl 5 milliGRAM(s) Oral every 12 hours PRN  docusate sodium 100 milliGRAM(s) Oral three times a day  furosemide    Tablet 20 milliGRAM(s) Oral <User Schedule>  guaiFENesin    Syrup 200 milliGRAM(s) Oral every 6 hours PRN  HYDROmorphone  Injectable 2 milliGRAM(s) IV Push every 2 hours PRN  levalbuterol Inhalation 0.63 milliGRAM(s) Inhalation every 4 hours  melatonin 1.5 milliGRAM(s) Oral at bedtime  metoprolol succinate  milliGRAM(s) Oral daily  pantoprazole    Tablet 40 milliGRAM(s) Oral before breakfast  polyethylene glycol 3350 17 Gram(s) Oral daily PRN  predniSONE   Tablet 20 milliGRAM(s) Oral daily  sodium chloride 0.65% Nasal 1 Spray(s) Both Nostrils daily PRN  sodium chloride 7% Inhalation 4 milliLiter(s) Inhalation three times a day      Vital Signs Last 24 Hrs  T(C): 36.3 (30 Jul 2019 14:34), Max: 36.4 (29 Jul 2019 22:44)  T(F): 97.4 (30 Jul 2019 14:34), Max: 97.6 (30 Jul 2019 05:52)  HR: 96 (30 Jul 2019 14:34) (90 - 108)  BP: 120/83 (30 Jul 2019 14:34) (100/66 - 120/83)  BP(mean): --  RR: 20 (30 Jul 2019 14:34) (19 - 20)  SpO2: 98% (30 Jul 2019 14:34) (96% - 98%)    PHYSICAL EXAM:  General: [x ] lethargic  HEAD/EYES: [ ] PERRL [x ] white sclera [ ] icterus  ENT:  [ ] normal [ x] supple [ ] thrush [ ] pharyngeal exudate  Cardiovascular:   [ ] murmur [ x] normal [ ] PPM/AICD  Respiratory:  [x ] course bs  GI:  [x ] soft, non-tender, normal bowel sounds  :  [ ] bledsoe [ ] no CVA tenderness   Musculoskeletal:  [ ] no synovitis  Neurologic:  [ ] non-focal exam   Skin:  [ x] no rash  Lymph: [ ] no lymphadenopathy  Psychiatric:  x[ ] appropriate affect [ ] alert & oriented  Lines:  [x ] no phlebitis [ ] central line                                11.7   2.03  )-----------( 112      ( 30 Jul 2019 06:15 )             36.1       07-30    134<L>  |  87<L>  |  14  ----------------------------<  92  3.2<L>   |  37<H>  |  0.31<L>    Ca    8.5      30 Jul 2019 06:15  Phos  2.1     07-29  Mg     1.6     07-30    TPro  4.5<L>  /  Alb  2.8<L>  /  TBili  1.5<H>  /  DBili  0.5<H>  /  AST  62<H>  /  ALT  62<H>  /  AlkPhos  180<H>  07-30          MICROBIOLOGY:    RADIOLOGY:    < from: CT Angio Chest w/ IV Cont (07.30.19 @ 13:20) >  IMPRESSION:   Worsening bilateral pleural effusions.    Extensive pulmonary nodules and masses are increased in size.    Worsening central airways stenosis and right-sided atelectasis.    No evidence of pulmonary embolus.    A new periceliac lesion may represent a lymph node or hematoma.    Indeterminate 1.7cm pancreatic head lesion.    < end of copied text >

## 2019-07-31 LAB
ALBUMIN SERPL ELPH-MCNC: 2.8 G/DL — LOW (ref 3.3–5)
ALP SERPL-CCNC: 177 U/L — HIGH (ref 40–120)
ALT FLD-CCNC: 56 U/L — HIGH (ref 4–33)
ANION GAP SERPL CALC-SCNC: 9 MMO/L — SIGNIFICANT CHANGE UP (ref 7–14)
AST SERPL-CCNC: 54 U/L — HIGH (ref 4–32)
BILIRUB DIRECT SERPL-MCNC: 0.4 MG/DL — HIGH (ref 0.1–0.2)
BILIRUB SERPL-MCNC: 1.4 MG/DL — HIGH (ref 0.2–1.2)
BUN SERPL-MCNC: 12 MG/DL — SIGNIFICANT CHANGE UP (ref 7–23)
CALCIUM SERPL-MCNC: 8.8 MG/DL — SIGNIFICANT CHANGE UP (ref 8.4–10.5)
CHLORIDE SERPL-SCNC: 91 MMOL/L — LOW (ref 98–107)
CO2 SERPL-SCNC: 37 MMOL/L — HIGH (ref 22–31)
CREAT SERPL-MCNC: 0.32 MG/DL — LOW (ref 0.5–1.3)
GLUCOSE SERPL-MCNC: 83 MG/DL — SIGNIFICANT CHANGE UP (ref 70–99)
HCT VFR BLD CALC: 35.4 % — SIGNIFICANT CHANGE UP (ref 34.5–45)
HGB BLD-MCNC: 11.7 G/DL — SIGNIFICANT CHANGE UP (ref 11.5–15.5)
MAGNESIUM SERPL-MCNC: 1.6 MG/DL — SIGNIFICANT CHANGE UP (ref 1.6–2.6)
MCHC RBC-ENTMCNC: 33.1 % — SIGNIFICANT CHANGE UP (ref 32–36)
MCHC RBC-ENTMCNC: 34.8 PG — HIGH (ref 27–34)
MCV RBC AUTO: 105.4 FL — HIGH (ref 80–100)
NRBC # FLD: 0 K/UL — SIGNIFICANT CHANGE UP (ref 0–0)
PHOSPHATE SERPL-MCNC: 2.3 MG/DL — LOW (ref 2.5–4.5)
PLATELET # BLD AUTO: 97 K/UL — LOW (ref 150–400)
PMV BLD: 12.2 FL — SIGNIFICANT CHANGE UP (ref 7–13)
POTASSIUM SERPL-MCNC: 3.7 MMOL/L — SIGNIFICANT CHANGE UP (ref 3.5–5.3)
POTASSIUM SERPL-SCNC: 3.7 MMOL/L — SIGNIFICANT CHANGE UP (ref 3.5–5.3)
PROT SERPL-MCNC: 4.5 G/DL — LOW (ref 6–8.3)
RBC # BLD: 3.36 M/UL — LOW (ref 3.8–5.2)
RBC # FLD: 19.9 % — HIGH (ref 10.3–14.5)
SODIUM SERPL-SCNC: 137 MMOL/L — SIGNIFICANT CHANGE UP (ref 135–145)
WBC # BLD: 2.23 K/UL — LOW (ref 3.8–10.5)
WBC # FLD AUTO: 2.23 K/UL — LOW (ref 3.8–10.5)

## 2019-07-31 PROCEDURE — 99232 SBSQ HOSP IP/OBS MODERATE 35: CPT

## 2019-07-31 RX ORDER — FUROSEMIDE 40 MG
20 TABLET ORAL
Refills: 0 | Status: DISCONTINUED | OUTPATIENT
Start: 2019-08-01 | End: 2019-08-06

## 2019-07-31 RX ADMIN — POSACONAZOLE 300 MILLIGRAM(S): 100 TABLET, DELAYED RELEASE ORAL at 16:53

## 2019-07-31 RX ADMIN — PANTOPRAZOLE SODIUM 40 MILLIGRAM(S): 20 TABLET, DELAYED RELEASE ORAL at 05:10

## 2019-07-31 RX ADMIN — VALACYCLOVIR 500 MILLIGRAM(S): 500 TABLET, FILM COATED ORAL at 16:54

## 2019-07-31 RX ADMIN — Medication 100 MILLIGRAM(S): at 05:10

## 2019-07-31 RX ADMIN — BENZOCAINE AND MENTHOL 1 LOZENGE: 5; 1 LIQUID ORAL at 16:55

## 2019-07-31 RX ADMIN — LEVALBUTEROL 0.63 MILLIGRAM(S): 1.25 SOLUTION, CONCENTRATE RESPIRATORY (INHALATION) at 09:26

## 2019-07-31 RX ADMIN — BENZOCAINE AND MENTHOL 1 LOZENGE: 5; 1 LIQUID ORAL at 12:06

## 2019-07-31 RX ADMIN — LEVALBUTEROL 0.63 MILLIGRAM(S): 1.25 SOLUTION, CONCENTRATE RESPIRATORY (INHALATION) at 13:15

## 2019-07-31 RX ADMIN — LEVALBUTEROL 0.63 MILLIGRAM(S): 1.25 SOLUTION, CONCENTRATE RESPIRATORY (INHALATION) at 16:39

## 2019-07-31 RX ADMIN — SODIUM CHLORIDE 4 MILLILITER(S): 9 INJECTION INTRAMUSCULAR; INTRAVENOUS; SUBCUTANEOUS at 09:41

## 2019-07-31 RX ADMIN — LEVALBUTEROL 0.63 MILLIGRAM(S): 1.25 SOLUTION, CONCENTRATE RESPIRATORY (INHALATION) at 20:51

## 2019-07-31 RX ADMIN — Medication 100 MILLIGRAM(S): at 21:12

## 2019-07-31 RX ADMIN — Medication 20 MILLIGRAM(S): at 05:10

## 2019-07-31 RX ADMIN — Medication 150 MILLIGRAM(S): at 05:10

## 2019-07-31 RX ADMIN — BENZOCAINE AND MENTHOL 1 LOZENGE: 5; 1 LIQUID ORAL at 21:10

## 2019-07-31 RX ADMIN — ATOVAQUONE 1500 MILLIGRAM(S): 750 SUSPENSION ORAL at 12:07

## 2019-07-31 RX ADMIN — Medication 1.5 MILLIGRAM(S): at 21:12

## 2019-07-31 RX ADMIN — LEVALBUTEROL 0.63 MILLIGRAM(S): 1.25 SOLUTION, CONCENTRATE RESPIRATORY (INHALATION) at 05:13

## 2019-07-31 RX ADMIN — SODIUM CHLORIDE 4 MILLILITER(S): 9 INJECTION INTRAMUSCULAR; INTRAVENOUS; SUBCUTANEOUS at 01:18

## 2019-07-31 RX ADMIN — LEVALBUTEROL 0.63 MILLIGRAM(S): 1.25 SOLUTION, CONCENTRATE RESPIRATORY (INHALATION) at 01:10

## 2019-07-31 RX ADMIN — APIXABAN 2.5 MILLIGRAM(S): 2.5 TABLET, FILM COATED ORAL at 05:10

## 2019-07-31 RX ADMIN — APIXABAN 2.5 MILLIGRAM(S): 2.5 TABLET, FILM COATED ORAL at 16:55

## 2019-07-31 RX ADMIN — Medication 300 MILLIGRAM(S): at 12:05

## 2019-07-31 RX ADMIN — SODIUM CHLORIDE 4 MILLILITER(S): 9 INJECTION INTRAMUSCULAR; INTRAVENOUS; SUBCUTANEOUS at 16:47

## 2019-07-31 NOTE — PROGRESS NOTE ADULT - PROBLEM SELECTOR PLAN 4
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

## 2019-07-31 NOTE — PROGRESS NOTE ADULT - PROBLEM SELECTOR PLAN 3
[place] [facility] [place], [facility] is not offering any disease modifying treatment. Pt is looking for experimental trial if pt clinically improves from current medical issues. Email sent to Dr. Nguyen by Dr Landeros would need good performance status for experimental therapy. As per palliative care d/w Dr. Sidhu no further DMT due to poor functional status.   - C/w ppx mepron, valacyclovir and allopurinol  - GOC discussed  poor prognosis   - case d/w family, palliative care and pulmonary family agreeable to home hospice

## 2019-07-31 NOTE — PROGRESS NOTE ADULT - PROBLEM SELECTOR PLAN 1
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

## 2019-07-31 NOTE — PROGRESS NOTE ADULT - SUBJECTIVE AND OBJECTIVE BOX
Patient is a 79y old  Female who presents with a chief complaint of SOB (30 Jul 2019 17:31)      SUBJECTIVE / OVERNIGHT EVENTS: s/p CT scan yesterday daughter at bedside states respiratory status varies throughout day. Tele afib 100's. no CP/SOB     MEDICATIONS  (STANDING):  allopurinol 300 milliGRAM(s) Oral daily  apixaban 2.5 milliGRAM(s) Oral every 12 hours  atovaquone Suspension 1500 milliGRAM(s) Oral daily  benzocaine 15 mG/menthol 3.6 mG (Sugar-Free) Lozenge 1 Lozenge Oral every 4 hours  docusate sodium 100 milliGRAM(s) Oral three times a day  furosemide    Tablet 20 milliGRAM(s) Oral <User Schedule>  levalbuterol Inhalation 0.63 milliGRAM(s) Inhalation every 4 hours  melatonin 1.5 milliGRAM(s) Oral at bedtime  metoprolol succinate  milliGRAM(s) Oral daily  pantoprazole    Tablet 40 milliGRAM(s) Oral before breakfast  posaconazole DR Tablet 300 milliGRAM(s) Oral every 24 hours  predniSONE   Tablet 20 milliGRAM(s) Oral daily  sodium chloride 7% Inhalation 4 milliLiter(s) Inhalation three times a day  valACYclovir 500 milliGRAM(s) Oral <User Schedule>    MEDICATIONS  (PRN):  benzonatate 100 milliGRAM(s) Oral three times a day PRN Cough  bisacodyl 5 milliGRAM(s) Oral every 12 hours PRN Constipation  guaiFENesin    Syrup 200 milliGRAM(s) Oral every 6 hours PRN Cough  HYDROmorphone  Injectable 2 milliGRAM(s) IV Push every 2 hours PRN Severe Pain (7 - 10)  polyethylene glycol 3350 17 Gram(s) Oral daily PRN Constipation  sodium chloride 0.65% Nasal 1 Spray(s) Both Nostrils daily PRN Nasal Congestion        CAPILLARY BLOOD GLUCOSE        I&O's Summary    30 Jul 2019 07:01  -  31 Jul 2019 07:00  --------------------------------------------------------  IN: 400 mL / OUT: 600 mL / NET: -200 mL        T(C): 36.4 (07-31-19 @ 05:07), Max: 36.7 (07-30-19 @ 22:44)  HR: 95 (07-31-19 @ 05:07) (86 - 104)  BP: 110/64 (07-31-19 @ 05:07) (108/77 - 120/83)  RR: 18 (07-31-19 @ 05:07) (18 - 20)  SpO2: 96% (07-31-19 @ 05:13) (96% - 98%)    PHYSICAL EXAM:  GENERAL:  on NC  HEAD:  Atraumatic, Normocephalic  CHEST/LUNG: decrease BS RT base rhonci b/l   HEART:s1/s2 No murmurs, rubs, or gallops  ABDOMEN: Soft, Nontender, Nondistended; Bowel sounds present  EXTREMITIES:   anasarca  NEUROLOGY: non-focal      LABS:                        11.7   2.23  )-----------( 97       ( 31 Jul 2019 07:03 )             35.4     07-31    137  |  91<L>  |  12  ----------------------------<  83  3.7   |  37<H>  |  0.32<L>    Ca    8.8      31 Jul 2019 07:03  Phos  2.3     07-31  Mg     1.6     07-31    TPro  4.5<L>  /  Alb  2.8<L>  /  TBili  1.4<H>  /  DBili  0.4<H>  /  AST  54<H>  /  ALT  56<H>  /  AlkPhos  177<H>  07-31                RADIOLOGY & ADDITIONAL TESTS:    Imaging Personally Reviewed:< from: CT Angio Chest w/ IV Cont (07.30.19 @ 13:20) >  IMPRESSION:   Worsening bilateral pleural effusions.    Extensive pulmonary nodules and masses are increased in size.    Worsening central airways stenosis and right-sided atelectasis.    No evidence of pulmonary embolus.    A new periceliac lesion may represent a lymph node or hematoma.    Indeterminate 1.7cm pancreatic head lesion.    < end of copied text >      Consultant(s) Notes Reviewed:      Care Discussed with Consultants/Other Providers:

## 2019-07-31 NOTE — PROGRESS NOTE ADULT - SUBJECTIVE AND OBJECTIVE BOX
Follow Up:      Inverval History/ROS:Patient is a 79y old  Female who presents with a chief complaint of SOB (31 Jul 2019 11:13)    More alert.   On Nasal canula      Allergies    No Known Allergies    Intolerances        ANTIMICROBIALS:  atovaquone Suspension 1500 daily  posaconazole DR Tablet 300 every 24 hours  valACYclovir 500 <User Schedule>      OTHER MEDS:  allopurinol 300 milliGRAM(s) Oral daily  apixaban 2.5 milliGRAM(s) Oral every 12 hours  benzocaine 15 mG/menthol 3.6 mG (Sugar-Free) Lozenge 1 Lozenge Oral every 4 hours  benzonatate 100 milliGRAM(s) Oral three times a day PRN  bisacodyl 5 milliGRAM(s) Oral every 12 hours PRN  docusate sodium 100 milliGRAM(s) Oral three times a day  guaiFENesin    Syrup 200 milliGRAM(s) Oral every 6 hours PRN  HYDROmorphone  Injectable 2 milliGRAM(s) IV Push every 2 hours PRN  levalbuterol Inhalation 0.63 milliGRAM(s) Inhalation every 4 hours  melatonin 1.5 milliGRAM(s) Oral at bedtime  metoprolol succinate  milliGRAM(s) Oral daily  pantoprazole    Tablet 40 milliGRAM(s) Oral before breakfast  polyethylene glycol 3350 17 Gram(s) Oral daily PRN  predniSONE   Tablet 10 milliGRAM(s) Oral daily  sodium chloride 0.65% Nasal 1 Spray(s) Both Nostrils daily PRN  sodium chloride 7% Inhalation 4 milliLiter(s) Inhalation three times a day      Vital Signs Last 24 Hrs  T(C): 36.4 (31 Jul 2019 05:07), Max: 36.7 (30 Jul 2019 22:44)  T(F): 97.6 (31 Jul 2019 05:07), Max: 98 (30 Jul 2019 22:44)  HR: 92 (31 Jul 2019 09:26) (86 - 97)  BP: 110/64 (31 Jul 2019 05:07) (108/77 - 110/64)  BP(mean): 82 (31 Jul 2019 05:07) (82 - 87)  RR: 18 (31 Jul 2019 05:07) (18 - 20)  SpO2: 98% (31 Jul 2019 09:26) (96% - 98%)    PHYSICAL EXAM:  General: [ ] non-toxic  HEAD/EYES: [ ] PERRL x[ ] white sclera [ ] icterus  ENT:  [ ] normal [ x] supple [ ] thrush [ ] pharyngeal exudate  Cardiovascular:   [ ] murmur [x ] normal [ ] PPM/AICD  Respiratory:  [x ] clear to ausculation bilaterally  GI:  [x] soft, non-tender, normal bowel sounds  :  [ ] bledsoe [ ] no CVA tenderness   Musculoskeletal:  [ ] no synovitis  Neurologic:  [ ] non-focal exam   Skin:  [ x] no rash  Lymph: [ x] no lymphadenopathy  Psychiatric:  [ ] appropriate affect [ ] alert & oriented  Lines:  x[ ] no phlebitis [ ] central line                                11.7   2.23  )-----------( 97       ( 31 Jul 2019 07:03 )             35.4       07-31    137  |  91<L>  |  12  ----------------------------<  83  3.7   |  37<H>  |  0.32<L>    Ca    8.8      31 Jul 2019 07:03  Phos  2.3     07-31  Mg     1.6     07-31    TPro  4.5<L>  /  Alb  2.8<L>  /  TBili  1.4<H>  /  DBili  0.4<H>  /  AST  54<H>  /  ALT  56<H>  /  AlkPhos  177<H>  07-31          MICROBIOLOGY:    RADIOLOGY:

## 2019-07-31 NOTE — PROGRESS NOTE ADULT - ASSESSMENT
79 year old with relapsed B cell lymphoma (dx 2017, now relaplse)   Treated with rituximab and revlimid in 3/2019  Treated with Obintuzmab and Bendamustine iin 4/2019    Presents with shortness of breath  She had abnormal imaging with lung nodules    Aspergillosis    1) Fungal pneumonia  aspergillosis on culture    Changed to posaconazole due to elevated lfts  Lfts are now improving    Treatment of fungal pneumonia depends on antifungals and tx of underlying malignancy  At this time patient is not a candidate for lymphoma treatment.  CT shows progression of nodules.  I do not think this is antifungal failure, more likely due to ongoing immunosuppression from lymphoma.     Unclear that antifungals are going to significantly change outcome. Prognosis is grave.     Can continue posaconazole for 4 more weeks. (prior authorization obtained and medication available at [facility])  If her condition deteriorates can discontinue antifungals if antimicrobials are not amenable with the patient's goals of care.     2) Transaminitis:   Improved  RUQ us unremarkable    3) Immunosuppressed secondary to cancer tx  PCP prophylaxis  Continue mepron    Valtrex prophylaxis    4) Delirium: improved with sleep    Prognosis guarded.    Call the ID service with questions or concerns .  Will sign off.

## 2019-07-31 NOTE — PROGRESS NOTE ADULT - PROBLEM SELECTOR PLAN 10
Full code.  Appreciate palliative care consult. home with hospice. case d/w CM/SW on rounds and supply delivery. await home hospice.

## 2019-07-31 NOTE — PROGRESS NOTE ADULT - PROBLEM SELECTOR PLAN 2
RLL bronchus obstruction with concern for postobstructive & fungal PNA  - c/w continuous pulse ox  - c/w chest PT and incentive spirometry   - c/w standing nebs; Xopenex q4 hours, following by nebulized 3% hypertonic saline, and then Aerobika device as per Pulm recommendations  - s/p meropenem on 7/20   - palliative recs appreciated conveyed poor prognosis to family still wishes pt to be full code.  -change to prednisone 10   - as per pulm fellow states will not benefit from thoracentesis or pleurex due to endobronchial lesion cont diuresis.  - HCO3 increased due to contraction alkalosis and hyponatremia change lasix to 20 q daily case d/w daughter at bedside worsening effusion/disease on CT chest given symptomatic improvement with diuretics will continue as tolerated and stop checking labs

## 2019-08-01 LAB
ALBUMIN SERPL ELPH-MCNC: 3.2 G/DL — LOW (ref 3.3–5)
ALP SERPL-CCNC: 191 U/L — HIGH (ref 40–120)
ALT FLD-CCNC: 57 U/L — HIGH (ref 4–33)
ANION GAP SERPL CALC-SCNC: 13 MMO/L — SIGNIFICANT CHANGE UP (ref 7–14)
ANISOCYTOSIS BLD QL: SIGNIFICANT CHANGE UP
APTT BLD: 26.5 SEC — LOW (ref 27.5–36.3)
AST SERPL-CCNC: 57 U/L — HIGH (ref 4–32)
BASE EXCESS BLDA CALC-SCNC: 11.9 MMOL/L — SIGNIFICANT CHANGE UP
BASOPHILS # BLD AUTO: 0.04 K/UL — SIGNIFICANT CHANGE UP (ref 0–0.2)
BASOPHILS NFR BLD AUTO: 1.2 % — SIGNIFICANT CHANGE UP (ref 0–2)
BASOPHILS NFR SPEC: 0 % — SIGNIFICANT CHANGE UP (ref 0–2)
BILIRUB SERPL-MCNC: 1.7 MG/DL — HIGH (ref 0.2–1.2)
BLASTS # FLD: 0 % — SIGNIFICANT CHANGE UP (ref 0–0)
BUN SERPL-MCNC: 12 MG/DL — SIGNIFICANT CHANGE UP (ref 7–23)
CA-I BLDA-SCNC: 1.19 MMOL/L — SIGNIFICANT CHANGE UP (ref 1.15–1.29)
CALCIUM SERPL-MCNC: 8.8 MG/DL — SIGNIFICANT CHANGE UP (ref 8.4–10.5)
CHLORIDE SERPL-SCNC: 89 MMOL/L — LOW (ref 98–107)
CO2 SERPL-SCNC: 35 MMOL/L — HIGH (ref 22–31)
CREAT SERPL-MCNC: 0.33 MG/DL — LOW (ref 0.5–1.3)
EOSINOPHIL # BLD AUTO: 0.02 K/UL — SIGNIFICANT CHANGE UP (ref 0–0.5)
EOSINOPHIL NFR BLD AUTO: 0.6 % — SIGNIFICANT CHANGE UP (ref 0–6)
EOSINOPHIL NFR FLD: 0 % — SIGNIFICANT CHANGE UP (ref 0–6)
GIANT PLATELETS BLD QL SMEAR: PRESENT — SIGNIFICANT CHANGE UP
GLUCOSE BLDA-MCNC: 144 MG/DL — HIGH (ref 70–99)
GLUCOSE SERPL-MCNC: 135 MG/DL — HIGH (ref 70–99)
HCO3 BLDA-SCNC: 33 MMOL/L — HIGH (ref 22–26)
HCT VFR BLD CALC: 37.4 % — SIGNIFICANT CHANGE UP (ref 34.5–45)
HCT VFR BLDA CALC: 38.2 % — SIGNIFICANT CHANGE UP (ref 34.5–46.5)
HGB BLD-MCNC: 11.9 G/DL — SIGNIFICANT CHANGE UP (ref 11.5–15.5)
HGB BLDA-MCNC: 12.4 G/DL — SIGNIFICANT CHANGE UP (ref 11.5–15.5)
IMM GRANULOCYTES NFR BLD AUTO: 16.1 % — HIGH (ref 0–1.5)
INR BLD: 1.2 — HIGH (ref 0.88–1.17)
LACTATE BLDA-SCNC: 3.5 MMOL/L — HIGH (ref 0.5–2)
LACTATE SERPL-SCNC: 3.6 MMOL/L — HIGH (ref 0.5–2)
LYMPHOCYTES # BLD AUTO: 0.89 K/UL — LOW (ref 1–3.3)
LYMPHOCYTES # BLD AUTO: 27.1 % — SIGNIFICANT CHANGE UP (ref 13–44)
LYMPHOCYTES NFR SPEC AUTO: 13.3 % — SIGNIFICANT CHANGE UP (ref 13–44)
MACROCYTES BLD QL: SLIGHT — SIGNIFICANT CHANGE UP
MAGNESIUM SERPL-MCNC: 1.7 MG/DL — SIGNIFICANT CHANGE UP (ref 1.6–2.6)
MCHC RBC-ENTMCNC: 31.8 % — LOW (ref 32–36)
MCHC RBC-ENTMCNC: 33 PG — SIGNIFICANT CHANGE UP (ref 27–34)
MCV RBC AUTO: 103.6 FL — HIGH (ref 80–100)
METAMYELOCYTES # FLD: 12.4 % — HIGH (ref 0–1)
MICROCYTES BLD QL: SLIGHT — SIGNIFICANT CHANGE UP
MONOCYTES # BLD AUTO: 0.32 K/UL — SIGNIFICANT CHANGE UP (ref 0–0.9)
MONOCYTES NFR BLD AUTO: 9.7 % — SIGNIFICANT CHANGE UP (ref 2–14)
MONOCYTES NFR BLD: 10.6 % — HIGH (ref 2–9)
MYELOCYTES NFR BLD: 6.2 % — HIGH (ref 0–0)
NEUTROPHIL AB SER-ACNC: 31.9 % — LOW (ref 43–77)
NEUTROPHILS # BLD AUTO: 1.49 K/UL — LOW (ref 1.8–7.4)
NEUTROPHILS NFR BLD AUTO: 45.3 % — SIGNIFICANT CHANGE UP (ref 43–77)
NEUTS BAND # BLD: 10.6 % — HIGH (ref 0–6)
NRBC # FLD: 0 K/UL — SIGNIFICANT CHANGE UP (ref 0–0)
OTHER - HEMATOLOGY %: 0 — SIGNIFICANT CHANGE UP
OVALOCYTES BLD QL SMEAR: SLIGHT — SIGNIFICANT CHANGE UP
PCO2 BLDA: 79 MMHG — CRITICAL HIGH (ref 32–48)
PH BLDA: 7.31 PH — LOW (ref 7.35–7.45)
PHOSPHATE SERPL-MCNC: 3 MG/DL — SIGNIFICANT CHANGE UP (ref 2.5–4.5)
PLATELET # BLD AUTO: 142 K/UL — LOW (ref 150–400)
PLATELET COUNT - ESTIMATE: SIGNIFICANT CHANGE UP
PMV BLD: 11.9 FL — SIGNIFICANT CHANGE UP (ref 7–13)
PO2 BLDA: 193 MMHG — HIGH (ref 83–108)
POIKILOCYTOSIS BLD QL AUTO: SLIGHT — SIGNIFICANT CHANGE UP
POTASSIUM BLDA-SCNC: 3.7 MMOL/L — SIGNIFICANT CHANGE UP (ref 3.4–4.5)
POTASSIUM SERPL-MCNC: 3.8 MMOL/L — SIGNIFICANT CHANGE UP (ref 3.5–5.3)
POTASSIUM SERPL-SCNC: 3.8 MMOL/L — SIGNIFICANT CHANGE UP (ref 3.5–5.3)
PROMYELOCYTES # FLD: 0 % — SIGNIFICANT CHANGE UP (ref 0–0)
PROT SERPL-MCNC: 4.9 G/DL — LOW (ref 6–8.3)
PROTHROM AB SERPL-ACNC: 13.4 SEC — HIGH (ref 9.8–13.1)
RBC # BLD: 3.61 M/UL — LOW (ref 3.8–5.2)
RBC # FLD: 19.4 % — HIGH (ref 10.3–14.5)
SAO2 % BLDA: 99.2 % — HIGH (ref 95–99)
SMUDGE CELLS # BLD: PRESENT — SIGNIFICANT CHANGE UP
SODIUM BLDA-SCNC: 131 MMOL/L — LOW (ref 136–146)
SODIUM SERPL-SCNC: 137 MMOL/L — SIGNIFICANT CHANGE UP (ref 135–145)
VARIANT LYMPHS # BLD: 15 % — SIGNIFICANT CHANGE UP
WBC # BLD: 3.29 K/UL — LOW (ref 3.8–10.5)
WBC # FLD AUTO: 3.29 K/UL — LOW (ref 3.8–10.5)

## 2019-08-01 PROCEDURE — 71045 X-RAY EXAM CHEST 1 VIEW: CPT | Mod: 26

## 2019-08-01 PROCEDURE — 99233 SBSQ HOSP IP/OBS HIGH 50: CPT

## 2019-08-01 RX ORDER — HYDROMORPHONE HYDROCHLORIDE 2 MG/ML
0.2 INJECTION INTRAMUSCULAR; INTRAVENOUS; SUBCUTANEOUS
Refills: 0 | Status: DISCONTINUED | OUTPATIENT
Start: 2019-08-01 | End: 2019-08-02

## 2019-08-01 RX ORDER — FUROSEMIDE 40 MG
20 TABLET ORAL ONCE
Refills: 0 | Status: COMPLETED | OUTPATIENT
Start: 2019-08-01 | End: 2019-08-01

## 2019-08-01 RX ADMIN — Medication 100 MILLIGRAM(S): at 06:37

## 2019-08-01 RX ADMIN — Medication 20 MILLIGRAM(S): at 08:38

## 2019-08-01 RX ADMIN — LEVALBUTEROL 0.63 MILLIGRAM(S): 1.25 SOLUTION, CONCENTRATE RESPIRATORY (INHALATION) at 08:17

## 2019-08-01 RX ADMIN — Medication 300 MILLIGRAM(S): at 13:10

## 2019-08-01 RX ADMIN — APIXABAN 2.5 MILLIGRAM(S): 2.5 TABLET, FILM COATED ORAL at 06:37

## 2019-08-01 RX ADMIN — PANTOPRAZOLE SODIUM 40 MILLIGRAM(S): 20 TABLET, DELAYED RELEASE ORAL at 06:36

## 2019-08-01 RX ADMIN — Medication 150 MILLIGRAM(S): at 06:37

## 2019-08-01 RX ADMIN — Medication 10 MILLIGRAM(S): at 06:37

## 2019-08-01 RX ADMIN — LEVALBUTEROL 0.63 MILLIGRAM(S): 1.25 SOLUTION, CONCENTRATE RESPIRATORY (INHALATION) at 21:32

## 2019-08-01 RX ADMIN — LEVALBUTEROL 0.63 MILLIGRAM(S): 1.25 SOLUTION, CONCENTRATE RESPIRATORY (INHALATION) at 04:54

## 2019-08-01 RX ADMIN — LEVALBUTEROL 0.63 MILLIGRAM(S): 1.25 SOLUTION, CONCENTRATE RESPIRATORY (INHALATION) at 00:49

## 2019-08-01 RX ADMIN — LEVALBUTEROL 0.63 MILLIGRAM(S): 1.25 SOLUTION, CONCENTRATE RESPIRATORY (INHALATION) at 16:29

## 2019-08-01 RX ADMIN — LEVALBUTEROL 0.63 MILLIGRAM(S): 1.25 SOLUTION, CONCENTRATE RESPIRATORY (INHALATION) at 12:24

## 2019-08-01 RX ADMIN — SODIUM CHLORIDE 4 MILLILITER(S): 9 INJECTION INTRAMUSCULAR; INTRAVENOUS; SUBCUTANEOUS at 00:58

## 2019-08-01 RX ADMIN — HYDROMORPHONE HYDROCHLORIDE 2 MILLIGRAM(S): 2 INJECTION INTRAMUSCULAR; INTRAVENOUS; SUBCUTANEOUS at 09:00

## 2019-08-01 RX ADMIN — ATOVAQUONE 1500 MILLIGRAM(S): 750 SUSPENSION ORAL at 13:11

## 2019-08-01 NOTE — PROGRESS NOTE ADULT - PROBLEM SELECTOR PLAN 4
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

## 2019-08-01 NOTE — PROGRESS NOTE ADULT - SUBJECTIVE AND OBJECTIVE BOX
SUBJECTIVE AND OBJECTIVE:  Remains short of breath  INTERVAL HPI/OVERNIGHT EVENTS:  Patient received Dilaudid overnight for dyspnea. Felt to be too lethargic by family and was given Narcan.   DNR on chart: Yes    Allergies    No Known Allergies    Intolerances    MEDICATIONS  (STANDING):  allopurinol 300 milliGRAM(s) Oral daily  apixaban 2.5 milliGRAM(s) Oral every 12 hours  atovaquone Suspension 1500 milliGRAM(s) Oral daily  benzocaine 15 mG/menthol 3.6 mG (Sugar-Free) Lozenge 1 Lozenge Oral every 4 hours  docusate sodium 100 milliGRAM(s) Oral three times a day  furosemide    Tablet 20 milliGRAM(s) Oral <User Schedule>  levalbuterol Inhalation 0.63 milliGRAM(s) Inhalation every 4 hours  melatonin 1.5 milliGRAM(s) Oral at bedtime  metoprolol succinate  milliGRAM(s) Oral daily  nystatin Powder 1 Application(s) Topical two times a day  pantoprazole    Tablet 40 milliGRAM(s) Oral before breakfast  posaconazole DR Tablet 300 milliGRAM(s) Oral every 24 hours  potassium chloride    Tablet ER 20 milliEquivalent(s) Oral daily  predniSONE   Tablet 10 milliGRAM(s) Oral daily  sodium chloride 7% Inhalation 4 milliLiter(s) Inhalation three times a day  valACYclovir 500 milliGRAM(s) Oral <User Schedule>    MEDICATIONS  (PRN):  benzonatate 100 milliGRAM(s) Oral three times a day PRN Cough  bisacodyl 5 milliGRAM(s) Oral every 12 hours PRN Constipation  guaiFENesin    Syrup 200 milliGRAM(s) Oral every 6 hours PRN Cough  polyethylene glycol 3350 17 Gram(s) Oral daily PRN Constipation  sodium chloride 0.65% Nasal 1 Spray(s) Both Nostrils daily PRN Nasal Congestion      ITEMS UNCHECKED ARE NOT PRESENT    PRESENT SYMPTOMS: [x ]Unable to obtain due to poor mentation   Source if other than patient:  [ x]Family   [ ]Team     Pain (Impact on QOL):  None  Location:  Minimal acceptable level (0-10 scale):            Aggravating factors:  Quality:  Radiation:  Severity (0-10 scale):    Timing:    Dyspnea:                           [ ]Mild [x ]Moderate [ ]Severe  Anxiety:                             [x ]Mild [ ]Moderate [ ]Severe  Fatigue:                             [ ]Mild [ ]Moderate [x ]Severe  Nausea:                             [ ]Mild [ ]Moderate [ ]Severe  Loss of appetite:              [ ]Mild [ ]Moderate [ ]Severe  Constipation:                    [ ]Mild [ ]Moderate [ ]Severe  Grief Present                    [ ] Yes  [ ] No   PAIN AD Score:	  [email or web address] (Ctrl + left click to view)    Other Symptoms:  [ x]All other review of systems negative     Karnofsky Performance Score/Palliative Performance Status Version 2:  20       %    http://palliative.info/resource_material/[email or web address].pdf  PHYSICAL EXAM:  Vital Signs Last 24 Hrs  T(C): 36.3 (06 Aug 2019 05:37), Max: 36.4 (05 Aug 2019 21:14)  T(F): 97.4 (06 Aug 2019 05:37), Max: 97.6 (05 Aug 2019 21:14)  HR: 109 (06 Aug 2019 10:10) (71 - 114)  BP: 100/65 (06 Aug 2019 10:10) (100/65 - 131/93)  BP(mean): --  RR: 19 (06 Aug 2019 10:10) (18 - 20)  SpO2: 100% (06 Aug 2019 10:10) (95% - 100%) I&O's Summary    05 Aug 2019 07:01  -  06 Aug 2019 07:00  --------------------------------------------------------  IN: 580 mL / OUT: 1600 mL / NET: -1020 mL     GENERAL:  [x ]Alert  [ x]Oriented x3   [ ]Lethargic  [ x]Cachexia  [ ]Unarousable  [ ]Verbal  [ ]Non-Verbal  Behavioral:   [ ] Anxiety  [ ] Delirium [ ] Agitation [ x] Other  HEENT:  [ ]Normal   [ x]Dry mouth   [ ]ET Tube/Trach  [ ]Oral lesions  PULMONARY:   [ ]Clear [x ]Tachypnea  [ ]Audible excessive secretions   [x ]Rhonchi        [ ]Right [ ]Left [ x]Bilateral  [ ]Crackles        [ ]Right [ ]Left [ ]Bilateral  [ ]Wheezing     [ ]Right [ ]Left [ ]Bilateral  CARDIOVASCULAR:    [x ]Regular [ ]Irregular [ ]Tachy  [ ]Butch [ ]Murmur [ ]Other  GASTROINTESTINAL:  [xSoft  [ ]Distended   [x ]+BS  [ ]Non tender [ ]Tender  [ ]PEG [ ]OGT/ NGT   Last BM:    GENITOURINARY:  [ ]Normal x[ ] Incontinent   [ ]Oliguria/Anuria   [ ]Cespedes  MUSCULOSKELETAL:   [ ]Normal   [ ]Weakness  [ x]Bed/Wheelchair bound [ ]Edema  NEUROLOGIC:   [ x]No focal deficits  [ ] Cognitive impairment  [ ] Dysphagia [ ]Dysarthria [ ] Paresis [ ]Other   SKIN:   [ x]Normal   [ ]Pressure ulcer(s)  [ ]Rash    CRITICAL CARE:  [ ] Shock Present  [ ]Septic [ ]Cardiogenic [ ]Neurologic [ ]Hypovolemic  [ ]  Vasopressors [ ]  Inotropes   [x ] Respiratory failure present  [x ] Acute  [ ] Chronic [x ] Hypoxic  [ ] Hypercarbic [ ] Other  [ ] Other organ failure     LABS:                        11.3   3.13  )-----------( 122      ( 05 Aug 2019 05:55 )             33.8   08-06    130<L>  |  84<L>  |  13  ----------------------------<  105<H>  3.7   |  37<H>  |  0.23<L>    Ca    8.5      06 Aug 2019 07:40  Phos  2.4     08-06  Mg     1.3     08-06          RADIOLOGY & ADDITIONAL STUDIES:    Protein Calorie Malnutrition Present: [ ] yes [ ] no  [ ] PPSV2 < or = 30%  [ ] significant weight loss [ ] poor nutritional intake [ ] anasarca [ ] catabolic state Albumin, Serum: 3.2 g/dL (08-01-19 @ 09:24)  Artificial Nutrition [ ]     REFERRALS:   [ ]Chaplaincy  [ ] Hospice  [ ]Child Life  [ ]Social Work  [ ]Case management [ ]Holistic Therapy   Goals of Care Document: FANY Tsai (08-05-19 @ 16:33)  Goals of Care Conversation:   Advance Directives:  · Does patient have Advance Directive  Yes  · Indicate Type  Health Care Proxy (HCP)  · Agent's Name  Cleve  · Phone #  [phone]  · Are any of the items on the chart  Yes    Conversation Discussion:  · Conversation  Hospice Referral  · Conversation Details  [facility] - Consents resigned. A  w/c, portable O2 tank and a cough assist machine will be sent from [facility] in the AM.      Electronic Signatures:  May Tsai (MADELINE)  (Signed 05-Aug-2019 16:35)  	Authored: Goals of Care Conversation      Last Updated: 05-Aug-2019 16:35 by May Tsai)

## 2019-08-01 NOTE — PROGRESS NOTE ADULT - PROBLEM SELECTOR PLAN 3
Follows w/ Dr Nguyen at [facility] [place] who says no further dmt.  No further DMT at this time as pt not functional enough and poor respiratory status.  - Patient is awaiting home with hospice services.

## 2019-08-01 NOTE — CHART NOTE - NSCHARTNOTEFT_GEN_A_CORE
Source: Patient [ ]    Family [ ]     other [X ]    Current Diet : Dysphagia 2, Mechanical Soft, Nectar Consistency + Vital 500 Shake  Reported:  [ ] nausea  [ ] vomiting [ ] diarrhea [ ] constipation  [ ]chewing problems [ ] swallowing issues  [ ] other:   PO intake:  < 50% [X] 50-75% [ ]   % [ ]  other :  Current Weight: obtain current, admission Wt. 47.6kgs 7/1.     Nutrition follow-up 2/2 extended LOS.  Pt continues to have poor PO intake and with ongoing malnutrition diagnosis.  No reports of GI distress (nausea/vomiting/diarrhea/constipation.) Pt on Dysphagia 2, Mechanical Soft, [place] Consistency per speech and swallow recommendations.  Palliative following patient and plans for d/c to home hospice noted. noted 4+ edema of BLE    __________________ Pertinent Medications__________________   MEDICATIONS  (STANDING):  allopurinol 300 milliGRAM(s) Oral daily  apixaban 2.5 milliGRAM(s) Oral every 12 hours  atovaquone Suspension 1500 milliGRAM(s) Oral daily  benzocaine 15 mG/menthol 3.6 mG (Sugar-Free) Lozenge 1 Lozenge Oral every 4 hours  docusate sodium 100 milliGRAM(s) Oral three times a day  furosemide    Tablet 20 milliGRAM(s) Oral <User Schedule>  levalbuterol Inhalation 0.63 milliGRAM(s) Inhalation every 4 hours  melatonin 1.5 milliGRAM(s) Oral at bedtime  metoprolol succinate  milliGRAM(s) Oral daily  pantoprazole    Tablet 40 milliGRAM(s) Oral before breakfast  posaconazole DR Tablet 300 milliGRAM(s) Oral every 24 hours  predniSONE   Tablet 10 milliGRAM(s) Oral daily  sodium chloride 7% Inhalation 4 milliLiter(s) Inhalation three times a day  valACYclovir 500 milliGRAM(s) Oral <User Schedule>        __________________ Pertinent Labs__________________   08-01 Na137 mmol/L Glu 135 mg/dL<H> K+ 3.8 mmol/L Cr  0.33 mg/dL<L> BUN 12 mg/dL 08-01 Phos 3.0 mg/dL 08-01 Alb 3.2 g/dL<L>      Estimated Needs:   [X] no change since previous assessment  [ ] recalculated:       Previous Nutrition Diagnosis:     [ ] Inadequate Energy Intake [ ]Inadequate Oral Intake [ ] Excessive Energy Intake   [ ] Underweight [ ] Increased Nutrient Needs [ ] Overweight/Obesity   [ ] Altered GI Function [ ] Unintended Weight Loss [ ] Food & Nutrition Related Knowledge Deficit X ] Malnutrition     Nutrition Diagnosis is [X] ongoing  [ ] resolved [ ] not applicable     New Nutrition Diagnosis: [X] not applicable      Nutrition Recommendations:  1- Continue current diet order, which remains appropriate at this time.   2- Monitor weights, labs, BM's, skin integrity, p.o. intake.   3- Please Encourage po intake, assist with meals and menu selections, provide alternatives PRN.    4- RD remains available, re-consult as needed. Nuvia Sanders RD Pager #[phone]

## 2019-08-01 NOTE — PROGRESS NOTE ADULT - SUBJECTIVE AND OBJECTIVE BOX
Patient is a 79y old  Female who presents with a chief complaint of SOB (31 Jul 2019 14:43)      SUBJECTIVE / OVERNIGHT EVENTS: Pt noted to be in respiratory distress/apneic this AM RRT called as per RN/PA was hypoxic to 80's    MEDICATIONS  (STANDING):  allopurinol 300 milliGRAM(s) Oral daily  apixaban 2.5 milliGRAM(s) Oral every 12 hours  atovaquone Suspension 1500 milliGRAM(s) Oral daily  benzocaine 15 mG/menthol 3.6 mG (Sugar-Free) Lozenge 1 Lozenge Oral every 4 hours  docusate sodium 100 milliGRAM(s) Oral three times a day  furosemide    Tablet 20 milliGRAM(s) Oral <User Schedule>  levalbuterol Inhalation 0.63 milliGRAM(s) Inhalation every 4 hours  melatonin 1.5 milliGRAM(s) Oral at bedtime  metoprolol succinate  milliGRAM(s) Oral daily  pantoprazole    Tablet 40 milliGRAM(s) Oral before breakfast  posaconazole DR Tablet 300 milliGRAM(s) Oral every 24 hours  predniSONE   Tablet 10 milliGRAM(s) Oral daily  sodium chloride 7% Inhalation 4 milliLiter(s) Inhalation three times a day  valACYclovir 500 milliGRAM(s) Oral <User Schedule>    MEDICATIONS  (PRN):  benzonatate 100 milliGRAM(s) Oral three times a day PRN Cough  bisacodyl 5 milliGRAM(s) Oral every 12 hours PRN Constipation  guaiFENesin    Syrup 200 milliGRAM(s) Oral every 6 hours PRN Cough  HYDROmorphone  Injectable 0.2 milliGRAM(s) IV Push every 3 hours PRN shortness of breath  polyethylene glycol 3350 17 Gram(s) Oral daily PRN Constipation  sodium chloride 0.65% Nasal 1 Spray(s) Both Nostrils daily PRN Nasal Congestion        CAPILLARY BLOOD GLUCOSE      POCT Blood Glucose.: 143 mg/dL (01 Aug 2019 09:11)    I&O's Summary    31 Jul 2019 07:01  -  01 Aug 2019 07:00  --------------------------------------------------------  IN: 150 mL / OUT: 950 mL / NET: -800 mL        T(C): 36.3 (08-01-19 @ 06:33), Max: 36.6 (07-31-19 @ 20:33)  HR: 103 (08-01-19 @ 12:25) (82 - 118)  BP: 129/99 (08-01-19 @ 08:59) (112/80 - 136/94)  RR: 20 (08-01-19 @ 08:59) (18 - 22)  SpO2: 97% (08-01-19 @ 12:25) (93% - 99%)    PHYSICAL EXAM:  GENERAL: resp distress  HEAD:  Atraumatic, Normocephalic  CHEST/LUNG: decrease BS RT base transmitted BS    HEART:s1/s2 No murmurs, rubs, or gallops  ABDOMEN: Soft, Nontender, Nondistended; Bowel sounds present  EXTREMITIES:   anasarca  NEUROLOGY: lethargic     LABS:                        11.9   3.29  )-----------( 142      ( 01 Aug 2019 09:24 )             37.4     08-01    137  |  89<L>  |  12  ----------------------------<  135<H>  3.8   |  35<H>  |  0.33<L>    Ca    8.8      01 Aug 2019 09:24  Phos  3.0     08-01  Mg     1.7     08-01    TPro  4.9<L>  /  Alb  3.2<L>  /  TBili  1.7<H>  /  DBili  x   /  AST  57<H>  /  ALT  57<H>  /  AlkPhos  191<H>  08-01    PT/INR - ( 01 Aug 2019 09:24 )   PT: 13.4 SEC;   INR: 1.20          PTT - ( 01 Aug 2019 09:24 )  PTT:26.5 SEC            RADIOLOGY & ADDITIONAL TESTS:    Imaging Personally Reviewed:< from: Xray Chest 1 View- PORTABLE-Urgent (08.01.19 @ 09:38) >  IMPRESSION:  Worsening opacification of the right hemithorax compared to   the prior chest x-ray. This corresponds to a combination of pulmonary   nodules, masses, and a pleural effusion with passive atelectasis on the   CT scan.    Medial left lung opacities corresponding to nodules and masses on the CT   scan. Other nodules and masses are better seen on that exam.    Hazy opacity at the left base likely corresponds to a left pleural   effusion noted on the CT scan.    < end of copied text >      Consultant(s) Notes Reviewed:      Care Discussed with Consultants/Other Providers:

## 2019-08-01 NOTE — CHART NOTE - NSCHARTNOTEFT_GEN_A_CORE
Pt's was made DNR / DNI during the day - day team spoke with pt's son Uvaldo - HCP over the phone.   MOLST form was explained to pt's son/ HCP Uvaldomalgorzata White and was filled out by Mr. Uvaldo White. Witnessed by me and Bre Marlow hospitalist on call was notified regarding completion of MOLST form - pending attending signature

## 2019-08-01 NOTE — PROGRESS NOTE ADULT - PROBLEM SELECTOR PLAN 3
[place] with Dr. Nguyen at [facility] [place]. as per family, [facility] is not offering any disease modifying treatment. Pt is looking for experimental trial if pt clinically improves from current medical issues. Email sent to Dr. Nguyen by Dr Landeros would need good performance status for experimental therapy. As per palliative care d/w Dr. Sidhu no further DMT due to poor functional status.   - C/w ppx mepron, valacyclovir and allopurinol  - case d/w family, palliative care and pulmonary family agreeable to home hospice

## 2019-08-01 NOTE — CHART NOTE - NSCHARTNOTEFT_GEN_A_CORE
Called by RN due to patient c/o sob. Upon examination, patient had nasal cannula and was in mild respiratory distress. Patient had inspiratory and expiratory wheezing on auscultation.  Spoke to pulmonary team for evaluation. Patient was given Xopenex treatment, Lasix 20mg IVP x 1. Stat CXR ordered. Standing order of IV dilaudid 2 mg given. Patient became apneic and Rapid response was called. While RRT was in progress, pulmonary team spoke to her son about code status. Her son agreed to DNR/DNI. Attending aware of above.

## 2019-08-01 NOTE — RAPID RESPONSE TEAM SUMMARY - NSSITUATIONBACKGROUNDRRT_GEN_ALL_CORE
79F hx of DLBC lymphoma, Afib on eliquis presenting with three days of worsening SOB and productive cough x3 days found to have +entero/rhinovirus with possible superimposed PNA.    RRT called for tachypnea. Upon arrival, patient breathing at rate of 40, saturating 100% on nasal cannula, in afib with RVR in 130s-140s, and Bps 160s/70s. Patient given xopinex. Physical exam revealed course breath sounds and rales at bases, as well as upper airway secretions. Patient deep suctioned with minimal mucous retreived. She was given chest PT with some mucous expectorated. Patient placed on venti-mask. BiPAP not used given patient with mucous plugging and marked secretions. Patient then given 50 mg of IV solu-medrol, and 40mg of IV lasix for airway inflammation and diuresis respectively. Patient with improvement in respiration. ABG done and chest xray to be followed up by primary team. MICU consulted for respiratory distress.
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  RRT was called for respiratory depression and mental status changes. The patient upon arrival was becoming apneic, not responding to her name, sBP in the 120-130s, HR in the 100-120s Afib on tele, RR 5-8, O2 Sat % on NRB, Temp on Axillary 93 F. Pulm exam showed wheezing, no crackles and decreased breath sounds on the R side. Initial management with getting ABG, labs and talking to the son (HCP) about code status. He wanted full code and after review of patient's recent meds (IV lasix 20) and IV diluadid 2mg x1 given just around 10-15 mins before the RRT was called. Has never received diluadid before in the admission and no home opioids also were seen on ISTOP #[id number] and has just been on hycodan before. Given 0.4 x 1 Narcan and pt responded, started to wake up and talk, RR went back to 13-15 and was AOx1-2 in no pain. Pulm was at bedside, had GOC with the family and changed the code status to now DNR/DNI.     To Do:  [] continue GOC, most likely respiratory depression from the patient being mostly opioid naive and never gotten IV dilaudid before and would need to lower the dosage and frequency and consider alternatives  [] f/u ABG, CMP, CBC, BCx2 and UCx1  [] c/w antifungals

## 2019-08-01 NOTE — PROGRESS NOTE ADULT - PROBLEM SELECTOR PLAN 1
RLL bronchus obstruction with concern for postobstructive & fungal PNA  - c/w continuous pulse ox  - c/w chest PT and incentive spirometry   - c/w standing nebs; Xopenex q4 hours, following by nebulized 3% hypertonic saline, and then Aerobika device as per Pulm recommendations  - s/p meropenem on 7/20   -change to prednisone 10   - as per pulm fellow states will not benefit from thoracentesis or pleurex due to endobronchial lesion cont diuresis.  - HCO3 increased due to contraction alkalosis and hyponatremia lasix 20 IV case d/w Son over telephone and bedside also at bedside worsening effusion/disease on CT chest/Xray given symptomatic improvement with diuretics will continue as tolerated and stop checking labs.  -RRT called this AM due to respiratory distress given nebs and dilaudid given for respiratory distress and subsequently given narcan xray showing worsening effusion ABG showing resp acidosis and hypercarbia family called by myself and pulm decided to be DNR/DNI as prognosis poor if pt were to be intubated.  -Palliative following d/w Dr. Christine low dose Dilaudid for resp distress plan for home hospice

## 2019-08-01 NOTE — PROGRESS NOTE ADULT - PROBLEM SELECTOR PLAN 10
DNR/DNI  Appreciate palliative care consult. home with hospice. case d/w CM/SW on rounds and supply delivery. await home hospice.

## 2019-08-02 LAB
SPECIMEN SOURCE: SIGNIFICANT CHANGE UP
SPECIMEN SOURCE: SIGNIFICANT CHANGE UP

## 2019-08-02 PROCEDURE — 99233 SBSQ HOSP IP/OBS HIGH 50: CPT

## 2019-08-02 PROCEDURE — 99232 SBSQ HOSP IP/OBS MODERATE 35: CPT

## 2019-08-02 RX ADMIN — SODIUM CHLORIDE 4 MILLILITER(S): 9 INJECTION INTRAMUSCULAR; INTRAVENOUS; SUBCUTANEOUS at 21:48

## 2019-08-02 RX ADMIN — Medication 20 MILLIGRAM(S): at 10:31

## 2019-08-02 RX ADMIN — LEVALBUTEROL 0.63 MILLIGRAM(S): 1.25 SOLUTION, CONCENTRATE RESPIRATORY (INHALATION) at 17:16

## 2019-08-02 RX ADMIN — ATOVAQUONE 1500 MILLIGRAM(S): 750 SUSPENSION ORAL at 13:12

## 2019-08-02 RX ADMIN — VALACYCLOVIR 500 MILLIGRAM(S): 500 TABLET, FILM COATED ORAL at 17:38

## 2019-08-02 RX ADMIN — LEVALBUTEROL 0.63 MILLIGRAM(S): 1.25 SOLUTION, CONCENTRATE RESPIRATORY (INHALATION) at 21:40

## 2019-08-02 RX ADMIN — POSACONAZOLE 300 MILLIGRAM(S): 100 TABLET, DELAYED RELEASE ORAL at 17:38

## 2019-08-02 RX ADMIN — LEVALBUTEROL 0.63 MILLIGRAM(S): 1.25 SOLUTION, CONCENTRATE RESPIRATORY (INHALATION) at 01:10

## 2019-08-02 RX ADMIN — SODIUM CHLORIDE 4 MILLILITER(S): 9 INJECTION INTRAMUSCULAR; INTRAVENOUS; SUBCUTANEOUS at 01:18

## 2019-08-02 RX ADMIN — APIXABAN 2.5 MILLIGRAM(S): 2.5 TABLET, FILM COATED ORAL at 17:38

## 2019-08-02 RX ADMIN — BENZOCAINE AND MENTHOL 1 LOZENGE: 5; 1 LIQUID ORAL at 17:38

## 2019-08-02 RX ADMIN — BENZOCAINE AND MENTHOL 1 LOZENGE: 5; 1 LIQUID ORAL at 13:12

## 2019-08-02 RX ADMIN — LEVALBUTEROL 0.63 MILLIGRAM(S): 1.25 SOLUTION, CONCENTRATE RESPIRATORY (INHALATION) at 09:49

## 2019-08-02 RX ADMIN — Medication 100 MILLIGRAM(S): at 13:11

## 2019-08-02 RX ADMIN — BENZOCAINE AND MENTHOL 1 LOZENGE: 5; 1 LIQUID ORAL at 10:31

## 2019-08-02 RX ADMIN — LEVALBUTEROL 0.63 MILLIGRAM(S): 1.25 SOLUTION, CONCENTRATE RESPIRATORY (INHALATION) at 14:22

## 2019-08-02 RX ADMIN — SODIUM CHLORIDE 4 MILLILITER(S): 9 INJECTION INTRAMUSCULAR; INTRAVENOUS; SUBCUTANEOUS at 14:22

## 2019-08-02 RX ADMIN — Medication 300 MILLIGRAM(S): at 13:11

## 2019-08-02 RX ADMIN — LEVALBUTEROL 0.63 MILLIGRAM(S): 1.25 SOLUTION, CONCENTRATE RESPIRATORY (INHALATION) at 05:13

## 2019-08-02 NOTE — PROGRESS NOTE ADULT - PROBLEM SELECTOR PLAN 1
Off high flow.  On NC.  At this time patient denies any dyspnea, RR ~18.  If family not in agreement with Dilaudid, can use low dose Morphine, would recommend morphine solution 2.5mg PO q4hrs PRN, which can be continued at home. Per primary team son would not want to use any ativan.  Was able to talk to pts , at bedside, who deferred to his children.   Called petey Vazquez, did not  the phone, left voicemail to return call to address concerns regarding medications.

## 2019-08-02 NOTE — PROGRESS NOTE ADULT - PROBLEM SELECTOR PLAN 1
RLL bronchus obstruction with concern for postobstructive & fungal PNA  - c/w continuous pulse ox  - c/w chest PT and incentive spirometry   - c/w standing nebs; Xopenex q4 hours, following by nebulized 3% hypertonic saline, and then Aerobika device as per Pulm recommendations  - s/p meropenem on 7/20   - prednisone 10   - as per pulm fellow states will not benefit from thoracentesis or pleurex due to endobronchial lesion cont diuresis.  - HCO3 increased due to contraction alkalosis on CT chest/Xray given symptomatic improvement with diuretics will continue as tolerated and stop checking labs.  -RRT called 8/1 due to respiratory distress given nebs and dilaudid given for respiratory distress and subsequently apenic given narcan xray showing worsening effusion ABG showing resp acidosis and hypercarbia family called by myself and pulm decided to be DNR/DNI as prognosis poor if pt were to be intubated.  -Would like to discuss with palliative in regards to pain medication for pain/respiratory distress RLL bronchus obstruction with concern for postobstructive & fungal PNA  - c/w continuous pulse ox  - c/w chest PT and incentive spirometry   - c/w standing nebs; Xopenex q4 hours, following by nebulized 3% hypertonic saline, and then Aerobika device as per Pulm recommendations  - s/p meropenem on 7/20   - prednisone 10   - as per pulm fellow states will not benefit from thoracentesis or pleurex due to endobronchial lesion cont diuresis.  - HCO3 increased due to contraction alkalosis on CT chest/Xray given symptomatic improvement with diuretics will continue as tolerated and stop checking labs.  -RRT called 8/1 due to respiratory distress given nebs and dilaudid given for respiratory distress and subsequently apenic given narcan xray showing worsening effusion ABG showing resp acidosis and hypercarbia family called by myself and pulm decided to be DNR/DNI as prognosis poor if pt were to be intubated.  -titrated off 100% NRB to NC   -Would like to discuss with palliative in regards to pain medication for pain/respiratory distress

## 2019-08-02 NOTE — PROGRESS NOTE ADULT - PROBLEM SELECTOR PLAN 2
Per primary team son refusing any ativan. At this time no active symptoms, hospice can continue to follow up.

## 2019-08-02 NOTE — PROGRESS NOTE ADULT - PROBLEM SELECTOR PLAN 2
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

## 2019-08-02 NOTE — CHART NOTE - NSCHARTNOTEFT_GEN_A_CORE
Called by Benjamin CORREA pt's son Uvaldo White HCP wanted pt's DNR / DNI status rescinded.   Pt's son had agreed to code status changed to DNR / DNI & filled out MOLST form in my presence yesterday.     I spoke with pt's son Uvaldo White HCP - he stated that he and his father (pt's ) decided pt's condition is improving and he wants pt's DNR / DNI status rescinded and will reassess. DNR / DNI order cancelled in [place]. MOLST form was removed from pt's chart.  Dr Johnson Hospitalist on call notified.

## 2019-08-02 NOTE — PROGRESS NOTE ADULT - PROBLEM SELECTOR PLAN 3
Follows w/ Dr Nguyen at [facility] [place] who says no further dmt.  Spoke with Dr. Davies at [facility]- he also states no further DMT at this time as pt not functional enough and poor respiratory status.  Discussed plan for pt to go home with hospice services.

## 2019-08-02 NOTE — PROGRESS NOTE ADULT - SUBJECTIVE AND OBJECTIVE BOX
Patient is a 79y old  Female who presents with a chief complaint of SOB (01 Aug 2019 12:40)      SUBJECTIVE / OVERNIGHT EVENTS: Pt sitting up in bed awake alert daughter at bedside receiving respiratory treatment from respiratory reports breathing improved. As per notes Son wanted dilaudid to be stopped.     MEDICATIONS  (STANDING):  allopurinol 300 milliGRAM(s) Oral daily  apixaban 2.5 milliGRAM(s) Oral every 12 hours  atovaquone Suspension 1500 milliGRAM(s) Oral daily  benzocaine 15 mG/menthol 3.6 mG (Sugar-Free) Lozenge 1 Lozenge Oral every 4 hours  docusate sodium 100 milliGRAM(s) Oral three times a day  furosemide    Tablet 20 milliGRAM(s) Oral <User Schedule>  levalbuterol Inhalation 0.63 milliGRAM(s) Inhalation every 4 hours  melatonin 1.5 milliGRAM(s) Oral at bedtime  metoprolol succinate  milliGRAM(s) Oral daily  pantoprazole    Tablet 40 milliGRAM(s) Oral before breakfast  posaconazole DR Tablet 300 milliGRAM(s) Oral every 24 hours  predniSONE   Tablet 10 milliGRAM(s) Oral daily  sodium chloride 7% Inhalation 4 milliLiter(s) Inhalation three times a day  valACYclovir 500 milliGRAM(s) Oral <User Schedule>    MEDICATIONS  (PRN):  benzonatate 100 milliGRAM(s) Oral three times a day PRN Cough  bisacodyl 5 milliGRAM(s) Oral every 12 hours PRN Constipation  guaiFENesin    Syrup 200 milliGRAM(s) Oral every 6 hours PRN Cough  polyethylene glycol 3350 17 Gram(s) Oral daily PRN Constipation  sodium chloride 0.65% Nasal 1 Spray(s) Both Nostrils daily PRN Nasal Congestion        CAPILLARY BLOOD GLUCOSE        I&O's Summary    01 Aug 2019 07:01  -  02 Aug 2019 07:00  --------------------------------------------------------  IN: 100 mL / OUT: 850 mL / NET: -750 mL        T(C): 36.4 (19 @ 10:26), Max: 36.4 (19 @ 22:11)  HR: 65 (19 @ 10:26) (65 - 99)  BP: 123/80 (19 @ 10:26) (100/65 - 123/80)  RR: 18 (19 @ 10:26) (14 - 18)  SpO2: 97% (19 @ 10:26) (97% - 100%)    PHYSICAL EXAM:  GENERAL: resp distress  HEAD:  Atraumatic, Normocephalic  CHEST/LUNG: decrease BS RT base no wheezing  HEART:s1/s2 No murmurs, rubs, or gallops  ABDOMEN: Soft, Nontender, Nondistended; Bowel sounds present  EXTREMITIES:   anasarca  NEUROLOGY: awake alert follows command oriented to herself  and hospital    LABS:                        11.9   3.29  )-----------( 142      ( 01 Aug 2019 09:24 )             37.4     08-    137  |  89<L>  |  12  ----------------------------<  135<H>  3.8   |  35<H>  |  0.33<L>    Ca    8.8      01 Aug 2019 09:24  Phos  3.0     08  Mg     1.7         TPro  4.9<L>  /  Alb  3.2<L>  /  TBili  1.7<H>  /  DBili  x   /  AST  57<H>  /  ALT  57<H>  /  AlkPhos  191<H>      PT/INR - ( 01 Aug 2019 09:24 )   PT: 13.4 SEC;   INR: 1.20          PTT - ( 01 Aug 2019 09:24 )  PTT:26.5 SEC            RADIOLOGY & ADDITIONAL TESTS:    Imaging Personally Reviewed:    Consultant(s) Notes Reviewed:      Care Discussed with Consultants/Other Providers: Patient is a 79y old  Female who presents with a chief complaint of SOB (01 Aug 2019 12:40)      SUBJECTIVE / OVERNIGHT EVENTS: Pt sitting up in bed awake alert daughter at bedside receiving respiratory treatment from respiratory reports breathing improved on 100% NRB. As per notes Son wanted dilaudid to be stopped.     MEDICATIONS  (STANDING):  allopurinol 300 milliGRAM(s) Oral daily  apixaban 2.5 milliGRAM(s) Oral every 12 hours  atovaquone Suspension 1500 milliGRAM(s) Oral daily  benzocaine 15 mG/menthol 3.6 mG (Sugar-Free) Lozenge 1 Lozenge Oral every 4 hours  docusate sodium 100 milliGRAM(s) Oral three times a day  furosemide    Tablet 20 milliGRAM(s) Oral <User Schedule>  levalbuterol Inhalation 0.63 milliGRAM(s) Inhalation every 4 hours  melatonin 1.5 milliGRAM(s) Oral at bedtime  metoprolol succinate  milliGRAM(s) Oral daily  pantoprazole    Tablet 40 milliGRAM(s) Oral before breakfast  posaconazole DR Tablet 300 milliGRAM(s) Oral every 24 hours  predniSONE   Tablet 10 milliGRAM(s) Oral daily  sodium chloride 7% Inhalation 4 milliLiter(s) Inhalation three times a day  valACYclovir 500 milliGRAM(s) Oral <User Schedule>    MEDICATIONS  (PRN):  benzonatate 100 milliGRAM(s) Oral three times a day PRN Cough  bisacodyl 5 milliGRAM(s) Oral every 12 hours PRN Constipation  guaiFENesin    Syrup 200 milliGRAM(s) Oral every 6 hours PRN Cough  polyethylene glycol 3350 17 Gram(s) Oral daily PRN Constipation  sodium chloride 0.65% Nasal 1 Spray(s) Both Nostrils daily PRN Nasal Congestion        CAPILLARY BLOOD GLUCOSE        I&O's Summary    01 Aug 2019 07:01  -  02 Aug 2019 07:00  --------------------------------------------------------  IN: 100 mL / OUT: 850 mL / NET: -750 mL        T(C): 36.4 (19 @ 10:26), Max: 36.4 (19 @ 22:11)  HR: 65 (19 @ 10:26) (65 - 99)  BP: 123/80 (19 @ 10:26) (100/65 - 123/80)  RR: 18 (19 @ 10:26) (14 - 18)  SpO2: 97% (19 @ 10:26) (97% - 100%)    PHYSICAL EXAM:  GENERAL: resp distress  HEAD:  Atraumatic, Normocephalic  CHEST/LUNG: decrease BS RT base no wheezing  HEART:s1/s2 No murmurs, rubs, or gallops  ABDOMEN: Soft, Nontender, Nondistended; Bowel sounds present  EXTREMITIES:   anasarca  NEUROLOGY: awake alert follows command oriented to herself  and hospital    LABS:                        11.9   3.29  )-----------( 142      ( 01 Aug 2019 09:24 )             37.4         137  |  89<L>  |  12  ----------------------------<  135<H>  3.8   |  35<H>  |  0.33<L>    Ca    8.8      01 Aug 2019 09:24  Phos  3.0       Mg     1.7         TPro  4.9<L>  /  Alb  3.2<L>  /  TBili  1.7<H>  /  DBili  x   /  AST  57<H>  /  ALT  57<H>  /  AlkPhos  191<H>      PT/INR - ( 01 Aug 2019 09:24 )   PT: 13.4 SEC;   INR: 1.20          PTT - ( 01 Aug 2019 09:24 )  PTT:26.5 SEC            RADIOLOGY & ADDITIONAL TESTS:    Imaging Personally Reviewed:    Consultant(s) Notes Reviewed:      Care Discussed with Consultants/Other Providers:

## 2019-08-02 NOTE — PROGRESS NOTE ADULT - PROBLEM SELECTOR PLAN 3
[place] [facility] [place] per family, [facility] is not offering any disease modifying treatment. Pt is looking for experimental trial if pt clinically improves from current medical issues. Email sent to Dr. Nguyen by Dr Landeros would need good performance status for experimental therapy. As per palliative care d/w Dr. Sidhu no further DMT due to poor functional status.   - C/w ppx mepron, valacyclovir and allopurinol

## 2019-08-02 NOTE — CHART NOTE - NSCHARTNOTEFT_GEN_A_CORE
Spoke to attending and Palliative team about recommendation for respiratory comfort. Palliative attempted to speak to Son George White about Dilaudid dosing, but he was not in the room at the time. They stated, they will call him.

## 2019-08-02 NOTE — PROGRESS NOTE ADULT - SUBJECTIVE AND OBJECTIVE BOX
SUBJECTIVE AND OBJECTIVE: Pt awake.  Denies any dyspnea/discomfort at this time.   INTERVAL HPI/OVERNIGHT EVENTS: Called by primary team to assess options for SOB/dyspnea.     DNR on chart: DNR / DNI  Allergies    No Known Allergies    Intolerances    MEDICATIONS  (STANDING):  allopurinol 300 milliGRAM(s) Oral daily  apixaban 2.5 milliGRAM(s) Oral every 12 hours  atovaquone Suspension 1500 milliGRAM(s) Oral daily  benzocaine 15 mG/menthol 3.6 mG (Sugar-Free) Lozenge 1 Lozenge Oral every 4 hours  docusate sodium 100 milliGRAM(s) Oral three times a day  furosemide    Tablet 20 milliGRAM(s) Oral <User Schedule>  levalbuterol Inhalation 0.63 milliGRAM(s) Inhalation every 4 hours  melatonin 1.5 milliGRAM(s) Oral at bedtime  metoprolol succinate  milliGRAM(s) Oral daily  pantoprazole    Tablet 40 milliGRAM(s) Oral before breakfast  posaconazole DR Tablet 300 milliGRAM(s) Oral every 24 hours  predniSONE   Tablet 10 milliGRAM(s) Oral daily  sodium chloride 7% Inhalation 4 milliLiter(s) Inhalation three times a day  valACYclovir 500 milliGRAM(s) Oral <User Schedule>    MEDICATIONS  (PRN):  benzonatate 100 milliGRAM(s) Oral three times a day PRN Cough  bisacodyl 5 milliGRAM(s) Oral every 12 hours PRN Constipation  guaiFENesin    Syrup 200 milliGRAM(s) Oral every 6 hours PRN Cough  polyethylene glycol 3350 17 Gram(s) Oral daily PRN Constipation  sodium chloride 0.65% Nasal 1 Spray(s) Both Nostrils daily PRN Nasal Congestion    ITEMS UNCHECKED ARE NOT PRESENT    PRESENT SYMPTOMS: [ ]Unable to obtain due to poor mentation   Source if other than patient:  [ ]Family   [ ]Team     Pain (Impact on QOL):  denies  Location:  Minimal acceptable level (0-10 scale):            Aggravating factors:  Quality:  Radiation:  Severity (0-10 scale):    Timing:    Dyspnea:           none                [ ]Mild [ ]Moderate [ ]Severe  Anxiety:            none                 [ ]Mild [ ]Moderate [ ]Severe  Fatigue:                             [ ]Mild [ ]Moderate [x ]Severe  Nausea:          none                   [ ]Mild [ ]Moderate [ ]Severe  Loss of appetite:              [ ]Mild [ ]Moderate [x ]Severe  Constipation:          none          [ ]Mild [ ]Moderate [ ]Severe    PAIN AD Score:	  http://geriatrictoolkit.[place].[email or web address]/cog/painad.pdf (Ctrl + left click to view)    Other Symptoms:  [x ]All other review of systems negative     Karnofsky Performance Score/Palliative Performance Status Version 2:    30-40     %    http://palliative.info/resource_material/PPSv2.pdf    PHYSICAL EXAM:  Vital Signs Last 24 Hrs  T(C): 36.3 (29 Jul 2019 12:25), Max: 36.4 (28 Jul 2019 21:20)  T(F): 97.4 (29 Jul 2019 12:25), Max: 97.5 (28 Jul 2019 21:20)  HR: 90 (29 Jul 2019 12:25) (68 - 108)  BP: 104/72 (29 Jul 2019 12:25) (94/62 - 105/75)  BP(mean): --  RR: 20 (29 Jul 2019 12:25) (19 - 23)  SpO2: 98% (29 Jul 2019 12:25) (95% - 99%) I&O's Summary    28 Jul 2019 07:01  -  29 Jul 2019 07:00  --------------------------------------------------------  IN: 1440 mL / OUT: 350 mL / NET: 1090 mL    GENERAL:  [ x]Alert  [x ]Oriented x  1-2 [ ]Lethargic  [ ]Cachexia  [ ]Unarousable  [ ]Verbal  [ ]Non-Verbal    Behavioral:   [ ] Anxiety  [ ] Delirium [ ] Agitation [ ] Other    HEENT:  [ ]Normal   [ x]Dry mouth   [ ]ET Tube/Trach  [ ]Oral lesions    PULMONARY:   [ ]Clear [ ]Tachypnea  [ ]Audible excessive secretions   [ ]Rhonchi        [ ]Right [ ]Left [ ]Bilateral  [x ]Crackles        [ ]Right [ ]Left [x ]Bilateral  [ ]Wheezing     [ ]Right [ ]Left [ ]Bilateral    CARDIOVASCULAR:    [ ]Regular [ ]Irregular [ ]Tachy  [ ]Butch [ ]Murmur [ ]Other    GASTROINTESTINAL:  [x ]Soft  [ ]Distended   [x ]+BS  [x ]Non tender [ ]Tender  [ ]PEG [ ]OGT/ NGT   Last BM:    GENITOURINARY:  [ ]Normal [ ] Incontinent   [ ]Oliguria/Anuria   [x ]Cespedes    MUSCULOSKELETAL:   [ ]Normal   [ ]Weakness  [ x]Bed/Wheelchair bound [ ]Edema    NEUROLOGIC:   [ ]No focal deficits  [ x] Cognitive impairment  [ ] Dysphagia [ ]Dysarthria [ ] Paresis [ ]Other     SKIN:   [x ]Normal   [ ]Pressure ulcer(s)  [ ]Rash    CRITICAL CARE:  [ ] Shock Present  [ ]Septic [ ]Cardiogenic [ ]Neurologic [ ]Hypovolemic  [ ]  Vasopressors [ ]  Inotropes   [ ] Respiratory failure present  [ ] Acute  [ ] Chronic [ ] Hypoxic  [ ] Hypercarbic [ ] Other  [ ] Other organ failure     LABS:                        11.9   3.29  )-----------( 142      ( 01 Aug 2019 09:24 )             37.4     08-01    137  |  89<L>  |  12  ----------------------------<  135<H>  3.8   |  35<H>  |  0.33<L>    Ca    8.8      01 Aug 2019 09:24  Phos  3.0     08-01  Mg     1.7     08-01    TPro  4.9<L>  /  Alb  3.2<L>  /  TBili  1.7<H>  /  DBili  x   /  AST  57<H>  /  ALT  57<H>  /  AlkPhos  191<H>  08-01    PT/INR - ( 01 Aug 2019 09:24 )   PT: 13.4 SEC;   INR: 1.20        PTT - ( 01 Aug 2019 09:24 )  PTT:26.5 SEC    RADIOLOGY & ADDITIONAL STUDIES: reviewed.     Protein Calorie Malnutrition Present: [ ] yes [ ] no  [ ] PPSV2 < or = 30%  [ ] significant weight loss [ ] poor nutritional intake [ ] anasarca [ ] catabolic state Albumin, Serum: 2.9 g/dL (07-29-19 @ 04:40)  Artificial Nutrition [ ]     REFERRALS:   [ ]Chaplaincy  [ ] Hospice  [ ]Child Life  [ ]Social Work  [ ]Case management [ ]Holistic Therapy   Goals of Care Document: FANY Tsai (07-26-19 @ 14:33)  Goals of Care Conversation:   Advance Directives:  · Does patient have Advance Directive  Yes  · Indicate Type  Health Care Proxy (HCP)  · Agent's Name  George White  · Phone #  [phone]  · Are any of the items on the chart  Yes  · Specify which ones are on chart  Health Care Proxy (HCP)    Conversation Discussion:  · Conversation  Hospice Referral  · Conversation Details  [facility] - Spoke with pt's son who states that family is still seeking further treatment. Reported this conversation to Dr. LUCIO Landeros.    Electronic Signatures:  May Tsai (RN)  (Signed 26-Jul-2019 14:34)  	Authored: Goals of Care Conversation

## 2019-08-02 NOTE — PROGRESS NOTE ADULT - PROBLEM SELECTOR PLAN 10
DNR/DNI. Poor prognosis.  Appreciate palliative care consult home with hospice. DNR/DNI. no labs. Poor prognosis.  Appreciate palliative care consult home with hospice.

## 2019-08-02 NOTE — PROGRESS NOTE ADULT - PROBLEM SELECTOR PLAN 7
Afib w/ RVR likely due infection and hypoxia/lung disease   - c/w metoprolol 150 mg ER  - c/w apixaban (age >80, weight <60kg)  -dc tele

## 2019-08-03 LAB
ALP BONE SERPL-MCNC: 21 % — LOW (ref 28–66)
ALP FLD-CCNC: 168 U/L — HIGH (ref 33–130)
ALP INTEST CFR SERPL: 5 % — SIGNIFICANT CHANGE UP (ref 1–24)
ALP LIVER SERPL-CCNC: 42 % — SIGNIFICANT CHANGE UP (ref 25–69)
ALP MACRO CFR SERPL: 32 % — HIGH
ALP PLAC SERPL-CCNC: 0 % — SIGNIFICANT CHANGE UP

## 2019-08-03 PROCEDURE — 99233 SBSQ HOSP IP/OBS HIGH 50: CPT

## 2019-08-03 RX ORDER — NYSTATIN CREAM 100000 [USP'U]/G
1 CREAM TOPICAL
Refills: 0 | Status: DISCONTINUED | OUTPATIENT
Start: 2019-08-03 | End: 2019-08-06

## 2019-08-03 RX ORDER — LEVALBUTEROL 1.25 MG/.5ML
0.63 SOLUTION, CONCENTRATE RESPIRATORY (INHALATION) ONCE
Refills: 0 | Status: COMPLETED | OUTPATIENT
Start: 2019-08-03 | End: 2019-08-03

## 2019-08-03 RX ADMIN — APIXABAN 2.5 MILLIGRAM(S): 2.5 TABLET, FILM COATED ORAL at 05:52

## 2019-08-03 RX ADMIN — LEVALBUTEROL 0.63 MILLIGRAM(S): 1.25 SOLUTION, CONCENTRATE RESPIRATORY (INHALATION) at 21:09

## 2019-08-03 RX ADMIN — SODIUM CHLORIDE 4 MILLILITER(S): 9 INJECTION INTRAMUSCULAR; INTRAVENOUS; SUBCUTANEOUS at 17:32

## 2019-08-03 RX ADMIN — LEVALBUTEROL 0.63 MILLIGRAM(S): 1.25 SOLUTION, CONCENTRATE RESPIRATORY (INHALATION) at 05:30

## 2019-08-03 RX ADMIN — LEVALBUTEROL 0.63 MILLIGRAM(S): 1.25 SOLUTION, CONCENTRATE RESPIRATORY (INHALATION) at 17:32

## 2019-08-03 RX ADMIN — Medication 100 MILLIGRAM(S): at 02:27

## 2019-08-03 RX ADMIN — ATOVAQUONE 1500 MILLIGRAM(S): 750 SUSPENSION ORAL at 12:41

## 2019-08-03 RX ADMIN — Medication 10 MILLIGRAM(S): at 05:53

## 2019-08-03 RX ADMIN — POSACONAZOLE 300 MILLIGRAM(S): 100 TABLET, DELAYED RELEASE ORAL at 18:08

## 2019-08-03 RX ADMIN — SODIUM CHLORIDE 4 MILLILITER(S): 9 INJECTION INTRAMUSCULAR; INTRAVENOUS; SUBCUTANEOUS at 09:31

## 2019-08-03 RX ADMIN — Medication 100 MILLIGRAM(S): at 05:51

## 2019-08-03 RX ADMIN — LEVALBUTEROL 0.63 MILLIGRAM(S): 1.25 SOLUTION, CONCENTRATE RESPIRATORY (INHALATION) at 09:31

## 2019-08-03 RX ADMIN — Medication 1.5 MILLIGRAM(S): at 22:29

## 2019-08-03 RX ADMIN — SODIUM CHLORIDE 4 MILLILITER(S): 9 INJECTION INTRAMUSCULAR; INTRAVENOUS; SUBCUTANEOUS at 21:09

## 2019-08-03 RX ADMIN — LEVALBUTEROL 0.63 MILLIGRAM(S): 1.25 SOLUTION, CONCENTRATE RESPIRATORY (INHALATION) at 03:25

## 2019-08-03 RX ADMIN — APIXABAN 2.5 MILLIGRAM(S): 2.5 TABLET, FILM COATED ORAL at 18:08

## 2019-08-03 RX ADMIN — Medication 300 MILLIGRAM(S): at 12:41

## 2019-08-03 RX ADMIN — BENZOCAINE AND MENTHOL 1 LOZENGE: 5; 1 LIQUID ORAL at 10:32

## 2019-08-03 RX ADMIN — Medication 20 MILLIGRAM(S): at 10:32

## 2019-08-03 RX ADMIN — LEVALBUTEROL 0.63 MILLIGRAM(S): 1.25 SOLUTION, CONCENTRATE RESPIRATORY (INHALATION) at 01:52

## 2019-08-03 RX ADMIN — PANTOPRAZOLE SODIUM 40 MILLIGRAM(S): 20 TABLET, DELAYED RELEASE ORAL at 05:52

## 2019-08-03 RX ADMIN — LEVALBUTEROL 0.63 MILLIGRAM(S): 1.25 SOLUTION, CONCENTRATE RESPIRATORY (INHALATION) at 13:33

## 2019-08-03 RX ADMIN — Medication 150 MILLIGRAM(S): at 05:52

## 2019-08-03 RX ADMIN — Medication 100 MILLIGRAM(S): at 22:29

## 2019-08-03 RX ADMIN — NYSTATIN CREAM 1 APPLICATION(S): 100000 CREAM TOPICAL at 18:51

## 2019-08-03 NOTE — PROVIDER CONTACT NOTE (OTHER) - BACKGROUND
pt admitted for worsening respiratory status and hypoxic respiratory failure, improve w oxygenation on high flow nasal cannula
Rapid response called on patient this morning. Per MAR, patient to be DNR/DNI.
admitted for shortness of breath RVP positive for rhinovirus/entero virus with pneumonia
admitted for shortness of breath, RVP + for enterovirus/rhinovirus with pneumonia
nstemi, on heparin gtt
pt admitted for difficulty breathing, progression of metastatic disease, s/p bronchoscopy today
pt admitted for shortness of breath hx of a fib, DLBC lymphoma
pt admitted for shortness of breath, RVP + for enterovirus/rhinovirus with pneumonia
pt admitted for worsening respiratory status and hypoxic respiratory failure, improve w oxygenation on high flow nasal cannula
admit dx: SOB  dx of fungal Pneumonia.
pt is afib on Telemetry monitoring. HR has been sustaining between 100-120 bpm. on eliquis and metoprolol extended release once a day.

## 2019-08-03 NOTE — PROGRESS NOTE ADULT - PROBLEM SELECTOR PLAN 1
RLL bronchus obstruction with concern for postobstructive & fungal PNA  - c/w continuous pulse ox  - c/w chest PT and incentive spirometry   - c/w standing nebs; Xopenex q4 hours, following by nebulized 3% hypertonic saline, and then Aerobika device as per Pulm recommendations  - s/p meropenem on 7/20   - prednisone 10   - as per pulm fellow states will not benefit from thoracentesis or pleurex due to endobronchial lesion cont diuresis.  - HCO3 increased due to contraction alkalosis on CT chest/Xray given symptomatic improvement with diuretics will continue as tolerated and stop checking labs.  -RRT called 8/1 due to respiratory distress given nebs and dilaudid given for respiratory distress and subsequently apenic given narcan xray showing worsening effusion ABG showing resp acidosis and hypercarbia family called by myself and pulm decided to be DNR/DNI as prognosis poor if pt were to be intubated.  -titrated off 100% NRB to NC   -Would like to discuss with palliative in regards to pain medication for pain/respiratory distress

## 2019-08-03 NOTE — PROVIDER CONTACT NOTE (OTHER) - ACTION/TREATMENT ORDERED:
levalbuterol x1 ordered; pending RT to come administer the medication. will continue to monitor.
provider made aware. continue to monitor as per provider and inform if an changes are noted. no medication intervention at this time, Will continue to monitor
titrate high flow NC up as tolerate, 40L/min. give stat dose lasix 40mg and stat dose xopenex w metaneb administration . continue w chest PT and suctioning,
acetaminophen and blood cultures ordered
administer xopenex now as ordered q4h. preform chest physiotherapy and continue to monitor VS
cardizem IVP 10mg, reassess HR and BP and possible administration of PO XR metoprolol hs based on pt vitals
change duonebs to xopenex , given metoprolol one time dose for HR
check rectal temp
continue to monitor

## 2019-08-03 NOTE — PROVIDER CONTACT NOTE (OTHER) - SITUATION
MOLST not found in chart for patient.
pt complaining of shortness of breath
pt complaints of difficulty breathing
pt rapid A fib on tele, sustaining 120-130
pt rapid a fib sustaining 140 bursts to 160 on tele
pt rectal temp 101.3
pt refusing blood draw
pt temp 100.2 oral
pt is complaining of difficulty breathing.
pt is afib on Telemetry monitoring, HR sustaining between 100-120bpm, asymptomatic. order says to notify provider for HR >100 bpm
burst of rapid afib to 150 w respiratory distress

## 2019-08-03 NOTE — PROGRESS NOTE ADULT - PROBLEM SELECTOR PLAN 3
[place] with [place]. Nguyen [place] [facility] [place] as per family, [facility] is not offering any disease modifying treatment. Pt is looking for experimental trial if pt clinically improves from current medical issues. Email sent to Dr. Nguyen by Dr Landeros would need good performance status for experimental therapy. As per palliative care d/w Dr. Sidhu no further DMT due to poor functional status.   - C/w ppx mepron, valacyclovir and allopurinol

## 2019-08-03 NOTE — PROVIDER CONTACT NOTE (OTHER) - NAME OF MD/NP/PA/DO NOTIFIED:
Berkley Rocha
Tele MENG Ceballos
Tele MENG Rocha
Yolanda FAN
Yolanda Porras
maxx de la vega
Yolanda Porras, PA
MENG Hickman
Tele MENG Rocha

## 2019-08-03 NOTE — PROGRESS NOTE ADULT - SUBJECTIVE AND OBJECTIVE BOX
CHIEF COMPLAINT: Patient is a 79y old  female who presents with a chief complaint of SOB (02 Aug 2019 16:00)      SUBJECTIVE / OVERNIGHT EVENTS:    Patient complains of pruritus in her gluteal/sacral area. Denies other complaints. Denies SOB today.    MEDICATIONS  (STANDING):  allopurinol 300 milliGRAM(s) Oral daily  apixaban 2.5 milliGRAM(s) Oral every 12 hours  atovaquone Suspension 1500 milliGRAM(s) Oral daily  benzocaine 15 mG/menthol 3.6 mG (Sugar-Free) Lozenge 1 Lozenge Oral every 4 hours  docusate sodium 100 milliGRAM(s) Oral three times a day  furosemide    Tablet 20 milliGRAM(s) Oral <User Schedule>  levalbuterol Inhalation 0.63 milliGRAM(s) Inhalation every 4 hours  melatonin 1.5 milliGRAM(s) Oral at bedtime  metoprolol succinate  milliGRAM(s) Oral daily  pantoprazole    Tablet 40 milliGRAM(s) Oral before breakfast  posaconazole DR Tablet 300 milliGRAM(s) Oral every 24 hours  predniSONE   Tablet 10 milliGRAM(s) Oral daily  sodium chloride 7% Inhalation 4 milliLiter(s) Inhalation three times a day  valACYclovir 500 milliGRAM(s) Oral <User Schedule>    MEDICATIONS  (PRN):  benzonatate 100 milliGRAM(s) Oral three times a day PRN Cough  bisacodyl 5 milliGRAM(s) Oral every 12 hours PRN Constipation  guaiFENesin    Syrup 200 milliGRAM(s) Oral every 6 hours PRN Cough  polyethylene glycol 3350 17 Gram(s) Oral daily PRN Constipation  sodium chloride 0.65% Nasal 1 Spray(s) Both Nostrils daily PRN Nasal Congestion      VITALS:  T(F): 97.4 (08-03-19 @ 10:31), Max: 98.6 (08-03-19 @ 02:12)  HR: 110 (08-03-19 @ 10:31) (87 - 122)  BP: 128/84 (08-03-19 @ 10:31) (107/69 - 145/104)  RR: 22 (08-03-19 @ 10:31) (19 - 30)  SpO2: 96% (08-03-19 @ 10:31)      CAPILLARY BLOOD GLUCOSE    Output     I&O's Summary  T(F): 97.4 (08-03-19 @ 10:31), Max: 98.6 (08-03-19 @ 02:12)  HR: 110 (08-03-19 @ 10:31) (87 - 122)  BP: 128/84 (08-03-19 @ 10:31) (107/69 - 145/104)  RR: 22 (08-03-19 @ 10:31) (19 - 30)  SpO2: 96% (08-03-19 @ 10:31)    PHYSICAL EXAM:  GENERAL: NAD, well-developed  HEAD:  Atraumatic, Normocephalic  EYES: EOMI  NECK: Supple, No JVD  CHEST/LUNG: nonlabored breathing  HEART: nl S1/S2  ABDOMEN: nondistended, soft  EXTREMITIES:  no LE edema  PSYCH: alert, answering questions appropriately  NEUROLOGY: non-focal  SKIN: erythematous rash over sacral area    LABS:                        MICROBIOLOGY:    Culture - Blood (collected 01 Aug 2019 10:21)  Source: Peripheral Site 2  Preliminary Report (03 Aug 2019 10:21):    NO ORGANISMS ISOLATED    NO ORGANISMS ISOLATED AT 48 HRS.    Culture - Blood (collected 01 Aug 2019 10:21)  Source: Peripheral Site 1  Preliminary Report (03 Aug 2019 10:21):    NO ORGANISMS ISOLATED    NO ORGANISMS ISOLATED AT 48 HRS.          RADIOLOGY & ADDITIONAL TESTS:    Imaging Personally Reviewed:    < from: Xray Chest 1 View- PORTABLE-Urgent (08.01.19 @ 09:38) >      < end of copied text >        [x] Care Discussed with Consultants/Other Providers:      Solitario Teague M.D.  Hospitalist  Pager [phone]

## 2019-08-03 NOTE — PROVIDER CONTACT NOTE (OTHER) - REASON
Febrile
MOLST not in chart
Shortness of breath
blood draw refusal
febrile
pt complaints of difficulty breathing
rapid A fib
rapid A fib
pt is complaining of difficulty breathing.
rapid afinb, HR between 100-120bpm
Shortness of breath

## 2019-08-03 NOTE — PROGRESS NOTE ADULT - PROBLEM SELECTOR PLAN 2
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

## 2019-08-03 NOTE — PROVIDER CONTACT NOTE (OTHER) - RECOMMENDATIONS
Administered benzocaine for cough. possible nebulizer treatment for expiratory wheeze. Administered benzonatate for cough. possible nebulizer treatment for expiratory wheeze.

## 2019-08-03 NOTE — PROVIDER CONTACT NOTE (OTHER) - ASSESSMENT
pt denies chest pain. pt has frequent productive cough & LAWRENCE. she is on q4h Xopenex and sodium chloride 3% breathing treatments.
VSS, HR montiored on tele, afib. pt denies chest pain. tachypnea and dyspnea present at rest, b/l coarse breath sound w cough.
-130 rapid a fib on telemetry, /78 temp 99.3 oral, respirations 24 oxygen saturation 96% on 2L oxygen. pt appears to have increased work of breathing, using accessory muscles.
VSS, , /99, RR 26, SpO2 94%. tachypnea and dyspnea present at rest, b/l coarse breath sound w cough
a&o x4, no s/s distress noted
pt rapid A fib 120s on tele, cough, denies chills
pt rapid a fib sustaining 140 bursts to 160 on tele, all other VSS, pt denies palpitations. frequently coughing.
pt rapid a fib, denies chills
pt resting in bed, denies any palpitations, all other vitals WDL. pt coughing frequently.
RR: 30; o2 saturation 93-95% w. 5L o2 via NC. expiratory wheezes noted on auscultation. cough noted.

## 2019-08-03 NOTE — PROVIDER CONTACT NOTE (OTHER) - DATE AND TIME:
01-Aug-2019 18:10
01-Jul-2019 20:05
19-Jul-2019 20:25
26-Jun-2019 19:40
27-Jun-2019 13:02
30-Jun-2019 21:03
30-Jun-2019 21:43
30-Jun-2019 23:57
03-Aug-2019 02:14
03-Jul-2019 21:15
20-Jul-2019 05:55

## 2019-08-04 PROCEDURE — 99233 SBSQ HOSP IP/OBS HIGH 50: CPT

## 2019-08-04 RX ADMIN — Medication 20 MILLIGRAM(S): at 11:10

## 2019-08-04 RX ADMIN — LEVALBUTEROL 0.63 MILLIGRAM(S): 1.25 SOLUTION, CONCENTRATE RESPIRATORY (INHALATION) at 04:59

## 2019-08-04 RX ADMIN — APIXABAN 2.5 MILLIGRAM(S): 2.5 TABLET, FILM COATED ORAL at 05:51

## 2019-08-04 RX ADMIN — NYSTATIN CREAM 1 APPLICATION(S): 100000 CREAM TOPICAL at 05:51

## 2019-08-04 RX ADMIN — NYSTATIN CREAM 1 APPLICATION(S): 100000 CREAM TOPICAL at 17:32

## 2019-08-04 RX ADMIN — APIXABAN 2.5 MILLIGRAM(S): 2.5 TABLET, FILM COATED ORAL at 17:32

## 2019-08-04 RX ADMIN — POSACONAZOLE 300 MILLIGRAM(S): 100 TABLET, DELAYED RELEASE ORAL at 21:51

## 2019-08-04 RX ADMIN — PANTOPRAZOLE SODIUM 40 MILLIGRAM(S): 20 TABLET, DELAYED RELEASE ORAL at 05:51

## 2019-08-04 RX ADMIN — LEVALBUTEROL 0.63 MILLIGRAM(S): 1.25 SOLUTION, CONCENTRATE RESPIRATORY (INHALATION) at 10:31

## 2019-08-04 RX ADMIN — Medication 100 MILLIGRAM(S): at 05:51

## 2019-08-04 RX ADMIN — BENZOCAINE AND MENTHOL 1 LOZENGE: 5; 1 LIQUID ORAL at 15:09

## 2019-08-04 RX ADMIN — Medication 100 MILLIGRAM(S): at 21:57

## 2019-08-04 RX ADMIN — LEVALBUTEROL 0.63 MILLIGRAM(S): 1.25 SOLUTION, CONCENTRATE RESPIRATORY (INHALATION) at 17:49

## 2019-08-04 RX ADMIN — Medication 1.5 MILLIGRAM(S): at 21:52

## 2019-08-04 RX ADMIN — BENZOCAINE AND MENTHOL 1 LOZENGE: 5; 1 LIQUID ORAL at 05:53

## 2019-08-04 RX ADMIN — ATOVAQUONE 1500 MILLIGRAM(S): 750 SUSPENSION ORAL at 11:10

## 2019-08-04 RX ADMIN — SODIUM CHLORIDE 4 MILLILITER(S): 9 INJECTION INTRAMUSCULAR; INTRAVENOUS; SUBCUTANEOUS at 21:27

## 2019-08-04 RX ADMIN — LEVALBUTEROL 0.63 MILLIGRAM(S): 1.25 SOLUTION, CONCENTRATE RESPIRATORY (INHALATION) at 21:26

## 2019-08-04 RX ADMIN — SODIUM CHLORIDE 4 MILLILITER(S): 9 INJECTION INTRAMUSCULAR; INTRAVENOUS; SUBCUTANEOUS at 14:52

## 2019-08-04 RX ADMIN — BENZOCAINE AND MENTHOL 1 LOZENGE: 5; 1 LIQUID ORAL at 17:32

## 2019-08-04 RX ADMIN — LEVALBUTEROL 0.63 MILLIGRAM(S): 1.25 SOLUTION, CONCENTRATE RESPIRATORY (INHALATION) at 00:51

## 2019-08-04 RX ADMIN — VALACYCLOVIR 500 MILLIGRAM(S): 500 TABLET, FILM COATED ORAL at 17:32

## 2019-08-04 RX ADMIN — Medication 150 MILLIGRAM(S): at 05:51

## 2019-08-04 RX ADMIN — BENZOCAINE AND MENTHOL 1 LOZENGE: 5; 1 LIQUID ORAL at 01:03

## 2019-08-04 RX ADMIN — LEVALBUTEROL 0.63 MILLIGRAM(S): 1.25 SOLUTION, CONCENTRATE RESPIRATORY (INHALATION) at 14:52

## 2019-08-04 RX ADMIN — Medication 300 MILLIGRAM(S): at 11:10

## 2019-08-04 RX ADMIN — SODIUM CHLORIDE 4 MILLILITER(S): 9 INJECTION INTRAMUSCULAR; INTRAVENOUS; SUBCUTANEOUS at 10:31

## 2019-08-04 RX ADMIN — Medication 10 MILLIGRAM(S): at 05:51

## 2019-08-04 NOTE — PROGRESS NOTE ADULT - PROBLEM SELECTOR PLAN 10
Poor prognosis.  DNR/DNI rescinded Saturday night - trying calling son Josef to discuss plan for cancer however unable to reach

## 2019-08-04 NOTE — PROGRESS NOTE ADULT - SUBJECTIVE AND OBJECTIVE BOX
CHIEF COMPLAINT: Patient is a 79y old  female who presents with a chief complaint of SOB (03 Aug 2019 15:48)      SUBJECTIVE / OVERNIGHT EVENTS:     at bedside. Patient complains of SOB. Denies gluteal pruritus today.    Per overnight notes, DNR/DNI rescinded. Per discussion with PA, family wants patient to get cancer treatment.     is unaware of plan for cancer treatment. Reports their son Josef is the decision maker. Called Josef at [phone] x 2 - went to [facility] both times.    MEDICATIONS  (STANDING):  allopurinol 300 milliGRAM(s) Oral daily  apixaban 2.5 milliGRAM(s) Oral every 12 hours  atovaquone Suspension 1500 milliGRAM(s) Oral daily  benzocaine 15 mG/menthol 3.6 mG (Sugar-Free) Lozenge 1 Lozenge Oral every 4 hours  docusate sodium 100 milliGRAM(s) Oral three times a day  furosemide    Tablet 20 milliGRAM(s) Oral <User Schedule>  levalbuterol Inhalation 0.63 milliGRAM(s) Inhalation every 4 hours  melatonin 1.5 milliGRAM(s) Oral at bedtime  metoprolol succinate  milliGRAM(s) Oral daily  nystatin Powder 1 Application(s) Topical two times a day  pantoprazole    Tablet 40 milliGRAM(s) Oral before breakfast  posaconazole DR Tablet 300 milliGRAM(s) Oral every 24 hours  predniSONE   Tablet 10 milliGRAM(s) Oral daily  sodium chloride 7% Inhalation 4 milliLiter(s) Inhalation three times a day  valACYclovir 500 milliGRAM(s) Oral <User Schedule>    MEDICATIONS  (PRN):  benzonatate 100 milliGRAM(s) Oral three times a day PRN Cough  bisacodyl 5 milliGRAM(s) Oral every 12 hours PRN Constipation  guaiFENesin    Syrup 200 milliGRAM(s) Oral every 6 hours PRN Cough  polyethylene glycol 3350 17 Gram(s) Oral daily PRN Constipation  sodium chloride 0.65% Nasal 1 Spray(s) Both Nostrils daily PRN Nasal Congestion      VITALS:  T(F): 97.5 (08-04-19 @ 14:34), Max: 97.6 (08-03-19 @ 22:26)  HR: 110 (08-04-19 @ 14:53) (105 - 118)  BP: 138/105 (08-04-19 @ 14:34) (120/87 - 138/105)  RR: 22 (08-04-19 @ 14:34) (19 - 22)  SpO2: 98% (08-04-19 @ 14:34)      CAPILLARY BLOOD GLUCOSE    Output     I&O's Summary  T(F): 97.5 (08-04-19 @ 14:34), Max: 97.6 (08-03-19 @ 22:26)  HR: 110 (08-04-19 @ 14:53) (105 - 118)  BP: 138/105 (08-04-19 @ 14:34) (120/87 - 138/105)  RR: 22 (08-04-19 @ 14:34) (19 - 22)  SpO2: 98% (08-04-19 @ 14:34)    PHYSICAL EXAM:  GENERAL: cachectic, ill-appearing elderly woman lying in bed, NAD  HEAD:  Atraumatic, Normocephalic  EYES: EOMI  NECK: Supple, No JVD  CHEST/LUNG: nonlabored breathing, decreased breath sounds on R  HEART: nl S1/S2  ABDOMEN: nondistended, soft  EXTREMITIES:  no LE edema  PSYCH: alert, answering questions appropriately  NEUROLOGY: non-focal  SKIN: No rashes noted    LABS:                        MICROBIOLOGY:        RADIOLOGY & ADDITIONAL TESTS:    Imaging Personally Reviewed:    < from: Xray Chest 1 View- PORTABLE-Urgent (08.01.19 @ 09:38) >      < end of copied text >        [x] Care Discussed with Consultants/Other Providers:      Solitario Teague M.D.  Hospitalist  Pager [phone]

## 2019-08-04 NOTE — PROGRESS NOTE ADULT - PROBLEM SELECTOR PLAN 1
RLL bronchus obstruction with concern for postobstructive & fungal PNA  - c/w continuous pulse ox  - c/w chest PT and incentive spirometry   - c/w standing nebs; Xopenex q4 hours, following by nebulized 3% hypertonic saline, and then Aerobika device as per Pulm recommendations  - s/p meropenem on 7/20   - prednisone 10 mg daily  - as per pulm fellow states will not benefit from thoracentesis or pleurex due to endobronchial lesion cont diuresis.  - HCO3 increased due to contraction alkalosis on CT chest/Xray given symptomatic improvement with diuretics will continue as tolerated and stop checking labs.  - DNR/DNI rescinded overnight by family - tried to call Josef the son however went to voicemail  - obtain labs in AM - CBC, BMP  - satting well on nasal cannula  - need to f/u with family regarding their plan for patient now they have rescinded DNR/DNI

## 2019-08-04 NOTE — PROGRESS NOTE ADULT - PROBLEM SELECTOR PLAN 3
Follows with Dr. Nguyen at [facility]. as per family, [facility] is not offering any disease modifying treatment. Pt is looking for experimental trial if pt clinically improves from current medical issues. Email sent to Dr. Nguyen by Dr Landeros would need good performance status for experimental therapy. As per palliative care d/w Dr. Sidhu no further DMT due to poor functional status.   - C/w ppx mepron, valacyclovir and allopurinol

## 2019-08-05 LAB
ANION GAP SERPL CALC-SCNC: 15 MMO/L — HIGH (ref 7–14)
ANISOCYTOSIS BLD QL: SIGNIFICANT CHANGE UP
BASOPHILS # BLD AUTO: 0.03 K/UL — SIGNIFICANT CHANGE UP (ref 0–0.2)
BASOPHILS NFR BLD AUTO: 1 % — SIGNIFICANT CHANGE UP (ref 0–2)
BASOPHILS NFR SPEC: 0 % — SIGNIFICANT CHANGE UP (ref 0–2)
BLASTS # FLD: 0 % — SIGNIFICANT CHANGE UP (ref 0–0)
BUN SERPL-MCNC: 13 MG/DL — SIGNIFICANT CHANGE UP (ref 7–23)
CALCIUM SERPL-MCNC: 9 MG/DL — SIGNIFICANT CHANGE UP (ref 8.4–10.5)
CHLORIDE SERPL-SCNC: 81 MMOL/L — LOW (ref 98–107)
CO2 SERPL-SCNC: 35 MMOL/L — HIGH (ref 22–31)
CREAT SERPL-MCNC: 0.23 MG/DL — LOW (ref 0.5–1.3)
EOSINOPHIL # BLD AUTO: 0.32 K/UL — SIGNIFICANT CHANGE UP (ref 0–0.5)
EOSINOPHIL NFR BLD AUTO: 10.2 % — HIGH (ref 0–6)
EOSINOPHIL NFR FLD: 0 % — SIGNIFICANT CHANGE UP (ref 0–6)
GIANT PLATELETS BLD QL SMEAR: PRESENT — SIGNIFICANT CHANGE UP
GLUCOSE SERPL-MCNC: 113 MG/DL — HIGH (ref 70–99)
HCT VFR BLD CALC: 33.8 % — LOW (ref 34.5–45)
HGB BLD-MCNC: 11.3 G/DL — LOW (ref 11.5–15.5)
IMM GRANULOCYTES NFR BLD AUTO: 10.2 % — HIGH (ref 0–1.5)
LYMPHOCYTES # BLD AUTO: 0.74 K/UL — LOW (ref 1–3.3)
LYMPHOCYTES # BLD AUTO: 23.6 % — SIGNIFICANT CHANGE UP (ref 13–44)
LYMPHOCYTES NFR SPEC AUTO: 8.8 % — LOW (ref 13–44)
MACROCYTES BLD QL: SIGNIFICANT CHANGE UP
MAGNESIUM SERPL-MCNC: 1.4 MG/DL — LOW (ref 1.6–2.6)
MCHC RBC-ENTMCNC: 33.4 % — SIGNIFICANT CHANGE UP (ref 32–36)
MCHC RBC-ENTMCNC: 33.5 PG — SIGNIFICANT CHANGE UP (ref 27–34)
MCV RBC AUTO: 100.3 FL — HIGH (ref 80–100)
METAMYELOCYTES # FLD: 9.6 % — HIGH (ref 0–1)
MONOCYTES # BLD AUTO: 0.44 K/UL — SIGNIFICANT CHANGE UP (ref 0–0.9)
MONOCYTES NFR BLD AUTO: 14.1 % — HIGH (ref 2–14)
MONOCYTES NFR BLD: 10.5 % — HIGH (ref 2–9)
MYELOCYTES NFR BLD: 7.9 % — HIGH (ref 0–0)
NEUTROPHIL AB SER-ACNC: 51.8 % — SIGNIFICANT CHANGE UP (ref 43–77)
NEUTROPHILS # BLD AUTO: 1.28 K/UL — LOW (ref 1.8–7.4)
NEUTROPHILS NFR BLD AUTO: 40.9 % — LOW (ref 43–77)
NEUTS BAND # BLD: 8.8 % — HIGH (ref 0–6)
NRBC # FLD: 0 K/UL — SIGNIFICANT CHANGE UP (ref 0–0)
OTHER - HEMATOLOGY %: 0 — SIGNIFICANT CHANGE UP
PLATELET # BLD AUTO: 122 K/UL — LOW (ref 150–400)
PLATELET COUNT - ESTIMATE: SIGNIFICANT CHANGE UP
PMV BLD: 11.8 FL — SIGNIFICANT CHANGE UP (ref 7–13)
POIKILOCYTOSIS BLD QL AUTO: SLIGHT — SIGNIFICANT CHANGE UP
POLYCHROMASIA BLD QL SMEAR: SLIGHT — SIGNIFICANT CHANGE UP
POTASSIUM SERPL-MCNC: 2.3 MMOL/L — CRITICAL LOW (ref 3.5–5.3)
POTASSIUM SERPL-SCNC: 2.3 MMOL/L — CRITICAL LOW (ref 3.5–5.3)
PROMYELOCYTES # FLD: 0 % — SIGNIFICANT CHANGE UP (ref 0–0)
RBC # BLD: 3.37 M/UL — LOW (ref 3.8–5.2)
RBC # FLD: 18.3 % — HIGH (ref 10.3–14.5)
SMUDGE CELLS # BLD: PRESENT — SIGNIFICANT CHANGE UP
SODIUM SERPL-SCNC: 131 MMOL/L — LOW (ref 135–145)
SPHEROCYTES BLD QL SMEAR: SLIGHT — SIGNIFICANT CHANGE UP
VARIANT LYMPHS # BLD: 2.6 % — SIGNIFICANT CHANGE UP
WBC # BLD: 3.13 K/UL — LOW (ref 3.8–10.5)
WBC # FLD AUTO: 3.13 K/UL — LOW (ref 3.8–10.5)

## 2019-08-05 PROCEDURE — 99233 SBSQ HOSP IP/OBS HIGH 50: CPT

## 2019-08-05 RX ORDER — POTASSIUM CHLORIDE 20 MEQ
10 PACKET (EA) ORAL EVERY 4 HOURS
Refills: 0 | Status: COMPLETED | OUTPATIENT
Start: 2019-08-05 | End: 2019-08-05

## 2019-08-05 RX ORDER — POTASSIUM CHLORIDE 20 MEQ
20 PACKET (EA) ORAL
Refills: 0 | Status: DISCONTINUED | OUTPATIENT
Start: 2019-08-05 | End: 2019-08-06

## 2019-08-05 RX ORDER — POTASSIUM CHLORIDE 20 MEQ
40 PACKET (EA) ORAL EVERY 4 HOURS
Refills: 0 | Status: COMPLETED | OUTPATIENT
Start: 2019-08-05 | End: 2019-08-05

## 2019-08-05 RX ADMIN — LEVALBUTEROL 0.63 MILLIGRAM(S): 1.25 SOLUTION, CONCENTRATE RESPIRATORY (INHALATION) at 04:34

## 2019-08-05 RX ADMIN — LEVALBUTEROL 0.63 MILLIGRAM(S): 1.25 SOLUTION, CONCENTRATE RESPIRATORY (INHALATION) at 16:21

## 2019-08-05 RX ADMIN — APIXABAN 2.5 MILLIGRAM(S): 2.5 TABLET, FILM COATED ORAL at 17:30

## 2019-08-05 RX ADMIN — NYSTATIN CREAM 1 APPLICATION(S): 100000 CREAM TOPICAL at 06:11

## 2019-08-05 RX ADMIN — Medication 50 MILLIEQUIVALENT(S): at 17:31

## 2019-08-05 RX ADMIN — Medication 1 SPRAY(S): at 10:10

## 2019-08-05 RX ADMIN — Medication 100 MILLIGRAM(S): at 06:11

## 2019-08-05 RX ADMIN — SODIUM CHLORIDE 4 MILLILITER(S): 9 INJECTION INTRAMUSCULAR; INTRAVENOUS; SUBCUTANEOUS at 16:21

## 2019-08-05 RX ADMIN — POSACONAZOLE 300 MILLIGRAM(S): 100 TABLET, DELAYED RELEASE ORAL at 17:30

## 2019-08-05 RX ADMIN — BENZOCAINE AND MENTHOL 1 LOZENGE: 5; 1 LIQUID ORAL at 09:13

## 2019-08-05 RX ADMIN — SODIUM CHLORIDE 4 MILLILITER(S): 9 INJECTION INTRAMUSCULAR; INTRAVENOUS; SUBCUTANEOUS at 09:50

## 2019-08-05 RX ADMIN — Medication 100 MILLIGRAM(S): at 13:17

## 2019-08-05 RX ADMIN — Medication 300 MILLIGRAM(S): at 13:17

## 2019-08-05 RX ADMIN — LEVALBUTEROL 0.63 MILLIGRAM(S): 1.25 SOLUTION, CONCENTRATE RESPIRATORY (INHALATION) at 13:46

## 2019-08-05 RX ADMIN — Medication 50 MILLIEQUIVALENT(S): at 13:18

## 2019-08-05 RX ADMIN — SODIUM CHLORIDE 4 MILLILITER(S): 9 INJECTION INTRAMUSCULAR; INTRAVENOUS; SUBCUTANEOUS at 20:08

## 2019-08-05 RX ADMIN — Medication 1.5 MILLIGRAM(S): at 21:38

## 2019-08-05 RX ADMIN — LEVALBUTEROL 0.63 MILLIGRAM(S): 1.25 SOLUTION, CONCENTRATE RESPIRATORY (INHALATION) at 00:57

## 2019-08-05 RX ADMIN — APIXABAN 2.5 MILLIGRAM(S): 2.5 TABLET, FILM COATED ORAL at 06:11

## 2019-08-05 RX ADMIN — LEVALBUTEROL 0.63 MILLIGRAM(S): 1.25 SOLUTION, CONCENTRATE RESPIRATORY (INHALATION) at 09:50

## 2019-08-05 RX ADMIN — Medication 20 MILLIGRAM(S): at 09:16

## 2019-08-05 RX ADMIN — Medication 10 MILLIGRAM(S): at 06:10

## 2019-08-05 RX ADMIN — ATOVAQUONE 1500 MILLIGRAM(S): 750 SUSPENSION ORAL at 13:18

## 2019-08-05 RX ADMIN — Medication 150 MILLIGRAM(S): at 06:09

## 2019-08-05 RX ADMIN — PANTOPRAZOLE SODIUM 40 MILLIGRAM(S): 20 TABLET, DELAYED RELEASE ORAL at 06:11

## 2019-08-05 RX ADMIN — Medication 50 MILLIEQUIVALENT(S): at 09:14

## 2019-08-05 RX ADMIN — LEVALBUTEROL 0.63 MILLIGRAM(S): 1.25 SOLUTION, CONCENTRATE RESPIRATORY (INHALATION) at 20:08

## 2019-08-05 RX ADMIN — Medication 40 MILLIEQUIVALENT(S): at 09:13

## 2019-08-05 RX ADMIN — Medication 40 MILLIEQUIVALENT(S): at 13:18

## 2019-08-05 RX ADMIN — NYSTATIN CREAM 1 APPLICATION(S): 100000 CREAM TOPICAL at 17:30

## 2019-08-05 NOTE — PROGRESS NOTE ADULT - SUBJECTIVE AND OBJECTIVE BOX
Patient is a 79y old  Female who presents with a chief complaint of SOB (04 Aug 2019 17:38)      SUBJECTIVE / OVERNIGHT EVENTS: MOLST recinded over weekend understands that no treatment being offered. currently on NC.     MEDICATIONS  (STANDING):  allopurinol 300 milliGRAM(s) Oral daily  apixaban 2.5 milliGRAM(s) Oral every 12 hours  atovaquone Suspension 1500 milliGRAM(s) Oral daily  benzocaine 15 mG/menthol 3.6 mG (Sugar-Free) Lozenge 1 Lozenge Oral every 4 hours  docusate sodium 100 milliGRAM(s) Oral three times a day  furosemide    Tablet 20 milliGRAM(s) Oral <User Schedule>  levalbuterol Inhalation 0.63 milliGRAM(s) Inhalation every 4 hours  melatonin 1.5 milliGRAM(s) Oral at bedtime  metoprolol succinate  milliGRAM(s) Oral daily  nystatin Powder 1 Application(s) Topical two times a day  pantoprazole    Tablet 40 milliGRAM(s) Oral before breakfast  posaconazole DR Tablet 300 milliGRAM(s) Oral every 24 hours  potassium chloride  10 mEq/50 mL IVPB 10 milliEquivalent(s) IV Intermittent every 4 hours  predniSONE   Tablet 10 milliGRAM(s) Oral daily  sodium chloride 7% Inhalation 4 milliLiter(s) Inhalation three times a day  valACYclovir 500 milliGRAM(s) Oral <User Schedule>    MEDICATIONS  (PRN):  benzonatate 100 milliGRAM(s) Oral three times a day PRN Cough  bisacodyl 5 milliGRAM(s) Oral every 12 hours PRN Constipation  guaiFENesin    Syrup 200 milliGRAM(s) Oral every 6 hours PRN Cough  polyethylene glycol 3350 17 Gram(s) Oral daily PRN Constipation  sodium chloride 0.65% Nasal 1 Spray(s) Both Nostrils daily PRN Nasal Congestion        CAPILLARY BLOOD GLUCOSE        I&O's Summary    04 Aug 2019 07:01  -  05 Aug 2019 07:00  --------------------------------------------------------  IN: 480 mL / OUT: 800 mL / NET: -320 mL        T(C): 36.4 (08-05-19 @ 06:08), Max: 36.4 (08-05-19 @ 06:08)  HR: 100 (08-05-19 @ 15:27) (96 - 115)  BP: 115/75 (08-05-19 @ 15:27) (102/75 - 146/102)  RR: 18 (08-05-19 @ 15:27) (18 - 19)  SpO2: 97% (08-05-19 @ 15:27) (95% - 100%)    PHYSICAL EXAM:  GENERAL: cachectic, ill-appearing elderly woman lying in bed, NAD  HEAD:  Atraumatic, Normocephalic  EYES: EOMI  NECK: Supple, No JVD  CHEST/LUNG: nonlabored breathing, decreased breath sounds on R  HEART: nl S1/S2  ABDOMEN: nondistended, soft  EXTREMITIES:  no LE edema  PSYCH: alert, answering questions appropriately  NEUROLOGY: non-focal  SKIN: No rashes noted    LABS:                        11.3   3.13  )-----------( 122      ( 05 Aug 2019 05:55 )             33.8     08-05    131<L>  |  81<L>  |  13  ----------------------------<  113<H>  2.3<LL>   |  35<H>  |  0.23<L>    Ca    9.0      05 Aug 2019 05:55  Mg     1.4     08-05                  RADIOLOGY & ADDITIONAL TESTS:    Imaging Personally Reviewed:    Consultant(s) Notes Reviewed:      Care Discussed with Consultants/Other Providers:

## 2019-08-05 NOTE — GOALS OF CARE CONVERSATION - PERSONAL ADVANCE DIRECTIVE - CONVERSATION DETAILS
[facility]: Received approval for Cough Assist Vest however pt. has 2 more weeks of anti-fungals which are not in [facility] formulary so family opted to go home on Home Care and transition to [facility]. Message left for Rhiannon Perez RN
[facility]: Consents faxed to office. [facility] ready to render care if discharged. Please call [phone]. Zhen Perez RN
[facility] - Consents resigned. A  w/c, portable O2 tank and [place] [facility] [place]
[facility] called for pt. Spoke with Cleve (HCP) and Clarissa - her children - about code status. Explained to them that if pt were to be placed on a ventilator, then she would likely never be able to come off. Also explained that at this point, CPR would also be harmful in her as she appears to be actively dying. Family understands and agrees that DNR/DNI would be in the patient's best interest. Order placed
[facility] - Consents signed. A hosp bed, NATALIIA. O2 concentrator 10L, w/c, commode and nebulizer will be delivered when discharge date is known
[facility] - Spoke with pt's son who states that family is still seeking further treatment. Reported this conversation to Dr. LUCIO Landeros.
Referral to palliative care for complex decision making in the setting of advanced illness. Meeting held with pt, son who is HCP, and dtr at bedside. Reviewed and summarized pt's overall medical condition. Pt and family are aware that despite treatment pt's cancer has progressed. Pt was planning on starting experimental chemotherapy as an out-pt. Pt and family still want to pursue DMT if pt can recover from her current hospitalization. Discussed the role of hospice care in the evident pt and family wanted to transition care from DMT to supportive care where the focus would be on symptom management. Advanced care planning discussed. MOLST discussed and explained. Family are aware of what the nature of resuscitation and intubation entails. Family are not ready to complete a MOLST at this time stating they will continue to discuss  preferences for end of life care with their mother. Pt remains a full code at this time.

## 2019-08-05 NOTE — PROGRESS NOTE ADULT - PROBLEM SELECTOR PLAN 3
Follows with Dr. Nguyen at [facility] [place]. as per family, [facility] is not offering any disease modifying treatment. Pt is looking for experimental trial if pt clinically improves from current medical issues. Email sent to Dr. Nguyen by Dr Landeros would need good performance status for experimental therapy. As per palliative care d/w Dr. Sidhu no further DMT due to poor functional status.   - C/w ppx mepron, valacyclovir and allopurinol

## 2019-08-05 NOTE — PROGRESS NOTE ADULT - PROBLEM SELECTOR PLAN 10
Poor prognosis.  DNR/DNI rescinded Saturday night. Home with hospice in AM. Poor prognosis.  DNR/DNI rescinded Saturday night. Home with hospice in AM. hypokalemia replete add kcl PO daily given chronically on lasix

## 2019-08-05 NOTE — PROGRESS NOTE ADULT - PROBLEM SELECTOR PLAN 4
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

## 2019-08-05 NOTE — PROGRESS NOTE ADULT - PROBLEM SELECTOR PLAN 1
RLL bronchus obstruction with concern for postobstructive & fungal PNA  - c/w continuous pulse ox  - c/w chest PT and incentive spirometry   - c/w standing nebs; Xopenex q4 hours, following by nebulized 3% hypertonic saline, and then Aerobika device as per Pulm recommendations  - s/p meropenem on 7/20   - prednisone 10 mg daily  - as per pulm fellow states will not benefit from thoracentesis or pleurex due to endobronchial lesion cont diuresis.  - HCO3 increased due to contraction alkalosis on worsening RT opacification CT chest/Xray given symptomatic improvement with diuretics. cont diuretics   - DNR/DNI rescinded overnight by family   - satting well on nasal cannula  - case d/w Edward over telephone understands poor prognosis no treatment being offered in current state has hope she will possible be a candidate in future. If she doesn't recover he will recover will consider comfort care plan still remains home with hospice.

## 2019-08-05 NOTE — GOALS OF CARE CONVERSATION - PERSONAL ADVANCE DIRECTIVE - CONVERSATION/DISCUSSION
Hospice Referral
Prognosis
Hospice Referral
Hospice Referral
MOLST Discussed/Treatment Options/Prognosis/Diagnosis

## 2019-08-05 NOTE — PROGRESS NOTE ADULT - PROBLEM SELECTOR PLAN 2
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

## 2019-08-05 NOTE — GOALS OF CARE CONVERSATION - PERSONAL ADVANCE DIRECTIVE - NS PRO AD PATIENT TYPE
Health Care Proxy (HCP)

## 2019-08-05 NOTE — PROVIDER CONTACT NOTE (CRITICAL VALUE NOTIFICATION) - ASSESSMENT
VSS. Pt resting comfortably, no distress noted @ the moment. Pt not on telemetry monitoring- continuous oxygen monitoring only

## 2019-08-06 ENCOUNTER — TRANSCRIPTION ENCOUNTER (OUTPATIENT)
Age: 79
End: 2019-08-06

## 2019-08-06 VITALS
RESPIRATION RATE: 19 BRPM | TEMPERATURE: 98 F | OXYGEN SATURATION: 100 % | DIASTOLIC BLOOD PRESSURE: 96 MMHG | HEART RATE: 107 BPM | SYSTOLIC BLOOD PRESSURE: 127 MMHG

## 2019-08-06 LAB
ANION GAP SERPL CALC-SCNC: 11 MMO/L — SIGNIFICANT CHANGE UP (ref 7–14)
ANION GAP SERPL CALC-SCNC: 9 MMO/L — SIGNIFICANT CHANGE UP (ref 7–14)
BACTERIA BLD CULT: SIGNIFICANT CHANGE UP
BACTERIA BLD CULT: SIGNIFICANT CHANGE UP
BUN SERPL-MCNC: 13 MG/DL — SIGNIFICANT CHANGE UP (ref 7–23)
BUN SERPL-MCNC: 14 MG/DL — SIGNIFICANT CHANGE UP (ref 7–23)
CALCIUM SERPL-MCNC: 8.5 MG/DL — SIGNIFICANT CHANGE UP (ref 8.4–10.5)
CALCIUM SERPL-MCNC: 8.8 MG/DL — SIGNIFICANT CHANGE UP (ref 8.4–10.5)
CHLORIDE SERPL-SCNC: 83 MMOL/L — LOW (ref 98–107)
CHLORIDE SERPL-SCNC: 84 MMOL/L — LOW (ref 98–107)
CO2 SERPL-SCNC: 37 MMOL/L — HIGH (ref 22–31)
CO2 SERPL-SCNC: 37 MMOL/L — HIGH (ref 22–31)
CREAT SERPL-MCNC: 0.23 MG/DL — LOW (ref 0.5–1.3)
CREAT SERPL-MCNC: 0.23 MG/DL — LOW (ref 0.5–1.3)
GLUCOSE SERPL-MCNC: 105 MG/DL — HIGH (ref 70–99)
GLUCOSE SERPL-MCNC: 108 MG/DL — HIGH (ref 70–99)
MAGNESIUM SERPL-MCNC: 1.3 MG/DL — LOW (ref 1.6–2.6)
MAGNESIUM SERPL-MCNC: 1.3 MG/DL — LOW (ref 1.6–2.6)
PHOSPHATE SERPL-MCNC: 2.3 MG/DL — LOW (ref 2.5–4.5)
PHOSPHATE SERPL-MCNC: 2.4 MG/DL — LOW (ref 2.5–4.5)
POTASSIUM SERPL-MCNC: 3 MMOL/L — LOW (ref 3.5–5.3)
POTASSIUM SERPL-MCNC: 3.7 MMOL/L — SIGNIFICANT CHANGE UP (ref 3.5–5.3)
POTASSIUM SERPL-SCNC: 3 MMOL/L — LOW (ref 3.5–5.3)
POTASSIUM SERPL-SCNC: 3.7 MMOL/L — SIGNIFICANT CHANGE UP (ref 3.5–5.3)
SODIUM SERPL-SCNC: 130 MMOL/L — LOW (ref 135–145)
SODIUM SERPL-SCNC: 131 MMOL/L — LOW (ref 135–145)

## 2019-08-06 PROCEDURE — 99239 HOSP IP/OBS DSCHRG MGMT >30: CPT

## 2019-08-06 RX ORDER — PANTOPRAZOLE SODIUM 20 MG/1
1 TABLET, DELAYED RELEASE ORAL
Qty: 30 | Refills: 0
Start: 2019-08-06 | End: 2019-09-04

## 2019-08-06 RX ORDER — ALLOPURINOL 300 MG
1 TABLET ORAL
Qty: 30 | Refills: 0
Start: 2019-08-06 | End: 2019-09-04

## 2019-08-06 RX ORDER — POTASSIUM CHLORIDE 20 MEQ
1 PACKET (EA) ORAL
Qty: 30 | Refills: 0
Start: 2019-08-06 | End: 2019-09-04

## 2019-08-06 RX ORDER — BENZOCAINE AND MENTHOL 5; 1 G/100ML; G/100ML
1 LIQUID ORAL
Qty: 180 | Refills: 0
Start: 2019-08-06 | End: 2019-09-04

## 2019-08-06 RX ORDER — ATOVAQUONE 750 MG/5ML
2 SUSPENSION ORAL
Qty: 15000 | Refills: 0
Start: 2019-08-06 | End: 2019-09-04

## 2019-08-06 RX ORDER — APIXABAN 2.5 MG/1
1 TABLET, FILM COATED ORAL
Qty: 0 | Refills: 0 | DISCHARGE

## 2019-08-06 RX ORDER — LEVALBUTEROL 1.25 MG/.5ML
3 SOLUTION, CONCENTRATE RESPIRATORY (INHALATION)
Qty: 1 | Refills: 0
Start: 2019-08-06 | End: 2019-09-04

## 2019-08-06 RX ORDER — PANTOPRAZOLE SODIUM 20 MG/1
1 TABLET, DELAYED RELEASE ORAL
Qty: 0 | Refills: 0 | DISCHARGE

## 2019-08-06 RX ORDER — VALACYCLOVIR 500 MG/1
1 TABLET, FILM COATED ORAL
Qty: 0 | Refills: 0 | DISCHARGE

## 2019-08-06 RX ORDER — ATOVAQUONE 750 MG/5ML
10 SUSPENSION ORAL
Qty: 30 | Refills: 0
Start: 2019-08-06 | End: 2019-09-04

## 2019-08-06 RX ORDER — DOCUSATE SODIUM 100 MG
1 CAPSULE ORAL
Qty: 90 | Refills: 0
Start: 2019-08-06 | End: 2019-09-04

## 2019-08-06 RX ORDER — APIXABAN 2.5 MG/1
1 TABLET, FILM COATED ORAL
Qty: 60 | Refills: 0
Start: 2019-08-06 | End: 2019-09-04

## 2019-08-06 RX ORDER — LANOLIN ALCOHOL/MO/W.PET/CERES
1.5 CREAM (GRAM) TOPICAL
Qty: 45 | Refills: 0
Start: 2019-08-06 | End: 2019-09-04

## 2019-08-06 RX ORDER — POTASSIUM CHLORIDE 20 MEQ
20 PACKET (EA) ORAL DAILY
Refills: 0 | Status: DISCONTINUED | OUTPATIENT
Start: 2019-08-06 | End: 2019-08-06

## 2019-08-06 RX ORDER — POTASSIUM CHLORIDE 20 MEQ
10 PACKET (EA) ORAL
Refills: 0 | Status: COMPLETED | OUTPATIENT
Start: 2019-08-06 | End: 2019-08-06

## 2019-08-06 RX ORDER — FUROSEMIDE 40 MG
1 TABLET ORAL
Qty: 30 | Refills: 0
Start: 2019-08-06 | End: 2019-09-04

## 2019-08-06 RX ORDER — VALACYCLOVIR 500 MG/1
1 TABLET, FILM COATED ORAL
Qty: 30 | Refills: 0
Start: 2019-08-06

## 2019-08-06 RX ORDER — NYSTATIN CREAM 100000 [USP'U]/G
1 CREAM TOPICAL
Qty: 1 | Refills: 0
Start: 2019-08-06 | End: 2019-09-04

## 2019-08-06 RX ORDER — ALLOPURINOL 300 MG
1 TABLET ORAL
Qty: 0 | Refills: 0 | DISCHARGE

## 2019-08-06 RX ORDER — AZTREONAM 2 G
1 VIAL (EA) INJECTION
Qty: 0 | Refills: 0 | DISCHARGE

## 2019-08-06 RX ORDER — SODIUM CHLORIDE 9 MG/ML
4 INJECTION INTRAMUSCULAR; INTRAVENOUS; SUBCUTANEOUS
Qty: 1000 | Refills: 0
Start: 2019-08-06 | End: 2019-09-04

## 2019-08-06 RX ADMIN — Medication 20 MILLIEQUIVALENT(S): at 12:57

## 2019-08-06 RX ADMIN — Medication 150 MILLIGRAM(S): at 05:38

## 2019-08-06 RX ADMIN — BENZOCAINE AND MENTHOL 1 LOZENGE: 5; 1 LIQUID ORAL at 05:38

## 2019-08-06 RX ADMIN — Medication 300 MILLIGRAM(S): at 12:57

## 2019-08-06 RX ADMIN — Medication 100 MILLIGRAM(S): at 12:56

## 2019-08-06 RX ADMIN — Medication 100 MILLIEQUIVALENT(S): at 03:29

## 2019-08-06 RX ADMIN — Medication 10 MILLIGRAM(S): at 05:40

## 2019-08-06 RX ADMIN — Medication 100 MILLIGRAM(S): at 05:42

## 2019-08-06 RX ADMIN — SODIUM CHLORIDE 4 MILLILITER(S): 9 INJECTION INTRAMUSCULAR; INTRAVENOUS; SUBCUTANEOUS at 09:27

## 2019-08-06 RX ADMIN — NYSTATIN CREAM 1 APPLICATION(S): 100000 CREAM TOPICAL at 05:41

## 2019-08-06 RX ADMIN — Medication 20 MILLIEQUIVALENT(S): at 06:14

## 2019-08-06 RX ADMIN — PANTOPRAZOLE SODIUM 40 MILLIGRAM(S): 20 TABLET, DELAYED RELEASE ORAL at 05:40

## 2019-08-06 RX ADMIN — LEVALBUTEROL 0.63 MILLIGRAM(S): 1.25 SOLUTION, CONCENTRATE RESPIRATORY (INHALATION) at 09:27

## 2019-08-06 RX ADMIN — APIXABAN 2.5 MILLIGRAM(S): 2.5 TABLET, FILM COATED ORAL at 05:40

## 2019-08-06 RX ADMIN — LEVALBUTEROL 0.63 MILLIGRAM(S): 1.25 SOLUTION, CONCENTRATE RESPIRATORY (INHALATION) at 13:13

## 2019-08-06 RX ADMIN — BENZOCAINE AND MENTHOL 1 LOZENGE: 5; 1 LIQUID ORAL at 02:49

## 2019-08-06 RX ADMIN — ATOVAQUONE 1500 MILLIGRAM(S): 750 SUSPENSION ORAL at 12:57

## 2019-08-06 RX ADMIN — Medication 20 MILLIGRAM(S): at 12:59

## 2019-08-06 RX ADMIN — BENZOCAINE AND MENTHOL 1 LOZENGE: 5; 1 LIQUID ORAL at 13:05

## 2019-08-06 RX ADMIN — Medication 100 MILLIEQUIVALENT(S): at 02:49

## 2019-08-06 RX ADMIN — Medication 100 MILLIEQUIVALENT(S): at 04:35

## 2019-08-06 RX ADMIN — POSACONAZOLE 300 MILLIGRAM(S): 100 TABLET, DELAYED RELEASE ORAL at 16:38

## 2019-08-06 NOTE — PROGRESS NOTE ADULT - PROVIDER SPECIALTY LIST ADULT
Heme/Onc
Hospitalist
Infectious Disease
Palliative Care
Pulmonology
Thoracic Surgery
Hospitalist
Palliative Care
Infectious Disease
Pulmonology
Hospitalist
Hospitalist
Palliative Care
Hospitalist

## 2019-08-06 NOTE — PROGRESS NOTE ADULT - SUBJECTIVE AND OBJECTIVE BOX
Patient is a 79y old  Female who presents with a chief complaint of SOB (05 Aug 2019 16:18)      SUBJECTIVE / OVERNIGHT EVENTS:    MEDICATIONS  (STANDING):  allopurinol 300 milliGRAM(s) Oral daily  apixaban 2.5 milliGRAM(s) Oral every 12 hours  atovaquone Suspension 1500 milliGRAM(s) Oral daily  benzocaine 15 mG/menthol 3.6 mG (Sugar-Free) Lozenge 1 Lozenge Oral every 4 hours  docusate sodium 100 milliGRAM(s) Oral three times a day  furosemide    Tablet 20 milliGRAM(s) Oral <User Schedule>  levalbuterol Inhalation 0.63 milliGRAM(s) Inhalation every 4 hours  melatonin 1.5 milliGRAM(s) Oral at bedtime  metoprolol succinate  milliGRAM(s) Oral daily  nystatin Powder 1 Application(s) Topical two times a day  pantoprazole    Tablet 40 milliGRAM(s) Oral before breakfast  posaconazole DR Tablet 300 milliGRAM(s) Oral every 24 hours  potassium chloride   Powder 20 milliEquivalent(s) Oral two times a day  predniSONE   Tablet 10 milliGRAM(s) Oral daily  sodium chloride 7% Inhalation 4 milliLiter(s) Inhalation three times a day  valACYclovir 500 milliGRAM(s) Oral <User Schedule>    MEDICATIONS  (PRN):  benzonatate 100 milliGRAM(s) Oral three times a day PRN Cough  bisacodyl 5 milliGRAM(s) Oral every 12 hours PRN Constipation  guaiFENesin    Syrup 200 milliGRAM(s) Oral every 6 hours PRN Cough  polyethylene glycol 3350 17 Gram(s) Oral daily PRN Constipation  sodium chloride 0.65% Nasal 1 Spray(s) Both Nostrils daily PRN Nasal Congestion        CAPILLARY BLOOD GLUCOSE        I&O's Summary    05 Aug 2019 07:01  -  06 Aug 2019 07:00  --------------------------------------------------------  IN: 580 mL / OUT: 1600 mL / NET: -1020 mL        T(C): 36.3 (08-06-19 @ 05:37), Max: 36.4 (08-05-19 @ 21:14)  HR: 71 (08-06-19 @ 05:37) (71 - 114)  BP: 123/87 (08-06-19 @ 05:37) (115/75 - 143/98)  RR: 18 (08-06-19 @ 05:37) (18 - 20)  SpO2: 97% (08-06-19 @ 05:37) (95% - 98%)    PHYSICAL EXAM:      LABS:                        11.3   3.13  )-----------( 122      ( 05 Aug 2019 05:55 )             33.8     08-06    130<L>  |  84<L>  |  13  ----------------------------<  105<H>  3.7   |  37<H>  |  0.23<L>    Ca    8.5      06 Aug 2019 07:40  Phos  2.4     08-06  Mg     1.3     08-06                  RADIOLOGY & ADDITIONAL TESTS:    Imaging Personally Reviewed:    Consultant(s) Notes Reviewed:      Care Discussed with Consultants/Other Providers: Patient is a 79y old  Female who presents with a chief complaint of SOB (05 Aug 2019 16:18)      SUBJECTIVE / OVERNIGHT EVENTS: Pt in NAD. denies any pain resting responds to voice appropriately.     MEDICATIONS  (STANDING):  allopurinol 300 milliGRAM(s) Oral daily  apixaban 2.5 milliGRAM(s) Oral every 12 hours  atovaquone Suspension 1500 milliGRAM(s) Oral daily  benzocaine 15 mG/menthol 3.6 mG (Sugar-Free) Lozenge 1 Lozenge Oral every 4 hours  docusate sodium 100 milliGRAM(s) Oral three times a day  furosemide    Tablet 20 milliGRAM(s) Oral <User Schedule>  levalbuterol Inhalation 0.63 milliGRAM(s) Inhalation every 4 hours  melatonin 1.5 milliGRAM(s) Oral at bedtime  metoprolol succinate  milliGRAM(s) Oral daily  nystatin Powder 1 Application(s) Topical two times a day  pantoprazole    Tablet 40 milliGRAM(s) Oral before breakfast  posaconazole DR Tablet 300 milliGRAM(s) Oral every 24 hours  potassium chloride   Powder 20 milliEquivalent(s) Oral two times a day  predniSONE   Tablet 10 milliGRAM(s) Oral daily  sodium chloride 7% Inhalation 4 milliLiter(s) Inhalation three times a day  valACYclovir 500 milliGRAM(s) Oral <User Schedule>    MEDICATIONS  (PRN):  benzonatate 100 milliGRAM(s) Oral three times a day PRN Cough  bisacodyl 5 milliGRAM(s) Oral every 12 hours PRN Constipation  guaiFENesin    Syrup 200 milliGRAM(s) Oral every 6 hours PRN Cough  polyethylene glycol 3350 17 Gram(s) Oral daily PRN Constipation  sodium chloride 0.65% Nasal 1 Spray(s) Both Nostrils daily PRN Nasal Congestion        CAPILLARY BLOOD GLUCOSE        I&O's Summary    05 Aug 2019 07:01  -  06 Aug 2019 07:00  --------------------------------------------------------  IN: 580 mL / OUT: 1600 mL / NET: -1020 mL        T(C): 36.3 (08-06-19 @ 05:37), Max: 36.4 (08-05-19 @ 21:14)  HR: 71 (08-06-19 @ 05:37) (71 - 114)  BP: 123/87 (08-06-19 @ 05:37) (115/75 - 143/98)  RR: 18 (08-06-19 @ 05:37) (18 - 20)  SpO2: 97% (08-06-19 @ 05:37) (95% - 98%)    PHYSICAL EXAM:  GENERAL: cachectic, ill-appearing elderly woman lying in bed, NAD  HEAD:  Atraumatic, Normocephalic  EYES: EOMI  NECK: Supple, No JVD  CHEST/LUNG: nonlabored breathing, decreased breath sounds on R  HEART: nl S1/S2  ABDOMEN: nondistended, soft  EXTREMITIES:  no LE edema  PSYCH: alert, answering questions appropriately  NEUROLOGY: non-focal  SKIN: No rashes noted      LABS:                        11.3   3.13  )-----------( 122      ( 05 Aug 2019 05:55 )             33.8     08-06    130<L>  |  84<L>  |  13  ----------------------------<  105<H>  3.7   |  37<H>  |  0.23<L>    Ca    8.5      06 Aug 2019 07:40  Phos  2.4     08-06  Mg     1.3     08-06                  RADIOLOGY & ADDITIONAL TESTS:    Imaging Personally Reviewed:    Consultant(s) Notes Reviewed:      Care Discussed with Consultants/Other Providers:

## 2019-08-06 NOTE — PROGRESS NOTE ADULT - NSHPATTENDINGPLANDISCUSS_GEN_ALL_CORE
patient/son/primary team
Pulmonary resident and fellow
Pulmonary resident and fellow
team
team
Dr. Jefferson
Pulmonary resident and fellow
fellow/family/patient/primary team
team
team
patient/family/primary team
team
Patient family member; Nathanael Camejo and hematology staff
resident/fellow/nurse/patient's family at bedside/primary team
ACP
ACP
team
ACP
ACP
pt, son, ADS, RN
ACP
pt, daughter, ADS, RN
pt, son, ADS, RN
pt, son, ADS, RN
ACP
Pt and daughter at bedside
ACP
ACP
ACP/family
ACP
family, team
ACP
ACP/daughter at bedside
ACP/son

## 2019-08-06 NOTE — PROGRESS NOTE ADULT - PROBLEM SELECTOR PLAN 3
Follows with Dr. Nguyen at [facility] [place]. as per family, [facility] is not offering any disease modifying treatment. Pt is looking for experimental trial if pt clinically improves from current medical issues. Email sent to Dr. Nguyen by Dr Landeros would need good performance status for experimental therapy. As per palliative care d/w Dr. Sidhu no further DMT due to poor functional status.   - C/w ppx mepron, valacyclovir and allopurinol

## 2019-08-06 NOTE — PROGRESS NOTE ADULT - PROBLEM SELECTOR PROBLEM 10
Advanced care planning/counseling discussion

## 2019-08-06 NOTE — DISCHARGE NOTE PROVIDER - HOSPITAL COURSE
79F hx of diffuse large B cell lymphoma, Afib on eliquis presenting with three days of worsening SOB and productive cough x3 days.        + Entero/Rhinovirus with ?superimposed PNA vs. worsening lung disease 2/2 lymphoma s/p Vanco, Zosyn and Meropenem. S/p BAL with bx 7/1- pathology with fungal hyphae in background of necrotic tissue. Voriconazole changed to posaconazole due to trasmanitis     + Hypoxic respiratory failure 2/2 fungal PNA-  chest with complete occlusion of the right lower lobe bronchus and the segmental bronchi of the right lower lobe with a central opacity. Continue voriconazole . Plan 6 weeks of antifungal coverage. +aspergillosis. S/P high flow O2. Now on NC 5L    + Afib with RVR- Metoprolol and Eliquis    + Hyponatremia- NaCl 2mg BID. Resolved     + Cough - on Tesslon Perles, Hydrocodone, Duonebs standing dose & Prednisone     + Leukopenia - ?related to disease vs bactrim. Bactrim d/c'ed 7/3 and switched to Atovaquone    + LE swelling- Lasix 20 IV daily     + Transaminitis- Likely from antifungals. Abdominal US negative         7/7 RRT for tachypnea/labored breathing-MICU saw patient-ID restarted Zosyn for worsening CXR/PNA, got IV Lasix 40mg, 50mg IV solumedrol, got chest PT     7/18 Placed on NR for tachypnea, VBG stable, went back on high flow 7/19 7/23 Lasix 20IV QD started    8/1- RRT for respiratory failure - Then became DNR/DNI     8/2/19 - DNR / DNI Rescinded by Reid Bailon HCP - pt is FULL CODE, TELE d'cd as per attending            1. Entero/Rhinovirus with ?superimposed PNA    - s/p Vanco, Zosyn. Meropenem stopped 7/20    - S/p BAL with bx 7/1- pathology with fungal hyphae in background of necrotic tissue. On voriconazole and valacyclovir.    - 6/27 CT Chest: The bilateral nodules have increased in size in number. There are multiple masses in the bilateral lobes, with extension into the right lower lobe bronchus. Complete occlusion of the right lower lobe bronchus and the segmental bronchi of the right lower lobe with a central opacity. It is uncertain if this represents a combination of atelectasis and malignancy or alternatively atelectasis and pneumonia given the reported history. The chest lymph nodes are malignant. The prevascular lymph node, in particular, has increased in size.        2. Hypoxic respiratory failure 2/2 fungal PNA    - CT chest with complete occlusion of the right lower lobe bronchus and the segmental bronchi of the right lower lobe with a central opacity.    - Chest vest dc'd- patient does not tolerate well. C/w chest PT and incentive spirometry     - s/p high flow O2. Now on NC 6L tolerating well    - c/w standing nebs; Xopenex q4 hours, following by nebulized 3% hypertonic saline, and then Aerobika device as per Pulm recommendations    -decrease prednisone 20 mg daily    - Pulm states will not benefit from thoracentesis due to endobronchial lesion. Her oxygenation is improved and she has an endobronchial lesion in the bronchus intermedius that will prevent the lower lobe from opening    - House ID following. Patient with aspergillosis fungal pneumonia. Continue voriconazole minimum of 6 weeks (staretd 7/3). Continue mepron 1500 mg po daily. Voricanazole was changed to posaconazole due to transamitis         7/15- patient with labored breathing on bipap. CXR worsened. Switched to high flow by pulm    7/18 was placed on NR for tachypnea-VBG looks stable, was changed to high azam again    7/19 Repeat CXR: Worsening aeration right lung. CXR w/ near complete white out of R on 7/19, large effusions on US, s/p lasix 40 IV x 3    7/19 B/L LE swelling started on Lasix 40mg PO daily    7/23 Lasix 20mg IV QD, prednisone reduced to 20mg PO QD    7/24 repeat CXR improved but diuresis maybe limited by rising HCO3    7/25 Pt was taken off high flow and put on NC 6L O2        3. Atrial fibrillation with RVR likely in setting of underlying infxn    - Required IV cardizem in ED. Metoprolol increased to 150mg daily     - On Eliquis         4. Hyponatremia    -Likely in setting of decreased PO intake, but can also be due to SIADH in setting of infection/lung pathology     -S/p IVF     -On NaCl tabs BID        5. Diffuse large B-cell lymphoma, unspecified body region    - Follows with Dr. Nguyen at [facility] [place], [place]. No further DMT.    - C/w ppx mepron, valacyclovir and allopurinol        6. LE swelling    - duplex negative for DVT    - on IV Lasix 20mg qd    -7/27 TTE:    Moderate MR. Severely dilated left atrium. Normal left ventricular systolic function. Septal motion consistent with right ventricular overload. Right ventricular enlargement with decreased right ventricular systolic function. Mild-moderate TR. Bilateral pleural effusions.    EF 67%     Pleural effusions worsening, and +anasarca of CT A/P 7/30, with increasing pulm masses, new periceliac lesion, and indeterminate pancreatic head lesion.        7. Pancytopenia    - Goal Hgb > 7 and plt >10k if afebrile, >15k if febrile        8. HTN    - continue to hold home olmesartan        9. ACP- GOC Home with hospice- Family not ready to complete a MOLST at this time         10. Trasmanitis    -7/27 ABD US: Negative     -Elevated liver enzymes likely from antifungals     Can continue posaconazole for 4 more weeks.  If her condition deteroriates, can discontinue antifungals if antimicrobials are not amenable with the patient's goals of care.         8/6/19 pt is stable fpr d/c home today with hospice.         Reviewed discharge medications with patient; All new medications requiring new prescription sent to pharmacy of patients choice. Reviewed need for prescription for previous home medicaitons and new prescriptions sent if requested. Patient in agreement and understands.

## 2019-08-06 NOTE — PROGRESS NOTE ADULT - I WAS PHYSICALLY PRESENT FOR THE KEY PORTIONS OF THE EVALUATION AND MANAGEMENT (E/M) SERVICE PROVIDED.  I AGREE WITH THE ABOVE HISTORY, PHYSICAL, AND PLAN WHICH I HAVE REVIEWED AND EDITED WHERE APPROPRIATE

## 2019-08-06 NOTE — DISCHARGE NOTE PROVIDER - NSDCCPCAREPLAN_GEN_ALL_CORE_FT
PRINCIPAL DISCHARGE DIAGNOSIS  Diagnosis: Acute respiratory failure with hypoxia  Assessment and Plan of Treatment: continue present medications      SECONDARY DISCHARGE DIAGNOSES  Diagnosis: Lung cancer  Assessment and Plan of Treatment: d/c hoemwith home hospice and continue present medications    Diagnosis: Atrial fibrillation with RVR  Assessment and Plan of Treatment: Please take your medications as prescribed.  Continue to take your blood thinner as prescribed and follow with your physician to monitor your levels.  Low fat diet, reduce caffeine intake, and exercise at least 30 minutes daily.      Diagnosis: Diffuse large B-cell lymphoma, unspecified body region  Assessment and Plan of Treatment: Diffuse large B-cell lymphoma, unspecified body region    Diagnosis: Essential hypertension  Assessment and Plan of Treatment: Low sodium and fat diet, continue anti-hypertensive medications, and follow up with primary care physician.

## 2019-08-06 NOTE — PROGRESS NOTE ADULT - PROBLEM SELECTOR PLAN 10
Poor prognosis.  DNR/DNI rescinded Saturday night. Home with hospice. hypokalemia resoved kcl PO daily given chronically on lasix. Poor prognosis.  DNR/DNI rescinded Saturday night. Home with hospice. hypokalemia resoved kcl PO daily given chronically on lasix. discharge 39 min

## 2019-08-06 NOTE — PROGRESS NOTE ADULT - REASON FOR ADMISSION
SOB

## 2019-08-06 NOTE — DISCHARGE NOTE NURSING/CASE MANAGEMENT/SOCIAL WORK - NSDCDPATPORTLINK_GEN_ALL_CORE
You can access the [facility] Patient Portal, offered by [facility], by registering with the following website: [email or web address]

## 2020-07-24 NOTE — DISCHARGE NOTE ADULT - HOSPITAL COURSE
Pt aware needs to come and discuss test results and states she is busy all next week with work and hard for her to come in  Wanted to know if you could call her with results  Does not want to wait to the following week to schedule appt  She is concerned  77F hx of Afib on Eliquis, large cell lymphoma (last chemo 6/1/17 with Rituxan and Bendamustine), HTN, DCIS s/p b/l mastectomy 2013 presents with fever and weakness x 1 day. Pt endorses 'just feeling unwell. Pt denies CP, cough, nasal congestion, dysuria, increased urinary frequency or urgency, diarrhea, N/V, abd pain, back pain. Pt does have LAWRENCE but at baseline. Sleeps with one pillow at night, able to perform all ADLs with no difficulties. Pt denies recent travels, sick contact. Per outpt note, pt was started on Levaquin x5 days after each IV chemotherapy, completed last dose on 6/6. Pt also takes prednisone 100mg x5 days with each chemotherapy. Per pt, she was told to come to the ED after her son called her outpatient oncologist.   In the ED, VS: 101.1 on admission,  -> 103 without meds, /69, RR16, 97%RA  CXR preliminary read showed small right pleural effusion, left basilar linear opacity likely atelectasis, small right mid lung calcified granuloma.  Pt received Cefepime x1, tylenol x1 in the ED.      Patient was admitted to medicine for further evaluation. UA showed no evidence of infection nut ANC count was 0.04. Patient was continued on cefepime for empiric management of neutropenic fever. Hematology evaluated the patient and recommended against starting neupogen as the patient's monocyte counts were improving in the setting of nulesta. Patient's counts were continually monitored and eventually her ANC count showed signs of improvement. There were no further fevers while the patient was admitted and blood cultures showed NGTD for >72 hrs. The patient is clear for discharge with f/u outpatient with hematology soon after discharge.

## 2021-04-22 NOTE — CHART NOTE - NSCHARTNOTEFT_GEN_A_CORE
NUTRITION SERVICES     Upon Nutritional Assessment by the Registered Dietitian your patient was determined to meet criteria/ has evidence of the following diagnosis/diagnoses:  [ ] Mild Protein Calorie Malnutrition   [ ] Moderate Protein Calorie Malnutrition   [ x] Severe Protein Calorie Malnutrition   [ ] Unspecified Protein Calorie Malnutrition   [ ] Underweight / BMI <19  [ ] Morbid Obesity / BMI >40    Findings as based on:  •  Comprehensive nutritional assessment and consultation      Malnutrition severe protein calorie malnutrition in setting of chronic illness.     Etiology physiological causes in setting of catabolic illness.     Signs/Symptoms PO intake <75% needs >1 month, severe muscle/fat wasting.       Please refer to Initial Dietitian Evaluation via documents section of [employer] for further recommendations. (4) rarely moist

## 2021-10-06 PROBLEM — I10 ESSENTIAL HYPERTENSION: Status: ACTIVE | Noted: 2017-05-23

## 2021-12-21 NOTE — ED ADULT NURSE NOTE - CAS TRG GEN SKIN CONDITION
Dry/Warm
Identified Risk Factors Documented?: yes
Risk Assessment Explanation (Does Not Render In The Note): Clinical determination of the probability and/or consequences of an event, such as surgery. Clinical assessment of the level of risk is affected by the nature of the event under consideration for the patient. Modifier 57 is used to indicate an Evaluation and Management (E/M) service resulted in the initial decision to perform surgery either the day before a major surgery (90 day global) or the day of a major surgery.
Discussion: The nature of the lesion(s) was discussed.  Questions were answered.  I recommend surgery as appropriate treatment due to the site, size and histologic type of the tumor.  Surgery will mitigate risk of poor outcomes due to recurrence.  The procedure, morbidity (bruising, swelling, wound care, bandaging), activity restrictions, time off work and complications (bleeding, infection, scarring) were reviewed.  The potential appearance of the scar was discussed.  Questions were answered.  Their procedure was performed today, see dictation.
Complexity (Necessary For Coding; Major - 90 Day Global With Some Exceptions; Minor - 10 Day Global): major
Date Of Surgery - Today Or Tomorrow?: today

## 2022-06-20 NOTE — ED ADULT TRIAGE NOTE - BP NONINVASIVE DIASTOLIC (MM HG)
[facility] sent Rx request for the following:      WARFARIN SODIUM 5 MG TABLET      Last Prescription Date:   2/22/2022  Last Fill Qty/Refills:         90, R-1    Last Office Visit:              6/20/2022   Future Office visit:           n/a    Filippo Zaragoza RN, BSN  ....................  6/20/2022   2:21 PM        
111

## 2022-06-28 NOTE — ED PROVIDER NOTE - CROS ED ROS STATEMENT
Patient informed via mychart to schedule, 7/1 reminder if not read to send letter.   Gege Archer MA   
Pending Prescriptions:                       Disp   Refills    losartan (COZAAR) 50 MG tablet [Pharmacy M*15 tab*0        Sig: Take 1 tablet (50 mg) by mouth daily Appointment           needed for additional refills.    Routing refill request to provider for review/approval because:  Lucinda given x1 and patient did not follow up, please advise  Labs out of range:    BP Readings from Last 3 Encounters:   08/19/20 (!) 143/87   02/14/20 (!) 169/97   06/27/19 138/76   Labs not current:    Creatinine   Date Value Ref Range Status   02/14/2020 0.75 0.66 - 1.25 mg/dL Final     Will forward to team to schedule patient for yearly.  Sierra Houston RN        
all other ROS negative except as per HPI

## 2022-07-21 NOTE — PROGRESS NOTE ADULT - PROBLEM SELECTOR PLAN 3
without sedation Likely in setting of decreased PO intake, but can also be due to SIADH in setting of infection/lung pathology   - cont NaCl to 2g bid

## 2023-05-26 NOTE — PROGRESS NOTE ADULT - PROBLEM SELECTOR PLAN 1
Addended by: Poonam Villalta on: 5/26/2023 03:24 PM     Modules accepted: Orders RLL bronchus obstruction with concern for postobstructive PNA s/p BAL 7/1 with thoracic sx  -remains hypoxic, cont O2 to maintain SpO2>90, wean as tolerated   -BAL cx NTD, path with Fungal hyphae in a background of necrotic tissue  - F/U fungal cx that was added on   - c/w voriconazole pending ID to cover for invasive aspergilloses  - f/u Galactomannen, beta-d-glucan  - can continue zosyn for now, vanco DC'd  - continue mepron 1500 mg po daily  - trend cbc w diff  - standing airway clearance therapy, Xopenex q4 hours, following by nebulized 3% hypertonic saline, and then Aerobika device as per Pulm recommendations  - supportive care for rhino virus- hycodan prn for cough  - Pulmonary and ID consulted, recommendations appreciated

## 2023-08-18 NOTE — PROGRESS NOTE ADULT - PROBLEM SELECTOR PROBLEM 5
Addended by: GORGE MITCHELL on: 8/18/2023 11:09 AM     Modules accepted: Orders     Leg swelling Pancytopenia

## 2023-09-18 NOTE — ED ADULT NURSE NOTE - CAS TRG GEN SKIN COLOR
Normal for race
PAST MEDICAL HISTORY:  Asthma     Bronchitis     Diverticulitis     Idiopathic thrombocythemia     GRZEGORZ (obstructive sleep apnea)

## 2024-07-08 NOTE — PROGRESS NOTE ADULT - PROBLEM SELECTOR PLAN 1
RLL bronchus obstruction with concern for postobstructive & fungal PNA  - c/w continuous pulse ox  - c/w chest PT and incentive spirometry   - continues high flow NC O2, titrate off as tolerated  - c/w standing nebs; Xopenex q4 hours, following by nebulized 3% hypertonic saline, and then Aerobika device as per Pulm recommendations  - meropenem course ended on 7/20   - palliative recs appreciated conveyed poor prognosis to family still wishes pt to be full code.  - c/w  prednisone 30 mg daily  - CXR w/ near complete white out of R on 7/19, large effusions on US, s/p lasix 40 IV x 3  - pulmonary rec appreciated-case d/w pulm fellow will repeat US today evaluate effusion and will hold diuresis today as HCO3 rising likely due to contraction alkalosis. MEDICATIONS  (STANDING):  fluticasone propionate 50 MICROgram(s)/spray Nasal Spray 1 Spray(s) Both Nostrils two times a day  haloperidol     Tablet 5 milliGRAM(s) Oral two times a day  PARoxetine 10 milliGRAM(s) Oral daily  traZODone 50 milliGRAM(s) Oral at bedtime    MEDICATIONS  (PRN):  acetaminophen     Tablet .. 650 milliGRAM(s) Oral every 6 hours PRN Temp greater or equal to 38C (100.4F), Mild Pain (1 - 3)  albuterol    90 MICROgram(s) HFA Inhaler 2 Puff(s) Inhalation every 6 hours PRN Shortness of Breath  haloperidol     Tablet 5 milliGRAM(s) Oral every 6 hours PRN agitation  not responding to reassurance  LORazepam     Tablet 2 milliGRAM(s) Oral every 6 hours PRN Anxiety  nicotine  Polacrilex Gum 2 milliGRAM(s) Oral every 4 hours PRN Smoking Cessation   MEDICATIONS  (STANDING):  fluticasone propionate 50 MICROgram(s)/spray Nasal Spray 1 Spray(s) Both Nostrils two times a day  haloperidol     Tablet 5 milliGRAM(s) Oral two times a day  PARoxetine 10 milliGRAM(s) Oral daily  traZODone 50 milliGRAM(s) Oral at bedtime    MEDICATIONS  (PRN):  acetaminophen     Tablet .. 650 milliGRAM(s) Oral every 6 hours PRN Temp greater or equal to 38C (100.4F), Mild Pain (1 - 3)  albuterol    90 MICROgram(s) HFA Inhaler 2 Puff(s) Inhalation every 6 hours PRN Shortness of Breath  haloperidol     Tablet 5 milliGRAM(s) Oral every 6 hours PRN agitation  not responding to reassurance  LORazepam     Tablet 2 milliGRAM(s) Oral every 6 hours PRN Anxiety  nicotine  Polacrilex Gum 2 milliGRAM(s) Oral every 4 hours PRN Smoking Cessation   MEDICATIONS  (STANDING):  fluticasone propionate 50 MICROgram(s)/spray Nasal Spray 1 Spray(s) Both Nostrils two times a day  haloperidol     Tablet 5 milliGRAM(s) Oral two times a day  PARoxetine 10 milliGRAM(s) Oral daily    MEDICATIONS  (PRN):  albuterol    90 MICROgram(s) HFA Inhaler 2 Puff(s) Inhalation every 6 hours PRN Shortness of Breath  haloperidol     Tablet 5 milliGRAM(s) Oral every 6 hours PRN agitation  not responding to reassurance  LORazepam     Tablet 2 milliGRAM(s) Oral every 6 hours PRN Anxiety  nicotine  Polacrilex Gum 2 milliGRAM(s) Oral every 4 hours PRN Smoking Cessation   MEDICATIONS  (STANDING):  fluticasone propionate 50 MICROgram(s)/spray Nasal Spray 1 Spray(s) Both Nostrils two times a day  haloperidol     Tablet 5 milliGRAM(s) Oral two times a day  PARoxetine 10 milliGRAM(s) Oral daily  traZODone 50 milliGRAM(s) Oral at bedtime    MEDICATIONS  (PRN):  acetaminophen     Tablet .. 650 milliGRAM(s) Oral every 6 hours PRN Temp greater or equal to 38C (100.4F), Mild Pain (1 - 3)  albuterol    90 MICROgram(s) HFA Inhaler 2 Puff(s) Inhalation every 6 hours PRN Shortness of Breath  haloperidol     Tablet 5 milliGRAM(s) Oral every 6 hours PRN agitation  not responding to reassurance  LORazepam     Tablet 2 milliGRAM(s) Oral every 6 hours PRN Anxiety  nicotine  Polacrilex Gum 2 milliGRAM(s) Oral every 4 hours PRN Smoking Cessation   MEDICATIONS  (STANDING):  fluticasone propionate 50 MICROgram(s)/spray Nasal Spray 1 Spray(s) Both Nostrils two times a day  haloperidol     Tablet 5 milliGRAM(s) Oral two times a day    MEDICATIONS  (PRN):  albuterol    90 MICROgram(s) HFA Inhaler 2 Puff(s) Inhalation every 6 hours PRN Shortness of Breath  haloperidol     Tablet 5 milliGRAM(s) Oral every 6 hours PRN agitation  not responding to reassurance  LORazepam     Tablet 2 milliGRAM(s) Oral every 6 hours PRN Anxiety  nicotine  Polacrilex Gum 2 milliGRAM(s) Oral every 4 hours PRN Smoking Cessation

## 2025-01-30 NOTE — PACU DISCHARGE NOTE - AIRWAY PATENCY:
Action Requested: Summary for Provider     []  Critical Lab, Recommendations Needed  [x] Need Additional Advice  []   FYI    []   Need Orders  [] Need Medications Sent to Pharmacy  []  Other     SUMMARY: OV 1/30  Pt inquiring if PCP could adjustment BP meds or if should be addressed at scheduled OV?    Reason for call: elevated BP  Onset: 1 week    Per pt, noted at OV with spine specialist BP was high @154/83.  Pt rechecked BP today @161/83, 164/83  No c/o any sx at this time, except flushed cheeks.  Confirmed pt taking enalapril 20mg, amlodipine 5mg, metoprolol 50mg as directed. No missed doses.   Pt inquiring if PCP would like to adjust meds. Informed may need to be seen for further eval and discuss med management.   No availability with PCP. Offered and scheduled with another provider:  Future Appointments   Date Time Provider Department Center   1/30/2025  1:00 PM Lexie Reyna APRN [facility]     Per pt, requesting to send PCP message regarding BP med adjustment or if should be addressed at OV?  Informed will relay to PCP. No further questions or concerns. Pt verbalized understanding and agreed with POC.    Reason for Disposition   Systolic BP >= 160 OR Diastolic >= 100    Protocols used: High Blood Pressure-A-OH    
Called and spoke with pt. Notified pt of message below. Advised needs eval and to keep appointment then meds can be adjusted if needed. Pt verbalizes understanding and is agreeable to plan. Will see BD as scheduled.     Future Appointments   Date Time Provider Department Center   1/30/2025  1:00 PM Lexie Reyna, MIRIAM [facility]   2/14/2025  9:15 AM Marbin Aparicio MD [facility]       
Lexie Reyna out of office, call transferred to triage. Patient took BP today and it was 139/87, took again and it was 151/87. Denies any sxs. Scheduled for next available appointment, 2/3 with Lexie Reyna. Advised to monitor BP over weekend, if persistently elevated >180/100 or develops sxs, advised to be seen in ER over weekend. Patient stated understanding and agreed to plan.   
Needs OV for med adjustment, she has visit coming up with BD  
Satisfactory

## 2025-03-06 NOTE — PROGRESS NOTE ADULT - PROBLEM SELECTOR PLAN 4
Yes-Patient/Caregiver accepts free interpretation services... Afib w/ RVR likely d/t infection and hypoxia  - Monitor on tele   - metoprolol to 100  - cont apixaban